# Patient Record
Sex: MALE | Race: OTHER | Employment: OTHER
[De-identification: names, ages, dates, MRNs, and addresses within clinical notes are randomized per-mention and may not be internally consistent; named-entity substitution may affect disease eponyms.]

---

## 2017-01-12 ENCOUNTER — OFFICE VISIT (OUTPATIENT)
Dept: INTERNAL MEDICINE | Facility: CLINIC | Age: 82
End: 2017-01-12

## 2017-01-12 VITALS
WEIGHT: 233 LBS | HEIGHT: 69 IN | SYSTOLIC BLOOD PRESSURE: 118 MMHG | DIASTOLIC BLOOD PRESSURE: 64 MMHG | BODY MASS INDEX: 34.51 KG/M2

## 2017-01-12 DIAGNOSIS — I42.0 DILATED CARDIOMYOPATHY (HCC): ICD-10-CM

## 2017-01-12 DIAGNOSIS — N40.1 BENIGN NON-NODULAR PROSTATIC HYPERPLASIA WITH LOWER URINARY TRACT SYMPTOMS: ICD-10-CM

## 2017-01-12 DIAGNOSIS — I48.20 CHRONIC ATRIAL FIBRILLATION (HCC): ICD-10-CM

## 2017-01-12 DIAGNOSIS — E11.9 TYPE 2 DIABETES MELLITUS WITHOUT COMPLICATION, WITHOUT LONG-TERM CURRENT USE OF INSULIN (HCC): Primary | ICD-10-CM

## 2017-01-12 PROCEDURE — 99214 OFFICE O/P EST MOD 30 MIN: CPT | Performed by: INTERNAL MEDICINE

## 2017-01-12 RX ORDER — FUROSEMIDE 40 MG/1
40 TABLET ORAL DAILY
Qty: 90 TABLET | Refills: 3 | Status: SHIPPED | OUTPATIENT
Start: 2017-01-12 | End: 2018-10-23

## 2017-01-12 ASSESSMENT — ENCOUNTER SYMPTOMS
ABDOMINAL PAIN: 0
SHORTNESS OF BREATH: 0

## 2017-01-13 DIAGNOSIS — I42.0 DILATED CARDIOMYOPATHY (HCC): ICD-10-CM

## 2017-01-13 RX ORDER — METOLAZONE 2.5 MG/1
2.5 TABLET ORAL
Qty: 30 TABLET | Refills: 3 | Status: SHIPPED | OUTPATIENT
Start: 2017-01-16 | End: 2017-01-16 | Stop reason: SDUPTHER

## 2017-01-16 DIAGNOSIS — I42.0 DILATED CARDIOMYOPATHY (HCC): ICD-10-CM

## 2017-01-16 RX ORDER — METOLAZONE 2.5 MG/1
2.5 TABLET ORAL
Qty: 30 TABLET | Refills: 3 | Status: SHIPPED | OUTPATIENT
Start: 2017-01-16 | End: 2017-03-23 | Stop reason: DRUGHIGH

## 2017-03-08 ENCOUNTER — HOSPITAL ENCOUNTER (OUTPATIENT)
Dept: GENERAL RADIOLOGY | Age: 82
Discharge: HOME | End: 2017-03-08
Payer: MEDICARE

## 2017-03-08 ENCOUNTER — HOSPITAL ENCOUNTER (OUTPATIENT)
Age: 82
Discharge: HOME | End: 2017-03-08
Payer: MEDICARE

## 2017-03-08 DIAGNOSIS — I48.20 CHRONIC ATRIAL FIBRILLATION (HCC): ICD-10-CM

## 2017-03-08 DIAGNOSIS — I42.0 DILATED CARDIOMYOPATHY (HCC): ICD-10-CM

## 2017-03-08 DIAGNOSIS — E11.9 TYPE 2 DIABETES MELLITUS WITHOUT COMPLICATION, WITHOUT LONG-TERM CURRENT USE OF INSULIN (HCC): ICD-10-CM

## 2017-03-08 LAB
ANION GAP SERPL CALCULATED.3IONS-SCNC: 16 MMOL/L (ref 9–17)
BUN BLDV-MCNC: 24 MG/DL (ref 8–23)
BUN/CREAT BLD: ABNORMAL (ref 9–20)
CALCIUM SERPL-MCNC: 10.1 MG/DL (ref 8.6–10.4)
CHLORIDE BLD-SCNC: 100 MMOL/L (ref 98–107)
CHOLESTEROL/HDL RATIO: 3.7
CHOLESTEROL: 182 MG/DL
CO2: 27 MMOL/L (ref 20–31)
CREAT SERPL-MCNC: 0.8 MG/DL (ref 0.7–1.2)
ESTIMATED AVERAGE GLUCOSE: 154 MG/DL
GFR AFRICAN AMERICAN: >60 ML/MIN
GFR NON-AFRICAN AMERICAN: >60 ML/MIN
GFR SERPL CREATININE-BSD FRML MDRD: ABNORMAL ML/MIN/{1.73_M2}
GFR SERPL CREATININE-BSD FRML MDRD: ABNORMAL ML/MIN/{1.73_M2}
GLUCOSE BLD-MCNC: 136 MG/DL (ref 70–99)
HBA1C MFR BLD: 7 % (ref 4–6)
HCT VFR BLD CALC: 37.9 % (ref 41–53)
HDLC SERPL-MCNC: 49 MG/DL
HEMOGLOBIN: 12.7 G/DL (ref 13.5–17.5)
LDL CHOLESTEROL: 101 MG/DL (ref 0–130)
MCH RBC QN AUTO: 27.5 PG (ref 26–34)
MCHC RBC AUTO-ENTMCNC: 33.4 G/DL (ref 31–37)
MCV RBC AUTO: 82.4 FL (ref 80–100)
PDW BLD-RTO: 15.7 % (ref 11.5–14.9)
PLATELET # BLD: 270 K/UL (ref 150–450)
PMV BLD AUTO: 8 FL (ref 6–12)
POTASSIUM SERPL-SCNC: 4.7 MMOL/L (ref 3.7–5.3)
RBC # BLD: 4.6 M/UL (ref 4.5–5.9)
SODIUM BLD-SCNC: 143 MMOL/L (ref 135–144)
TRIGL SERPL-MCNC: 162 MG/DL
VLDLC SERPL CALC-MCNC: ABNORMAL MG/DL (ref 1–30)
WBC # BLD: 6.7 K/UL (ref 3.5–11)

## 2017-03-08 PROCEDURE — 85027 COMPLETE CBC AUTOMATED: CPT

## 2017-03-08 PROCEDURE — 82043 UR ALBUMIN QUANTITATIVE: CPT

## 2017-03-08 PROCEDURE — 71020 XR CHEST STANDARD TWO VW: CPT

## 2017-03-08 PROCEDURE — 36415 COLL VENOUS BLD VENIPUNCTURE: CPT

## 2017-03-08 PROCEDURE — 80061 LIPID PANEL: CPT

## 2017-03-08 PROCEDURE — 83036 HEMOGLOBIN GLYCOSYLATED A1C: CPT

## 2017-03-08 PROCEDURE — 82570 ASSAY OF URINE CREATININE: CPT

## 2017-03-08 PROCEDURE — 80048 BASIC METABOLIC PNL TOTAL CA: CPT

## 2017-03-23 ENCOUNTER — OFFICE VISIT (OUTPATIENT)
Dept: INTERNAL MEDICINE CLINIC | Age: 82
End: 2017-03-23
Payer: MEDICARE

## 2017-03-23 VITALS
WEIGHT: 242 LBS | SYSTOLIC BLOOD PRESSURE: 128 MMHG | HEIGHT: 69 IN | BODY MASS INDEX: 35.84 KG/M2 | DIASTOLIC BLOOD PRESSURE: 70 MMHG

## 2017-03-23 DIAGNOSIS — E11.9 TYPE 2 DIABETES MELLITUS WITHOUT COMPLICATION, WITHOUT LONG-TERM CURRENT USE OF INSULIN (HCC): ICD-10-CM

## 2017-03-23 DIAGNOSIS — N40.1 BENIGN NON-NODULAR PROSTATIC HYPERPLASIA WITH LOWER URINARY TRACT SYMPTOMS: ICD-10-CM

## 2017-03-23 DIAGNOSIS — I48.20 CHRONIC ATRIAL FIBRILLATION (HCC): ICD-10-CM

## 2017-03-23 DIAGNOSIS — I42.0 DILATED CARDIOMYOPATHY (HCC): Primary | ICD-10-CM

## 2017-03-23 PROCEDURE — 99214 OFFICE O/P EST MOD 30 MIN: CPT | Performed by: INTERNAL MEDICINE

## 2017-03-23 RX ORDER — CARVEDILOL 3.12 MG/1
3.12 TABLET ORAL DAILY
Qty: 180 TABLET | Refills: 3 | Status: SHIPPED | OUTPATIENT
Start: 2017-03-23 | End: 2017-08-01 | Stop reason: SDUPTHER

## 2017-03-23 RX ORDER — METOLAZONE 2.5 MG/1
2.5 TABLET ORAL
Qty: 30 TABLET | Refills: 3 | Status: SHIPPED | OUTPATIENT
Start: 2017-03-23 | End: 2018-10-23 | Stop reason: SDUPTHER

## 2017-03-23 ASSESSMENT — ENCOUNTER SYMPTOMS
ORTHOPNEA: 1
BACK PAIN: 0
HEARTBURN: 0
HEMOPTYSIS: 0
NAUSEA: 0
COUGH: 0
VOMITING: 0

## 2017-03-23 ASSESSMENT — PATIENT HEALTH QUESTIONNAIRE - PHQ9
SUM OF ALL RESPONSES TO PHQ9 QUESTIONS 1 & 2: 0
2. FEELING DOWN, DEPRESSED OR HOPELESS: 0
1. LITTLE INTEREST OR PLEASURE IN DOING THINGS: 0
SUM OF ALL RESPONSES TO PHQ QUESTIONS 1-9: 0

## 2017-03-29 ENCOUNTER — HOSPITAL ENCOUNTER (OUTPATIENT)
Dept: NON INVASIVE DIAGNOSTICS | Age: 82
Discharge: HOME | End: 2017-03-29
Payer: MEDICARE

## 2017-03-29 DIAGNOSIS — I48.20 CHRONIC ATRIAL FIBRILLATION (HCC): ICD-10-CM

## 2017-03-29 DIAGNOSIS — I42.0 DILATED CARDIOMYOPATHY (HCC): ICD-10-CM

## 2017-03-29 LAB
LV EF: 55 %
LVEF MODALITY: NORMAL

## 2017-03-29 PROCEDURE — C8923 2D TTE W OR W/O FOL W/CON,CO: HCPCS

## 2017-03-29 PROCEDURE — 93005 ELECTROCARDIOGRAM TRACING: CPT

## 2017-03-30 LAB
EKG ATRIAL RATE: 300 BPM
EKG Q-T INTERVAL: 442 MS
EKG QRS DURATION: 100 MS
EKG QTC CALCULATION (BAZETT): 394 MS
EKG R AXIS: -16 DEGREES
EKG T AXIS: 87 DEGREES
EKG VENTRICULAR RATE: 48 BPM

## 2017-05-09 ENCOUNTER — OFFICE VISIT (OUTPATIENT)
Dept: INTERNAL MEDICINE CLINIC | Age: 82
End: 2017-05-09
Payer: MEDICARE

## 2017-05-09 ENCOUNTER — HOSPITAL ENCOUNTER (OUTPATIENT)
Age: 82
Setting detail: SPECIMEN
Discharge: HOME | End: 2017-05-09
Payer: MEDICARE

## 2017-05-09 VITALS
BODY MASS INDEX: 35.7 KG/M2 | WEIGHT: 241 LBS | DIASTOLIC BLOOD PRESSURE: 82 MMHG | HEIGHT: 69 IN | SYSTOLIC BLOOD PRESSURE: 128 MMHG

## 2017-05-09 DIAGNOSIS — N50.89 ANDROPAUSE: ICD-10-CM

## 2017-05-09 DIAGNOSIS — I48.20 CHRONIC ATRIAL FIBRILLATION (HCC): ICD-10-CM

## 2017-05-09 DIAGNOSIS — I10 ESSENTIAL HYPERTENSION: Primary | ICD-10-CM

## 2017-05-09 DIAGNOSIS — N40.1 BENIGN NON-NODULAR PROSTATIC HYPERPLASIA WITH LOWER URINARY TRACT SYMPTOMS: ICD-10-CM

## 2017-05-09 DIAGNOSIS — R06.02 SOB (SHORTNESS OF BREATH) ON EXERTION: ICD-10-CM

## 2017-05-09 DIAGNOSIS — E11.9 TYPE 2 DIABETES MELLITUS WITHOUT COMPLICATION, WITHOUT LONG-TERM CURRENT USE OF INSULIN (HCC): ICD-10-CM

## 2017-05-09 DIAGNOSIS — I42.0 DILATED CARDIOMYOPATHY (HCC): ICD-10-CM

## 2017-05-09 PROCEDURE — 99214 OFFICE O/P EST MOD 30 MIN: CPT | Performed by: INTERNAL MEDICINE

## 2017-05-10 ENCOUNTER — TELEPHONE (OUTPATIENT)
Dept: INTERNAL MEDICINE CLINIC | Age: 82
End: 2017-05-10

## 2017-05-10 LAB
SEX HORMONE BINDING GLOBULIN: 87 NMOL/L (ref 11–80)
TESTOSTERONE FREE-NONMALE: 7.4 PG/ML (ref 47–244)
TESTOSTERONE TOTAL: 80 NG/DL (ref 220–1000)

## 2017-05-18 ENCOUNTER — OFFICE VISIT (OUTPATIENT)
Dept: INTERNAL MEDICINE CLINIC | Age: 82
End: 2017-05-18
Payer: MEDICARE

## 2017-05-18 VITALS — BODY MASS INDEX: 35.99 KG/M2 | HEIGHT: 69 IN | WEIGHT: 243 LBS

## 2017-05-18 DIAGNOSIS — N40.1 BENIGN NON-NODULAR PROSTATIC HYPERPLASIA WITH LOWER URINARY TRACT SYMPTOMS: ICD-10-CM

## 2017-05-18 DIAGNOSIS — I48.20 CHRONIC ATRIAL FIBRILLATION (HCC): ICD-10-CM

## 2017-05-18 DIAGNOSIS — E11.9 TYPE 2 DIABETES MELLITUS WITHOUT COMPLICATION, WITHOUT LONG-TERM CURRENT USE OF INSULIN (HCC): ICD-10-CM

## 2017-05-18 DIAGNOSIS — I42.0 DILATED CARDIOMYOPATHY (HCC): ICD-10-CM

## 2017-05-18 DIAGNOSIS — N50.89 ANDROPAUSE: Primary | ICD-10-CM

## 2017-05-18 PROCEDURE — 99213 OFFICE O/P EST LOW 20 MIN: CPT | Performed by: INTERNAL MEDICINE

## 2017-05-18 RX ORDER — TESTOSTERONE 12.5 MG/1.25G
2.5 GEL TOPICAL DAILY
Qty: 1 PACKAGE | Refills: 3 | Status: SHIPPED | OUTPATIENT
Start: 2017-05-18 | End: 2017-08-16

## 2017-05-24 ENCOUNTER — TELEPHONE (OUTPATIENT)
Dept: INTERNAL MEDICINE CLINIC | Age: 82
End: 2017-05-24

## 2017-08-01 DIAGNOSIS — N40.1 BENIGN PROSTATIC HYPERPLASIA WITH LOWER URINARY TRACT SYMPTOMS, UNSPECIFIED MORPHOLOGY: ICD-10-CM

## 2017-08-01 DIAGNOSIS — I50.42 CHRONIC COMBINED SYSTOLIC AND DIASTOLIC CONGESTIVE HEART FAILURE (HCC): ICD-10-CM

## 2017-08-01 DIAGNOSIS — E11.59 TYPE 2 DIABETES MELLITUS WITH OTHER CIRCULATORY COMPLICATIONS (HCC): ICD-10-CM

## 2017-08-01 DIAGNOSIS — E11.9 TYPE 2 DIABETES MELLITUS WITHOUT COMPLICATION, UNSPECIFIED LONG TERM INSULIN USE STATUS: ICD-10-CM

## 2017-08-01 RX ORDER — GLIMEPIRIDE 2 MG/1
2 TABLET ORAL
Qty: 90 TABLET | Refills: 3 | Status: SHIPPED | OUTPATIENT
Start: 2017-08-01 | End: 2018-06-28 | Stop reason: SDUPTHER

## 2017-08-01 RX ORDER — CARVEDILOL 3.12 MG/1
3.12 TABLET ORAL DAILY
Qty: 90 TABLET | Refills: 3 | Status: SHIPPED | OUTPATIENT
Start: 2017-08-01 | End: 2017-11-08

## 2017-08-01 RX ORDER — TAMSULOSIN HYDROCHLORIDE 0.4 MG/1
0.4 CAPSULE ORAL DAILY
Qty: 90 CAPSULE | Refills: 3 | Status: SHIPPED | OUTPATIENT
Start: 2017-08-01 | End: 2018-08-27 | Stop reason: SDUPTHER

## 2017-08-01 RX ORDER — LISINOPRIL 20 MG/1
20 TABLET ORAL DAILY
Qty: 90 TABLET | Refills: 3 | Status: SHIPPED | OUTPATIENT
Start: 2017-08-01 | End: 2017-08-16 | Stop reason: SDUPTHER

## 2017-08-16 ENCOUNTER — OFFICE VISIT (OUTPATIENT)
Dept: INTERNAL MEDICINE CLINIC | Age: 82
End: 2017-08-16
Payer: MEDICARE

## 2017-08-16 VITALS
SYSTOLIC BLOOD PRESSURE: 100 MMHG | WEIGHT: 237 LBS | HEIGHT: 69 IN | DIASTOLIC BLOOD PRESSURE: 60 MMHG | BODY MASS INDEX: 35.1 KG/M2

## 2017-08-16 DIAGNOSIS — I50.42 CHRONIC COMBINED SYSTOLIC AND DIASTOLIC CONGESTIVE HEART FAILURE (HCC): ICD-10-CM

## 2017-08-16 DIAGNOSIS — Z98.890 HISTORY OF INGUINAL HERNIA REPAIR: ICD-10-CM

## 2017-08-16 DIAGNOSIS — E11.9 TYPE 2 DIABETES MELLITUS WITHOUT COMPLICATION, WITHOUT LONG-TERM CURRENT USE OF INSULIN (HCC): ICD-10-CM

## 2017-08-16 DIAGNOSIS — R20.2 PARESTHESIA OF BILATERAL LEGS: ICD-10-CM

## 2017-08-16 DIAGNOSIS — I42.0 DILATED CARDIOMYOPATHY (HCC): ICD-10-CM

## 2017-08-16 DIAGNOSIS — Z23 FLU VACCINE NEED: Primary | ICD-10-CM

## 2017-08-16 DIAGNOSIS — E11.42 TYPE 2 DIABETES MELLITUS WITH DIABETIC POLYNEUROPATHY, WITHOUT LONG-TERM CURRENT USE OF INSULIN (HCC): ICD-10-CM

## 2017-08-16 DIAGNOSIS — N40.1 BENIGN NON-NODULAR PROSTATIC HYPERPLASIA WITH LOWER URINARY TRACT SYMPTOMS: ICD-10-CM

## 2017-08-16 DIAGNOSIS — Z87.19 HISTORY OF INGUINAL HERNIA REPAIR: ICD-10-CM

## 2017-08-16 DIAGNOSIS — N50.89 ANDROPAUSE: ICD-10-CM

## 2017-08-16 DIAGNOSIS — I48.20 CHRONIC ATRIAL FIBRILLATION (HCC): ICD-10-CM

## 2017-08-16 PROCEDURE — G0008 ADMIN INFLUENZA VIRUS VAC: HCPCS | Performed by: INTERNAL MEDICINE

## 2017-08-16 PROCEDURE — 99214 OFFICE O/P EST MOD 30 MIN: CPT | Performed by: INTERNAL MEDICINE

## 2017-08-16 PROCEDURE — 90662 IIV NO PRSV INCREASED AG IM: CPT | Performed by: INTERNAL MEDICINE

## 2017-08-16 RX ORDER — LISINOPRIL 10 MG/1
10 TABLET ORAL DAILY
Qty: 90 TABLET | Refills: 3 | Status: SHIPPED | OUTPATIENT
Start: 2017-08-16 | End: 2018-06-28 | Stop reason: SDUPTHER

## 2017-08-16 RX ORDER — LISINOPRIL 20 MG/1
10 TABLET ORAL DAILY
Qty: 90 TABLET | Refills: 3 | Status: SHIPPED | OUTPATIENT
Start: 2017-08-16 | End: 2017-08-16 | Stop reason: SDUPTHER

## 2017-08-16 RX ORDER — GABAPENTIN 300 MG/1
300 CAPSULE ORAL NIGHTLY
Qty: 90 CAPSULE | Refills: 1 | Status: SHIPPED | OUTPATIENT
Start: 2017-08-16 | End: 2019-01-16

## 2017-08-16 ASSESSMENT — ENCOUNTER SYMPTOMS
COUGH: 0
HEARTBURN: 0
BACK PAIN: 0
HEMOPTYSIS: 0
VOMITING: 0
NAUSEA: 0
ORTHOPNEA: 1

## 2017-11-08 ENCOUNTER — OFFICE VISIT (OUTPATIENT)
Dept: INTERNAL MEDICINE CLINIC | Age: 82
End: 2017-11-08
Payer: MEDICARE

## 2017-11-08 VITALS
BODY MASS INDEX: 35.55 KG/M2 | WEIGHT: 240 LBS | DIASTOLIC BLOOD PRESSURE: 72 MMHG | HEIGHT: 69 IN | OXYGEN SATURATION: 94 % | HEART RATE: 42 BPM | SYSTOLIC BLOOD PRESSURE: 118 MMHG

## 2017-11-08 DIAGNOSIS — M54.50 ACUTE RIGHT-SIDED LOW BACK PAIN WITHOUT SCIATICA: ICD-10-CM
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
## 2017-11-08 DIAGNOSIS — I48.20 CHRONIC ATRIAL FIBRILLATION (HCC): ICD-10-CM

## 2017-11-08 DIAGNOSIS — E11.9 TYPE 2 DIABETES MELLITUS WITHOUT COMPLICATION, WITHOUT LONG-TERM CURRENT USE OF INSULIN (HCC): ICD-10-CM

## 2017-11-08 PROCEDURE — 1036F TOBACCO NON-USER: CPT | Performed by: INTERNAL MEDICINE

## 2017-11-08 PROCEDURE — G8417 CALC BMI ABV UP PARAM F/U: HCPCS | Performed by: INTERNAL MEDICINE

## 2017-11-08 PROCEDURE — G8484 FLU IMMUNIZE NO ADMIN: HCPCS | Performed by: INTERNAL MEDICINE

## 2017-11-08 PROCEDURE — 99214 OFFICE O/P EST MOD 30 MIN: CPT | Performed by: INTERNAL MEDICINE

## 2017-11-08 PROCEDURE — 4040F PNEUMOC VAC/ADMIN/RCVD: CPT | Performed by: INTERNAL MEDICINE

## 2017-11-08 PROCEDURE — 1123F ACP DISCUSS/DSCN MKR DOCD: CPT | Performed by: INTERNAL MEDICINE

## 2017-11-08 PROCEDURE — G8427 DOCREV CUR MEDS BY ELIG CLIN: HCPCS | Performed by: INTERNAL MEDICINE

## 2017-11-08 RX ORDER — TIZANIDINE 2 MG/1
2 TABLET ORAL NIGHTLY PRN
Qty: 30 TABLET | Refills: 0 | Status: SHIPPED | OUTPATIENT
Start: 2017-11-08 | End: 2018-10-30

## 2017-11-08 NOTE — PROGRESS NOTES
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  Accidental fall, initial encounter Yes    Acute right-sided low back pain without sciatica     Type 2 diabetes mellitus without complication, without long-term current use of insulin (HCC)     Chronic atrial fibrillation ([facility] [address]           AND -had an accidental fall from ladder at home -     Known  CHRONIC  HEART FAILURE . NYHA CLASS   -- 2 -- ,  NO CHANGE IN SOB AND LEG EDEMA SINCE LAST VISIT . NO ORTHOPNEA. COMPLIANT WITH DRUG REGIMEN . -irregular bowels-      ROS AS NOTED IN HPI ,    Other  positive -- Review of Systems   Constitutional: Negative for malaise/fatigue and weight loss. Respiratory: Negative for shortness of breath. Cardiovascular: Negative for chest pain, palpitations and leg swelling. Gastrointestinal: Positive for constipation. Negative for abdominal pain. Musculoskeletal: Positive for falls, joint pain and myalgias. Skin: Negative for rash. Neurological: Negative for focal weakness. ALL OTHER  SYSTEM REVIEW NEGATIVE.   Social History   Substance Use Topics    Smoking status: Former Smoker     Packs/day: 20.00     Types: Cigarettes    Smokeless tobacco: Never Used      Comment: quit 42 years ago     Alcohol use No      Comment: quite 42 years ago      Xarelto [rivaroxaban]     FAMILY  And SOCIAL HISTORY:   Reviewed and No change from previous visit     Allergies; medicatons; past medical, surgical, family, and social history; and problem list reviewed by me, as indicated in this encounter  . OBJECTIVE      Physical exam      Vitals:    11/08/17 1402   BP: 118/72   Pulse: (!) 42   SpO2: 94%   Weight: 240 lb (108.9 kg)   Height: 5' 8.9\" (1.75 m)      Estimated body mass index is 35.55 kg/m² as calculated from the following:    Height as of this encounter: 5' 8.9\" (1.75 m). Weight as of this encounter: 240 lb (108.9 kg). Physical Exam   Constitutional: He is cooperative. Neck: Carotid bruit is not present. No thyroid mass and no thyromegaly present. Cardiovascular: Normal rate, regular rhythm and normal heart sounds. Exam reveals no S3. No murmur heard. Pulmonary/Chest: Effort normal and breath sounds normal.   Neurological: He is alert. He has normal strength. Skin: Skin is warm and dry. No rash noted. Psychiatric: He has a normal mood and affect.              DIAGNOSTICS / INSERTS / IMAGES      [x] Reviewed  ;       Labs           Chemistry        Component Value Date/Time     03/08/2017 1221    K 4.7 03/08/2017 1221     03/08/2017 1221    CO2 27 03/08/2017 1221    BUN 24 (H) 03/08/2017 1221    CREATININE 0.80 03/08/2017 1221        Component Value Date/Time    CALCIUM 10.1 03/08/2017 1221    ALKPHOS 47 03/31/2016 1215    AST 16 03/31/2016 1215    ALT 18 03/31/2016 1215    BILITOT 0.57 03/31/2016 1215          Lab Results   Component Value Date    WBC 6.7 03/08/2017    HGB 12.7 (L) 03/08/2017    HCT 37.9 (L) 03/08/2017    MCV 82.4 03/08/2017     03/08/2017     Lab Results   Component Value Date    LABMICR 8 08/30/2016     No components found for: CHLPL  Lab Results   Component Value Date    TRIG 162 (H) 03/08/2017     Lab Results   Component Value Date    HDL 49 03/08/2017     Lab Results   Component Value Date    LDLCHOLESTEROL 101 03/08/2017               Results for orders placed or performed during the hospital encounter of 05/09/17 Testosterone Free and Total Male   Result Value Ref Range    Testosterone 80 (L) 220 - 1,000 ng/dL    Sex Hormone Binding 87 (H) 11 - 80 nmol/L    Testosterone, Free 7.4 (L) 47 - 244 pg/mL        No results found. ASSESSMENT / [address] was seen today for fall, congestive heart failure and other. Diagnoses and all orders for this visit:    Accidental fall, initial encounter  Has sxs  -     tiZANidine (ZANAFLEX) 2 MG tablet; Take 1 tablet by mouth nightly as needed (muscle spasms)    Acute right-sided low back pain without sciatica  bruising-     tiZANidine (ZANAFLEX) 2 MG tablet; Take 1 tablet by mouth nightly as needed (muscle spasms)    Type 2 diabetes mellitus without complication, without long-term current use of insulin ([facility] [address]  Fair control . Therapy reviewed . Continue to monitor .     -     Hemoglobin A1C; Future  -     Basic Metabolic Panel; Future    Chronic atrial fibrillation ([facility] [address]  1. Stable . 2. Rx reviewed . 3. Continue present regimen .                -Key points--;  -accidental fall-     SALIENT  ACTIONS TODAYS VISIT     Today's office visit SALIENT ACTIONS   Medications Discontinued During This Encounter   Medication Reason    carvedilol (COREG) 3.125 MG tablet            Orders Placed This Encounter   Procedures    Hemoglobin A1C     Standing Status:   Future     Standing Expiration Date:   11/8/2018    Basic Metabolic Panel     Standing Status:   Future     Standing Expiration Date:   11/8/2018               There are no Patient Instructions on file for this visit.      Current Outpatient Prescriptions   Medication Sig Dispense Refill    tiZANidine (ZANAFLEX) 2 MG tablet Take 1 tablet by mouth nightly as needed (muscle spasms) 30 tablet 0    gabapentin (NEURONTIN) 300 MG capsule Take 1 capsule by mouth nightly 90 capsule 1    lisinopril (PRINIVIL;ZESTRIL) 10 MG tablet Take 1 tablet by mouth daily 90 tablet 3    metFORMIN (GLUCOPHAGE) 500 MG tablet Take 1 tablet by mouth daily (with breakfast) 90 tablet 3    tamsulosin (FLOMAX) 0.4 MG capsule Take 1 capsule by mouth daily 90 capsule 3    glimepiride (AMARYL) 2 MG tablet Take 1 tablet by mouth every morning (before breakfast) 90 tablet 3    metolazone (ZAROXOLYN) 2.5 MG tablet Take 1 tablet by mouth Twice a Week Mondays only 30 tablet 3    furosemide (LASIX) 40 MG tablet Take 1 tablet by mouth daily In morning 90 tablet 3    simvastatin (ZOCOR) 20 MG tablet Take 1 tablet by mouth nightly 90 tablet 3    glucose monitoring kit (FREESTYLE) monitoring kit 1 each by Does not apply route once for 1 dose. 1 kit 0    aspirin EC 81 MG EC tablet Take 1 tablet by mouth daily. 30 tablet 11    Multiple Vitamin (MULTI VITAMIN MENS PO) Take  by mouth.  Calcium Citrate-Vitamin D (CITRACAL + D PO) Take  by mouth.  fish oil-omega-3 fatty acids 1000 MG capsule Take 2 g by mouth daily.  Cyanocobalamin (VITAMIN B 12 PO) Take  by mouth.  Misc Natural Products (LEG VEIN & CIRCULATION PO) Take  by mouth. No current facility-administered medications for this visit. FOLLOW UP  PLANS     Return in about 1 month (around 12/8/2017). MD PIA [facility] [address], [address] [place] [address].    Phone [phone]   Fax: [phone]  Answering Service: [phone]

## 2017-11-20 ASSESSMENT — ENCOUNTER SYMPTOMS
ABDOMINAL PAIN: 0
CONSTIPATION: 1
SHORTNESS OF BREATH: 0

## 2017-12-21 ENCOUNTER — OFFICE VISIT (OUTPATIENT)
Dept: UROLOGY | Age: 82
End: 2017-12-21
Payer: MEDICARE

## 2017-12-21 VITALS
SYSTOLIC BLOOD PRESSURE: 132 MMHG | DIASTOLIC BLOOD PRESSURE: 74 MMHG | HEART RATE: 64 BPM | RESPIRATION RATE: 16 BRPM | HEIGHT: 69 IN | TEMPERATURE: 98 F | WEIGHT: 240.08 LBS | BODY MASS INDEX: 35.56 KG/M2

## 2017-12-21 DIAGNOSIS — N40.1 BPH WITH OBSTRUCTION/LOWER URINARY TRACT SYMPTOMS: ICD-10-CM

## 2017-12-21 DIAGNOSIS — N13.8 BPH WITH OBSTRUCTION/LOWER URINARY TRACT SYMPTOMS: ICD-10-CM

## 2017-12-21 DIAGNOSIS — N43.0 ENCYSTED HYDROCELE: Primary | ICD-10-CM

## 2017-12-21 DIAGNOSIS — N47.1 PHIMOSIS: ICD-10-CM

## 2017-12-21 PROCEDURE — G8427 DOCREV CUR MEDS BY ELIG CLIN: HCPCS | Performed by: UROLOGY

## 2017-12-21 PROCEDURE — 99214 OFFICE O/P EST MOD 30 MIN: CPT | Performed by: UROLOGY

## 2017-12-21 PROCEDURE — 1123F ACP DISCUSS/DSCN MKR DOCD: CPT | Performed by: UROLOGY

## 2017-12-21 PROCEDURE — 1036F TOBACCO NON-USER: CPT | Performed by: UROLOGY

## 2017-12-21 PROCEDURE — G8417 CALC BMI ABV UP PARAM F/U: HCPCS | Performed by: UROLOGY

## 2017-12-21 PROCEDURE — 4040F PNEUMOC VAC/ADMIN/RCVD: CPT | Performed by: UROLOGY

## 2017-12-21 PROCEDURE — G8484 FLU IMMUNIZE NO ADMIN: HCPCS | Performed by: UROLOGY

## 2017-12-21 ASSESSMENT — ENCOUNTER SYMPTOMS
COUGH: 0
EYE PAIN: 0
NAUSEA: 0
WHEEZING: 0
VOMITING: 0
EYE REDNESS: 0
BACK PAIN: 0
SHORTNESS OF BREATH: 0
COLOR CHANGE: 0
ABDOMINAL PAIN: 0

## 2017-12-21 NOTE — PROGRESS NOTES
non-tender, non-distended with no CVA,  No flank tenderness,  Or hepatosplenomegaly   Lymphatics: No palpable lymphadenopathy. Bladder non-tender and not distended. Musculoskeletal: Normal gait and station  Testiscles: right hydrocele noted, absent left testicle. Phimosis noted. Assessment and Plan      1. Encysted hydrocele    2. Phimosis    3. BPH with obstruction/lower urinary tract symptoms       He is now unable to retract his foreskin      Plan:         Continue Flomax  Discussed hyrdocelectomy, but he is not bothered enough to proceed. We discussed a dorsal slit for the phimosis, but he is unclear if he wants to proceed. Prescriptions Ordered:  No orders of the defined types were placed in this encounter. Orders Placed:  No orders of the defined types were placed in this encounter.          Zhanna Maya MD

## 2017-12-21 NOTE — LETTER
[facility]  [facility] [place]  [address]  [address]  [address] [place] [id number]  Dept: [phone]  Dept Fax: [phone]        12/21/17    Patient: Parrish Tomas  YOB: 1935    Dear Ramesh Posada MD,    I had the pleasure of seeing one of your patients, Clyde Mathias today in the office today. Below are the relevant portions of my assessment and plan of care. IMPRESSION:     1. Encysted hydrocele    2. Phimosis    3. BPH with obstruction/lower urinary tract symptoms        PLAN:        Continue Flomax  Discussed hyrdocelectomy, but he is not bothered enough to proceed. We discussed a dorsal slit for the phimosis, but he is unclear if he wants to proceed. Prescriptions Ordered:  No orders of the defined types were placed in this encounter. Orders Placed:  No orders of the defined types were placed in this encounter. Thank you for allowing me to participate in the care of this patient. I will keep you updated on this patient's follow up and I look forward to serving you and your patients again in the future.         Oanh Moran MD

## 2018-01-30 DIAGNOSIS — E78.2 MIXED HYPERLIPIDEMIA: ICD-10-CM

## 2018-01-30 RX ORDER — SIMVASTATIN 20 MG
20 TABLET ORAL NIGHTLY
Qty: 90 TABLET | Refills: 3 | Status: SHIPPED | OUTPATIENT
Start: 2018-01-30 | End: 2019-05-29 | Stop reason: SDUPTHER

## 2018-06-28 DIAGNOSIS — I50.42 CHRONIC COMBINED SYSTOLIC AND DIASTOLIC CONGESTIVE HEART FAILURE (HCC): ICD-10-CM

## 2018-06-28 DIAGNOSIS — E11.9 TYPE 2 DIABETES MELLITUS WITHOUT COMPLICATION, UNSPECIFIED LONG TERM INSULIN USE STATUS: ICD-10-CM

## 2018-06-28 RX ORDER — LISINOPRIL 10 MG/1
TABLET ORAL
Qty: 90 TABLET | Refills: 3 | Status: SHIPPED | OUTPATIENT
Start: 2018-06-28 | End: 2019-10-16

## 2018-06-28 RX ORDER — GLIMEPIRIDE 2 MG/1
TABLET ORAL
Qty: 90 TABLET | Refills: 3 | Status: SHIPPED | OUTPATIENT
Start: 2018-06-28 | End: 2018-11-21 | Stop reason: SDUPTHER

## 2018-08-27 RX ORDER — TAMSULOSIN HYDROCHLORIDE 0.4 MG/1
0.4 CAPSULE ORAL DAILY
Qty: 90 CAPSULE | Refills: 3 | Status: SHIPPED | OUTPATIENT
Start: 2018-08-27 | End: 2019-08-19 | Stop reason: SDUPTHER

## 2018-08-27 NOTE — TELEPHONE ENCOUNTER
Medication: tamsulosin  Last visit: 118/17  Next visit: Visit date not found  Last refill: 7/2017  Pharmacy: [facility]

## 2018-10-23 ENCOUNTER — OFFICE VISIT (OUTPATIENT)
Dept: INTERNAL MEDICINE CLINIC | Age: 83
End: 2018-10-23
Payer: MEDICARE

## 2018-10-23 ENCOUNTER — OFFICE VISIT (OUTPATIENT)
Dept: UROLOGY | Age: 83
End: 2018-10-23
Payer: MEDICARE

## 2018-10-23 VITALS
SYSTOLIC BLOOD PRESSURE: 172 MMHG | HEART RATE: 66 BPM | HEIGHT: 69 IN | WEIGHT: 242 LBS | BODY MASS INDEX: 35.84 KG/M2 | OXYGEN SATURATION: 95 % | DIASTOLIC BLOOD PRESSURE: 74 MMHG

## 2018-10-23 VITALS
SYSTOLIC BLOOD PRESSURE: 112 MMHG | HEART RATE: 52 BPM | DIASTOLIC BLOOD PRESSURE: 62 MMHG | WEIGHT: 242 LBS | BODY MASS INDEX: 35.84 KG/M2 | HEIGHT: 69 IN | TEMPERATURE: 98 F

## 2018-10-23 DIAGNOSIS — I42.0 DILATED CARDIOMYOPATHY (HCC): ICD-10-CM

## 2018-10-23 DIAGNOSIS — N40.1 BENIGN PROSTATIC HYPERPLASIA WITH LOWER URINARY TRACT SYMPTOMS, SYMPTOM DETAILS UNSPECIFIED: ICD-10-CM

## 2018-10-23 DIAGNOSIS — N47.1 PHIMOSIS: Primary | ICD-10-CM

## 2018-10-23 DIAGNOSIS — N50.89 SCROTAL EDEMA: ICD-10-CM

## 2018-10-23 DIAGNOSIS — I48.20 CHRONIC ATRIAL FIBRILLATION (HCC): ICD-10-CM

## 2018-10-23 DIAGNOSIS — E11.9 TYPE 2 DIABETES MELLITUS WITHOUT COMPLICATION, WITHOUT LONG-TERM CURRENT USE OF INSULIN (HCC): ICD-10-CM

## 2018-10-23 DIAGNOSIS — R60.0 LOWER EXTREMITY EDEMA: ICD-10-CM

## 2018-10-23 DIAGNOSIS — Z23 NEED FOR VACCINATION: Primary | ICD-10-CM

## 2018-10-23 DIAGNOSIS — L81.9 SKIN DEPIGMENTATION: ICD-10-CM

## 2018-10-23 DIAGNOSIS — I50.43 ACUTE ON CHRONIC COMBINED SYSTOLIC AND DIASTOLIC HEART FAILURE (HCC): ICD-10-CM

## 2018-10-23 PROCEDURE — G8484 FLU IMMUNIZE NO ADMIN: HCPCS | Performed by: NURSE PRACTITIONER

## 2018-10-23 PROCEDURE — 1123F ACP DISCUSS/DSCN MKR DOCD: CPT | Performed by: NURSE PRACTITIONER

## 2018-10-23 PROCEDURE — 1036F TOBACCO NON-USER: CPT | Performed by: INTERNAL MEDICINE

## 2018-10-23 PROCEDURE — 4040F PNEUMOC VAC/ADMIN/RCVD: CPT | Performed by: INTERNAL MEDICINE

## 2018-10-23 PROCEDURE — 1123F ACP DISCUSS/DSCN MKR DOCD: CPT | Performed by: INTERNAL MEDICINE

## 2018-10-23 PROCEDURE — 4040F PNEUMOC VAC/ADMIN/RCVD: CPT | Performed by: NURSE PRACTITIONER

## 2018-10-23 PROCEDURE — G8417 CALC BMI ABV UP PARAM F/U: HCPCS | Performed by: NURSE PRACTITIONER

## 2018-10-23 PROCEDURE — 1036F TOBACCO NON-USER: CPT | Performed by: NURSE PRACTITIONER

## 2018-10-23 PROCEDURE — 99214 OFFICE O/P EST MOD 30 MIN: CPT | Performed by: NURSE PRACTITIONER

## 2018-10-23 PROCEDURE — 1101F PT FALLS ASSESS-DOCD LE1/YR: CPT | Performed by: INTERNAL MEDICINE

## 2018-10-23 PROCEDURE — 99214 OFFICE O/P EST MOD 30 MIN: CPT | Performed by: INTERNAL MEDICINE

## 2018-10-23 PROCEDURE — G8427 DOCREV CUR MEDS BY ELIG CLIN: HCPCS | Performed by: NURSE PRACTITIONER

## 2018-10-23 PROCEDURE — G8417 CALC BMI ABV UP PARAM F/U: HCPCS | Performed by: INTERNAL MEDICINE

## 2018-10-23 PROCEDURE — G8427 DOCREV CUR MEDS BY ELIG CLIN: HCPCS | Performed by: INTERNAL MEDICINE

## 2018-10-23 PROCEDURE — G8484 FLU IMMUNIZE NO ADMIN: HCPCS | Performed by: INTERNAL MEDICINE

## 2018-10-23 PROCEDURE — 1101F PT FALLS ASSESS-DOCD LE1/YR: CPT | Performed by: NURSE PRACTITIONER

## 2018-10-23 RX ORDER — METOLAZONE 2.5 MG/1
2.5 TABLET ORAL DAILY
Qty: 30 TABLET | Refills: 3 | Status: SHIPPED | OUTPATIENT
Start: 2018-10-23 | End: 2018-11-21 | Stop reason: SDUPTHER

## 2018-10-23 RX ORDER — TORSEMIDE 20 MG/1
20 TABLET ORAL
Qty: 30 TABLET | Refills: 0 | Status: SHIPPED | OUTPATIENT
Start: 2018-10-23 | End: 2018-10-30 | Stop reason: SDUPTHER

## 2018-10-23 ASSESSMENT — ENCOUNTER SYMPTOMS
WHEEZING: 0
COLOR CHANGE: 0
COUGH: 0
BACK PAIN: 0
EYE REDNESS: 0
EYE PAIN: 0
ABDOMINAL PAIN: 0
VOMITING: 0
SHORTNESS OF BREATH: 0
NAUSEA: 0

## 2018-10-23 ASSESSMENT — PATIENT HEALTH QUESTIONNAIRE - PHQ9
SUM OF ALL RESPONSES TO PHQ QUESTIONS 1-9: 0
SUM OF ALL RESPONSES TO PHQ QUESTIONS 1-9: 0
2. FEELING DOWN, DEPRESSED OR HOPELESS: 0
1. LITTLE INTEREST OR PLEASURE IN DOING THINGS: 0
SUM OF ALL RESPONSES TO PHQ9 QUESTIONS 1 & 2: 0

## 2018-10-23 NOTE — PATIENT INSTRUCTIONS
Asked  [facility] [place], [address] to evaluate depigmentation noted in assessment.     He agrees with plan: appt has been scheduled today by our office with Dr Karo Galloway for evaluation of lower leg pitting edema,  SOB and weight gain at 4pm.     Elevate legs when sitting, consider scrotal elevation with hand towel when sitting or lying     Continue water pills as per Dr Berenice Newman instructions.      F/u to re-evaluate in 3 weeks for possible dorsal slit. Wife and pt both verbalize understanding.     Call with questions or concerns sooner.

## 2018-10-23 NOTE — PATIENT INSTRUCTIONS
Patient Education        Limiting Sodium and Fluids With Heart Failure: Care Instructions  Your Care Instructions    Sodium causes your body to hold on to extra water. This may cause your heart failure symptoms to get worse. Limiting sodium may help you feel better and lower your risk of having to go to the hospital.  People get most of their sodium from processed foods. Fast food and restaurant meals also tend to be very high in sodium. Your doctor may suggest that you limit sodium to 2,000 milligrams (mg) a day or less. That is less than 1 teaspoon of salt a day, including all the salt you eat in cooked or packaged foods. Usually, you have to limit the amount of liquids you drink only if your heart failure is severe. Limiting sodium alone often is enough to help your body get rid of extra fluids. However, your doctor may tell you to limit your fluid intake to a set amount each day. Follow-up care is a key part of your treatment and safety. Be sure to make and go to all appointments, and call your doctor if you are having problems. It's also a good idea to know your test results and keep a list of the medicines you take. How can you care for yourself at home? Read food labels  · Read food labels on cans and food packages. The labels tell you how much sodium is in each serving. Make sure that you look at the serving size. If you eat more than the serving size, you have eaten more sodium than is listed for one serving. · Food labels also tell you the Percent Daily Value. If the Percent Daily Value says 50%, it means that you will get at least 50% of all the sodium you need for the entire day in one serving. Choose products with low Percent Daily Values for sodium. · Be aware that sodium can come in forms other than salt, including monosodium glutamate (MSG), sodium citrate, and sodium bicarbonate (baking soda). MSG is often added to Asian food. You can sometimes ask for food without MSG or salt.   Buy

## 2018-10-23 NOTE — PROGRESS NOTES
mouth daily In morning 90 tablet 3    glucose monitoring kit (FREESTYLE) monitoring kit 1 each by Does not apply route once for 1 dose. 1 kit 0    aspirin EC 81 MG EC tablet Take 1 tablet by mouth daily. 30 tablet 11    Multiple Vitamin (MULTI VITAMIN MENS PO) Take  by mouth.  Calcium Citrate-Vitamin D (CITRACAL + D PO) Take  by mouth.  fish oil-omega-3 fatty acids 1000 MG capsule Take 2 g by mouth daily.  Cyanocobalamin (VITAMIN B 12 PO) Take  by mouth.  Misc Natural Products (LEG VEIN & CIRCULATION PO) Take  by mouth. Xarelto [rivaroxaban]  History   Smoking Status    Former Smoker    Packs/day: 20.00    Types: Cigarettes   Smokeless Tobacco    Never Used     Comment: quit 42 years ago      (Ifpatient a smoker, smoking cessation counseling offered)    History   Alcohol Use No     Comment: quite 42 years ago        REVIEW OF SYSTEMS:  Review of Systems    Physical Exam:      Vitals:    10/23/18 1136   BP: 112/62   Pulse: 52   Temp: 98 °F (36.7 °C)     Body mass index is 35.84 kg/m². Patient is a 81 y.o. male in no acute distress and alert and oriented to person, place and time. Physical Exam  Constitutional: Patient in no acute distress. Neuro: Alert and oriented to person, place and time. Psych: Mood normal, affect normal  Skin:warm, pink,  No rash noted  HEENT: Head: Normocephalic andatraumatic  Conjunctivae and EOM are normal. Pupils are equal, round  Nose:Normal  Right External Ear: Normal; Left External Ear: Normal  Mouth: Mucosa Moist  Neck: Supple  Lungs: Respiratory effort is normal  Cardiovascular: strong and regular. 2-3 plus Pitting edema in the legs, low legs shiney, no hair. Abdomen: Soft, non-tender, non-distended  Bladder non-tender and not distended. Musculoskeletal: slow gait and station  Penis: retracted, unable to expose due to swelling. Phimosis, skin around phimosis white depigmentation, no open areas. Assessment and Plan      1.  Phimosis

## 2018-10-23 NOTE — PROGRESS NOTES
OFFICE VISIT  PROGRESS NOTE         Date of patient's visit: 10/23/2018  Patient's Name:  Coty Metcalf  YOB: 1935       Patient Care Team:  Jesenia Mars MD as PCP - General (Internal Medicine)  Gordon Damon MD as Consulting Physician (Hematology and Oncology)  Art Kruger MD as Consulting Physician (Radiation Oncology)  Mirella Stuart MD        SUBJECTIVE     HISTORY           History of present illness     Pertinent details  added to ,       Chief Complaint   Patient presents with    Leg Swelling     bilateral leg edema           Encounter Diagnoses   Name Primary?  Edema, unspecified type Yes    Need for vaccination            Symptom / problem          ---     · Location/          --------  Context/Setting---        Timing/Onset/Duration--:         Quality/ Severity       ----         Modifying Factors:------        ROS AS NOTED IN HPI ,    Other  positive-- Review of Systems    ALL OTHER  SYSTEM REVIEW NEGATIVE. Allergies; medicatons; past medical, surgical, family, and social history; and problem list reviewed by me, as indicated in this encounter  . OBJECTIVE      Physical exam      Vitals:    10/23/18 1609   BP: (!) 172/74   Pulse: 66   SpO2: 95%   Weight: 242 lb (109.8 kg)   Height: 5' 8.9\" (1.75 m)        Estimated body mass index is 35.84 kg/m² as calculated from the following:    Height as of this encounter: 5' 8.9\" (1.75 m). Weight as of this encounter: 242 lb (109.8 kg). Physical Exam   Vitals: BP (!) 172/74   Pulse 66   Ht 5' 8.9\" (1.75 m)   Wt 242 lb (109.8 kg)   SpO2 95%   BMI 35.84 kg/m²                 Body mass index is 35.84 kg/m².      General Appearance:   Alert , CO-OPERATIVE , Pulmonary/Chest:        Clear to auscultation bilaterally . No wheezes, rales or rhonchi . Cardiovascular:            Normal rate, regular rhythm,                                          No murmur or  Gallop . Abdomen:                       Soft, non-tender    Extremities:                    No  Edema . Clary Thurston / ALLYSSA / Prudy Alpers    [x] Reviewed       Labs      URINE ANALYSIS: No results found for: LABURIN     CBC:  Lab Results   Component Value Date    WBC 6.7 03/08/2017    HGB 12.7 03/08/2017     03/08/2017     05/22/2012        BMP:    Lab Results   Component Value Date     03/08/2017    K 4.7 03/08/2017     03/08/2017    CO2 27 03/08/2017    BUN 24 03/08/2017    CREATININE 0.80 03/08/2017    GLUCOSE 136 03/08/2017    GLUCOSE 181 10/10/2011        No components found for: LDLC  Lab Results   Component Value Date    LABA1C 7.0 03/08/2017     No results found for: POCGLU   .     LIVER PROFILE:  Lab Results   Component Value Date    ALT 18 03/31/2016    AST 16 03/31/2016    PROT 7.0 03/31/2016    BILITOT 0.57 03/31/2016    LABALBU 3.8 03/31/2016    LABALBU 4.0 10/10/2011                           MONA / Isabella Pak was seen today for leg swelling. Diagnoses and all orders for this visit:    Edema, unspecified type    Need for vaccination    Other orders  -     INFLUENZA, HIGH DOSE, 65 YRS +, IM, PF, PREFILL SYR, 0.5ML (FLUZONE HD)          Diagnosis Orders   1. Edema, unspecified type     2. Need for vaccination          SALIENT  ACTIONS TODAYS VISIT     Today's office visit SALIENT ACTIONS    ----          There are no discontinued medications. No orders of the defined types were placed in this encounter. There are no Patient Instructions on file for this visit.           Medication List          Accurate as of 10/23/18  4:37 PM.

## 2018-10-24 ENCOUNTER — HOSPITAL ENCOUNTER (OUTPATIENT)
Age: 83
Setting detail: SPECIMEN
Discharge: HOME | End: 2018-10-24
Payer: MEDICARE

## 2018-10-24 ENCOUNTER — HOSPITAL ENCOUNTER (OUTPATIENT)
Facility: CLINIC | Age: 83
Discharge: HOME | End: 2018-10-26
Payer: MEDICARE

## 2018-10-24 ENCOUNTER — HOSPITAL ENCOUNTER (OUTPATIENT)
Dept: GENERAL RADIOLOGY | Facility: CLINIC | Age: 83
Discharge: HOME | End: 2018-10-26
Payer: MEDICARE

## 2018-10-24 DIAGNOSIS — I50.43 ACUTE ON CHRONIC COMBINED SYSTOLIC AND DIASTOLIC HEART FAILURE (HCC): ICD-10-CM

## 2018-10-24 DIAGNOSIS — E11.9 TYPE 2 DIABETES MELLITUS WITHOUT COMPLICATION, WITHOUT LONG-TERM CURRENT USE OF INSULIN (HCC): ICD-10-CM

## 2018-10-24 LAB
-: NORMAL
ALBUMIN SERPL-MCNC: 3.9 G/DL (ref 3.5–5.2)
ALBUMIN/GLOBULIN RATIO: 1.3 (ref 1–2.5)
ALP BLD-CCNC: 54 U/L (ref 40–129)
ALT SERPL-CCNC: 18 U/L (ref 5–41)
AMORPHOUS: NORMAL
ANION GAP SERPL CALCULATED.3IONS-SCNC: 12 MMOL/L (ref 9–17)
AST SERPL-CCNC: 16 U/L
BACTERIA: NORMAL
BILIRUB SERPL-MCNC: 0.74 MG/DL (ref 0.3–1.2)
BILIRUBIN URINE: NEGATIVE
BNP INTERPRETATION: ABNORMAL
BUN BLDV-MCNC: 19 MG/DL (ref 8–23)
BUN/CREAT BLD: ABNORMAL (ref 9–20)
CALCIUM SERPL-MCNC: 9.3 MG/DL (ref 8.6–10.4)
CASTS UA: NORMAL /LPF (ref 0–8)
CHLORIDE BLD-SCNC: 106 MMOL/L (ref 98–107)
CHOLESTEROL/HDL RATIO: 5.5
CHOLESTEROL: 209 MG/DL
CO2: 25 MMOL/L (ref 20–31)
COLOR: YELLOW
COMMENT UA: ABNORMAL
CREAT SERPL-MCNC: 0.82 MG/DL (ref 0.7–1.2)
CREATININE URINE: 107.2 MG/DL (ref 39–259)
CRYSTALS, UA: NORMAL /HPF
EPITHELIAL CELLS UA: NORMAL /HPF (ref 0–5)
GFR AFRICAN AMERICAN: >60 ML/MIN
GFR NON-AFRICAN AMERICAN: >60 ML/MIN
GFR SERPL CREATININE-BSD FRML MDRD: ABNORMAL ML/MIN/{1.73_M2}
GFR SERPL CREATININE-BSD FRML MDRD: ABNORMAL ML/MIN/{1.73_M2}
GLUCOSE BLD-MCNC: 118 MG/DL (ref 70–99)
GLUCOSE URINE: NEGATIVE
HCT VFR BLD CALC: 38.9 % (ref 40.7–50.3)
HDLC SERPL-MCNC: 38 MG/DL
HEMOGLOBIN: 11.9 G/DL (ref 13–17)
KETONES, URINE: NEGATIVE
LDL CHOLESTEROL: 143 MG/DL (ref 0–130)
LEUKOCYTE ESTERASE, URINE: ABNORMAL
MCH RBC QN AUTO: 26.9 PG (ref 25.2–33.5)
MCHC RBC AUTO-ENTMCNC: 30.6 G/DL (ref 28.4–34.8)
MCV RBC AUTO: 88 FL (ref 82.6–102.9)
MICROALBUMIN/CREAT 24H UR: 27 MG/L
MICROALBUMIN/CREAT UR-RTO: 25 MCG/MG CREAT
MUCUS: NORMAL
NITRITE, URINE: NEGATIVE
NRBC AUTOMATED: 0 PER 100 WBC
OTHER OBSERVATIONS UA: NORMAL
PDW BLD-RTO: 14.5 % (ref 11.8–14.4)
PH UA: 5 (ref 5–8)
PLATELET # BLD: 267 K/UL (ref 138–453)
PMV BLD AUTO: 10.4 FL (ref 8.1–13.5)
POTASSIUM SERPL-SCNC: 4.4 MMOL/L (ref 3.7–5.3)
PRO-BNP: 1607 PG/ML
PROTEIN UA: NEGATIVE
RBC # BLD: 4.42 M/UL (ref 4.21–5.77)
RBC UA: NORMAL /HPF (ref 0–4)
RENAL EPITHELIAL, UA: NORMAL /HPF
SODIUM BLD-SCNC: 143 MMOL/L (ref 135–144)
SPECIFIC GRAVITY UA: 1.02 (ref 1–1.03)
TOTAL PROTEIN: 6.8 G/DL (ref 6.4–8.3)
TRICHOMONAS: NORMAL
TRIGL SERPL-MCNC: 139 MG/DL
TURBIDITY: CLEAR
URINE HGB: NEGATIVE
UROBILINOGEN, URINE: NORMAL
VLDLC SERPL CALC-MCNC: ABNORMAL MG/DL (ref 1–30)
WBC # BLD: 5.8 K/UL (ref 3.5–11.3)
WBC UA: NORMAL /HPF (ref 0–5)
YEAST: NORMAL

## 2018-10-24 PROCEDURE — 81001 URINALYSIS AUTO W/SCOPE: CPT

## 2018-10-24 PROCEDURE — 36415 COLL VENOUS BLD VENIPUNCTURE: CPT

## 2018-10-24 PROCEDURE — 82570 ASSAY OF URINE CREATININE: CPT

## 2018-10-24 PROCEDURE — 85027 COMPLETE CBC AUTOMATED: CPT

## 2018-10-24 PROCEDURE — 83036 HEMOGLOBIN GLYCOSYLATED A1C: CPT

## 2018-10-24 PROCEDURE — 82043 UR ALBUMIN QUANTITATIVE: CPT

## 2018-10-24 PROCEDURE — 80061 LIPID PANEL: CPT

## 2018-10-24 PROCEDURE — 83880 ASSAY OF NATRIURETIC PEPTIDE: CPT

## 2018-10-24 PROCEDURE — 71046 X-RAY EXAM CHEST 2 VIEWS: CPT

## 2018-10-24 PROCEDURE — 80053 COMPREHEN METABOLIC PANEL: CPT

## 2018-10-25 LAB
ESTIMATED AVERAGE GLUCOSE: 174 MG/DL
HBA1C MFR BLD: 7.7 % (ref 4–6)

## 2018-10-30 ENCOUNTER — OFFICE VISIT (OUTPATIENT)
Dept: INTERNAL MEDICINE CLINIC | Age: 83
End: 2018-10-30
Payer: MEDICARE

## 2018-10-30 VITALS
DIASTOLIC BLOOD PRESSURE: 64 MMHG | HEART RATE: 55 BPM | SYSTOLIC BLOOD PRESSURE: 118 MMHG | WEIGHT: 229 LBS | BODY MASS INDEX: 33.92 KG/M2 | HEIGHT: 69 IN | OXYGEN SATURATION: 93 %

## 2018-10-30 DIAGNOSIS — I50.43 ACUTE ON CHRONIC COMBINED SYSTOLIC AND DIASTOLIC HEART FAILURE (HCC): ICD-10-CM

## 2018-10-30 DIAGNOSIS — Z23 NEED FOR VACCINATION: Primary | ICD-10-CM

## 2018-10-30 DIAGNOSIS — E11.9 TYPE 2 DIABETES MELLITUS WITHOUT COMPLICATION, WITHOUT LONG-TERM CURRENT USE OF INSULIN (HCC): ICD-10-CM

## 2018-10-30 DIAGNOSIS — I42.0 DILATED CARDIOMYOPATHY (HCC): ICD-10-CM

## 2018-10-30 DIAGNOSIS — I48.20 CHRONIC ATRIAL FIBRILLATION (HCC): ICD-10-CM

## 2018-10-30 PROCEDURE — G0008 ADMIN INFLUENZA VIRUS VAC: HCPCS | Performed by: INTERNAL MEDICINE

## 2018-10-30 PROCEDURE — G8482 FLU IMMUNIZE ORDER/ADMIN: HCPCS | Performed by: INTERNAL MEDICINE

## 2018-10-30 PROCEDURE — 90662 IIV NO PRSV INCREASED AG IM: CPT | Performed by: INTERNAL MEDICINE

## 2018-10-30 PROCEDURE — G8427 DOCREV CUR MEDS BY ELIG CLIN: HCPCS | Performed by: INTERNAL MEDICINE

## 2018-10-30 PROCEDURE — G8417 CALC BMI ABV UP PARAM F/U: HCPCS | Performed by: INTERNAL MEDICINE

## 2018-10-30 PROCEDURE — 1101F PT FALLS ASSESS-DOCD LE1/YR: CPT | Performed by: INTERNAL MEDICINE

## 2018-10-30 PROCEDURE — 1036F TOBACCO NON-USER: CPT | Performed by: INTERNAL MEDICINE

## 2018-10-30 PROCEDURE — 4040F PNEUMOC VAC/ADMIN/RCVD: CPT | Performed by: INTERNAL MEDICINE

## 2018-10-30 PROCEDURE — 1123F ACP DISCUSS/DSCN MKR DOCD: CPT | Performed by: INTERNAL MEDICINE

## 2018-10-30 PROCEDURE — 99213 OFFICE O/P EST LOW 20 MIN: CPT | Performed by: INTERNAL MEDICINE

## 2018-10-30 RX ORDER — TORSEMIDE 10 MG/1
10 TABLET ORAL
Qty: 90 TABLET | Refills: 3 | Status: SHIPPED | OUTPATIENT
Start: 2018-10-30 | End: 2019-10-16

## 2018-11-20 ENCOUNTER — OFFICE VISIT (OUTPATIENT)
Dept: UROLOGY | Age: 83
End: 2018-11-20
Payer: MEDICARE

## 2018-11-20 VITALS
DIASTOLIC BLOOD PRESSURE: 77 MMHG | BODY MASS INDEX: 34.71 KG/M2 | WEIGHT: 229 LBS | HEIGHT: 68 IN | SYSTOLIC BLOOD PRESSURE: 150 MMHG | TEMPERATURE: 98.3 F | HEART RATE: 70 BPM

## 2018-11-20 DIAGNOSIS — N43.0 ENCYSTED HYDROCELE: ICD-10-CM

## 2018-11-20 DIAGNOSIS — N40.1 BPH WITH OBSTRUCTION/LOWER URINARY TRACT SYMPTOMS: ICD-10-CM

## 2018-11-20 DIAGNOSIS — N13.8 BPH WITH OBSTRUCTION/LOWER URINARY TRACT SYMPTOMS: ICD-10-CM

## 2018-11-20 DIAGNOSIS — N47.1 PHIMOSIS: Primary | ICD-10-CM

## 2018-11-20 PROCEDURE — 1123F ACP DISCUSS/DSCN MKR DOCD: CPT | Performed by: UROLOGY

## 2018-11-20 PROCEDURE — 4040F PNEUMOC VAC/ADMIN/RCVD: CPT | Performed by: UROLOGY

## 2018-11-20 PROCEDURE — 1036F TOBACCO NON-USER: CPT | Performed by: UROLOGY

## 2018-11-20 PROCEDURE — 99214 OFFICE O/P EST MOD 30 MIN: CPT | Performed by: UROLOGY

## 2018-11-20 PROCEDURE — G8427 DOCREV CUR MEDS BY ELIG CLIN: HCPCS | Performed by: UROLOGY

## 2018-11-20 PROCEDURE — G8482 FLU IMMUNIZE ORDER/ADMIN: HCPCS | Performed by: UROLOGY

## 2018-11-20 PROCEDURE — G8417 CALC BMI ABV UP PARAM F/U: HCPCS | Performed by: UROLOGY

## 2018-11-20 PROCEDURE — 1101F PT FALLS ASSESS-DOCD LE1/YR: CPT | Performed by: UROLOGY

## 2018-11-20 ASSESSMENT — ENCOUNTER SYMPTOMS
EYE PAIN: 0
SHORTNESS OF BREATH: 0
COUGH: 0
WHEEZING: 0
EYE REDNESS: 0
ABDOMINAL PAIN: 0
COLOR CHANGE: 0
NAUSEA: 0
VOMITING: 0
BACK PAIN: 0

## 2018-11-20 NOTE — PROGRESS NOTES
Component Value Date    CREATININE 0.82 10/24/2018       Additional Lab/Culture results: none    Imaging Reviewed during this Office Visit: none  (results were independently reviewed by physician and radiology report verified)    PAST MEDICAL, FAMILY AND SOCIAL HISTORY UPDATE:  Past Medical History:   Diagnosis Date    Diabetes mellitus ([facility] [address]     Hypertension     Testicular cancer ([facility] [address]     sarcoma of left testis     Past Surgical History:   Procedure Laterality Date    EYE SURGERY Bilateral     HERNIA REPAIR  1988    left inguinal    JOINT REPLACEMENT Bilateral     TESTICLE REMOVAL  2008     Family History   Problem Relation Age of Onset    Other Mother         lung disease     Outpatient Prescriptions Marked as Taking for the 11/20/18 encounter (Office Visit) with Kyra Schumacher MD   Medication Sig Dispense Refill    torsemide (DEMADEX) 10 MG tablet Take 1 tablet by mouth daily (with breakfast) HOLD IF DIARRHEA , DIZZY. NOTIFY OFFICE . 90 tablet 3    metolazone (ZAROXOLYN) 2.5 MG tablet Take 1 tablet by mouth daily Mondays only 30 tablet 3    tamsulosin (FLOMAX) 0.4 MG capsule Take 1 capsule by mouth daily 90 capsule 3    glimepiride (AMARYL) 2 MG tablet TAKE 1 TABLET EVERY MORNING BEFORE BREAKFAST 90 tablet 3    lisinopril (PRINIVIL;ZESTRIL) 10 MG tablet TAKE 1 TABLET EVERY DAY 90 tablet 3    simvastatin (ZOCOR) 20 MG tablet Take 1 tablet by mouth nightly 90 tablet 3    metFORMIN (GLUCOPHAGE) 500 MG tablet Take 1 tablet by mouth daily (with breakfast) 90 tablet 3    gabapentin (NEURONTIN) 300 MG capsule Take 1 capsule by mouth nightly 90 capsule 1    aspirin EC 81 MG EC tablet Take 1 tablet by mouth daily. 30 tablet 11    Multiple Vitamin (MULTI VITAMIN MENS PO) Take  by mouth.  Calcium Citrate-Vitamin D (CITRACAL + D PO) Take  by mouth.  fish oil-omega-3 fatty acids 1000 MG capsule Take 2 g by mouth daily.  Cyanocobalamin (VITAMIN B 12 PO) Take  by mouth.         Misc

## 2018-11-21 ENCOUNTER — OFFICE VISIT (OUTPATIENT)
Dept: INTERNAL MEDICINE CLINIC | Age: 83
End: 2018-11-21
Payer: MEDICARE

## 2018-11-21 VITALS
SYSTOLIC BLOOD PRESSURE: 132 MMHG | BODY MASS INDEX: 35.46 KG/M2 | DIASTOLIC BLOOD PRESSURE: 62 MMHG | HEIGHT: 68 IN | WEIGHT: 234 LBS

## 2018-11-21 DIAGNOSIS — I50.43 ACUTE ON CHRONIC COMBINED SYSTOLIC AND DIASTOLIC HEART FAILURE (HCC): ICD-10-CM

## 2018-11-21 DIAGNOSIS — I42.0 DILATED CARDIOMYOPATHY (HCC): ICD-10-CM

## 2018-11-21 DIAGNOSIS — I48.20 CHRONIC ATRIAL FIBRILLATION (HCC): Primary | ICD-10-CM

## 2018-11-21 DIAGNOSIS — E11.9 TYPE 2 DIABETES MELLITUS WITHOUT COMPLICATION, WITHOUT LONG-TERM CURRENT USE OF INSULIN (HCC): ICD-10-CM

## 2018-11-21 PROCEDURE — 1036F TOBACCO NON-USER: CPT | Performed by: INTERNAL MEDICINE

## 2018-11-21 PROCEDURE — G8417 CALC BMI ABV UP PARAM F/U: HCPCS | Performed by: INTERNAL MEDICINE

## 2018-11-21 PROCEDURE — 4040F PNEUMOC VAC/ADMIN/RCVD: CPT | Performed by: INTERNAL MEDICINE

## 2018-11-21 PROCEDURE — 1101F PT FALLS ASSESS-DOCD LE1/YR: CPT | Performed by: INTERNAL MEDICINE

## 2018-11-21 PROCEDURE — G8427 DOCREV CUR MEDS BY ELIG CLIN: HCPCS | Performed by: INTERNAL MEDICINE

## 2018-11-21 PROCEDURE — 99214 OFFICE O/P EST MOD 30 MIN: CPT | Performed by: INTERNAL MEDICINE

## 2018-11-21 PROCEDURE — G8482 FLU IMMUNIZE ORDER/ADMIN: HCPCS | Performed by: INTERNAL MEDICINE

## 2018-11-21 PROCEDURE — 1123F ACP DISCUSS/DSCN MKR DOCD: CPT | Performed by: INTERNAL MEDICINE

## 2018-11-21 RX ORDER — METOLAZONE 2.5 MG/1
2.5 TABLET ORAL DAILY
Qty: 90 TABLET | Refills: 3 | Status: SHIPPED | OUTPATIENT
Start: 2018-11-21 | End: 2018-11-23 | Stop reason: SDUPTHER

## 2018-11-21 RX ORDER — GLIMEPIRIDE 2 MG/1
2 TABLET ORAL
Qty: 90 TABLET | Refills: 3 | Status: SHIPPED | OUTPATIENT
Start: 2018-11-21 | End: 2018-11-23 | Stop reason: SDUPTHER

## 2018-11-21 NOTE — PROGRESS NOTES
Team:  Maicol Santo MD as PCP - General (Internal Medicine)  Kate Sierra MD as Consulting Physician (Hematology and Oncology)  Florence Le MD as Consulting Physician (Radiation Oncology)  Key Dubois MD        SUBJECTIVE     HISTORY           History of present illness     Pertinent details  added to ,       Chief Complaint   Patient presents with    Diabetes     management last a1c 7.7%    Benign Prostatic Hypertrophy     sees Dr Lebron Aaron Edema     follow up on le edema           Encounter Diagnoses   Name Primary?  Dilated cardiomyopathy (HCC)     Acute on chronic combined systolic and diastolic heart failure (HCC)     Chronic atrial fibrillation (HCC) Yes    Type 2 diabetes mellitus without complication, without long-term current use of insulin (HCC)            Symptom / problem          ---           Known  CHRONIC  HEART FAILURE . NYHA CLASS   -- 2 -- ,  NO CHANGE IN SOB AND LEG EDEMA SINCE LAST VISIT . NO ORTHOPNEA. COMPLIANT WITH DRUG REGIMEN . Blood sugar control better           ROS AS NOTED IN HPI ,    Other  positive-- Review of Systems    ALL OTHER  SYSTEM REVIEW NEGATIVE. Allergies; medicatons; past medical, surgical, family, and social history; and problem list reviewed by me, as indicated in this encounter  . OBJECTIVE      Physical exam      Vitals:    11/21/18 1615   BP: 132/62   Weight: 234 lb (106.1 kg)   Height: 5' 8\" (1.727 m)        Estimated body mass index is 35.58 kg/m² as calculated from the following:    Height as of this encounter: 5' 8\" (1.727 m). Weight as of this encounter: 234 lb (106.1 kg). Physical Exam   Vitals: /62   Ht 5' 8\" (1.727 m)   Wt 234 lb (106.1 kg)   BMI 35.58 kg/m²                 Body mass index is 35.58 kg/m². General Appearance:   Alert , CO-OPERATIVE ,                                                        Pulmonary/Chest:        Clear to auscultation bilaterally .

## 2018-11-23 DIAGNOSIS — I50.43 ACUTE ON CHRONIC COMBINED SYSTOLIC AND DIASTOLIC HEART FAILURE (HCC): ICD-10-CM

## 2018-11-23 DIAGNOSIS — I42.0 DILATED CARDIOMYOPATHY (HCC): ICD-10-CM

## 2018-11-23 DIAGNOSIS — E11.9 TYPE 2 DIABETES MELLITUS WITHOUT COMPLICATION, WITHOUT LONG-TERM CURRENT USE OF INSULIN (HCC): ICD-10-CM

## 2018-11-26 RX ORDER — GLIMEPIRIDE 2 MG/1
2 TABLET ORAL
Qty: 90 TABLET | Refills: 3 | Status: SHIPPED | OUTPATIENT
Start: 2018-11-26 | End: 2020-03-11

## 2018-11-26 RX ORDER — METOLAZONE 2.5 MG/1
2.5 TABLET ORAL DAILY
Qty: 90 TABLET | Refills: 3 | Status: SHIPPED | OUTPATIENT
Start: 2018-11-26 | End: 2019-08-13 | Stop reason: SDUPTHER

## 2018-11-28 ENCOUNTER — TELEPHONE (OUTPATIENT)
Dept: INTERNAL MEDICINE CLINIC | Age: 83
End: 2018-11-28

## 2018-11-28 DIAGNOSIS — E11.9 TYPE 2 DIABETES MELLITUS WITHOUT COMPLICATION, WITHOUT LONG-TERM CURRENT USE OF INSULIN (HCC): Primary | ICD-10-CM

## 2018-12-26 NOTE — TELEPHONE ENCOUNTER
Patient states that he still has not received his diabetic testing supplies. Is still in need of test strips and lancets to check his sugar. They only sent him alcohol pads and the testing solution for the glucometer. Patient very upset, states that he has been trying to get this ordered for 2 months now. Explained to him that we did fax info to [place] last month, but I do not see anything scanned into his chart for this. He needs us to call shobha at [phone] reference # [id number] to get this taken care of ASAP.

## 2018-12-27 RX ORDER — LANCETS 30 GAUGE
1 EACH MISCELLANEOUS DAILY
Qty: 100 EACH | Refills: 11 | Status: SHIPPED | OUTPATIENT
Start: 2018-12-27 | End: 2021-12-13 | Stop reason: SDUPTHER

## 2019-01-16 ENCOUNTER — OFFICE VISIT (OUTPATIENT)
Dept: INTERNAL MEDICINE CLINIC | Age: 84
End: 2019-01-16
Payer: MEDICARE

## 2019-01-16 VITALS
HEIGHT: 68 IN | SYSTOLIC BLOOD PRESSURE: 122 MMHG | BODY MASS INDEX: 35.92 KG/M2 | WEIGHT: 237 LBS | OXYGEN SATURATION: 98 % | DIASTOLIC BLOOD PRESSURE: 74 MMHG | HEART RATE: 73 BPM

## 2019-01-16 DIAGNOSIS — N43.3 HYDROCELE, UNSPECIFIED HYDROCELE TYPE: ICD-10-CM

## 2019-01-16 DIAGNOSIS — K59.00 CONSTIPATION, UNSPECIFIED CONSTIPATION TYPE: ICD-10-CM

## 2019-01-16 DIAGNOSIS — B37.42 CANDIDAL BALANITIS: Primary | ICD-10-CM

## 2019-01-16 DIAGNOSIS — Z87.19 HISTORY OF INGUINAL HERNIA REPAIR: ICD-10-CM

## 2019-01-16 DIAGNOSIS — N47.1 ACQUIRED PHIMOSIS: ICD-10-CM

## 2019-01-16 DIAGNOSIS — I50.42 CHRONIC COMBINED SYSTOLIC AND DIASTOLIC CONGESTIVE HEART FAILURE (HCC): ICD-10-CM

## 2019-01-16 DIAGNOSIS — E11.9 TYPE 2 DIABETES MELLITUS WITHOUT COMPLICATION, WITHOUT LONG-TERM CURRENT USE OF INSULIN (HCC): ICD-10-CM

## 2019-01-16 DIAGNOSIS — I48.20 CHRONIC ATRIAL FIBRILLATION (HCC): ICD-10-CM

## 2019-01-16 DIAGNOSIS — Z98.890 HISTORY OF INGUINAL HERNIA REPAIR: ICD-10-CM

## 2019-01-16 DIAGNOSIS — I42.0 DILATED CARDIOMYOPATHY (HCC): ICD-10-CM

## 2019-01-16 DIAGNOSIS — N40.1 BENIGN PROSTATIC HYPERPLASIA WITH LOWER URINARY TRACT SYMPTOMS, SYMPTOM DETAILS UNSPECIFIED: ICD-10-CM

## 2019-01-16 PROBLEM — N50.89 ANDROPAUSE: Status: RESOLVED | Noted: 2017-08-16 | Resolved: 2019-01-16

## 2019-01-16 PROBLEM — I50.43 ACUTE ON CHRONIC COMBINED SYSTOLIC AND DIASTOLIC HEART FAILURE (HCC): Status: RESOLVED | Noted: 2018-10-23 | Resolved: 2019-01-16

## 2019-01-16 PROCEDURE — G8427 DOCREV CUR MEDS BY ELIG CLIN: HCPCS | Performed by: INTERNAL MEDICINE

## 2019-01-16 PROCEDURE — G8482 FLU IMMUNIZE ORDER/ADMIN: HCPCS | Performed by: INTERNAL MEDICINE

## 2019-01-16 PROCEDURE — 99214 OFFICE O/P EST MOD 30 MIN: CPT | Performed by: INTERNAL MEDICINE

## 2019-01-16 PROCEDURE — 4040F PNEUMOC VAC/ADMIN/RCVD: CPT | Performed by: INTERNAL MEDICINE

## 2019-01-16 PROCEDURE — 1036F TOBACCO NON-USER: CPT | Performed by: INTERNAL MEDICINE

## 2019-01-16 PROCEDURE — G8417 CALC BMI ABV UP PARAM F/U: HCPCS | Performed by: INTERNAL MEDICINE

## 2019-01-16 PROCEDURE — 1101F PT FALLS ASSESS-DOCD LE1/YR: CPT | Performed by: INTERNAL MEDICINE

## 2019-01-16 PROCEDURE — 1123F ACP DISCUSS/DSCN MKR DOCD: CPT | Performed by: INTERNAL MEDICINE

## 2019-01-16 RX ORDER — AMOXICILLIN 250 MG
2 CAPSULE ORAL DAILY
Qty: 100 TABLET | Refills: 0 | Status: SHIPPED | OUTPATIENT
Start: 2019-01-16 | End: 2019-05-09 | Stop reason: SDUPTHER

## 2019-01-16 RX ORDER — CLOTRIMAZOLE AND BETAMETHASONE DIPROPIONATE 10; .64 MG/G; MG/G
CREAM TOPICAL 2 TIMES DAILY
Qty: 1 TUBE | Refills: 1 | Status: SHIPPED | OUTPATIENT
Start: 2019-01-16 | End: 2019-05-09

## 2019-01-16 ASSESSMENT — ENCOUNTER SYMPTOMS: CONSTIPATION: 1

## 2019-02-26 ENCOUNTER — OFFICE VISIT (OUTPATIENT)
Dept: UROLOGY | Age: 84
End: 2019-02-26
Payer: MEDICARE

## 2019-02-26 VITALS
HEART RATE: 88 BPM | HEIGHT: 68 IN | BODY MASS INDEX: 36.07 KG/M2 | SYSTOLIC BLOOD PRESSURE: 132 MMHG | WEIGHT: 238 LBS | TEMPERATURE: 98.2 F | DIASTOLIC BLOOD PRESSURE: 76 MMHG

## 2019-02-26 DIAGNOSIS — N40.1 BPH WITH OBSTRUCTION/LOWER URINARY TRACT SYMPTOMS: ICD-10-CM

## 2019-02-26 DIAGNOSIS — N43.3 HYDROCELE, UNSPECIFIED HYDROCELE TYPE: ICD-10-CM

## 2019-02-26 DIAGNOSIS — N47.1 PHIMOSIS: Primary | ICD-10-CM

## 2019-02-26 DIAGNOSIS — N13.8 BPH WITH OBSTRUCTION/LOWER URINARY TRACT SYMPTOMS: ICD-10-CM

## 2019-02-26 PROCEDURE — G8427 DOCREV CUR MEDS BY ELIG CLIN: HCPCS | Performed by: NURSE PRACTITIONER

## 2019-02-26 PROCEDURE — G8417 CALC BMI ABV UP PARAM F/U: HCPCS | Performed by: NURSE PRACTITIONER

## 2019-02-26 PROCEDURE — 99214 OFFICE O/P EST MOD 30 MIN: CPT | Performed by: NURSE PRACTITIONER

## 2019-02-26 PROCEDURE — 1036F TOBACCO NON-USER: CPT | Performed by: NURSE PRACTITIONER

## 2019-02-26 PROCEDURE — 1101F PT FALLS ASSESS-DOCD LE1/YR: CPT | Performed by: NURSE PRACTITIONER

## 2019-02-26 PROCEDURE — 1123F ACP DISCUSS/DSCN MKR DOCD: CPT | Performed by: NURSE PRACTITIONER

## 2019-02-26 PROCEDURE — 4040F PNEUMOC VAC/ADMIN/RCVD: CPT | Performed by: NURSE PRACTITIONER

## 2019-02-26 PROCEDURE — G8482 FLU IMMUNIZE ORDER/ADMIN: HCPCS | Performed by: NURSE PRACTITIONER

## 2019-02-26 ASSESSMENT — ENCOUNTER SYMPTOMS
COLOR CHANGE: 0
VOMITING: 0
CONSTIPATION: 1
WHEEZING: 0
SHORTNESS OF BREATH: 0
EYE PAIN: 0
NAUSEA: 0
BACK PAIN: 0
COUGH: 0
EYE REDNESS: 0
ABDOMINAL PAIN: 0

## 2019-02-27 ENCOUNTER — HOSPITAL ENCOUNTER (OUTPATIENT)
Dept: GENERAL RADIOLOGY | Facility: CLINIC | Age: 84
Discharge: HOME | End: 2019-03-01
Payer: MEDICARE

## 2019-02-27 ENCOUNTER — HOSPITAL ENCOUNTER (OUTPATIENT)
Facility: CLINIC | Age: 84
Discharge: HOME | End: 2019-03-01
Payer: MEDICARE

## 2019-02-27 ENCOUNTER — OFFICE VISIT (OUTPATIENT)
Dept: INTERNAL MEDICINE CLINIC | Age: 84
End: 2019-02-27
Payer: MEDICARE

## 2019-02-27 VITALS
SYSTOLIC BLOOD PRESSURE: 134 MMHG | BODY MASS INDEX: 36.37 KG/M2 | OXYGEN SATURATION: 97 % | WEIGHT: 240 LBS | HEART RATE: 72 BPM | DIASTOLIC BLOOD PRESSURE: 68 MMHG | HEIGHT: 68 IN

## 2019-02-27 DIAGNOSIS — R20.2 PARESTHESIA OF BILATERAL LEGS: Primary | ICD-10-CM

## 2019-02-27 DIAGNOSIS — N40.1 BENIGN PROSTATIC HYPERPLASIA WITH LOWER URINARY TRACT SYMPTOMS, SYMPTOM DETAILS UNSPECIFIED: ICD-10-CM

## 2019-02-27 DIAGNOSIS — E11.9 TYPE 2 DIABETES MELLITUS WITHOUT COMPLICATION, WITHOUT LONG-TERM CURRENT USE OF INSULIN (HCC): ICD-10-CM

## 2019-02-27 DIAGNOSIS — B37.42 CANDIDAL BALANITIS: ICD-10-CM

## 2019-02-27 DIAGNOSIS — I42.0 DILATED CARDIOMYOPATHY (HCC): ICD-10-CM

## 2019-02-27 DIAGNOSIS — M54.50 MIDLINE LOW BACK PAIN WITHOUT SCIATICA, UNSPECIFIED CHRONICITY: ICD-10-CM

## 2019-02-27 DIAGNOSIS — M54.12 CERVICAL RADICULOPATHY: ICD-10-CM

## 2019-02-27 DIAGNOSIS — I50.42 CHRONIC COMBINED SYSTOLIC AND DIASTOLIC CONGESTIVE HEART FAILURE (HCC): ICD-10-CM

## 2019-02-27 DIAGNOSIS — I48.20 CHRONIC ATRIAL FIBRILLATION (HCC): ICD-10-CM

## 2019-02-27 DIAGNOSIS — N47.1 ACQUIRED PHIMOSIS: ICD-10-CM

## 2019-02-27 PROCEDURE — 99214 OFFICE O/P EST MOD 30 MIN: CPT | Performed by: INTERNAL MEDICINE

## 2019-02-27 PROCEDURE — G8427 DOCREV CUR MEDS BY ELIG CLIN: HCPCS | Performed by: INTERNAL MEDICINE

## 2019-02-27 PROCEDURE — 72040 X-RAY EXAM NECK SPINE 2-3 VW: CPT

## 2019-02-27 PROCEDURE — G8482 FLU IMMUNIZE ORDER/ADMIN: HCPCS | Performed by: INTERNAL MEDICINE

## 2019-02-27 PROCEDURE — 1101F PT FALLS ASSESS-DOCD LE1/YR: CPT | Performed by: INTERNAL MEDICINE

## 2019-02-27 PROCEDURE — G8417 CALC BMI ABV UP PARAM F/U: HCPCS | Performed by: INTERNAL MEDICINE

## 2019-02-27 PROCEDURE — 72100 X-RAY EXAM L-S SPINE 2/3 VWS: CPT

## 2019-02-27 PROCEDURE — 4040F PNEUMOC VAC/ADMIN/RCVD: CPT | Performed by: INTERNAL MEDICINE

## 2019-02-27 PROCEDURE — 1123F ACP DISCUSS/DSCN MKR DOCD: CPT | Performed by: INTERNAL MEDICINE

## 2019-02-27 PROCEDURE — 1036F TOBACCO NON-USER: CPT | Performed by: INTERNAL MEDICINE

## 2019-02-27 RX ORDER — GABAPENTIN 100 MG/1
100 CAPSULE ORAL 2 TIMES DAILY
Qty: 180 CAPSULE | Refills: 1 | Status: SHIPPED | OUTPATIENT
Start: 2019-02-27 | End: 2019-02-27 | Stop reason: SDUPTHER

## 2019-02-27 ASSESSMENT — PATIENT HEALTH QUESTIONNAIRE - PHQ9
SUM OF ALL RESPONSES TO PHQ QUESTIONS 1-9: 0
2. FEELING DOWN, DEPRESSED OR HOPELESS: 0
SUM OF ALL RESPONSES TO PHQ QUESTIONS 1-9: 0
1. LITTLE INTEREST OR PLEASURE IN DOING THINGS: 0
SUM OF ALL RESPONSES TO PHQ9 QUESTIONS 1 & 2: 0

## 2019-02-28 RX ORDER — GABAPENTIN 100 MG/1
100 CAPSULE ORAL 2 TIMES DAILY
Qty: 180 CAPSULE | Refills: 1 | Status: SHIPPED | OUTPATIENT
Start: 2019-02-28 | End: 2020-01-14

## 2019-03-06 ENCOUNTER — TELEPHONE (OUTPATIENT)
Dept: INTERNAL MEDICINE CLINIC | Age: 84
End: 2019-03-06

## 2019-03-06 DIAGNOSIS — E11.9 TYPE 2 DIABETES MELLITUS WITHOUT COMPLICATION, WITHOUT LONG-TERM CURRENT USE OF INSULIN (HCC): Primary | ICD-10-CM

## 2019-03-06 RX ORDER — LANCETS
1 EACH MISCELLANEOUS 3 TIMES DAILY
Qty: 100 EACH | Refills: 3 | Status: SHIPPED | OUTPATIENT
Start: 2019-03-06 | End: 2020-12-28 | Stop reason: SDUPTHER

## 2019-05-09 ENCOUNTER — OFFICE VISIT (OUTPATIENT)
Dept: INTERNAL MEDICINE CLINIC | Age: 84
End: 2019-05-09
Payer: MEDICARE

## 2019-05-09 VITALS
WEIGHT: 231 LBS | SYSTOLIC BLOOD PRESSURE: 110 MMHG | HEIGHT: 68 IN | DIASTOLIC BLOOD PRESSURE: 74 MMHG | BODY MASS INDEX: 35.01 KG/M2

## 2019-05-09 DIAGNOSIS — I48.20 CHRONIC ATRIAL FIBRILLATION (HCC): ICD-10-CM

## 2019-05-09 DIAGNOSIS — I10 ESSENTIAL HYPERTENSION: ICD-10-CM

## 2019-05-09 DIAGNOSIS — N40.1 BENIGN PROSTATIC HYPERPLASIA WITH LOWER URINARY TRACT SYMPTOMS, SYMPTOM DETAILS UNSPECIFIED: ICD-10-CM

## 2019-05-09 DIAGNOSIS — I50.42 CHRONIC COMBINED SYSTOLIC AND DIASTOLIC CONGESTIVE HEART FAILURE (HCC): ICD-10-CM

## 2019-05-09 DIAGNOSIS — E11.9 TYPE 2 DIABETES MELLITUS WITHOUT COMPLICATION, WITHOUT LONG-TERM CURRENT USE OF INSULIN (HCC): Primary | ICD-10-CM

## 2019-05-09 DIAGNOSIS — K59.00 CONSTIPATION, UNSPECIFIED CONSTIPATION TYPE: ICD-10-CM

## 2019-05-09 DIAGNOSIS — I42.0 DILATED CARDIOMYOPATHY (HCC): ICD-10-CM

## 2019-05-09 PROCEDURE — 1123F ACP DISCUSS/DSCN MKR DOCD: CPT | Performed by: INTERNAL MEDICINE

## 2019-05-09 PROCEDURE — 1036F TOBACCO NON-USER: CPT | Performed by: INTERNAL MEDICINE

## 2019-05-09 PROCEDURE — 99214 OFFICE O/P EST MOD 30 MIN: CPT | Performed by: INTERNAL MEDICINE

## 2019-05-09 PROCEDURE — G8427 DOCREV CUR MEDS BY ELIG CLIN: HCPCS | Performed by: INTERNAL MEDICINE

## 2019-05-09 PROCEDURE — G8417 CALC BMI ABV UP PARAM F/U: HCPCS | Performed by: INTERNAL MEDICINE

## 2019-05-09 PROCEDURE — 4040F PNEUMOC VAC/ADMIN/RCVD: CPT | Performed by: INTERNAL MEDICINE

## 2019-05-09 RX ORDER — AMOXICILLIN 250 MG
2 CAPSULE ORAL DAILY
Qty: 100 TABLET | Refills: 1 | Status: SHIPPED | OUTPATIENT
Start: 2019-05-09 | End: 2020-05-05

## 2019-05-09 NOTE — PROGRESS NOTES
Visit Information    Have you changed or started any medications since your last visit including any over-the-counter medicines, vitamins, or herbal medicines? no   Are you having any side effects from any of your medications? -  no  Have you stopped taking any of your medications? Is so, why? -  no    Have you seen any other physician or provider since your last visit? No  Have you had any other diagnostic tests since your last visit? No  Have you been seen in the emergency room and/or had an admission to a hospital since we last saw you? No  Have you had your routine dental cleaning in the past 6 months? no    Have you activated your [email or web address] account? If not, what are your barriers?  No:      Patient Care Team:  Linda Steward MD as PCP - General (Internal Medicine)  Guera Harry MD as Consulting Physician (Hematology and Oncology)  Owen Deleon MD as Consulting Physician (Radiation Oncology)  Sarai Alexander MD    Medical History Review  Past Medical, Family, and Social History reviewed and does not contribute to the patient presenting condition    Health Maintenance   Topic Date Due    DTaP/Tdap/Td vaccine (1 - Tdap) 10/05/1954    Shingles Vaccine (1 of 2) 11/22/2019 (Originally 10/5/1986)    Potassium monitoring  10/24/2019    Creatinine monitoring  10/24/2019    Flu vaccine  Completed    Pneumococcal 65+ years Vaccine  Completed

## 2019-05-21 ENCOUNTER — TELEPHONE (OUTPATIENT)
Dept: UROLOGY | Age: 84
End: 2019-05-21

## 2019-05-21 DIAGNOSIS — N47.1 PHIMOSIS: ICD-10-CM

## 2019-05-21 DIAGNOSIS — N43.3 HYDROCELE, UNSPECIFIED HYDROCELE TYPE: Primary | ICD-10-CM

## 2019-05-21 NOTE — TELEPHONE ENCOUNTER
Will discuss who Dr Yousuf Anaya wants to refer to for phimosis repair. He is more uncomfortable with hydrocele and would like this addressed sooner.

## 2019-05-22 NOTE — TELEPHONE ENCOUNTER
Spoke with patient regarding appointment info with Dr. Valeri Glaser. Strongly expressed to patient to bring a family member with him to appointment due to [facility] being a large campus. Patient is picking up information regarding appointment today 5/22/19 in the [place] office to go over in more detail. Referral, demographics, notes and imaging faxed to Dr. Valeri Glaser at [phone].

## 2019-05-29 DIAGNOSIS — E78.2 MIXED HYPERLIPIDEMIA: ICD-10-CM

## 2019-05-29 RX ORDER — SIMVASTATIN 20 MG
TABLET ORAL
Qty: 90 TABLET | Refills: 3 | Status: SHIPPED | OUTPATIENT
Start: 2019-05-29

## 2019-06-13 ENCOUNTER — OFFICE VISIT (OUTPATIENT)
Dept: INTERNAL MEDICINE CLINIC | Age: 84
End: 2019-06-13
Payer: MEDICARE

## 2019-06-13 VITALS
SYSTOLIC BLOOD PRESSURE: 112 MMHG | DIASTOLIC BLOOD PRESSURE: 64 MMHG | HEIGHT: 68 IN | WEIGHT: 234 LBS | BODY MASS INDEX: 35.46 KG/M2

## 2019-06-13 DIAGNOSIS — I10 ESSENTIAL HYPERTENSION: ICD-10-CM

## 2019-06-13 DIAGNOSIS — N43.3 HYDROCELE, UNSPECIFIED HYDROCELE TYPE: ICD-10-CM

## 2019-06-13 DIAGNOSIS — B37.42 CANDIDAL BALANITIS: ICD-10-CM

## 2019-06-13 DIAGNOSIS — N47.1 ACQUIRED PHIMOSIS: ICD-10-CM

## 2019-06-13 DIAGNOSIS — I42.0 DILATED CARDIOMYOPATHY (HCC): ICD-10-CM

## 2019-06-13 DIAGNOSIS — E11.9 TYPE 2 DIABETES MELLITUS WITHOUT COMPLICATION, WITHOUT LONG-TERM CURRENT USE OF INSULIN (HCC): ICD-10-CM

## 2019-06-13 DIAGNOSIS — I50.42 CHRONIC COMBINED SYSTOLIC AND DIASTOLIC CONGESTIVE HEART FAILURE (HCC): Primary | ICD-10-CM

## 2019-06-13 DIAGNOSIS — I48.20 CHRONIC ATRIAL FIBRILLATION (HCC): ICD-10-CM

## 2019-06-13 PROCEDURE — 1036F TOBACCO NON-USER: CPT | Performed by: INTERNAL MEDICINE

## 2019-06-13 PROCEDURE — 4040F PNEUMOC VAC/ADMIN/RCVD: CPT | Performed by: INTERNAL MEDICINE

## 2019-06-13 PROCEDURE — G8427 DOCREV CUR MEDS BY ELIG CLIN: HCPCS | Performed by: INTERNAL MEDICINE

## 2019-06-13 PROCEDURE — G8417 CALC BMI ABV UP PARAM F/U: HCPCS | Performed by: INTERNAL MEDICINE

## 2019-06-13 PROCEDURE — 99214 OFFICE O/P EST MOD 30 MIN: CPT | Performed by: INTERNAL MEDICINE

## 2019-06-13 PROCEDURE — 1123F ACP DISCUSS/DSCN MKR DOCD: CPT | Performed by: INTERNAL MEDICINE

## 2019-06-13 NOTE — PROGRESS NOTES
Visit Information    Have you changed or started any medications since your last visit including any over-the-counter medicines, vitamins, or herbal medicines? no   Are you having any side effects from any of your medications? -  no  Have you stopped taking any of your medications? Is so, why? -  no    Have you seen any other physician or provider since your last visit? No  Have you had any other diagnostic tests since your last visit? No  Have you been seen in the emergency room and/or had an admission to a hospital since we last saw you? No  Have you had your routine dental cleaning in the past 6 months? no    Have you activated your [employer] account? If not, what are your barriers?  No: declined     Patient Care Team:  Miriam aMya MD as PCP - General (Internal Medicine)  Miriam Maya MD as PCP - [facility]  Ruthy oRn MD as Consulting Physician (Hematology and Oncology)  Jeff Calixto MD as Consulting Physician (Radiation Oncology)  Darrel Ceballos MD    Medical History Review  Past Medical, Family, and Social History reviewed and does not contribute to the patient presenting condition    Health Maintenance   Topic Date Due    DTaP/Tdap/Td vaccine (1 - Tdap) 10/05/1955    Shingles Vaccine (1 of 2) 11/22/2019 (Originally 10/5/1986)    Potassium monitoring  10/24/2019    Creatinine monitoring  10/24/2019    Flu vaccine  Completed    Pneumococcal 65+ years Vaccine  Completed     Chief Complaint   Patient presents with    Diabetes     follow up \                                                                                                                                                                                                          OFFICE VISIT  PROGRESS NOTE         Date of patient's visit: 6/16/2019  Patient's Name:  James Mcdonald  YOB: 1935       Patient Care Team:  Miriam Maya MD as PCP - General (Internal Medicine)  Miriam Maya MD as PCP [facility] Empaneled Provider  Mack Moctezuma MD as Consulting Physician (Hematology and Oncology)  Parker Ruff MD as Consulting Physician (Radiation Oncology)  Myles Cobian MD        SUBJECTIVE     HISTORY           History of present illness     Pertinent details  added to ,       Chief Complaint   Patient presents with    Diabetes     follow up \          Encounter Diagnoses   Name Primary?  Chronic combined systolic and diastolic congestive heart failure (HCC) Yes    Chronic atrial fibrillation (HCC)     Type 2 diabetes mellitus without complication, without long-term current use of insulin (HCC)     Dilated cardiomyopathy (HCC)     Hydrocele, unspecified hydrocele type     Essential hypertension     Candidal balanitis     Acquired phimosis         Symptom / problem          ---   Patient here for evaluation and management  of  Diabetes Mellitus . -- No symptoms suggestive of hyperglycemia or hypoglycemia . - Blood sugar diary maintaine [] yes    [x] no           - TAKING MEDICATIONS  AS PRESCRIBED , NO ADVERSE EFFECTS .      Significant problem because of the acquired severe phimosis and has significantly difficulty in urination  As a result he has balanitis  He has seen a urologist but they have not been able to help him so he is going to see a urologist in [facility] hopefully they will be able to help him and operate on him  It is to be noted that he does have a hydrocele of 1 of the testicles the other testicle has been removed in the past so  He may end end up having that hydrocele drained also    He is known to have chronic congestive heart failure which is under fair control he still has 1+ edema he is on torsemide and Zestril symptomatically he is doing well with present treatment    Known to have chronic atrial fibrillation chronic combined systolic and diastolic heart failure dilated cardiomyopathy            ROS AS NOTED IN HPI ,    Other Component Value Date    ALT 18 10/24/2018    AST 16 10/24/2018    PROT 6.8 10/24/2018    BILITOT 0.74 10/24/2018    LABALBU 3.9 10/24/2018    LABALBU 4.0 10/10/2011            Lab Results   Component Value Date    CREATININE 0.82 10/24/2018     Lab Results   Component Value Date    LABA1C 7.7 10/24/2018     No results found for: Sarah Yin / Samra Haro was seen today for diabetes. Diagnoses and all orders for this visit:    Chronic combined systolic and diastolic congestive heart failure (HCC)    Chronic atrial fibrillation ([facility] [address]    Type 2 diabetes mellitus without complication, without long-term current use of insulin (HCC)    Dilated cardiomyopathy (HCC)    Hydrocele, unspecified hydrocele type    Essential hypertension    Candidal balanitis    Acquired phimosis          Diagnosis Orders   1. Chronic combined systolic and diastolic congestive heart failure ([facility] [address]     2. Chronic atrial fibrillation (HCC)     3. Type 2 diabetes mellitus without complication, without long-term current use of insulin ([facility] [address]     4. Dilated cardiomyopathy (HCC)     5. Hydrocele, unspecified hydrocele type     6. Essential hypertension     7. Candidal balanitis     8. Acquired phimosis           SALIENT  ACTIONS TODAYS VISIT     Today's office visit SALIENT ACTIONS    --Has multiple medical conditions which are under fair control including CHF diabetes hypertension ischemic dilated cardiomyopathy  No new symptoms of chest pain shortness of breath  Main issue is the phimosis and related problems and he is going to see a urologist at [facility] hopefully they will be able to help--          There are no discontinued medications. No orders of the defined types were placed in this encounter. There are no Patient Instructions on file for this visit. Medication List           Accurate as of 6/13/19 11:59 PM. If you have any questions, ask your nurse or doctor.                CONTINUE taking these medications    ACCU-CHEK ERIKA [place]  1 each by Does not apply route daily     aspirin EC 81 MG EC tablet  Take 1 tablet by mouth daily. * blood glucose test strips strip  Commonly known as:  ASCENSIA AUTODISC VI;ONE TOUCH ULTRA TEST VI  Use with associated glucose meter. * blood glucose test strips strip  Commonly known as:  ACCU-CHEK ERIKA  1 each by In Vitro route 3 times daily As needed. gabapentin 100 MG capsule  Commonly known as:  NEURONTIN  Take 1 capsule by mouth 2 times daily for 180 days. Intended supply: 90 days. glimepiride 2 MG tablet  Commonly known as:  AMARYL  Take 1 tablet by mouth every morning (before breakfast)     * Lancets Misc  1 each by Does not apply route daily     * ACCU-CHEK SOFTCLIX LANCETS Misc  1 each by Does not apply route 3 times daily     lisinopril 10 MG tablet  Commonly known as:  PRINIVIL;ZESTRIL  TAKE 1 TABLET EVERY DAY     metFORMIN 500 MG tablet  Commonly known as:  GLUCOPHAGE  TAKE 1 TABLET EVERY DAY WITH BREAKFAST     metolazone 2.5 MG tablet  Commonly known as:  ZAROXOLYN  Take 1 tablet by mouth daily Mondays only     MULTI VITAMIN MENS PO     senna-docusate 8.6-50 MG per tablet  Commonly known as:  SENNA S  Take 2 tablets by mouth daily     simvastatin 20 MG tablet  Commonly known as:  ZOCOR  TAKE 1 TABLET EVERY NIGHT     tamsulosin 0.4 MG capsule  Commonly known as:  FLOMAX  Take 1 capsule by mouth daily     torsemide 10 MG tablet  Commonly known as:  DEMADEX  Take 1 tablet by mouth daily (with breakfast) HOLD IF DIARRHEA , DIZZY. NOTIFY OFFICE . VITAMIN B 12 PO         * This list has 4 medication(s) that are the same as other medications prescribed for you. Read the directions carefully, and ask your doctor or other care provider to review them with you. FOLLOW UP  PLANS     Return in about 2 months (around 8/13/2019). MD PIA Francois [address], [address] [place] [address].    Phone [phone]   Fax:

## 2019-06-16 PROBLEM — N43.3 HYDROCELE: Status: ACTIVE | Noted: 2019-06-16

## 2019-06-21 ENCOUNTER — HOSPITAL ENCOUNTER (OUTPATIENT)
Dept: ULTRASOUND IMAGING | Age: 84
Discharge: HOME | End: 2019-06-23
Payer: MEDICARE

## 2019-06-21 DIAGNOSIS — N50.9 TESTICULAR LESION: ICD-10-CM

## 2019-06-21 PROCEDURE — 76870 US EXAM SCROTUM: CPT

## 2019-08-06 ENCOUNTER — TELEPHONE (OUTPATIENT)
Dept: INTERNAL MEDICINE CLINIC | Age: 84
End: 2019-08-06

## 2019-08-06 NOTE — TELEPHONE ENCOUNTER
Medical surgical clearance request received 08/06/19    Surgeon: Dr Dennis Cockayne    Procedure: buried penis repair, limited panniciulectomy    Date of Procedure: 8/20/19    Last appt: 6/13/19    Next appt: 8/13/2019    PATs received:    [] yes   [x] no

## 2019-08-13 ENCOUNTER — OFFICE VISIT (OUTPATIENT)
Dept: INTERNAL MEDICINE CLINIC | Age: 84
End: 2019-08-13
Payer: MEDICARE

## 2019-08-13 VITALS
HEIGHT: 68 IN | DIASTOLIC BLOOD PRESSURE: 68 MMHG | SYSTOLIC BLOOD PRESSURE: 132 MMHG | BODY MASS INDEX: 35.16 KG/M2 | WEIGHT: 232 LBS

## 2019-08-13 DIAGNOSIS — I10 ESSENTIAL HYPERTENSION: ICD-10-CM

## 2019-08-13 DIAGNOSIS — I50.42 CHRONIC COMBINED SYSTOLIC AND DIASTOLIC CONGESTIVE HEART FAILURE (HCC): ICD-10-CM

## 2019-08-13 DIAGNOSIS — E11.9 TYPE 2 DIABETES MELLITUS WITHOUT COMPLICATION, WITHOUT LONG-TERM CURRENT USE OF INSULIN (HCC): Primary | ICD-10-CM

## 2019-08-13 DIAGNOSIS — B37.42 CANDIDAL BALANITIS: ICD-10-CM

## 2019-08-13 DIAGNOSIS — I50.43 ACUTE ON CHRONIC COMBINED SYSTOLIC AND DIASTOLIC HEART FAILURE (HCC): ICD-10-CM

## 2019-08-13 DIAGNOSIS — I42.0 DILATED CARDIOMYOPATHY (HCC): ICD-10-CM

## 2019-08-13 DIAGNOSIS — R30.0 DYSURIA: ICD-10-CM

## 2019-08-13 DIAGNOSIS — N43.3 HYDROCELE, UNSPECIFIED HYDROCELE TYPE: ICD-10-CM

## 2019-08-13 DIAGNOSIS — N47.1 ACQUIRED PHIMOSIS: ICD-10-CM

## 2019-08-13 PROCEDURE — G8427 DOCREV CUR MEDS BY ELIG CLIN: HCPCS | Performed by: INTERNAL MEDICINE

## 2019-08-13 PROCEDURE — G8417 CALC BMI ABV UP PARAM F/U: HCPCS | Performed by: INTERNAL MEDICINE

## 2019-08-13 PROCEDURE — 4040F PNEUMOC VAC/ADMIN/RCVD: CPT | Performed by: INTERNAL MEDICINE

## 2019-08-13 PROCEDURE — 1036F TOBACCO NON-USER: CPT | Performed by: INTERNAL MEDICINE

## 2019-08-13 PROCEDURE — 99214 OFFICE O/P EST MOD 30 MIN: CPT | Performed by: INTERNAL MEDICINE

## 2019-08-13 PROCEDURE — 1123F ACP DISCUSS/DSCN MKR DOCD: CPT | Performed by: INTERNAL MEDICINE

## 2019-08-13 RX ORDER — METOLAZONE 2.5 MG/1
2.5 TABLET ORAL
Qty: 30 TABLET | Refills: 0 | Status: SHIPPED | OUTPATIENT
Start: 2019-08-13 | End: 2019-09-16 | Stop reason: SDUPTHER

## 2019-08-13 NOTE — PROGRESS NOTES
OFFICE VISIT  PROGRESS NOTE         Date of patient's visit: 8/13/19  Patient's Name:  Bertrand Thompson  YOB: 1935       Patient Care Team:  Andrei Alejandro MD as PCP - General (Internal Medicine)  Andrei Alejandro MD as PCP - [facility] Provider  Antonio Kwon MD as Consulting Physician (Hematology and Oncology)  Attila Cortes MD as Consulting Physician (Radiation Oncology)  Felton Orellana MD        SUBJECTIVE     HISTORY           History of present illness     Pertinent details  added to ,       Chief Complaint   Patient presents with    Other     surgical clearance for penilectomy           Encounter Diagnoses   Name Primary?  Type 2 diabetes mellitus without complication, without long-term current use of insulin (HCC) Yes    Essential hypertension     Hydrocele, unspecified hydrocele type     Chronic combined systolic and diastolic congestive heart failure (HCC)     Dysuria     Acquired phimosis     Candidal balanitis         Symptom / problem          ---   Going for surgery 8/20/19    Scheduled for distal penilectomy on 8/30/19      echocardigram 2017 normal lv fx . ROS AS NOTED IN HPI ,    Other  positive-- Review of Systems    ALL OTHER  SYSTEM REVIEW NEGATIVE. Allergies; medicatons; past medical, surgical, family, and social history; and problem list reviewed by me, as indicated in this encounter  . OBJECTIVE      Physical exam           Vitals:    08/13/19 1224   BP: 132/68   Weight: 232 lb (105.2 kg)   Height: 5' 7.99\" (1.727 m)        Estimated body mass index is 35.28 kg/m² as calculated from the following:    Height as of this encounter: 5' 7.99\" (1.727 m). Weight as of this encounter: 232 lb (105.2 kg).   .BM   Physical Exam   Vitals:  /68   Ht medications? -  no  Have you stopped taking any of your medications? Is so, why? -  no    Have you seen any other physician or provider since your last visit? No  Have you had any other diagnostic tests since your last visit? No  Have you been seen in the emergency room and/or had an admission to a hospital since we last saw you? No  Have you had your routine dental cleaning in the past 6 months? no    Have you activated your [employer] account? If not, what are your barriers?  Yes     Patient Care Team:  Ann Jeans, MD as PCP - General (Internal Medicine)  Ann Jeans[facility] Consulting Physician (Hematology and Oncology)  Sudha Nayak MD as Consulting Physician (Radiation Oncology)  Devon Dalton MD    Medical History Review  Past Medical, Family, and Social History reviewed and does not contribute to the patient presenting condition    Health Maintenance   Topic Date Due    DTaP/Tdap/Td vaccine (1 - Tdap) 10/05/1955    Annual Wellness Visit (AWV)  10/05/1999    Shingles Vaccine (1 of 2) 11/22/2019 (Originally 10/5/1986)    Flu vaccine (1) 09/01/2019    Potassium monitoring  10/24/2019    Creatinine monitoring  10/24/2019    Pneumococcal 65+ years Vaccine  Completed     Chief Complaint   Patient presents with    Other     surgical clearance for penilectomy

## 2019-08-19 RX ORDER — TAMSULOSIN HYDROCHLORIDE 0.4 MG/1
0.4 CAPSULE ORAL DAILY
Qty: 90 CAPSULE | Refills: 3 | Status: SHIPPED | OUTPATIENT
Start: 2019-08-19 | End: 2020-12-01 | Stop reason: SDUPTHER

## 2019-08-21 ENCOUNTER — TELEPHONE (OUTPATIENT)
Dept: INTERNAL MEDICINE CLINIC | Age: 84
End: 2019-08-21

## 2019-08-21 DIAGNOSIS — E11.9 TYPE 2 DIABETES MELLITUS WITHOUT COMPLICATION, WITHOUT LONG-TERM CURRENT USE OF INSULIN (HCC): ICD-10-CM

## 2019-08-21 DIAGNOSIS — I10 ESSENTIAL HYPERTENSION: Primary | ICD-10-CM

## 2019-09-03 ENCOUNTER — HOSPITAL ENCOUNTER (OUTPATIENT)
Age: 84
Setting detail: SPECIMEN
Discharge: HOME | End: 2019-09-03
Payer: MEDICARE

## 2019-09-03 DIAGNOSIS — E11.9 TYPE 2 DIABETES MELLITUS WITHOUT COMPLICATION, WITHOUT LONG-TERM CURRENT USE OF INSULIN (HCC): ICD-10-CM

## 2019-09-03 DIAGNOSIS — I10 ESSENTIAL HYPERTENSION: ICD-10-CM

## 2019-09-03 LAB
ANION GAP SERPL CALCULATED.3IONS-SCNC: 16 MMOL/L (ref 9–17)
BUN BLDV-MCNC: 22 MG/DL (ref 8–23)
BUN/CREAT BLD: NORMAL (ref 9–20)
CALCIUM SERPL-MCNC: 9.5 MG/DL (ref 8.6–10.4)
CHLORIDE BLD-SCNC: 101 MMOL/L (ref 98–107)
CO2: 22 MMOL/L (ref 20–31)
CREAT SERPL-MCNC: 1.11 MG/DL (ref 0.7–1.2)
GFR AFRICAN AMERICAN: >60 ML/MIN
GFR NON-AFRICAN AMERICAN: >60 ML/MIN
GFR SERPL CREATININE-BSD FRML MDRD: NORMAL ML/MIN/{1.73_M2}
GFR SERPL CREATININE-BSD FRML MDRD: NORMAL ML/MIN/{1.73_M2}
GLUCOSE BLD-MCNC: 90 MG/DL (ref 70–99)
POTASSIUM SERPL-SCNC: 4.9 MMOL/L (ref 3.7–5.3)
SODIUM BLD-SCNC: 139 MMOL/L (ref 135–144)

## 2019-09-16 DIAGNOSIS — I42.0 DILATED CARDIOMYOPATHY (HCC): ICD-10-CM

## 2019-09-16 DIAGNOSIS — I50.43 ACUTE ON CHRONIC COMBINED SYSTOLIC AND DIASTOLIC HEART FAILURE (HCC): ICD-10-CM

## 2019-09-16 RX ORDER — METOLAZONE 2.5 MG/1
2.5 TABLET ORAL
Qty: 30 TABLET | Refills: 0 | Status: SHIPPED | OUTPATIENT
Start: 2019-09-16 | End: 2019-09-18 | Stop reason: SDUPTHER

## 2019-09-17 ENCOUNTER — TELEPHONE (OUTPATIENT)
Dept: INTERNAL MEDICINE CLINIC | Age: 84
End: 2019-09-17

## 2019-09-17 DIAGNOSIS — I50.43 ACUTE ON CHRONIC COMBINED SYSTOLIC AND DIASTOLIC HEART FAILURE (HCC): ICD-10-CM

## 2019-09-17 DIAGNOSIS — I42.0 DILATED CARDIOMYOPATHY (HCC): ICD-10-CM

## 2019-09-18 RX ORDER — METOLAZONE 2.5 MG/1
2.5 TABLET ORAL
Qty: 30 TABLET | Refills: 0 | Status: SHIPPED | OUTPATIENT
Start: 2019-09-18 | End: 2019-10-16 | Stop reason: DRUGHIGH

## 2019-10-16 ENCOUNTER — OFFICE VISIT (OUTPATIENT)
Dept: INTERNAL MEDICINE CLINIC | Age: 84
End: 2019-10-16
Payer: MEDICARE

## 2019-10-16 VITALS
DIASTOLIC BLOOD PRESSURE: 64 MMHG | SYSTOLIC BLOOD PRESSURE: 130 MMHG | HEIGHT: 68 IN | WEIGHT: 237 LBS | BODY MASS INDEX: 35.92 KG/M2

## 2019-10-16 DIAGNOSIS — I10 ESSENTIAL HYPERTENSION: ICD-10-CM

## 2019-10-16 DIAGNOSIS — E11.9 TYPE 2 DIABETES MELLITUS WITHOUT COMPLICATION, WITHOUT LONG-TERM CURRENT USE OF INSULIN (HCC): ICD-10-CM

## 2019-10-16 DIAGNOSIS — N40.1 BENIGN PROSTATIC HYPERPLASIA WITH LOWER URINARY TRACT SYMPTOMS, SYMPTOM DETAILS UNSPECIFIED: Primary | ICD-10-CM

## 2019-10-16 DIAGNOSIS — I42.0 DILATED CARDIOMYOPATHY (HCC): ICD-10-CM

## 2019-10-16 DIAGNOSIS — I50.43 ACUTE ON CHRONIC COMBINED SYSTOLIC AND DIASTOLIC HEART FAILURE (HCC): ICD-10-CM

## 2019-10-16 DIAGNOSIS — I50.42 CHRONIC COMBINED SYSTOLIC AND DIASTOLIC CONGESTIVE HEART FAILURE (HCC): ICD-10-CM

## 2019-10-16 PROCEDURE — G8484 FLU IMMUNIZE NO ADMIN: HCPCS | Performed by: INTERNAL MEDICINE

## 2019-10-16 PROCEDURE — 1123F ACP DISCUSS/DSCN MKR DOCD: CPT | Performed by: INTERNAL MEDICINE

## 2019-10-16 PROCEDURE — G8427 DOCREV CUR MEDS BY ELIG CLIN: HCPCS | Performed by: INTERNAL MEDICINE

## 2019-10-16 PROCEDURE — 4040F PNEUMOC VAC/ADMIN/RCVD: CPT | Performed by: INTERNAL MEDICINE

## 2019-10-16 PROCEDURE — G8417 CALC BMI ABV UP PARAM F/U: HCPCS | Performed by: INTERNAL MEDICINE

## 2019-10-16 PROCEDURE — 99214 OFFICE O/P EST MOD 30 MIN: CPT | Performed by: INTERNAL MEDICINE

## 2019-10-16 PROCEDURE — 1036F TOBACCO NON-USER: CPT | Performed by: INTERNAL MEDICINE

## 2019-10-16 RX ORDER — SPIRONOLACTONE 25 MG/1
25 TABLET ORAL DAILY
Qty: 10 TABLET | Refills: 0 | Status: SHIPPED | OUTPATIENT
Start: 2019-10-16 | End: 2019-10-31 | Stop reason: SDUPTHER

## 2019-10-16 RX ORDER — SPIRONOLACTONE 25 MG/1
25 TABLET ORAL DAILY
Qty: 90 TABLET | Refills: 1 | Status: SHIPPED | OUTPATIENT
Start: 2019-10-16 | End: 2019-10-16 | Stop reason: SDUPTHER

## 2019-10-16 RX ORDER — BUMETANIDE 1 MG/1
1 TABLET ORAL DAILY
Qty: 90 TABLET | Refills: 3 | Status: SHIPPED | OUTPATIENT
Start: 2019-10-16 | End: 2019-10-16 | Stop reason: SDUPTHER

## 2019-10-16 RX ORDER — METOLAZONE 2.5 MG/1
2.5 TABLET ORAL DAILY
Qty: 90 TABLET | Refills: 3 | Status: SHIPPED | OUTPATIENT
Start: 2019-10-16 | End: 2019-11-15 | Stop reason: SDUPTHER

## 2019-10-16 RX ORDER — BUMETANIDE 1 MG/1
1 TABLET ORAL DAILY
Qty: 10 TABLET | Refills: 0 | Status: SHIPPED | OUTPATIENT
Start: 2019-10-16 | End: 2019-10-31 | Stop reason: SDUPTHER

## 2019-10-30 ENCOUNTER — HOSPITAL ENCOUNTER (OUTPATIENT)
Age: 84
Setting detail: SPECIMEN
Discharge: HOME | End: 2019-10-30
Payer: MEDICARE

## 2019-10-30 DIAGNOSIS — I42.0 DILATED CARDIOMYOPATHY (HCC): ICD-10-CM

## 2019-10-30 DIAGNOSIS — I50.43 ACUTE ON CHRONIC COMBINED SYSTOLIC AND DIASTOLIC HEART FAILURE (HCC): ICD-10-CM

## 2019-10-30 LAB
ANION GAP SERPL CALCULATED.3IONS-SCNC: 11 MMOL/L (ref 9–17)
BUN BLDV-MCNC: 19 MG/DL (ref 8–23)
BUN/CREAT BLD: ABNORMAL (ref 9–20)
CALCIUM SERPL-MCNC: 9.4 MG/DL (ref 8.6–10.4)
CHLORIDE BLD-SCNC: 102 MMOL/L (ref 98–107)
CO2: 29 MMOL/L (ref 20–31)
CREAT SERPL-MCNC: 0.77 MG/DL (ref 0.7–1.2)
GFR AFRICAN AMERICAN: >60 ML/MIN
GFR NON-AFRICAN AMERICAN: >60 ML/MIN
GFR SERPL CREATININE-BSD FRML MDRD: ABNORMAL ML/MIN/{1.73_M2}
GFR SERPL CREATININE-BSD FRML MDRD: ABNORMAL ML/MIN/{1.73_M2}
GLUCOSE BLD-MCNC: 155 MG/DL (ref 70–99)
POTASSIUM SERPL-SCNC: 4 MMOL/L (ref 3.7–5.3)
SODIUM BLD-SCNC: 142 MMOL/L (ref 135–144)

## 2019-10-31 ENCOUNTER — OFFICE VISIT (OUTPATIENT)
Dept: INTERNAL MEDICINE CLINIC | Age: 84
End: 2019-10-31
Payer: MEDICARE

## 2019-10-31 VITALS
SYSTOLIC BLOOD PRESSURE: 134 MMHG | WEIGHT: 239 LBS | BODY MASS INDEX: 36.22 KG/M2 | HEIGHT: 68 IN | DIASTOLIC BLOOD PRESSURE: 80 MMHG

## 2019-10-31 DIAGNOSIS — I42.0 DILATED CARDIOMYOPATHY (HCC): ICD-10-CM

## 2019-10-31 DIAGNOSIS — I50.43 ACUTE ON CHRONIC COMBINED SYSTOLIC AND DIASTOLIC HEART FAILURE (HCC): ICD-10-CM

## 2019-10-31 PROCEDURE — G8427 DOCREV CUR MEDS BY ELIG CLIN: HCPCS | Performed by: INTERNAL MEDICINE

## 2019-10-31 PROCEDURE — 1123F ACP DISCUSS/DSCN MKR DOCD: CPT | Performed by: INTERNAL MEDICINE

## 2019-10-31 PROCEDURE — G8417 CALC BMI ABV UP PARAM F/U: HCPCS | Performed by: INTERNAL MEDICINE

## 2019-10-31 PROCEDURE — 99213 OFFICE O/P EST LOW 20 MIN: CPT | Performed by: INTERNAL MEDICINE

## 2019-10-31 PROCEDURE — G8484 FLU IMMUNIZE NO ADMIN: HCPCS | Performed by: INTERNAL MEDICINE

## 2019-10-31 PROCEDURE — 4040F PNEUMOC VAC/ADMIN/RCVD: CPT | Performed by: INTERNAL MEDICINE

## 2019-10-31 PROCEDURE — 1036F TOBACCO NON-USER: CPT | Performed by: INTERNAL MEDICINE

## 2019-10-31 RX ORDER — BUMETANIDE 1 MG/1
1 TABLET ORAL DAILY
Qty: 90 TABLET | Refills: 1 | Status: SHIPPED | OUTPATIENT
Start: 2019-10-31 | End: 2020-05-05 | Stop reason: SDUPTHER

## 2019-10-31 RX ORDER — SPIRONOLACTONE 25 MG/1
25 TABLET ORAL DAILY
Qty: 90 TABLET | Refills: 3 | Status: SHIPPED | OUTPATIENT
Start: 2019-10-31 | End: 2020-01-14

## 2019-10-31 RX ORDER — BUMETANIDE 1 MG/1
1 TABLET ORAL DAILY
Qty: 10 TABLET | Refills: 0 | Status: SHIPPED | OUTPATIENT
Start: 2019-10-31 | End: 2019-10-31 | Stop reason: SDUPTHER

## 2019-11-15 DIAGNOSIS — I42.0 DILATED CARDIOMYOPATHY (HCC): ICD-10-CM

## 2019-11-15 DIAGNOSIS — I50.43 ACUTE ON CHRONIC COMBINED SYSTOLIC AND DIASTOLIC HEART FAILURE (HCC): ICD-10-CM

## 2019-11-20 RX ORDER — METOLAZONE 2.5 MG/1
TABLET ORAL
Qty: 30 TABLET | Refills: 0 | Status: SHIPPED | OUTPATIENT
Start: 2019-11-20 | End: 2020-01-14 | Stop reason: SDUPTHER

## 2020-01-14 ENCOUNTER — OFFICE VISIT (OUTPATIENT)
Dept: INTERNAL MEDICINE CLINIC | Age: 85
End: 2020-01-14
Payer: MEDICARE

## 2020-01-14 VITALS
HEART RATE: 56 BPM | DIASTOLIC BLOOD PRESSURE: 70 MMHG | SYSTOLIC BLOOD PRESSURE: 124 MMHG | OXYGEN SATURATION: 96 % | WEIGHT: 229 LBS | BODY MASS INDEX: 34.71 KG/M2 | HEIGHT: 68 IN

## 2020-01-14 PROCEDURE — 1036F TOBACCO NON-USER: CPT | Performed by: INTERNAL MEDICINE

## 2020-01-14 PROCEDURE — G8417 CALC BMI ABV UP PARAM F/U: HCPCS | Performed by: INTERNAL MEDICINE

## 2020-01-14 PROCEDURE — G8484 FLU IMMUNIZE NO ADMIN: HCPCS | Performed by: INTERNAL MEDICINE

## 2020-01-14 PROCEDURE — 99214 OFFICE O/P EST MOD 30 MIN: CPT | Performed by: INTERNAL MEDICINE

## 2020-01-14 PROCEDURE — 4040F PNEUMOC VAC/ADMIN/RCVD: CPT | Performed by: INTERNAL MEDICINE

## 2020-01-14 PROCEDURE — 1123F ACP DISCUSS/DSCN MKR DOCD: CPT | Performed by: INTERNAL MEDICINE

## 2020-01-14 PROCEDURE — G8427 DOCREV CUR MEDS BY ELIG CLIN: HCPCS | Performed by: INTERNAL MEDICINE

## 2020-01-14 RX ORDER — METOLAZONE 2.5 MG/1
2.5 TABLET ORAL EVERY OTHER DAY
Qty: 30 TABLET | Refills: 5 | Status: SHIPPED | OUTPATIENT
Start: 2020-01-14 | End: 2020-03-09 | Stop reason: SDUPTHER

## 2020-01-14 RX ORDER — SPIRONOLACTONE 25 MG/1
50 TABLET ORAL DAILY
Qty: 90 TABLET | Refills: 3 | Status: SHIPPED | OUTPATIENT
Start: 2020-01-14 | End: 2020-09-23

## 2020-01-14 ASSESSMENT — PATIENT HEALTH QUESTIONNAIRE - PHQ9
SUM OF ALL RESPONSES TO PHQ QUESTIONS 1-9: 0
SUM OF ALL RESPONSES TO PHQ QUESTIONS 1-9: 0
1. LITTLE INTEREST OR PLEASURE IN DOING THINGS: 0
SUM OF ALL RESPONSES TO PHQ9 QUESTIONS 1 & 2: 0
2. FEELING DOWN, DEPRESSED OR HOPELESS: 0

## 2020-01-14 NOTE — PROGRESS NOTES
Patient Care Team:  Ramesh Paris MD as PCP - General (Internal Medicine)  Ramesh Paris MD as PCP - [facility]  Rashmi Marin MD as Consulting Physician (Hematology and Oncology)  Daniel Nguyen MD as Consulting Physician (Radiation Oncology)  Auburn Lennox, MD        SUBJECTIVE     HISTORY           History of present illness     Pertinent details  added to ,       Chief Complaint   Patient presents with    Cough     congestion, last couple days     Dizziness     has been havign dizziness          Encounter Diagnoses   Name Primary?     Dilated cardiomyopathy (HCC)     Acute on chronic combined systolic and diastolic heart failure (HCC)     Type 2 diabetes mellitus without complication, without long-term current use of insulin (HCC) Yes    Essential hypertension     Chronic atrial fibrillation     Chronic combined systolic and diastolic congestive heart failure (HCC)         Symptom / problem          --history obtained from patient.-   Pains symptom was orthostatic dizziness  He is not any antihypertensive medication or diuretics  However he is on tamsulosin which can cause dizziness and we can stop that    He is recuperating for an upper respiratory tract infection and has mild cough  No fever no chills no Rales rhonchi    It is to be noted that since we optimized his diuretic therapy with metolazone and spironolactone and Bumex the leg edema has significantly improved and hydrocele and scrotal edema is also improved and patient is feeling better we will continue same medications patient is monitoring his weight           MEDICATIONS:      Current Outpatient Medications   Medication Sig Dispense Refill    spironolactone (ALDACTONE) 25 MG tablet Take 2 tablets by mouth daily 90 tablet 3    metOLazone (ZAROXOLYN) 2.5 MG tablet Take 1 tablet by mouth every other day 30 tablet 5    metFORMIN (GLUCOPHAGE) 500 MG tablet Take 1 tablet by mouth daily (with breakfast) 90 tablet 1    bumetanide (BUMEX) 1 MG tablet Take 1 tablet by mouth daily 90 tablet 1    tamsulosin (FLOMAX) 0.4 MG capsule Take 1 capsule by mouth daily 90 capsule 3    simvastatin (ZOCOR) 20 MG tablet TAKE 1 TABLET EVERY NIGHT 90 tablet 3    senna-docusate (SENNA S) 8.6-50 MG per tablet Take 2 tablets by mouth daily 100 tablet 1    blood glucose test strips (ACCU-CHEK ERIKA) strip 1 each by In Vitro route 3 times daily As needed. 100 each 3    Blood Glucose Monitoring Suppl (ACCU-CHEK ERIKA) DEVIN 1 each by Does not apply route daily 1 Device 0    ACCU-CHEK SOFTCLIX LANCETS MISC 1 each by Does not apply route 3 times daily 100 each 3    Lancets MISC 1 each by Does not apply route daily 100 each 11    blood glucose test strips (ASCENSIA AUTODISC VI;ONE TOUCH ULTRA TEST VI) strip Use with associated glucose meter. 100 strip 11    glimepiride (AMARYL) 2 MG tablet Take 1 tablet by mouth every morning (before breakfast) 90 tablet 3    aspirin EC 81 MG EC tablet Take 1 tablet by mouth daily. 30 tablet 11    Multiple Vitamin (MULTI VITAMIN MENS PO) Take  by mouth.  Cyanocobalamin (VITAMIN B 12 PO) Take  by mouth. No current facility-administered medications for this visit. ALLERGIES:      Allergies   Allergen Reactions    Xarelto [Rivaroxaban] Other (See Comments)     Hematuria recurrent       SOCIAL HISTORY   Reviewed and no change from previous record. Tennille Morgan  reports that he has quit smoking. His smoking use included cigarettes. He smoked 20.00 packs per day.  He has never used smokeless tobacco.    FAMILY HISTORY:    Reviewed and No change from previous visit    REVIEW OF SYSTEMS:    Review of Systems        OBJECTIVE      Physical exam           Vitals:    01/14/20 1357   BP: 124/70   Pulse: 56   SpO2: 96%   Weight: 229 lb (103.9 kg)   Height: 5' 7.99\" (1.727 m)         Physical Exam   Vitals:  /70   Pulse 56   Ht 5' 7.99\" (1.727 m)   Wt 229 lb (103.9 kg)   SpO2 96%   BMI 34.83 kg/m²                 Body mass index is 34.83 kg/m².      Vitals:    01/14/20 1357   BP: 124/70   Pulse: 56   SpO2: 96%   Weight: 229 lb (103.9 kg)   Height: 5' 7.99\" (1.727 m)       General -alert, well appearing, and in no distress  Skin - normal coloration and turgor, no rashes,no suspicious skin lesions noted  Eyes - pupils equal and reactive, extraocular eye movementsintact  Ears - bilateral TM's and external ear canals normal  Nose - normal and patent, no erythema, discharge or polyps  Mouth - mucous membranes are moist, pharynx normal without lesions  Neck - supple, no significant adenopathy  Lymphatics - no palpable lymphadenopathy, no hepatosplenomegaly    Chest - clear to auscultation, no wheezes, rales or rhonchi, symmetric air entry    Heart - normal rate, regular rhythm, no murmurs, rubs, clicks or gallops    Extremities - peripheral pulses normal, no pedal edema       Abdomen - soft, nontender, nondistended, no masses or organomegaly    Back - full range of motion, notenderness, palpable spasm or pain on motion    Neurological - alert, oriented, normal speech, no focal sensory or motor deficit  Musculoskeletal - no joint tenderness, deformity or swelling        Hydrocele noted  2+ leg edema        DIAGNOSTICS / INSERTS / IMAGES    [x] Reviewed       Labs      URINE ANALYSIS: No results found for: LABURIN     CBC:  Lab Results   Component Value Date    WBC 5.8 10/24/2018    HGB 11.9 10/24/2018     10/24/2018     05/22/2012        BMP:    Lab Results   Component Value Date     10/30/2019    K 4.0 10/30/2019     10/30/2019    CO2 29 10/30/2019    BUN 19 10/30/2019    CREATININE 0.77 10/30/2019    GLUCOSE 155 10/30/2019    GLUCOSE 181 10/10/2011        No components found for: LDLC  Lab Results   Component Value Date    LABA1C 7.7 10/24/2018     No results found for: POCGLU   .     LIVER PROFILE:  Lab Results   Component Value Date    ALT 18 10/24/2018    AST 16 10/24/2018    PROT

## 2020-03-09 RX ORDER — METOLAZONE 2.5 MG/1
2.5 TABLET ORAL EVERY OTHER DAY
Qty: 30 TABLET | Refills: 5 | Status: SHIPPED | OUTPATIENT
Start: 2020-03-09 | End: 2021-02-03

## 2020-03-11 RX ORDER — GLIMEPIRIDE 2 MG/1
TABLET ORAL
Qty: 90 TABLET | Refills: 3 | Status: SHIPPED | OUTPATIENT
Start: 2020-03-11 | End: 2021-05-11

## 2020-05-05 ENCOUNTER — VIRTUAL VISIT (OUTPATIENT)
Dept: INTERNAL MEDICINE CLINIC | Age: 85
End: 2020-05-05
Payer: MEDICARE

## 2020-05-05 PROCEDURE — 99443 PR PHYS/QHP TELEPHONE EVALUATION 21-30 MIN: CPT | Performed by: INTERNAL MEDICINE

## 2020-05-05 RX ORDER — LACTULOSE 10 G/15ML
20 SOLUTION ORAL DAILY PRN
Qty: 1 BOTTLE | Refills: 3 | Status: SHIPPED | OUTPATIENT
Start: 2020-05-05 | End: 2020-09-15 | Stop reason: ALTCHOICE

## 2020-05-05 RX ORDER — BUMETANIDE 1 MG/1
1 TABLET ORAL DAILY
Qty: 90 TABLET | Refills: 3 | Status: SHIPPED | OUTPATIENT
Start: 2020-05-05 | End: 2021-05-13 | Stop reason: SDUPTHER

## 2020-05-05 RX ORDER — AMOXICILLIN 250 MG
2 CAPSULE ORAL DAILY
Qty: 180 TABLET | Refills: 3 | Status: SHIPPED | OUTPATIENT
Start: 2020-05-05 | End: 2021-05-13 | Stop reason: SDUPTHER

## 2020-05-05 NOTE — PROGRESS NOTES
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 fib and is taking his Xarelto regularly     MEDICATIONS:      Current Outpatient Medications   Medication Sig Dispense Refill    senna-docusate (SENNA S) 8.6-50 MG per tablet Take 2 tablets by mouth daily 180 tablet 3    lactulose (CHRONULAC) 10 GM/15ML solution Take 30 mLs by mouth daily as needed (constipation) 1 Bottle 3    bumetanide (BUMEX) 1 MG tablet Take 1 tablet by mouth daily 90 tablet 3    metFORMIN (GLUCOPHAGE) 500 MG tablet Take 1 tablet by mouth daily (with breakfast) 90 tablet 3    glimepiride (AMARYL) 2 MG tablet TAKE 1 TABLET EVERY MORNING (BEFORE BREAKFAST) 90 tablet 3    metOLazone (ZAROXOLYN) 2.5 MG tablet Take 1 tablet by mouth every other day 30 tablet 5    spironolactone (ALDACTONE) 25 MG tablet Take 2 tablets by mouth daily 90 tablet 3    tamsulosin (FLOMAX) 0.4 MG capsule Take 1 capsule by mouth daily 90 capsule 3    simvastatin (ZOCOR) 20 MG tablet TAKE 1 TABLET EVERY NIGHT 90 tablet 3    blood glucose test strips (ACCU-CHEK ERIKA) strip 1 each by In Vitro route 3 times daily As needed.  100 each 3    Blood Glucose Monitoring Suppl (ACCU-CHEK ERIKA) DEVIN 1 each by Does not apply route daily 1 Device 0    ACCU-CHEK SOFTCLIX LANCETS MISC 1 each by Does not apply route 3 times daily 100 each 3    Lancets MISC 1 each by Does not apply route daily 100 each 11    blood glucose test tablet  Commonly known as:  GLUCOPHAGE  Take 1 tablet by mouth daily (with breakfast)     metOLazone 2.5 MG tablet  Commonly known as:  ZAROXOLYN  Take 1 tablet by mouth every other day     MULTI VITAMIN MENS PO     senna-docusate 8.6-50 MG per tablet  Commonly known as:  Senna S  Take 2 tablets by mouth daily     simvastatin 20 MG tablet  Commonly known as:  ZOCOR  TAKE 1 TABLET EVERY NIGHT     spironolactone 25 MG tablet  Commonly known as:  ALDACTONE  Take 2 tablets by mouth daily     tamsulosin 0.4 MG capsule  Commonly known as:  FLOMAX  Take 1 capsule by mouth daily     VITAMIN B 12 PO         * This list has 4 medication(s) that are the same as other medications prescribed for you. Read the directions carefully, and ask your doctor or other care provider to review them with you. Where to Get Your Medications      These medications were sent to [address] [place] [address]    Phone:  [phone] ·   bumetanide 1 MG tablet  · lactulose 10 GM/15ML solution  · metFORMIN 500 MG tablet  · senna-docusate 8.6-50 MG per tablet             FOLLOW UP  PLANS     No follow-ups on file. MD PIA Martínez[address], [address] [place] [address].    Phone [phone]   Fax: [phone]  Answering Service: [phone]

## 2020-05-14 RX ORDER — LISINOPRIL 10 MG/1
TABLET ORAL
Qty: 90 TABLET | Refills: 3 | Status: SHIPPED | OUTPATIENT
Start: 2020-05-14 | End: 2021-02-19

## 2020-06-16 ENCOUNTER — NURSE TRIAGE (OUTPATIENT)
Dept: OTHER | Facility: CLINIC | Age: 85
End: 2020-06-16

## 2020-06-17 ENCOUNTER — HOSPITAL ENCOUNTER (OUTPATIENT)
Age: 85
Setting detail: SPECIMEN
Discharge: HOME | End: 2020-06-17
Payer: MEDICARE

## 2020-06-17 ENCOUNTER — OFFICE VISIT (OUTPATIENT)
Dept: INTERNAL MEDICINE CLINIC | Age: 85
End: 2020-06-17
Payer: MEDICARE

## 2020-06-17 VITALS
HEIGHT: 68 IN | DIASTOLIC BLOOD PRESSURE: 60 MMHG | TEMPERATURE: 97.8 F | WEIGHT: 230.4 LBS | HEART RATE: 60 BPM | OXYGEN SATURATION: 97 % | SYSTOLIC BLOOD PRESSURE: 102 MMHG | BODY MASS INDEX: 34.92 KG/M2

## 2020-06-17 LAB
-: ABNORMAL
AMORPHOUS: ABNORMAL
BACTERIA: ABNORMAL
BILIRUBIN URINE: NEGATIVE
CASTS UA: ABNORMAL /LPF (ref 0–8)
COLOR: YELLOW
COMMENT UA: ABNORMAL
CRYSTALS, UA: ABNORMAL /HPF
EPITHELIAL CELLS UA: ABNORMAL /HPF (ref 0–5)
GLUCOSE URINE: NEGATIVE
KETONES, URINE: NEGATIVE
LEUKOCYTE ESTERASE, URINE: ABNORMAL
MUCUS: ABNORMAL
NITRITE, URINE: POSITIVE
OTHER OBSERVATIONS UA: ABNORMAL
PH UA: 5 (ref 5–8)
PROTEIN UA: ABNORMAL
RBC UA: ABNORMAL /HPF (ref 0–4)
RENAL EPITHELIAL, UA: ABNORMAL /HPF
SPECIFIC GRAVITY UA: 1.01 (ref 1–1.03)
TRICHOMONAS: ABNORMAL
TURBIDITY: ABNORMAL
URINE HGB: ABNORMAL
UROBILINOGEN, URINE: NORMAL
WBC UA: ABNORMAL /HPF (ref 0–5)
YEAST: ABNORMAL

## 2020-06-17 PROCEDURE — 1123F ACP DISCUSS/DSCN MKR DOCD: CPT | Performed by: PHYSICIAN ASSISTANT

## 2020-06-17 PROCEDURE — 1036F TOBACCO NON-USER: CPT | Performed by: PHYSICIAN ASSISTANT

## 2020-06-17 PROCEDURE — 4040F PNEUMOC VAC/ADMIN/RCVD: CPT | Performed by: PHYSICIAN ASSISTANT

## 2020-06-17 PROCEDURE — G8427 DOCREV CUR MEDS BY ELIG CLIN: HCPCS | Performed by: PHYSICIAN ASSISTANT

## 2020-06-17 PROCEDURE — G8417 CALC BMI ABV UP PARAM F/U: HCPCS | Performed by: PHYSICIAN ASSISTANT

## 2020-06-17 PROCEDURE — 99213 OFFICE O/P EST LOW 20 MIN: CPT | Performed by: PHYSICIAN ASSISTANT

## 2020-06-17 RX ORDER — CEPHALEXIN 500 MG/1
500 CAPSULE ORAL 2 TIMES DAILY
Qty: 14 CAPSULE | Refills: 0 | Status: SHIPPED | OUTPATIENT
Start: 2020-06-17 | End: 2020-06-17 | Stop reason: SDUPTHER

## 2020-06-17 RX ORDER — CEPHALEXIN 500 MG/1
500 CAPSULE ORAL 2 TIMES DAILY
Qty: 14 CAPSULE | Refills: 0 | Status: SHIPPED | OUTPATIENT
Start: 2020-06-17 | End: 2020-09-15 | Stop reason: ALTCHOICE

## 2020-06-17 NOTE — PROGRESS NOTES
urinating, discharge, flank pain, hematuria, penile pain, scrotal swelling and testicular pain. Musculoskeletal: Negative for arthralgias, back pain, neck pain and neck stiffness. Skin: Negative for color change, pallor and rash. Neurological: Negative for dizziness, weakness, light-headedness, numbness and headaches. Hematological: Negative for adenopathy. Psychiatric/Behavioral: Negative for confusion. PHYSICAL EXAM:      Vitals:    06/17/20 1316   BP: 102/60   Pulse: 60   Temp: 97.8 °F (36.6 °C)   SpO2: 97%   Weight: 230 lb 6.4 oz (104.5 kg)   Height: 5' 7.99\" (1.727 m)     General -alert, well appearing, non toxic, in no distress  Skin - normal coloration and turgor, no rash  Mouth - mucous membranes are moist  Neck - supple, no significant adenopathy  Lymphatics - no palpable lymphadenopathy, no hepatosplenomegaly  Chest - clear to auscultation, no wheezes, rales or rhonchi, symmetric air entry  Heart - normal rate, irregularly  irregular rhythm  Abdomen - soft, nontender, nondistended, no suprapubic tenderness  Back - no flank tenderness bilaterally  Neurological - alert, oriented x 3, normal speech, no focal sensory or motor deficit  Extremities - peripheral pulses normal, 1+ pedal edema cyndie    ASSESSMENT AND PLAN:      1. Dysuria  2. Frequency of micturition  - Urinalysis Reflex to Culture; Future  - cephALEXin (KEFLEX) 500 MG capsule; Take 1 capsule by mouth 2 times daily  Dispense: 14 capsule; Refill: 0    FOLLOW UP AND INSTRUCTIONS:   Return if symptoms worsen or fail to improve. Discussed use, benefit, and side effects of prescribed medications. All patient questions answered. Patient voiced understanding. Patient given educational materials - see patient instructions    Jace BHARDWAJ [facility]  6/18/2020, 4:35 PM    Please note that this chart wasgenerated using voice recognition Dragon dictation software.   Although every effort was made to ensure the accuracy of this automated transcription, some errors in transcription may have occurred.

## 2020-06-18 ASSESSMENT — ENCOUNTER SYMPTOMS
VOMITING: 0
ABDOMINAL PAIN: 0
NAUSEA: 0
SHORTNESS OF BREATH: 0
BACK PAIN: 0
COUGH: 0
COLOR CHANGE: 0
CHEST TIGHTNESS: 0

## 2020-06-19 LAB
CULTURE: ABNORMAL
Lab: ABNORMAL
SPECIMEN DESCRIPTION: ABNORMAL

## 2020-09-15 ENCOUNTER — OFFICE VISIT (OUTPATIENT)
Dept: INTERNAL MEDICINE CLINIC | Age: 85
End: 2020-09-15
Payer: MEDICARE

## 2020-09-15 VITALS
HEIGHT: 68 IN | BODY MASS INDEX: 33.8 KG/M2 | WEIGHT: 223 LBS | TEMPERATURE: 97.1 F | HEART RATE: 75 BPM | OXYGEN SATURATION: 98 % | DIASTOLIC BLOOD PRESSURE: 62 MMHG | SYSTOLIC BLOOD PRESSURE: 138 MMHG

## 2020-09-15 LAB — HBA1C MFR BLD: 8.3 %

## 2020-09-15 PROCEDURE — 4040F PNEUMOC VAC/ADMIN/RCVD: CPT | Performed by: PHYSICIAN ASSISTANT

## 2020-09-15 PROCEDURE — 3052F HG A1C>EQUAL 8.0%<EQUAL 9.0%: CPT | Performed by: PHYSICIAN ASSISTANT

## 2020-09-15 PROCEDURE — 99214 OFFICE O/P EST MOD 30 MIN: CPT | Performed by: PHYSICIAN ASSISTANT

## 2020-09-15 PROCEDURE — 83036 HEMOGLOBIN GLYCOSYLATED A1C: CPT | Performed by: PHYSICIAN ASSISTANT

## 2020-09-15 PROCEDURE — G8417 CALC BMI ABV UP PARAM F/U: HCPCS | Performed by: PHYSICIAN ASSISTANT

## 2020-09-15 PROCEDURE — 1123F ACP DISCUSS/DSCN MKR DOCD: CPT | Performed by: PHYSICIAN ASSISTANT

## 2020-09-15 PROCEDURE — G8427 DOCREV CUR MEDS BY ELIG CLIN: HCPCS | Performed by: PHYSICIAN ASSISTANT

## 2020-09-15 PROCEDURE — 1036F TOBACCO NON-USER: CPT | Performed by: PHYSICIAN ASSISTANT

## 2020-09-15 ASSESSMENT — ENCOUNTER SYMPTOMS
EYE ITCHING: 0
COUGH: 0
VOICE CHANGE: 0
RHINORRHEA: 0
WHEEZING: 0
SINUS PAIN: 0
BACK PAIN: 0
TROUBLE SWALLOWING: 0
ABDOMINAL PAIN: 0
VOMITING: 0
EYE DISCHARGE: 0
DIARRHEA: 0
BLOOD IN STOOL: 0
EYE PAIN: 0
COLOR CHANGE: 0
NAUSEA: 0
CHEST TIGHTNESS: 0
SHORTNESS OF BREATH: 1
SORE THROAT: 0
CONSTIPATION: 0

## 2020-09-15 NOTE — PROGRESS NOTES
[address] [id number]  Dept: [phone]  Dept Fax: [phone]    OfficeProgress/Follow Up Note  Date of patient's visit: 9/15/2020  Patient's Name:  Wm Bhatia YOB: 1935            Patient Care Team:  Janes Woods MD as PCP - General (Internal Medicine)  Janes Woods MD as PCP - [facility] Provider  Corrinne Mulders, MD as Consulting Physician (Hematology and Oncology)  Yousif Mayfield MD as Consulting Physician (Radiation Oncology)  Paige Cordoba MD    REASON FOR VISIT:  Routine outpatient follow up    HISTORY OF PRESENT ILLNESS:      Chief Complaint   Patient presents with    Other     Discuss weight loss, urine has bubbles    Discuss Medications    Health Maintenance     Dtap     History was obtained from the patient. Wm Bhatia is a 81 y.o. male here today for above. Patient reports occasional bubble in urine over the past few weeks. Was seen here a few months ago with similar complaints, urinalysis revealed acute cystitis, urine culture positive for e coli infection. He currently denies dysuria, urgency, hesitancy, flank pain or hematuria. He reports urinary frequency, chronic due to diuretic therapy. Diabetes mellitus, patient not checking blood sugars regularly, A1c elevated today 8.3. Congestive heart failure, mild dyspnea on exertion. No chest pain/pressure. No headaches or vision changes.       Patient Active Problem List   Diagnosis    Benign prostatic hyperplasia with lower urinary tract symptoms    Essential hypertension    Dilated cardiomyopathy (HCC)    Chronic atrial fibrillation    Type 2 diabetes mellitus without complication, without long-term current use of insulin (HCC)    History of inguinal hernia repair    Paresthesia of bilateral legs    Chronic combined systolic and diastolic congestive heart failure (HCC)    Candidal balanitis    Acquired phimosis    Constipation    Reactions    Xarelto [Rivaroxaban] Other (See Comments)     Hematuria recurrent       SOCIAL HISTORY    Reviewed and no change from previous record. Kaylene Brito  reports that he has quit smoking. His smoking use included cigarettes. He smoked 20.00 packs per day. He has never used smokeless tobacco.    FAMILY HISTORY:    Reviewed and no change from previous visit    REVIEW OF SYSTEMS:    Review of Systems   Constitutional: Positive for unexpected weight change (7 lbs over past few months). Negative for chills, diaphoresis, fatigue and fever. HENT: Positive for hearing loss. Negative for congestion, ear discharge, ear pain, rhinorrhea, sinus pain, sore throat, trouble swallowing and voice change. Eyes: Negative for pain, discharge, itching and visual disturbance. Respiratory: Positive for shortness of breath (mild, exertional). Negative for cough, chest tightness and wheezing. Cardiovascular: Negative for chest pain, palpitations and leg swelling. Gastrointestinal: Negative for abdominal pain, blood in stool, constipation, diarrhea, nausea and vomiting. Endocrine: Negative for cold intolerance and heat intolerance. Genitourinary: Positive for frequency. Negative for difficulty urinating, dysuria, flank pain, hematuria and urgency. Musculoskeletal: Negative for arthralgias, back pain, neck pain and neck stiffness. Skin: Negative for color change, pallor and rash. Neurological: Negative for dizziness, tremors, syncope, facial asymmetry, speech difficulty, weakness, light-headedness, numbness and headaches. Hematological: Negative for adenopathy.      PHYSICAL EXAM:      Vitals:    09/15/20 0936   BP: 138/62   Pulse: 75   Temp: 97.1 °F (36.2 °C)   SpO2: 98%   Weight: 223 lb (101.2 kg)   Height: 5' 7.99\" (1.727 m)     BP Readings from Last 3 Encounters:   09/15/20 138/62   06/17/20 102/60   01/14/20 124/70      Wt Readings from Last 3 Encounters:   09/15/20 223 lb (101.2 kg)   06/17/20 230 lb 6.4 oz (104.5 kg)   01/14/20 229 lb (103.9 kg)     General - alert, well appearing, and in no distress  Skin - normal coloration and turgor, no rash  Eyes - pupils equal and reactive, extraocular eye movements intact  Mouth - mucous membranes are moist  Neck - supple, no significant adenopathy, no palpable masses   Lymphatics - no palpable lymphadenopathy, no hepatosplenomegaly  Chest - clear to auscultation, no wheezes, rales or rhonchi, symmetric air entry  Heart - normal rate, rhythm is regular  Abdomen - soft, nontender, nondistended  Neurological - alert, oriented, normal speech, no focal sensory or motor deficit  Extremities - peripheral pulses normal, trace pedal edema bilaterally    LABORATORY FINDINGS:    CBC:  Lab Results   Component Value Date    WBC 5.8 10/24/2018    HGB 11.9 10/24/2018     10/24/2018     05/22/2012     BMP:    Lab Results   Component Value Date     10/30/2019    K 4.0 10/30/2019     10/30/2019    CO2 29 10/30/2019    BUN 19 10/30/2019    CREATININE 0.77 10/30/2019    GLUCOSE 155 10/30/2019    GLUCOSE 181 10/10/2011     HEMOGLOBIN A1C:   Lab Results   Component Value Date    LABA1C 8.3 09/15/2020     FASTING LIPID PANEL:  Lab Results   Component Value Date    CHOL 209 (H) 10/24/2018    HDL 38 (L) 10/24/2018    TRIG 139 10/24/2018     ASSESSMENT AND PLAN:      1. Essential hypertension  - Comprehensive Metabolic Panel; Future  - CBC Auto Differential; Future    2. Type 2 diabetes mellitus without complication, without long-term current use of insulin (HCC)  - Uncontrolled, A1c increased 8.3, dietary changes, increase Metformin 500 mg BID  - metFORMIN (GLUCOPHAGE) 500 MG tablet; Take 1 tablet by mouth 2 times daily (with meals)  Dispense: 60 tablet; Refill: 2  - Microalbumin, Ur; Future  - TSH With Reflex Ft4; Future    3.  Chronic combined systolic and diastolic congestive heart failure (HCC)  - Stable, compensated, continue present management  - Comprehensive Metabolic

## 2020-09-23 RX ORDER — SPIRONOLACTONE 25 MG/1
TABLET ORAL
Qty: 90 TABLET | Refills: 3 | Status: SHIPPED | OUTPATIENT
Start: 2020-09-23 | End: 2021-05-13

## 2020-10-26 ENCOUNTER — HOSPITAL ENCOUNTER (OUTPATIENT)
Age: 85
Setting detail: SPECIMEN
Discharge: HOME | End: 2020-10-26
Payer: MEDICARE

## 2020-10-26 LAB
-: ABNORMAL
ABSOLUTE EOS #: 0.42 K/UL (ref 0–0.44)
ABSOLUTE IMMATURE GRANULOCYTE: 0.03 K/UL (ref 0–0.3)
ABSOLUTE LYMPH #: 2.81 K/UL (ref 1.1–3.7)
ABSOLUTE MONO #: 0.54 K/UL (ref 0.1–1.2)
ALBUMIN SERPL-MCNC: 3.6 G/DL (ref 3.5–5.2)
ALBUMIN/GLOBULIN RATIO: 1.3 (ref 1–2.5)
ALP BLD-CCNC: 50 U/L (ref 40–129)
ALT SERPL-CCNC: 17 U/L (ref 5–41)
AMORPHOUS: ABNORMAL
ANION GAP SERPL CALCULATED.3IONS-SCNC: 14 MMOL/L (ref 9–17)
AST SERPL-CCNC: 14 U/L
BACTERIA: ABNORMAL
BASOPHILS # BLD: 1 % (ref 0–2)
BASOPHILS ABSOLUTE: 0.06 K/UL (ref 0–0.2)
BILIRUB SERPL-MCNC: 0.49 MG/DL (ref 0.3–1.2)
BILIRUBIN URINE: NEGATIVE
BUN BLDV-MCNC: 26 MG/DL (ref 8–23)
BUN/CREAT BLD: ABNORMAL (ref 9–20)
CALCIUM SERPL-MCNC: 9.1 MG/DL (ref 8.6–10.4)
CASTS UA: ABNORMAL /LPF (ref 0–2)
CHLORIDE BLD-SCNC: 104 MMOL/L (ref 98–107)
CHOLESTEROL, FASTING: 196 MG/DL
CHOLESTEROL/HDL RATIO: 5
CO2: 23 MMOL/L (ref 20–31)
COLOR: YELLOW
COMMENT UA: ABNORMAL
CREAT SERPL-MCNC: 0.95 MG/DL (ref 0.7–1.2)
CREATININE URINE: 97.3 MG/DL (ref 39–259)
CRYSTALS, UA: ABNORMAL /HPF
DIFFERENTIAL TYPE: ABNORMAL
EOSINOPHILS RELATIVE PERCENT: 6 % (ref 1–4)
EPITHELIAL CELLS UA: ABNORMAL /HPF (ref 0–5)
GFR AFRICAN AMERICAN: >60 ML/MIN
GFR NON-AFRICAN AMERICAN: >60 ML/MIN
GFR SERPL CREATININE-BSD FRML MDRD: ABNORMAL ML/MIN/{1.73_M2}
GFR SERPL CREATININE-BSD FRML MDRD: ABNORMAL ML/MIN/{1.73_M2}
GLUCOSE BLD-MCNC: 124 MG/DL (ref 70–99)
GLUCOSE URINE: NEGATIVE
HCT VFR BLD CALC: 35.2 % (ref 40.7–50.3)
HDLC SERPL-MCNC: 39 MG/DL
HEMOGLOBIN: 10.9 G/DL (ref 13–17)
IMMATURE GRANULOCYTES: 0 %
KETONES, URINE: NEGATIVE
LDL CHOLESTEROL: 122 MG/DL (ref 0–130)
LEUKOCYTE ESTERASE, URINE: ABNORMAL
LYMPHOCYTES # BLD: 39 % (ref 24–43)
MCH RBC QN AUTO: 27.5 PG (ref 25.2–33.5)
MCHC RBC AUTO-ENTMCNC: 31 G/DL (ref 28.4–34.8)
MCV RBC AUTO: 88.7 FL (ref 82.6–102.9)
MICROALBUMIN/CREAT 24H UR: 76 MG/L
MICROALBUMIN/CREAT UR-RTO: 78 MCG/MG CREAT
MONOCYTES # BLD: 7 % (ref 3–12)
MUCUS: ABNORMAL
NITRITE, URINE: POSITIVE
NRBC AUTOMATED: 0 PER 100 WBC
OTHER OBSERVATIONS UA: ABNORMAL
PDW BLD-RTO: 14.8 % (ref 11.8–14.4)
PH UA: 5 (ref 5–8)
PLATELET # BLD: 300 K/UL (ref 138–453)
PLATELET ESTIMATE: ABNORMAL
PMV BLD AUTO: 9.6 FL (ref 8.1–13.5)
POTASSIUM SERPL-SCNC: 4.3 MMOL/L (ref 3.7–5.3)
PROTEIN UA: ABNORMAL
RBC # BLD: 3.97 M/UL (ref 4.21–5.77)
RBC # BLD: ABNORMAL 10*6/UL
RBC UA: ABNORMAL /HPF (ref 0–2)
RENAL EPITHELIAL, UA: ABNORMAL /HPF
SEG NEUTROPHILS: 47 % (ref 36–65)
SEGMENTED NEUTROPHILS ABSOLUTE COUNT: 3.44 K/UL (ref 1.5–8.1)
SODIUM BLD-SCNC: 141 MMOL/L (ref 135–144)
SPECIFIC GRAVITY UA: 1.01 (ref 1–1.03)
TOTAL PROTEIN: 6.4 G/DL (ref 6.4–8.3)
TRICHOMONAS: ABNORMAL
TRIGLYCERIDE, FASTING: 176 MG/DL
TSH SERPL DL<=0.05 MIU/L-ACNC: 1.27 MIU/L (ref 0.3–5)
TURBIDITY: ABNORMAL
URINE HGB: ABNORMAL
UROBILINOGEN, URINE: NORMAL
VLDLC SERPL CALC-MCNC: ABNORMAL MG/DL (ref 1–30)
WBC # BLD: 7.3 K/UL (ref 3.5–11.3)
WBC # BLD: ABNORMAL 10*3/UL
WBC UA: ABNORMAL /HPF (ref 0–5)
YEAST: ABNORMAL

## 2020-10-27 ENCOUNTER — OFFICE VISIT (OUTPATIENT)
Dept: INTERNAL MEDICINE CLINIC | Age: 85
End: 2020-10-27
Payer: MEDICARE

## 2020-10-27 VITALS
DIASTOLIC BLOOD PRESSURE: 76 MMHG | TEMPERATURE: 97.6 F | WEIGHT: 230 LBS | HEIGHT: 60 IN | BODY MASS INDEX: 45.16 KG/M2 | RESPIRATION RATE: 14 BRPM | HEART RATE: 64 BPM | SYSTOLIC BLOOD PRESSURE: 128 MMHG

## 2020-10-27 PROCEDURE — 1036F TOBACCO NON-USER: CPT | Performed by: INTERNAL MEDICINE

## 2020-10-27 PROCEDURE — G8484 FLU IMMUNIZE NO ADMIN: HCPCS | Performed by: INTERNAL MEDICINE

## 2020-10-27 PROCEDURE — G8417 CALC BMI ABV UP PARAM F/U: HCPCS | Performed by: INTERNAL MEDICINE

## 2020-10-27 PROCEDURE — 3052F HG A1C>EQUAL 8.0%<EQUAL 9.0%: CPT | Performed by: INTERNAL MEDICINE

## 2020-10-27 PROCEDURE — 4040F PNEUMOC VAC/ADMIN/RCVD: CPT | Performed by: INTERNAL MEDICINE

## 2020-10-27 PROCEDURE — G8427 DOCREV CUR MEDS BY ELIG CLIN: HCPCS | Performed by: INTERNAL MEDICINE

## 2020-10-27 PROCEDURE — 99214 OFFICE O/P EST MOD 30 MIN: CPT | Performed by: INTERNAL MEDICINE

## 2020-10-27 PROCEDURE — 1123F ACP DISCUSS/DSCN MKR DOCD: CPT | Performed by: INTERNAL MEDICINE

## 2020-10-27 RX ORDER — CEFUROXIME AXETIL 250 MG/1
250 TABLET ORAL 2 TIMES DAILY
Qty: 20 TABLET | Refills: 0 | Status: SHIPPED | OUTPATIENT
Start: 2020-10-27 | End: 2020-11-06

## 2020-10-27 ASSESSMENT — PATIENT HEALTH QUESTIONNAIRE - PHQ9
SUM OF ALL RESPONSES TO PHQ9 QUESTIONS 1 & 2: 0
SUM OF ALL RESPONSES TO PHQ QUESTIONS 1-9: 0
1. LITTLE INTEREST OR PLEASURE IN DOING THINGS: 0
SUM OF ALL RESPONSES TO PHQ QUESTIONS 1-9: 0
2. FEELING DOWN, DEPRESSED OR HOPELESS: 0
SUM OF ALL RESPONSES TO PHQ QUESTIONS 1-9: 0

## 2020-10-27 NOTE — PROGRESS NOTES
Visit Information    Have you changed or started any medications since your last visit including any over-the-counter medicines, vitamins, or herbal medicines? no -    Are you having any side effects from any of your medications? -  no  Have you stopped taking any of your medications? Is so, why? -  no    Have you seen any other physician or provider since your last visit? No  Have you had any other diagnostic tests since your last visit? No  Have you been seen in the emergency room and/or had an admission to a hospital since we last saw you? Yes - Records Obtained  Have you had your routine dental cleaning in the past 6 months? yes     Have you activated your [employer] account? If not, what are your barriers?  yes      Patient Care Team:  Oskar Trent MD as PCP - General (Internal Medicine)  Oskar Trent MD as PCP - [facility]  Margarito Acuna MD as Consulting Physician (Hematology and Oncology)  Christopher Enriquez MD as Consulting Physician (Radiation Oncology)  Tremaine Arenas MD    Medical History Review  Past Medical, Family, and Social History reviewed and does contribute to the patient presenting condition    Health Maintenance   Topic Date Due    DTaP/Tdap/Td vaccine (1 - Tdap) 10/05/1955    Shingles Vaccine (1 of 2) 10/05/1986    Annual Wellness Visit (AWV)  05/29/2019    Flu vaccine (1) 09/01/2020    Lipid screen  10/26/2021    Potassium monitoring  10/26/2021    Creatinine monitoring  10/26/2021    Pneumococcal 65+ years Vaccine  Completed    Hepatitis A vaccine  Aged Out    Hib vaccine  Aged Out    Meningococcal (ACWY) vaccine  Aged Out     Chief Complaint   Patient presents with    Follow-up                                                                                                                                                                                                       OFFICE VISIT  PROGRESS NOTE         Date of patient's visit: 10/28/20  Patient's Name: Rosette Fish  YOB: 1935       Patient Care Team:  Jana Salcido MD as PCP - General (Internal Medicine)  Jana Salcido MD as PCP - [facility] Provider  Aleksandra Khanna MD as Consulting Physician (Hematology and Oncology)  Emily Da Silva MD as Consulting Physician (Radiation Oncology)  Lucy Stephens MD        SUBJECTIVE     HISTORY           History of present illness     Pertinent details  added to ,       Chief Complaint   Patient presents with    Follow-up          Encounter Diagnoses   Name Primary?  Follow up Yes    Chronic atrial fibrillation (HCC)     Type 2 diabetes mellitus without complication, without long-term current use of insulin (HCC)     Chronic combined systolic (congestive) and diastolic (congestive) heart failure (HCC)     Essential hypertension     Chronic combined systolic and diastolic congestive heart failure (HCC)     Acute cystitis without hematuria         Symptom / problem          --history obtained from patient. -   Patient is known to have diabetes mellitus heart failure hypertension   He is CHF and marked edema has improved with the diuretic therapy   At present he complains of foamy urine  Recent urinalysis showed evidence of pyuria  So we decided to treat him as UTI     MEDICATIONS:      Current Outpatient Medications   Medication Sig Dispense Refill    cefUROXime (CEFTIN) 250 MG tablet Take 1 tablet by mouth 2 times daily for 10 days 20 tablet 0    spironolactone (ALDACTONE) 25 MG tablet TAKE 1 TABLET EVERY DAY 90 tablet 3    metFORMIN (GLUCOPHAGE) 500 MG tablet Take 1 tablet by mouth 2 times daily (with meals) 60 tablet 2    lisinopril (PRINIVIL;ZESTRIL) 10 MG tablet TAKE 1 TABLET EVERY DAY 90 tablet 3    senna-docusate (SENNA S) 8.6-50 MG per tablet Take 2 tablets by mouth daily 180 tablet 3    bumetanide (BUMEX) 1 MG tablet Take 1 tablet by mouth daily 90 tablet 3    glimepiride (AMARYL) 2 MG tablet TAKE 1 TABLET EVERY MORNING (BEFORE BREAKFAST) 90 tablet 3    metOLazone (ZAROXOLYN) 2.5 MG tablet Take 1 tablet by mouth every other day 30 tablet 5    tamsulosin (FLOMAX) 0.4 MG capsule Take 1 capsule by mouth daily 90 capsule 3    simvastatin (ZOCOR) 20 MG tablet TAKE 1 TABLET EVERY NIGHT 90 tablet 3    blood glucose test strips (ACCU-CHEK ERIKA) strip 1 each by In Vitro route 3 times daily As needed. 100 each 3    Blood Glucose Monitoring Suppl (ACCU-CHEK ERIKA) DEVIN 1 each by Does not apply route daily 1 Device 0    ACCU-CHEK SOFTCLIX LANCETS MISC 1 each by Does not apply route 3 times daily 100 each 3    Lancets MISC 1 each by Does not apply route daily 100 each 11    blood glucose test strips (ASCENSIA AUTODISC VI;ONE TOUCH ULTRA TEST VI) strip Use with associated glucose meter. 100 strip 11    aspirin EC 81 MG EC tablet Take 1 tablet by mouth daily. 30 tablet 11    Multiple Vitamin (MULTI VITAMIN MENS PO) Take  by mouth.  Cyanocobalamin (VITAMIN B 12 PO) Take  by mouth. No current facility-administered medications for this visit. ALLERGIES:      Allergies   Allergen Reactions    Xarelto [Rivaroxaban] Other (See Comments)     Hematuria recurrent       SOCIAL HISTORY   Reviewed and no change from previous record. Giorgi Pham  reports that he has quit smoking. His smoking use included cigarettes. He smoked 20.00 packs per day.  He has never used smokeless tobacco.    FAMILY HISTORY:    Reviewed and No change from previous visit    REVIEW OF SYSTEMS:    Review of Systems        OBJECTIVE      Physical exam           Vitals:    10/27/20 1438   BP: 128/76   Site: Right Upper Arm   Position: Sitting   Cuff Size: Small Adult   Pulse: 64   Resp: 14   Temp: 97.6 °F (36.4 °C)   Weight: 230 lb (104.3 kg)   Height: 5' (1.524 m)         Physical Exam   Vitals:  /76 (Site: Right Upper Arm, Position: Sitting, Cuff Size: Small Adult)   Pulse 64   Temp 97.6 °F (36.4 °C)   Resp 14   Ht 5' (1.524 m)   Wt 230 lb (104.3 kg)   BMI 44.92 kg/m²                 Body mass index is 44.92 kg/m².      Vitals:    10/27/20 1438   BP: 128/76   Site: Right Upper Arm   Position: Sitting   Cuff Size: Small Adult   Pulse: 64   Resp: 14   Temp: 97.6 °F (36.4 °C)   Weight: 230 lb (104.3 kg)   Height: 5' (1.524 m)       General -alert, well appearing, and in no distress  Skin - normal coloration and turgor, no rashes,no suspicious skin lesions noted  Eyes - pupils equal and reactive, extraocular eye movementsintact  Ears - bilateral TM's and external ear canals normal  Nose - normal and patent, no erythema, discharge or polyps  Mouth - mucous membranes are moist, pharynx normal without lesions  Neck - supple, no significant adenopathy  Lymphatics - no palpable lymphadenopathy, no hepatosplenomegaly    Chest - clear to auscultation, no wheezes, rales or rhonchi, symmetric air entry    Heart - normal rate, regular rhythm, no murmurs, rubs, clicks or gallops    Extremities - peripheral pulses normal, no pedal edema       Abdomen - soft, nontender, nondistended, no masses or organomegaly    Back - full range of motion, notenderness, palpable spasm or pain on motion    Neurological - alert, oriented, normal speech, no focal sensory or motor deficit  Musculoskeletal - no joint tenderness, deformity or swelling                DIAGNOSTICS / INSERTS / IMAGES    [x] Reviewed       Labs      URINE ANALYSIS: No results found for: LABURIN     CBC:  Lab Results   Component Value Date    WBC 7.3 10/26/2020    HGB 10.9 10/26/2020     10/26/2020     05/22/2012        BMP:    Lab Results   Component Value Date     10/26/2020    K 4.3 10/26/2020     10/26/2020    CO2 23 10/26/2020    BUN 26 10/26/2020    CREATININE 0.95 10/26/2020    GLUCOSE 124 10/26/2020    GLUCOSE 181 10/10/2011        No components found for: LDLC  Lab Results   Component Value Date    LABA1C 8.3 09/15/2020     No results found for: POCGLU   .     LIVER PROFILE:  Lab Results   Component Value Date    ALT 17 10/26/2020    AST 14 10/26/2020    PROT 6.4 10/26/2020    BILITOT 0.49 10/26/2020    LABALBU 3.6 10/26/2020    LABALBU 4.0 10/10/2011          Results for orders placed or performed during the hospital encounter of 10/26/20   Culture, Urine    Specimen: Urine, clean catch   Result Value Ref Range    Specimen Description . CLEAN CATCH URINE     Special Requests NOT REPORTED     Culture ESCHERICHIA COLI >[id number] CFU/ML (A)        Susceptibility    Escherichia coli - BACTERIAL SUSCEPTIBILITY PANEL OSCAR     amikacin Value in next row        NOT REPORTED     ampicillin Value in next row Sensitive       4SUSCEPTIBLE     ampicillin-sulbactam Value in next row        NOT REPORTED     aztreonam Value in next row Sensitive       <=1SUSCEPTIBLE     ceFAZolin Value in next row Sensitive       <=4SUSCEPTIBLE     ceFAZolin Value in next row Sensitive       <=4SUSCEPTIBLE     cefepime Value in next row        NOT REPORTED     cefTRIAXone Value in next row Sensitive       <=1SUSCEPTIBLE     ciprofloxacin Value in next row Sensitive       <=0.25SUSCEPTIBLE     ertapenem Value in next row        NOT REPORTED     Confirmatory Extended Spectrum Beta-Lactamase Value in next row Negative       NOT REPORTED     gentamicin Value in next row Sensitive       <=1SUSCEPTIBLE     meropenem Value in next row        NOT REPORTED     nitrofurantoin Value in next row Sensitive       <=16SUSCEPTIBLE     tigecycline Value in next row        NOT REPORTED     tobramycin Value in next row Sensitive       <=1SUSCEPTIBLE     trimethoprim-sulfamethoxazole Value in next row Sensitive       <=20SUSCEPTIBLE     piperacillin-tazobactam Value in next row Sensitive       <=4SUSCEPTIBLE   CBC Auto Differential   Result Value Ref Range    WBC 7.3 3.5 - 11.3 k/uL    RBC 3.97 (L) 4.21 - 5.77 m/uL    Hemoglobin 10.9 (L) 13.0 - 17.0 g/dL    Hematocrit 35.2 (L) 40.7 - 50.3 %    MCV 88.7 82.6 - 102.9 fL    MCH 27.5 25.2 - 33.5 pg    MCHC 31.0 28.4 - 34.8 g/dL    RDW 14.8 (H) 11.8 - 14.4 %    Platelets 196 147 - 178 k/uL    MPV 9.6 8.1 - 13.5 fL    NRBC Automated 0.0 0.0 per 100 WBC    Differential Type NOT REPORTED     Seg Neutrophils 47 36 - 65 %    Lymphocytes 39 24 - 43 %    Monocytes 7 3 - 12 %    Eosinophils % 6 (H) 1 - 4 %    Basophils 1 0 - 2 %    Immature Granulocytes 0 0 %    Segs Absolute 3.44 1.50 - 8.10 k/uL    Absolute Lymph # 2.81 1.10 - 3.70 k/uL    Absolute Mono # 0.54 0.10 - 1.20 k/uL    Absolute Eos # 0.42 0.00 - 0.44 k/uL    Basophils Absolute 0.06 0.00 - 0.20 k/uL    Absolute Immature Granulocyte 0.03 0.00 - 0.30 k/uL    WBC Morphology NOT REPORTED     RBC Morphology ANISOCYTOSIS PRESENT     Platelet Estimate NOT REPORTED    Comprehensive Metabolic Panel   Result Value Ref Range    Glucose 124 (H) 70 - 99 mg/dL    BUN 26 (H) 8 - 23 mg/dL    CREATININE 0.95 0.70 - 1.20 mg/dL    Bun/Cre Ratio NOT REPORTED 9 - 20    Calcium 9.1 8.6 - 10.4 mg/dL    Sodium 141 135 - 144 mmol/L    Potassium 4.3 3.7 - 5.3 mmol/L    Chloride 104 98 - 107 mmol/L    CO2 23 20 - 31 mmol/L    Anion Gap 14 9 - 17 mmol/L    Alkaline Phosphatase 50 40 - 129 U/L    ALT 17 5 - 41 U/L    AST 14 <40 U/L    Total Bilirubin 0.49 0.3 - 1.2 mg/dL    Total Protein 6.4 6.4 - 8.3 g/dL    Alb 3.6 3.5 - 5.2 g/dL    Albumin/Globulin Ratio 1.3 1.0 - 2.5    GFR Non-African American >60 >60 mL/min    GFR African American >60 >60 mL/min    GFR Comment          GFR Staging NOT REPORTED    TSH With Reflex Ft4   Result Value Ref Range    TSH 1.27 0.30 - 5.00 mIU/L   Lipid, Fasting   Result Value Ref Range    Cholesterol, Fasting 196 <200 mg/dL    HDL 39 (L) >40 mg/dL    LDL Cholesterol 122 0 - 130 mg/dL    Chol/HDL Ratio 5.0 (H) <5    Triglyceride, Fasting 176 (H) <150 mg/dL    VLDL NOT REPORTED (H) 1 - 30 mg/dL   Urinalysis Reflex to Culture    Specimen: Urine, clean catch   Result Value Ref Range    Color, UA YELLOW YELLOW    Turbidity UA TURBID (A) CLEAR TODAYS VISIT       Today's office visit SALIENT ACTIONS    ----            Discussed use, benefit, and side effects of prescribed medications. [x] yes    All patient questions answered. Patient voiced understanding. Patient given educational materials - see patient instructions  [] yes         There are no discontinued medications. No orders of the defined types were placed in this encounter. There are no Patient Instructions on file for this visit. Medication List          Accurate as of October 27, 2020 11:59 PM. If you have any questions, ask your nurse or doctor. START taking these medications    cefUROXime 250 MG tablet  Commonly known as:  Ceftin  Take 1 tablet by mouth 2 times daily for 10 days  Started by:  Karma Castillo MD        CONTINUE taking these medications    Accu-Chek Estella Gardenia  1 each by Does not apply route daily     aspirin EC 81 MG EC tablet  Take 1 tablet by mouth daily. * blood glucose test strips strip  Commonly known as:  ASCENSIA AUTODISC VI;ONE TOUCH ULTRA TEST VI  Use with associated glucose meter. * blood glucose test strips strip  Commonly known as:  Accu-Chek Estella  1 each by In Vitro route 3 times daily As needed.      bumetanide 1 MG tablet  Commonly known as:  BUMEX  Take 1 tablet by mouth daily     glimepiride 2 MG tablet  Commonly known as:  AMARYL  TAKE 1 TABLET EVERY MORNING (BEFORE BREAKFAST)     * Lancets Misc  1 each by Does not apply route daily     * Accu-Chek Softclix Lancets Misc  1 each by Does not apply route 3 times daily     lisinopril 10 MG tablet  Commonly known as:  PRINIVIL;ZESTRIL  TAKE 1 TABLET EVERY DAY     metFORMIN 500 MG tablet  Commonly known as:  GLUCOPHAGE  Take 1 tablet by mouth 2 times daily (with meals)     metOLazone 2.5 MG tablet  Commonly known as:  ZAROXOLYN  Take 1 tablet by mouth every other day     MULTI VITAMIN MENS PO     senna-docusate 8.6-50 MG per tablet  Commonly known as:  Senna S  Take 2 tablets by mouth daily     simvastatin 20 MG tablet  Commonly known as:  ZOCOR  TAKE 1 TABLET EVERY NIGHT     spironolactone 25 MG tablet  Commonly known as:  ALDACTONE  TAKE 1 TABLET EVERY DAY     tamsulosin 0.4 MG capsule  Commonly known as:  FLOMAX  Take 1 capsule by mouth daily     VITAMIN B 12 PO         * This list has 4 medication(s) that are the same as other medications prescribed for you. Read the directions carefully, and ask your doctor or other care provider to review them with you. Where to Get Your Medications      These medications were sent to [facility], [address] [place] [address] Street [facility], [address]    Phone:  [phone] ·   cefUROXime 250 MG tablet             FOLLOW UP  PLANS     Return in about 1 month (around 11/27/2020). MD PIA Villagran [address], [address] [place] [address] Street.    Phone [phone]   Fax: [phone]  Answering Service: [phone]

## 2020-10-28 LAB
CULTURE: ABNORMAL
Lab: ABNORMAL
SPECIMEN DESCRIPTION: ABNORMAL

## 2020-11-19 ENCOUNTER — OFFICE VISIT (OUTPATIENT)
Dept: INTERNAL MEDICINE CLINIC | Age: 85
End: 2020-11-19
Payer: MEDICARE

## 2020-11-19 VITALS
DIASTOLIC BLOOD PRESSURE: 66 MMHG | HEART RATE: 86 BPM | HEIGHT: 60 IN | RESPIRATION RATE: 14 BRPM | BODY MASS INDEX: 44.61 KG/M2 | SYSTOLIC BLOOD PRESSURE: 132 MMHG | OXYGEN SATURATION: 98 % | TEMPERATURE: 97.3 F | WEIGHT: 227.2 LBS

## 2020-11-19 PROCEDURE — 99214 OFFICE O/P EST MOD 30 MIN: CPT | Performed by: INTERNAL MEDICINE

## 2020-11-19 PROCEDURE — 1036F TOBACCO NON-USER: CPT | Performed by: INTERNAL MEDICINE

## 2020-11-19 PROCEDURE — 4040F PNEUMOC VAC/ADMIN/RCVD: CPT | Performed by: INTERNAL MEDICINE

## 2020-11-19 PROCEDURE — 1123F ACP DISCUSS/DSCN MKR DOCD: CPT | Performed by: INTERNAL MEDICINE

## 2020-11-19 PROCEDURE — G8484 FLU IMMUNIZE NO ADMIN: HCPCS | Performed by: INTERNAL MEDICINE

## 2020-11-19 PROCEDURE — G8427 DOCREV CUR MEDS BY ELIG CLIN: HCPCS | Performed by: INTERNAL MEDICINE

## 2020-11-19 PROCEDURE — G8417 CALC BMI ABV UP PARAM F/U: HCPCS | Performed by: INTERNAL MEDICINE

## 2020-11-19 RX ORDER — AMOXICILLIN 500 MG/1
500 CAPSULE ORAL 3 TIMES DAILY
Qty: 21 CAPSULE | Refills: 0 | Status: SHIPPED | OUTPATIENT
Start: 2020-11-19 | End: 2020-11-26

## 2020-11-19 ASSESSMENT — PATIENT HEALTH QUESTIONNAIRE - PHQ9
SUM OF ALL RESPONSES TO PHQ QUESTIONS 1-9: 0
1. LITTLE INTEREST OR PLEASURE IN DOING THINGS: 0
2. FEELING DOWN, DEPRESSED OR HOPELESS: 0
SUM OF ALL RESPONSES TO PHQ QUESTIONS 1-9: 0
SUM OF ALL RESPONSES TO PHQ9 QUESTIONS 1 & 2: 0
SUM OF ALL RESPONSES TO PHQ QUESTIONS 1-9: 0

## 2020-11-19 NOTE — PROGRESS NOTES
OFFICE VISIT  PROGRESS NOTE         Date of patient's visit: 11/20/20  Patient's Name:  Cheri Obrien  YOB: 1935       Patient Care Team:  Ambreen Garcia MD as PCP - General (Internal Medicine)  Ambreen Garcia MD as PCP - [facility]  Sita Kraft MD as Consulting Physician (Hematology and Oncology)  Olegario Condon MD as Consulting Physician (Radiation Oncology)  Eva Llanos MD        SUBJECTIVE     HISTORY           History of present illness     Pertinent details  added to ,       Chief Complaint   Patient presents with    Follow-up     1 Month for bubbles in urine- still having issue           Encounter Diagnoses   Name Primary?  Benign prostatic hyperplasia with urinary hesitancy Yes    Essential hypertension     Chronic atrial fibrillation (HCC)     Chronic combined systolic and diastolic congestive heart failure (HCC)     Hydrocele, unspecified hydrocele type     History of inguinal hernia repair         Symptom / problem          --history obtained from patient. -   Known to have significant heart failure with significant leg edema in the past   He has tolerated spironolactone and lisinopril and torsemide and his CHF control has been better  He used to have severe phimosis and balanitis which has been corrected with surgical approach per Dr. Lily Kohler patient does have a hydrocele and does have some abdominal pain and concerned about hernia from time to time but I explained to him that surgical options are limited and high risk        MEDICATIONS:      Current Outpatient Medications   Medication Sig Dispense Refill    amoxicillin (AMOXIL) 500 MG capsule Take 1 capsule by mouth 3 times daily for 7 days 21 capsule 0    spironolactone (ALDACTONE) 25 MG tablet TAKE 1 TABLET EVERY DAY 90 tablet 3    metFORMIN (GLUCOPHAGE) 500 MG tablet Take 1 tablet by mouth 2 times daily (with meals) 60 tablet 2    lisinopril (PRINIVIL;ZESTRIL) 10 MG tablet TAKE 1 TABLET EVERY DAY 90 tablet 3    senna-docusate (SENNA S) 8.6-50 MG per tablet Take 2 tablets by mouth daily 180 tablet 3    bumetanide (BUMEX) 1 MG tablet Take 1 tablet by mouth daily 90 tablet 3    glimepiride (AMARYL) 2 MG tablet TAKE 1 TABLET EVERY MORNING (BEFORE BREAKFAST) 90 tablet 3    metOLazone (ZAROXOLYN) 2.5 MG tablet Take 1 tablet by mouth every other day 30 tablet 5    tamsulosin (FLOMAX) 0.4 MG capsule Take 1 capsule by mouth daily 90 capsule 3    simvastatin (ZOCOR) 20 MG tablet TAKE 1 TABLET EVERY NIGHT 90 tablet 3    blood glucose test strips (ACCU-CHEK ERIKA) strip 1 each by In Vitro route 3 times daily As needed. 100 each 3    Blood Glucose Monitoring Suppl (ACCU-CHEK ERIKA) DEVIN 1 each by Does not apply route daily 1 Device 0    ACCU-CHEK SOFTCLIX LANCETS MISC 1 each by Does not apply route 3 times daily 100 each 3    Lancets MISC 1 each by Does not apply route daily 100 each 11    blood glucose test strips (ASCENSIA AUTODISC VI;ONE TOUCH ULTRA TEST VI) strip Use with associated glucose meter. 100 strip 11    aspirin EC 81 MG EC tablet Take 1 tablet by mouth daily. 30 tablet 11    Multiple Vitamin (MULTI VITAMIN MENS PO) Take  by mouth.  Cyanocobalamin (VITAMIN B 12 PO) Take  by mouth. No current facility-administered medications for this visit. ALLERGIES:      Allergies   Allergen Reactions    Xarelto [Rivaroxaban] Other (See Comments)     Hematuria recurrent       SOCIAL HISTORY   Reviewed and no change from previous record. Marylee Elam  reports that he has quit smoking. His smoking use included cigarettes. He smoked 20.00 packs per day.  He has never used smokeless tobacco.    FAMILY HISTORY:    Reviewed and No change from previous visit    REVIEW OF SYSTEMS:    Review of Systems OBJECTIVE      Physical exam           Vitals:    11/19/20 1504   BP: 132/66   Pulse: 86   Resp: 14   Temp: 97.3 °F (36.3 °C)   TempSrc: Temporal   SpO2: 98%   Weight: 227 lb 3.2 oz (103.1 kg)   Height: 5' (1.524 m)         Physical Exam   Vitals:  /66   Pulse 86   Temp 97.3 °F (36.3 °C) (Temporal)   Resp 14   Ht 5' (1.524 m)   Wt 227 lb 3.2 oz (103.1 kg)   SpO2 98%   BMI 44.37 kg/m²                 Body mass index is 44.37 kg/m².      Vitals:    11/19/20 1504   BP: 132/66   Pulse: 86   Resp: 14   Temp: 97.3 °F (36.3 °C)   TempSrc: Temporal   SpO2: 98%   Weight: 227 lb 3.2 oz (103.1 kg)   Height: 5' (1.524 m)       General -alert, well appearing, and in no distress  Skin - normal coloration and turgor, no rashes,no suspicious skin lesions noted  Eyes - pupils equal and reactive, extraocular eye movementsintact  Ears - bilateral TM's and external ear canals normal  Nose - normal and patent, no erythema, discharge or polyps  Mouth - mucous membranes are moist, pharynx normal without lesions  Neck - supple, no significant adenopathy  Lymphatics - no palpable lymphadenopathy, no hepatosplenomegaly    Chest - clear to auscultation, no wheezes, rales or rhonchi, symmetric air entry    Heart - normal rate, regular rhythm, no murmurs, rubs, clicks or gallops    Extremities - peripheral pulses normal, no pedal edema       Abdomen - soft, nontender, nondistended, no masses or organomegaly    Back - full range of motion, notenderness, palpable spasm or pain on motion    Neurological - alert, oriented, normal speech, no focal sensory or motor deficit  Musculoskeletal - no joint tenderness, deformity or swelling        Right leg edema  Hydrocele        DIAGNOSTICS / INSERTS / IMAGES    [x] Reviewed     Lab results reviewed with patient  Labs      URINE ANALYSIS: No results found for: LABURIN     CBC:  Lab Results   Component Value Date    WBC 7.3 10/26/2020    HGB 10.9 10/26/2020     10/26/2020    PLT 273 05/22/2012        BMP:    Lab Results   Component Value Date     10/26/2020    K 4.3 10/26/2020     10/26/2020    CO2 23 10/26/2020    BUN 26 10/26/2020    CREATININE 0.95 10/26/2020    GLUCOSE 124 10/26/2020    GLUCOSE 181 10/10/2011        No components found for: LDLC  Lab Results   Component Value Date    LABA1C 8.3 09/15/2020     No results found for: POCGLU   .     LIVER PROFILE:  Lab Results   Component Value Date    ALT 17 10/26/2020    AST 14 10/26/2020    PROT 6.4 10/26/2020    BILITOT 0.49 10/26/2020    LABALBU 3.6 10/26/2020    LABALBU 4.0 10/10/2011          Results for orders placed or performed during the hospital encounter of 10/26/20   Culture, Urine    Specimen: Urine, clean catch   Result Value Ref Range    Specimen Description . CLEAN CATCH URINE     Special Requests NOT REPORTED     Culture ESCHERICHIA COLI >239510 CFU/ML (A)        Susceptibility    Escherichia coli - BACTERIAL SUSCEPTIBILITY PANEL OSACR     amikacin Value in next row        NOT REPORTED     ampicillin Value in next row Sensitive       4SUSCEPTIBLE     ampicillin-sulbactam Value in next row        NOT REPORTED     aztreonam Value in next row Sensitive       <=1SUSCEPTIBLE     ceFAZolin Value in next row Sensitive       <=4SUSCEPTIBLE     ceFAZolin Value in next row Sensitive       <=4SUSCEPTIBLE     cefepime Value in next row        NOT REPORTED     cefTRIAXone Value in next row Sensitive       <=1SUSCEPTIBLE     ciprofloxacin Value in next row Sensitive       <=0.25SUSCEPTIBLE     ertapenem Value in next row        NOT REPORTED     Confirmatory Extended Spectrum Beta-Lactamase Value in next row Negative       NOT REPORTED     gentamicin Value in next row Sensitive       <=1SUSCEPTIBLE     meropenem Value in next row        NOT REPORTED     nitrofurantoin Value in next row Sensitive       <=16SUSCEPTIBLE     tigecycline Value in next row        NOT REPORTED     tobramycin Value in next row Sensitive <=1SUSCEPTIBLE     trimethoprim-sulfamethoxazole Value in next row Sensitive       <=20SUSCEPTIBLE     piperacillin-tazobactam Value in next row Sensitive       <=4SUSCEPTIBLE   CBC Auto Differential   Result Value Ref Range    WBC 7.3 3.5 - 11.3 k/uL    RBC 3.97 (L) 4.21 - 5.77 m/uL    Hemoglobin 10.9 (L) 13.0 - 17.0 g/dL    Hematocrit 35.2 (L) 40.7 - 50.3 %    MCV 88.7 82.6 - 102.9 fL    MCH 27.5 25.2 - 33.5 pg    MCHC 31.0 28.4 - 34.8 g/dL    RDW 14.8 (H) 11.8 - 14.4 %    Platelets 141 091 - 952 k/uL    MPV 9.6 8.1 - 13.5 fL    NRBC Automated 0.0 0.0 per 100 WBC    Differential Type NOT REPORTED     Seg Neutrophils 47 36 - 65 %    Lymphocytes 39 24 - 43 %    Monocytes 7 3 - 12 %    Eosinophils % 6 (H) 1 - 4 %    Basophils 1 0 - 2 %    Immature Granulocytes 0 0 %    Segs Absolute 3.44 1.50 - 8.10 k/uL    Absolute Lymph # 2.81 1.10 - 3.70 k/uL    Absolute Mono # 0.54 0.10 - 1.20 k/uL    Absolute Eos # 0.42 0.00 - 0.44 k/uL    Basophils Absolute 0.06 0.00 - 0.20 k/uL    Absolute Immature Granulocyte 0.03 0.00 - 0.30 k/uL    WBC Morphology NOT REPORTED     RBC Morphology ANISOCYTOSIS PRESENT     Platelet Estimate NOT REPORTED    Comprehensive Metabolic Panel   Result Value Ref Range    Glucose 124 (H) 70 - 99 mg/dL    BUN 26 (H) 8 - 23 mg/dL    CREATININE 0.95 0.70 - 1.20 mg/dL    Bun/Cre Ratio NOT REPORTED 9 - 20    Calcium 9.1 8.6 - 10.4 mg/dL    Sodium 141 135 - 144 mmol/L    Potassium 4.3 3.7 - 5.3 mmol/L    Chloride 104 98 - 107 mmol/L    CO2 23 20 - 31 mmol/L    Anion Gap 14 9 - 17 mmol/L    Alkaline Phosphatase 50 40 - 129 U/L    ALT 17 5 - 41 U/L    AST 14 <40 U/L    Total Bilirubin 0.49 0.3 - 1.2 mg/dL    Total Protein 6.4 6.4 - 8.3 g/dL    Alb 3.6 3.5 - 5.2 g/dL    Albumin/Globulin Ratio 1.3 1.0 - 2.5    GFR Non-African American >60 >60 mL/min    GFR African American >60 >60 mL/min    GFR Comment          GFR Staging NOT REPORTED    TSH With Reflex Ft4   Result Value Ref Range    TSH 1.27 0.30 - 5.00 mIU/L Lipid, Fasting   Result Value Ref Range    Cholesterol, Fasting 196 <200 mg/dL    HDL 39 (L) >40 mg/dL    LDL Cholesterol 122 0 - 130 mg/dL    Chol/HDL Ratio 5.0 (H) <5    Triglyceride, Fasting 176 (H) <150 mg/dL    VLDL NOT REPORTED (H) 1 - 30 mg/dL   Urinalysis Reflex to Culture    Specimen: Urine, clean catch   Result Value Ref Range    Color, UA YELLOW YELLOW    Turbidity UA TURBID (A) CLEAR    Glucose, Ur NEGATIVE NEGATIVE    Bilirubin Urine NEGATIVE NEGATIVE    Ketones, Urine NEGATIVE NEGATIVE    Specific Gravity, UA 1.013 1.005 - 1.030    Urine Hgb MODERATE (A) NEGATIVE    pH, UA 5.0 5.0 - 8.0    Protein, UA 1+ (A) NEGATIVE    Urobilinogen, Urine Normal Normal    Nitrite, Urine POSITIVE (A) NEGATIVE    Leukocyte Esterase, Urine LARGE (A) NEGATIVE    Urinalysis Comments NOT REPORTED    Microalbumin, Ur   Result Value Ref Range    Microalb, Ur 76 (H) <21 mg/L    Creatinine, Ur 97.3 39.0 - 259.0 mg/dL    Microalb/Crt. Ratio 78 (H) <17 mcg/mg creat   Microscopic Urinalysis   Result Value Ref Range    -          WBC, UA TOO NUMEROUS TO COUNT 0 - 5 /HPF    RBC, UA 10 TO 20 0 - 2 /HPF    Casts UA NOT REPORTED 0 - 2 /LPF    Crystals, UA NOT REPORTED None /HPF    Epithelial Cells UA None 0 - 5 /HPF    Renal Epithelial, UA NOT REPORTED 0 /HPF    Bacteria, UA MANY (A) None    Mucus, UA NOT REPORTED None    Trichomonas, UA NOT REPORTED None    Amorphous, UA NOT REPORTED None    Other Observations UA NOT REPORTED NOT REQ. Yeast, UA NOT REPORTED None                     VITALS INCLUDING BMI REVIEWED WITH PATIENT  Labs reviewed as noted above   Discussed with patient        ASSESSMENT / Dea Garrett was seen today for follow-up.     Diagnoses and all orders for this visit:    Benign prostatic hyperplasia with urinary hesitancy  Stable  Essential hypertension  Good control  Chronic atrial fibrillation (HCC)  Rate controlled  Chronic combined systolic and diastolic congestive heart failure (HCC)  Much improved  Hydrocele, unspecified hydrocele type  Unchanged  History of inguinal hernia repair    Other orders  Had several episodes of Enterococcus urinary tract infection  Advised if he has symptoms of UTI to take Amoxil and call us  -     amoxicillin (AMOXIL) 500 MG capsule; Take 1 capsule by mouth 3 times daily for 7 days               SALIENT  ACTIONS TODAYS VISIT       Today's office visit SALIENT ACTIONS    ----            Discussed use, benefit, and side effects of prescribed medications. [x] yes    All patient questions answered. Patient voiced understanding. Patient given educational materials - see patient instructions  [] yes         There are no discontinued medications. No orders of the defined types were placed in this encounter. There are no Patient Instructions on file for this visit. Medication List          Accurate as of November 19, 2020 11:59 PM. If you have any questions, ask your nurse or doctor. START taking these medications    amoxicillin 500 MG capsule  Commonly known as:  AMOXIL  Take 1 capsule by mouth 3 times daily for 7 days  Started by:  Muna San MD        CONTINUE taking these medications    Accu-Chek Estella Gardenia  1 each by Does not apply route daily     aspirin EC 81 MG EC tablet  Take 1 tablet by mouth daily. * blood glucose test strips strip  Commonly known as:  ASCENSIA AUTODISC VI;ONE TOUCH ULTRA TEST VI  Use with associated glucose meter. * blood glucose test strips strip  Commonly known as:  Accu-Chek Estella  1 each by In Vitro route 3 times daily As needed.      bumetanide 1 MG tablet  Commonly known as:  BUMEX  Take 1 tablet by mouth daily     glimepiride 2 MG tablet  Commonly known as:  AMARYL  TAKE 1 TABLET EVERY MORNING (BEFORE BREAKFAST)     * Lancets Misc  1 each by Does not apply route daily     * Accu-Chek Softclix Lancets Misc  1 each by Does not apply route 3 times daily     lisinopril 10 MG tablet  Commonly known as: PRINIVIL;ZESTRIL  TAKE 1 TABLET EVERY DAY     metFORMIN 500 MG tablet  Commonly known as:  GLUCOPHAGE  Take 1 tablet by mouth 2 times daily (with meals)     metOLazone 2.5 MG tablet  Commonly known as:  ZAROXOLYN  Take 1 tablet by mouth every other day     MULTI VITAMIN MENS PO     senna-docusate 8.6-50 MG per tablet  Commonly known as:  Senna S  Take 2 tablets by mouth daily     simvastatin 20 MG tablet  Commonly known as:  ZOCOR  TAKE 1 TABLET EVERY NIGHT     spironolactone 25 MG tablet  Commonly known as:  ALDACTONE  TAKE 1 TABLET EVERY DAY     tamsulosin 0.4 MG capsule  Commonly known as:  FLOMAX  Take 1 capsule by mouth daily     VITAMIN B 12 PO         * This list has 4 medication(s) that are the same as other medications prescribed for you. Read the directions carefully, and ask your doctor or other care provider to review them with you. Where to Get Your Medications      You can get these medications from any pharmacy    Bring a paper prescription for each of these medications  · amoxicillin 500 MG capsule             FOLLOW UP  PLANS     Return in about 6 months (around 5/19/2021). MD PIA Remy [address], [address] [place] [address]. Phone [phone]   Fax: [phone]  Answering Service: [phone]  Visit Information    Have you changed or started any medications since your last visit including any over-the-counter medicines, vitamins, or herbal medicines? no   Are you having any side effects from any of your medications? -  no  Have you stopped taking any of your medications? Is so, why? -      Have you seen any other physician or provider since your last visit? No  Have you had any other diagnostic tests since your last visit? No  Have you been seen in the emergency room and/or had an admission to a hospital since we last saw you?  No  Have you had your routine dental cleaning in the past 6 months? yes -     Have you activated your MyChart

## 2020-11-20 PROBLEM — K59.00 CONSTIPATION: Status: RESOLVED | Noted: 2019-05-09 | Resolved: 2020-11-20

## 2020-11-20 PROBLEM — N47.1 ACQUIRED PHIMOSIS: Status: RESOLVED | Noted: 2019-01-16 | Resolved: 2020-11-20

## 2020-11-20 PROBLEM — R20.2 PARESTHESIA OF BILATERAL LEGS: Status: RESOLVED | Noted: 2017-08-16 | Resolved: 2020-11-20

## 2020-11-20 PROBLEM — R30.0 DYSURIA: Status: RESOLVED | Noted: 2019-08-13 | Resolved: 2020-11-20

## 2020-11-20 PROBLEM — B37.42 CANDIDAL BALANITIS: Status: RESOLVED | Noted: 2019-01-16 | Resolved: 2020-11-20

## 2020-12-01 RX ORDER — TAMSULOSIN HYDROCHLORIDE 0.4 MG/1
CAPSULE ORAL
Qty: 90 CAPSULE | Refills: 3 | Status: SHIPPED | OUTPATIENT
Start: 2020-12-01 | End: 2021-10-22

## 2020-12-28 RX ORDER — LANCETS
1 EACH MISCELLANEOUS 3 TIMES DAILY
Qty: 100 EACH | Refills: 3 | Status: SHIPPED | OUTPATIENT
Start: 2020-12-28 | End: 2021-05-13 | Stop reason: SDUPTHER

## 2020-12-28 NOTE — TELEPHONE ENCOUNTER
Medication: Accu-Chek Estella Plus Meter, Test Strips, Lancets  Last visit: 11/19/20  Next visit: 5/13/2021  Last refill: 3/6/19  Pharmacy: [facility]

## 2021-02-03 DIAGNOSIS — I50.43 ACUTE ON CHRONIC COMBINED SYSTOLIC AND DIASTOLIC HEART FAILURE (HCC): ICD-10-CM

## 2021-02-03 DIAGNOSIS — I42.0 DILATED CARDIOMYOPATHY (HCC): ICD-10-CM

## 2021-02-05 RX ORDER — METOLAZONE 2.5 MG/1
TABLET ORAL
Qty: 45 TABLET | Refills: 3 | Status: SHIPPED | OUTPATIENT
Start: 2021-02-05

## 2021-02-17 DIAGNOSIS — I50.42 CHRONIC COMBINED SYSTOLIC AND DIASTOLIC CONGESTIVE HEART FAILURE (HCC): ICD-10-CM

## 2021-02-19 RX ORDER — LISINOPRIL 10 MG/1
TABLET ORAL
Qty: 90 TABLET | Refills: 3 | Status: SHIPPED | OUTPATIENT
Start: 2021-02-19 | End: 2022-03-02

## 2021-05-03 DIAGNOSIS — E11.9 TYPE 2 DIABETES MELLITUS WITHOUT COMPLICATION, WITHOUT LONG-TERM CURRENT USE OF INSULIN (HCC): Primary | ICD-10-CM

## 2021-05-03 RX ORDER — BLOOD SUGAR DIAGNOSTIC
STRIP MISCELLANEOUS
Qty: 200 STRIP | Refills: 11 | Status: SHIPPED | OUTPATIENT
Start: 2021-05-03 | End: 2022-07-26

## 2021-05-10 DIAGNOSIS — E11.9 TYPE 2 DIABETES MELLITUS WITHOUT COMPLICATION, WITHOUT LONG-TERM CURRENT USE OF INSULIN (HCC): ICD-10-CM

## 2021-05-11 RX ORDER — GLIMEPIRIDE 2 MG/1
TABLET ORAL
Qty: 90 TABLET | Refills: 3 | Status: SHIPPED | OUTPATIENT
Start: 2021-05-11 | End: 2021-05-13

## 2021-05-13 ENCOUNTER — OFFICE VISIT (OUTPATIENT)
Dept: INTERNAL MEDICINE CLINIC | Age: 86
End: 2021-05-13
Payer: MEDICARE

## 2021-05-13 ENCOUNTER — HOSPITAL ENCOUNTER (OUTPATIENT)
Dept: GENERAL RADIOLOGY | Facility: CLINIC | Age: 86
Discharge: HOME | End: 2021-05-15
Payer: MEDICARE

## 2021-05-13 ENCOUNTER — HOSPITAL ENCOUNTER (OUTPATIENT)
Facility: CLINIC | Age: 86
Discharge: HOME | End: 2021-05-15
Payer: MEDICARE

## 2021-05-13 VITALS
DIASTOLIC BLOOD PRESSURE: 82 MMHG | HEART RATE: 74 BPM | OXYGEN SATURATION: 98 % | SYSTOLIC BLOOD PRESSURE: 124 MMHG | BODY MASS INDEX: 43.16 KG/M2 | TEMPERATURE: 96 F | WEIGHT: 221 LBS

## 2021-05-13 DIAGNOSIS — I48.20 CHRONIC ATRIAL FIBRILLATION (HCC): ICD-10-CM

## 2021-05-13 DIAGNOSIS — G89.29 CHRONIC BILATERAL LOW BACK PAIN WITHOUT SCIATICA: ICD-10-CM

## 2021-05-13 DIAGNOSIS — I50.43 ACUTE ON CHRONIC COMBINED SYSTOLIC AND DIASTOLIC HEART FAILURE (HCC): ICD-10-CM

## 2021-05-13 DIAGNOSIS — Z98.890 HISTORY OF INGUINAL HERNIA REPAIR: ICD-10-CM

## 2021-05-13 DIAGNOSIS — I50.42 CHRONIC COMBINED SYSTOLIC AND DIASTOLIC CONGESTIVE HEART FAILURE (HCC): Primary | ICD-10-CM

## 2021-05-13 DIAGNOSIS — K59.09 CONSTIPATION, CHRONIC: ICD-10-CM

## 2021-05-13 DIAGNOSIS — I10 ESSENTIAL HYPERTENSION: ICD-10-CM

## 2021-05-13 DIAGNOSIS — E11.9 TYPE 2 DIABETES MELLITUS WITHOUT COMPLICATION, WITHOUT LONG-TERM CURRENT USE OF INSULIN (HCC): ICD-10-CM

## 2021-05-13 DIAGNOSIS — M54.50 CHRONIC BILATERAL LOW BACK PAIN WITHOUT SCIATICA: ICD-10-CM

## 2021-05-13 DIAGNOSIS — K45.8 OTHER SPECIFIED ABDOMINAL HERNIA WITHOUT OBSTRUCTION OR GANGRENE: ICD-10-CM

## 2021-05-13 DIAGNOSIS — I42.0 DILATED CARDIOMYOPATHY (HCC): ICD-10-CM

## 2021-05-13 DIAGNOSIS — F32.89 OTHER DEPRESSION: ICD-10-CM

## 2021-05-13 DIAGNOSIS — Z87.19 HISTORY OF INGUINAL HERNIA REPAIR: ICD-10-CM

## 2021-05-13 DIAGNOSIS — K59.01 SLOW TRANSIT CONSTIPATION: ICD-10-CM

## 2021-05-13 LAB — HBA1C MFR BLD: 9.6 %

## 2021-05-13 PROCEDURE — 72100 X-RAY EXAM L-S SPINE 2/3 VWS: CPT

## 2021-05-13 PROCEDURE — G8427 DOCREV CUR MEDS BY ELIG CLIN: HCPCS | Performed by: INTERNAL MEDICINE

## 2021-05-13 PROCEDURE — 4040F PNEUMOC VAC/ADMIN/RCVD: CPT | Performed by: INTERNAL MEDICINE

## 2021-05-13 PROCEDURE — 99214 OFFICE O/P EST MOD 30 MIN: CPT | Performed by: INTERNAL MEDICINE

## 2021-05-13 PROCEDURE — 83036 HEMOGLOBIN GLYCOSYLATED A1C: CPT | Performed by: INTERNAL MEDICINE

## 2021-05-13 PROCEDURE — 1036F TOBACCO NON-USER: CPT | Performed by: INTERNAL MEDICINE

## 2021-05-13 PROCEDURE — 1123F ACP DISCUSS/DSCN MKR DOCD: CPT | Performed by: INTERNAL MEDICINE

## 2021-05-13 PROCEDURE — G8417 CALC BMI ABV UP PARAM F/U: HCPCS | Performed by: INTERNAL MEDICINE

## 2021-05-13 RX ORDER — BUMETANIDE 1 MG/1
1 TABLET ORAL DAILY
Qty: 90 TABLET | Refills: 3 | Status: SHIPPED | OUTPATIENT
Start: 2021-05-13 | End: 2022-09-28 | Stop reason: SDUPTHER

## 2021-05-13 RX ORDER — ACETAMINOPHEN 500 MG
1000 TABLET ORAL 2 TIMES DAILY
Qty: 540 TABLET | Refills: 1 | Status: SHIPPED | OUTPATIENT
Start: 2021-05-13

## 2021-05-13 RX ORDER — FLUOXETINE HYDROCHLORIDE 20 MG/1
20 CAPSULE ORAL DAILY
Qty: 30 CAPSULE | Refills: 3 | Status: SHIPPED | OUTPATIENT
Start: 2021-05-13

## 2021-05-13 RX ORDER — GLIMEPIRIDE 2 MG/1
4 TABLET ORAL
Qty: 90 TABLET | Refills: 3 | Status: SHIPPED | OUTPATIENT
Start: 2021-05-13 | End: 2022-03-10

## 2021-05-13 RX ORDER — AMOXICILLIN 250 MG
2 CAPSULE ORAL DAILY
Qty: 180 TABLET | Refills: 3 | Status: SHIPPED | OUTPATIENT
Start: 2021-05-13 | End: 2021-09-20

## 2021-05-13 RX ORDER — POLYETHYLENE GLYCOL 3350 17 G/17G
17 POWDER, FOR SOLUTION ORAL DAILY
Qty: 510 G | Refills: 0 | Status: SHIPPED | OUTPATIENT
Start: 2021-05-13 | End: 2021-06-12

## 2021-05-13 RX ORDER — LANCETS
1 EACH MISCELLANEOUS 3 TIMES DAILY
Qty: 100 EACH | Refills: 3 | Status: SHIPPED | OUTPATIENT
Start: 2021-05-13

## 2021-05-13 ASSESSMENT — PATIENT HEALTH QUESTIONNAIRE - PHQ9
6. FEELING BAD ABOUT YOURSELF - OR THAT YOU ARE A FAILURE OR HAVE LET YOURSELF OR YOUR FAMILY DOWN: 1
4. FEELING TIRED OR HAVING LITTLE ENERGY: 3
9. THOUGHTS THAT YOU WOULD BE BETTER OFF DEAD, OR OF HURTING YOURSELF: 0
SUM OF ALL RESPONSES TO PHQ QUESTIONS 1-9: 14
3. TROUBLE FALLING OR STAYING ASLEEP: 3
SUM OF ALL RESPONSES TO PHQ QUESTIONS 1-9: 14
8. MOVING OR SPEAKING SO SLOWLY THAT OTHER PEOPLE COULD HAVE NOTICED. OR THE OPPOSITE, BEING SO FIGETY OR RESTLESS THAT YOU HAVE BEEN MOVING AROUND A LOT MORE THAN USUAL: 0
1. LITTLE INTEREST OR PLEASURE IN DOING THINGS: 3
SUM OF ALL RESPONSES TO PHQ9 QUESTIONS 1 & 2: 6
7. TROUBLE CONCENTRATING ON THINGS, SUCH AS READING THE NEWSPAPER OR WATCHING TELEVISION: 0

## 2021-05-13 ASSESSMENT — COLUMBIA-SUICIDE SEVERITY RATING SCALE - C-SSRS: 1. WITHIN THE PAST MONTH, HAVE YOU WISHED YOU WERE DEAD OR WISHED YOU COULD GO TO SLEEP AND NOT WAKE UP?: NO

## 2021-05-13 NOTE — PROGRESS NOTES
OFFICE VISIT  PROGRESS NOTE         Date of patient's visit: 5/13/21  Patient's Name:  Yomaira Pinto  YOB: 1935       Patient Care Team:  Genie Vizcaino MD as PCP - General (Internal Medicine)  Genie Vizcaino MD as PCP - [facility] Provider  Lizet Looney MD as Consulting Physician (Hematology and Oncology)  Maryana Riley MD as Consulting Physician (Radiation Oncology)  Cristin Bowen MD        SUBJECTIVE     HISTORY           History of present illness     Pertinent details  added to ,       Chief Complaint   Patient presents with    Medication Check    Lower Back Pain          Encounter Diagnoses   Name Primary?  Chronic combined systolic and diastolic congestive heart failure (HCC) Yes    Type 2 diabetes mellitus without complication, without long-term current use of insulin (HCC)     Dilated cardiomyopathy (HCC)     Chronic atrial fibrillation (HCC)         Symptom / problem          --history obtained from patient. -   *   Known  CHRONIC  HEART FAILURE . NYHA CLASS   -- 2 -- ,  NO CHANGE IN SOB AND LEG EDEMA SINCE LAST VISIT . NO ORTHOPNEA. COMPLIANT WITH DRUG REGIMEN . Patient here for evaluation and management  of  Diabetes Mellitus . -- No symptoms suggestive of hyperglycemia or hypoglycemia . - Blood sugar diary maintaine [] yes    [x] no           - TAKING MEDICATIONS  AS PRESCRIBED , NO ADVERSE EFFECTS .              MEDICATIONS:      Current Outpatient Medications   Medication Sig Dispense Refill    glimepiride (AMARYL) 2 MG tablet TAKE 1 TABLET EVERY MORNING (BEFORE BREAKFAST) 90 tablet 3    ACCU-CHEK ERIKA PLUS strip TEST BLOOD SUGAR THREE TIMES DAILY AS NEEDED. 200 strip 11    lisinopril (PRINIVIL;ZESTRIL) 10 MG tablet TAKE 1 TABLET EVERY DAY 43.16 kg/m².      Vitals:    05/13/21 1103   BP: 124/82   Site: Left Upper Arm   Pulse: 74   Temp: 96 °F (35.6 °C)   SpO2: 98%   Weight: 221 lb (100.2 kg)       General -alert, well appearing, and in no distress  Skin - normal coloration and turgor, no rashes,no suspicious skin lesions noted  Eyes - pupils equal and reactive, extraocular eye movementsintact  Ears - bilateral TM's and external ear canals normal  Nose - normal and patent, no erythema, discharge or polyps  Mouth - mucous membranes are moist, pharynx normal without lesions  Neck - supple, no significant adenopathy  Lymphatics - no palpable lymphadenopathy, no hepatosplenomegaly    Chest - clear to auscultation, no wheezes, rales or rhonchi, symmetric air entry    Heart - normal rate, regular rhythm, no murmurs, rubs, clicks or gallops    Extremities - peripheral pulses normal, no pedal edema       Abdomen - soft, nontender, nondistended, no masses or organomegaly    Back - full range of motion, notenderness, palpable spasm or pain on motion    Neurological - alert, oriented, normal speech, no focal sensory or motor deficit  Musculoskeletal - no joint tenderness, deformity or swelling                DIAGNOSTICS / INSERTS / IMAGES    [x] Reviewed       Labs      URINE ANALYSIS: No results found for: LABURIN     CBC:  Lab Results   Component Value Date    WBC 7.3 10/26/2020    HGB 10.9 10/26/2020     10/26/2020     05/22/2012        BMP:    Lab Results   Component Value Date     10/26/2020    K 4.3 10/26/2020     10/26/2020    CO2 23 10/26/2020    BUN 26 10/26/2020    CREATININE 0.95 10/26/2020    GLUCOSE 124 10/26/2020    GLUCOSE 181 10/10/2011        No components found for: LDLC  Lab Results   Component Value Date    LABA1C 9.6 05/13/2021     No results found for: POCGLU   .     LIVER PROFILE:  Lab Results   Component Value Date    ALT 17 10/26/2020    AST 14 10/26/2020    PROT 6.4 10/26/2020    BILITOT 0.49 10/26/2020 LABALBU 3.6 10/26/2020    LABALBU 4.0 10/10/2011          Results for orders placed or performed in visit on 05/13/21   POCT glycosylated hemoglobin (Hb A1C)   Result Value Ref Range    Hemoglobin A1C 9.6 %                     VITALS INCLUDING BMI REVIEWED WITH PATIENT  Labs reviewed as noted above   Discussed with patient        ASSESSMENT / Nelly Case was seen today for medication check and lower back pain. Diagnoses and all orders for this visit:    Chronic combined systolic and diastolic congestive heart failure ([address]  Compensated  Type 2 diabetes mellitus without complication, without long-term current use of insulin (HCC)  Fair control  -     POCT glycosylated hemoglobin (Hb A1C)    Dilated cardiomyopathy (HCC)  Compensated  Chronic atrial fibrillation (HCC)  Rate controlled             SALIENT  ACTIONS TODAYS VISIT       Today's office visit SALIENT ACTIONS    ----            Discussed use, benefit, and side effects of prescribed medications. [x] yes    All patient questions answered. Patient voiced understanding. Patient given educational materials - see patient instructions  [] yes         There are no discontinued medications. Orders Placed This Encounter   Procedures    POCT glycosylated hemoglobin (Hb A1C)        There are no Patient Instructions on file for this visit. Medication List          Accurate as of May 13, 2021 11:33 AM. If you have any questions, ask your nurse or doctor. CONTINUE taking these medications    Accu-Chek Estella Gardenia  1 each by Does not apply route daily     Accu-Chek Estella Plus strip  Generic drug: blood glucose test strips  TEST BLOOD SUGAR THREE TIMES DAILY AS NEEDED. aspirin EC 81 MG EC tablet  Take 1 tablet by mouth daily.      bumetanide 1 MG tablet  Commonly known as: BUMEX  Take 1 tablet by mouth daily     glimepiride 2 MG tablet  Commonly known as: AMARYL  TAKE 1 TABLET EVERY MORNING (BEFORE BREAKFAST)     * Lancets Misc  1 each barriers?  No:      Patient Care Team:  Carolyne Samuel MD as PCP - General (Internal Medicine)  Carolyne Samuel MD as PCP - [address] [place] [address] Provider  Lisa Lopes MD as Consulting Physician (Hematology and Oncology)  Shirin Noonan MD as Consulting Physician (Radiation Oncology)  Pranav Saenz MD    Medical History Review  Past Medical, Family, and Social History reviewed and does not contribute to the patient presenting condition    Health Maintenance   Topic Date Due    COVID-19 Vaccine (1) Never done   [employer] Visit (AWV)  Never done    Flu vaccine (Season Ended) 10/27/2021 (Originally 9/1/2021)    DTaP/Tdap/Td vaccine (1 - Tdap) 11/19/2021 (Originally 10/5/1954)    Shingles Vaccine (1 of 2) 11/19/2021 (Originally 10/5/1986)    Lipid screen  10/26/2021    Potassium monitoring  10/26/2021    Creatinine monitoring  10/26/2021    Pneumococcal 65+ years Vaccine  Completed    Hepatitis A vaccine  Aged Out    Hib vaccine  Aged Out    Meningococcal (ACWY) vaccine  Aged Out

## 2021-06-22 ENCOUNTER — OFFICE VISIT (OUTPATIENT)
Dept: INTERNAL MEDICINE CLINIC | Age: 86
End: 2021-06-22
Payer: MEDICARE

## 2021-06-22 VITALS
HEART RATE: 64 BPM | OXYGEN SATURATION: 97 % | DIASTOLIC BLOOD PRESSURE: 60 MMHG | TEMPERATURE: 97 F | SYSTOLIC BLOOD PRESSURE: 124 MMHG | WEIGHT: 226 LBS | BODY MASS INDEX: 44.14 KG/M2

## 2021-06-22 DIAGNOSIS — G89.29 CHRONIC BILATERAL LOW BACK PAIN WITHOUT SCIATICA: ICD-10-CM

## 2021-06-22 DIAGNOSIS — I42.0 DILATED CARDIOMYOPATHY (HCC): ICD-10-CM

## 2021-06-22 DIAGNOSIS — M54.50 CHRONIC BILATERAL LOW BACK PAIN WITHOUT SCIATICA: ICD-10-CM

## 2021-06-22 DIAGNOSIS — N43.3 HYDROCELE, UNSPECIFIED HYDROCELE TYPE: ICD-10-CM

## 2021-06-22 DIAGNOSIS — I10 ESSENTIAL HYPERTENSION: ICD-10-CM

## 2021-06-22 DIAGNOSIS — K59.01 SLOW TRANSIT CONSTIPATION: ICD-10-CM

## 2021-06-22 DIAGNOSIS — N40.1 BENIGN PROSTATIC HYPERPLASIA WITH URINARY HESITANCY: ICD-10-CM

## 2021-06-22 DIAGNOSIS — R39.11 BENIGN PROSTATIC HYPERPLASIA WITH URINARY HESITANCY: ICD-10-CM

## 2021-06-22 DIAGNOSIS — I48.20 CHRONIC ATRIAL FIBRILLATION (HCC): ICD-10-CM

## 2021-06-22 DIAGNOSIS — M54.17 LUMBOSACRAL RADICULOPATHY: ICD-10-CM

## 2021-06-22 DIAGNOSIS — E11.9 TYPE 2 DIABETES MELLITUS WITHOUT COMPLICATION, WITHOUT LONG-TERM CURRENT USE OF INSULIN (HCC): Primary | ICD-10-CM

## 2021-06-22 DIAGNOSIS — I50.42 CHRONIC COMBINED SYSTOLIC AND DIASTOLIC CONGESTIVE HEART FAILURE (HCC): ICD-10-CM

## 2021-06-22 PROCEDURE — G8427 DOCREV CUR MEDS BY ELIG CLIN: HCPCS | Performed by: INTERNAL MEDICINE

## 2021-06-22 PROCEDURE — 4040F PNEUMOC VAC/ADMIN/RCVD: CPT | Performed by: INTERNAL MEDICINE

## 2021-06-22 PROCEDURE — G8417 CALC BMI ABV UP PARAM F/U: HCPCS | Performed by: INTERNAL MEDICINE

## 2021-06-22 PROCEDURE — 1123F ACP DISCUSS/DSCN MKR DOCD: CPT | Performed by: INTERNAL MEDICINE

## 2021-06-22 PROCEDURE — 99214 OFFICE O/P EST MOD 30 MIN: CPT | Performed by: INTERNAL MEDICINE

## 2021-06-22 PROCEDURE — 1036F TOBACCO NON-USER: CPT | Performed by: INTERNAL MEDICINE

## 2021-06-22 RX ORDER — GABAPENTIN 100 MG/1
100 CAPSULE ORAL 2 TIMES DAILY
Qty: 180 CAPSULE | Refills: 0 | Status: SHIPPED | OUTPATIENT
Start: 2021-06-22 | End: 2021-09-20

## 2021-06-22 SDOH — ECONOMIC STABILITY: TRANSPORTATION INSECURITY
IN THE PAST 12 MONTHS, HAS THE LACK OF TRANSPORTATION KEPT YOU FROM MEDICAL APPOINTMENTS OR FROM GETTING MEDICATIONS?: NO

## 2021-06-22 SDOH — ECONOMIC STABILITY: FOOD INSECURITY: WITHIN THE PAST 12 MONTHS, THE FOOD YOU BOUGHT JUST DIDN'T LAST AND YOU DIDN'T HAVE MONEY TO GET MORE.: NEVER TRUE

## 2021-06-22 SDOH — ECONOMIC STABILITY: TRANSPORTATION INSECURITY
IN THE PAST 12 MONTHS, HAS LACK OF TRANSPORTATION KEPT YOU FROM MEETINGS, WORK, OR FROM GETTING THINGS NEEDED FOR DAILY LIVING?: NO

## 2021-06-22 SDOH — ECONOMIC STABILITY: FOOD INSECURITY: WITHIN THE PAST 12 MONTHS, YOU WORRIED THAT YOUR FOOD WOULD RUN OUT BEFORE YOU GOT MONEY TO BUY MORE.: NEVER TRUE

## 2021-06-22 ASSESSMENT — PATIENT HEALTH QUESTIONNAIRE - PHQ9
SUM OF ALL RESPONSES TO PHQ QUESTIONS 1-9: 0
SUM OF ALL RESPONSES TO PHQ9 QUESTIONS 1 & 2: 0
SUM OF ALL RESPONSES TO PHQ QUESTIONS 1-9: 0
1. LITTLE INTEREST OR PLEASURE IN DOING THINGS: 0
2. FEELING DOWN, DEPRESSED OR HOPELESS: 0
SUM OF ALL RESPONSES TO PHQ QUESTIONS 1-9: 0

## 2021-06-22 ASSESSMENT — LIFESTYLE VARIABLES: HOW OFTEN DO YOU HAVE A DRINK CONTAINING ALCOHOL: NEVER

## 2021-06-22 ASSESSMENT — SOCIAL DETERMINANTS OF HEALTH (SDOH): HOW HARD IS IT FOR YOU TO PAY FOR THE VERY BASICS LIKE FOOD, HOUSING, MEDICAL CARE, AND HEATING?: NOT VERY HARD

## 2021-06-22 NOTE — PROGRESS NOTES
Vitals:    06/22/21 1431   BP: 124/60   Site: Left Upper Arm   Pulse: 64   Temp: 97 °F (36.1 °C)   SpO2: 97%   Weight: 226 lb (102.5 kg)         Physical Exam   Vitals:  /60 (Site: Left Upper Arm)   Pulse 64   Temp 97 °F (36.1 °C)   Wt 226 lb (102.5 kg)   SpO2 97%   BMI 44.14 kg/m²                 Body mass index is 44.14 kg/m².      Vitals:    06/22/21 1431   BP: 124/60   Site: Left Upper Arm   Pulse: 64   Temp: 97 °F (36.1 °C)   SpO2: 97%   Weight: 226 lb (102.5 kg)       General -alert, well appearing, and in no distress  Skin - normal coloration and turgor, no rashes,no suspicious skin lesions noted  Eyes - pupils equal and reactive, extraocular eye movementsintact  Ears - bilateral TM's and external ear canals normal  Nose - normal and patent, no erythema, discharge or polyps  Mouth - mucous membranes are moist, pharynx normal without lesions  Neck - supple, no significant adenopathy  Lymphatics - no palpable lymphadenopathy, no hepatosplenomegaly    Chest - clear to auscultation, no wheezes, rales or rhonchi, symmetric air entry    Heart -     Extremities -        Abdomen - soft, nontender, nondistended, no masses or organomegaly    Back - full range of motion, notenderness, palpable spasm or pain on motion    Neurological - alert, oriented, normal speech, no focal sensory or motor deficit  Musculoskeletal - no joint tenderness, deformity or swelling        Has cardiomegaly and S3  Has leg edema bilateral 1+  Has scrotal hydrocele  Obese        DIAGNOSTICS / INSERTS / IMAGES    [x] Reviewed       Labs      URINE ANALYSIS: No results found for: LABURIN     CBC:  Lab Results   Component Value Date    WBC 7.3 10/26/2020    HGB 10.9 10/26/2020     10/26/2020     05/22/2012        BMP:    Lab Results   Component Value Date     10/26/2020    K 4.3 10/26/2020     10/26/2020    CO2 23 10/26/2020    BUN 26 10/26/2020    CREATININE 0.95 10/26/2020    GLUCOSE 124 10/26/2020 GLUCOSE 181 10/10/2011        No components found for: LDLC  Lab Results   Component Value Date    LABA1C 9.6 05/13/2021     No results found for: POCGLU   .     LIVER PROFILE:  Lab Results   Component Value Date    ALT 17 10/26/2020    AST 14 10/26/2020    PROT 6.4 10/26/2020    BILITOT 0.49 10/26/2020    LABALBU 3.6 10/26/2020    LABALBU 4.0 10/10/2011          Results for orders placed or performed in visit on 05/13/21   POCT glycosylated hemoglobin (Hb A1C)   Result Value Ref Range    Hemoglobin A1C 9.6 %                     VITALS INCLUDING BMI REVIEWED WITH PATIENT  Labs reviewed as noted above   Discussed with patient        ASSESSMENT / Tram Trevino was seen today for results and gi problem. Diagnoses and all orders for this visit:    Type 2 diabetes mellitus without complication, without long-term current use of insulin (HCC)  Fair control  Chronic combined systolic and diastolic congestive heart failure (HCC)  Fair control  Dilated cardiomyopathy (HCC)  On treatment  Benign prostatic hyperplasia with urinary hesitancy  Fair control  Essential hypertension  Good control  Chronic atrial fibrillation (HCC)  Rate controlled  Slow transit constipation  Advised to take senna as  Hydrocele, unspecified hydrocele type  Unchanged  Chronic bilateral low back pain without sciatica  Mild symptoms             SALIENT  ACTIONS TODAYS VISIT       Today's office visit SALIENT ACTIONS    ----            Discussed use, benefit, and side effects of prescribed medications. [x] yes    All patient questions answered. Patient voiced understanding. Patient given educational materials - see patient instructions  [] yes         There are no discontinued medications. No orders of the defined types were placed in this encounter. There are no Patient Instructions on file for this visit. Medication List          Accurate as of June 22, 2021  3:05 PM. If you have any questions, ask your nurse or doctor. CONTINUE taking these medications    Accu-Chek Estella Gardenia  1 each by Does not apply route daily     Accu-Chek Estella Plus strip  Generic drug: blood glucose test strips  TEST BLOOD SUGAR THREE TIMES DAILY AS NEEDED. acetaminophen 500 MG tablet  Commonly known as: TYLENOL  Take 2 tablets by mouth 2 times daily     aspirin EC 81 MG EC tablet  Take 1 tablet by mouth daily. bumetanide 1 MG tablet  Commonly known as: BUMEX  Take 1 tablet by mouth daily     FLUoxetine 20 MG capsule  Commonly known as: PROZAC  Take 1 capsule by mouth daily     glimepiride 2 MG tablet  Commonly known as: AMARYL  Take 2 tablets by mouth every morning (before breakfast)     * Lancets Misc  1 each by Does not apply route daily     * Accu-Chek Softclix Lancets Misc  1 each by Does not apply route 3 times daily     lisinopril 10 MG tablet  Commonly known as: PRINIVIL;ZESTRIL  TAKE 1 TABLET EVERY DAY     metFORMIN 850 MG tablet  Commonly known as: GLUCOPHAGE  Take 1 tablet by mouth 2 times daily (with meals)     metOLazone 2.5 MG tablet  Commonly known as: ZAROXOLYN  TAKE 1 TABLET EVERY OTHER DAY     MULTI VITAMIN MENS PO     senna-docusate 8.6-50 MG per tablet  Commonly known as: Senna S  Take 2 tablets by mouth daily In the evening     simvastatin 20 MG tablet  Commonly known as: ZOCOR  TAKE 1 TABLET EVERY NIGHT     tamsulosin 0.4 MG capsule  Commonly known as: FLOMAX  TAKE 1 CAPSULE EVERY DAY     VITAMIN B 12 PO         * This list has 2 medication(s) that are the same as other medications prescribed for you. Read the directions carefully, and ask your doctor or other care provider to review them with you. FOLLOW UP  PLANS     No follow-ups on file. MD PIA Frost [address], [address] [place] [address].    Phone [phone]   Fax: [phone]  Answering Service: [phone]  Visit Information    Have you changed or started any medications since your last visit including any over-the-counter medicines, vitamins, or herbal medicines? no   Are you having any side effects from any of your medications? -  no  Have you stopped taking any of your medications? Is so, why? -  no    Have you seen any other physician or provider since your last visit? No  Have you had any other diagnostic tests since your last visit? Yes - Records Obtained  Have you been seen in the emergency room and/or had an admission to a hospital since we last saw you? No  Have you had your routine dental cleaning in the past 6 months? no    Have you activated your [employer] account? If not, what are your barriers?  No:      Patient Care Team:  June aYo MD as PCP - General (Internal Medicine)  June Yao MD as PCP - [address] [place] [address]  [facility] Provider  Cindy Chowdary MD as Consulting Physician (Hematology and Oncology)  Lissett Barry MD as Consulting Physician (Radiation Oncology)  Jacquiline Goldberg, MD    Medical History Review  Past Medical, Family, and Social History reviewed and does not contribute to the patient presenting condition    Health Maintenance   Topic Date Due    Annual Wellness Visit (AWV)  Never done    Flu vaccine (Season Ended) 10/27/2021 (Originally 9/1/2021)    DTaP/Tdap/Td vaccine (1 - Tdap) 11/19/2021 (Originally 10/5/1954)    Shingles Vaccine (1 of 2) 11/19/2021 (Originally 10/5/1986)    Lipid screen  10/26/2021    Potassium monitoring  10/26/2021    Creatinine monitoring  10/26/2021    Pneumococcal 65+ years Vaccine  Completed    COVID-19 Vaccine  Completed    Hepatitis A vaccine  Aged Out    Hib vaccine  Aged Out    Meningococcal (ACWY) vaccine  Aged Out

## 2021-09-15 ENCOUNTER — HOSPITAL ENCOUNTER (OUTPATIENT)
Age: 86
Setting detail: SPECIMEN
Discharge: HOME | End: 2021-09-15
Payer: MEDICARE

## 2021-09-15 DIAGNOSIS — E11.9 TYPE 2 DIABETES MELLITUS WITHOUT COMPLICATION, WITHOUT LONG-TERM CURRENT USE OF INSULIN (HCC): ICD-10-CM

## 2021-09-15 DIAGNOSIS — I10 ESSENTIAL HYPERTENSION: ICD-10-CM

## 2021-09-15 DIAGNOSIS — I50.42 CHRONIC COMBINED SYSTOLIC AND DIASTOLIC CONGESTIVE HEART FAILURE (HCC): ICD-10-CM

## 2021-09-15 LAB
ALBUMIN SERPL-MCNC: 3.9 G/DL (ref 3.5–5.2)
ALBUMIN/GLOBULIN RATIO: 1.4 (ref 1–2.5)
ALP BLD-CCNC: 53 U/L (ref 40–129)
ALT SERPL-CCNC: 24 U/L (ref 5–41)
ANION GAP SERPL CALCULATED.3IONS-SCNC: 13 MMOL/L (ref 9–17)
AST SERPL-CCNC: 18 U/L
BILIRUB SERPL-MCNC: 0.81 MG/DL (ref 0.3–1.2)
BUN BLDV-MCNC: 20 MG/DL (ref 8–23)
BUN/CREAT BLD: ABNORMAL (ref 9–20)
CALCIUM SERPL-MCNC: 9.3 MG/DL (ref 8.6–10.4)
CHLORIDE BLD-SCNC: 105 MMOL/L (ref 98–107)
CHOLESTEROL/HDL RATIO: 4.6
CHOLESTEROL: 183 MG/DL
CO2: 22 MMOL/L (ref 20–31)
CREAT SERPL-MCNC: 0.92 MG/DL (ref 0.7–1.2)
GFR AFRICAN AMERICAN: >60 ML/MIN
GFR NON-AFRICAN AMERICAN: >60 ML/MIN
GFR SERPL CREATININE-BSD FRML MDRD: ABNORMAL ML/MIN/{1.73_M2}
GFR SERPL CREATININE-BSD FRML MDRD: ABNORMAL ML/MIN/{1.73_M2}
GLUCOSE BLD-MCNC: 151 MG/DL (ref 70–99)
HCT VFR BLD CALC: 36.5 % (ref 40.7–50.3)
HDLC SERPL-MCNC: 40 MG/DL
HEMOGLOBIN: 11.1 G/DL (ref 13–17)
LDL CHOLESTEROL: 115 MG/DL (ref 0–130)
MCH RBC QN AUTO: 27.4 PG (ref 25.2–33.5)
MCHC RBC AUTO-ENTMCNC: 30.4 G/DL (ref 28.4–34.8)
MCV RBC AUTO: 90.1 FL (ref 82.6–102.9)
NRBC AUTOMATED: 0 PER 100 WBC
PDW BLD-RTO: 14.5 % (ref 11.8–14.4)
PLATELET # BLD: 310 K/UL (ref 138–453)
PMV BLD AUTO: 10.2 FL (ref 8.1–13.5)
POTASSIUM SERPL-SCNC: 4.3 MMOL/L (ref 3.7–5.3)
RBC # BLD: 4.05 M/UL (ref 4.21–5.77)
SODIUM BLD-SCNC: 140 MMOL/L (ref 135–144)
TOTAL PROTEIN: 6.6 G/DL (ref 6.4–8.3)
TRIGL SERPL-MCNC: 140 MG/DL
VLDLC SERPL CALC-MCNC: ABNORMAL MG/DL (ref 1–30)
WBC # BLD: 6.1 K/UL (ref 3.5–11.3)

## 2021-09-16 LAB
ESTIMATED AVERAGE GLUCOSE: 194 MG/DL
HBA1C MFR BLD: 8.4 % (ref 4–6)

## 2021-09-20 DIAGNOSIS — K59.09 CONSTIPATION, CHRONIC: ICD-10-CM

## 2021-09-20 RX ORDER — DOCUSATE SODIUM 50 MG AND SENNOSIDES 8.6 MG 8.6; 5 MG/1; MG/1
TABLET, FILM COATED ORAL
Qty: 180 TABLET | Refills: 3 | Status: SHIPPED | OUTPATIENT
Start: 2021-09-20

## 2021-10-22 RX ORDER — TAMSULOSIN HYDROCHLORIDE 0.4 MG/1
CAPSULE ORAL
Qty: 90 CAPSULE | Refills: 3 | Status: SHIPPED | OUTPATIENT
Start: 2021-10-22 | End: 2022-09-28 | Stop reason: SDUPTHER

## 2021-10-22 RX ORDER — SPIRONOLACTONE 25 MG/1
TABLET ORAL
Qty: 90 TABLET | Refills: 0 | Status: SHIPPED | OUTPATIENT
Start: 2021-10-22 | End: 2022-03-02 | Stop reason: SDUPTHER

## 2021-10-26 ENCOUNTER — TELEPHONE (OUTPATIENT)
Dept: INTERNAL MEDICINE CLINIC | Age: 86
End: 2021-10-26

## 2021-10-26 NOTE — TELEPHONE ENCOUNTER
Spoke w/ pts spouse, on hippa by phone regarding pts no show appt fernando w/ Dr. Jovon Montes on 10/26/21. Ltr mailed.

## 2021-10-26 NOTE — LETTER
Enma Sharp  [address] [place] [address]          October 26, 2021    Dear Enma Sharp:     Leoncio Stevens recently missed a scheduled appointment with Dr. Myrla Mcburney on 10/26/21. This is the 1st scheduledappointment that you have missed within the last twelve months. If you miss 2 more appointment, you may be dismissed from the practice. It is your responsibility to arrive to your appointment. Please call our office as soon as possible so that we may reschedule your appointment, because your health and follow-up medical care are important to us. For future appointments that you are unable to keep, please call the office at [phone] at least 24 hours in advance to cancel and reschedule. If a traditional appointment is difficult for you to make, please keep in mind, we offer E-Visits for several diagnoses as well as virtual visits. We also have primary care walk-in clinics available. For more information on these options, please contact our office.    Sincerely,        [address] [facility]

## 2021-11-05 ENCOUNTER — OFFICE VISIT (OUTPATIENT)
Dept: INTERNAL MEDICINE CLINIC | Age: 86
End: 2021-11-05
Payer: MEDICARE

## 2021-11-05 VITALS
HEART RATE: 65 BPM | SYSTOLIC BLOOD PRESSURE: 100 MMHG | DIASTOLIC BLOOD PRESSURE: 60 MMHG | WEIGHT: 228.4 LBS | OXYGEN SATURATION: 96 % | BODY MASS INDEX: 44.84 KG/M2 | HEIGHT: 60 IN

## 2021-11-05 DIAGNOSIS — H91.93 BILATERAL HEARING LOSS, UNSPECIFIED HEARING LOSS TYPE: Primary | ICD-10-CM

## 2021-11-05 DIAGNOSIS — K40.90 UNILATERAL INGUINAL HERNIA WITHOUT OBSTRUCTION OR GANGRENE, RECURRENCE NOT SPECIFIED: ICD-10-CM

## 2021-11-05 DIAGNOSIS — Z00.00 ROUTINE GENERAL MEDICAL EXAMINATION AT A HEALTH CARE FACILITY: ICD-10-CM

## 2021-11-05 PROCEDURE — 1123F ACP DISCUSS/DSCN MKR DOCD: CPT | Performed by: NURSE PRACTITIONER

## 2021-11-05 PROCEDURE — 4040F PNEUMOC VAC/ADMIN/RCVD: CPT | Performed by: NURSE PRACTITIONER

## 2021-11-05 PROCEDURE — G0438 PPPS, INITIAL VISIT: HCPCS | Performed by: NURSE PRACTITIONER

## 2021-11-05 PROCEDURE — G8484 FLU IMMUNIZE NO ADMIN: HCPCS | Performed by: NURSE PRACTITIONER

## 2021-11-05 ASSESSMENT — PATIENT HEALTH QUESTIONNAIRE - PHQ9
SUM OF ALL RESPONSES TO PHQ QUESTIONS 1-9: 0
1. LITTLE INTEREST OR PLEASURE IN DOING THINGS: 0
2. FEELING DOWN, DEPRESSED OR HOPELESS: 0
SUM OF ALL RESPONSES TO PHQ QUESTIONS 1-9: 0
SUM OF ALL RESPONSES TO PHQ QUESTIONS 1-9: 0
SUM OF ALL RESPONSES TO PHQ9 QUESTIONS 1 & 2: 0

## 2021-11-05 ASSESSMENT — LIFESTYLE VARIABLES: HOW OFTEN DO YOU HAVE A DRINK CONTAINING ALCOHOL: 0

## 2021-11-05 NOTE — PROGRESS NOTES
Visit Information    Have you changed or started any medications since your last visit including any over-the-counter medicines, vitamins, or herbal medicines? no   Are you having any side effects from any of your medications? -  no  Have you stopped taking any of your medications? Is so, why? -  no    Have you seen any other physician or provider since your last visit? No  Have you had any other diagnostic tests since your last visit? Yes - Records Requested  Have you been seen in the emergency room and/or had an admission to a hospital since we last saw you? No  Have you had your routine dental cleaning in the past 6 months? no    Have you activated your [employer] account? If not, what are your barriers? No: inactive     Patient Care Team:  Fabiana Stroud MD as PCP - General (Internal Medicine)  Fabiana Stroud MD as PCP - [facility]  Alli Caba MD as Consulting Physician (Hematology and Oncology)  Meliton Dill MD as Consulting Physician (Radiation Oncology)  Radha Haywood MD    Medical History Review  Past Medical, Family, and Social History reviewed and does not contribute to the patient presenting condition    Health Maintenance   Topic Date Due    DTaP/Tdap/Td vaccine (1 - Tdap) Never done    Shingles Vaccine (1 of 2) Never done    Annual Wellness Visit (AWV)  Never done    Flu vaccine (1) 2021    COVID-19 Vaccine (3 - Booster for Verle Lachelle series) 2021    Lipid screen  09/15/2022    Potassium monitoring  09/15/2022    Creatinine monitoring  09/15/2022    Pneumococcal 65+ years Vaccine  Completed    Hepatitis A vaccine  Aged Out    Hib vaccine  Aged Out    Meningococcal (ACWY) vaccine  Aged Out     Medicare Annual Wellness Visit  Name: Joshua Hernandez Date: 2021   MRN: [id number] Sex: Male   Age: 81 y.o.  Ethnicity:  / [place] F[place]   : 1935 Race: Other      Nelda Mom is here for Medicare AWV    Screenings for behavioral, psychosocial and functional/safety risks, and cognitive dysfunction are all negative except as indicated below. These results, as well as other patient data from the [address] form, are documented in Flowsheets linked to this Encounter. Allergies   Allergen Reactions    Xarelto [Rivaroxaban] Other (See Comments)     Hematuria recurrent         Prior to Visit Medications    Medication Sig Taking? Authorizing Provider   Elastic Bandages & Supports MISC Apply Truss strap for L groin tenderness/hernia Yes Maritza Slater, APRN - CNP   latanoprost (XALATAN) 0.005 % ophthalmic solution  Yes Historical Provider, MD   spironolactone (ALDACTONE) 25 MG tablet TAKE 1 TABLET EVERY DAY Yes Sohail Olea MD   tamsulosin (FLOMAX) 0.4 MG capsule TAKE 1 CAPSULE EVERY DAY Yes Sohail Olea MD   STIMULANT LAXATIVE 8.6-50 MG per tablet TAKE 2 TABLETS EVERY DAY Yes Sohail Olea MD   metFORMIN (GLUCOPHAGE) 500 MG tablet TAKE 1 TABLET BY MOUTH DAILY (WITH BREAKFAST) Yes Sohail Olea MD   glimepiride (AMARYL) 2 MG tablet Take 2 tablets by mouth every morning (before breakfast) Yes Sohail Olea MD   bumetanide (BUMEX) 1 MG tablet Take 1 tablet by mouth daily Yes Sohail Olea MD   lisinopril (PRINIVIL;ZESTRIL) 10 MG tablet TAKE 1 TABLET EVERY DAY Yes Sohail Olea MD   metOLazone (ZAROXOLYN) 2.5 MG tablet TAKE 1 TABLET EVERY OTHER DAY Yes Sohail Olea MD   simvastatin (ZOCOR) 20 MG tablet TAKE 1 TABLET EVERY NIGHT Yes Sohail Olea MD   aspirin EC 81 MG EC tablet Take 1 tablet by mouth daily. Yes Maria G Colon DO   gabapentin (NEURONTIN) 100 MG capsule Take 1 capsule by mouth 2 times daily for 90 days.   Sohail Olea MD   Accu-Chek Softclix Lancets MISC 1 each by Does not apply route 3 times daily  Patient not taking: Reported on 11/5/2021  Sohail Olea MD   Blood Glucose Monitoring Suppl (ACCU-CHEK ERIKA) DEVIN 1 each by Does not apply route daily  Patient not taking: Reported on 11/5/2021  Sb CHRISTINE Sage Byrd MD   acetaminophen (TYLENOL) 500 MG tablet Take 2 tablets by mouth 2 times daily  Patient not taking: Reported on 11/5/2021  Nida Warner MD   FLUoxetine (PROZAC) 20 MG capsule Take 1 capsule by mouth daily  Patient not taking: Reported on 11/5/2021  Nida Warner MD   ACCU-CHEK ERIKA PLUS strip TEST BLOOD SUGAR THREE TIMES DAILY AS NEEDED. Patient not taking: Reported on 11/5/2021  Nida Warner MD   Lancets MISC 1 each by Does not apply route daily  Patient not taking: Reported on 11/5/2021  Nida Warner MD   Multiple Vitamin (MULTI VITAMIN MENS PO) Take  by mouth. Patient not taking: Reported on 11/5/2021  Historical Provider, MD   Cyanocobalamin (VITAMIN B 12 PO) Take  by mouth. 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 Empaneled Provider  Brian Elliott MD as Consulting Physician (Hematology and Oncology)  Deena Ch MD as Consulting Physician (Radiation Oncology)  Jun García MD    Wt Readings from Last 3 Encounters:   11/05/21 228 lb 6.4 oz (103.6 kg)   06/22/21 226 lb (102.5 kg)   05/13/21 221 lb (100.2 kg)     Vitals:    11/05/21 1103   BP: 100/60   Site: Left Upper Arm   Position: Sitting   Pulse: 65   SpO2: 96%   Weight: 228 lb 6.4 oz (103.6 kg)   Height: 5' (1.524 m)     Body mass index is 44.61 kg/m².     Based upon direct observation of the patient, evaluation of cognition reveals recent and remote memory intact. General Appearance: alert and oriented to person, place and time, well developed and well- nourished, in no acute distress  Skin: warm and dry, no rash or erythema  Head: normocephalic and atraumatic  Eyes: pupils equal, round, and reactive to light, extraocular eye movements intact, conjunctivae normal  ENT: tympanic membrane, external ear and ear canal normal bilaterally, nose without deformity, nasal mucosa and turbinates normal without polyps  Neck: supple and non-tender without mass, no thyromegaly or thyroid nodules, no cervical lymphadenopathy  Pulmonary/Chest: clear to auscultation bilaterally- no wheezes, rales or rhonchi, normal air movement, no respiratory distress  Cardiovascular: normal rate, regular rhythm, normal S1 and S2, no murmurs, rubs, clicks, or gallops, distal pulses intact, no carotid bruits  Abdomen: soft, non-tender, non-distended, normal bowel sounds, no masses or organomegaly  Extremities: no cyanosis, clubbing or edema  Musculoskeletal: normal range of motion, no joint swelling, deformity or tenderness  Neurologic: reflexes normal and symmetric, no cranial nerve deficit, gait, coordination and speech normal    Patient's complete Health Risk Assessment and screening values have been reviewed and are found in Flowsheets. The following problems were reviewed today and where indicated follow up appointments were made and/or referrals ordered. Positive Risk Factor Screenings with Interventions:            General Health and ACP:  General  In general, how would you say your health is?: Fair  In the past 7 days, have you experienced any of the following?  New or Increased Pain, New or Increased Fatigue, Loneliness, Social Isolation, Stress or Anger?: (!) Anger  Do you get the social and emotional support that you need?: (!) No  Do you have a Living Will?: (!) No  Advance Directives activities? Eating, dressing, grooming, bathing, toileting, or walking/balance?: (!) Walking/Balance  In the past 7 days, did you need help from others to take care of any of the following? Laundry, housekeeping, banking/finances, shopping, telephone use, food preparation, transportation, or taking medications?: None  ADL Interventions:  · Patient declines any further evaluation/treatment for this issue    Personalized Preventive Plan   Current Health Maintenance Status  Immunization History   Administered Date(s) Administered    COVID-19, [facility], Primary or Immunocompromised, PF, 100mcg/0.5mL 05/22/2021, 06/19/2021    Influenza Vaccine, unspecified formulation 01/24/2014, 09/08/2016    Influenza, High Dose (Fluzone 65 yrs and older) 11/02/2015, 09/08/2016, 08/16/2017, 10/30/2018    Pneumococcal Conjugate 13-valent ([id number]) 07/23/2015    Pneumococcal Polysaccharide ([id number]) 01/24/2014, 08/08/2016        Health Maintenance   Topic Date Due    DTaP/Tdap/Td vaccine (1 - Tdap) Never done    Shingles Vaccine (1 of 2) Never done    Annual Wellness Visit (AWV)  Never done    Flu vaccine (1) 09/01/2021    COVID-19 Vaccine (3 - Booster for Moderna series) 12/19/2021    Lipid screen  09/15/2022    Potassium monitoring  09/15/2022    Creatinine monitoring  09/15/2022    Pneumococcal 65+ years Vaccine  Completed    Hepatitis A vaccine  Aged Out    Hib vaccine  Aged Out    Meningococcal (ACWY) vaccine  Aged Out     Recommendations for [employer] Due: see orders and patient instructions/AVS.  . Recommended screening schedule for the next 5-10 years is provided to the patient in written form: see Patient Marlen Dong was seen today for medicare awv.     Diagnoses and all orders for this visit:    Bilateral hearing loss, unspecified hearing loss type  -     Samantha Leon, AUD, Audiology, [facility]    Unilateral inguinal hernia without obstruction or gangrene, recurrence not specified  -     Elastic Bandages & Supports MISC; Apply Truss strap for L groin tenderness/hernia    Routine general medical examination at a health care facility    Other orders  -     Discontinue: Elastic Bandages & Supports MISC;  Apply Truss strap for L groin tenderness/hernia

## 2021-11-05 NOTE — TELEPHONE ENCOUNTER
Patients Rx for elastic bandages and support had been incorrectly sent to [address],   Please sign pended order to print and patient will take to medical supply pharmacy.

## 2021-11-05 NOTE — PATIENT INSTRUCTIONS
Personalized Preventive Plan for Monica Espinal - 11/5/2021  Medicare offers a range of preventive health benefits. Some of the tests and screenings are paid in full while other may be subject to a deductible, co-insurance, and/or copay. Some of these benefits include a comprehensive review of your medical history including lifestyle, illnesses that may run in your family, and various assessments and screenings as appropriate. After reviewing your medical record and screening and assessments performed today your provider may have ordered immunizations, labs, imaging, and/or referrals for you. A list of these orders (if applicable) as well as your Preventive Care list are included within your After Visit Summary for your review. Other Preventive Recommendations:    · A preventive eye exam performed by an eye specialist is recommended every 1-2 years to screen for glaucoma; cataracts, macular degeneration, and other eye disorders. · A preventive dental visit is recommended every 6 months. · Try to get at least 150 minutes of exercise per week or 10,000 steps per day on a pedometer . · Order or download the FREE \"Exercise & Physical Activity: Your Everyday Guide\" from [employer]. Call 4-687.693.2256 or search [employer] online. · You need 7269-7984 mg of calcium and 9335-3738 IU of vitamin D per day. It is possible to meet your calcium requirement with diet alone, but a vitamin D supplement is usually necessary to meet this goal.  · When exposed to the sun, use a sunscreen that protects against both UVA and UVB radiation with an SPF of 30 or greater. Reapply every 2 to 3 hours or after sweating, drying off with a towel, or swimming. · Always wear a seat belt when traveling in a car. Always wear a helmet when riding a bicycle or motorcycle.

## 2021-12-13 DIAGNOSIS — E11.9 TYPE 2 DIABETES MELLITUS WITHOUT COMPLICATION, WITHOUT LONG-TERM CURRENT USE OF INSULIN (HCC): ICD-10-CM

## 2021-12-13 RX ORDER — GLUCOSAMINE HCL/CHONDROITIN SU 500-400 MG
CAPSULE ORAL 2 TIMES DAILY
Qty: 100 STRIP | Refills: 11 | Status: SHIPPED | OUTPATIENT
Start: 2021-12-13

## 2021-12-13 RX ORDER — BLOOD-GLUCOSE CONTROL, NORMAL
100 EACH MISCELLANEOUS
Qty: 100 EACH | Refills: 5 | Status: SHIPPED | OUTPATIENT
Start: 2021-12-13

## 2021-12-13 RX ORDER — LANCETS 30 GAUGE
1 EACH MISCELLANEOUS DAILY
Qty: 100 EACH | Refills: 11 | Status: SHIPPED | OUTPATIENT
Start: 2021-12-13

## 2021-12-13 NOTE — TELEPHONE ENCOUNTER
Medication: BD Single Use Swab, [employer] True Metrix Test Strip, True Metrix Blood Glucose Mtr, True Metrix Level 2 Ctrl Soln, Trueplus 33 G Lancets   Last visit: 11/5/21  Next visit: 2/8/2022  Last refill: various  Pharmacy: [facility]      Please fill in hard stops

## 2022-03-02 ENCOUNTER — OFFICE VISIT (OUTPATIENT)
Dept: INTERNAL MEDICINE CLINIC | Age: 87
End: 2022-03-02
Payer: MEDICARE

## 2022-03-02 VITALS
OXYGEN SATURATION: 99 % | SYSTOLIC BLOOD PRESSURE: 130 MMHG | BODY MASS INDEX: 42.86 KG/M2 | TEMPERATURE: 97.9 F | HEART RATE: 87 BPM | DIASTOLIC BLOOD PRESSURE: 72 MMHG | WEIGHT: 227 LBS | HEIGHT: 61 IN

## 2022-03-02 DIAGNOSIS — I48.20 CHRONIC ATRIAL FIBRILLATION (HCC): ICD-10-CM

## 2022-03-02 DIAGNOSIS — M54.17 LUMBOSACRAL RADICULOPATHY: ICD-10-CM

## 2022-03-02 DIAGNOSIS — I50.42 CHRONIC COMBINED SYSTOLIC AND DIASTOLIC CONGESTIVE HEART FAILURE (HCC): ICD-10-CM

## 2022-03-02 DIAGNOSIS — I10 ESSENTIAL HYPERTENSION: ICD-10-CM

## 2022-03-02 DIAGNOSIS — K59.01 SLOW TRANSIT CONSTIPATION: ICD-10-CM

## 2022-03-02 DIAGNOSIS — I42.0 DILATED CARDIOMYOPATHY (HCC): ICD-10-CM

## 2022-03-02 DIAGNOSIS — E11.9 TYPE 2 DIABETES MELLITUS WITHOUT COMPLICATION, WITHOUT LONG-TERM CURRENT USE OF INSULIN (HCC): Primary | ICD-10-CM

## 2022-03-02 LAB — HBA1C MFR BLD: 8.9 %

## 2022-03-02 PROCEDURE — 1036F TOBACCO NON-USER: CPT | Performed by: INTERNAL MEDICINE

## 2022-03-02 PROCEDURE — 3052F HG A1C>EQUAL 8.0%<EQUAL 9.0%: CPT | Performed by: INTERNAL MEDICINE

## 2022-03-02 PROCEDURE — G8427 DOCREV CUR MEDS BY ELIG CLIN: HCPCS | Performed by: INTERNAL MEDICINE

## 2022-03-02 PROCEDURE — 1123F ACP DISCUSS/DSCN MKR DOCD: CPT | Performed by: INTERNAL MEDICINE

## 2022-03-02 PROCEDURE — 4040F PNEUMOC VAC/ADMIN/RCVD: CPT | Performed by: INTERNAL MEDICINE

## 2022-03-02 PROCEDURE — G8484 FLU IMMUNIZE NO ADMIN: HCPCS | Performed by: INTERNAL MEDICINE

## 2022-03-02 PROCEDURE — 83036 HEMOGLOBIN GLYCOSYLATED A1C: CPT | Performed by: INTERNAL MEDICINE

## 2022-03-02 PROCEDURE — G8417 CALC BMI ABV UP PARAM F/U: HCPCS | Performed by: INTERNAL MEDICINE

## 2022-03-02 PROCEDURE — 99214 OFFICE O/P EST MOD 30 MIN: CPT | Performed by: INTERNAL MEDICINE

## 2022-03-02 RX ORDER — DORZOLAMIDE HYDROCHLORIDE AND TIMOLOL MALEATE 20; 5 MG/ML; MG/ML
SOLUTION/ DROPS OPHTHALMIC
COMMUNITY
Start: 2022-02-11

## 2022-03-02 RX ORDER — SPIRONOLACTONE 25 MG/1
25 TABLET ORAL DAILY
Qty: 90 TABLET | Refills: 3 | Status: SHIPPED | OUTPATIENT
Start: 2022-03-02 | End: 2022-03-03 | Stop reason: SDUPTHER

## 2022-03-02 RX ORDER — LISINOPRIL 20 MG/1
20 TABLET ORAL DAILY
Qty: 90 TABLET | Refills: 3 | Status: SHIPPED | OUTPATIENT
Start: 2022-03-02 | End: 2022-03-03 | Stop reason: SDUPTHER

## 2022-03-02 RX ORDER — SPIRONOLACTONE 25 MG/1
25 TABLET ORAL DAILY
Qty: 90 TABLET | Refills: 3 | OUTPATIENT
Start: 2022-03-02

## 2022-03-02 RX ORDER — LISINOPRIL 20 MG/1
20 TABLET ORAL DAILY
Qty: 90 TABLET | Refills: 3 | OUTPATIENT
Start: 2022-03-02

## 2022-03-02 NOTE — PROGRESS NOTES
OFFICE VISIT  PROGRESS NOTE         Date of patient's visit: 3/2/22  Patient's Name:  Wilmer Frazeir  YOB: 1935       Patient Care Team:  Everette Portillo MD as PCP - General (Internal Medicine)  Everette Portillo MD as PCP - [address] [place] [address] [facility]  Michelle Donohue MD as Consulting Physician (Hematology and Oncology)  Rogelio Wheeler MD as Consulting Physician (Radiation Oncology)  Ace Morales MD        SUBJECTIVE     HISTORY           History of present illness     Pertinent details  added to ,       Chief Complaint   Patient presents with    Follow-up     pt is having trouble making bowelmovements    Diabetes          Encounter Diagnoses   Name Primary?  Type 2 diabetes mellitus without complication, without long-term current use of insulin (HCC) Yes    Dilated cardiomyopathy (HCC)     Chronic combined systolic and diastolic congestive heart failure (HCC)     Essential hypertension     Chronic atrial fibrillation (HCC)     Slow transit constipation     Lumbosacral radiculopathy         Symptom / problem          --history obtained from patient. -   Patient is known to have advanced dilated cardiomyopathy chronic congestive heart failure  He is a [place] [employer] class is 2  He still has 2+ leg edema    His main complaint today was regarding intermittent back pain on ambulation  He is known to have lumbosacral arthritis and radiculopathy  Symptoms are fairly controlled    He has chronic constipation and he is on senna S2 tablets at at bedtime and sometimes he has 2 or 3 days before he had a bowel movement    His hemoglobin A1c is 8.9 he is on Metformin glimepiride             MEDICATIONS:      Current Outpatient Medications   Medication Sig Dispense Refill    dorzolamide-timolol (COSOPT) 22.3-6.8 MG/ML ophthalmic solution       spironolactone (ALDACTONE) 25 MG tablet Take 1 tablet by mouth daily 90 tablet 3    metFORMIN (GLUCOPHAGE) 500 MG tablet Take 1 tablet by mouth 2 times daily (with meals) 180 tablet 3    lisinopril (PRINIVIL;ZESTRIL) 20 MG tablet Take 1 tablet by mouth daily 90 tablet 3    Lancets MISC 1 each by Does not apply route daily Trueplus 33G Lancets 100 each 11    Blood Glucose Calibration (FREESTYLE CONTROL SOLUTION) LIQD 100 each by In Vitro route 3 times daily (before meals) True Metrix Level 2 Ctrl Solution 100 each 5    blood glucose monitor strips by Other route 2 times daily [employer] True Metrix Test Strip 100 strip 11    blood glucose monitor strips by Other route 2 times daily True Metrix Blood Glucose Meter 100 strip 11    Elastic Bandages & Supports MISC Apply Truss strap for L groin tenderness/hernia 1 each 0    latanoprost (XALATAN) 0.005 % ophthalmic solution       tamsulosin (FLOMAX) 0.4 MG capsule TAKE 1 CAPSULE EVERY DAY 90 capsule 3    STIMULANT LAXATIVE 8.6-50 MG per tablet TAKE 2 TABLETS EVERY  tablet 3    glimepiride (AMARYL) 2 MG tablet Take 2 tablets by mouth every morning (before breakfast) 90 tablet 3    bumetanide (BUMEX) 1 MG tablet Take 1 tablet by mouth daily 90 tablet 3    Accu-Chek Softclix Lancets MISC 1 each by Does not apply route 3 times daily 100 each 3    Blood Glucose Monitoring Suppl (ACCU-CHEK ERIKA) DEVIN 1 each by Does not apply route daily 1 Device 0    acetaminophen (TYLENOL) 500 MG tablet Take 2 tablets by mouth 2 times daily 540 tablet 1    FLUoxetine (PROZAC) 20 MG capsule Take 1 capsule by mouth daily 30 capsule 3    ACCU-CHEK ERIKA PLUS strip TEST BLOOD SUGAR THREE TIMES DAILY AS NEEDED. 200 strip 11    metOLazone (ZAROXOLYN) 2.5 MG tablet TAKE 1 TABLET EVERY OTHER DAY 45 tablet 3    simvastatin (ZOCOR) 20 MG tablet TAKE 1 TABLET EVERY NIGHT 90 tablet 3    aspirin EC 81 MG EC tablet Take 1 tablet by mouth daily. 30 tablet 11    Multiple Vitamin (MULTI VITAMIN MENS PO) Take by mouth        Cyanocobalamin (VITAMIN B 12 PO) Take by mouth        gabapentin (NEURONTIN) 100 MG capsule Take 1 capsule by mouth 2 times daily for 90 days. 180 capsule 0     No current facility-administered medications for this visit. ALLERGIES:      Allergies   Allergen Reactions    Xarelto [Rivaroxaban] Other (See Comments)     Hematuria recurrent       SOCIAL HISTORY   Reviewed and no change from previous record. [place]  reports that he quit smoking about 33 years ago. His smoking use included cigarettes. He has a 400.00 pack-year smoking history. He has never used smokeless tobacco.    FAMILY HISTORY:    Reviewed and No change from previous visit    REVIEW OF SYSTEMS:    Review of Systems        OBJECTIVE      Physical exam           Vitals:    03/02/22 1040   BP: 130/72   Site: Left Upper Arm   Position: Sitting   Cuff Size: Large Adult   Pulse: 87   Temp: 97.9 °F (36.6 °C)   TempSrc: Temporal   SpO2: 99%   Weight: 227 lb (103 kg)   Height: 5' 1\" (1.549 m)         Physical Exam   Vitals:  /72 (Site: Left Upper Arm, Position: Sitting, Cuff Size: Large Adult)   Pulse 87   Temp 97.9 °F (36.6 °C) (Temporal)   Ht 5' 1\" (1.549 m)   Wt 227 lb (103 kg)   SpO2 99%   BMI 42.89 kg/m²                 Body mass index is 42.89 kg/m².      Vitals:    03/02/22 1040   BP: 130/72   Site: Left Upper Arm   Position: Sitting   Cuff Size: Large Adult   Pulse: 87   Temp: 97.9 °F (36.6 °C)   TempSrc: Temporal   SpO2: 99%   Weight: 227 lb (103 kg)   Height: 5' 1\" (1.549 m)       General -alert, well appearing, and in no distress  Skin - normal coloration and turgor, no rashes,no suspicious skin lesions noted  Eyes - pupils equal and reactive, extraocular eye movementsintact  Ears - bilateral TM's and external ear canals normal  Nose - normal and patent, no erythema, discharge or polyps  Mouth - mucous membranes are moist, pharynx normal without lesions  Neck - supple, no significant adenopathy  Lymphatics - no palpable lymphadenopathy, no hepatosplenomegaly    Chest - clear to auscultation, no wheezes, rales or rhonchi, symmetric air entry    Heart - normal rate, regular rhythm, no murmurs, rubs, clicks or gallops    Extremities - peripheral pulses normal, 3+ edema       Abdomen - soft, nontender, nondistended, no masses or organomegaly    Back - full range of motion, notenderness, palpable spasm or pain on motion    Neurological - alert, oriented, normal speech, no focal sensory or motor deficit  Musculoskel                DIAGNOSTICS / INSERTS / IMAGES    [x] Reviewed       Labs      URINE ANALYSIS: No results found for: LABURIN     CBC:  Lab Results   Component Value Date    WBC 6.1 09/15/2021    HGB 11.1 09/15/2021     09/15/2021     05/22/2012        BMP:    Lab Results   Component Value Date     09/15/2021    K 4.3 09/15/2021     09/15/2021    CO2 22 09/15/2021    BUN 20 09/15/2021    CREATININE 0.92 09/15/2021    GLUCOSE 151 09/15/2021    GLUCOSE 181 10/10/2011        No results found for: LDLC  Lab Results   Component Value Date    LABA1C 8.9 03/02/2022     No results found for: POCGLU   .     LIVER PROFILE:  Lab Results   Component Value Date    ALT 24 09/15/2021    AST 18 09/15/2021    PROT 6.6 09/15/2021    BILITOT 0.81 09/15/2021    LABALBU 3.9 09/15/2021    LABALBU 4.0 10/10/2011          Results for orders placed or performed in visit on 03/02/22   POCT glycosylated hemoglobin (Hb A1C)   Result Value Ref Range    Hemoglobin A1C 8.9 %                     VITALS INCLUDING BMI REVIEWED WITH PATIENT  Labs reviewed as noted above   Discussed with patient        ASSESSMENT / Ron Chambers was seen today for follow-up and diabetes.     Diagnoses and all orders for this visit:    Type 2 diabetes mellitus without complication, without long-term current use of insulin (HCC)  We are going to increase his Metformin to 500 mg twice daily  -     POCT glycosylated hemoglobin (Hb A1C)  -     Basic Metabolic Panel; Future  -     Hemoglobin A1C; Future    Dilated cardiomyopathy (HCC)  Patient does have advanced dilated cardiomyopathy  CHF control is fair  Edema is still there but on adequate therapy    Chronic combined systolic and diastolic congestive heart failure (HCC)  We will will increase his lisinopril to 20 mg  -     lisinopril (PRINIVIL;ZESTRIL) 20 MG tablet; Take 1 tablet by mouth daily    Essential hypertension  Good control  Chronic atrial fibrillation (HCC)  Rate controlled  Slow transit constipation  Consult  Lumbosacral radiculopathy  Symptoms persist but tolerable  Other orders  -     spironolactone (ALDACTONE) 25 MG tablet; Take 1 tablet by mouth daily  -     metFORMIN (GLUCOPHAGE) 500 MG tablet; Take 1 tablet by mouth 2 times daily (with meals)               SALIENT  ACTIONS TODAYS VISIT       Today's office visit SALIENT ACTIONS    -Increase Metformin to twice daily 500  Increase lisinopril to 20  ---            Discussed use, benefit, and side effects of prescribed medications. [x] yes    All patient questions answered. Patient voiced understanding. Patient given educational materials - see patient instructions  [] yes         Medications Discontinued During This Encounter   Medication Reason    lisinopril (PRINIVIL;ZESTRIL) 10 MG tablet     metFORMIN (GLUCOPHAGE) 500 MG tablet     spironolactone (ALDACTONE) 25 MG tablet REORDER        Orders Placed This Encounter   Procedures    Basic Metabolic Panel     Standing Status:   Future     Standing Expiration Date:   3/2/2023    Hemoglobin A1C     Standing Status:   Future     Standing Expiration Date:   3/2/2023    POCT glycosylated hemoglobin (Hb A1C)        There are no Patient Instructions on file for this visit. Medication List          Accurate as of March 2, 2022 11:24 AM. If you have any questions, ask your nurse or doctor.             CHANGE how you take these medications    lisinopril 20 MG tablet  Commonly known as: PRINIVIL;ZESTRIL  Take 1 tablet by mouth daily  What changed:   · medication strength  · See the new instructions. Changed by: Abena Duke MD     metFORMIN 500 MG tablet  Commonly known as: GLUCOPHAGE  Take 1 tablet by mouth 2 times daily (with meals)  What changed: See the new instructions. Changed by: Abena Duke MD     spironolactone 25 MG tablet  Commonly known as: ALDACTONE  Take 1 tablet by mouth daily  What changed: See the new instructions. Changed by: Abena Duke MD        CONTINUE taking these medications    Accu-Chek Estella Gardenia  1 each by Does not apply route daily     * Accu-Chek Estella Plus strip  Generic drug: blood glucose test strips  TEST BLOOD SUGAR THREE TIMES DAILY AS NEEDED. * blood glucose test strips  by Other route 2 times daily [employer] True Metrix Test Strip     * blood glucose test strips  by Other route 2 times daily True Metrix Blood Glucose Meter     * Accu-Chek Softclix Lancets Misc  1 each by Does not apply route 3 times daily     * Lancets Misc  1 each by Does not apply route daily Trueplus 33G Lancets     acetaminophen 500 MG tablet  Commonly known as: TYLENOL  Take 2 tablets by mouth 2 times daily     aspirin EC 81 MG EC tablet  Take 1 tablet by mouth daily. bumetanide 1 MG tablet  Commonly known as: BUMEX  Take 1 tablet by mouth daily     dorzolamide-timolol 22.3-6.8 MG/ML ophthalmic solution  Commonly known as: COSOPT     Elastic Bandages & Supports Misc  Apply Truss strap for L groin tenderness/hernia     FLUoxetine 20 MG capsule  Commonly known as: PROZAC  Take 1 capsule by mouth daily     FreeStyle Control Solution Liqd  100 each by In Vitro route 3 times daily (before meals) True Metrix Level 2 Ctrl Solution     gabapentin 100 MG capsule  Commonly known as: NEURONTIN  Take 1 capsule by mouth 2 times daily for 90 days.      glimepiride 2 MG tablet  Commonly known as: AMARYL  Take 2 tablets by mouth every morning (before breakfast)     latanoprost 0.005 % ophthalmic solution  Commonly known as: XALATAN     metOLazone 2.5 MG tablet  Commonly known as: ZAROXOLYN  TAKE 1 TABLET EVERY OTHER DAY     MULTI VITAMIN MENS PO     simvastatin 20 MG tablet  Commonly known as: ZOCOR  TAKE 1 TABLET EVERY NIGHT     Stimulant Laxative 8.6-50 MG per tablet  Generic drug: senna-docusate  TAKE 2 TABLETS EVERY DAY     tamsulosin 0.4 MG capsule  Commonly known as: FLOMAX  TAKE 1 CAPSULE EVERY DAY     VITAMIN B 12 PO         * This list has 5 medication(s) that are the same as other medications prescribed for you. Read the directions carefully, and ask your doctor or other care provider to review them with you. Where to Get Your Medications      These medications were sent to [address], [address]    Phone: [phone] ·   lisinopril 20 MG tablet  · metFORMIN 500 MG tablet  · spironolactone 25 MG tablet             FOLLOW UP  PLANS     Return in about 3 months (around 6/2/2022). MD PIA Hensley [address], [address] [place] [address].    Phone [phone]   Fax: [phone]  Answering Service: [phone]

## 2022-03-02 NOTE — PROGRESS NOTES
Visit Information    Have you changed or started any medications since your last visit including any over-the-counter medicines, vitamins, or herbal medicines? no   Have you stopped taking any of your medications? Is so, why? -  no  Are you having any side effects from any of your medications? - no    Have you seen any other physician or provider since your last visit?  no   Have you had any other diagnostic tests since your last visit?  no   Have you been seen in the emergency room and/or had an admission in a hospital since we last saw you?  no   Have you had your routine dental cleaning in the past 6 months?  no     Do you have an active MyChart account? If no, what is the barrier?   No:     Patient Care Team:  Uriah Gates MD as PCP - General (Internal Medicine)  Uriah Gates MD as PCP - [facility]  Aristeo Lauren MD as Consulting Physician (Hematology and Oncology)  Grant Mota MD as Consulting Physician (Radiation Oncology)  Che Alcala MD    Medical History Review  Past Medical, Family, and Social History reviewed and does contribute to the patient presenting condition    Health Maintenance   Topic Date Due    DTaP/Tdap/Td vaccine (1 - Tdap) Never done    Shingles Vaccine (1 of 2) Never done    Flu vaccine (1) 09/01/2021    COVID-19 Vaccine (3 - Booster for [place] [employer] series) 11/19/2021    Lipid screen  09/15/2022    Potassium monitoring  09/15/2022    Creatinine monitoring  09/15/2022    Depression Monitoring  11/05/2022    Annual Wellness Visit (AWV)  11/06/2022    Pneumococcal 65+ years Vaccine  Completed    Hepatitis A vaccine  Aged Out    Hib vaccine  Aged Out    Meningococcal (ACWY) vaccine  Aged Out

## 2022-03-03 RX ORDER — LISINOPRIL 20 MG/1
20 TABLET ORAL DAILY
Qty: 90 TABLET | Refills: 3 | Status: SHIPPED | OUTPATIENT
Start: 2022-03-03

## 2022-03-03 RX ORDER — SPIRONOLACTONE 25 MG/1
25 TABLET ORAL DAILY
Qty: 90 TABLET | Refills: 3 | Status: SHIPPED | OUTPATIENT
Start: 2022-03-03

## 2022-03-09 DIAGNOSIS — E11.9 TYPE 2 DIABETES MELLITUS WITHOUT COMPLICATION, WITHOUT LONG-TERM CURRENT USE OF INSULIN (HCC): ICD-10-CM

## 2022-03-10 ENCOUNTER — HOSPITAL ENCOUNTER (OUTPATIENT)
Age: 87
Setting detail: SPECIMEN
Discharge: HOME | End: 2022-03-10

## 2022-03-10 DIAGNOSIS — E11.9 TYPE 2 DIABETES MELLITUS WITHOUT COMPLICATION, WITHOUT LONG-TERM CURRENT USE OF INSULIN (HCC): ICD-10-CM

## 2022-03-10 LAB
ANION GAP SERPL CALCULATED.3IONS-SCNC: 13 MMOL/L (ref 9–17)
BUN BLDV-MCNC: 23 MG/DL (ref 8–23)
CALCIUM SERPL-MCNC: 9.7 MG/DL (ref 8.6–10.4)
CHLORIDE BLD-SCNC: 101 MMOL/L (ref 98–107)
CO2: 24 MMOL/L (ref 20–31)
CREAT SERPL-MCNC: 0.85 MG/DL (ref 0.7–1.2)
GFR AFRICAN AMERICAN: >60 ML/MIN
GFR NON-AFRICAN AMERICAN: >60 ML/MIN
GFR SERPL CREATININE-BSD FRML MDRD: ABNORMAL ML/MIN/{1.73_M2}
GLUCOSE BLD-MCNC: 250 MG/DL (ref 70–99)
POTASSIUM SERPL-SCNC: 4.5 MMOL/L (ref 3.7–5.3)
SODIUM BLD-SCNC: 138 MMOL/L (ref 135–144)

## 2022-03-10 RX ORDER — GLIMEPIRIDE 2 MG/1
TABLET ORAL
Qty: 90 TABLET | Refills: 3 | Status: SHIPPED | OUTPATIENT
Start: 2022-03-10

## 2022-03-11 LAB
ESTIMATED AVERAGE GLUCOSE: 212 MG/DL
HBA1C MFR BLD: 9 % (ref 4–6)

## 2022-06-14 ENCOUNTER — OFFICE VISIT (OUTPATIENT)
Dept: INTERNAL MEDICINE CLINIC | Age: 87
End: 2022-06-14
Payer: MEDICARE

## 2022-06-14 VITALS
HEIGHT: 61 IN | HEART RATE: 74 BPM | SYSTOLIC BLOOD PRESSURE: 128 MMHG | BODY MASS INDEX: 42.1 KG/M2 | WEIGHT: 223 LBS | OXYGEN SATURATION: 100 % | DIASTOLIC BLOOD PRESSURE: 80 MMHG

## 2022-06-14 DIAGNOSIS — I10 ESSENTIAL HYPERTENSION: ICD-10-CM

## 2022-06-14 DIAGNOSIS — L20.89 FLEXURAL ATOPIC DERMATITIS: ICD-10-CM

## 2022-06-14 DIAGNOSIS — E11.9 TYPE 2 DIABETES MELLITUS WITHOUT COMPLICATION, WITHOUT LONG-TERM CURRENT USE OF INSULIN (HCC): Primary | ICD-10-CM

## 2022-06-14 DIAGNOSIS — I42.0 DILATED CARDIOMYOPATHY (HCC): ICD-10-CM

## 2022-06-14 DIAGNOSIS — I50.42 CHRONIC COMBINED SYSTOLIC AND DIASTOLIC CONGESTIVE HEART FAILURE (HCC): ICD-10-CM

## 2022-06-14 LAB — HBA1C MFR BLD: 7.6 %

## 2022-06-14 PROCEDURE — 3051F HG A1C>EQUAL 7.0%<8.0%: CPT | Performed by: INTERNAL MEDICINE

## 2022-06-14 PROCEDURE — 1036F TOBACCO NON-USER: CPT | Performed by: INTERNAL MEDICINE

## 2022-06-14 PROCEDURE — G8427 DOCREV CUR MEDS BY ELIG CLIN: HCPCS | Performed by: INTERNAL MEDICINE

## 2022-06-14 PROCEDURE — 83036 HEMOGLOBIN GLYCOSYLATED A1C: CPT | Performed by: INTERNAL MEDICINE

## 2022-06-14 PROCEDURE — 99214 OFFICE O/P EST MOD 30 MIN: CPT | Performed by: INTERNAL MEDICINE

## 2022-06-14 PROCEDURE — G8417 CALC BMI ABV UP PARAM F/U: HCPCS | Performed by: INTERNAL MEDICINE

## 2022-06-14 PROCEDURE — 1123F ACP DISCUSS/DSCN MKR DOCD: CPT | Performed by: INTERNAL MEDICINE

## 2022-06-14 RX ORDER — CLOTRIMAZOLE AND BETAMETHASONE DIPROPIONATE 10; .64 MG/G; MG/G
CREAM TOPICAL
Qty: 45 G | Refills: 1 | Status: SHIPPED | OUTPATIENT
Start: 2022-06-14

## 2022-06-14 ASSESSMENT — PATIENT HEALTH QUESTIONNAIRE - PHQ9
4. FEELING TIRED OR HAVING LITTLE ENERGY: 0
3. TROUBLE FALLING OR STAYING ASLEEP: 0
10. IF YOU CHECKED OFF ANY PROBLEMS, HOW DIFFICULT HAVE THESE PROBLEMS MADE IT FOR YOU TO DO YOUR WORK, TAKE CARE OF THINGS AT HOME, OR GET ALONG WITH OTHER PEOPLE: 0
SUM OF ALL RESPONSES TO PHQ QUESTIONS 1-9: 0
8. MOVING OR SPEAKING SO SLOWLY THAT OTHER PEOPLE COULD HAVE NOTICED. OR THE OPPOSITE, BEING SO FIGETY OR RESTLESS THAT YOU HAVE BEEN MOVING AROUND A LOT MORE THAN USUAL: 0
2. FEELING DOWN, DEPRESSED OR HOPELESS: 0
SUM OF ALL RESPONSES TO PHQ QUESTIONS 1-9: 0
SUM OF ALL RESPONSES TO PHQ QUESTIONS 1-9: 0
5. POOR APPETITE OR OVEREATING: 0
9. THOUGHTS THAT YOU WOULD BE BETTER OFF DEAD, OR OF HURTING YOURSELF: 0
6. FEELING BAD ABOUT YOURSELF - OR THAT YOU ARE A FAILURE OR HAVE LET YOURSELF OR YOUR FAMILY DOWN: 0
SUM OF ALL RESPONSES TO PHQ9 QUESTIONS 1 & 2: 0
1. LITTLE INTEREST OR PLEASURE IN DOING THINGS: 0
SUM OF ALL RESPONSES TO PHQ QUESTIONS 1-9: 0
7. TROUBLE CONCENTRATING ON THINGS, SUCH AS READING THE NEWSPAPER OR WATCHING TELEVISION: 0

## 2022-06-14 NOTE — PROGRESS NOTES
OFFICE VISIT  PROGRESS NOTE         Date of patient's visit: 6/14/22  Patient's Name:  Cheri Obrien  YOB: 1935       Patient Care Team:  Ambreen Garcia MD as PCP - General (Internal Medicine)  Ambreen Garcia MD as PCP - [facility] Provider  Sita Kraft MD as Consulting Physician (Hematology and Oncology)  Olegario Condon MD as Consulting Physician (Radiation Oncology)  Eva Llanos MD        SUBJECTIVE     HISTORY           History of present illness     Pertinent details  added to ,       Chief Complaint   Patient presents with    Diabetes          Encounter Diagnoses   Name Primary?  Type 2 diabetes mellitus without complication, without long-term current use of insulin (HCC) Yes    Flexural atopic dermatitis perineal     Dilated cardiomyopathy (HCC)     Chronic combined systolic and diastolic congestive heart failure (HCC)     Essential hypertension         Symptom / problem          --history obtained from patient. -     Patient here for evaluation and management  of  Diabetes Mellitus . -- No symptoms suggestive of hyperglycemia or hypoglycemia . - Blood sugar diary maintaine [] yes    [x] no      Known to have  Hypertension ,  Compliant with meds . No new symptoms suggestive of high or low bp . Known  CHRONIC  HEART FAILURE . NYHA CLASS   -- 2 -- ,  NO CHANGE IN SOB AND LEG EDEMA SINCE LAST VISIT . NO ORTHOPNEA. COMPLIANT WITH DRUG REGIMEN . MEDICATIONS:      Current Outpatient Medications   Medication Sig Dispense Refill    clotrimazole-betamethasone (LOTRISONE) 1-0.05 % cream Apply topically 2 times daily.  45 g 1    glimepiride (AMARYL) 2 MG tablet TAKE 1 TABLET EVERY MORNING BEFORE BREAKFAST 90 tablet 3    lisinopril (PRINIVIL;ZESTRIL) 20 MG tablet Take 1 tablet by mouth daily 90 tablet 3    metFORMIN (GLUCOPHAGE) 500 MG tablet Take 1 tablet by mouth 2 times daily (with meals) 180 tablet 3    spironolactone (ALDACTONE) 25 MG tablet Take 1 tablet by mouth daily 90 tablet 3    dorzolamide-timolol (COSOPT) 22.3-6.8 MG/ML ophthalmic solution       Lancets MISC 1 each by Does not apply route daily Trueplus 33G Lancets 100 each 11    Blood Glucose Calibration (FREESTYLE CONTROL SOLUTION) LIQD 100 each by In Vitro route 3 times daily (before meals) True Metrix Level 2 Ctrl Solution 100 each 5    blood glucose monitor strips by Other route 2 times daily [employer] True Metrix Test Strip 100 strip 11    blood glucose monitor strips by Other route 2 times daily True Metrix Blood Glucose Meter 100 strip 11    Elastic Bandages & Supports MISC Apply Truss strap for L groin tenderness/hernia 1 each 0    latanoprost (XALATAN) 0.005 % ophthalmic solution       tamsulosin (FLOMAX) 0.4 MG capsule TAKE 1 CAPSULE EVERY DAY 90 capsule 3    STIMULANT LAXATIVE 8.6-50 MG per tablet TAKE 2 TABLETS EVERY  tablet 3    bumetanide (BUMEX) 1 MG tablet Take 1 tablet by mouth daily 90 tablet 3    Accu-Chek Softclix Lancets MISC 1 each by Does not apply route 3 times daily 100 each 3    Blood Glucose Monitoring Suppl (ACCU-CHEK ERIKA) DEVIN 1 each by Does not apply route daily 1 Device 0    acetaminophen (TYLENOL) 500 MG tablet Take 2 tablets by mouth 2 times daily 540 tablet 1    FLUoxetine (PROZAC) 20 MG capsule Take 1 capsule by mouth daily 30 capsule 3    ACCU-CHEK ERIKA PLUS strip TEST BLOOD SUGAR THREE TIMES DAILY AS NEEDED. 200 strip 11    metOLazone (ZAROXOLYN) 2.5 MG tablet TAKE 1 TABLET EVERY OTHER DAY 45 tablet 3    simvastatin (ZOCOR) 20 MG tablet TAKE 1 TABLET EVERY NIGHT 90 tablet 3    aspirin EC 81 MG EC tablet Take 1 tablet by mouth daily.  30 tablet 11    Multiple Vitamin (MULTI VITAMIN MENS PO) Take by mouth        Cyanocobalamin (VITAMIN B 12 PO) Take by mouth        gabapentin (NEURONTIN) 100 MG capsule Take 1 capsule by mouth 2 times daily for 90 days. 180 capsule 0     No current facility-administered medications for this visit. ALLERGIES:      Allergies   Allergen Reactions    Xarelto [Rivaroxaban] Other (See Comments)     Hematuria recurrent       SOCIAL HISTORY   Reviewed and no change from previous record. Diann Gipson  reports that he quit smoking about 33 years ago. His smoking use included cigarettes. He has a 400.00 pack-year smoking history. He has never used smokeless tobacco.    FAMILY HISTORY:    Reviewed and No change from previous visit    REVIEW OF SYSTEMS:    Review of Systems        OBJECTIVE      Physical exam           Vitals:    06/14/22 1059   BP: 128/80   Pulse: 74   SpO2: 100%   Weight: 223 lb (101.2 kg)   Height: 5' 1\" (1.549 m)         Physical Exam   Vitals:  /80   Pulse 74   Ht 5' 1\" (1.549 m)   Wt 223 lb (101.2 kg)   SpO2 100%   BMI 42.14 kg/m²                 Body mass index is 42.14 kg/m².      Vitals:    06/14/22 1059   BP: 128/80   Pulse: 74   SpO2: 100%   Weight: 223 lb (101.2 kg)   Height: 5' 1\" (1.549 m)       General -alert, well appearing, and in no distress  Skin - normal coloration and turgor, no rashes,no suspicious skin lesions noted  Eyes - pupils equal and reactive, extraocular eye movementsintact  Ears - bilateral TM's and external ear canals normal  Nose - normal and patent, no erythema, discharge or polyps  Mouth - mucous membranes are moist, pharynx normal without lesions  Neck - supple, no significant adenopathy  Lymphatics - no palpable lymphadenopathy, no hepatosplenomegaly    Chest - clear to auscultation, no wheezes, rales or rhonchi, symmetric air entry    Heart - normal rate, regular rhythm, no murmurs, rubs, clicks or gallops    Extremities - peripheral pulses normal,        Abdomen - soft, nontender, nondistended, no masses or organomegaly    Back - full range of motion, notenderness, palpable spasm or pain on motion    Neurological - alert, oriented, normal speech, no focal sensory or motor deficit  Musculoskeletal - no joint tenderness, deformity or swelling        Leg edema bilatera 1 plus  Has Flexeril dermatitis in the perianal region and very scrotal  Candidal contributing        DIAGNOSTICS / INSERTS / IMAGES    [x] Reviewed       Labs      URINE ANALYSIS: No results found for: LABURIN     CBC:  Lab Results   Component Value Date    WBC 6.1 09/15/2021    HGB 11.1 09/15/2021     09/15/2021     05/22/2012        BMP:    Lab Results   Component Value Date     03/10/2022    K 4.5 03/10/2022     03/10/2022    CO2 24 03/10/2022    BUN 23 03/10/2022    CREATININE 0.85 03/10/2022    GLUCOSE 250 03/10/2022    GLUCOSE 181 10/10/2011        No results found for: LDLC  Lab Results   Component Value Date    LABA1C 7.6 06/14/2022     No results found for: POCGLU   .     LIVER PROFILE:  Lab Results   Component Value Date    ALT 24 09/15/2021    AST 18 09/15/2021    PROT 6.6 09/15/2021    BILITOT 0.81 09/15/2021    LABALBU 3.9 09/15/2021    LABALBU 4.0 10/10/2011          Results for orders placed or performed in visit on 06/14/22   POCT glycosylated hemoglobin (Hb A1C)   Result Value Ref Range    Hemoglobin A1C 7.6 %                     VITALS INCLUDING BMI REVIEWED WITH PATIENT  Labs reviewed as noted above   Discussed with patient        ASSESSMENT / Myriam Kayla was seen today for diabetes.     Diagnoses and all orders for this visit:    Type 2 diabetes mellitus without complication, without long-term current use of insulin (HCC)  Good control  Hemoglobin A1c 7.6  -     POCT glycosylated hemoglobin (Hb A1C)    Flexural atopic dermatitis perineal  Ordered practice cream  Ordered Lotrisone cream  Dilated cardiomyopathy (HCC)  Significant improvement  CHF compensated    Chronic combined systolic and diastolic congestive heart failure [facility])  Compensated  Essential hypertension  Blood pressure control satisfactory  Other orders  -     clotrimazole-betamethasone (LOTRISONE) 1-0.05 % cream; Apply topically 2 times daily. SALIENT  ACTIONS TODAYS VISIT       Today's office visit SALIENT ACTIONS    --Lotrisone ordered for perianal Candida dermatitis--            Discussed use, benefit, and side effects of prescribed medications. [x] yes    All patient questions answered. Patient voiced understanding. Patient given educational materials - see patient instructions  [] yes         There are no discontinued medications. Orders Placed This Encounter   Procedures    POCT glycosylated hemoglobin (Hb A1C)        There are no Patient Instructions on file for this visit. Medication List          Accurate as of June 14, 2022 11:59 PM. If you have any questions, ask your nurse or doctor. START taking these medications    clotrimazole-betamethasone 1-0.05 % cream  Commonly known as: LOTRISONE  Apply topically 2 times daily. Started by: Odilon Lopez MD        CONTINUE taking these medications    Accu-Chek Estella Gardenia  1 each by Does not apply route daily     * Accu-Chek Estella Plus strip  Generic drug: blood glucose test strips  TEST BLOOD SUGAR THREE TIMES DAILY AS NEEDED. * blood glucose test strips  by Other route 2 times daily [employer] True Metrix Test Strip     * blood glucose test strips  by Other route 2 times daily True Metrix Blood Glucose Meter     * Accu-Chek Softclix Lancets Misc  1 each by Does not apply route 3 times daily     * Lancets Misc  1 each by Does not apply route daily Trueplus 33G Lancets     acetaminophen 500 MG tablet  Commonly known as: TYLENOL  Take 2 tablets by mouth 2 times daily     aspirin EC 81 MG EC tablet  Take 1 tablet by mouth daily.      bumetanide 1 MG tablet  Commonly known as: BUMEX  Take 1 tablet by mouth daily     dorzolamide-timolol 22.3-6.8 MG/ML ophthalmic solution  Commonly known as: COSOPT     Elastic Bandages & Supports Misc  Apply Truss strap for L groin tenderness/hernia     FLUoxetine 20 MG capsule  Commonly known as: PROZAC  Take 1 capsule by mouth daily     FreeStyle Control Solution Liqd  100 each by In Vitro route 3 times daily (before meals) True Metrix Level 2 Ctrl Solution     gabapentin 100 MG capsule  Commonly known as: NEURONTIN  Take 1 capsule by mouth 2 times daily for 90 days. glimepiride 2 MG tablet  Commonly known as: AMARYL  TAKE 1 TABLET EVERY MORNING BEFORE BREAKFAST     latanoprost 0.005 % ophthalmic solution  Commonly known as: XALATAN     lisinopril 20 MG tablet  Commonly known as: PRINIVIL;ZESTRIL  Take 1 tablet by mouth daily     metFORMIN 500 MG tablet  Commonly known as: GLUCOPHAGE  Take 1 tablet by mouth 2 times daily (with meals)     metOLazone 2.5 MG tablet  Commonly known as: ZAROXOLYN  TAKE 1 TABLET EVERY OTHER DAY     MULTI VITAMIN MENS PO     simvastatin 20 MG tablet  Commonly known as: ZOCOR  TAKE 1 TABLET EVERY NIGHT     spironolactone 25 MG tablet  Commonly known as: ALDACTONE  Take 1 tablet by mouth daily     Stimulant Laxative 8.6-50 MG per tablet  Generic drug: senna-docusate  TAKE 2 TABLETS EVERY DAY     tamsulosin 0.4 MG capsule  Commonly known as: FLOMAX  TAKE 1 CAPSULE EVERY DAY     VITAMIN B 12 PO         * This list has 5 medication(s) that are the same as other medications prescribed for you. Read the directions carefully, and ask your doctor or other care provider to review them with you. Where to Get Your Medications      These medications were sent to [facility] [id number] Emely Jim [address] [place] [address]    Phone: [phone] ·   clotrimazole-betamethasone 1-0.05 % cream             FOLLOW UP  PLANS     Return in about 3 months (around 9/14/2022).     MD PIA [place] [address] [address].   Phone [phone]   Fax: [phone]  Answering Service: [phone]  Visit Information    Have you changed or started any medications since your last visit including any over-the-counter medicines, vitamins, or herbal medicines? no   Are you having any side effects from any of your medications? -  no  Have you stopped taking any of your medications? Is so, why? -  no    Have you seen any other physician or provider since your last visit? No  Have you had any other diagnostic tests since your last visit? No  Have you been seen in the emergency room and/or had an admission to a hospital since we last saw you? No  Have you had your routine dental cleaning in the past 6 months? no    Have you activated your [employer] account? If not, what are your barriers?  No:      Patient Care Team:  Papo Wilson MD as PCP - General (Internal Medicine)  Papo Wilson MD as PCP - [facility] Provider  Edilson Cole MD as Consulting Physician (Hematology and Oncology)  Pedro Xiong MD as Consulting Physician (Radiation Oncology)  Sophia Corona MD    Medical History Review  Past Medical, Family, and Social History reviewed and does contribute to the patient presenting condition    Health Maintenance   Topic Date Due    DTaP/Tdap/Td vaccine (1 - Tdap) Never done    Shingles vaccine (1 of 2) Never done    Flu vaccine (Season Ended) 09/01/2022    Lipids  09/15/2022    Annual Wellness Visit (AWV)  11/06/2022    Depression Monitoring  06/14/2023    Pneumococcal 65+ years Vaccine  Completed    COVID-19 Vaccine  Completed    Hepatitis A vaccine  Aged Out    Hib vaccine  Aged Out    Meningococcal (ACWY) vaccine  Aged Out

## 2022-07-22 DIAGNOSIS — E11.9 TYPE 2 DIABETES MELLITUS WITHOUT COMPLICATION, WITHOUT LONG-TERM CURRENT USE OF INSULIN (HCC): ICD-10-CM

## 2022-07-26 RX ORDER — BLOOD SUGAR DIAGNOSTIC
STRIP MISCELLANEOUS
Qty: 200 STRIP | Refills: 11 | Status: SHIPPED | OUTPATIENT
Start: 2022-07-26

## 2022-09-27 ENCOUNTER — OFFICE VISIT (OUTPATIENT)
Dept: INTERNAL MEDICINE CLINIC | Age: 87
End: 2022-09-27
Payer: MEDICARE

## 2022-09-27 VITALS
OXYGEN SATURATION: 97 % | HEIGHT: 61 IN | HEART RATE: 54 BPM | BODY MASS INDEX: 42.44 KG/M2 | DIASTOLIC BLOOD PRESSURE: 88 MMHG | WEIGHT: 224.8 LBS | SYSTOLIC BLOOD PRESSURE: 134 MMHG

## 2022-09-27 DIAGNOSIS — J40 BRONCHITIS: Primary | ICD-10-CM

## 2022-09-27 DIAGNOSIS — I48.20 CHRONIC ATRIAL FIBRILLATION (HCC): ICD-10-CM

## 2022-09-27 DIAGNOSIS — E11.9 TYPE 2 DIABETES MELLITUS WITHOUT COMPLICATION, WITHOUT LONG-TERM CURRENT USE OF INSULIN (HCC): ICD-10-CM

## 2022-09-27 DIAGNOSIS — I50.42 CHRONIC COMBINED SYSTOLIC AND DIASTOLIC CONGESTIVE HEART FAILURE (HCC): ICD-10-CM

## 2022-09-27 DIAGNOSIS — I50.33 ACUTE ON CHRONIC DIASTOLIC CONGESTIVE HEART FAILURE (HCC): ICD-10-CM

## 2022-09-27 PROCEDURE — 3051F HG A1C>EQUAL 7.0%<8.0%: CPT | Performed by: INTERNAL MEDICINE

## 2022-09-27 PROCEDURE — 1123F ACP DISCUSS/DSCN MKR DOCD: CPT | Performed by: INTERNAL MEDICINE

## 2022-09-27 PROCEDURE — 99214 OFFICE O/P EST MOD 30 MIN: CPT | Performed by: INTERNAL MEDICINE

## 2022-09-27 RX ORDER — CEFDINIR 300 MG/1
300 CAPSULE ORAL 2 TIMES DAILY
Qty: 14 CAPSULE | Refills: 0 | Status: SHIPPED | OUTPATIENT
Start: 2022-09-27 | End: 2022-10-04

## 2022-09-27 RX ORDER — AZITHROMYCIN 250 MG/1
250 TABLET, FILM COATED ORAL SEE ADMIN INSTRUCTIONS
Qty: 6 TABLET | Refills: 0 | Status: SHIPPED | OUTPATIENT
Start: 2022-09-27 | End: 2022-10-02

## 2022-09-27 SDOH — ECONOMIC STABILITY: FOOD INSECURITY: WITHIN THE PAST 12 MONTHS, YOU WORRIED THAT YOUR FOOD WOULD RUN OUT BEFORE YOU GOT MONEY TO BUY MORE.: NEVER TRUE

## 2022-09-27 SDOH — ECONOMIC STABILITY: FOOD INSECURITY: WITHIN THE PAST 12 MONTHS, THE FOOD YOU BOUGHT JUST DIDN'T LAST AND YOU DIDN'T HAVE MONEY TO GET MORE.: NEVER TRUE

## 2022-09-27 ASSESSMENT — SOCIAL DETERMINANTS OF HEALTH (SDOH): HOW HARD IS IT FOR YOU TO PAY FOR THE VERY BASICS LIKE FOOD, HOUSING, MEDICAL CARE, AND HEATING?: NOT HARD AT ALL

## 2022-09-27 NOTE — PROGRESS NOTES
[address] [address]  Dept: [phone]  Dept Fax: [phone]    Office Progress/Follow Up Note  Date of patient's visit: 9/27/2022  Patient's Name:  Alicia Gtz YOB: 1935            Patient Care Team:  Janeth Flores MD as PCP - General (Internal Medicine)  Janeth Flores MD as PCP - [facility]  Vennie Severance, MD as Consulting Physician (Hematology and Oncology)  Neva Mauricio MD as Consulting Physician (Radiation Oncology)  Danielito Rea MD    REASON FOR VISIT: same day     HISTORY OF PRESENT ILLNESS:      Chief Complaint   Patient presents with    Fall     Patients knee gave out on him this morning and he feel, he was in and out of it when his wife found him. Patient denies hitting his head when he fell. Right leg gave out on him. Cough     Patient was coughing last night and has a sore throat, EMS thought that he had diminished lung sounds         Patient complaining of cough, since yesterday, also having some sore throat, cough productive of whitish sputum, no fever or chills, complaining of congestion, hoarse voice, fatigue  Also complaining of  shortness of breath  Oxygen saturation was in high 90s today  No orthopnea  No chest pain    Patient fell this morning, stated that his knee gave out, denied hitting his head, denies losing consciousness  Right knee nontender  States that he does feel dizzy sometimes in the morning when he wakes up late , no check his blood pressure at home.     Not compliant with his diuretic regimen  Patient states that that makes him urinate a lot so he does not take it  Daughter was present during encounter as well, stated that patient avoids taking his meds  Patient stated that he has more than half of his diuretics left in the bottle,     Complaining of swelling in the legs, states that swelling gets better when he takes his medications, has not taken it in last couple of days    Patient was seen by squad today after he fell, strip showed A. fib with heart rate in 100  Heart rate controlled today    Not checking his blood sugars       Patient Active Problem List   Diagnosis    Benign prostatic hyperplasia with lower urinary tract symptoms    Essential hypertension    Dilated cardiomyopathy (HCC)    Chronic atrial fibrillation (HCC)    Type 2 diabetes mellitus without complication, without long-term current use of insulin (HCC)    History of inguinal hernia repair    Chronic combined systolic and diastolic congestive heart failure (HCC)    Slow transit constipation    Hydrocele    Chronic bilateral low back pain without sciatica    Other specified abdominal hernia without obstruction or gangrene    Lumbosacral radiculopathy    Flexural atopic dermatitis perineal       Health Maintenance Due   Topic Date Due    DTaP/Tdap/Td vaccine (1 - Tdap) Never done    Shingles vaccine (1 of 2) Never done    COVID-19 Vaccine (4 - Booster for Moderna series) 04/28/2022    Flu vaccine (1) 08/01/2022    Lipids  09/15/2022       Allergies   Allergen Reactions    Xarelto [Rivaroxaban] Other (See Comments)     Hematuria recurrent         MEDICATIONS:     Current Outpatient Medications   Medication Sig Dispense Refill    ACCU-CHEK ERIKA PLUS strip TEST BLOOD SUGAR THREE TIMES DAILY AS NEEDED 200 strip 11    clotrimazole-betamethasone (LOTRISONE) 1-0.05 % cream Apply topically 2 times daily.  45 g 1    glimepiride (AMARYL) 2 MG tablet TAKE 1 TABLET EVERY MORNING BEFORE BREAKFAST 90 tablet 3    lisinopril (PRINIVIL;ZESTRIL) 20 MG tablet Take 1 tablet by mouth daily 90 tablet 3    metFORMIN (GLUCOPHAGE) 500 MG tablet Take 1 tablet by mouth 2 times daily (with meals) 180 tablet 3    spironolactone (ALDACTONE) 25 MG tablet Take 1 tablet by mouth daily 90 tablet 3    dorzolamide-timolol (COSOPT) 22.3-6.8 MG/ML ophthalmic solution       Lancets MISC 1 each by Does not apply route daily Trueplus 33G Lancets 100 each 11    Blood Glucose Calibration (FREESTYLE CONTROL SOLUTION) LIQD 100 each by In Vitro route 3 times daily (before meals) True Metrix Level 2 Ctrl Solution 100 each 5    blood glucose monitor strips by Other route 2 times daily [employer] True Metrix Test Strip 100 strip 11    blood glucose monitor strips by Other route 2 times daily True Metrix Blood Glucose Meter 100 strip 11    Elastic Bandages & Supports MISC Apply Truss strap for L groin tenderness/hernia 1 each 0    latanoprost (XALATAN) 0.005 % ophthalmic solution       tamsulosin (FLOMAX) 0.4 MG capsule TAKE 1 CAPSULE EVERY DAY 90 capsule 3    STIMULANT LAXATIVE 8.6-50 MG per tablet TAKE 2 TABLETS EVERY  tablet 3    bumetanide (BUMEX) 1 MG tablet Take 1 tablet by mouth daily 90 tablet 3    Accu-Chek Softclix Lancets MISC 1 each by Does not apply route 3 times daily 100 each 3    Blood Glucose Monitoring Suppl (ACCU-CHEK ERIKA) DEVIN 1 each by Does not apply route daily 1 Device 0    acetaminophen (TYLENOL) 500 MG tablet Take 2 tablets by mouth 2 times daily 540 tablet 1    FLUoxetine (PROZAC) 20 MG capsule Take 1 capsule by mouth daily 30 capsule 3    metOLazone (ZAROXOLYN) 2.5 MG tablet TAKE 1 TABLET EVERY OTHER DAY 45 tablet 3    simvastatin (ZOCOR) 20 MG tablet TAKE 1 TABLET EVERY NIGHT 90 tablet 3    aspirin EC 81 MG EC tablet Take 1 tablet by mouth daily. 30 tablet 11    Multiple Vitamin (MULTI VITAMIN MENS PO) Take by mouth        Cyanocobalamin (VITAMIN B 12 PO) Take by mouth        gabapentin (NEURONTIN) 100 MG capsule Take 1 capsule by mouth 2 times daily for 90 days. 180 capsule 0     No current facility-administered medications for this visit. SOCIAL HISTORY    Reviewed and no change from previous record. Kaycee Bone  reports that he quit smoking about 33 years ago. His smoking use included cigarettes. He has a 400.00 pack-year smoking history.  He has never used smokeless tobacco.    FAMILY HISTORY:    Reviewed and No change from previous visit  family history includes Other in his mother. OF SYSTEMS:    General : Negative for fatigue, weight loss, appetite change  HEENT : Positive for congestion, cough  Respiratory : Positive for shortness of breath and cough cardiovascular: Positive for shortness of shortness of breath, positive for leg edema  GI: Negative for abdominal pain, nausea, vomiting, diarrhea, constipation  Genitourinary : negative for dysuria, urinary frequency, urinary urgency, hematuria  Neurological : Positive for intermittent dizziness  Psych : Negative for depression, anxiety  Skin: Negative   musculoskeletal : Negative for joint pain, muscle pain      PHYSICAL EXAM:    There were no vitals filed for this visit. BP Readings from Last 3 Encounters:   06/14/22 128/80   03/02/22 130/72   11/05/21 100/60        Physical Exam  Constitutional:       Comments: Morbidly obese, no acute distress   HENT:      Head: Normocephalic. Nose: No congestion or rhinorrhea. Mouth/Throat:      Mouth: Mucous membranes are moist.   Eyes:      Extraocular Movements: Extraocular movements intact. Pupils: Pupils are equal, round, and reactive to light. Cardiovascular:      Rate and Rhythm: Rhythm irregular. Pulses: Normal pulses. Comments: Rate controlled, heart rate in high 50s to 70s  Pulmonary:      Effort: No respiratory distress. Breath sounds: No stridor. No wheezing or rales. Comments: Scattered rhonchi present on the left side  Abdominal:      Palpations: Abdomen is soft. Musculoskeletal:         General: Swelling present. Cervical back: No rigidity. Right lower leg: Edema present. Left lower leg: Edema present. Comments: Bilateral +2 pitting edema present   Skin:     General: Skin is warm. Neurological:      General: No focal deficit present.         Lab Results   Component Value Date    LABA1C 7.6 06/14/2022     Lab Results   Component Value Date     03/10/2022      LABORATORY FINDINGS:    CBC:  Lab Results   Component Value Date/Time    WBC 6.1 09/15/2021 12:40 PM    HGB 11.1 09/15/2021 12:40 PM     09/15/2021 12:40 PM     05/22/2012 11:57 AM       BMP:    Lab Results   Component Value Date/Time     03/10/2022 01:50 PM    K 4.5 03/10/2022 01:50 PM     03/10/2022 01:50 PM    CO2 24 03/10/2022 01:50 PM    BUN 23 03/10/2022 01:50 PM    CREATININE 0.85 03/10/2022 01:50 PM    GLUCOSE 250 03/10/2022 01:50 PM    GLUCOSE 181 10/10/2011 03:50 PM       HEMOGLOBIN A1C:   Lab Results   Component Value Date/Time    LABA1C 7.6 06/14/2022 11:28 AM       FASTING LIPID PANEL:  Lab Results   Component Value Date    CHOL 183 09/15/2021    HDL 40 (L) 09/15/2021    TRIG 140 09/15/2021       ASSESSMENT AND PLAN:      1. Bronchitis    - cefdinir (OMNICEF) 300 MG capsule; Take 1 capsule by mouth 2 times daily for 7 days  Dispense: 14 capsule; Refill: 0  - azithromycin (ZITHROMAX) 250 MG tablet; Take 1 tablet by mouth See Admin Instructions for 5 days 500mg on day 1 followed by 250mg on days 2 - 5  Dispense: 6 tablet; Refill: 0    2. Acute on chronic diastolic congestive heart failure ([address]  Patient is noncompliant with his diuretic regimen,  Does have bilateral pitting pedal edema      3. Chronic atrial fibrillation (HCC)  Rate is controlled,    4. Chronic combined systolic and diastolic congestive heart failure ([address]      5.  Type 2 diabetes mellitus without complication, without long-term current use of insulin (HCC)  Not checking his blood sugars daily  Last HbA1c was checked was 7.6 in June 2022    We will treat him with antibiotics for now, patient is at very high risk of rapid decompensation  Patient was advised about medication compliance  Patient needs to take his diuretic regimen as prescribed  If no improvement in his symptoms, or if symptoms get worse , Patient and family advised to go to ER   , for further evaluation    FOLLOW UP AND INSTRUCTIONS:   Return in about 1 week (around 10/4/2022). Radha Salas received counseling on the following healthy behaviors: medication adherence    Discussed use, benefit, and side effects of prescribed medications. Barriers to medication compliance addressed. All patient questions answered. Pt voiced understanding. Patient given educational materials - see patient instructions    MD PIA [facility]  9/27/2022, 4:41 PM    Please note that this chart was generated using voice recognition Dragon dictation software. Although every effort was made to ensure the accuracy of this automatedtranscription, some errors in transcription may have occurred.

## 2022-09-28 ENCOUNTER — OFFICE VISIT (OUTPATIENT)
Dept: INTERNAL MEDICINE CLINIC | Age: 87
End: 2022-09-28
Payer: MEDICARE

## 2022-09-28 VITALS — BODY MASS INDEX: 42.32 KG/M2 | WEIGHT: 224 LBS

## 2022-09-28 DIAGNOSIS — I50.43 ACUTE ON CHRONIC COMBINED SYSTOLIC AND DIASTOLIC HEART FAILURE (HCC): ICD-10-CM

## 2022-09-28 DIAGNOSIS — E11.9 TYPE 2 DIABETES MELLITUS WITHOUT COMPLICATION, WITHOUT LONG-TERM CURRENT USE OF INSULIN (HCC): Primary | ICD-10-CM

## 2022-09-28 DIAGNOSIS — I42.0 DILATED CARDIOMYOPATHY (HCC): ICD-10-CM

## 2022-09-28 DIAGNOSIS — I50.42 CHRONIC COMBINED SYSTOLIC AND DIASTOLIC CONGESTIVE HEART FAILURE (HCC): ICD-10-CM

## 2022-09-28 LAB — HBA1C MFR BLD: 8.3 %

## 2022-09-28 PROCEDURE — 83036 HEMOGLOBIN GLYCOSYLATED A1C: CPT | Performed by: INTERNAL MEDICINE

## 2022-09-28 PROCEDURE — 1123F ACP DISCUSS/DSCN MKR DOCD: CPT | Performed by: INTERNAL MEDICINE

## 2022-09-28 PROCEDURE — G8427 DOCREV CUR MEDS BY ELIG CLIN: HCPCS | Performed by: INTERNAL MEDICINE

## 2022-09-28 PROCEDURE — 1036F TOBACCO NON-USER: CPT | Performed by: INTERNAL MEDICINE

## 2022-09-28 PROCEDURE — 99213 OFFICE O/P EST LOW 20 MIN: CPT | Performed by: INTERNAL MEDICINE

## 2022-09-28 PROCEDURE — G8417 CALC BMI ABV UP PARAM F/U: HCPCS | Performed by: INTERNAL MEDICINE

## 2022-09-28 PROCEDURE — 3052F HG A1C>EQUAL 8.0%<EQUAL 9.0%: CPT | Performed by: INTERNAL MEDICINE

## 2022-09-28 RX ORDER — BUMETANIDE 1 MG/1
1 TABLET ORAL DAILY
Qty: 90 TABLET | Refills: 3 | Status: SHIPPED | OUTPATIENT
Start: 2022-09-28

## 2022-09-28 RX ORDER — TAMSULOSIN HYDROCHLORIDE 0.4 MG/1
CAPSULE ORAL
Qty: 90 CAPSULE | Refills: 3 | Status: SHIPPED | OUTPATIENT
Start: 2022-09-28

## 2022-09-28 NOTE — PROGRESS NOTES
Visit Information    Have you changed or started any medications since your last visit including any over-the-counter medicines, vitamins, or herbal medicines? no   Are you having any side effects from any of your medications? -  no  Have you stopped taking any of your medications? Is so, why? -  no    Have you seen any other physician or provider since your last visit? No  Have you had any other diagnostic tests since your last visit? No  Have you been seen in the emergency room and/or had an admission to a hospital since we last saw you? No  Have you had your routine dental cleaning in the past 6 months? no    Have you activated your [employer] account? If not, what are your barriers?  No:      Patient Care Team:  Joe Hodges MD as PCP - General (Internal Medicine)  Joe Hodges MD as PCP - [facility]  Matthew Lipscomb MD as Consulting Physician (Hematology and Oncology)  Lorenza Nelson MD as Consulting Physician (Radiation Oncology)  Rohan Montes MD    Medical History Review  Past Medical, Family, and Social History reviewed and does not contribute to the patient presenting condition    Health Maintenance   Topic Date Due    DTaP/Tdap/Td vaccine (1 - Tdap) Never done    Shingles vaccine (1 of 2) Never done    COVID-19 Vaccine (4 - Booster for Harle Distel series) 04/28/2022    Flu vaccine (1) 08/01/2022    Lipids  09/15/2022    Annual Wellness Visit (AWV)  11/06/2022    Depression Monitoring  06/14/2023    Pneumococcal 65+ years Vaccine  Completed    Hepatitis A vaccine  Aged Out    Hib vaccine  Aged Out    Meningococcal (ACWY) vaccine  Aged Out

## 2022-09-30 NOTE — PROGRESS NOTES
OFFICE VISIT  PROGRESS NOTE         Date of patient's visit: 9/30/22  Patient's Name:  Eber Nicolas  YOB: 1935       Patient Care Team:  Raul Burris MD as PCP - General (Internal Medicine)  Raul Burris MD as PCP - [facility]  Yulisa Huffman MD as Consulting Physician (Hematology and Oncology)  Tresa Blanco MD as Consulting Physician (Radiation Oncology)  Param Gutierrez MD        SUBJECTIVE     HISTORY           History of present illness     Pertinent details  added to ,       Chief Complaint   Patient presents with    Diabetes          Encounter Diagnoses   Name Primary? Type 2 diabetes mellitus without complication, without long-term current use of insulin (HCC) Yes    Chronic combined systolic and diastolic congestive heart failure (HCC)     Dilated cardiomyopathy (HCC)     Acute on chronic combined systolic and diastolic heart failure (HCC)         Symptom / problem          --history obtained from patient. -      Known  CHRONIC  HEART FAILURE . NYHA CLASS   -- 2 -- ,    NO ORTHOPNEA.      Non compliant with diuretics   On bumex and metolazone            MEDICATIONS:      Current Outpatient Medications   Medication Sig Dispense Refill    bumetanide (BUMEX) 1 MG tablet Take 1 tablet by mouth daily 90 tablet 3    tamsulosin (FLOMAX) 0.4 MG capsule TAKE 1 CAPSULE EVERY DAY 90 capsule 3    cefdinir (OMNICEF) 300 MG capsule Take 1 capsule by mouth 2 times daily for 7 days 14 capsule 0    azithromycin (ZITHROMAX) 250 MG tablet Take 1 tablet by mouth See Admin Instructions for 5 days 500mg on day 1 followed by 250mg on days 2 - 5 6 tablet 0    ACCU-CHEK ERIKA PLUS strip TEST BLOOD SUGAR THREE TIMES DAILY AS NEEDED 200 strip 11    clotrimazole-betamethasone (LOTRISONE) 1-0.05 % cream Apply topically 2 times daily. 45 g 1    glimepiride (AMARYL) 2 MG tablet TAKE 1 TABLET EVERY MORNING BEFORE BREAKFAST 90 tablet 3    lisinopril (PRINIVIL;ZESTRIL) 20 MG tablet Take 1 tablet by mouth daily 90 tablet 3    metFORMIN (GLUCOPHAGE) 500 MG tablet Take 1 tablet by mouth 2 times daily (with meals) 180 tablet 3    spironolactone (ALDACTONE) 25 MG tablet Take 1 tablet by mouth daily 90 tablet 3    dorzolamide-timolol (COSOPT) 22.3-6.8 MG/ML ophthalmic solution       Lancets MISC 1 each by Does not apply route daily Trueplus 33G Lancets 100 each 11    Blood Glucose Calibration (FREESTYLE CONTROL SOLUTION) LIQD 100 each by In Vitro route 3 times daily (before meals) True Metrix Level 2 Ctrl Solution 100 each 5    blood glucose monitor strips by Other route 2 times daily [employer] True Metrix Test Strip 100 strip 11    blood glucose monitor strips by Other route 2 times daily True Metrix Blood Glucose Meter 100 strip 11    Elastic Bandages & Supports MISC Apply Truss strap for L groin tenderness/hernia 1 each 0    latanoprost (XALATAN) 0.005 % ophthalmic solution       Accu-Chek Softclix Lancets MISC 1 each by Does not apply route 3 times daily 100 each 3    Blood Glucose Monitoring Suppl (ACCU-CHEK ERIKA) DEVIN 1 each by Does not apply route daily 1 Device 0    acetaminophen (TYLENOL) 500 MG tablet Take 2 tablets by mouth 2 times daily 540 tablet 1    FLUoxetine (PROZAC) 20 MG capsule Take 1 capsule by mouth daily 30 capsule 3    metOLazone (ZAROXOLYN) 2.5 MG tablet TAKE 1 TABLET EVERY OTHER DAY 45 tablet 3    simvastatin (ZOCOR) 20 MG tablet TAKE 1 TABLET EVERY NIGHT 90 tablet 3    aspirin EC 81 MG EC tablet Take 1 tablet by mouth daily.  30 tablet 11    Multiple Vitamin (MULTI VITAMIN MENS PO) Take by mouth        Cyanocobalamin (VITAMIN B 12 PO) Take by mouth        STIMULANT LAXATIVE 8.6-50 MG per tablet TAKE 2 TABLETS EVERY DAY (Patient not taking: Reported on 9/28/2022) 180 tablet 3    gabapentin (NEURONTIN) 100 MG capsule Take 1 capsule by mouth 2 times daily for 90 days. 180 capsule 0     No current facility-administered medications for this visit. ALLERGIES:      Allergies   Allergen Reactions    Xarelto [Rivaroxaban] Other (See Comments)     Hematuria recurrent       SOCIAL HISTORY   Reviewed and no change from previous record. Sam Pete  reports that he quit smoking about 33 years ago. His smoking use included cigarettes. He has a 400.00 pack-year smoking history. He has never used smokeless tobacco.    FAMILY HISTORY:    Reviewed and No change from previous visit    REVIEW OF SYSTEMS:    Review of Systems        OBJECTIVE      Physical exam           Vitals:    09/28/22 1141   Weight: 224 lb (101.6 kg)         Physical Exam   Vitals: Wt 224 lb (101.6 kg)   BMI 42.32 kg/m²                 Body mass index is 42.32 kg/m².      Vitals:    09/28/22 1141   Weight: 224 lb (101.6 kg)       General -alert, well appearing, and in no distress  Skin - normal coloration and turgor, no rashes,no suspicious skin lesions noted  Eyes - pupils equal and reactive, extraocular eye movementsintact  Ears - bilateral TM's and external ear canals normal  Nose - normal and patent, no erythema, discharge or polyps  Mouth - mucous membranes are moist, pharynx normal without lesions  Neck - supple, no significant adenopathy  Lymphatics - no palpable lymphadenopathy, no hepatosplenomegaly    Chest - clear to auscultation, no wheezes, rales or rhonchi, symmetric air entry    Heart - normal rate, regular rhythm, no murmurs, rubs, clicks or gallops    Extremities - peripheral pulses normal, no pedal edema       Abdomen - soft, nontender, nondistended, no masses or organomegaly    Back - full range of motion, notenderness, palpable spasm or pain on motion    Neurological - alert, oriented, normal speech, no focal sensory or motor deficit  Musculoskeletal - no joint tenderness, deformity or swelling        Leg edema 2 plus Casey Cali / ALLYSSA / Rody Brown    [x] Reviewed       Labs      URINE ANALYSIS: No results found for: LABURIN     CBC:  Lab Results   Component Value Date/Time    WBC 6.1 09/15/2021 12:40 PM    HGB 11.1 09/15/2021 12:40 PM     09/15/2021 12:40 PM     05/22/2012 11:57 AM        BMP:    Lab Results   Component Value Date/Time     03/10/2022 01:50 PM    K 4.5 03/10/2022 01:50 PM     03/10/2022 01:50 PM    CO2 24 03/10/2022 01:50 PM    BUN 23 03/10/2022 01:50 PM    CREATININE 0.85 03/10/2022 01:50 PM    GLUCOSE 250 03/10/2022 01:50 PM    GLUCOSE 181 10/10/2011 03:50 PM        No results found for: LDLC  Lab Results   Component Value Date/Time    LABA1C 8.3 09/28/2022 11:57 AM     No results found for: POCGLU   .     LIVER PROFILE:  Lab Results   Component Value Date/Time    ALT 24 09/15/2021 12:40 PM    AST 18 09/15/2021 12:40 PM    PROT 6.6 09/15/2021 12:40 PM    BILITOT 0.81 09/15/2021 12:40 PM    LABALBU 3.9 09/15/2021 12:40 PM    LABALBU 4.0 10/10/2011 03:50 PM          Results for orders placed or performed in visit on 09/28/22   POCT glycosylated hemoglobin (Hb A1C)   Result Value Ref Range    Hemoglobin A1C 8.3 %                     VITALS INCLUDING BMI REVIEWED WITH PATIENT  Labs reviewed as noted above   Discussed with patient        ASSESSMENT / Yoliekanwal Spencer was seen today for diabetes. Diagnoses and all orders for this visit:    Type 2 diabetes mellitus without complication, without long-term current use of insulin ([address]  Fair control  -     POCT glycosylated hemoglobin (Hb A1C)    Chronic combined systolic and diastolic congestive heart failure ([address]  Counselled on taking diuretics as prescribed  not to skip  -     bumetanide (BUMEX) 1 MG tablet; Take 1 tablet by mouth daily    Dilated cardiomyopathy (HCC)  stable  -     bumetanide (BUMEX) 1 MG tablet; Take 1 tablet by mouth daily  -     Comprehensive Metabolic Panel;  Future    Acute on chronic combined systolic and diastolic heart failure (HCC)  -     bumetanide (BUMEX) 1 MG tablet; Take 1 tablet by mouth daily    Other orders  -     tamsulosin (FLOMAX) 0.4 MG capsule; TAKE 1 CAPSULE EVERY DAY               SALIENT  ACTIONS TODAYS VISIT       Today's office visit SALIENT ACTIONS    ---meds reviewed   counselled-            Discussed use, benefit, and side effects of prescribed medications. [x] yes    All patient questions answered. Patient voiced understanding. Patient given educational materials - see patient instructions  [] yes         Medications Discontinued During This Encounter   Medication Reason    bumetanide (BUMEX) 1 MG tablet REORDER    tamsulosin (FLOMAX) 0.4 MG capsule REORDER        Orders Placed This Encounter   Procedures    Comprehensive Metabolic Panel     Standing Status:   Future     Standing Expiration Date:   9/28/2023    POCT glycosylated hemoglobin (Hb A1C)        There are no Patient Instructions on file for this visit. Medication List            Accurate as of September 28, 2022 11:59 PM. If you have any questions, ask your nurse or doctor. CONTINUE taking these medications      Accu-Chek Estella Gardenia  1 each by Does not apply route daily     * Accu-Chek Softclix Lancets Misc  1 each by Does not apply route 3 times daily     * Lancets Misc  1 each by Does not apply route daily Trueplus 33G Lancets     acetaminophen 500 MG tablet  Commonly known as: TYLENOL  Take 2 tablets by mouth 2 times daily     aspirin EC 81 MG EC tablet  Take 1 tablet by mouth daily.      azithromycin 250 MG tablet  Commonly known as: ZITHROMAX  Take 1 tablet by mouth See Admin Instructions for 5 days 500mg on day 1 followed by 250mg on days 2 - 5     * blood glucose test strips  by Other route 2 times daily [employer] True Metrix Test Strip     * blood glucose test strips  by Other route 2 times daily True Metrix Blood Glucose Meter     * Accu-Chek Estella Plus strip  Generic drug: blood glucose test strips  TEST BLOOD SUGAR THREE TIMES DAILY AS NEEDED     bumetanide 1 MG tablet  Commonly known as: BUMEX  Take 1 tablet by mouth daily     cefdinir 300 MG capsule  Commonly known as: OMNICEF  Take 1 capsule by mouth 2 times daily for 7 days     clotrimazole-betamethasone 1-0.05 % cream  Commonly known as: LOTRISONE  Apply topically 2 times daily. dorzolamide-timolol 22.3-6.8 MG/ML ophthalmic solution  Commonly known as: COSOPT     Elastic Bandages & Supports Misc  Apply Truss strap for L groin tenderness/hernia     FLUoxetine 20 MG capsule  Commonly known as: PROZAC  Take 1 capsule by mouth daily     FreeStyle Control Solution Liqd  100 each by In Vitro route 3 times daily (before meals) True Metrix Level 2 Ctrl Solution     gabapentin 100 MG capsule  Commonly known as: NEURONTIN  Take 1 capsule by mouth 2 times daily for 90 days. glimepiride 2 MG tablet  Commonly known as: AMARYL  TAKE 1 TABLET EVERY MORNING BEFORE BREAKFAST     latanoprost 0.005 % ophthalmic solution  Commonly known as: XALATAN     lisinopril 20 MG tablet  Commonly known as: PRINIVIL;ZESTRIL  Take 1 tablet by mouth daily     metFORMIN 500 MG tablet  Commonly known as: GLUCOPHAGE  Take 1 tablet by mouth 2 times daily (with meals)     metOLazone 2.5 MG tablet  Commonly known as: ZAROXOLYN  TAKE 1 TABLET EVERY OTHER DAY     MULTI VITAMIN MENS PO     simvastatin 20 MG tablet  Commonly known as: ZOCOR  TAKE 1 TABLET EVERY NIGHT     spironolactone 25 MG tablet  Commonly known as: ALDACTONE  Take 1 tablet by mouth daily     Stimulant Laxative 8.6-50 MG per tablet  Generic drug: senna-docusate  TAKE 2 TABLETS EVERY DAY     tamsulosin 0.4 MG capsule  Commonly known as: FLOMAX  TAKE 1 CAPSULE EVERY DAY     VITAMIN B 12 PO           * This list has 5 medication(s) that are the same as other medications prescribed for you. Read the directions carefully, and ask your doctor or other care provider to review them with you.                    Where to Get Your Medications        These medications were sent to [address] [place] [address] [facility] [place], [address] [address]      Phone: [phone]   bumetanide 1 MG tablet  tamsulosin 0.4 MG capsule             FOLLOW UP  PLANS     No follow-ups on file. MD P[facility]  [address] [place] [address], [address] [place] [address].    Phone [phone]   Fax: [phone]  Answering Service: [phone]

## 2022-10-31 ENCOUNTER — HOSPITAL ENCOUNTER (OUTPATIENT)
Age: 87
Setting detail: SPECIMEN
Discharge: HOME | End: 2022-10-31

## 2022-10-31 DIAGNOSIS — I42.0 DILATED CARDIOMYOPATHY (HCC): ICD-10-CM

## 2022-11-01 LAB
ALBUMIN SERPL-MCNC: 3.9 G/DL (ref 3.5–5.2)
ALBUMIN/GLOBULIN RATIO: 1.2 (ref 1–2.5)
ALP BLD-CCNC: 49 U/L (ref 40–129)
ALT SERPL-CCNC: 15 U/L (ref 5–41)
ANION GAP SERPL CALCULATED.3IONS-SCNC: 14 MMOL/L (ref 9–17)
AST SERPL-CCNC: 14 U/L
BILIRUB SERPL-MCNC: 0.4 MG/DL (ref 0.3–1.2)
BUN BLDV-MCNC: 44 MG/DL (ref 8–23)
CALCIUM SERPL-MCNC: 9.7 MG/DL (ref 8.6–10.4)
CHLORIDE BLD-SCNC: 96 MMOL/L (ref 98–107)
CO2: 28 MMOL/L (ref 20–31)
CREAT SERPL-MCNC: 1.36 MG/DL (ref 0.7–1.2)
GFR SERPL CREATININE-BSD FRML MDRD: 51 ML/MIN/1.73M2
GLUCOSE BLD-MCNC: 204 MG/DL (ref 70–99)
POTASSIUM SERPL-SCNC: 4.4 MMOL/L (ref 3.7–5.3)
SODIUM BLD-SCNC: 138 MMOL/L (ref 135–144)
TOTAL PROTEIN: 7.1 G/DL (ref 6.4–8.3)

## 2022-11-08 ENCOUNTER — OFFICE VISIT (OUTPATIENT)
Dept: INTERNAL MEDICINE CLINIC | Age: 87
End: 2022-11-08
Payer: MEDICARE

## 2022-11-08 ENCOUNTER — HOSPITAL ENCOUNTER (OUTPATIENT)
Age: 87
Setting detail: SPECIMEN
Discharge: HOME | End: 2022-11-08

## 2022-11-08 VITALS
HEART RATE: 70 BPM | DIASTOLIC BLOOD PRESSURE: 66 MMHG | BODY MASS INDEX: 41.35 KG/M2 | SYSTOLIC BLOOD PRESSURE: 122 MMHG | OXYGEN SATURATION: 96 % | WEIGHT: 219 LBS | HEIGHT: 61 IN

## 2022-11-08 DIAGNOSIS — I48.20 CHRONIC ATRIAL FIBRILLATION (HCC): ICD-10-CM

## 2022-11-08 DIAGNOSIS — I50.43 ACUTE ON CHRONIC COMBINED SYSTOLIC AND DIASTOLIC HEART FAILURE (HCC): ICD-10-CM

## 2022-11-08 DIAGNOSIS — E11.9 TYPE 2 DIABETES MELLITUS WITHOUT COMPLICATION, WITHOUT LONG-TERM CURRENT USE OF INSULIN (HCC): ICD-10-CM

## 2022-11-08 DIAGNOSIS — I10 ESSENTIAL HYPERTENSION: Primary | ICD-10-CM

## 2022-11-08 DIAGNOSIS — I42.0 DILATED CARDIOMYOPATHY (HCC): ICD-10-CM

## 2022-11-08 DIAGNOSIS — I10 ESSENTIAL HYPERTENSION: ICD-10-CM

## 2022-11-08 DIAGNOSIS — I50.42 CHRONIC COMBINED SYSTOLIC AND DIASTOLIC CONGESTIVE HEART FAILURE (HCC): ICD-10-CM

## 2022-11-08 PROCEDURE — G8427 DOCREV CUR MEDS BY ELIG CLIN: HCPCS | Performed by: INTERNAL MEDICINE

## 2022-11-08 PROCEDURE — 3052F HG A1C>EQUAL 8.0%<EQUAL 9.0%: CPT | Performed by: INTERNAL MEDICINE

## 2022-11-08 PROCEDURE — 1036F TOBACCO NON-USER: CPT | Performed by: INTERNAL MEDICINE

## 2022-11-08 PROCEDURE — 1123F ACP DISCUSS/DSCN MKR DOCD: CPT | Performed by: INTERNAL MEDICINE

## 2022-11-08 PROCEDURE — G8417 CALC BMI ABV UP PARAM F/U: HCPCS | Performed by: INTERNAL MEDICINE

## 2022-11-08 PROCEDURE — 99213 OFFICE O/P EST LOW 20 MIN: CPT | Performed by: INTERNAL MEDICINE

## 2022-11-08 PROCEDURE — G8484 FLU IMMUNIZE NO ADMIN: HCPCS | Performed by: INTERNAL MEDICINE

## 2022-11-08 RX ORDER — METOLAZONE 2.5 MG/1
2.5 TABLET ORAL
Qty: 45 TABLET | Refills: 3 | Status: SHIPPED | OUTPATIENT
Start: 2022-11-10

## 2022-11-08 RX ORDER — GLIPIZIDE 5 MG/1
5 TABLET ORAL
Qty: 60 TABLET | Refills: 11 | Status: SHIPPED | OUTPATIENT
Start: 2022-11-08

## 2022-11-08 NOTE — PROGRESS NOTES
Visit Information    Have you changed or started any medications since your last visit including any over-the-counter medicines, vitamins, or herbal medicines? yes  Are you having any side effects from any of your medications? -  no  Have you stopped taking any of your medications? Is so, why? -  no    Have you seen any other physician or provider since your last visit? No  Have you had any other diagnostic tests since your last visit? No  Have you been seen in the emergency room and/or had an admission to a hospital since we last saw you? No  Have you had your routine dental cleaning in the past 6 months? no    Have you activated your "[employer] account? If not, what are your barriers?  No:      Patient Care Team:  Chauncey Givens MD as PCP - General (Internal Medicine)  Chauncey Givens MD as PCP - [facility]  Brittany Kohli MD as Consulting Physician (Hematology and Oncology)  Jonathan Hatchet, MD as Consulting Physician (Radiation Oncology)  Bette Nicholson MD    Medical History Review  Past Medical, Family, and Social History reviewed and does contribute to the patient presenting condition    Health Maintenance   Topic Date Due    DTaP/Tdap/Td vaccine (1 - Tdap) Never done    Shingles vaccine (1 of 2) Never done    COVID-19 Vaccine (4 - Booster for Lavone Amanda series) 02/22/2022    Flu vaccine (1) 08/01/2022    Lipids  09/15/2022    Annual Wellness Visit (AWV)  11/06/2022    Depression Monitoring  06/14/2023    Pneumococcal 65+ years Vaccine  Completed    Hepatitis A vaccine  Aged Out    Hib vaccine  Aged Out    Meningococcal (ACWY) vaccine  Aged Out

## 2022-11-09 LAB
ANION GAP SERPL CALCULATED.3IONS-SCNC: 11 MMOL/L (ref 9–17)
BUN BLDV-MCNC: 61 MG/DL (ref 8–23)
CALCIUM SERPL-MCNC: 9.5 MG/DL (ref 8.6–10.4)
CHLORIDE BLD-SCNC: 98 MMOL/L (ref 98–107)
CO2: 24 MMOL/L (ref 20–31)
CREAT SERPL-MCNC: 1.49 MG/DL (ref 0.7–1.2)
ESTIMATED AVERAGE GLUCOSE: 197 MG/DL
GFR SERPL CREATININE-BSD FRML MDRD: 45 ML/MIN/1.73M2
GLUCOSE BLD-MCNC: 138 MG/DL (ref 70–99)
HBA1C MFR BLD: 8.5 % (ref 4–6)
POTASSIUM SERPL-SCNC: 5 MMOL/L (ref 3.7–5.3)
SODIUM BLD-SCNC: 133 MMOL/L (ref 135–144)

## 2022-11-10 ENCOUNTER — TELEPHONE (OUTPATIENT)
Dept: INTERNAL MEDICINE CLINIC | Age: 87
End: 2022-11-10

## 2022-11-10 NOTE — TELEPHONE ENCOUNTER
----- Message from Nubia Ahmadi sent at 11/10/2022 11:03 AM EST -----  Subject: Message to Provider    QUESTIONS  Information for Provider?  Pt would like someone to return call about   adding daughter on to his HIPPA   ---------------------------------------------------------------------------  --------------  [address] [facility]  [phone]; OK to leave message on voicemail  ---------------------------------------------------------------------------  --------------  SCRIPT ANSWERS  undefined

## 2022-11-10 NOTE — PROGRESS NOTES
OFFICE VISIT  PROGRESS NOTE         Date of patient's visit: 11/10/22  Patient's Name:  Philly Dos Santos  YOB: 1935       Patient Care Team:  Jd Glover MD as PCP - General (Internal Medicine)  Jd Glover MD as PCP - [address] [place] [address] [facility]  Maicol Mckeon MD as Consulting Physician (Hematology and Oncology)  Gabby Kat MD as Consulting Physician (Radiation Oncology)  Terence Velazquez MD        SUBJECTIVE     HISTORY           History of present illness     Pertinent details  added to ,       Chief Complaint   Patient presents with    Diabetes     A1C- 9/28/22= 8.3, blood sugar was 268 yesterday    Heart Problem     States he has an enlarged heart, unsure if he needs a cardiologist           Encounter Diagnoses   Name Primary? Essential hypertension Yes    Dilated cardiomyopathy (HCC)     Chronic atrial fibrillation (HCC)     Chronic combined systolic and diastolic congestive heart failure (HCC)     Type 2 diabetes mellitus without complication, without long-term current use of insulin (HCC)     Acute on chronic combined systolic and diastolic heart failure (HCC)         Symptom / problem          --history obtained from patient. -     Patient here for evaluation and management  of  Diabetes Mellitus . -- No symptoms suggestive of hyperglycemia or hypoglycemia . - Blood sugar diary maintaine [] yes    [x] no      Known to have  Hypertension ,  Compliant with meds . No new symptoms suggestive of high or low bp . Known CHF    NYHA class 2  Compliant with therapy . No change in leg edema .               MEDICATIONS:      Current Outpatient Medications   Medication Sig Dispense Refill    metOLazone (ZAROXOLYN) 2.5 MG tablet Take 1 tablet by mouth Twice a Week Every monday and thursday 45 tablet 3    glipiZIDE (GLUCOTROL) 5 MG tablet Take 1 tablet by mouth 2 times daily (before meals) With breakfast and supper 60 tablet 11    bumetanide (BUMEX) 1 MG tablet Take 1 tablet by mouth daily 90 tablet 3    tamsulosin (FLOMAX) 0.4 MG capsule TAKE 1 CAPSULE EVERY DAY 90 capsule 3    ACCU-CHEK ERIKA PLUS strip TEST BLOOD SUGAR THREE TIMES DAILY AS NEEDED 200 strip 11    clotrimazole-betamethasone (LOTRISONE) 1-0.05 % cream Apply topically 2 times daily. 45 g 1    glimepiride (AMARYL) 2 MG tablet TAKE 1 TABLET EVERY MORNING BEFORE BREAKFAST 90 tablet 3    lisinopril (PRINIVIL;ZESTRIL) 20 MG tablet Take 1 tablet by mouth daily 90 tablet 3    spironolactone (ALDACTONE) 25 MG tablet Take 1 tablet by mouth daily 90 tablet 3    dorzolamide-timolol (COSOPT) 22.3-6.8 MG/ML ophthalmic solution       Lancets MISC 1 each by Does not apply route daily Trueplus 33G Lancets 100 each 11    Blood Glucose Calibration (FREESTYLE CONTROL SOLUTION) LIQD 100 each by In Vitro route 3 times daily (before meals) True Metrix Level 2 Ctrl Solution 100 each 5    blood glucose monitor strips by Other route 2 times daily [employer] True Metrix Test Strip 100 strip 11    blood glucose monitor strips by Other route 2 times daily True Metrix Blood Glucose Meter 100 strip 11    Elastic Bandages & Supports MISC Apply Truss strap for L groin tenderness/hernia 1 each 0    latanoprost (XALATAN) 0.005 % ophthalmic solution       Accu-Chek Softclix Lancets MISC 1 each by Does not apply route 3 times daily 100 each 3    Blood Glucose Monitoring Suppl (ACCU-CHEK ERIKA) DEVIN 1 each by Does not apply route daily 1 Device 0    acetaminophen (TYLENOL) 500 MG tablet Take 2 tablets by mouth 2 times daily 540 tablet 1    FLUoxetine (PROZAC) 20 MG capsule Take 1 capsule by mouth daily 30 capsule 3    simvastatin (ZOCOR) 20 MG tablet TAKE 1 TABLET EVERY NIGHT 90 tablet 3    aspirin EC 81 MG EC tablet Take 1 tablet by mouth daily.  30 tablet 11    Multiple Vitamin (MULTI VITAMIN MENS PO) Take by mouth        Cyanocobalamin (VITAMIN B 12 PO) Take by mouth        STIMULANT LAXATIVE 8.6-50 MG per tablet TAKE 2 TABLETS EVERY DAY (Patient not taking: No sig reported) 180 tablet 3    gabapentin (NEURONTIN) 100 MG capsule Take 1 capsule by mouth 2 times daily for 90 days. 180 capsule 0     No current facility-administered medications for this visit. ALLERGIES:      Allergies   Allergen Reactions    Xarelto [Rivaroxaban] Other (See Comments)     Hematuria recurrent       SOCIAL HISTORY   Reviewed and no change from previous record. Abdelrahman Garzon  reports that he quit smoking about 33 years ago. His smoking use included cigarettes. He has a 400.00 pack-year smoking history. He has never used smokeless tobacco.    FAMILY HISTORY:    Reviewed and No change from previous visit    REVIEW OF SYSTEMS:    Review of Systems        OBJECTIVE      Physical exam           Vitals:    11/08/22 1541   BP: 122/66   Pulse: 70   SpO2: 96%   Weight: 219 lb (99.3 kg)   Height: 5' 1\" (1.549 m)         Physical Exam   Vitals:  /66   Pulse 70   Ht 5' 1\" (1.549 m)   Wt 219 lb (99.3 kg)   SpO2 96%   BMI 41.38 kg/m²                 Body mass index is 41.38 kg/m².      Vitals:    11/08/22 1541   BP: 122/66   Pulse: 70   SpO2: 96%   Weight: 219 lb (99.3 kg)   Height: 5' 1\" (1.549 m)       General -alert, well appearing, and in no distress  Skin - normal coloration and turgor, no rashes,no suspicious skin lesions noted  Eyes - pupils equal and reactive, extraocular eye movementsintact  Ears - bilateral TM's and external ear canals normal  Nose - normal and patent, no erythema, discharge or polyps  Mouth - mucous membranes are moist, pharynx normal without lesions  Neck - supple, no significant adenopathy  Lymphatics - no palpable lymphadenopathy, no hepatosplenomegaly    Chest - clear to auscultation, no wheezes, rales or rhonchi, symmetric air entry    Heart - normal rate, regular rhythm, no murmurs, rubs, clicks or gallops    Extremities - peripheral pulses normal, no pedal edema       Abdomen - soft, nontender, nondistended, no masses or organomegaly    Back - full range of motion, notenderness, palpable spasm or pain on motion    Neurological - alert, oriented, normal speech, no focal sensory or motor deficit  Musculoskeletal - no joint tenderness, deformity or swelling                DIAGNOSTICS / INSERTS / IMAGES    [x] Reviewed       Labs      URINE ANALYSIS: No results found for: LABURIN     CBC:  Lab Results   Component Value Date/Time    WBC 6.1 09/15/2021 12:40 PM    HGB 11.1 09/15/2021 12:40 PM     09/15/2021 12:40 PM     05/22/2012 11:57 AM        BMP:    Lab Results   Component Value Date/Time     11/08/2022 04:35 PM    K 5.0 11/08/2022 04:35 PM    CL 98 11/08/2022 04:35 PM    CO2 24 11/08/2022 04:35 PM    BUN 61 11/08/2022 04:35 PM    CREATININE 1.49 11/08/2022 04:35 PM    GLUCOSE 138 11/08/2022 04:35 PM    GLUCOSE 181 10/10/2011 03:50 PM        No results found for: LDLC  Lab Results   Component Value Date/Time    LABA1C 8.5 11/08/2022 04:35 PM     No results found for: POCGLU   .     LIVER PROFILE:  Lab Results   Component Value Date/Time    ALT 15 10/31/2022 03:26 PM    AST 14 10/31/2022 03:26 PM    PROT 7.1 10/31/2022 03:26 PM    BILITOT 0.4 10/31/2022 03:26 PM    LABALBU 3.9 10/31/2022 03:26 PM    LABALBU 4.0 10/10/2011 03:50 PM          Results for orders placed or performed during the hospital encounter of 10/31/22   Comprehensive Metabolic Panel   Result Value Ref Range    Glucose 204 (H) 70 - 99 mg/dL    BUN 44 (H) 8 - 23 mg/dL    Creatinine 1.36 (H) 0.70 - 1.20 mg/dL    Est, Glom Filt Rate 51 (L) >60 mL/min/1.73m2    Calcium 9.7 8.6 - 10.4 mg/dL    Sodium 138 135 - 144 mmol/L    Potassium 4.4 3.7 - 5.3 mmol/L    Chloride 96 (L) 98 - 107 mmol/L    CO2 28 20 - 31 mmol/L    Anion Gap 14 9 - 17 mmol/L    Alkaline Phosphatase 49 40 - 129 U/L ALT 15 5 - 41 U/L    AST 14 <40 U/L    Total Bilirubin 0.4 0.3 - 1.2 mg/dL    Total Protein 7.1 6.4 - 8.3 g/dL    Albumin 3.9 3.5 - 5.2 g/dL    Albumin/Globulin Ratio 1.2 1.0 - 2.5                     VITALS INCLUDING BMI REVIEWED WITH PATIENT  Labs reviewed as noted above   Discussed with patient        ASSESSMENT / Ramona Ortez was seen today for diabetes and heart problem. Diagnoses and all orders for this visit:    Essential hypertension  Good control  -     Basic Metabolic Panel; Future    Dilated cardiomyopathy (HCC)  Compensated chf  Reduce metolazone to twice a week in view of renal insuffficiency  -     metOLazone (ZAROXOLYN) 2.5 MG tablet; Take 1 tablet by mouth Twice a Week Every monday and thursday    Chronic atrial fibrillation (HCC)  Rater controlled  Chronic combined systolic and diastolic congestive heart failure (HCC)  Improved   Type 2 diabetes mellitus without complication, without long-term current use of insulin (HCC)  Fair control  -     glipiZIDE (GLUCOTROL) 5 MG tablet; Take 1 tablet by mouth 2 times daily (before meals) With breakfast and supper  -     Hemoglobin A1C; Future    Acute on chronic combined systolic and diastolic heart failure (HCC)  Compensated . Reduce metolazone to twice a week  -     metOLazone (ZAROXOLYN) 2.5 MG tablet; Take 1 tablet by mouth Twice a Week Every monday and thursday               SALIENT  ACTIONS TODAYS VISIT       Today's office visit SALIENT ACTIONS    ----            Discussed use, benefit, and side effects of prescribed medications. [x] yes    All patient questions answered. Patient voiced understanding.      Patient given educational materials - see patient instructions  [] yes         Medications Discontinued During This Encounter   Medication Reason    metFORMIN (GLUCOPHAGE) 500 MG tablet     metOLazone (ZAROXOLYN) 2.5 MG tablet         Orders Placed This Encounter   Procedures    Basic Metabolic Panel     Standing Status:   Future     Number of Occurrences:   1     Standing Expiration Date:   11/8/2023    Hemoglobin A1C     Standing Status:   Future     Number of Occurrences:   1     Standing Expiration Date:   11/8/2023        There are no Patient Instructions on file for this visit. Medication List            Accurate as of November 8, 2022 11:59 PM. If you have any questions, ask your nurse or doctor. START taking these medications      glipiZIDE 5 MG tablet  Commonly known as: GLUCOTROL  Take 1 tablet by mouth 2 times daily (before meals) With breakfast and supper  Started by: Blane Flores MD            CHANGE how you take these medications      metOLazone 2.5 MG tablet  Commonly known as: ZAROXOLYN  Take 1 tablet by mouth Twice a Week Every monday and thursday  What changed: See the new instructions. Changed by: Blane Flores MD            CONTINUE taking these medications      Accu-Chek Estella Gardenia  1 each by Does not apply route daily     * Accu-Chek Softclix Lancets Misc  1 each by Does not apply route 3 times daily     * Lancets Misc  1 each by Does not apply route daily Trueplus 33G Lancets     acetaminophen 500 MG tablet  Commonly known as: TYLENOL  Take 2 tablets by mouth 2 times daily     aspirin EC 81 MG EC tablet  Take 1 tablet by mouth daily. * blood glucose test strips  by Other route 2 times daily [employer] True Metrix Test Strip     * blood glucose test strips  by Other route 2 times daily True Metrix Blood Glucose Meter     * Accu-Chek Estella Plus strip  Generic drug: blood glucose test strips  TEST BLOOD SUGAR THREE TIMES DAILY AS NEEDED     bumetanide 1 MG tablet  Commonly known as: BUMEX  Take 1 tablet by mouth daily     clotrimazole-betamethasone 1-0.05 % cream  Commonly known as: LOTRISONE  Apply topically 2 times daily.      dorzolamide-timolol 22.3-6.8 MG/ML ophthalmic solution  Commonly known as: COSOPT     Elastic Bandages & Supports Misc  Apply Truss strap for L groin tenderness/hernia FLUoxetine 20 MG capsule  Commonly known as: PROZAC  Take 1 capsule by mouth daily     FreeStyle Control Solution Liqd  100 each by In Vitro route 3 times daily (before meals) True Metrix Level 2 Ctrl Solution     gabapentin 100 MG capsule  Commonly known as: NEURONTIN  Take 1 capsule by mouth 2 times daily for 90 days. glimepiride 2 MG tablet  Commonly known as: AMARYL  TAKE 1 TABLET EVERY MORNING BEFORE BREAKFAST     latanoprost 0.005 % ophthalmic solution  Commonly known as: XALATAN     lisinopril 20 MG tablet  Commonly known as: PRINIVIL;ZESTRIL  Take 1 tablet by mouth daily     MULTI VITAMIN MENS PO     simvastatin 20 MG tablet  Commonly known as: ZOCOR  TAKE 1 TABLET EVERY NIGHT     spironolactone 25 MG tablet  Commonly known as: ALDACTONE  Take 1 tablet by mouth daily     Stimulant Laxative 8.6-50 MG per tablet  Generic drug: senna-docusate  TAKE 2 TABLETS EVERY DAY     tamsulosin 0.4 MG capsule  Commonly known as: FLOMAX  TAKE 1 CAPSULE EVERY DAY     VITAMIN B 12 PO           * This list has 5 medication(s) that are the same as other medications prescribed for you. Read the directions carefully, and ask your doctor or other care provider to review them with you. STOP taking these medications      metFORMIN 500 MG tablet  Commonly known as: GLUCOPHAGE  Stopped by: Marin Liz MD               Where to Get Your Medications        These medications were sent to [facility] [id number] Emely SCHMITT [address] [place] [address]      Phone: [phone]   glipiZIDE 5 MG tablet  metOLazone 2.5 MG tablet             FOLLOW UP  PLANS     Return in about 2 months (around 1/8/2023). MD PIA Ret[facility] [address], [address] [place] [address].    Phone [phone]   Fax: [phone]  Answering Service: [phone]

## 2022-11-10 NOTE — TELEPHONE ENCOUNTER
Left detailed message that patient would need to come into the office to sign new copy. Advised patient to return call wit concerns.

## 2022-11-30 ENCOUNTER — TELEPHONE (OUTPATIENT)
Dept: INTERNAL MEDICINE CLINIC | Age: 87
End: 2022-11-30

## 2022-11-30 NOTE — LETTER
[facility] [address],[facility] [address] [place] [address]  Phone: [phone]  Fax: [phone]    Nikole Allen MD         December 1, 2022     Patient: Ludy Banks   YOB: 1935   Date of Visit: 11/30/2022       To Whom It May Concern: It is my medical opinion that Master Saavedra requires a disability parking placard for the following reasons:  He cannot walk 200 feet without stopping to rest.  Duration of need: 5 years    If you have any questions or concerns, please don't hesitate to call.     Sincerely,        Nikole Allen MD

## 2022-11-30 NOTE — TELEPHONE ENCOUNTER
----- Message from Geronimo Mi sent at 2022 12:05 PM EST -----  Subject: Message to Provider    QUESTIONS  Information for Provider? Pt is calling inquiring about Handicap document. Pt will like to know when can , PT handicaps sticker has . ---------------------------------------------------------------------------  --------------  Carmita VANCE  [phone]; OK to leave message on voicemail  ---------------------------------------------------------------------------  --------------  SCRIPT ANSWERS  Relationship to Patient?  Self

## 2022-12-09 ENCOUNTER — TELEPHONE (OUTPATIENT)
Dept: INTERNAL MEDICINE CLINIC | Age: 87
End: 2022-12-09

## 2022-12-09 DIAGNOSIS — I42.0 DILATED CARDIOMYOPATHY (HCC): ICD-10-CM

## 2022-12-09 DIAGNOSIS — E11.9 TYPE 2 DIABETES MELLITUS WITHOUT COMPLICATION, WITHOUT LONG-TERM CURRENT USE OF INSULIN (HCC): ICD-10-CM

## 2022-12-09 DIAGNOSIS — I50.43 ACUTE ON CHRONIC COMBINED SYSTOLIC AND DIASTOLIC HEART FAILURE (HCC): ICD-10-CM

## 2022-12-09 NOTE — TELEPHONE ENCOUNTER
Rich stating that the pt have refills of medication that he wants refilled at [facility]

## 2022-12-09 NOTE — TELEPHONE ENCOUNTER
----- Message from Adrianna Tripp sent at 12/8/2022 11:40 AM EST -----  Subject: Refill Request    QUESTIONS  Name of Medication? glipiZIDE (GLUCOTROL) 5 MG tablet  Patient-reported dosage and instructions? 5 MG tablet - 90 day supply  How many days do you have left? 10  Preferred Pharmacy? [address] [facility] phone number (if available)? [phone]  ---------------------------------------------------------------------------  --------------,  Name of Medication? metOLazone (ZAROXOLYN) 2.5 MG tablet  Patient-reported dosage and instructions? 2.5 MG tablet  How many days do you have left? 0  Preferred Pharmacy? [address] [facility] phone number (if available)? [phone]  ---------------------------------------------------------------------------  --------------,  Name of Medication? bumetanide (BUMEX) 1 MG tablet  Patient-reported dosage and instructions? 1 MG tablet  How many days do you have left? 14  Preferred Pharmacy? [address] [facility] phone number (if available)? [phone]  ---------------------------------------------------------------------------  --------------  CALL BACK INFO  What is the best way for the office to contact you? OK to leave message on   voicemail  Preferred Call Back Phone Number? [phone]  ---------------------------------------------------------------------------  --------------  SCRIPT ANSWERS  Relationship to Patient?  Self

## 2022-12-13 RX ORDER — METOLAZONE 2.5 MG/1
2.5 TABLET ORAL
Qty: 135 TABLET | Refills: 3 | Status: ON HOLD | OUTPATIENT
Start: 2022-12-15 | End: 2022-12-16 | Stop reason: HOSPADM

## 2022-12-13 RX ORDER — GLIPIZIDE 5 MG/1
5 TABLET ORAL
Qty: 10 TABLET | Refills: 3 | Status: ON HOLD | OUTPATIENT
Start: 2022-12-13 | End: 2022-12-16 | Stop reason: HOSPADM

## 2022-12-14 ENCOUNTER — APPOINTMENT (OUTPATIENT)
Dept: GENERAL RADIOLOGY | Age: 87
End: 2022-12-14
Payer: MEDICARE

## 2022-12-14 ENCOUNTER — HOSPITAL ENCOUNTER (INPATIENT)
Age: 87
LOS: 2 days | Discharge: HOME | End: 2022-12-16
Attending: EMERGENCY MEDICINE | Admitting: INTERNAL MEDICINE
Payer: MEDICARE

## 2022-12-14 DIAGNOSIS — N17.9 AKI (ACUTE KIDNEY INJURY) (HCC): Primary | ICD-10-CM

## 2022-12-14 DIAGNOSIS — U07.1 COVID: ICD-10-CM

## 2022-12-14 DIAGNOSIS — I50.42 CHRONIC COMBINED SYSTOLIC AND DIASTOLIC CONGESTIVE HEART FAILURE (HCC): ICD-10-CM

## 2022-12-14 DIAGNOSIS — I48.91 ATRIAL FIBRILLATION, UNSPECIFIED TYPE (HCC): ICD-10-CM

## 2022-12-14 DIAGNOSIS — E86.0 DEHYDRATION: ICD-10-CM

## 2022-12-14 DIAGNOSIS — R77.8 ELEVATED TROPONIN: ICD-10-CM

## 2022-12-14 LAB
ABSOLUTE EOS #: 0.3 K/UL (ref 0–0.4)
ABSOLUTE LYMPH #: 1.3 K/UL (ref 1–4.8)
ABSOLUTE MONO #: 0.3 K/UL (ref 0.1–1.3)
ALBUMIN SERPL-MCNC: 3.9 G/DL (ref 3.5–5.2)
ALP BLD-CCNC: 52 U/L (ref 40–129)
ALT SERPL-CCNC: 19 U/L (ref 5–41)
ANION GAP SERPL CALCULATED.3IONS-SCNC: 12 MMOL/L (ref 9–17)
AST SERPL-CCNC: 16 U/L
BASOPHILS # BLD: 1 % (ref 0–2)
BASOPHILS ABSOLUTE: 0.1 K/UL (ref 0–0.2)
BILIRUB SERPL-MCNC: 0.7 MG/DL (ref 0.3–1.2)
BILIRUBIN URINE: NEGATIVE
BUN BLDV-MCNC: 50 MG/DL (ref 8–23)
CALCIUM SERPL-MCNC: 9.7 MG/DL (ref 8.6–10.4)
CHLORIDE BLD-SCNC: 98 MMOL/L (ref 98–107)
CO2: 23 MMOL/L (ref 20–31)
COLOR: YELLOW
COMMENT UA: NORMAL
CREAT SERPL-MCNC: 1.9 MG/DL (ref 0.7–1.2)
EOSINOPHILS RELATIVE PERCENT: 6 % (ref 0–4)
GFR SERPL CREATININE-BSD FRML MDRD: 34 ML/MIN/1.73M2
GLUCOSE BLD-MCNC: 226 MG/DL (ref 70–99)
GLUCOSE URINE: NEGATIVE
HCT VFR BLD CALC: 33.8 % (ref 41–53)
HEMOGLOBIN: 11.4 G/DL (ref 13.5–17.5)
INFLUENZA A: NOT DETECTED
INFLUENZA B: NOT DETECTED
INR BLD: 1
KETONES, URINE: NEGATIVE
LEUKOCYTE ESTERASE, URINE: NEGATIVE
LYMPHOCYTES # BLD: 23 % (ref 24–44)
MAGNESIUM: 2.1 MG/DL (ref 1.6–2.6)
MCH RBC QN AUTO: 28.9 PG (ref 26–34)
MCHC RBC AUTO-ENTMCNC: 33.8 G/DL (ref 31–37)
MCV RBC AUTO: 85.5 FL (ref 80–100)
MONOCYTES # BLD: 6 % (ref 1–7)
NITRITE, URINE: NEGATIVE
PDW BLD-RTO: 16.2 % (ref 11.5–14.9)
PH UA: 5 (ref 5–8)
PLATELET # BLD: 261 K/UL (ref 150–450)
PMV BLD AUTO: 7.6 FL (ref 6–12)
POTASSIUM SERPL-SCNC: 5.3 MMOL/L (ref 3.7–5.3)
PROTEIN UA: NEGATIVE
PROTHROMBIN TIME: 12.9 SEC (ref 11.8–14.6)
RBC # BLD: 3.95 M/UL (ref 4.5–5.9)
SARS-COV-2 RNA, RT PCR: DETECTED
SEG NEUTROPHILS: 64 % (ref 36–66)
SEGMENTED NEUTROPHILS ABSOLUTE COUNT: 3.7 K/UL (ref 1.3–9.1)
SODIUM BLD-SCNC: 133 MMOL/L (ref 135–144)
SOURCE: ABNORMAL
SPECIFIC GRAVITY UA: 1.01 (ref 1–1.03)
SPECIMEN DESCRIPTION: ABNORMAL
TOTAL PROTEIN: 7.3 G/DL (ref 6.4–8.3)
TROPONIN, HIGH SENSITIVITY: 66 NG/L (ref 0–22)
TROPONIN, HIGH SENSITIVITY: 85 NG/L (ref 0–22)
TURBIDITY: CLEAR
URINE HGB: NEGATIVE
UROBILINOGEN, URINE: NORMAL
WBC # BLD: 5.7 K/UL (ref 3.5–11)

## 2022-12-14 PROCEDURE — 2060000000 HC ICU INTERMEDIATE R&B

## 2022-12-14 PROCEDURE — 99285 EMERGENCY DEPT VISIT HI MDM: CPT

## 2022-12-14 PROCEDURE — 85025 COMPLETE CBC W/AUTO DIFF WBC: CPT

## 2022-12-14 PROCEDURE — 36415 COLL VENOUS BLD VENIPUNCTURE: CPT

## 2022-12-14 PROCEDURE — 96360 HYDRATION IV INFUSION INIT: CPT

## 2022-12-14 PROCEDURE — 84484 ASSAY OF TROPONIN QUANT: CPT

## 2022-12-14 PROCEDURE — 80053 COMPREHEN METABOLIC PANEL: CPT

## 2022-12-14 PROCEDURE — 71045 X-RAY EXAM CHEST 1 VIEW: CPT

## 2022-12-14 PROCEDURE — 2580000003 HC RX 258: Performed by: EMERGENCY MEDICINE

## 2022-12-14 PROCEDURE — 6360000002 HC RX W HCPCS: Performed by: INTERNAL MEDICINE

## 2022-12-14 PROCEDURE — 2580000003 HC RX 258: Performed by: INTERNAL MEDICINE

## 2022-12-14 PROCEDURE — 81003 URINALYSIS AUTO W/O SCOPE: CPT

## 2022-12-14 PROCEDURE — 87636 SARSCOV2 & INF A&B AMP PRB: CPT

## 2022-12-14 PROCEDURE — 83735 ASSAY OF MAGNESIUM: CPT

## 2022-12-14 PROCEDURE — 93005 ELECTROCARDIOGRAM TRACING: CPT | Performed by: EMERGENCY MEDICINE

## 2022-12-14 PROCEDURE — 87086 URINE CULTURE/COLONY COUNT: CPT

## 2022-12-14 PROCEDURE — 85610 PROTHROMBIN TIME: CPT

## 2022-12-14 RX ORDER — 0.9 % SODIUM CHLORIDE 0.9 %
500 INTRAVENOUS SOLUTION INTRAVENOUS ONCE
Status: COMPLETED | OUTPATIENT
Start: 2022-12-14 | End: 2022-12-14

## 2022-12-14 RX ORDER — FLUOXETINE HYDROCHLORIDE 20 MG/1
20 CAPSULE ORAL DAILY
Status: DISCONTINUED | OUTPATIENT
Start: 2022-12-14 | End: 2022-12-16 | Stop reason: HOSPADM

## 2022-12-14 RX ORDER — ONDANSETRON 2 MG/ML
4 INJECTION INTRAMUSCULAR; INTRAVENOUS EVERY 6 HOURS PRN
Status: DISCONTINUED | OUTPATIENT
Start: 2022-12-14 | End: 2022-12-16 | Stop reason: HOSPADM

## 2022-12-14 RX ORDER — SODIUM CHLORIDE 9 MG/ML
INJECTION, SOLUTION INTRAVENOUS CONTINUOUS
Status: DISCONTINUED | OUTPATIENT
Start: 2022-12-14 | End: 2022-12-15

## 2022-12-14 RX ORDER — SODIUM CHLORIDE 9 MG/ML
INJECTION, SOLUTION INTRAVENOUS PRN
Status: DISCONTINUED | OUTPATIENT
Start: 2022-12-14 | End: 2022-12-16 | Stop reason: HOSPADM

## 2022-12-14 RX ORDER — POTASSIUM CHLORIDE 20 MEQ/1
40 TABLET, EXTENDED RELEASE ORAL PRN
Status: DISCONTINUED | OUTPATIENT
Start: 2022-12-14 | End: 2022-12-16 | Stop reason: HOSPADM

## 2022-12-14 RX ORDER — SODIUM CHLORIDE 0.9 % (FLUSH) 0.9 %
5-40 SYRINGE (ML) INJECTION EVERY 12 HOURS SCHEDULED
Status: DISCONTINUED | OUTPATIENT
Start: 2022-12-14 | End: 2022-12-16 | Stop reason: HOSPADM

## 2022-12-14 RX ORDER — SODIUM CHLORIDE 0.9 % (FLUSH) 0.9 %
10 SYRINGE (ML) INJECTION PRN
Status: DISCONTINUED | OUTPATIENT
Start: 2022-12-14 | End: 2022-12-16 | Stop reason: HOSPADM

## 2022-12-14 RX ORDER — ACETAMINOPHEN 325 MG/1
650 TABLET ORAL EVERY 6 HOURS PRN
Status: DISCONTINUED | OUTPATIENT
Start: 2022-12-14 | End: 2022-12-16 | Stop reason: HOSPADM

## 2022-12-14 RX ORDER — ACETAMINOPHEN 650 MG/1
650 SUPPOSITORY RECTAL EVERY 6 HOURS PRN
Status: DISCONTINUED | OUTPATIENT
Start: 2022-12-14 | End: 2022-12-16 | Stop reason: HOSPADM

## 2022-12-14 RX ORDER — POTASSIUM CHLORIDE 7.45 MG/ML
10 INJECTION INTRAVENOUS PRN
Status: DISCONTINUED | OUTPATIENT
Start: 2022-12-14 | End: 2022-12-16 | Stop reason: HOSPADM

## 2022-12-14 RX ORDER — ENOXAPARIN SODIUM 100 MG/ML
40 INJECTION SUBCUTANEOUS DAILY
Status: DISCONTINUED | OUTPATIENT
Start: 2022-12-14 | End: 2022-12-16 | Stop reason: HOSPADM

## 2022-12-14 RX ORDER — ONDANSETRON 4 MG/1
4 TABLET, ORALLY DISINTEGRATING ORAL EVERY 8 HOURS PRN
Status: DISCONTINUED | OUTPATIENT
Start: 2022-12-14 | End: 2022-12-16 | Stop reason: HOSPADM

## 2022-12-14 RX ORDER — MAGNESIUM SULFATE 1 G/100ML
1000 INJECTION INTRAVENOUS PRN
Status: DISCONTINUED | OUTPATIENT
Start: 2022-12-14 | End: 2022-12-16 | Stop reason: HOSPADM

## 2022-12-14 RX ORDER — POLYETHYLENE GLYCOL 3350 17 G/17G
17 POWDER, FOR SOLUTION ORAL DAILY PRN
Status: DISCONTINUED | OUTPATIENT
Start: 2022-12-14 | End: 2022-12-16 | Stop reason: HOSPADM

## 2022-12-14 RX ADMIN — SODIUM CHLORIDE: 9 INJECTION, SOLUTION INTRAVENOUS at 16:00

## 2022-12-14 RX ADMIN — SODIUM CHLORIDE: 9 INJECTION, SOLUTION INTRAVENOUS at 18:15

## 2022-12-14 RX ADMIN — SODIUM CHLORIDE 500 ML: 9 INJECTION, SOLUTION INTRAVENOUS at 15:00

## 2022-12-14 RX ADMIN — SODIUM CHLORIDE: 9 INJECTION, SOLUTION INTRAVENOUS at 15:26

## 2022-12-14 RX ADMIN — ENOXAPARIN SODIUM 40 MG: 100 INJECTION SUBCUTANEOUS at 23:48

## 2022-12-14 ASSESSMENT — PAIN - FUNCTIONAL ASSESSMENT: PAIN_FUNCTIONAL_ASSESSMENT: NONE - DENIES PAIN

## 2022-12-14 ASSESSMENT — ENCOUNTER SYMPTOMS
DIARRHEA: 1
COUGH: 1
NAUSEA: 0
VOMITING: 0
SHORTNESS OF BREATH: 1

## 2022-12-14 NOTE — ED PROVIDER NOTES
EMERGENCY DEPARTMENT ENCOUNTER    Pt Name: Edin Vanegas  MRN: [id number]  [place] 1935  Date of evaluation: 12/14/22  CHIEF COMPLAINT       Chief Complaint   Patient presents with    Hypotension     Pt states low blood pressure X1 week     HISTORY OF PRESENT ILLNESS   HPI  Fatigue, dizziness, 1 week of symptoms, upper respiratory infection, cough and cold symptoms. States he had diarrhea over the weekend none now. No nausea no vomiting. Stated history of A. fib, compliant with his medications. No pain anywhere. No traumas or falls. Constant symptoms, nothing making better or worse, moderate symptom severity. Persistent. He is got a known history of atrial fibrillation, does not take anticoagulants at this time, denies any blood in his stools or black stools or vomiting. Denies any chest pain. Denies any headache or weakness in his arms or legs or facial droop or slurred speech. REVIEW OF SYSTEMS     Review of Systems   Constitutional:  Positive for fatigue. Respiratory:  Positive for cough and shortness of breath. Gastrointestinal:  Positive for diarrhea. Negative for nausea and vomiting. All other systems reviewed and are negative.   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     Past Problem List  Patient Active Problem List   Diagnosis Code    Benign prostatic hyperplasia with lower urinary tract symptoms N40.1    Essential hypertension I10    Dilated cardiomyopathy (HCC) I42.0    Chronic atrial fibrillation (HCC) I48.20    Type 2 diabetes mellitus without complication, without long-term current use of insulin (HCC) E11.9    History of inguinal hernia repair Z98.890, Z87.19    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    Hydrocele N43.3    Chronic bilateral low back pain without sciatica M54.50, G89.29 Other specified abdominal hernia without obstruction or gangrene K45.8    Lumbosacral radiculopathy M54.17    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 BLOOD GLUCOSE CALIBRATION (FREESTYLE CONTROL SOLUTION) LIQD    100 each by In Vitro route 3 times daily (before meals) True Metrix Level 2 Ctrl Solution    BLOOD GLUCOSE MONITOR STRIPS    by Other route 2 times daily [employer] True Metrix Test Strip    BLOOD GLUCOSE MONITOR STRIPS    by Other route 2 times daily True Metrix Blood Glucose Meter    BLOOD GLUCOSE MONITORING SUPPL (ACCU-CHEK ERIKA) DEVIN    1 each by Does not apply route daily    BUMETANIDE (BUMEX) 1 MG TABLET    Take 1 tablet by mouth daily    CLOTRIMAZOLE-BETAMETHASONE (LOTRISONE) 1-0.05 % CREAM    Apply topically 2 times daily. CYANOCOBALAMIN (VITAMIN B 12 PO)    Take by mouth      DORZOLAMIDE-TIMOLOL (COSOPT) 22.3-6.8 MG/ML OPHTHALMIC SOLUTION        ELASTIC BANDAGES & SUPPORTS MISC    Apply Truss strap for L groin tenderness/hernia    FLUOXETINE (PROZAC) 20 MG CAPSULE    Take 1 capsule by mouth daily    GABAPENTIN (NEURONTIN) 100 MG CAPSULE    Take 1 capsule by mouth 2 times daily for 90 days.     GLIMEPIRIDE (AMARYL) 2 MG TABLET    TAKE 1 TABLET EVERY MORNING BEFORE BREAKFAST    GLIPIZIDE (GLUCOTROL) 5 MG TABLET    Take 1 tablet by mouth 2 times daily (before meals) With breakfast and supper    LANCETS MISC    1 each by Does not apply route daily Trueplus 33G Lancets    LATANOPROST (XALATAN) 0.005 % OPHTHALMIC SOLUTION        LISINOPRIL (PRINIVIL;ZESTRIL) 20 MG TABLET    Take 1 tablet by mouth daily    METOLAZONE (ZAROXOLYN) 2.5 MG TABLET    Take 1 tablet by mouth Twice a Week Every monday and thursday    MULTIPLE VITAMIN (MULTI VITAMIN MENS PO)    Take by mouth      SIMVASTATIN (ZOCOR) 20 MG TABLET    TAKE 1 TABLET EVERY NIGHT    SPIRONOLACTONE (ALDACTONE) 25 MG TABLET    Take 1 tablet by mouth daily    STIMULANT LAXATIVE 8.6-50 MG PER TABLET    TAKE 2 TABLETS EVERY DAY    TAMSULOSIN (FLOMAX) 0.4 MG CAPSULE    TAKE 1 CAPSULE EVERY DAY     ALLERGIES     is allergic to xarelto [rivaroxaban]. FAMILY HISTORY     He indicated that his mother is alive. He indicated that his father is . SOCIAL HISTORY       Social History     Tobacco Use    Smoking status: Former     Packs/day: 20.00     Years: 20.00     Pack years: 400.00     Types: Cigarettes     Quit date:      Years since quittin.9    Smokeless tobacco: Never    Tobacco comments:     quit 42 years ago    Substance Use Topics    Alcohol use: No     Comment: quite 42 years ago     Drug use: No     PHYSICAL EXAM     INITIAL VITALS: BP (!) 94/46   Pulse 62   Temp 98.2 °F (36.8 °C) (Oral)   Resp 16   Ht 5' 8\" (1.727 m)   Wt 219 lb (99.3 kg)   SpO2 98%   BMI 33.30 kg/m²    Physical Exam  Constitutional:       General: He is not in acute distress. Appearance: Normal appearance. He is well-developed. He is not diaphoretic. HENT:      Head: Normocephalic and atraumatic. Right Ear: External ear normal.      Left Ear: External ear normal.      Nose: Nose normal. No congestion. Mouth/Throat:      Mouth: Mucous membranes are moist.      Pharynx: Oropharynx is clear. Eyes:      General:         Right eye: No discharge. Left eye: No discharge. Conjunctiva/sclera: Conjunctivae normal.      Pupils: Pupils are equal, round, and reactive to light.    Neck: Trachea: No tracheal deviation. Cardiovascular:      Rate and Rhythm: Normal rate and regular rhythm. Pulses: Normal pulses. Heart sounds: Normal heart sounds. Pulmonary:      Effort: Pulmonary effort is normal. No respiratory distress. Breath sounds: Normal breath sounds. No stridor. No wheezing or rales. Abdominal:      Palpations: Abdomen is soft. Tenderness: There is no abdominal tenderness. There is no guarding or rebound. Musculoskeletal:         General: No tenderness or deformity. Normal range of motion. Cervical back: Normal range of motion and neck supple. Skin:     General: Skin is warm and dry. Capillary Refill: Capillary refill takes less than 2 seconds. Findings: No erythema or rash. Neurological:      General: No focal deficit present. Mental Status: He is alert and oriented to person, place, and time. Coordination: Coordination normal.   Psychiatric:         Mood and Affect: Mood normal.         Behavior: Behavior normal.         Thought Content:  Thought content normal.         Judgment: Judgment normal.       MEDICAL DECISION MAKING:       ED Course as of 12/14/22 1602   Wed Dec 14, 2022   1556 Covid positive, ROXI on labs, suspect dehydration, starting iv fluids, trop elev likely from [place] and covid, will trend, do not suspect nstemi [WM]      ED Course User Index  [WM] Claribel Painting MD        Dr Mikaela Lazar the plan for admit  He is not requiring oxygen at this time  Patient updated plan  Admitting for fluids, trop tend, hold diuretics    Procedures    DIAGNOSTIC RESULTS   EKG:All EKG's are interpreted by the Emergency Department Physician who either signs or Co-signs this chart in the absence of a cardiologist.  Afib, rate 64 bpm, no acute st elev or dep, normal intervals      RADIOLOGY:All plain film, CT, MRI, and formal ultrasound images (except ED bedside ultrasound) are read by the radiologist, see reports below, unless otherwisenoted in MDM or here. XR CHEST PORTABLE   Preliminary Result   No acute intrathoracic process. LABS: All lab results were reviewed by myself, and all abnormals are listed below. Labs Reviewed   COVID-19 & INFLUENZA COMBO - Abnormal; Notable for the following components:       Result Value    SARS-CoV-2 RNA, RT PCR DETECTED (*)     All other components within normal limits   CBC WITH AUTO DIFFERENTIAL - Abnormal; Notable for the following components:    RBC 3.95 (*)     Hemoglobin 11.4 (*)     Hematocrit 33.8 (*)     RDW 16.2 (*)     Lymphocytes 23 (*)     Eosinophils % 6 (*)     All other components within normal limits   COMPREHENSIVE METABOLIC PANEL - Abnormal; Notable for the following components:    Glucose 226 (*)     BUN 50 (*)     Creatinine 1.90 (*)     Est, Glom Filt Rate 34 (*)     Sodium 133 (*)     All other components within normal limits   TROPONIN - Abnormal; Notable for the following components:    Troponin, High Sensitivity 85 (*)     All other components within normal limits   CULTURE, URINE   MAGNESIUM   PROTIME-INR   TROPONIN   URINALYSIS WITH REFLEX TO CULTURE       EMERGENCY DEPARTMENTCOURSE:         Vitals:    Vitals:    12/14/22 1429 12/14/22 1444 12/14/22 1530 12/14/22 1545   BP: (!) 115/59  (!) 100/42 (!) 94/46   Pulse: 62 65 62 62   Resp: 20  24 16   Temp: 98.2 °F (36.8 °C)      TempSrc: Oral      SpO2: 98%      Weight: 219 lb (99.3 kg)      Height: 5' 8\" (1.727 m)          The patient was given the following medications while in the emergency department:  Orders Placed This Encounter   Medications    0.9 % sodium chloride infusion    0.9 % sodium chloride bolus    0.9 % sodium chloride infusion     CONSULTS:  None    FINAL IMPRESSION      1. ROXI (acute kidney injury) ([address]    2. Dehydration    3. COVID    4. Elevated troponin    5.  Atrial fibrillation, unspecified type [facility])          DISPOSITION/PLAN   DISPOSITION Decision To Admit 12/14/2022 03:55:28 PM      PATIENT REFERRED TO:  No follow-up provider specified. DISCHARGE MEDICATIONS:  New Prescriptions    No medications on file     The care is provided during an unprecedented national emergency due to the novel coronavirus, COVID 19.   MD Alessia Hernandez MD  12/14/22 5611

## 2022-12-14 NOTE — PROGRESS NOTES
Medication History completed:    New medications: none    Medications discontinued: none    Medications flagged for review:  Senna-docusate - not taking  Glimepiride - not taking. Now on glipizide. Gabapentin - not taking. Last fill 6/22/21 for 90 days per [place]. Fluoxetine - not taking    Changes to dosing: none    Stated allergies: As listed    Other pertinent information: Medications confirmed with [facility] and [facility]. OAR[place] history reviewed.      Thank you,  Nomi Wayne, PharmD, BCPS  [phone]

## 2022-12-14 NOTE — ED NOTES
Report given to SUKHWINDER tao from ICU/Covid. Report method by phone   The following was reviewed with receiving RN:   Current vital signs:  BP (!) 114/53   Pulse 62   Temp 98.2 °F (36.8 °C) (Oral)   Resp 17   Ht 5' 8\" (1.727 m)   Wt 219 lb (99.3 kg)   SpO2 100%   BMI 33.30 kg/m²                MEWS Score: 1     Any medication or safety alerts were reviewed. Any pending diagnostics and notifications were also reviewed, as well as any safety concerns or issues, abnormal labs, abnormal imaging, and abnormal assessment findings. Questions were answered.             Bushra Zaman RN  12/14/22 7938

## 2022-12-14 NOTE — ED NOTES
Per Dr. Wolfgang Moreno, verbals orders to give 500 ml bolus NS, followed by 1L at 100 ml/hour.      Tammy Herr RN  12/14/22 0677

## 2022-12-15 ENCOUNTER — APPOINTMENT (OUTPATIENT)
Dept: NON INVASIVE DIAGNOSTICS | Age: 87
End: 2022-12-15
Payer: MEDICARE

## 2022-12-15 DIAGNOSIS — E11.9 TYPE 2 DIABETES MELLITUS WITHOUT COMPLICATION, WITHOUT LONG-TERM CURRENT USE OF INSULIN (HCC): ICD-10-CM

## 2022-12-15 PROBLEM — N17.9 AKI (ACUTE KIDNEY INJURY) (HCC): Status: ACTIVE | Noted: 2022-12-15

## 2022-12-15 LAB
ANION GAP SERPL CALCULATED.3IONS-SCNC: 8 MMOL/L (ref 9–17)
BUN BLDV-MCNC: 47 MG/DL (ref 8–23)
CALCIUM SERPL-MCNC: 8.9 MG/DL (ref 8.6–10.4)
CHLORIDE BLD-SCNC: 105 MMOL/L (ref 98–107)
CO2: 24 MMOL/L (ref 20–31)
CREAT SERPL-MCNC: 1.51 MG/DL (ref 0.7–1.2)
CULTURE: NORMAL
EKG ATRIAL RATE: 59 BPM
EKG Q-T INTERVAL: 428 MS
EKG QRS DURATION: 72 MS
EKG QTC CALCULATION (BAZETT): 428 MS
EKG R AXIS: -17 DEGREES
EKG T AXIS: -37 DEGREES
EKG VENTRICULAR RATE: 60 BPM
GFR SERPL CREATININE-BSD FRML MDRD: 45 ML/MIN/1.73M2
GLUCOSE BLD-MCNC: 144 MG/DL (ref 70–99)
INR BLD: 1
POTASSIUM SERPL-SCNC: 4.2 MMOL/L (ref 3.7–5.3)
PROTHROMBIN TIME: 13.5 SEC (ref 11.8–14.6)
SODIUM BLD-SCNC: 137 MMOL/L (ref 135–144)
SPECIMEN DESCRIPTION: NORMAL

## 2022-12-15 PROCEDURE — 80048 BASIC METABOLIC PNL TOTAL CA: CPT

## 2022-12-15 PROCEDURE — 36415 COLL VENOUS BLD VENIPUNCTURE: CPT

## 2022-12-15 PROCEDURE — 99221 1ST HOSP IP/OBS SF/LOW 40: CPT | Performed by: INTERNAL MEDICINE

## 2022-12-15 PROCEDURE — 6370000000 HC RX 637 (ALT 250 FOR IP): Performed by: INTERNAL MEDICINE

## 2022-12-15 PROCEDURE — 2580000003 HC RX 258: Performed by: INTERNAL MEDICINE

## 2022-12-15 PROCEDURE — 99223 1ST HOSP IP/OBS HIGH 75: CPT | Performed by: INTERNAL MEDICINE

## 2022-12-15 PROCEDURE — 85610 PROTHROMBIN TIME: CPT

## 2022-12-15 PROCEDURE — 2060000000 HC ICU INTERMEDIATE R&B

## 2022-12-15 PROCEDURE — 6360000002 HC RX W HCPCS: Performed by: INTERNAL MEDICINE

## 2022-12-15 PROCEDURE — 93010 ELECTROCARDIOGRAM REPORT: CPT | Performed by: INTERNAL MEDICINE

## 2022-12-15 PROCEDURE — 93306 TTE W/DOPPLER COMPLETE: CPT

## 2022-12-15 RX ADMIN — SODIUM CHLORIDE, PRESERVATIVE FREE 10 ML: 5 INJECTION INTRAVENOUS at 20:23

## 2022-12-15 RX ADMIN — SODIUM CHLORIDE, PRESERVATIVE FREE 10 ML: 5 INJECTION INTRAVENOUS at 08:10

## 2022-12-15 RX ADMIN — ENOXAPARIN SODIUM 40 MG: 100 INJECTION SUBCUTANEOUS at 08:09

## 2022-12-15 RX ADMIN — FLUOXETINE HYDROCHLORIDE 20 MG: 20 CAPSULE ORAL at 08:09

## 2022-12-15 NOTE — H&P
times daily (before meals) With breakfast and supper 12/13/22   Keyona Lopez MD   metOLazone (ZAROXOLYN) 2.5 MG tablet Take 1 tablet by mouth Twice a Week Every monday and thursday 12/15/22   Keyona Lopez MD   bumetanide (BUMEX) 1 MG tablet Take 1 tablet by mouth daily 9/28/22   Keyona Lopez MD   tamsulosin (FLOMAX) 0.4 MG capsule TAKE 1 CAPSULE EVERY DAY 9/28/22   Keyona Lopez MD   ACCU-CHEK ERIKA PLUS strip TEST BLOOD SUGAR THREE TIMES DAILY AS NEEDED 7/26/22   Keyona Lopez MD   clotrimazole-betamethasone (LOTRISONE) 1-0.05 % cream Apply topically 2 times daily.  6/14/22   Keyona Lopez MD   glimepiride (AMARYL) 2 MG tablet TAKE 1 TABLET EVERY MORNING BEFORE BREAKFAST  Patient not taking: Reported on 12/14/2022 3/10/22   Keyona Lopez MD   lisinopril (PRINIVIL;ZESTRIL) 20 MG tablet Take 1 tablet by mouth daily 3/3/22   Keyona Lopez MD   spironolactone (ALDACTONE) 25 MG tablet Take 1 tablet by mouth daily 3/3/22   Keyona Lopez MD   dorzolamide-timolol (COSOPT) 22.3-6.8 MG/ML ophthalmic solution Place 1 drop into both eyes 2 times daily 2/11/22   Kia Bell MD   Lancets MISC 1 each by Does not apply route daily Trueplus 33G Lancets 12/13/21   Keyona Lopez MD   Blood Glucose Calibration (FREESTYLE CONTROL SOLUTION) LIQD 100 each by In Vitro route 3 times daily (before meals) True Metrix Level 2 Ctrl Solution 12/13/21   Keyona Lopez MD   blood glucose monitor strips by Other route 2 times daily Fito Dakin True Metrix Test Strip 12/13/21   Keyona Lopez MD   blood glucose monitor strips by Other route 2 times daily True Metrix Blood Glucose Meter 12/13/21   Keyona Lopez MD   Elastic Bandages & Supports MISC Apply Truss strap for L groin tenderness/hernia 11/5/21   KALEN Coats - CNP   latanoprost (XALATAN) 0.005 % ophthalmic solution Place 1 drop into both eyes nightly 10/5/21   Historical Provider, MD   STIMULANT LAXATIVE 8.6-50 MG per tablet TAKE 2 TABLETS EVERY DAY  Patient not taking: No sig reported 9/20/21   Blane Flores MD   gabapentin (NEURONTIN) 100 MG capsule Take 1 capsule by mouth 2 times daily for 90 days. Patient not taking: Reported on 12/14/2022 6/22/21 9/20/21  Blane Flores MD   Accu-Chek Softclix Lancets MISC 1 each by Does not apply route 3 times daily 5/13/21   Blane Flores MD   Blood Glucose Monitoring Suppl (ACCU-CHEK ERIKA) DEVIN 1 each by Does not apply route daily 5/13/21   Blane Flores MD   acetaminophen (TYLENOL) 500 MG tablet Take 2 tablets by mouth 2 times daily 5/13/21   Blane Flores MD   FLUoxetine (PROZAC) 20 MG capsule Take 1 capsule by mouth daily  Patient not taking: Reported on 12/14/2022 5/13/21   Blane Flores MD   simvastatin (ZOCOR) 20 MG tablet TAKE 1 TABLET EVERY NIGHT 5/29/19   Blane Flores MD   aspirin EC 81 MG EC tablet Take 1 tablet by mouth daily. 6/12/12 3/17/23  Eugene Brain, DO   Multiple Vitamin (MULTI VITAMIN MENS PO) Take by mouth      Historical Provider, MD   Cyanocobalamin (VITAMIN B 12) 100 MCG LOZG Take 1 tablet by mouth daily    Historical Provider, MD        Allergies:     Xarelto [rivaroxaban]    Social History:     Tobacco:    reports that he quit smoking about 33 years ago. His smoking use included cigarettes. He has a 400.00 pack-year smoking history. He has never used smokeless tobacco.  Alcohol:      reports no history of alcohol use. Drug Use:  reports no history of drug use. Family History:     Family History   Problem Relation Age of Onset    Other Mother         lung disease       Review of Systems:     Positive and Negative as described in HPI.     Review of system:  Denies any nausea vomiting fever chills,  Denies any headaches or blurred vision,  Denies any abdominal pain diarrhea constipation,  Denies any tingling tingling numbness weakness of arms or legs,   Skin no rash,          Physical Exam:   BP (!) 124/50   Pulse 56   Temp 97.3 °F (36.3 °C)   Resp 20   Ht 5' 8\" (1.727 m)   Wt 220 lb (99.8 kg)   SpO2 100%   BMI 33.45 kg/m²   Temp (24hrs), Av.5 °F (36.4 °C), Min:97.3 °F (36.3 °C), Max:97.7 °F (36.5 °C)    No results for input(s): POCGLU in the last 72 hours. Intake/Output Summary (Last 24 hours) at 12/15/2022 1835  Last data filed at 12/15/2022 1547  Gross per 24 hour   Intake 1529.58 ml   Output 1000 ml   Net 529.58 ml       On examination,  Alert awake oriented x3,  S1-S2 present,  CTA bilateral,  Abdomen soft nontender nondistended bowel sounds present   Extremity no edema no calf tenderness,,  Skin no rash  CNS no focal neurological deficits    Investigations:      Laboratory Testing:  Recent Results (from the past 24 hour(s))   Basic Metabolic Panel w/ Reflex to MG    Collection Time: 12/15/22  3:45 AM   Result Value Ref Range    Glucose 144 (H) 70 - 99 mg/dL    BUN 47 (H) 8 - 23 mg/dL    Creatinine 1.51 (H) 0.70 - 1.20 mg/dL    Est, Glom Filt Rate 45 (L) >60 mL/min/1.73m2    Calcium 8.9 8.6 - 10.4 mg/dL    Sodium 137 135 - 144 mmol/L    Potassium 4.2 3.7 - 5.3 mmol/L    Chloride 105 98 - 107 mmol/L    CO2 24 20 - 31 mmol/L    Anion Gap 8 (L) 9 - 17 mmol/L   Protime-INR    Collection Time: 12/15/22  3:45 AM   Result Value Ref Range    Protime 13.5 11.8 - 14.6 sec    INR 1.0        Imaging/Diagnostics:  XR CHEST PORTABLE    Result Date: 2022  No acute intrathoracic process.        Assessment :      Hospital Problems             Last Modified POA    * (Principal) COVID-19 virus infection 2022 Yes       Plan:     Patient status inpatient in the Progressive Unit/Step down    1.  COVID-19 infection, no signs and symptoms of pneumonia, patient out of window for the treatment, watch for symptoms and check the inflammatory markers in the morning, infectious disease on board,  Elevated troponin, possible type II MI, patient extensive cardiac history on cardiology on board,  Diabetes mellitus, check blood sugars before meals and at bedtime, patient eating well,  Acute kidney injury creatinine 1.9, started on IV fluids,  History of testicular cancer, follows with oncology,  DVT prophylaxis with the Lovenox,  Full CODE STATUS,      2. Disposition 2 days      Consultations:   IP CONSULT TO INFECTIOUS DISEASES  IP CONSULT TO CARDIOLOGY     Patient is admitted as inpatient status because of co-morbidities listed above, severity of signs and symptoms as outlined, requirement for current medical therapies and most importantly because of direct risk to patient if care not provided in a hospital setting. Expected length of stay > 48 hours. Sulema Santiago MD  12/15/2022  6:35 PM    Copy sent to Dr. Jd Glover MD    Please note that this chart was generated using voice recognition Dragon dictation software. Although every effort was made to ensure the accuracy of this automated transcription, some errors in transcription may have occurred.

## 2022-12-15 NOTE — PROGRESS NOTES
12/15/22 1729   Encounter Summary   Encounter Overview/Reason  Initial Encounter   Service Provided For: Patient   Referral/Consult From: Rounding   Complexity of Encounter Low   Spiritual/Emotional needs   Type Spiritual Support   Assessment/Intervention/Outcome   Assessment Unable to assess  (covid isolation)   Intervention Prayer (assurance of)/[place]

## 2022-12-15 NOTE — TELEPHONE ENCOUNTER
Ana Cristina Menezes is calling to request a refill on the following medication(s):    Last Visit Date (If Applicable):  45/4/1974    Next Visit Date:    Visit date not found    Medication Request:  Requested Prescriptions     Pending Prescriptions Disp Refills    TRUE METRIX BLOOD GLUCOSE TEST strip [Pharmacy Med Name: TRUE METRIX SELF MONITORING BLOOD GLUCOSE STRIPS   Strip] 200 strip      Sig: TEST BLOOD [id number] [facility] [address] [place] [address] [Pharmacy Med Name: Svitlana Bearded LANCETS 33G] 200 each      Sig: TEST BLOOD SUGAR AS DIRECTED

## 2022-12-15 NOTE — PLAN OF CARE
Problem: Discharge Planning  Goal: Discharge to home or other facility with appropriate resources  Outcome: Progressing     Problem: Safety - Adult  Goal: Free from fall injury  Outcome: Progressing   Pt assessed as a fall risk this shift. Remains free from falls and accidental injury at this time. Fall precautions in place, including falling star sign and fall risk band on pt. Floor free from obstacles, and bed is locked and in lowest position. Adequate lighting provided. Pt encouraged to call before getting OOB for any need. Bed alarm activated. Will continue to monitor needs during hourly rounding, and reinforce education on use of call light.    Problem: ABCDS Injury Assessment  Goal: Absence of physical injury  Outcome: Progressing

## 2022-12-15 NOTE — CONSULTS
[facility] -   Infectious diseases evaluation  admission date 12/14/2022    reason for consultation:   COVID-19    Impression :   Current:  Positive COVID-19 test, no evidence of pneumonia. Elevated troponin  Diabetes mellitus  Hypertension  History of testicular cancer    Recommendations   No indication for Decadron  He is out of the window for antiviral treatment. Monitor oxygenation. Inflammatory markers in a.m. Cardiology consulted  Supportive care  Discussed with daughter at the bedside    Infection Control Recommendations   Droplet plus isolation    Antimicrobial Stewardship Recommendations   Simplification of therapy  Targeted therapy      History of Present Illness:   Initial history:  Alfredito Alvarez is a 81y.o.-year-old male was brought to the hospital for generalized fatigue and dizziness associated with nonproductive cough and diarrhea for more than 1 week. Symptoms moderate, no alleviating or aggravating factors. He is comfortable on room air, eating lunch, no hypoxia. Afebrile, blood pressure stable on the low side with systolic blood pressure 974  The patient is hard of hearing, unable to provide detailed history that was obtained from chart review and daughter at the bedside. The patient blood pressure was low reportedly.   12/14/2022 COVID-19 PCR was positive  WBC 5.7, creatinine 1.9, INR 1, troponin 85  Urinalysis was clear, negative nitrate negative leukocyte Estrace  Chest x-ray reviewed showed no acute infiltrate  The patient is vaccinated for COVID-19    Interval changes  12/15/2022   Patient Vitals for the past 8 hrs:   BP Temp Temp src Pulse SpO2 Weight   12/15/22 0951 (!) 103/48 97.5 °F (36.4 °C) Oral 64 100 % --   12/15/22 0830 -- -- -- 57 -- --   12/15/22 0600 -- -- -- -- -- 220 lb (99.8 kg)           I have personally reviewed the past medical history, past surgical history, medications, social history, and family history, and I haveupdated the database accordingly. Allergies:   Xarelto [rivaroxaban]     Review of Systems:     Review of Systems  Decreased hearing, other than above 12 system review was negative. Physical Examination :       Physical Exam     Appearance: Normal appearance. He is well-developed. He is not diaphoretic. HENT:      Head: Normocephalic and atraumatic. Right Ear: External ear normal.      Left Ear: External ear normal.      Nose: Nose normal. No congestion. Mouth/Throat:      Mouth: Mucous membranes are moist.      Pharynx: Oropharynx is clear. Eyes:      General:         Right eye: No discharge. Left eye: No discharge. Conjunctiva/sclera: Conjunctivae normal.      Pupils: Pupils are equal, round, and reactive to light. Neck:      Trachea: No tracheal deviation. Cardiovascular:      Rate and Rhythm: Normal rate and regular rhythm. Pulses: Normal pulses. Heart sounds: Normal heart sounds. Pulmonary:      Effort: Pulmonary effort is normal. No respiratory distress. Breath sounds: Normal breath sounds. No stridor. No wheezing or rales. Abdominal:      Palpations: Abdomen is soft. Tenderness: There is no abdominal tenderness. There is no guarding or rebound. Musculoskeletal:         General: No tenderness or deformity. Normal range of motion. Cervical back: Normal range of motion and neck supple. Skin:     General: Skin is warm and dry. Capillary Refill: Capillary refill takes less than 2 seconds. Findings: No erythema or rash. Neurological:      General: No focal deficit present. Mental Status: He is alert and oriented to person, place, and time. Coordination: Coordination normal.   Psychiatric:         Mood and Affect: Mood normal.         Behavior: Behavior normal.         Thought Content:  Thought content normal.         Judgment: Judgment normal.      Past Medical History:     Past Medical History:   Diagnosis Date Diabetes mellitus ([facility] [address]     Hypertension     [facility]     sarcoma of left testis       Past Surgical  History:     Past Surgical History:   Procedure Laterality Date    EYE SURGERY Bilateral     HERNIA REPAIR  1988    left inguinal    JOINT REPLACEMENT Bilateral     TESTICLE REMOVAL         Medications:      FLUoxetine  20 mg Oral Daily    sodium chloride flush  5-40 mL IntraVENous 2 times per day    enoxaparin  40 mg SubCUTAneous Daily       Social History:     Social History     Socioeconomic History    Marital status:      Spouse name: Not on file    Number of children: Not on file    Years of education: Not on file    Highest education level: Not on file   Occupational History    Not on file   Tobacco Use    Smoking status: Former     Packs/day: 20.00     Years: 20.00     Pack years: 400.00     Types: Cigarettes     Quit date: 47     Years since quittin.9    Smokeless tobacco: Never    Tobacco comments:     quit 42 years ago    Substance and Sexual Activity    Alcohol use: No     Comment: quite 42 years ago     Drug use: No    Sexual activity: Not on file   Other Topics Concern    Not on file   Social History Narrative    Not on file     Social Determinants of Health     Financial Resource Strain: Low Risk     Difficulty of Paying Living Expenses: Not hard at all   Food Insecurity: No Food Insecurity    Worried About Running Out of Food in the Last Year: Never true    Ran Out of Food in the Last Year: Never true   Transportation Needs: Not on file   Physical Activity: Not on file   Stress: Not on file   Social Connections: Not on file   Intimate Partner Violence: Not on file   Housing Stability: Not on file       Family History:     Family History   Problem Relation Age of Onset    Other Mother         lung disease      Medical Decision Making:   I have independently reviewed/ordered the following labs:    CBC with Differential:   Recent Labs     22  1504   WBC 5.7   HGB

## 2022-12-15 NOTE — CARE COORDINATION
CASE MANAGEMENT NOTE:    Admission Date:  12/14/2022 Lavern Paget is a 81 y.o.  male    Admitted for : Dehydration [E86.0]  Elevated troponin [R77.8]  ROXI (acute kidney injury) ([facility] [address] [N17.9]  Atrial fibrillation, unspecified type ([facility] [address] [I48.91]  COVID [U07.1]  COVID-19 virus infection [U07.1]    Met with:  Patient and Family    PCP:  [address]:  Liban Mcgill       Is patient alert and oriented at time of discussion:  Yes    Current Residence/ Living Arrangements:  independently at home  with spouse in 2 story home> liveable 1st floor. Current Services PTA:  No    Does patient go to outpatient dialysis: No  If yes, location and chair time: NA  Who is their nephrologist? NA    Is patient agreeable to VNS: No    Freedom of choice provided:  No    List of [address] provided: No    VNS chosen:  No    DME:  straight cane, walker, shower chair, and other GB    Home Oxygen: No    Nebulizer: No    CPAP/BIPAP: No    Supplier: N/A    Potential Assistance Needed: No    SNF needed: No    Freedom of choice and list provided: No    Pharmacy:  [facility] [address]       Is patient currently receiving oral anticoagulation therapy? No- but patient has history of afib     Is the Patient an [facility] with Readmission Risk Score greater than 14%? No  If yes, pt needs a follow up appointment made within 7 days. Family Members/Caregivers that pt would like involved in their care:    Yes    If yes, list name here:  spouse Naomi and dtr Lay Lindsey     Transportation Provider:  Family             Discharge Plan:  12/15/22 [employer] COVID+ (afebrile , room air) from home with spouse DME: cane, walker, GB, SC toilet handles VNS: none and patient denies need for VNS. Cardio and ID consult 2decho, PT/OT eval. HX: afib, not on anticoag .  Following for needs//JF                           Electronically signed by: Zaria Gutierrez RN on 12/15/2022 at 8:31 AM

## 2022-12-15 NOTE — PLAN OF CARE
Problem: Discharge Planning  Goal: Discharge to home or other facility with appropriate resources  12/15/2022 0524 by Nicholas Swift RN  Outcome: Progressing  12/14/2022 1929 by Micheline Perdomo RN  Outcome: Progressing     Problem: Safety - Adult  Goal: Free from fall injury  12/15/2022 0524 by Nicholas Swift RN  Outcome: Progressing  12/14/2022 1929 by Micheline Perdomo RN  Outcome: Progressing     Problem: Skin/Tissue Integrity  Goal: Absence of new skin breakdown  Description: 1. Monitor for areas of redness and/or skin breakdown  2. Assess vascular access sites hourly  3. Every 4-6 hours minimum:  Change oxygen saturation probe site  4. Every 4-6 hours:  If on nasal continuous positive airway pressure, respiratory therapy assess nares and determine need for appliance change or resting period.   12/15/2022 0524 by Nicholas Swift RN  Outcome: Progressing  12/14/2022 1929 by Micheline Perdomo RN  Outcome: Progressing     Problem: ABCDS Injury Assessment  Goal: Absence of physical injury  12/14/2022 1929 by Micheline Perdomo RN  Outcome: Progressing

## 2022-12-16 ENCOUNTER — TELEPHONE (OUTPATIENT)
Dept: INTERNAL MEDICINE CLINIC | Age: 87
End: 2022-12-16

## 2022-12-16 VITALS
SYSTOLIC BLOOD PRESSURE: 121 MMHG | TEMPERATURE: 98.3 F | OXYGEN SATURATION: 94 % | WEIGHT: 220 LBS | HEIGHT: 68 IN | DIASTOLIC BLOOD PRESSURE: 86 MMHG | HEART RATE: 66 BPM | RESPIRATION RATE: 16 BRPM | BODY MASS INDEX: 33.34 KG/M2

## 2022-12-16 LAB
ANION GAP SERPL CALCULATED.3IONS-SCNC: 9 MMOL/L (ref 9–17)
BUN BLDV-MCNC: 25 MG/DL (ref 8–23)
C-REACTIVE PROTEIN: 4.6 MG/L (ref 0–5)
CALCIUM SERPL-MCNC: 9.2 MG/DL (ref 8.6–10.4)
CHLORIDE BLD-SCNC: 102 MMOL/L (ref 98–107)
CO2: 23 MMOL/L (ref 20–31)
CREAT SERPL-MCNC: 1.03 MG/DL (ref 0.7–1.2)
GFR SERPL CREATININE-BSD FRML MDRD: >60 ML/MIN/1.73M2
GLUCOSE BLD-MCNC: 128 MG/DL (ref 70–99)
HCT VFR BLD CALC: 31.2 % (ref 41–53)
HEMOGLOBIN: 10.3 G/DL (ref 13.5–17.5)
MCH RBC QN AUTO: 28.8 PG (ref 26–34)
MCHC RBC AUTO-ENTMCNC: 33.1 G/DL (ref 31–37)
MCV RBC AUTO: 87.2 FL (ref 80–100)
PDW BLD-RTO: 15.8 % (ref 11.5–14.9)
PLATELET # BLD: 250 K/UL (ref 150–450)
PMV BLD AUTO: 7.2 FL (ref 6–12)
POTASSIUM SERPL-SCNC: 4.5 MMOL/L (ref 3.7–5.3)
RBC # BLD: 3.58 M/UL (ref 4.5–5.9)
SODIUM BLD-SCNC: 134 MMOL/L (ref 135–144)
WBC # BLD: 6.8 K/UL (ref 3.5–11)

## 2022-12-16 PROCEDURE — 85027 COMPLETE CBC AUTOMATED: CPT

## 2022-12-16 PROCEDURE — 6370000000 HC RX 637 (ALT 250 FOR IP): Performed by: INTERNAL MEDICINE

## 2022-12-16 PROCEDURE — 6360000002 HC RX W HCPCS: Performed by: INTERNAL MEDICINE

## 2022-12-16 PROCEDURE — 2580000003 HC RX 258: Performed by: INTERNAL MEDICINE

## 2022-12-16 PROCEDURE — 80048 BASIC METABOLIC PNL TOTAL CA: CPT

## 2022-12-16 PROCEDURE — 36415 COLL VENOUS BLD VENIPUNCTURE: CPT

## 2022-12-16 PROCEDURE — 86140 C-REACTIVE PROTEIN: CPT

## 2022-12-16 RX ORDER — LISINOPRIL 20 MG/1
10 TABLET ORAL DAILY
Qty: 90 TABLET | Refills: 1 | Status: SHIPPED | OUTPATIENT
Start: 2022-12-16

## 2022-12-16 RX ORDER — CALCIUM CITRATE/VITAMIN D3 200MG-6.25
TABLET ORAL
Qty: 200 STRIP | Refills: 3 | Status: SHIPPED | OUTPATIENT
Start: 2022-12-16

## 2022-12-16 RX ORDER — GLUCOSAM/CHON-MSM1/C/MANG/BOSW 500-416.6
TABLET ORAL
Qty: 200 EACH | Refills: 3 | Status: SHIPPED | OUTPATIENT
Start: 2022-12-16

## 2022-12-16 RX ADMIN — ENOXAPARIN SODIUM 40 MG: 100 INJECTION SUBCUTANEOUS at 07:51

## 2022-12-16 RX ADMIN — FLUOXETINE HYDROCHLORIDE 20 MG: 20 CAPSULE ORAL at 07:51

## 2022-12-16 RX ADMIN — SODIUM CHLORIDE, PRESERVATIVE FREE 10 ML: 5 INJECTION INTRAVENOUS at 07:51

## 2022-12-16 NOTE — PLAN OF CARE
Problem: Discharge Planning  Goal: Discharge to home or other facility with appropriate resources  Outcome: Progressing     Problem: Safety - Adult  Goal: Free from fall injury  Outcome: Progressing     Problem: Respiratory - Adult  Goal: Achieves optimal ventilation and oxygenation  Outcome: Progressing  Flowsheets (Taken 12/16/2022 0255)  Achieves optimal ventilation and oxygenation:   Assess for changes in respiratory status   Assess for changes in mentation and behavior   Position to facilitate oxygenation and minimize respiratory effort   Oxygen supplementation based on oxygen saturation or arterial blood gases   Respiratory therapy support as indicated   Assess and instruct to report shortness of breath or any respiratory difficulty     Problem: Skin/Tissue Integrity  Goal: Absence of new skin breakdown  Description: Monitor for areas of redness and/or skin breakdown  Outcome: Progressing     Problem: ABCDS Injury Assessment  Goal: Absence of physical injury  Outcome: Progressing  Flowsheets  Taken 12/16/2022 0255  Absence of Physical Injury: Implement safety measures based on patient assessment  Taken 12/15/2022 2100  Absence of Physical Injury: Implement safety measures based on patient assessment     Problem: Chronic Conditions and Co-morbidities  Goal: Patient's chronic conditions and co-morbidity symptoms are monitored and maintained or improved  Outcome: Progressing

## 2022-12-16 NOTE — FLOWSHEET NOTE
12/16/22 0755   Vitals   Temp 98.3 °F (36.8 °C)   Temp Source Oral   Heart Rate 66   Heart Rate Source Monitor   Resp 16   /86   MAP (Calculated) 98   Oxygen Therapy   SpO2 94 %   Discharge   Daughter at bedside, pt alert and oriented, discharge instructions provided with CDC recommendations on quarantine and masking guidelines, medication changes, follow up with PCP. All questions answered, pt provided wheelchair.

## 2022-12-16 NOTE — DISCHARGE INSTR - ACTIVITY
Please visit CDC webpage to clarify questions   Regardless of vaccination status, you should isolate from others when you have COVID-19. You should also isolate if you are sick and suspect that you have COVID-19 but do not yet have test results. If your results are positive, follow the full isolation recommendations below. If your results are negative, you can end your isolation. IF YOU TEST  Negative  You can end your isolation    IF YOU TEST  Positive  Follow the full isolation recommendations below    When you have COVID-19, isolation is counted in days, as follows: If you had no symptoms  Day 0 is the day you were tested (not the day you received your positive test result)  Day 1 is the first full day following the day you were tested  If you develop symptoms within 10 days of when you were tested, the clock restarts at day 0 on the day of symptom onset  If you had symptoms  Day 0 of isolation is the day of symptom onset, regardless of when you tested positive  Day 1 is the first full day after the day your symptoms started  Isolation  If you test positive for COVID-19, stay home for at least 5 days and isolate from others in your home. You are likely most infectious during these first 5 days. Wear a high-quality mask if you must be around others at home and in public. Do not go places where you are unable to wear a mask. For travel guidance, see CDCs Travel webpage. Do not travel. Stay home and separate from others as much as possible. Use a separate bathroom, if possible. Take steps to improve ventilation at home, if possible. Dont share personal household items, like cups, towels, and utensils. Monitor your symptoms. If you have an emergency warning sign (like trouble breathing), seek emergency medical care immediately. Learn more about what to do if you have COVID-19. Ending Isolation  End isolation based on how serious your COVID-19 symptoms were.  Loss of taste and smell may persist for weeks or months after recovery and need not delay the end of isolation. If you had no symptoms  You may end isolation after day 5. If you had symptoms and:  Your symptoms are improving  You may end isolation after day 5 if:    You are fever-free for 24 hours (without the use of fever-reducing medication). Your symptoms are not improving  Continue to isolate until:    You are fever-free for 24 hours (without the use of fever-reducing medication). Your symptoms are improving. 1  If you had symptoms and had: Moderate illness (you experienced shortness of breath or had difficulty breathing)  You need to isolate through day 10. Severe illness (you were hospitalized) or have a weakened immune system  You need to isolate through day 10. Consult your doctor before ending isolation. Ending isolation without a viral test may not be an option for you. If you are unsure if your symptoms are moderate or severe or if you have a weakened immune system, talk to a healthcare provider for further guidance. Regardless of when you end isolation  Until at least day 11:  Avoid being around people who are more likely to get very sick from COVID-19. Remember to wear a high-quality mask when indoors around others at home and in public. Do not go places where you are unable to wear a mask until you are able to discontinue masking (see below). For travel guidance, see CDCs Travel webpage.

## 2022-12-16 NOTE — TELEPHONE ENCOUNTER
Patient was just discharged from [facility]  today and he and his family are very confused about what medications he is to take. Numerous things were stopped and they are concerned because he is a diabetic and that was stopped as well. They would like to know what he is to have before dinner.     Please Advise

## 2022-12-16 NOTE — PLAN OF CARE
Problem: Discharge Planning  Goal: Discharge to home or other facility with appropriate resources  12/16/2022 0916 by William Duarte RN  Outcome: Progressing  Flowsheets (Taken 12/16/2022 9363)  Discharge to home or other facility with appropriate resources:   Identify barriers to discharge with patient and caregiver   Arrange for needed discharge resources and transportation as appropriate   Identify discharge learning needs (meds, wound care, etc)   Arrange for interpreters to assist at discharge as needed   Refer to discharge planning if patient needs post-hospital services based on physician order or complex needs related to functional status, cognitive ability or social support system  12/16/2022 0255 by Lavelle Carrero RN  Outcome: Progressing     Problem: Safety - Adult  Goal: Free from fall injury  12/16/2022 0916 by William Duarte RN  Outcome: Progressing  12/16/2022 0255 by Lavelle Carrero RN  Outcome: Progressing     Problem: Respiratory - Adult  Goal: Achieves optimal ventilation and oxygenation  12/16/2022 0916 by William Duarte RN  Outcome: Progressing  12/16/2022 0255 by Lavelle Carrero RN  Outcome: Progressing  Flowsheets (Taken 12/16/2022 0255)  Achieves optimal ventilation and oxygenation:   Assess for changes in respiratory status   Assess for changes in mentation and behavior   Position to facilitate oxygenation and minimize respiratory effort   Oxygen supplementation based on oxygen saturation or arterial blood gases   Respiratory therapy support as indicated   Assess and instruct to report shortness of breath or any respiratory difficulty     Problem: Skin/Tissue Integrity  Goal: Absence of new skin breakdown  Description: 1. Monitor for areas of redness and/or skin breakdown  2. Assess vascular access sites hourly  3. Every 4-6 hours minimum:  Change oxygen saturation probe site  4.   Every 4-6 hours:  If on nasal continuous positive airway pressure, respiratory therapy assess nares and determine need for appliance change or resting period.   12/16/2022 0916 by Octavio Wayne RN  Outcome: Progressing  12/16/2022 0255 by Nishi Dill RN  Outcome: Progressing     Problem: ABCDS Injury Assessment  Goal: Absence of physical injury  12/16/2022 0916 by Octavio Wayne RN  Outcome: Progressing  12/16/2022 0255 by Nishi Dill RN  Outcome: Progressing  Flowsheets  Taken 12/16/2022 0255  Absence of Physical Injury: Implement safety measures based on patient assessment  Taken 12/15/2022 2100  Absence of Physical Injury: Implement safety measures based on patient assessment     Problem: Chronic Conditions and Co-morbidities  Goal: Patient's chronic conditions and co-morbidity symptoms are monitored and maintained or improved  12/16/2022 0916 by Octavio Wayne RN  Outcome: Progressing  Flowsheets (Taken 12/16/2022 0737)  Care Plan - Patient's Chronic Conditions and Co-Morbidity Symptoms are Monitored and Maintained or Improved:   Monitor and assess patient's chronic conditions and comorbid symptoms for stability, deterioration, or improvement   Collaborate with multidisciplinary team to address chronic and comorbid conditions and prevent exacerbation or deterioration   Update acute care plan with appropriate goals if chronic or comorbid symptoms are exacerbated and prevent overall improvement and discharge  12/16/2022 0255 by Nishi Dill RN  Outcome: Progressing     Problem: Discharge Planning  Goal: Discharge to home or other facility with appropriate resources  12/16/2022 0916 by Octavio Wayne RN  Outcome: Progressing  Flowsheets (Taken 12/16/2022 0737)  Discharge to home or other facility with appropriate resources:   Identify barriers to discharge with patient and caregiver   Arrange for needed discharge resources and transportation as appropriate   Identify discharge learning needs (meds, wound care, etc)   Arrange for interpreters to assist at discharge as needed   Refer to discharge planning if patient needs post-hospital services based on physician order or complex needs related to functional status, cognitive ability or social support system  12/16/2022 0255 by Nuzhat Rodriguez RN  Outcome: Progressing     Problem: Safety - Adult  Goal: Free from fall injury  12/16/2022 0916 by Maggi Arellano RN  Outcome: Progressing  12/16/2022 0255 by Nuzhat Rodriguez RN  Outcome: Progressing     Problem: Respiratory - Adult  Goal: Achieves optimal ventilation and oxygenation  12/16/2022 0916 by Maggi Arellano RN  Outcome: Progressing  12/16/2022 0255 by Nuzhat Rodriguez RN  Outcome: Progressing  Flowsheets (Taken 12/16/2022 0255)  Achieves optimal ventilation and oxygenation:   Assess for changes in respiratory status   Assess for changes in mentation and behavior   Position to facilitate oxygenation and minimize respiratory effort   Oxygen supplementation based on oxygen saturation or arterial blood gases   Respiratory therapy support as indicated   Assess and instruct to report shortness of breath or any respiratory difficulty     Problem: Skin/Tissue Integrity  Goal: Absence of new skin breakdown  Description: 1. Monitor for areas of redness and/or skin breakdown  2. Assess vascular access sites hourly  3. Every 4-6 hours minimum:  Change oxygen saturation probe site  4. Every 4-6 hours:  If on nasal continuous positive airway pressure, respiratory therapy assess nares and determine need for appliance change or resting period.   12/16/2022 0916 by Maggi Arellano RN  Outcome: Progressing  12/16/2022 0255 by Nuzhat Rodriguez RN  Outcome: Progressing     Problem: ABCDS Injury Assessment  Goal: Absence of physical injury  12/16/2022 0916 by Maggi Arellano RN  Outcome: Progressing  12/16/2022 0255 by Nuzhat Rodriguez RN  Outcome: Progressing  Flowsheets  Taken 12/16/2022 0255  Absence of Physical Injury: Implement safety measures based on patient assessment  Taken 12/15/2022 2100  Absence of Physical Injury: Implement safety measures based on patient assessment     Problem: Chronic Conditions and Co-morbidities  Goal: Patient's chronic conditions and co-morbidity symptoms are monitored and maintained or improved  12/16/2022 0916 by Ambreen Fabian RN  Outcome: Progressing  Flowsheets (Taken 12/16/2022 1761)  Care Plan - Patient's Chronic Conditions and Co-Morbidity Symptoms are Monitored and Maintained or Improved:   Monitor and assess patient's chronic conditions and comorbid symptoms for stability, deterioration, or improvement   Collaborate with multidisciplinary team to address chronic and comorbid conditions and prevent exacerbation or deterioration   Update acute care plan with appropriate goals if chronic or comorbid symptoms are exacerbated and prevent overall improvement and discharge  12/16/2022 0255 by Saumya Steele RN  Outcome: Progressing

## 2022-12-16 NOTE — PLAN OF CARE
Problem: Discharge Planning  Goal: Discharge to home or other facility with appropriate resources  12/16/2022 1112 by Dayo Lobato RN  Outcome: Completed  12/16/2022 0916 by Dayo Lobato RN  Outcome: Progressing  Flowsheets (Taken 12/16/2022 5259)  Discharge to home or other facility with appropriate resources:   Identify barriers to discharge with patient and caregiver   Arrange for needed discharge resources and transportation as appropriate   Identify discharge learning needs (meds, wound care, etc)   Arrange for interpreters to assist at discharge as needed   Refer to discharge planning if patient needs post-hospital services based on physician order or complex needs related to functional status, cognitive ability or social support system  12/16/2022 0255 by Nayan Wetzel RN  Outcome: Progressing     Problem: Safety - Adult  Goal: Free from fall injury  12/16/2022 1112 by Dayo Lobato RN  Outcome: Completed  12/16/2022 0916 by Dayo Lobato RN  Outcome: Progressing  12/16/2022 0255 by Nayan Wetzel RN  Outcome: Progressing     Problem: Respiratory - Adult  Goal: Achieves optimal ventilation and oxygenation  12/16/2022 1112 by Dayo Lobato RN  Outcome: Completed  12/16/2022 0916 by Dayo Lobato RN  Outcome: Progressing  12/16/2022 0255 by Nayan Wetzel RN  Outcome: Progressing  Flowsheets (Taken 12/16/2022 0255)  Achieves optimal ventilation and oxygenation:   Assess for changes in respiratory status   Assess for changes in mentation and behavior   Position to facilitate oxygenation and minimize respiratory effort   Oxygen supplementation based on oxygen saturation or arterial blood gases   Respiratory therapy support as indicated   Assess and instruct to report shortness of breath or any respiratory difficulty     Problem: Skin/Tissue Integrity  Goal: Absence of new skin breakdown  Description: 1. Monitor for areas of redness and/or skin breakdown  2.   Assess vascular access sites hourly  3. Every 4-6 hours minimum:  Change oxygen saturation probe site  4. Every 4-6 hours:  If on nasal continuous positive airway pressure, respiratory therapy assess nares and determine need for appliance change or resting period.   12/16/2022 1112 by Maggi Arellano RN  Outcome: Completed  12/16/2022 0916 by Maggi Arellano RN  Outcome: Progressing  12/16/2022 0255 by Nuzhat Rodriguez RN  Outcome: Progressing     Problem: ABCDS Injury Assessment  Goal: Absence of physical injury  12/16/2022 1112 by Maggi Arellano RN  Outcome: Completed  12/16/2022 0916 by Maggi Arellano RN  Outcome: Progressing  12/16/2022 0255 by Nuzhat Rodriguez RN  Outcome: Progressing  Flowsheets  Taken 12/16/2022 0255  Absence of Physical Injury: Implement safety measures based on patient assessment  Taken 12/15/2022 2100  Absence of Physical Injury: Implement safety measures based on patient assessment     Problem: Chronic Conditions and Co-morbidities  Goal: Patient's chronic conditions and co-morbidity symptoms are monitored and maintained or improved  12/16/2022 1112 by Maggi Arellano RN  Outcome: Completed  12/16/2022 0916 by Maggi Arellano RN  Outcome: Progressing  Flowsheets (Taken 12/16/2022 0737)  Care Plan - Patient's Chronic Conditions and Co-Morbidity Symptoms are Monitored and Maintained or Improved:   Monitor and assess patient's chronic conditions and comorbid symptoms for stability, deterioration, or improvement   Collaborate with multidisciplinary team to address chronic and comorbid conditions and prevent exacerbation or deterioration   Update acute care plan with appropriate goals if chronic or comorbid symptoms are exacerbated and prevent overall improvement and discharge  12/16/2022 0255 by Nuzhat Rodriguez RN  Outcome: Progressing

## 2022-12-16 NOTE — PROGRESS NOTES
Physician Progress Note      PATIENT:               Jaime Ha  CSN #:                  [id number]  :                       1935  ADMIT DATE:       2022 2:21 PM  [address] [place] DATE:  RESPONDING  PROVIDER #:        Matias Estevez MD          QUERY TEXT:    Patient admitted w/ COVID 19 infection. Noted documentation of \"possible type   II MI\" in 12/15 H&P. In order to support the diagnosis of type II MI, please   refer to 4th universal definition of MI below & include additional clinical   indicators in your documentation. ? Or please document if the diagnosis of type   II MI has been r/o after study. The medical record reflects the following:  ?? Risk Factors: PMH of DM & HTN. Admitted for COVID 19, ROXI, & one week   history of hypotension. ?? Clinical Indicators: ED Provider Note : Denies any CP. Covid positive,   ROXI on labs, suspect dehydration. EKG: Afib, rate 64 bpm, no acute st elev or   dep, normal intervals. H&P 12/15: presents w/ Hypotension (Pt states low BP   X1 week) & is admitted to the hospital for the management of COVID-19 virus   infection. Cr was 1.9, Elevated troponin, possible type II MI, extensive   cardiac history on cardiology on board. 2D Echo 12/15: Low Normal LV size &   function w/ an estimated EF 50-55%. No obvious segmental wall motion   abnormalities seen. Trops 85 > 66.  ?? Treatment: Treatment of underlying conditions. Pending Cardio consult. Fourth Universal Definition of MI:  Clearly separates MI from myocardial   injury. Patients w/ elevated blood troponin levels but w/out clinical evidence   of ischemia are said to have had a myocardial injury. ? To have a MI requires   both an elevated troponin blood test along w/ at least one of the following:  - Symptoms of acute myocardial ischemia  - Clinical evidence of ischemia, as evidenced in an EKG showing new ischemic   changes  - Development of pathological Q waves  - Imaging evidence of new loss of viable myocardium or new regional wall   motion abnormality in a pattern consistent w/ an ischemic etiology  Options provided:  -- MI type II present as evidenced by, Please document indicators of MI.  -- MI type II r/o after study & demand ischemia confirmed  -- Other - I will add my own diagnosis  -- Disagree - Not applicable / Not valid  -- Disagree - Clinically unable to determine / Unknown  -- Refer to Clinical Documentation Reviewer    PROVIDER RESPONSE TEXT:    This patient has a type II MI as evidenced by elevated trop    Query created by: Fiona Lombardi on 12/16/2022 7:00 AM      Electronically signed by:  Marcie Hamilton MD 12/16/2022 11:03 AM

## 2023-01-03 ENCOUNTER — OFFICE VISIT (OUTPATIENT)
Dept: INTERNAL MEDICINE CLINIC | Age: 88
End: 2023-01-03
Payer: MEDICARE

## 2023-01-03 VITALS
WEIGHT: 222 LBS | DIASTOLIC BLOOD PRESSURE: 60 MMHG | BODY MASS INDEX: 33.75 KG/M2 | HEART RATE: 92 BPM | OXYGEN SATURATION: 96 % | SYSTOLIC BLOOD PRESSURE: 118 MMHG

## 2023-01-03 DIAGNOSIS — Z09 HOSPITAL DISCHARGE FOLLOW-UP: Primary | ICD-10-CM

## 2023-01-03 DIAGNOSIS — I50.42 CHRONIC COMBINED SYSTOLIC AND DIASTOLIC CONGESTIVE HEART FAILURE (HCC): ICD-10-CM

## 2023-01-03 PROCEDURE — 1111F DSCHRG MED/CURRENT MED MERGE: CPT | Performed by: INTERNAL MEDICINE

## 2023-01-03 PROCEDURE — 99213 OFFICE O/P EST LOW 20 MIN: CPT | Performed by: INTERNAL MEDICINE

## 2023-01-03 PROCEDURE — G8427 DOCREV CUR MEDS BY ELIG CLIN: HCPCS | Performed by: INTERNAL MEDICINE

## 2023-01-03 PROCEDURE — 1036F TOBACCO NON-USER: CPT | Performed by: INTERNAL MEDICINE

## 2023-01-03 PROCEDURE — 1123F ACP DISCUSS/DSCN MKR DOCD: CPT | Performed by: INTERNAL MEDICINE

## 2023-01-03 PROCEDURE — G8484 FLU IMMUNIZE NO ADMIN: HCPCS | Performed by: INTERNAL MEDICINE

## 2023-01-03 PROCEDURE — G8417 CALC BMI ABV UP PARAM F/U: HCPCS | Performed by: INTERNAL MEDICINE

## 2023-01-03 RX ORDER — GLIPIZIDE 5 MG/1
5 TABLET ORAL
COMMUNITY
End: 2023-01-03 | Stop reason: SDUPTHER

## 2023-01-03 RX ORDER — LISINOPRIL 10 MG/1
10 TABLET ORAL DAILY
Qty: 90 TABLET | Refills: 3 | Status: SHIPPED | OUTPATIENT
Start: 2023-01-03

## 2023-01-03 RX ORDER — GLIMEPIRIDE 2 MG/1
2 TABLET ORAL
COMMUNITY

## 2023-01-03 RX ORDER — GLIPIZIDE 5 MG/1
5 TABLET ORAL
Qty: 180 TABLET | Refills: 3 | Status: SHIPPED | OUTPATIENT
Start: 2023-01-03

## 2023-01-03 ASSESSMENT — ENCOUNTER SYMPTOMS
FLU SYMPTOMS: 1
COUGH: 1

## 2023-01-03 NOTE — PROGRESS NOTES
Visit Information    Have you changed or started any medications since your last visit including any over-the-counter medicines, vitamins, or herbal medicines? no   Are you having any side effects from any of your medications? -  no  Have you stopped taking any of your medications? Is so, why? -  no    Have you seen any other physician or provider since your last visit? No  Have you had any other diagnostic tests since your last visit? Yes - Records Obtained  Have you been seen in the emergency room and/or had an admission to a hospital since we last saw you? Yes - Records Obtained  Have you had your routine dental cleaning in the past 6 months? no    Have you activated your [employer] account? If not, what are your barriers?  No:      Patient Care Team:  Cyndi Pereira MD as PCP - General (Internal Medicine)  Cyndi Pereira MD as PCP - [facility]  Bismark Cotter MD as Consulting Physician (Hematology and Oncology)  Federica Randolph MD as Consulting Physician (Radiation Oncology)  Vaishali Jordan MD    Medical History Review  Past Medical, Family, and Social History reviewed and does contribute to the patient presenting condition    Health Maintenance   Topic Date Due    DTaP/Tdap/Td vaccine (1 - Tdap) Never done    Shingles vaccine (1 of 2) Never done    COVID-19 Vaccine (4 - Booster for Milo Stevenson series) 02/22/2022    Flu vaccine (1) 08/01/2022    Lipids  09/15/2022    Annual Wellness Visit (AWV)  11/06/2022    Depression Monitoring  06/14/2023    Pneumococcal 65+ years Vaccine  Completed    Hepatitis A vaccine  Aged Out    Hib vaccine  Aged Out    Meningococcal (ACWY) vaccine  Aged Out

## 2023-01-03 NOTE — PROGRESS NOTES
[address] [address]  Dept: [phone]  Dept Fax: [phone]    Shira Akbar is a 81 y.o. male who presents today for his medicalconditions/complaints as noted below. Shira Akbar is c/o of Dehydration (While in er) and Discuss Medications (Lisinopril taking an half tab was taking a whole tab before going to er and glimepiride and glipiZIDE was discontinued )      HPI:     Hypertension  This is a chronic problem. The current episode started more than 1 year ago. The problem is unchanged. The problem is controlled. Risk factors for coronary artery disease include dyslipidemia and diabetes mellitus. Past treatments include ACE inhibitors and diuretics. The current treatment provides significant improvement. There are no compliance problems. Influenza  This is a new (dx with COVID mid december) problem. The current episode started 1 to 4 weeks ago. The problem has been gradually improving. Associated symptoms include congestion and coughing. Nothing aggravates the symptoms. He has tried drinking (fluids, antivirals) for the symptoms. The treatment provided moderate relief. At time of admission hsi creatinine went to 1.9. since has come down. His lisinopriul has been decreased to 10 mg.      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        Current Outpatient Medications   Medication Sig Dispense Refill    glimepiride (AMARYL) 2 MG tablet Take 2 mg by mouth every morning (before breakfast)      lisinopril (PRINIVIL;ZESTRIL) 10 MG tablet Take 1 tablet by mouth daily 90 tablet 3    glipiZIDE (GLUCOTROL) 5 MG tablet Take 1 tablet by mouth 2 times daily (before meals) 180 tablet 3    bumetanide (BUMEX) 1 MG tablet Take 1 tablet by mouth daily 90 tablet 3    tamsulosin (FLOMAX) 0.4 MG capsule TAKE 1 CAPSULE EVERY DAY 90 capsule 3    spironolactone (ALDACTONE) 25 MG tablet Take 1 tablet by mouth daily 90 tablet 3    TRUE METRIX BLOOD GLUCOSE TEST strip TEST BLOOD SUGAR TWICE DAILY 200 strip 3    TRUEplus Lancets 33G MISC TEST BLOOD SUGAR AS DIRECTED 200 each 3    ACCU-CHEK ERIKA PLUS strip TEST BLOOD SUGAR THREE TIMES DAILY AS NEEDED 200 strip 11    clotrimazole-betamethasone (LOTRISONE) 1-0.05 % cream Apply topically 2 times daily. 45 g 1    dorzolamide-timolol (COSOPT) 22.3-6.8 MG/ML ophthalmic solution Place 1 drop into both eyes 2 times daily      Blood Glucose Calibration (FREESTYLE CONTROL SOLUTION) LIQD 100 each by In Vitro route 3 times daily (before meals) True Metrix Level 2 Ctrl Solution 100 each 5    blood glucose monitor strips by Other route 2 times daily True Metrix Blood Glucose Meter 100 strip 11    Elastic Bandages & Supports MISC Apply Truss strap for L groin tenderness/hernia 1 each 0    latanoprost (XALATAN) 0.005 % ophthalmic solution Place 1 drop into both eyes nightly      Accu-Chek Softclix Lancets MISC 1 each by Does not apply route 3 times daily 100 each 3    Blood Glucose Monitoring Suppl (ACCU-CHEK ERIKA) DEVIN 1 each by Does not apply route daily 1 Device 0    acetaminophen (TYLENOL) 500 MG tablet Take 2 tablets by mouth 2 times daily 540 tablet 1    simvastatin (ZOCOR) 20 MG tablet TAKE 1 TABLET EVERY NIGHT 90 tablet 3    aspirin EC 81 MG EC tablet Take 1 tablet by mouth daily. 30 tablet 11    Multiple Vitamin (MULTI VITAMIN MENS PO) Take by mouth        Cyanocobalamin (VITAMIN B 12) 100 MCG LOZG Take 1 tablet by mouth daily       No current facility-administered medications for this visit.      Allergies   Allergen Reactions    Xarelto [Rivaroxaban] Other (See Comments)     Hematuria recurrent       Health Maintenance   Topic Date Due    DTaP/Tdap/Td vaccine (1 - Tdap) Never done    Shingles vaccine (1 of 2) Never done    COVID-19 Vaccine (4 - Booster for Moderna series) 02/22/2022    Flu vaccine (1) 08/01/2022    Lipids  09/15/2022    Annual Wellness Visit (AWV)  11/06/2022    Depression Monitoring  06/14/2023    Pneumococcal 65+ years Vaccine  Completed    Hepatitis A vaccine  Aged Out    Hib vaccine  Aged Out    Meningococcal (ACWY) vaccine  Aged Out       Subjective:      Review of Systems   HENT:  Positive for congestion. Respiratory:  Positive for cough. All other systems reviewed and are negative. Objective:     Physical Exam  Vitals reviewed. Constitutional:       Appearance: He is well-developed. HENT:      Head: Normocephalic and atraumatic. Eyes:      Conjunctiva/sclera: Conjunctivae normal.      Pupils: Pupils are equal, round, and reactive to light. Neck:      Thyroid: No thyromegaly. Vascular: No JVD. Cardiovascular:      Rate and Rhythm: Normal rate and regular rhythm. Heart sounds: Normal heart sounds. No murmur heard. Pulmonary:      Effort: Pulmonary effort is normal.      Breath sounds: Normal breath sounds. Abdominal:      General: Bowel sounds are normal.      Palpations: Abdomen is soft. Musculoskeletal:         General: Normal range of motion. Cervical back: Normal range of motion and neck supple. Skin:     General: Skin is warm and dry. Neurological:      Mental Status: He is alert and oriented to person, place, and time. Deep Tendon Reflexes: Reflexes are normal and symmetric. /60 (Site: Right Upper Arm, Position: Sitting)   Pulse 92   Wt 222 lb (100.7 kg)   SpO2 96%   BMI 33.75 kg/m²       Assessment:       Diagnosis Orders   1. Hospital discharge follow-up  SC DISCHARGE MEDS RECONCILED W/ CURRENT OUTPATIENT MED LIST      2. Chronic combined systolic and diastolic congestive heart failure (HCC)  lisinopril (PRINIVIL;ZESTRIL) 10 MG tablet          Plan:      No follow-ups on file.     Orders Placed This Encounter   Medications    lisinopril (PRINIVIL;ZESTRIL) 10 MG tablet     Sig: Take 1 tablet by mouth daily     Dispense:  90 tablet     Refill:  3    glipiZIDE (GLUCOTROL) 5 MG tablet     Sig: Take 1 tablet by mouth 2 times daily (before meals)     Dispense:  180 tablet     Refill:  3     Orders Placed This Encounter   Procedures    MD DISCHARGE MEDS RECONCILED W/ CURRENT OUTPATIENT MED LIST            Patient given educational materials - see patient instructions. Discussed use, benefit, and side effects of prescribed medications. All patientquestions answered. Pt voiced understanding.     Electronically signed by Vito Peters MD on 1/3/2023at 2:38 PM

## 2023-01-04 ENCOUNTER — TELEPHONE (OUTPATIENT)
Dept: INTERNAL MEDICINE CLINIC | Age: 88
End: 2023-01-04

## 2023-01-04 NOTE — TELEPHONE ENCOUNTER
Patients daughter called informing that she noticed that Metolazone had been discontinued upon hospital discharge , would like to know if he should be restarting and continuing ?

## 2023-01-05 NOTE — TELEPHONE ENCOUNTER
Patient seen 1/3 by Dr Jaden Parks. Please advise. Or does patient need an additional appt to discuss?

## 2023-02-07 DIAGNOSIS — I50.43 ACUTE ON CHRONIC COMBINED SYSTOLIC AND DIASTOLIC HEART FAILURE (HCC): ICD-10-CM

## 2023-02-07 DIAGNOSIS — I42.0 DILATED CARDIOMYOPATHY (HCC): ICD-10-CM

## 2023-02-07 RX ORDER — METOLAZONE 2.5 MG/1
2.5 TABLET ORAL
Qty: 135 TABLET | Refills: 3 | Status: SHIPPED | OUTPATIENT
Start: 2023-02-09

## 2023-02-07 NOTE — TELEPHONE ENCOUNTER
----- Message from Margaux Whittaker sent at 2/7/2023 11:20 AM EST -----  Subject: Refill Request    QUESTIONS  Name of Medication? Other - metolazone  Patient-reported dosage and instructions? every other day  How many days do you have left? 0  Preferred Pharmacy? [address] phone number (if available)? [phone]  Additional Information for Provider? Patient wants this sent to mail   order, [place] charges him $17.00.  ---------------------------------------------------------------------------  --------------  [address]  What is the best way for the office to contact you? OK to leave message on   voicemail  Preferred Call Back Phone Number? [phone]  ---------------------------------------------------------------------------  --------------  SCRIPT ANSWERS  Relationship to Patient?  Self

## 2023-03-07 ENCOUNTER — TELEPHONE (OUTPATIENT)
Dept: INTERNAL MEDICINE CLINIC | Age: 88
End: 2023-03-07

## 2023-03-07 NOTE — TELEPHONE ENCOUNTER
LVM requesting patient return call to discuss. Pharmacy may be able to fill additional medication. However, the insurance likely will not cover a replacement script.

## 2023-03-07 NOTE — TELEPHONE ENCOUNTER
----- Message from [facility] sent at 3/6/2023  1:39 PM EST -----  Subject: Medication Problem    Medication: metOLazone (ZAROXOLYN) 2.5 MG tablet  Dosage: twice a week on Monday and Thursday  Ordering Provider: Kena Gordillo    Question/Problem: Patient lost pill bottle in home, needs refill?        Pharmacy: [address] [place] [address] [phone] [place] [phone]    ---------------------------------------------------------------------------  --------------  Jumana DRAKE  [phone]; OK to leave message on voicemail  ---------------------------------------------------------------------------  --------------    SCRIPT ANSWERS  Relationship to Patient: Self

## 2023-03-07 NOTE — LETTER
March 9, 2023       [address]       Dear Semaj Evans: We have attempted to reach you by phone with no success, please call the office at your earliest convenience.      Sincerely,    Your Care Team

## 2023-04-18 ENCOUNTER — OFFICE VISIT (OUTPATIENT)
Dept: INTERNAL MEDICINE CLINIC | Age: 88
End: 2023-04-18
Payer: MEDICARE

## 2023-04-18 VITALS
DIASTOLIC BLOOD PRESSURE: 84 MMHG | OXYGEN SATURATION: 96 % | HEART RATE: 75 BPM | SYSTOLIC BLOOD PRESSURE: 134 MMHG | WEIGHT: 227.6 LBS | BODY MASS INDEX: 34.61 KG/M2

## 2023-04-18 DIAGNOSIS — M54.17 LUMBOSACRAL RADICULOPATHY: ICD-10-CM

## 2023-04-18 DIAGNOSIS — G89.29 CHRONIC BILATERAL LOW BACK PAIN WITHOUT SCIATICA: ICD-10-CM

## 2023-04-18 DIAGNOSIS — N40.1 BENIGN PROSTATIC HYPERPLASIA WITH URINARY HESITANCY: ICD-10-CM

## 2023-04-18 DIAGNOSIS — R26.81 UNSTABLE GAIT: ICD-10-CM

## 2023-04-18 DIAGNOSIS — M54.50 CHRONIC BILATERAL LOW BACK PAIN WITHOUT SCIATICA: ICD-10-CM

## 2023-04-18 DIAGNOSIS — K45.8 OTHER SPECIFIED ABDOMINAL HERNIA WITHOUT OBSTRUCTION OR GANGRENE: ICD-10-CM

## 2023-04-18 DIAGNOSIS — R39.11 BENIGN PROSTATIC HYPERPLASIA WITH URINARY HESITANCY: ICD-10-CM

## 2023-04-18 DIAGNOSIS — I10 ESSENTIAL HYPERTENSION: ICD-10-CM

## 2023-04-18 DIAGNOSIS — K59.01 SLOW TRANSIT CONSTIPATION: ICD-10-CM

## 2023-04-18 DIAGNOSIS — I50.42 CHRONIC COMBINED SYSTOLIC AND DIASTOLIC CONGESTIVE HEART FAILURE (HCC): Primary | ICD-10-CM

## 2023-04-18 PROBLEM — U07.1 COVID-19 VIRUS INFECTION: Status: RESOLVED | Noted: 2022-12-14 | Resolved: 2023-04-18

## 2023-04-18 PROBLEM — N17.9 AKI (ACUTE KIDNEY INJURY) (HCC): Status: RESOLVED | Noted: 2022-12-15 | Resolved: 2023-04-18

## 2023-04-18 PROCEDURE — G8427 DOCREV CUR MEDS BY ELIG CLIN: HCPCS | Performed by: INTERNAL MEDICINE

## 2023-04-18 PROCEDURE — G8417 CALC BMI ABV UP PARAM F/U: HCPCS | Performed by: INTERNAL MEDICINE

## 2023-04-18 PROCEDURE — 99214 OFFICE O/P EST MOD 30 MIN: CPT | Performed by: INTERNAL MEDICINE

## 2023-04-18 PROCEDURE — 1123F ACP DISCUSS/DSCN MKR DOCD: CPT | Performed by: INTERNAL MEDICINE

## 2023-04-18 PROCEDURE — 1036F TOBACCO NON-USER: CPT | Performed by: INTERNAL MEDICINE

## 2023-04-18 RX ORDER — AMOXICILLIN 250 MG
1 CAPSULE ORAL DAILY
Qty: 100 TABLET | Refills: 3 | Status: SHIPPED | OUTPATIENT
Start: 2023-04-18

## 2023-04-18 RX ORDER — AMOXICILLIN 250 MG
1 CAPSULE ORAL DAILY
Qty: 100 TABLET | Refills: 0 | COMMUNITY
Start: 2023-04-18 | End: 2023-04-18

## 2023-04-18 RX ORDER — SPIRONOLACTONE 25 MG/1
25 TABLET ORAL DAILY
Qty: 90 TABLET | Refills: 3 | Status: SHIPPED | OUTPATIENT
Start: 2023-04-18

## 2023-04-18 SDOH — ECONOMIC STABILITY: INCOME INSECURITY: HOW HARD IS IT FOR YOU TO PAY FOR THE VERY BASICS LIKE FOOD, HOUSING, MEDICAL CARE, AND HEATING?: NOT HARD AT ALL

## 2023-04-18 SDOH — ECONOMIC STABILITY: FOOD INSECURITY: WITHIN THE PAST 12 MONTHS, THE FOOD YOU BOUGHT JUST DIDN'T LAST AND YOU DIDN'T HAVE MONEY TO GET MORE.: NEVER TRUE

## 2023-04-18 SDOH — ECONOMIC STABILITY: HOUSING INSECURITY
IN THE LAST 12 MONTHS, WAS THERE A TIME WHEN YOU DID NOT HAVE A STEADY PLACE TO SLEEP OR SLEPT IN A SHELTER (INCLUDING NOW)?: NO

## 2023-04-18 SDOH — ECONOMIC STABILITY: FOOD INSECURITY: WITHIN THE PAST 12 MONTHS, YOU WORRIED THAT YOUR FOOD WOULD RUN OUT BEFORE YOU GOT MONEY TO BUY MORE.: NEVER TRUE

## 2023-04-18 ASSESSMENT — PATIENT HEALTH QUESTIONNAIRE - PHQ9
3. TROUBLE FALLING OR STAYING ASLEEP: 0
SUM OF ALL RESPONSES TO PHQ9 QUESTIONS 1 & 2: 0
6. FEELING BAD ABOUT YOURSELF - OR THAT YOU ARE A FAILURE OR HAVE LET YOURSELF OR YOUR FAMILY DOWN: 0
5. POOR APPETITE OR OVEREATING: 0
SUM OF ALL RESPONSES TO PHQ QUESTIONS 1-9: 0
SUM OF ALL RESPONSES TO PHQ QUESTIONS 1-9: 0
4. FEELING TIRED OR HAVING LITTLE ENERGY: 0
10. IF YOU CHECKED OFF ANY PROBLEMS, HOW DIFFICULT HAVE THESE PROBLEMS MADE IT FOR YOU TO DO YOUR WORK, TAKE CARE OF THINGS AT HOME, OR GET ALONG WITH OTHER PEOPLE: 0
8. MOVING OR SPEAKING SO SLOWLY THAT OTHER PEOPLE COULD HAVE NOTICED. OR THE OPPOSITE, BEING SO FIGETY OR RESTLESS THAT YOU HAVE BEEN MOVING AROUND A LOT MORE THAN USUAL: 0
SUM OF ALL RESPONSES TO PHQ QUESTIONS 1-9: 0
7. TROUBLE CONCENTRATING ON THINGS, SUCH AS READING THE NEWSPAPER OR WATCHING TELEVISION: 0
2. FEELING DOWN, DEPRESSED OR HOPELESS: 0
1. LITTLE INTEREST OR PLEASURE IN DOING THINGS: 0
9. THOUGHTS THAT YOU WOULD BE BETTER OFF DEAD, OR OF HURTING YOURSELF: 0
SUM OF ALL RESPONSES TO PHQ QUESTIONS 1-9: 0

## 2023-04-18 NOTE — PROGRESS NOTES
Visit Information    Have you changed or started any medications since your last visit including any over-the-counter medicines, vitamins, or herbal medicines? no   Are you having any side effects from any of your medications? -  no  Have you stopped taking any of your medications? Is so, why? -  no    Have you seen any other physician or provider since your last visit? No  Have you had any other diagnostic tests since your last visit? No  Have you been seen in the emergency room and/or had an admission to a hospital since we last saw you? No  Have you had your routine dental cleaning in the past 6 months? no    Have you activated your [employer] account? If not, what are your barriers?  No:      Patient Care Team:  Elvia Wright MD as PCP - General (Internal Medicine)  Elvia Wright MD as PCP - Empaneled Provider  Kaylen Jarrett MD as Consulting Physician (Hematology and Oncology)  Kylee Ma MD as Consulting Physician (Radiation Oncology)  Cain Dodge MD    Medical History Review  Past Medical, Family, and Social History reviewed and does not contribute to the patient presenting condition    Health Maintenance   Topic Date Due    DTaP/Tdap/Td vaccine (1 - Tdap) Never done    Shingles vaccine (1 of 2) Never done    COVID-19 Vaccine (4 - Booster for Lorella Breeding series) 02/22/2022    Lipids  09/15/2022    Annual Wellness Visit (AWV)  11/06/2022    Depression Monitoring  06/14/2023    Flu vaccine (Season Ended) 08/01/2023    Pneumococcal 65+ years Vaccine  Completed    Hepatitis A vaccine  Aged Out    Hib vaccine  Aged Out    Meningococcal (ACWY) vaccine  Aged Out
Patricia Frazier NASOPHARYNGEAL SWAB     SARS-CoV-2 RNA, RT PCR DETECTED (A) Not Detected    INFLUENZA A Not Detected Not Detected    INFLUENZA B Not Detected Not Detected   CBC with Auto Differential   Result Value Ref Range    WBC 5.7 3.5 - 11.0 k/uL    RBC 3.95 (L) 4.5 - 5.9 m/uL    Hemoglobin 11.4 (L) 13.5 - 17.5 g/dL    Hematocrit 33.8 (L) 41 - 53 %    MCV 85.5 80 - 100 fL    MCH 28.9 26 - 34 pg    MCHC 33.8 31 - 37 g/dL    RDW 16.2 (H) 11.5 - 14.9 %    Platelets 512 865 - 903 k/uL    MPV 7.6 6.0 - 12.0 fL    Seg Neutrophils 64 36 - 66 %    Lymphocytes 23 (L) 24 - 44 %    Monocytes 6 1 - 7 %    Eosinophils % 6 (H) 0 - 4 %    Basophils 1 0 - 2 %    Segs Absolute 3.70 1.3 - 9.1 k/uL    Absolute Lymph # 1.30 1.0 - 4.8 k/uL    Absolute Mono # 0.30 0.1 - 1.3 k/uL    Absolute Eos # 0.30 0.0 - 0.4 k/uL    Basophils Absolute 0.10 0.0 - 0.2 k/uL   Comprehensive Metabolic Panel   Result Value Ref Range    Glucose 226 (H) 70 - 99 mg/dL    BUN 50 (H) 8 - 23 mg/dL    Creatinine 1.90 (H) 0.70 - 1.20 mg/dL    Est, Glom Filt Rate 34 (L) >60 mL/min/1.73m2    Calcium 9.7 8.6 - 10.4 mg/dL    Sodium 133 (L) 135 - 144 mmol/L    Potassium 5.3 3.7 - 5.3 mmol/L    Chloride 98 98 - 107 mmol/L    CO2 23 20 - 31 mmol/L    Anion Gap 12 9 - 17 mmol/L    Alkaline Phosphatase 52 40 - 129 U/L    ALT 19 5 - 41 U/L    AST 16 <40 U/L    Total Bilirubin 0.7 0.3 - 1.2 mg/dL    Total Protein 7.3 6.4 - 8.3 g/dL    Albumin 3.9 3.5 - 5.2 g/dL   Magnesium   Result Value Ref Range    Magnesium 2.1 1.6 - 2.6 mg/dL   Protime-INR   Result Value Ref Range    Protime 12.9 11.8 - 14.6 sec    INR 1.0    Troponin   Result Value Ref Range    Troponin, High Sensitivity 85 (HH) 0 - 22 ng/L   Troponin   Result Value Ref Range    Troponin, High Sensitivity 66 (HH) 0 - 22 ng/L   Urinalysis with Reflex to Culture    Specimen: Urine, clean catch   Result Value Ref Range    Color, UA Yellow Yellow    Turbidity UA Clear Clear    Glucose, Ur NEGATIVE NEGATIVE    Bilirubin Urine NEGATIVE

## 2023-05-01 ENCOUNTER — HOSPITAL ENCOUNTER (OUTPATIENT)
Dept: PHYSICAL THERAPY | Age: 88
Setting detail: THERAPIES SERIES
Discharge: HOME | End: 2023-05-01
Payer: MEDICARE

## 2023-05-01 PROCEDURE — 97161 PT EVAL LOW COMPLEX 20 MIN: CPT

## 2023-05-01 NOTE — CONSULTS
[address]  [place]: [phone]   F: [phone]     Physical Therapy Evaluation    Date:  2023  Patient: Deya Eisenberg  : 1935  MRN: [id number]  Physician: Gloria Martinez MD     Insurance: [facility] [employer] Medicare (28$ copay, APPROVED -23 10 VISITS)  Medical Diagnosis: R26.81 (ICD-10-CM) - Unstable gait    Rehab Codes: R26.81 unstable gait, M62.81 weakness   Onset Date: 2023 (referral)                                 Next 's appt: 23    Subjective:   CC: Unsteady gait  HPI: He reports falling 3x over the last 6 months. He reports that the last fall was last week when he was folding a comforter. No pain present. He is walking with a SPC. He reports taking meds for diabetes which has been under control. He reports his activity tolerance is decreased due to heart condition. He would like to be more confident with walking so he can get more out during the summer. He reports that he will occasionally go to the second floor of his house with UE assist.       Per referral note:   Unstable gait . 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     Medications: [x] Refer to full medical record [] None [] Other:  Allergies:      [x] Refer to full medical record  [] None [] Other:    Function:    Patient lives with: wife   In what type

## 2023-05-02 ENCOUNTER — OFFICE VISIT (OUTPATIENT)
Dept: UROLOGY | Age: 88
End: 2023-05-02
Payer: MEDICARE

## 2023-05-02 VITALS — BODY MASS INDEX: 34.4 KG/M2 | HEIGHT: 68 IN | TEMPERATURE: 97.8 F | WEIGHT: 227 LBS

## 2023-05-02 DIAGNOSIS — N40.1 BPH WITH OBSTRUCTION/LOWER URINARY TRACT SYMPTOMS: ICD-10-CM

## 2023-05-02 DIAGNOSIS — N13.8 BPH WITH OBSTRUCTION/LOWER URINARY TRACT SYMPTOMS: ICD-10-CM

## 2023-05-02 DIAGNOSIS — R35.1 NOCTURIA: Primary | ICD-10-CM

## 2023-05-02 PROCEDURE — 1123F ACP DISCUSS/DSCN MKR DOCD: CPT | Performed by: UROLOGY

## 2023-05-02 PROCEDURE — G8427 DOCREV CUR MEDS BY ELIG CLIN: HCPCS | Performed by: UROLOGY

## 2023-05-02 PROCEDURE — 1036F TOBACCO NON-USER: CPT | Performed by: UROLOGY

## 2023-05-02 PROCEDURE — 99204 OFFICE O/P NEW MOD 45 MIN: CPT | Performed by: UROLOGY

## 2023-05-02 PROCEDURE — G8417 CALC BMI ABV UP PARAM F/U: HCPCS | Performed by: UROLOGY

## 2023-05-02 RX ORDER — FUROSEMIDE 20 MG/1
TABLET ORAL
COMMUNITY

## 2023-05-02 RX ORDER — CARVEDILOL 12.5 MG/1
TABLET ORAL
COMMUNITY

## 2023-05-02 RX ORDER — FINASTERIDE 5 MG/1
5 TABLET, FILM COATED ORAL DAILY
Qty: 90 TABLET | Refills: 3 | Status: SHIPPED | OUTPATIENT
Start: 2023-05-02

## 2023-05-02 RX ORDER — SULFAMETHOXAZOLE AND TRIMETHOPRIM 800; 160 MG/1; MG/1
TABLET ORAL
COMMUNITY

## 2023-05-02 ASSESSMENT — ENCOUNTER SYMPTOMS
DIARRHEA: 0
EYE REDNESS: 0
CONSTIPATION: 0
ABDOMINAL PAIN: 0
BACK PAIN: 0
SHORTNESS OF BREATH: 0
NAUSEA: 0
VOMITING: 0
COUGH: 0
WHEEZING: 0
EYE PAIN: 0

## 2023-05-02 NOTE — PROGRESS NOTES
Review of Systems   Constitutional:  Negative for chills, fatigue and fever. Eyes:  Negative for pain, redness and visual disturbance. Respiratory:  Negative for cough, shortness of breath and wheezing. Cardiovascular:  Negative for chest pain and leg swelling. Gastrointestinal:  Negative for abdominal pain, constipation, diarrhea, nausea and vomiting. Genitourinary:  Positive for frequency and urgency. Negative for difficulty urinating, dysuria, flank pain, hematuria, scrotal swelling and testicular pain. Musculoskeletal:  Negative for back pain, joint swelling and myalgias. Skin:  Negative for rash and wound. Neurological:  Negative for dizziness, tremors and numbness. Hematological:  Does not bruise/bleed easily.
disease     Outpatient Medications Marked as Taking for the 5/2/23 encounter (Office Visit) with Parag Rivero MD   Medication Sig Dispense Refill    finasteride (PROSCAR) 5 MG tablet Take 1 tablet by mouth daily 90 tablet 3    spironolactone (ALDACTONE) 25 MG tablet Take 1 tablet by mouth daily 90 tablet 3    senna-docusate (SENNA S) 8.6-50 MG per tablet Take 1 tablet by mouth daily 100 tablet 3    metOLazone (ZAROXOLYN) 2.5 MG tablet Take 1 tablet by mouth Twice a Week Every monday and thursday 10 tablet 0    lisinopril (PRINIVIL;ZESTRIL) 10 MG tablet Take 1 tablet by mouth daily 90 tablet 3    glipiZIDE (GLUCOTROL) 5 MG tablet Take 1 tablet by mouth 2 times daily (before meals) 180 tablet 3    TRUE METRIX BLOOD GLUCOSE TEST strip TEST BLOOD SUGAR TWICE DAILY 200 strip 3    TRUEplus Lancets 33G MISC TEST BLOOD SUGAR AS DIRECTED 200 each 3    bumetanide (BUMEX) 1 MG tablet Take 1 tablet by mouth daily 90 tablet 3    tamsulosin (FLOMAX) 0.4 MG capsule TAKE 1 CAPSULE EVERY DAY 90 capsule 3    ACCU-CHEK ERIKA PLUS strip TEST BLOOD SUGAR THREE TIMES DAILY AS NEEDED 200 strip 11    clotrimazole-betamethasone (LOTRISONE) 1-0.05 % cream Apply topically 2 times daily.  45 g 1    dorzolamide-timolol (COSOPT) 22.3-6.8 MG/ML ophthalmic solution Place 1 drop into both eyes 2 times daily      Blood Glucose Calibration (FREESTYLE CONTROL SOLUTION) LIQD 100 each by In Vitro route 3 times daily (before meals) True Metrix Level 2 Ctrl Solution 100 each 5    blood glucose monitor strips by Other route 2 times daily True Metrix Blood Glucose Meter 100 strip 11    Elastic Bandages & Supports MISC Apply Truss strap for L groin tenderness/hernia 1 each 0    latanoprost (XALATAN) 0.005 % ophthalmic solution Place 1 drop into both eyes nightly      Accu-Chek Softclix Lancets MISC 1 each by Does not apply route 3 times daily 100 each 3    Blood Glucose Monitoring Suppl (ACCU-CHEK ERIKA) DEVIN 1 each by Does not apply route daily 1 Device

## 2023-05-08 ENCOUNTER — OFFICE VISIT (OUTPATIENT)
Dept: INTERNAL MEDICINE CLINIC | Age: 88
End: 2023-05-08
Payer: MEDICARE

## 2023-05-08 VITALS
BODY MASS INDEX: 34.67 KG/M2 | DIASTOLIC BLOOD PRESSURE: 72 MMHG | HEART RATE: 84 BPM | OXYGEN SATURATION: 98 % | WEIGHT: 228 LBS | SYSTOLIC BLOOD PRESSURE: 128 MMHG

## 2023-05-08 DIAGNOSIS — M79.602 LEFT ARM PAIN: ICD-10-CM
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
## 2023-05-08 DIAGNOSIS — M79.89 SWELLING OF LEFT HAND: Primary | ICD-10-CM

## 2023-05-08 PROCEDURE — G8427 DOCREV CUR MEDS BY ELIG CLIN: HCPCS | Performed by: INTERNAL MEDICINE

## 2023-05-08 PROCEDURE — 99213 OFFICE O/P EST LOW 20 MIN: CPT | Performed by: INTERNAL MEDICINE

## 2023-05-08 PROCEDURE — 1123F ACP DISCUSS/DSCN MKR DOCD: CPT | Performed by: INTERNAL MEDICINE

## 2023-05-08 PROCEDURE — G8417 CALC BMI ABV UP PARAM F/U: HCPCS | Performed by: INTERNAL MEDICINE

## 2023-05-08 PROCEDURE — 1036F TOBACCO NON-USER: CPT | Performed by: INTERNAL MEDICINE

## 2023-05-08 NOTE — PROGRESS NOTES
strip 11    clotrimazole-betamethasone (LOTRISONE) 1-0.05 % cream Apply topically 2 times daily. 45 g 1    dorzolamide-timolol (COSOPT) 22.3-6.8 MG/ML ophthalmic solution Place 1 drop into both eyes 2 times daily      Blood Glucose Calibration (FREESTYLE CONTROL SOLUTION) LIQD 100 each by In Vitro route 3 times daily (before meals) True Metrix Level 2 Ctrl Solution 100 each 5    blood glucose monitor strips by Other route 2 times daily True Metrix Blood Glucose Meter 100 strip 11    Elastic Bandages & Supports MISC Apply Truss strap for L groin tenderness/hernia 1 each 0    latanoprost (XALATAN) 0.005 % ophthalmic solution Place 1 drop into both eyes nightly      Accu-Chek Softclix Lancets MISC 1 each by Does not apply route 3 times daily 100 each 3    Blood Glucose Monitoring Suppl (ACCU-CHEK ERIKA) DEVIN 1 each by Does not apply route daily 1 Device 0    acetaminophen (TYLENOL) 500 MG tablet Take 2 tablets by mouth 2 times daily 540 tablet 1    aspirin EC 81 MG EC tablet Take 1 tablet by mouth daily. 30 tablet 11    Multiple Vitamin (MULTI VITAMIN MENS PO) Take by mouth        Cyanocobalamin (VITAMIN B 12) 100 MCG LOZG Take 1 tablet by mouth daily       No current facility-administered medications for this visit. Allergies   Allergen Reactions    Xarelto [Rivaroxaban] Other (See Comments)     Hematuria recurrent       Health Maintenance   Topic Date Due    DTaP/Tdap/Td vaccine (1 - Tdap) Never done    Shingles vaccine (1 of 2) Never done    COVID-19 Vaccine (4 - Booster for Moderna series) 02/22/2022    Annual Wellness Visit (AWV)  11/06/2022    Flu vaccine (Season Ended) 08/01/2023    Depression Monitoring  04/18/2024    Pneumococcal 65+ years Vaccine  Completed    Hepatitis A vaccine  Aged Out    Hib vaccine  Aged Out    Meningococcal (ACWY) vaccine  Aged Out    Lipids  Discontinued       Subjective:      Review of Systems   All other systems reviewed and are negative.     Objective:     Physical Exam  Vitals

## 2023-05-30 ENCOUNTER — OFFICE VISIT (OUTPATIENT)
Dept: INTERNAL MEDICINE CLINIC | Age: 88
End: 2023-05-30
Payer: MEDICARE

## 2023-05-30 VITALS
BODY MASS INDEX: 34.21 KG/M2 | SYSTOLIC BLOOD PRESSURE: 124 MMHG | WEIGHT: 225 LBS | DIASTOLIC BLOOD PRESSURE: 80 MMHG | OXYGEN SATURATION: 98 % | HEART RATE: 60 BPM

## 2023-05-30 DIAGNOSIS — I10 ESSENTIAL HYPERTENSION: ICD-10-CM

## 2023-05-30 DIAGNOSIS — I42.0 DILATED CARDIOMYOPATHY (HCC): ICD-10-CM

## 2023-05-30 DIAGNOSIS — E11.42 TYPE 2 DIABETES MELLITUS WITH DIABETIC POLYNEUROPATHY, WITHOUT LONG-TERM CURRENT USE OF INSULIN (HCC): ICD-10-CM

## 2023-05-30 DIAGNOSIS — I50.43 ACUTE ON CHRONIC COMBINED SYSTOLIC AND DIASTOLIC HEART FAILURE (HCC): ICD-10-CM

## 2023-05-30 DIAGNOSIS — I48.20 CHRONIC ATRIAL FIBRILLATION (HCC): Primary | ICD-10-CM

## 2023-05-30 DIAGNOSIS — I50.42 CHRONIC COMBINED SYSTOLIC AND DIASTOLIC CONGESTIVE HEART FAILURE (HCC): ICD-10-CM

## 2023-05-30 PROCEDURE — 99213 OFFICE O/P EST LOW 20 MIN: CPT | Performed by: INTERNAL MEDICINE

## 2023-05-30 PROCEDURE — 1036F TOBACCO NON-USER: CPT | Performed by: INTERNAL MEDICINE

## 2023-05-30 PROCEDURE — G8417 CALC BMI ABV UP PARAM F/U: HCPCS | Performed by: INTERNAL MEDICINE

## 2023-05-30 PROCEDURE — 1123F ACP DISCUSS/DSCN MKR DOCD: CPT | Performed by: INTERNAL MEDICINE

## 2023-05-30 PROCEDURE — G8427 DOCREV CUR MEDS BY ELIG CLIN: HCPCS | Performed by: INTERNAL MEDICINE

## 2023-05-30 RX ORDER — BUMETANIDE 1 MG/1
1 TABLET ORAL DAILY
Qty: 90 TABLET | Refills: 3 | Status: SHIPPED | OUTPATIENT
Start: 2023-05-30

## 2023-05-30 RX ORDER — METOLAZONE 2.5 MG/1
2.5 TABLET ORAL
Qty: 30 TABLET | Refills: 1 | Status: SHIPPED | OUTPATIENT
Start: 2023-06-01 | End: 2023-08-30

## 2023-05-31 NOTE — PROGRESS NOTES
Visit Information    Have you changed or started any medications since your last visit including any over-the-counter medicines, vitamins, or herbal medicines? no   Are you having any side effects from any of your medications? -  no  Have you stopped taking any of your medications? Is so, why? -  no    Have you seen any other physician or provider since your last visit? No  Have you had any other diagnostic tests since your last visit? No  Have you been seen in the emergency room and/or had an admission to a hospital since we last saw you? No  Have you had your routine dental cleaning in the past 6 months? no    Have you activated your [employer] account? If not, what are your barriers?  No:      Patient Care Team:  Gloria Hernandez MD as PCP - General (Internal Medicine)  Gloria Hernandez MD as PCP - Empaneled Provider  Gia Cadet MD as Consulting Physician (Hematology and Oncology)  Hoa Lagunas MD as Consulting Physician (Radiation Oncology)  Tremayne Sanchez MD    Medical History Review  Past Medical, Family, and Social History reviewed and does not contribute to the patient presenting condition    Health Maintenance   Topic Date Due    DTaP/Tdap/Td vaccine (1 - Tdap) Never done    Shingles vaccine (1 of 2) Never done    COVID-19 Vaccine (4 - Booster for [place] Maha series) 02/22/2022    Annual Wellness Visit (AWV)  11/06/2022    Flu vaccine (Season Ended) 08/01/2023    Depression Screen  04/18/2024    Pneumococcal 65+ years Vaccine  Completed    Hepatitis A vaccine  Aged Out    Hib vaccine  Aged Out    Meningococcal (ACWY) vaccine  Aged Out    Lipids  Discontinued    Depression Monitoring  Discontinued
2021 [address] Rodrigez.      Phone: [phone]   bumetanide 1 MG tablet  metOLazone 2.5 MG tablet             FOLLOW UP  PLANS     Return in about 3 months (around 8/30/2023). MD PIA Adame [address], [address] [place] [address].    Phone [phone]   Fax: [phone]  Answering Service: [phone]

## 2023-07-28 ENCOUNTER — CLINICAL DOCUMENTATION (OUTPATIENT)
Dept: PHYSICAL THERAPY | Age: 88
End: 2023-07-28

## 2023-07-28 NOTE — THERAPY DISCHARGE
[x] [facility] ([facility]) @ [facility]  [address] [facility], [address] [place] [address] [address]  Phone [phone]  Fax [phone]    Physical Therapy Discharge Note    Date: 2023      Patient: Nathaly Aguero  : 1935  MRN: [id number]    Physician: Lupe Estrada MD                               Insurance: [facility] [email or web address] [facility] Medicare (87$ copay, APPROVED -23 10 VISITS)  Medical Diagnosis: R26.81 (ICD-10-CM) - Unstable gait                  Rehab Codes: R26.81 unstable gait, M62.81 weakness   Onset Date: 2023 (referral)                                 Next 's appt: 23  Total visits attended:1  Cancels/No shows:0/0  Date of initial visit: 23                   [] Patient recovered from conditions. Treatment goals were met. [] Patient received maximum benefit. No further therapy indicated at this time. [] Patient demonstrated improvement from condition with  ** Of  ** Short term goals met. []Patient demonstrated improvement from condition with **   Of **  Long term goals met. [] Patient to continue exercise/home instructions independently. [] Therapy interrupted due to:    [] Patient has 2 or more no shows/cancels, is discontinued per our policy. [] Patient has completed prescribed number of treatment sessions. [x] Other: After insurance approval, patient was called multiple times and left messages. Patient never returned calls. Patient is to be discharged at this time. If patient is to require more physical therapy services, they are to obtain new script from physician.           Treatment Included:     [] Therapeutic Exercise   08337  [] Iontophoresis: 4 mg/mL Dexamethasone Sodium Phosphate  mAmin  39979   [] Therapeutic Activity  64490 [] Vasopneumatic cold with compression  35756    [] Gait Training   [phone] [] Ultrasound   21608   [] Neuromuscular Re-education  64943 [] Electrical Stimulation Unattended  34870   [] Manual Therapy  63119 []

## 2023-08-29 ENCOUNTER — OFFICE VISIT (OUTPATIENT)
Dept: INTERNAL MEDICINE CLINIC | Age: 88
End: 2023-08-29
Payer: MEDICARE

## 2023-08-29 VITALS
WEIGHT: 241 LBS | BODY MASS INDEX: 36.53 KG/M2 | HEART RATE: 56 BPM | SYSTOLIC BLOOD PRESSURE: 124 MMHG | OXYGEN SATURATION: 98 % | HEIGHT: 68 IN | DIASTOLIC BLOOD PRESSURE: 70 MMHG

## 2023-08-29 DIAGNOSIS — E66.01 SEVERE OBESITY (BMI 35.0-39.9) WITH COMORBIDITY (HCC): ICD-10-CM

## 2023-08-29 DIAGNOSIS — K59.01 SLOW TRANSIT CONSTIPATION: ICD-10-CM

## 2023-08-29 DIAGNOSIS — I50.42 CHRONIC COMBINED SYSTOLIC AND DIASTOLIC CONGESTIVE HEART FAILURE (HCC): ICD-10-CM

## 2023-08-29 DIAGNOSIS — E11.42 TYPE 2 DIABETES MELLITUS WITH DIABETIC POLYNEUROPATHY, WITHOUT LONG-TERM CURRENT USE OF INSULIN (HCC): Primary | ICD-10-CM

## 2023-08-29 DIAGNOSIS — R23.3 EASY BRUISABILITY: ICD-10-CM

## 2023-08-29 DIAGNOSIS — I48.20 CHRONIC ATRIAL FIBRILLATION (HCC): ICD-10-CM

## 2023-08-29 LAB — HBA1C MFR BLD: 11.9 %

## 2023-08-29 PROCEDURE — 83036 HEMOGLOBIN GLYCOSYLATED A1C: CPT | Performed by: INTERNAL MEDICINE

## 2023-08-29 PROCEDURE — 1123F ACP DISCUSS/DSCN MKR DOCD: CPT | Performed by: INTERNAL MEDICINE

## 2023-08-29 PROCEDURE — G8427 DOCREV CUR MEDS BY ELIG CLIN: HCPCS | Performed by: INTERNAL MEDICINE

## 2023-08-29 PROCEDURE — G8417 CALC BMI ABV UP PARAM F/U: HCPCS | Performed by: INTERNAL MEDICINE

## 2023-08-29 PROCEDURE — 1036F TOBACCO NON-USER: CPT | Performed by: INTERNAL MEDICINE

## 2023-08-29 PROCEDURE — 3046F HEMOGLOBIN A1C LEVEL >9.0%: CPT | Performed by: INTERNAL MEDICINE

## 2023-08-29 PROCEDURE — 99214 OFFICE O/P EST MOD 30 MIN: CPT | Performed by: INTERNAL MEDICINE

## 2023-08-29 RX ORDER — AMOXICILLIN 250 MG
2 CAPSULE ORAL NIGHTLY
Qty: 180 TABLET | Refills: 3 | Status: SHIPPED | OUTPATIENT
Start: 2023-08-29

## 2023-08-29 RX ORDER — GLIPIZIDE 10 MG/1
10 TABLET ORAL
Qty: 180 TABLET | Refills: 3 | Status: SHIPPED | OUTPATIENT
Start: 2023-08-29 | End: 2024-08-23

## 2023-08-29 NOTE — PROGRESS NOTES
Visit Information    Have you changed or started any medications since your last visit including any over-the-counter medicines, vitamins, or herbal medicines? no   Are you having any side effects from any of your medications? -  no  Have you stopped taking any of your medications? Is so, why? -  no    Have you seen any other physician or provider since your last visit? No  Have you had any other diagnostic tests since your last visit? No  Have you been seen in the emergency room and/or had an admission to a hospital since we last saw you? No  Have you had your routine dental cleaning in the past 6 months? no    Have you activated your [employer] account? If not, what are your barriers?  No:   Patient Care Team:  Rina Toth MD as PCP - General (Internal Medicine)  Rina Toth MD as PCP - Empaneled Provider  Filemon Guallpa MD as Consulting Physician (Hematology and Oncology)  Ronaldo Pickard MD as Consulting Physician (Radiation Oncology)  Vernon Velazquez MD    Medical History Review  Past Medical, Family, and Social History reviewed and does contribute to the patient presenting condition    Health Maintenance   Topic Date Due    DTaP/Tdap/Td vaccine (1 - Tdap) Never done    Shingles vaccine (1 of 2) Never done    COVID-19 Vaccine (4 - Booster for [place] Halt series) 2022    Annual Wellness Visit (AWV)  2022    Flu vaccine (1) 2023    Depression Screen  2024    Pneumococcal 65+ years Vaccine  Completed    Hepatitis A vaccine  Aged Out    Hib vaccine  Aged Out    Meningococcal (ACWY) vaccine  Aged Out    Lipids  Discontinued    Depression Monitoring  Discontinued
on file for this visit. Medication List            Accurate as of August 29, 2023 11:59 PM. If you have any questions, ask your nurse or doctor. START taking these medications      empagliflozin 10 MG tablet  Commonly known as: Jardiance  Take 1 tablet by mouth daily            CHANGE how you take these medications      glipiZIDE 10 MG tablet  Commonly known as: GLUCOTROL  Take 1 tablet by mouth 2 times daily (before meals)  What changed:   medication strength  how much to take     senna-docusate 8.6-50 MG per tablet  Commonly known as: Senna S  Take 2 tablets by mouth at bedtime  What changed:   how much to take  when to take this            CONTINUE taking these medications      Accu-Chek Estella Gardenia  1 each by Does not apply route daily     * Accu-Chek Softclix Lancets Misc  1 each by Does not apply route 3 times daily     * TRUEplus Lancets 33G Misc  TEST BLOOD SUGAR AS DIRECTED     * blood glucose test strips  by Other route 2 times daily True Metrix Blood Glucose Meter     * Accu-Chek Estella Plus strip  Generic drug: blood glucose test strips  TEST BLOOD SUGAR THREE TIMES DAILY AS NEEDED     * True Metrix Blood Glucose Test strip  Generic drug: blood glucose test strips  TEST BLOOD SUGAR TWICE DAILY     bumetanide 1 MG tablet  Commonly known as: BUMEX  Take 1 tablet by mouth daily     clotrimazole-betamethasone 1-0.05 % cream  Commonly known as: LOTRISONE  Apply topically 2 times daily.      dorzolamide-timolol 22.3-6.8 MG/ML ophthalmic solution  Commonly known as: COSOPT     Elastic Bandages & Supports Misc  Apply Truss strap for L groin tenderness/hernia     finasteride 5 MG tablet  Commonly known as: PROSCAR  Take 1 tablet by mouth daily     FreeStyle Control Solution Liqd  100 each by In Vitro route 3 times daily (before meals) True Metrix Level 2 Ctrl Solution     latanoprost 0.005 % ophthalmic solution  Commonly known as: XALATAN     lisinopril 10 MG tablet  Commonly known as:

## 2023-09-05 ENCOUNTER — TELEPHONE (OUTPATIENT)
Dept: INTERNAL MEDICINE CLINIC | Age: 88
End: 2023-09-05

## 2023-09-05 NOTE — TELEPHONE ENCOUNTER
Spoke with patient and received verbal approval to speak with patients wife. She is concerned about patient medications. On the AVS it stated to stop the following medications. Patient family would like to know why these are stopped. Please advise.      STOP taking these medications       acetaminophen 500 MG tablet  Commonly known as: TYLENOL      aspirin EC 81 MG EC tablet      carvedilol 12.5 MG tablet  Commonly known as: COREG      metOLazone 2.5 MG tablet  Commonly known as: Rhoda Loop

## 2023-09-05 NOTE — TELEPHONE ENCOUNTER
Celester Reasons would like a call back regarding Carl's visit on 08/29/2023 she has questions. Please advice.

## 2023-09-06 NOTE — TELEPHONE ENCOUNTER
Attempting to reach patient, wife answered and is not with patient. Informed her I will need his verbal consent to speak with her. She agreed and will call back when she is with patient.

## 2023-11-03 ENCOUNTER — TELEPHONE (OUTPATIENT)
Dept: UROLOGY | Age: 88
End: 2023-11-03

## 2023-11-03 NOTE — TELEPHONE ENCOUNTER
Writer called patient and left a voicemail. Writer called patient and let them know that we had to reschedule patients appointment for 11.28 @ 110 pm. Writer advised on voicemail if this does not work for them to give our office a call back to get rescheduled.

## 2023-12-05 ENCOUNTER — TELEPHONE (OUTPATIENT)
Dept: INTERNAL MEDICINE CLINIC | Age: 88
End: 2023-12-05

## 2023-12-14 ENCOUNTER — OFFICE VISIT (OUTPATIENT)
Dept: INTERNAL MEDICINE CLINIC | Age: 88
End: 2023-12-14
Payer: MEDICARE

## 2023-12-14 VITALS — SYSTOLIC BLOOD PRESSURE: 122 MMHG | HEART RATE: 64 BPM | DIASTOLIC BLOOD PRESSURE: 72 MMHG | OXYGEN SATURATION: 98 %

## 2023-12-14 DIAGNOSIS — E11.42 TYPE 2 DIABETES MELLITUS WITH DIABETIC POLYNEUROPATHY, WITHOUT LONG-TERM CURRENT USE OF INSULIN (HCC): Primary | ICD-10-CM

## 2023-12-14 LAB — HBA1C MFR BLD: 9.6 %

## 2023-12-14 PROCEDURE — G8417 CALC BMI ABV UP PARAM F/U: HCPCS | Performed by: INTERNAL MEDICINE

## 2023-12-14 PROCEDURE — 99214 OFFICE O/P EST MOD 30 MIN: CPT | Performed by: INTERNAL MEDICINE

## 2023-12-14 PROCEDURE — G8484 FLU IMMUNIZE NO ADMIN: HCPCS | Performed by: INTERNAL MEDICINE

## 2023-12-14 PROCEDURE — 1123F ACP DISCUSS/DSCN MKR DOCD: CPT | Performed by: INTERNAL MEDICINE

## 2023-12-14 PROCEDURE — 1036F TOBACCO NON-USER: CPT | Performed by: INTERNAL MEDICINE

## 2023-12-14 PROCEDURE — 83036 HEMOGLOBIN GLYCOSYLATED A1C: CPT | Performed by: INTERNAL MEDICINE

## 2023-12-14 PROCEDURE — 3046F HEMOGLOBIN A1C LEVEL >9.0%: CPT | Performed by: INTERNAL MEDICINE

## 2023-12-14 PROCEDURE — G8427 DOCREV CUR MEDS BY ELIG CLIN: HCPCS | Performed by: INTERNAL MEDICINE

## 2023-12-14 RX ORDER — ASPIRIN 81 MG/1
81 TABLET ORAL DAILY
Qty: 90 TABLET | Refills: 1 | Status: SHIPPED | OUTPATIENT
Start: 2023-12-14

## 2023-12-14 RX ORDER — METOLAZONE 2.5 MG/1
2.5 TABLET ORAL
COMMUNITY

## 2023-12-15 ENCOUNTER — HOSPITAL ENCOUNTER (OUTPATIENT)
Age: 88
Setting detail: SPECIMEN
Discharge: HOME | End: 2023-12-15

## 2023-12-15 DIAGNOSIS — E11.42 TYPE 2 DIABETES MELLITUS WITH DIABETIC POLYNEUROPATHY, WITHOUT LONG-TERM CURRENT USE OF INSULIN (HCC): ICD-10-CM

## 2023-12-15 LAB
CREAT UR-MCNC: 80.5 MG/DL (ref 39–259)
MICROALBUMIN UR-MCNC: <12 MG/L
MICROALBUMIN/CREAT UR-RTO: NORMAL MCG/MG CREAT

## 2023-12-15 NOTE — PROGRESS NOTES
Visit Information    Have you changed or started any medications since your last visit including any over-the-counter medicines, vitamins, or herbal medicines? no   Are you having any side effects from any of your medications? -  no  Have you stopped taking any of your medications? Is so, why? -  no    Have you seen any other physician or provider since your last visit? No  Have you had any other diagnostic tests since your last visit? No  Have you been seen in the emergency room and/or had an admission to a hospital since we last saw you? No  Have you had your routine dental cleaning in the past 6 months? no    Have you activated your [employer] account? If not, what are your barriers?  No:      Patient Care Team:  Lucas Apple MD as PCP - General (Internal Medicine)  Lucas Apple MD as PCP - Empaneled Provider  Skylar Nolan MD as Consulting Physician (Hematology and Oncology)  Isaiah Li MD as Consulting Physician (Radiation Oncology)  Abdirashid Matos MD    Medical History Review  Past Medical, Family, and Social History reviewed and does contribute to the patient presenting condition    Health Maintenance   Topic Date Due    DTaP/Tdap/Td vaccine (1 - Tdap) Never done    Shingles vaccine (1 of 2) Never done    Hepatitis B vaccine (1 of 3 - Risk 3-dose series) Never done    Respiratory Syncytial Virus (RSV) age 61 yrs+ (1 - 1-dose 60+ series) Never done    Annual Wellness Visit (AWV)  11/06/2022    Flu vaccine (1) 08/01/2023    COVID-19 Vaccine (4 - 2023-24 season) 09/01/2023    Depression Screen  04/18/2024    Pneumococcal 65+ years Vaccine  Completed    Hepatitis A vaccine  Aged Out    Hib vaccine  Aged Out    Meningococcal (ACWY) vaccine  Aged Out    Lipids  Discontinued    Depression Monitoring  Discontinued
Psych : Negative for depression, anxiety  Skin: Negative rash and skin lesions  Musculoskeletal : Negative for joint pain, muscle pain      PHYSICAL EXAM:      Vitals:    12/14/23 1518   BP: 122/72   Site: Right Upper Arm   Position: Sitting   Pulse: 64   SpO2: 98%     BP Readings from Last 3 Encounters:   12/14/23 122/72   08/29/23 124/70   05/30/23 124/80        Constitutional: Normal appearance for chronological age, does not appear chronically ill. HEENT: The pupils are equal and reactive. Neck: Trachea is midline. The neck is supple. Negative nodes. Respiratory: normal respiratory effort. Lungs are clear to auscultation bilaterally. no wheezing, no crackles. Cardiovascular: No jugular venous distention or carotid bruits. Normal S1 and S2 sounds are noted without murmurs, rubs, gallops. No leg edema  Abdomen: Soft to palpation in all four quadrants. There is no organomegaly and no rebound tenderness. Bowel sounds are normal.   Musculoskeletal: Muscular strength good bilaterally  Neurological: Normal [place] Coma Scale.   Alert oriented x3, grossly nonfocal    Lab Results   Component Value Date    LABA1C 9.6 12/14/2023     Lab Results   Component Value Date     11/08/2022      LABORATORY FINDINGS:    CBC:  Lab Results   Component Value Date/Time    WBC 6.8 12/16/2022 08:38 AM    HGB 10.3 12/16/2022 08:38 AM     12/16/2022 08:38 AM     05/22/2012 11:57 AM       BMP:    Lab Results   Component Value Date/Time     12/16/2022 08:38 AM    K 4.5 12/16/2022 08:38 AM     12/16/2022 08:38 AM    CO2 23 12/16/2022 08:38 AM    BUN 25 12/16/2022 08:38 AM    CREATININE 1.03 12/16/2022 08:38 AM    GLUCOSE 128 12/16/2022 08:38 AM    GLUCOSE 181 10/10/2011 03:50 PM       HEMOGLOBIN A1C:   Hemoglobin A1C (%)   Date Value   12/14/2023 9.6   08/29/2023 11.9   11/08/2022 8.5 (H)        FASTING LIPID PANEL:No results found for: \"LIPIDPAN\"    TSH:No results found for: \"TSHREFFT4\"    No results

## 2024-01-09 ENCOUNTER — OFFICE VISIT (OUTPATIENT)
Dept: INTERNAL MEDICINE CLINIC | Age: 89
End: 2024-01-09

## 2024-01-09 VITALS
WEIGHT: 205 LBS | DIASTOLIC BLOOD PRESSURE: 60 MMHG | HEIGHT: 67 IN | BODY MASS INDEX: 32.18 KG/M2 | SYSTOLIC BLOOD PRESSURE: 128 MMHG

## 2024-01-09 DIAGNOSIS — I48.20 CHRONIC ATRIAL FIBRILLATION (HCC): ICD-10-CM

## 2024-01-09 DIAGNOSIS — K59.00 CONSTIPATION, UNSPECIFIED CONSTIPATION TYPE: ICD-10-CM

## 2024-01-09 DIAGNOSIS — E11.42 TYPE 2 DIABETES MELLITUS WITH DIABETIC POLYNEUROPATHY, WITHOUT LONG-TERM CURRENT USE OF INSULIN (HCC): Primary | ICD-10-CM

## 2024-01-09 PROBLEM — K59.09 OTHER CONSTIPATION: Status: ACTIVE | Noted: 2019-05-09

## 2024-01-09 RX ORDER — ASPIRIN 81 MG/1
81 TABLET ORAL EVERY OTHER DAY
Qty: 90 TABLET | Refills: 1 | Status: SHIPPED | OUTPATIENT
Start: 2024-01-09

## 2024-01-09 RX ORDER — LACTULOSE 10 G/15ML
10 SOLUTION ORAL NIGHTLY PRN
Qty: 1 EACH | Refills: 1 | Status: SHIPPED | OUTPATIENT
Start: 2024-01-09

## 2024-01-09 ASSESSMENT — PATIENT HEALTH QUESTIONNAIRE - PHQ9
SUM OF ALL RESPONSES TO PHQ QUESTIONS 1-9: 0
SUM OF ALL RESPONSES TO PHQ9 QUESTIONS 1 & 2: 0
SUM OF ALL RESPONSES TO PHQ QUESTIONS 1-9: 0
1. LITTLE INTEREST OR PLEASURE IN DOING THINGS: 0
SUM OF ALL RESPONSES TO PHQ QUESTIONS 1-9: 0
2. FEELING DOWN, DEPRESSED OR HOPELESS: 0
SUM OF ALL RESPONSES TO PHQ QUESTIONS 1-9: 0

## 2024-01-09 NOTE — PROGRESS NOTES
OFFICE VISIT  PROGRESS NOTE         Date of patient's visit: 1/9/24  Patient's Name:  Carl Frias  YOB: 1935       Patient Care Team:  Aleksandr Finney MD as PCP - General (Internal Medicine)  [facility]ksandr Finney MD [facility]  Minerva Box MD as Consulting Physician (Hematology and Oncology)  Kyaw Bowling MD as Consulting Physician (Radiation Oncology)  Ron Mayers MD        SUBJECTIVE     HISTORY           History of present illness     Pertinent details  added to ,       Chief Complaint   Patient presents with    Diabetes          Encounter Diagnoses   Name Primary?    Type 2 diabetes mellitus with diabetic polyneuropathy, without long-term current use of insulin (HCC) Yes    Constipation, unspecified constipation type     Chronic atrial fibrillation (HCC)         Symptom / problem          --history obtained from patient.-     Patient here for evaluation and management  of  Diabetes Mellitus .           -- No symptoms suggestive of hyperglycemia or hypoglycemia .           - Blood sugar diary maintaine [] yes    [x] No    Hemoglobin A1C   Date Value Ref Range Status   12/14/2023 9.6 % Final            Known to have  Hypertension ,  Compliant with meds .  No new symptoms suggestive of high or low bp .        Hemoglobin A1C   Date Value Ref Range Status   12/14/2023 9.6 % Final         BP Readings from Last 3 Encounters:   01/09/24 128/60   12/14/23 122/72   08/29/23 124/70       @last(bmp)            Known  CHRONIC  HEART FAILURE .   NYHA CLASS   -- 2-- ,  NO CHANGE IN SOB AND LEG EDEMA SINCE LAST VISIT .  NO ORTHOPNEA.  COMPLIANT WITH DRUG REGIMEN .               MEDICATIONS:      Current Outpatient Medications   Medication Sig Dispense Refill    lactulose (CHRONULAC) 10

## 2024-01-09 NOTE — PROGRESS NOTES
Visit Information    Have you changed or started any medications since your last visit including any over-the-counter medicines, vitamins, or herbal medicines? no   Are you having any side effects from any of your medications? -  no  Have you stopped taking any of your medications? Is so, why? -  no    Have you seen any other physician or provider since your last visit? No  Have you had any other diagnostic tests since your last visit? No  Have you been seen in the emergency room and/or had an admission to a hospital since we last saw you? No  Have you had your routine dental cleaning in the past 6 months? no    Have you activated your [employer] account? If not, what are your barriers? No:      Patient Care Team:  Aleksandr Finney MD as PCP - General (Internal Medicine)  Aleksandr Finney MD as PCP - Empaneled Provider  Minerva Box MD as Consulting Physician (Hematology and Oncology)  Kyaw Bowling MD as Consulting Physician (Radiation Oncology)  Ron Mayers MD    Medical History Review  Past Medical, Family, and Social History reviewed and does not contribute to the patient presenting condition    Health Maintenance   Topic Date Due    DTaP/Tdap/Td vaccine (1 - Tdap) Never done    Shingles vaccine (1 of 2) Never done    Respiratory Syncytial Virus (RSV) Pregnant or age 60 yrs+ (1 - 1-dose 60+ series) Never done    Flu vaccine (1) 08/01/2023    COVID-19 Vaccine (4 - 2023-24 season) 09/01/2023    Annual Wellness Visit (Medicare Advantage)  01/01/2024    Depression Screen  04/18/2024    Pneumococcal 65+ years Vaccine  Completed    Hepatitis A vaccine  Aged Out    Hepatitis B vaccine  Aged Out    Hib vaccine  Aged Out    Polio vaccine  Aged Out    Meningococcal (ACWY) vaccine  Aged Out    Lipids  Discontinued    Depression Monitoring  Discontinued

## 2024-02-18 ENCOUNTER — APPOINTMENT (OUTPATIENT)
Dept: GENERAL RADIOLOGY | Age: 89
DRG: 291 | End: 2024-02-18
Payer: MEDICARE

## 2024-02-18 ENCOUNTER — HOSPITAL ENCOUNTER (INPATIENT)
Age: 89
LOS: 4 days | Discharge: NURSING, REHAB OR LONG-TERM CARE | DRG: 291 | End: 2024-02-22
Attending: EMERGENCY MEDICINE | Admitting: INTERNAL MEDICINE
Payer: MEDICARE

## 2024-02-18 DIAGNOSIS — I50.43 ACUTE ON CHRONIC COMBINED SYSTOLIC (CONGESTIVE) AND DIASTOLIC (CONGESTIVE) HEART FAILURE (HCC): Primary | ICD-10-CM

## 2024-02-18 DIAGNOSIS — I50.9 ACUTE CONGESTIVE HEART FAILURE, UNSPECIFIED HEART FAILURE TYPE (HCC): ICD-10-CM

## 2024-02-18 DIAGNOSIS — I48.20 CHRONIC ATRIAL FIBRILLATION (HCC): ICD-10-CM

## 2024-02-18 LAB
ALBUMIN SERPL-MCNC: 3.5 G/DL (ref 3.5–5.2)
ALP SERPL-CCNC: 47 U/L (ref 40–129)
ALT SERPL-CCNC: 14 U/L (ref 5–41)
ANION GAP SERPL CALCULATED.3IONS-SCNC: 12 MMOL/L (ref 9–17)
AST SERPL-CCNC: 18 U/L
BASOPHILS # BLD: 0 K/UL (ref 0–0.2)
BASOPHILS NFR BLD: 1 % (ref 0–2)
BILIRUB SERPL-MCNC: 1.1 MG/DL (ref 0.3–1.2)
BNP SERPL-MCNC: 849 PG/ML
BUN SERPL-MCNC: 31 MG/DL (ref 8–23)
CALCIUM SERPL-MCNC: 9.2 MG/DL (ref 8.6–10.4)
CHLORIDE SERPL-SCNC: 94 MMOL/L (ref 98–107)
CO2 SERPL-SCNC: 23 MMOL/L (ref 20–31)
CREAT SERPL-MCNC: 1.1 MG/DL (ref 0.7–1.2)
EOSINOPHIL # BLD: 0.1 K/UL (ref 0–0.4)
EOSINOPHILS RELATIVE PERCENT: 1 % (ref 0–4)
ERYTHROCYTE [DISTWIDTH] IN BLOOD BY AUTOMATED COUNT: 15 % (ref 11.5–14.9)
FLUAV RNA RESP QL NAA+PROBE: NOT DETECTED
FLUBV RNA RESP QL NAA+PROBE: NOT DETECTED
GFR SERPL CREATININE-BSD FRML MDRD: >60 ML/MIN/1.73M2
GLUCOSE BLD-MCNC: 156 MG/DL (ref 75–110)
GLUCOSE BLD-MCNC: 196 MG/DL (ref 75–110)
GLUCOSE SERPL-MCNC: 198 MG/DL (ref 70–99)
HCT VFR BLD AUTO: 34.4 % (ref 41–53)
HGB BLD-MCNC: 11.2 G/DL (ref 13.5–17.5)
INR PPP: 1.1
LIPASE SERPL-CCNC: 14 U/L (ref 13–60)
LYMPHOCYTES NFR BLD: 1.4 K/UL (ref 1–4.8)
LYMPHOCYTES RELATIVE PERCENT: 24 % (ref 24–44)
MAGNESIUM SERPL-MCNC: 1.7 MG/DL (ref 1.6–2.6)
MCH RBC QN AUTO: 27.9 PG (ref 26–34)
MCHC RBC AUTO-ENTMCNC: 32.7 G/DL (ref 31–37)
MCV RBC AUTO: 85.5 FL (ref 80–100)
MONOCYTES NFR BLD: 0.9 K/UL (ref 0.1–1.3)
MONOCYTES NFR BLD: 14 % (ref 1–7)
NEUTROPHILS NFR BLD: 60 % (ref 36–66)
NEUTS SEG NFR BLD: 3.7 K/UL (ref 1.3–9.1)
PLATELET # BLD AUTO: 243 K/UL (ref 150–450)
PMV BLD AUTO: 7.3 FL (ref 6–12)
POTASSIUM SERPL-SCNC: 4 MMOL/L (ref 3.7–5.3)
PROT SERPL-MCNC: 6.4 G/DL (ref 6.4–8.3)
PROTHROMBIN TIME: 14.6 SEC (ref 11.8–14.6)
RBC # BLD AUTO: 4.03 M/UL (ref 4.5–5.9)
SARS-COV-2 RNA RESP QL NAA+PROBE: NOT DETECTED
SODIUM SERPL-SCNC: 129 MMOL/L (ref 135–144)
SOURCE: NORMAL
SPECIMEN DESCRIPTION: NORMAL
TROPONIN I SERPL HS-MCNC: 69 NG/L (ref 0–22)
TROPONIN I SERPL HS-MCNC: 72 NG/L (ref 0–22)
WBC OTHER # BLD: 6.1 K/UL (ref 3.5–11)

## 2024-02-18 PROCEDURE — 2060000000 HC ICU INTERMEDIATE R&B

## 2024-02-18 PROCEDURE — 83690 ASSAY OF LIPASE: CPT

## 2024-02-18 PROCEDURE — 6360000002 HC RX W HCPCS: Performed by: EMERGENCY MEDICINE

## 2024-02-18 PROCEDURE — 93005 ELECTROCARDIOGRAM TRACING: CPT | Performed by: INTERNAL MEDICINE

## 2024-02-18 PROCEDURE — 6360000002 HC RX W HCPCS

## 2024-02-18 PROCEDURE — 85610 PROTHROMBIN TIME: CPT

## 2024-02-18 PROCEDURE — 99285 EMERGENCY DEPT VISIT HI MDM: CPT

## 2024-02-18 PROCEDURE — 99223 1ST HOSP IP/OBS HIGH 75: CPT | Performed by: INTERNAL MEDICINE

## 2024-02-18 PROCEDURE — 84484 ASSAY OF TROPONIN QUANT: CPT

## 2024-02-18 PROCEDURE — 82947 ASSAY GLUCOSE BLOOD QUANT: CPT

## 2024-02-18 PROCEDURE — 6370000000 HC RX 637 (ALT 250 FOR IP)

## 2024-02-18 PROCEDURE — 80053 COMPREHEN METABOLIC PANEL: CPT

## 2024-02-18 PROCEDURE — 83880 ASSAY OF NATRIURETIC PEPTIDE: CPT

## 2024-02-18 PROCEDURE — 85025 COMPLETE CBC W/AUTO DIFF WBC: CPT

## 2024-02-18 PROCEDURE — 83735 ASSAY OF MAGNESIUM: CPT

## 2024-02-18 PROCEDURE — 36415 COLL VENOUS BLD VENIPUNCTURE: CPT

## 2024-02-18 PROCEDURE — 96374 THER/PROPH/DIAG INJ IV PUSH: CPT

## 2024-02-18 PROCEDURE — 71045 X-RAY EXAM CHEST 1 VIEW: CPT

## 2024-02-18 PROCEDURE — 87636 SARSCOV2 & INF A&B AMP PRB: CPT

## 2024-02-18 PROCEDURE — 2580000003 HC RX 258

## 2024-02-18 RX ORDER — ASPIRIN 81 MG/1
81 TABLET ORAL EVERY OTHER DAY
Status: DISCONTINUED | OUTPATIENT
Start: 2024-02-19 | End: 2024-02-22 | Stop reason: HOSPADM

## 2024-02-18 RX ORDER — POTASSIUM CHLORIDE 20 MEQ/1
40 TABLET, EXTENDED RELEASE ORAL PRN
Status: DISCONTINUED | OUTPATIENT
Start: 2024-02-18 | End: 2024-02-22 | Stop reason: HOSPADM

## 2024-02-18 RX ORDER — POLYETHYLENE GLYCOL 3350 17 G/17G
17 POWDER, FOR SOLUTION ORAL DAILY PRN
Status: DISCONTINUED | OUTPATIENT
Start: 2024-02-18 | End: 2024-02-22 | Stop reason: HOSPADM

## 2024-02-18 RX ORDER — INSULIN LISPRO 100 [IU]/ML
0-4 INJECTION, SOLUTION INTRAVENOUS; SUBCUTANEOUS NIGHTLY
Status: DISCONTINUED | OUTPATIENT
Start: 2024-02-18 | End: 2024-02-22 | Stop reason: HOSPADM

## 2024-02-18 RX ORDER — ACETAMINOPHEN 325 MG/1
650 TABLET ORAL EVERY 6 HOURS PRN
Status: DISCONTINUED | OUTPATIENT
Start: 2024-02-18 | End: 2024-02-22 | Stop reason: HOSPADM

## 2024-02-18 RX ORDER — ACETAMINOPHEN 650 MG/1
650 SUPPOSITORY RECTAL EVERY 6 HOURS PRN
Status: DISCONTINUED | OUTPATIENT
Start: 2024-02-18 | End: 2024-02-22 | Stop reason: HOSPADM

## 2024-02-18 RX ORDER — BUMETANIDE 0.25 MG/ML
1 INJECTION INTRAMUSCULAR; INTRAVENOUS 2 TIMES DAILY
Status: DISCONTINUED | OUTPATIENT
Start: 2024-02-19 | End: 2024-02-20

## 2024-02-18 RX ORDER — POTASSIUM CHLORIDE 7.45 MG/ML
10 INJECTION INTRAVENOUS PRN
Status: DISCONTINUED | OUTPATIENT
Start: 2024-02-18 | End: 2024-02-22 | Stop reason: HOSPADM

## 2024-02-18 RX ORDER — DEXTROSE MONOHYDRATE 100 MG/ML
INJECTION, SOLUTION INTRAVENOUS CONTINUOUS PRN
Status: DISCONTINUED | OUTPATIENT
Start: 2024-02-18 | End: 2024-02-22 | Stop reason: HOSPADM

## 2024-02-18 RX ORDER — INSULIN LISPRO 100 [IU]/ML
0-4 INJECTION, SOLUTION INTRAVENOUS; SUBCUTANEOUS
Status: DISCONTINUED | OUTPATIENT
Start: 2024-02-18 | End: 2024-02-22 | Stop reason: HOSPADM

## 2024-02-18 RX ORDER — TAMSULOSIN HYDROCHLORIDE 0.4 MG/1
0.4 CAPSULE ORAL DAILY
Status: DISCONTINUED | OUTPATIENT
Start: 2024-02-19 | End: 2024-02-22 | Stop reason: HOSPADM

## 2024-02-18 RX ORDER — FINASTERIDE 5 MG/1
5 TABLET, FILM COATED ORAL DAILY
Status: DISCONTINUED | OUTPATIENT
Start: 2024-02-19 | End: 2024-02-22 | Stop reason: HOSPADM

## 2024-02-18 RX ORDER — LISINOPRIL 10 MG/1
10 TABLET ORAL DAILY
Status: DISCONTINUED | OUTPATIENT
Start: 2024-02-19 | End: 2024-02-18

## 2024-02-18 RX ORDER — ONDANSETRON 4 MG/1
4 TABLET, ORALLY DISINTEGRATING ORAL EVERY 8 HOURS PRN
Status: DISCONTINUED | OUTPATIENT
Start: 2024-02-18 | End: 2024-02-22 | Stop reason: HOSPADM

## 2024-02-18 RX ORDER — SPIRONOLACTONE 25 MG/1
25 TABLET ORAL DAILY
Status: DISCONTINUED | OUTPATIENT
Start: 2024-02-19 | End: 2024-02-18

## 2024-02-18 RX ORDER — METOLAZONE 2.5 MG/1
2.5 TABLET ORAL
Status: DISCONTINUED | OUTPATIENT
Start: 2024-02-19 | End: 2024-02-22 | Stop reason: HOSPADM

## 2024-02-18 RX ORDER — SODIUM CHLORIDE 0.9 % (FLUSH) 0.9 %
5-40 SYRINGE (ML) INJECTION EVERY 12 HOURS SCHEDULED
Status: DISCONTINUED | OUTPATIENT
Start: 2024-02-18 | End: 2024-02-22 | Stop reason: HOSPADM

## 2024-02-18 RX ORDER — SODIUM CHLORIDE 0.9 % (FLUSH) 0.9 %
5-40 SYRINGE (ML) INJECTION PRN
Status: DISCONTINUED | OUTPATIENT
Start: 2024-02-18 | End: 2024-02-22 | Stop reason: HOSPADM

## 2024-02-18 RX ORDER — SODIUM CHLORIDE 9 MG/ML
INJECTION, SOLUTION INTRAVENOUS PRN
Status: DISCONTINUED | OUTPATIENT
Start: 2024-02-18 | End: 2024-02-22 | Stop reason: HOSPADM

## 2024-02-18 RX ORDER — MAGNESIUM SULFATE HEPTAHYDRATE 40 MG/ML
2000 INJECTION, SOLUTION INTRAVENOUS PRN
Status: DISCONTINUED | OUTPATIENT
Start: 2024-02-18 | End: 2024-02-22 | Stop reason: HOSPADM

## 2024-02-18 RX ORDER — ENOXAPARIN SODIUM 100 MG/ML
40 INJECTION SUBCUTANEOUS DAILY
Status: DISCONTINUED | OUTPATIENT
Start: 2024-02-18 | End: 2024-02-21

## 2024-02-18 RX ORDER — BUMETANIDE 0.25 MG/ML
0.5 INJECTION INTRAMUSCULAR; INTRAVENOUS ONCE
Status: COMPLETED | OUTPATIENT
Start: 2024-02-18 | End: 2024-02-18

## 2024-02-18 RX ORDER — ONDANSETRON 2 MG/ML
4 INJECTION INTRAMUSCULAR; INTRAVENOUS EVERY 6 HOURS PRN
Status: DISCONTINUED | OUTPATIENT
Start: 2024-02-18 | End: 2024-02-22 | Stop reason: HOSPADM

## 2024-02-18 RX ORDER — SENNA AND DOCUSATE SODIUM 50; 8.6 MG/1; MG/1
2 TABLET, FILM COATED ORAL NIGHTLY
Status: DISCONTINUED | OUTPATIENT
Start: 2024-02-18 | End: 2024-02-22 | Stop reason: HOSPADM

## 2024-02-18 RX ADMIN — ENOXAPARIN SODIUM 40 MG: 100 INJECTION SUBCUTANEOUS at 17:28

## 2024-02-18 RX ADMIN — BUMETANIDE 0.5 MG: 0.25 INJECTION INTRAMUSCULAR; INTRAVENOUS at 13:15

## 2024-02-18 RX ADMIN — SODIUM CHLORIDE, PRESERVATIVE FREE 10 ML: 5 INJECTION INTRAVENOUS at 20:15

## 2024-02-18 RX ADMIN — ACETAMINOPHEN 650 MG: 325 TABLET ORAL at 22:28

## 2024-02-18 ASSESSMENT — LIFESTYLE VARIABLES
HOW MANY STANDARD DRINKS CONTAINING ALCOHOL DO YOU HAVE ON A TYPICAL DAY: PATIENT DOES NOT DRINK
HOW OFTEN DO YOU HAVE A DRINK CONTAINING ALCOHOL: NEVER

## 2024-02-18 ASSESSMENT — PAIN DESCRIPTION - LOCATION: LOCATION: HEAD

## 2024-02-18 ASSESSMENT — PAIN SCALES - GENERAL: PAINLEVEL_OUTOF10: 5

## 2024-02-18 ASSESSMENT — PAIN - FUNCTIONAL ASSESSMENT: PAIN_FUNCTIONAL_ASSESSMENT: NONE - DENIES PAIN

## 2024-02-18 ASSESSMENT — PAIN DESCRIPTION - DESCRIPTORS: DESCRIPTORS: ACHING

## 2024-02-18 NOTE — H&P
[facility]  IN-PATIENT SERVICE  [facility]  IN-PATIENT SERVICE   [facility]     HISTORY AND PHYSICAL EXAMINATION            Date:   2/18/2024  Patient name:  Carl Frias  Date of admission:  2/18/2024 11:40 AM  MRN:   [id number]  Account:  [id number]  YOB: 1935  PCP:    Aleksandr Finney MD  Room:   05/05  Code Status:    Full Code    Chief Complaint:     Chief Complaint   Patient presents with    Fatigue     X2 days, decreased activity tolerance, SOB on exertion  Ambulates with walker  No falls, no sick contacts       History Obtained From:     patient, electronic medical record    History of Present Illness:     The patient is a 88 y.o.   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 Patient reports increasing progressive dyspnea with exertion over the past few days as well as increased lower extremity swelling. Symptoms are associated with fatigue. Denies cough, recent illness, chest pain, palpations. Patient endorses medication compliance and has not missed any doses.  In the ED, BNP was elevated at 862, no baseline per chart review last bnp was 3 years ago around 1000. Trops were elevated slightly above baseline however trending down. EKG unremarkable for acute abnormalities compared to prior EKG. Last ECHO was in 2022 with preserved EF.    On exam, 2+ BL LE pitting edema. Radial pulses left side 2+. Lungs clear. Vitals stable on admission.   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

## 2024-02-18 NOTE — ED PROVIDER NOTES
water 50 mL IVPB    enoxaparin (LOVENOX) injection 40 mg     Order Specific Question:   Indication of Use     Answer:   Prophylaxis-DVT/PE    OR Linked Order Group     ondansetron (ZOFRAN-ODT) disintegrating tablet 4 mg     ondansetron (ZOFRAN) injection 4 mg    polyethylene glycol (GLYCOLAX) packet 17 g    OR Linked Order Group     acetaminophen (TYLENOL) tablet 650 mg     acetaminophen (TYLENOL) suppository 650 mg    bumetanide (BUMEX) injection 1 mg    glucose chewable tablet 16 g    OR Linked Order Group     dextrose bolus 10% 125 mL     dextrose bolus 10% 250 mL    glucagon injection 1 mg    dextrose 10 % infusion    insulin lispro (HUMALOG) injection vial 0-4 Units    insulin lispro (HUMALOG) injection vial 0-4 Units    aspirin EC tablet 81 mg    finasteride (PROSCAR) tablet 5 mg    DISCONTD: lisinopril (PRINIVIL;ZESTRIL) tablet 10 mg    sennosides-docusate sodium (SENOKOT-S) 8.6-50 MG tablet 2 tablet    DISCONTD: spironolactone (ALDACTONE) tablet 25 mg    tamsulosin (FLOMAX) capsule 0.4 mg    metOLazone (ZAROXOLYN) tablet 2.5 mg     DISCHARGE PRESCRIPTIONS:  Current Discharge Medication List        PHYSICIAN CONSULTS ORDERED THIS ENCOUNTER:  IP CONSULT TO INTERNAL MEDICINE  IP CONSULT TO SOCIAL WORK  IP CONSULT TO CARDIOLOGY  FINAL IMPRESSION      1. Acute on chronic combined systolic (congestive) and diastolic (congestive) heart failure (HCC)    2. Acute congestive heart failure, unspecified heart failure type (HCC)          DISPOSITION/PLAN   DISPOSITION Admitted 02/18/2024 01:50:48 PM      OUTPATIENT FOLLOW UP THE PATIENT:  No follow-up provider specified.    DO Amanda Mcbride Nathan R, DO  02/18/24 2243

## 2024-02-19 ENCOUNTER — APPOINTMENT (OUTPATIENT)
Dept: CT IMAGING | Age: 89
DRG: 291 | End: 2024-02-19
Payer: MEDICARE

## 2024-02-19 ENCOUNTER — APPOINTMENT (OUTPATIENT)
Age: 89
DRG: 291 | End: 2024-02-19
Payer: MEDICARE

## 2024-02-19 ENCOUNTER — HOSPITAL ENCOUNTER (INPATIENT)
Dept: VASCULAR LAB | Age: 89
Discharge: HOME | DRG: 291 | End: 2024-02-21
Payer: MEDICARE

## 2024-02-19 LAB
ANION GAP SERPL CALCULATED.3IONS-SCNC: 12 MMOL/L (ref 9–17)
BASOPHILS # BLD: 0 K/UL (ref 0–0.2)
BASOPHILS NFR BLD: 0 % (ref 0–2)
BUN SERPL-MCNC: 30 MG/DL (ref 8–23)
CALCIUM SERPL-MCNC: 8.9 MG/DL (ref 8.6–10.4)
CHLORIDE SERPL-SCNC: 94 MMOL/L (ref 98–107)
CO2 SERPL-SCNC: 25 MMOL/L (ref 20–31)
CREAT SERPL-MCNC: 1.1 MG/DL (ref 0.7–1.2)
ECHO AO ROOT DIAM: 3.6 CM
ECHO AO ROOT INDEX: 1.73 CM/M2
ECHO AV AREA PEAK VELOCITY: 1.4 CM2
ECHO AV AREA VTI: 1.5 CM2
ECHO AV AREA/BSA PEAK VELOCITY: 0.7 CM2/M2
ECHO AV AREA/BSA VTI: 0.7 CM2/M2
ECHO AV MEAN GRADIENT: 8 MMHG
ECHO AV MEAN VELOCITY: 1.3 M/S
ECHO AV PEAK GRADIENT: 15 MMHG
ECHO AV PEAK VELOCITY: 2 M/S
ECHO AV VELOCITY RATIO: 0.45
ECHO AV VTI: 35.1 CM
ECHO BSA: 2.14 M2
ECHO BSA: 2.14 M2
ECHO EST RA PRESSURE: 3 MMHG
ECHO LA AREA 2C: 35.1 CM2
ECHO LA AREA 4C: 27.8 CM2
ECHO LA DIAMETER INDEX: 3.03 CM/M2
ECHO LA DIAMETER: 6.3 CM
ECHO LA MAJOR AXIS: 7.3 CM
ECHO LA MINOR AXIS: 7.9 CM
ECHO LA TO AORTIC ROOT RATIO: 1.75
ECHO LA VOL BP: 108 ML (ref 18–58)
ECHO LA VOL MOD A2C: 128 ML (ref 18–58)
ECHO LA VOL MOD A4C: 85 ML (ref 18–58)
ECHO LA VOL/BSA BIPLANE: 52 ML/M2 (ref 16–34)
ECHO LA VOLUME INDEX MOD A2C: 62 ML/M2 (ref 16–34)
ECHO LA VOLUME INDEX MOD A4C: 41 ML/M2 (ref 16–34)
ECHO LV FRACTIONAL SHORTENING: 20 % (ref 28–44)
ECHO LV INTERNAL DIMENSION DIASTOLE INDEX: 2.45 CM/M2
ECHO LV INTERNAL DIMENSION DIASTOLIC: 5.1 CM (ref 4.2–5.9)
ECHO LV INTERNAL DIMENSION SYSTOLIC INDEX: 1.97 CM/M2
ECHO LV INTERNAL DIMENSION SYSTOLIC: 4.1 CM
ECHO LV IVSD: 1.4 CM (ref 0.6–1)
ECHO LV MASS 2D: 270.1 G (ref 88–224)
ECHO LV MASS INDEX 2D: 129.8 G/M2 (ref 49–115)
ECHO LV POSTERIOR WALL DIASTOLIC: 1.2 CM (ref 0.6–1)
ECHO LV RELATIVE WALL THICKNESS RATIO: 0.47
ECHO LVOT AREA: 3.1 CM2
ECHO LVOT AV VTI INDEX: 0.46
ECHO LVOT DIAM: 2 CM
ECHO LVOT MEAN GRADIENT: 2 MMHG
ECHO LVOT PEAK GRADIENT: 3 MMHG
ECHO LVOT PEAK VELOCITY: 0.9 M/S
ECHO LVOT STROKE VOLUME INDEX: 24.6 ML/M2
ECHO LVOT SV: 51.2 ML
ECHO LVOT VTI: 16.3 CM
ECHO PV MAX VELOCITY: 0.9 M/S
ECHO PV PEAK GRADIENT: 3 MMHG
ECHO RA AREA 4C: 22.3 CM2
ECHO RA END SYSTOLIC VOLUME APICAL 4 CHAMBER INDEX BSA: 34 ML/M2
ECHO RA VOLUME: 71 ML
ECHO RIGHT VENTRICULAR SYSTOLIC PRESSURE (RVSP): 28 MMHG
ECHO RV BASAL DIMENSION: 3.8 CM
ECHO RV TAPSE: 1.8 CM (ref 1.7–?)
ECHO TV REGURGITANT MAX VELOCITY: 2.51 M/S
ECHO TV REGURGITANT PEAK GRADIENT: 25 MMHG
EKG ATRIAL RATE: 131 BPM
EKG ATRIAL RATE: 68 BPM
EKG Q-T INTERVAL: 392 MS
EKG Q-T INTERVAL: 408 MS
EKG QRS DURATION: 72 MS
EKG QRS DURATION: 92 MS
EKG QTC CALCULATION (BAZETT): 443 MS
EKG QTC CALCULATION (BAZETT): 452 MS
EKG R AXIS: -20 DEGREES
EKG R AXIS: -25 DEGREES
EKG T AXIS: -5 DEGREES
EKG T AXIS: 10 DEGREES
EKG VENTRICULAR RATE: 74 BPM
EKG VENTRICULAR RATE: 77 BPM
EOSINOPHIL # BLD: 0.25 K/UL (ref 0–0.4)
EOSINOPHILS RELATIVE PERCENT: 4 % (ref 0–4)
ERYTHROCYTE [DISTWIDTH] IN BLOOD BY AUTOMATED COUNT: 15.1 % (ref 11.5–14.9)
GFR SERPL CREATININE-BSD FRML MDRD: >60 ML/MIN/1.73M2
GLUCOSE BLD-MCNC: 118 MG/DL (ref 75–110)
GLUCOSE BLD-MCNC: 174 MG/DL (ref 75–110)
GLUCOSE BLD-MCNC: 238 MG/DL (ref 75–110)
GLUCOSE BLD-MCNC: 278 MG/DL (ref 75–110)
GLUCOSE SERPL-MCNC: 118 MG/DL (ref 70–99)
HCT VFR BLD AUTO: 32.6 % (ref 41–53)
HGB BLD-MCNC: 11 G/DL (ref 13.5–17.5)
LYMPHOCYTES NFR BLD: 2.14 K/UL (ref 1–4.8)
LYMPHOCYTES RELATIVE PERCENT: 34 % (ref 24–44)
MCH RBC QN AUTO: 29 PG (ref 26–34)
MCHC RBC AUTO-ENTMCNC: 33.7 G/DL (ref 31–37)
MCV RBC AUTO: 86.1 FL (ref 80–100)
MONOCYTES NFR BLD: 0.63 K/UL (ref 0.1–1.3)
MONOCYTES NFR BLD: 10 % (ref 1–7)
MORPHOLOGY: ABNORMAL
NEUTROPHILS NFR BLD: 52 % (ref 36–66)
NEUTS SEG NFR BLD: 3.28 K/UL (ref 1.3–9.1)
PLATELET # BLD AUTO: 226 K/UL (ref 150–450)
PMV BLD AUTO: 7.5 FL (ref 6–12)
POTASSIUM SERPL-SCNC: 3.9 MMOL/L (ref 3.7–5.3)
RBC # BLD AUTO: 3.79 M/UL (ref 4.5–5.9)
SODIUM SERPL-SCNC: 131 MMOL/L (ref 135–144)
WBC OTHER # BLD: 6.3 K/UL (ref 3.5–11)

## 2024-02-19 PROCEDURE — 82947 ASSAY GLUCOSE BLOOD QUANT: CPT

## 2024-02-19 PROCEDURE — 93005 ELECTROCARDIOGRAM TRACING: CPT | Performed by: EMERGENCY MEDICINE

## 2024-02-19 PROCEDURE — 93306 TTE W/DOPPLER COMPLETE: CPT

## 2024-02-19 PROCEDURE — 99232 SBSQ HOSP IP/OBS MODERATE 35: CPT | Performed by: INTERNAL MEDICINE

## 2024-02-19 PROCEDURE — 97535 SELF CARE MNGMENT TRAINING: CPT

## 2024-02-19 PROCEDURE — 93010 ELECTROCARDIOGRAM REPORT: CPT | Performed by: INTERNAL MEDICINE

## 2024-02-19 PROCEDURE — 97166 OT EVAL MOD COMPLEX 45 MIN: CPT

## 2024-02-19 PROCEDURE — 6370000000 HC RX 637 (ALT 250 FOR IP): Performed by: INTERNAL MEDICINE

## 2024-02-19 PROCEDURE — 6370000000 HC RX 637 (ALT 250 FOR IP)

## 2024-02-19 PROCEDURE — 93970 EXTREMITY STUDY: CPT

## 2024-02-19 PROCEDURE — 99213 OFFICE O/P EST LOW 20 MIN: CPT

## 2024-02-19 PROCEDURE — 2500000003 HC RX 250 WO HCPCS

## 2024-02-19 PROCEDURE — 2060000000 HC ICU INTERMEDIATE R&B

## 2024-02-19 PROCEDURE — 70450 CT HEAD/BRAIN W/O DYE: CPT

## 2024-02-19 PROCEDURE — 80048 BASIC METABOLIC PNL TOTAL CA: CPT

## 2024-02-19 PROCEDURE — 6360000002 HC RX W HCPCS

## 2024-02-19 PROCEDURE — 93970 EXTREMITY STUDY: CPT | Performed by: SURGERY

## 2024-02-19 PROCEDURE — 85025 COMPLETE CBC W/AUTO DIFF WBC: CPT

## 2024-02-19 PROCEDURE — 36415 COLL VENOUS BLD VENIPUNCTURE: CPT

## 2024-02-19 PROCEDURE — 2580000003 HC RX 258

## 2024-02-19 RX ORDER — SPIRONOLACTONE 25 MG/1
25 TABLET ORAL DAILY
Status: DISCONTINUED | OUTPATIENT
Start: 2024-02-19 | End: 2024-02-22 | Stop reason: HOSPADM

## 2024-02-19 RX ADMIN — FINASTERIDE 5 MG: 5 TABLET, FILM COATED ORAL at 08:22

## 2024-02-19 RX ADMIN — SPIRONOLACTONE 25 MG: 25 TABLET, FILM COATED ORAL at 17:26

## 2024-02-19 RX ADMIN — BUMETANIDE 1 MG: 0.25 INJECTION, SOLUTION INTRAMUSCULAR; INTRAVENOUS at 10:51

## 2024-02-19 RX ADMIN — SODIUM CHLORIDE, PRESERVATIVE FREE 10 ML: 5 INJECTION INTRAVENOUS at 21:36

## 2024-02-19 RX ADMIN — INSULIN LISPRO 2 UNITS: 100 INJECTION, SOLUTION INTRAVENOUS; SUBCUTANEOUS at 17:26

## 2024-02-19 RX ADMIN — TAMSULOSIN HYDROCHLORIDE 0.4 MG: 0.4 CAPSULE ORAL at 08:21

## 2024-02-19 RX ADMIN — ANTI-FUNGAL POWDER MICONAZOLE NITRATE TALC FREE: 1.42 POWDER TOPICAL at 15:55

## 2024-02-19 RX ADMIN — ASPIRIN 81 MG: 81 TABLET, COATED ORAL at 08:21

## 2024-02-19 RX ADMIN — ENOXAPARIN SODIUM 40 MG: 100 INJECTION SUBCUTANEOUS at 15:53

## 2024-02-19 RX ADMIN — SODIUM CHLORIDE, PRESERVATIVE FREE 10 ML: 5 INJECTION INTRAVENOUS at 10:54

## 2024-02-19 RX ADMIN — ANTI-FUNGAL POWDER MICONAZOLE NITRATE TALC FREE: 1.42 POWDER TOPICAL at 21:36

## 2024-02-19 RX ADMIN — DOCUSATE SODIUM 50 MG AND SENNOSIDES 8.6 MG 2 TABLET: 8.6; 5 TABLET, FILM COATED ORAL at 21:33

## 2024-02-19 RX ADMIN — BUMETANIDE 1 MG: 0.25 INJECTION, SOLUTION INTRAMUSCULAR; INTRAVENOUS at 21:33

## 2024-02-19 ASSESSMENT — PAIN DESCRIPTION - LOCATION: LOCATION: HEAD

## 2024-02-19 ASSESSMENT — PAIN SCALES - GENERAL: PAINLEVEL_OUTOF10: 3

## 2024-02-19 NOTE — PLAN OF CARE
Problem: Discharge Planning  Goal: Discharge to home or other facility with appropriate resources  Outcome: Progressing  Flowsheets  Taken 2/19/2024 0411 by Becca Toledo RN  Discharge to home or other facility with appropriate resources:   Identify discharge learning needs (meds, wound care, etc)   Arrange for needed discharge resources and transportation as appropriate   Arrange for interpreters to assist at discharge as needed  Taken 2/18/2024 1440 by Savannah Schultz RN  Discharge to home or other facility with appropriate resources: Identify barriers to discharge with patient and caregiver     Problem: Skin/Tissue Integrity  Goal: Absence of new skin breakdown  Description: 1.  Monitor for areas of redness and/or skin breakdown  2.  Assess vascular access sites hourly  3.  Every 4-6 hours minimum:  Change oxygen saturation probe site  4.  Every 4-6 hours:  If on nasal continuous positive airway pressure, respiratory therapy assess nares and determine need for appliance change or resting period.  Outcome: Progressing  Note: Skin assessment complete. Waffle mattress in place. Coccyx reddened. Sensicare applied PRN. Turned and repositioned every two hours.  Area kept free from moisture. Proper nourishment and fluids encouraged, as appropriate. Will continue to monitor for additional needs and changes in skin breakdown.       Problem: Pain  Goal: Verbalizes/displays adequate comfort level or baseline comfort level  Outcome: Progressing  Note: Pt medicated with pain medication prn.  Assessed all pain characteristics including level, type, location, frequency, and onset.  Non-pharmacologic interventions offered to pt as well.  Pt states pain is tolerable at this time.        Problem: Safety - Adult  Goal: Free from fall injury  Outcome: Progressing  Note: No falls noted this shift. Patient ambulates with x1 staff assistance without difficulty.  Bed kept in low position. Safe environment maintained. Bedside

## 2024-02-19 NOTE — PLAN OF CARE
Problem: Discharge Planning  Goal: Discharge to home or other facility with appropriate resources  Outcome: Progressing     Problem: Skin/Tissue Integrity  Goal: Absence of new skin breakdown  Description: 1.  Monitor for areas of redness and/or skin breakdown  2.  Assess vascular access sites hourly  3.  Every 4-6 hours minimum:  Change oxygen saturation probe site  4.  Every 4-6 hours:  If on nasal continuous positive airway pressure, respiratory therapy assess nares and determine need for appliance change or resting period.  Outcome: Progressing     Problem: Safety - Adult  Goal: Free from fall injury  Outcome: Progressing     Problem: ABCDS Injury Assessment  Goal: Absence of physical injury  Outcome: Progressing     Problem: Pain  Goal: Verbalizes/displays adequate comfort level or baseline comfort level  Outcome: Progressing     Problem: Chronic Conditions and Co-morbidities  Goal: Patient's chronic conditions and co-morbidity symptoms are monitored and maintained or improved  Outcome: Progressing

## 2024-02-19 NOTE — ACP (ADVANCE CARE PLANNING)
Advance Care Planning     Advance Care Planning Clinical Specialist  Conversation Note      Date of ACP Conversation: 2/19/2024    Conversation Conducted with: Patient with Decision Making Capacity    ACP Clinical Specialist: Shilpa Leggett RN        Healthcare Decision Maker:     Current Designated Healthcare Decision Maker:     Primary Decision Maker: Naomi Frias - Spouse - [phone]  Click here to complete Healthcare Decision Makers including section of the Healthcare Decision Maker Relationship (ie \"Primary\")  Today we documented Decision Maker(s) consistent with Legal Next of Kin hierarchy.    Care Preferences    Hospitalization:  \"If your health worsens and it becomes clear that your chance of recovery is unlikely, what would your preference be regarding hospitalization?\"    Choice:  [] The patient wants hospitalization.  [] The patient prefers comfort-focused treatment without hospitalization.    Ventilation:  \"If you were in your present state of health and suddenly became very ill and were unable to breathe on your own, what would your preference be about the use of a ventilator (breathing machine) if it were available to you?\"      If the patient would desire the use of ventilator (breathing machine), answer \"yes\".  If not, \"no\": yes    \"If your health worsens and it becomes clear that your chance of recovery is unlikely, what would your preference be about the use of a ventilator (breathing machine) if it were available to you?\"     Would the patient desire the use of ventilator (breathing machine)?: Yes      Resuscitation  \"CPR works best to restart the heart when there is a sudden event, like a heart attack, in someone who is otherwise healthy. Unfortunately, CPR does not typically restart the heart for people who have serious health conditions or who are very sick.\"    \"In the event your heart stopped as a result of an underlying serious health condition, would you want attempts to be made to

## 2024-02-20 LAB
ANION GAP SERPL CALCULATED.3IONS-SCNC: 8 MMOL/L (ref 9–17)
BASOPHILS # BLD: 0 K/UL (ref 0–0.2)
BASOPHILS NFR BLD: 0 % (ref 0–2)
BUN SERPL-MCNC: 25 MG/DL (ref 8–23)
CALCIUM SERPL-MCNC: 8.7 MG/DL (ref 8.6–10.4)
CHLORIDE SERPL-SCNC: 92 MMOL/L (ref 98–107)
CO2 SERPL-SCNC: 26 MMOL/L (ref 20–31)
CREAT SERPL-MCNC: 1.1 MG/DL (ref 0.7–1.2)
EOSINOPHIL # BLD: 0.24 K/UL (ref 0–0.4)
EOSINOPHILS RELATIVE PERCENT: 4 % (ref 0–4)
ERYTHROCYTE [DISTWIDTH] IN BLOOD BY AUTOMATED COUNT: 14.7 % (ref 11.5–14.9)
GFR SERPL CREATININE-BSD FRML MDRD: >60 ML/MIN/1.73M2
GLUCOSE BLD-MCNC: 158 MG/DL (ref 75–110)
GLUCOSE BLD-MCNC: 233 MG/DL (ref 75–110)
GLUCOSE BLD-MCNC: 268 MG/DL (ref 75–110)
GLUCOSE BLD-MCNC: 269 MG/DL (ref 75–110)
GLUCOSE SERPL-MCNC: 151 MG/DL (ref 70–99)
HCT VFR BLD AUTO: 32.3 % (ref 41–53)
HGB BLD-MCNC: 10.8 G/DL (ref 13.5–17.5)
LYMPHOCYTES NFR BLD: 2.22 K/UL (ref 1–4.8)
LYMPHOCYTES RELATIVE PERCENT: 37 % (ref 24–44)
MAGNESIUM SERPL-MCNC: 1.8 MG/DL (ref 1.6–2.6)
MCH RBC QN AUTO: 28.5 PG (ref 26–34)
MCHC RBC AUTO-ENTMCNC: 33.4 G/DL (ref 31–37)
MCV RBC AUTO: 85.3 FL (ref 80–100)
MONOCYTES NFR BLD: 1.26 K/UL (ref 0.1–1.3)
MONOCYTES NFR BLD: 21 % (ref 1–7)
MORPHOLOGY: ABNORMAL
MORPHOLOGY: ABNORMAL
NEUTROPHILS NFR BLD: 38 % (ref 36–66)
NEUTS SEG NFR BLD: 2.28 K/UL (ref 1.3–9.1)
PLATELET # BLD AUTO: 237 K/UL (ref 150–450)
PMV BLD AUTO: 7.5 FL (ref 6–12)
POTASSIUM SERPL-SCNC: 4.2 MMOL/L (ref 3.7–5.3)
RBC # BLD AUTO: 3.78 M/UL (ref 4.5–5.9)
SODIUM SERPL-SCNC: 126 MMOL/L (ref 135–144)
WBC OTHER # BLD: 6 K/UL (ref 3.5–11)

## 2024-02-20 PROCEDURE — 83735 ASSAY OF MAGNESIUM: CPT

## 2024-02-20 PROCEDURE — 99232 SBSQ HOSP IP/OBS MODERATE 35: CPT | Performed by: INTERNAL MEDICINE

## 2024-02-20 PROCEDURE — 2060000000 HC ICU INTERMEDIATE R&B

## 2024-02-20 PROCEDURE — 97162 PT EVAL MOD COMPLEX 30 MIN: CPT

## 2024-02-20 PROCEDURE — 80048 BASIC METABOLIC PNL TOTAL CA: CPT

## 2024-02-20 PROCEDURE — 6360000002 HC RX W HCPCS

## 2024-02-20 PROCEDURE — 82947 ASSAY GLUCOSE BLOOD QUANT: CPT

## 2024-02-20 PROCEDURE — 2580000003 HC RX 258

## 2024-02-20 PROCEDURE — 36415 COLL VENOUS BLD VENIPUNCTURE: CPT

## 2024-02-20 PROCEDURE — 6370000000 HC RX 637 (ALT 250 FOR IP)

## 2024-02-20 PROCEDURE — 97530 THERAPEUTIC ACTIVITIES: CPT

## 2024-02-20 PROCEDURE — 6370000000 HC RX 637 (ALT 250 FOR IP): Performed by: INTERNAL MEDICINE

## 2024-02-20 PROCEDURE — 85025 COMPLETE CBC W/AUTO DIFF WBC: CPT

## 2024-02-20 PROCEDURE — 97535 SELF CARE MNGMENT TRAINING: CPT

## 2024-02-20 RX ORDER — BUMETANIDE 0.25 MG/ML
1 INJECTION INTRAMUSCULAR; INTRAVENOUS 2 TIMES DAILY
Status: DISCONTINUED | OUTPATIENT
Start: 2024-02-21 | End: 2024-02-22

## 2024-02-20 RX ORDER — LIDOCAINE 4 G/G
1 PATCH TOPICAL DAILY
Status: DISCONTINUED | OUTPATIENT
Start: 2024-02-20 | End: 2024-02-22 | Stop reason: HOSPADM

## 2024-02-20 RX ADMIN — ACETAMINOPHEN 650 MG: 325 TABLET ORAL at 21:53

## 2024-02-20 RX ADMIN — FINASTERIDE 5 MG: 5 TABLET, FILM COATED ORAL at 09:20

## 2024-02-20 RX ADMIN — SODIUM CHLORIDE, PRESERVATIVE FREE 10 ML: 5 INJECTION INTRAVENOUS at 21:15

## 2024-02-20 RX ADMIN — DOCUSATE SODIUM 50 MG AND SENNOSIDES 8.6 MG 2 TABLET: 8.6; 5 TABLET, FILM COATED ORAL at 21:15

## 2024-02-20 RX ADMIN — SODIUM CHLORIDE, PRESERVATIVE FREE 10 ML: 5 INJECTION INTRAVENOUS at 09:20

## 2024-02-20 RX ADMIN — ENOXAPARIN SODIUM 40 MG: 100 INJECTION SUBCUTANEOUS at 09:19

## 2024-02-20 RX ADMIN — INSULIN LISPRO 1 UNITS: 100 INJECTION, SOLUTION INTRAVENOUS; SUBCUTANEOUS at 16:54

## 2024-02-20 RX ADMIN — ANTI-FUNGAL POWDER MICONAZOLE NITRATE TALC FREE: 1.42 POWDER TOPICAL at 21:16

## 2024-02-20 RX ADMIN — BUMETANIDE 1 MG: 0.25 INJECTION, SOLUTION INTRAMUSCULAR; INTRAVENOUS at 09:20

## 2024-02-20 RX ADMIN — ACETAMINOPHEN 650 MG: 325 TABLET ORAL at 01:06

## 2024-02-20 RX ADMIN — TAMSULOSIN HYDROCHLORIDE 0.4 MG: 0.4 CAPSULE ORAL at 09:20

## 2024-02-20 RX ADMIN — INSULIN LISPRO 2 UNITS: 100 INJECTION, SOLUTION INTRAVENOUS; SUBCUTANEOUS at 12:12

## 2024-02-20 RX ADMIN — ACETAMINOPHEN 650 MG: 325 TABLET ORAL at 12:38

## 2024-02-20 RX ADMIN — ANTI-FUNGAL POWDER MICONAZOLE NITRATE TALC FREE: 1.42 POWDER TOPICAL at 09:21

## 2024-02-20 RX ADMIN — SPIRONOLACTONE 25 MG: 25 TABLET, FILM COATED ORAL at 09:20

## 2024-02-20 ASSESSMENT — PAIN SCALES - GENERAL
PAINLEVEL_OUTOF10: 4
PAINLEVEL_OUTOF10: 3
PAINLEVEL_OUTOF10: 0

## 2024-02-20 ASSESSMENT — PAIN DESCRIPTION - LOCATION
LOCATION: GENERALIZED
LOCATION: NECK

## 2024-02-20 NOTE — CONSULTS
combined systolic and diastolic congestive heart failure (HCC)     Other constipation     Hydrocele     Chronic bilateral low back pain without sciatica     Other specified abdominal hernia without obstruction or gangrene     Lumbosacral radiculopathy     Flexural atopic dermatitis perineal     Unstable gait     Easy bruisability     Acute on chronic combined systolic (congestive) and diastolic (congestive) heart failure (HCC)      Plan of Treatment:   Medications reviewed  1: CHF systolic and diastolic EF 50%, continue current dose of IV Bumex 2 mg a day, add Aldactone 25 mg a day  2; A-fib add low-dose beta-blocker 12.5 mg metoprolol twice daily patient also had nonsustained V. Tach  3: BPH continue current dose of tamsulosin 0.4 mg and finasteride 5 mg a day  4: Diabetes mellitus on insulin  5: Continue current dose of subcu Lovenox Will discuss with the family low-dose Eliquis 2.5 mg twice daily very high risk for CVA patient has chronic A-fib severely dilated LA  Check BMP and magnesium daily    Electronically signed by Alcides Felipe MD on 2/19/2024 at 3:53 PM  
assess for wall motion abnormality Ejection fraction about 50% The right ventricle function appears normal The left atrium is severely dilated Aortic valve was not well-visualized, no obvious aortic stenosis or regurgitation Mitral annular calcification noted, trace regurgitation no stenosis Tricuspid valve was not well-visualized trace regurgitation no stenosis The pulmonary valve was not well-visualized Normal aortic root dimensions The IVC not well-visualized No significant pericardial effusion seen     CT HEAD WO CONTRAST    Result Date: 2/19/2024  EXAMINATION: CT OF THE HEAD WITHOUT CONTRAST  2/19/2024 9:19 am TECHNIQUE: CT of the head was performed without the administration of intravenous contrast. Automated exposure control, iterative reconstruction, and/or weight based adjustment of the mA/kV was utilized to reduce the radiation dose to as low as reasonably achievable. COMPARISON: None. HISTORY: ORDERING SYSTEM PROVIDED HISTORY: fall TECHNOLOGIST PROVIDED HISTORY: fall Reason for Exam: fall FINDINGS: BRAIN/VENTRICLES: There is no acute intracranial hemorrhage, mass effect, or midline shift.  There is satisfactory overall gray-white matter differentiation.  There is chronic microvascular disease.  The ventricular structures are symmetric and unremarkable.  The infratentorial structures are unremarkable. ORBITS: The visualized portion of the orbits demonstrate no acute abnormality. SINUSES: The visualized paranasal sinuses and mastoid air cells demonstrate no acute abnormality. SOFT TISSUES/SKULL:  No acute abnormality of the visualized skull or soft tissues.     Chronic microvascular disease without acute intracranial abnormality.       Impression:    Patient Active Problem List   Diagnosis    Benign prostatic hyperplasia with lower urinary tract symptoms    Essential hypertension    Chronic atrial fibrillation (HCC)    Type 2 diabetes mellitus with diabetic polyneuropathy, without long-term current use of 
with saline 02/19/24 1204   Dressing/Treatment Open to air 02/19/24 1204   Wound Length (cm) 0.5 cm 02/19/24 1204   Wound Width (cm) 0.5 cm 02/19/24 1204   Wound Surface Area (cm^2) 0.25 cm^2 02/19/24 1204   Wound Assessment Other (Comment) 02/19/24 1204   Drainage Amount None (dry) 02/19/24 1204   Odor None 02/19/24 1204   Mariajose-wound Assessment Erosion;Fragile 02/19/24 1204   Margins Attached edges 02/19/24 1204   Number of days: 0       Wound 02/19/24 Leg Left;Lower (Active)   Wound Image     02/19/24 1204   Wound Etiology Other 02/19/24 1204   Dressing/Treatment Open to air 02/19/24 1204   Wound Assessment Purple/maroon;Fluid filled blister 02/19/24 1204   Drainage Amount None (dry) 02/19/24 1204   Odor None 02/19/24 1204   Mariajose-wound Assessment Intact 02/19/24 1204   Margins Attached edges 02/19/24 1204   Number of days: 0         WOUND ASSESSMENT:   WOC nurse consult for buttocks wound.  Patient was admitted on 2/18 with fatigue, shortness of breath, and leg swelling.  He had redness noticed to his buttocks upon admission.  Most of the redness is blanchable, but there are some areas that are very slow to bridget or nonblanchable.  Patient does have some urinary incontinence at times.  Recommend zinc paste and frequent turns.  Daughter asked writer to assess a rash on patient's left leg. Patient has clusters of blisters over dark purple discoloration from his lateral knee to his ankle. There are also a few clusters on the dorsal and plantar sides of his left foot.  Daughter reports that this just started.  Patient states that the areas are painful.  Recommend ruling out shingles, as the clusters of blisters are similar in appearance to shingles and are only occurring to his left lower leg/foot.  Daughter also concerned about a spot on patient's penis.  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  There is a black, raised area present underneath a skin fold, as well as some plaques and

## 2024-02-20 NOTE — PLAN OF CARE
Problem: Safety - Adult  Goal: Free from fall injury  2/20/2024 0528 by Nini Whittaker RN  Outcome: Progressing  Note: No falls noted this shift. Patient ambulates with x1 staff assistance without difficulty. Family at bedside. Bed kept in low position. Safe environment maintained. Bedside table & call light in reach. Uses call light appropriately when needing assistance.       Problem: Pain  Goal: Verbalizes/displays adequate comfort level or baseline comfort level  2/20/2024 0528 by Nini Whittaker RN  Outcome: Progressing  Note: Pt medicated with pain medication prn.  Assessed all pain characteristics including level, type, location, frequency, and onset.  Non-pharmacologic interventions offered to pt as well.  Pt states pain is tolerable at this time.       Problem: Chronic Conditions and Co-morbidities  Goal: Patient's chronic conditions and co-morbidity symptoms are monitored and maintained or improved  2/20/2024 0528 by Nini Whittaker RN  Outcome: Progressing     Problem: Skin/Tissue Integrity  Goal: Absence of new skin breakdown  Description: 1.  Monitor for areas of redness and/or skin breakdown  2.  Assess vascular access sites hourly  3.  Every 4-6 hours minimum:  Change oxygen saturation probe site  4.  Every 4-6 hours:  If on nasal continuous positive airway pressure, respiratory therapy assess nares and determine need for appliance change or resting period.  2/20/2024 0528 by Nini Whittaker RN  Outcome: Progressing     Problem: Discharge Planning  Goal: Discharge to home or other facility with appropriate resources  2/20/2024 0528 by Nini Whittaker RN  Outcome: Progressing

## 2024-02-21 LAB
ANION GAP SERPL CALCULATED.3IONS-SCNC: 13 MMOL/L (ref 9–17)
BASOPHILS # BLD: 0.1 K/UL (ref 0–0.2)
BASOPHILS NFR BLD: 1 % (ref 0–2)
BUN SERPL-MCNC: 32 MG/DL (ref 8–23)
CALCIUM SERPL-MCNC: 9.2 MG/DL (ref 8.6–10.4)
CHLORIDE SERPL-SCNC: 94 MMOL/L (ref 98–107)
CO2 SERPL-SCNC: 23 MMOL/L (ref 20–31)
CREAT SERPL-MCNC: 1.2 MG/DL (ref 0.7–1.2)
EOSINOPHIL # BLD: 0.3 K/UL (ref 0–0.4)
EOSINOPHILS RELATIVE PERCENT: 5 % (ref 0–4)
ERYTHROCYTE [DISTWIDTH] IN BLOOD BY AUTOMATED COUNT: 15.1 % (ref 11.5–14.9)
EST. AVERAGE GLUCOSE BLD GHB EST-MCNC: 217 MG/DL
GFR SERPL CREATININE-BSD FRML MDRD: 59 ML/MIN/1.73M2
GLUCOSE BLD-MCNC: 162 MG/DL (ref 75–110)
GLUCOSE BLD-MCNC: 252 MG/DL (ref 75–110)
GLUCOSE BLD-MCNC: 271 MG/DL (ref 75–110)
GLUCOSE BLD-MCNC: 306 MG/DL (ref 75–110)
GLUCOSE BLD-MCNC: 310 MG/DL (ref 75–110)
GLUCOSE SERPL-MCNC: 172 MG/DL (ref 70–99)
HBA1C MFR BLD: 9.2 % (ref 4–6)
HCT VFR BLD AUTO: 34.2 % (ref 41–53)
HGB BLD-MCNC: 11.3 G/DL (ref 13.5–17.5)
LYMPHOCYTES NFR BLD: 2.1 K/UL (ref 1–4.8)
LYMPHOCYTES RELATIVE PERCENT: 36 % (ref 24–44)
MCH RBC QN AUTO: 28.8 PG (ref 26–34)
MCHC RBC AUTO-ENTMCNC: 33 G/DL (ref 31–37)
MCV RBC AUTO: 87.4 FL (ref 80–100)
MONOCYTES NFR BLD: 0.8 K/UL (ref 0.1–1.3)
MONOCYTES NFR BLD: 14 % (ref 1–7)
NEUTROPHILS NFR BLD: 44 % (ref 36–66)
NEUTS SEG NFR BLD: 2.7 K/UL (ref 1.3–9.1)
PLATELET # BLD AUTO: 253 K/UL (ref 150–450)
PMV BLD AUTO: 8.7 FL (ref 6–12)
POTASSIUM SERPL-SCNC: 4.3 MMOL/L (ref 3.7–5.3)
RBC # BLD AUTO: 3.91 M/UL (ref 4.5–5.9)
SODIUM SERPL-SCNC: 130 MMOL/L (ref 135–144)
WBC OTHER # BLD: 6 K/UL (ref 3.5–11)

## 2024-02-21 PROCEDURE — 2580000003 HC RX 258

## 2024-02-21 PROCEDURE — 6360000002 HC RX W HCPCS

## 2024-02-21 PROCEDURE — 2060000000 HC ICU INTERMEDIATE R&B

## 2024-02-21 PROCEDURE — 85025 COMPLETE CBC W/AUTO DIFF WBC: CPT

## 2024-02-21 PROCEDURE — 80048 BASIC METABOLIC PNL TOTAL CA: CPT

## 2024-02-21 PROCEDURE — 99232 SBSQ HOSP IP/OBS MODERATE 35: CPT | Performed by: INTERNAL MEDICINE

## 2024-02-21 PROCEDURE — 6370000000 HC RX 637 (ALT 250 FOR IP): Performed by: INTERNAL MEDICINE

## 2024-02-21 PROCEDURE — 6370000000 HC RX 637 (ALT 250 FOR IP)

## 2024-02-21 PROCEDURE — 36415 COLL VENOUS BLD VENIPUNCTURE: CPT

## 2024-02-21 PROCEDURE — 99212 OFFICE O/P EST SF 10 MIN: CPT

## 2024-02-21 PROCEDURE — 6360000002 HC RX W HCPCS: Performed by: NURSE PRACTITIONER

## 2024-02-21 PROCEDURE — 83036 HEMOGLOBIN GLYCOSYLATED A1C: CPT

## 2024-02-21 PROCEDURE — 82947 ASSAY GLUCOSE BLOOD QUANT: CPT

## 2024-02-21 RX ORDER — METOPROLOL SUCCINATE 25 MG/1
25 TABLET, EXTENDED RELEASE ORAL DAILY
Status: DISCONTINUED | OUTPATIENT
Start: 2024-02-21 | End: 2024-02-22 | Stop reason: HOSPADM

## 2024-02-21 RX ORDER — INSULIN GLARGINE 100 [IU]/ML
10 INJECTION, SOLUTION SUBCUTANEOUS NIGHTLY
Status: DISCONTINUED | OUTPATIENT
Start: 2024-02-21 | End: 2024-02-22

## 2024-02-21 RX ORDER — METOPROLOL SUCCINATE 25 MG/1
25 TABLET, EXTENDED RELEASE ORAL DAILY
Qty: 90 TABLET | Refills: 1 | Status: SHIPPED | OUTPATIENT
Start: 2024-02-21

## 2024-02-21 RX ORDER — METOPROLOL TARTRATE 50 MG/1
25 TABLET, FILM COATED ORAL DAILY
Qty: 60 TABLET | Refills: 3 | Status: SHIPPED | OUTPATIENT
Start: 2024-02-21 | End: 2024-02-21 | Stop reason: HOSPADM

## 2024-02-21 RX ADMIN — INSULIN GLARGINE 10 UNITS: 100 INJECTION, SOLUTION SUBCUTANEOUS at 21:06

## 2024-02-21 RX ADMIN — ANTI-FUNGAL POWDER MICONAZOLE NITRATE TALC FREE: 1.42 POWDER TOPICAL at 21:06

## 2024-02-21 RX ADMIN — BUMETANIDE 1 MG: 0.25 INJECTION, SOLUTION INTRAMUSCULAR; INTRAVENOUS at 09:35

## 2024-02-21 RX ADMIN — DOCUSATE SODIUM 50 MG AND SENNOSIDES 8.6 MG 2 TABLET: 8.6; 5 TABLET, FILM COATED ORAL at 21:05

## 2024-02-21 RX ADMIN — ACETAMINOPHEN 650 MG: 325 TABLET ORAL at 05:43

## 2024-02-21 RX ADMIN — SPIRONOLACTONE 25 MG: 25 TABLET, FILM COATED ORAL at 09:34

## 2024-02-21 RX ADMIN — METOPROLOL SUCCINATE 25 MG: 25 TABLET, EXTENDED RELEASE ORAL at 18:05

## 2024-02-21 RX ADMIN — FINASTERIDE 5 MG: 5 TABLET, FILM COATED ORAL at 09:34

## 2024-02-21 RX ADMIN — BUMETANIDE 1 MG: 0.25 INJECTION, SOLUTION INTRAMUSCULAR; INTRAVENOUS at 16:31

## 2024-02-21 RX ADMIN — TAMSULOSIN HYDROCHLORIDE 0.4 MG: 0.4 CAPSULE ORAL at 09:34

## 2024-02-21 RX ADMIN — INSULIN LISPRO 3 UNITS: 100 INJECTION, SOLUTION INTRAVENOUS; SUBCUTANEOUS at 16:32

## 2024-02-21 RX ADMIN — SODIUM CHLORIDE, PRESERVATIVE FREE 10 ML: 5 INJECTION INTRAVENOUS at 09:35

## 2024-02-21 RX ADMIN — SODIUM CHLORIDE, PRESERVATIVE FREE 10 ML: 5 INJECTION INTRAVENOUS at 21:06

## 2024-02-21 RX ADMIN — ENOXAPARIN SODIUM 40 MG: 100 INJECTION SUBCUTANEOUS at 09:36

## 2024-02-21 RX ADMIN — ANTI-FUNGAL POWDER MICONAZOLE NITRATE TALC FREE: 1.42 POWDER TOPICAL at 09:35

## 2024-02-21 RX ADMIN — ASPIRIN 81 MG: 81 TABLET, COATED ORAL at 09:34

## 2024-02-21 RX ADMIN — ACETAMINOPHEN 650 MG: 325 TABLET ORAL at 21:11

## 2024-02-21 ASSESSMENT — PAIN DESCRIPTION - DESCRIPTORS
DESCRIPTORS: ACHING
DESCRIPTORS: ACHING

## 2024-02-21 ASSESSMENT — PAIN SCALES - GENERAL
PAINLEVEL_OUTOF10: 3
PAINLEVEL_OUTOF10: 3

## 2024-02-21 ASSESSMENT — PAIN DESCRIPTION - ORIENTATION: ORIENTATION: LEFT

## 2024-02-21 ASSESSMENT — PAIN DESCRIPTION - LOCATION
LOCATION: FOOT
LOCATION: FOOT

## 2024-02-21 NOTE — PLAN OF CARE
I reached out to Dr. Felipe from cardiology.  He went to the patient and saw the patient and said okay to start Eliquis 2.5 Mg twice daily and said to repeat blood work CBC twice daily at the skilled nursing facility.  Patient to follow-up with PCP and cardiology outpatient.    Electronically signed by Brett Hamilton MD on 2/21/2024 at 4:50 PM

## 2024-02-21 NOTE — PLAN OF CARE
Problem: Discharge Planning  Goal: Discharge to home or other facility with appropriate resources  2/21/2024 0101 by Ania Thomson RN  Outcome: Progressing  Flowsheets (Taken 2/21/2024 0101)  Discharge to home or other facility with appropriate resources: Identify barriers to discharge with patient and caregiver     Problem: Skin/Tissue Integrity  Goal: Absence of new skin breakdown  Description: 1.  Monitor for areas of redness and/or skin breakdown  2.  Assess vascular access sites hourly  3.  Every 4-6 hours minimum:  Change oxygen saturation probe site  4.  Every 4-6 hours:  If on nasal continuous positive airway pressure, respiratory therapy assess nares and determine need for appliance change or resting period.  2/21/2024 0101 by Ania Thomson RN  Outcome: Progressing     Problem: Safety - Adult  Goal: Free from fall injury  2/21/2024 0101 by Ania Thomson RN  Outcome: Progressing  Flowsheets (Taken 2/21/2024 0101)  Free From Fall Injury: Instruct family/caregiver on patient safety     Problem: ABCDS Injury Assessment  Goal: Absence of physical injury  2/21/2024 0101 by Ania Thomson RN  Outcome: Progressing  Flowsheets (Taken 2/19/2024 2216 by Savannah Schultz RN)  Absence of Physical Injury: Implement safety measures based on patient assessment     Problem: Pain  Goal: Verbalizes/displays adequate comfort level or baseline comfort level  2/21/2024 0101 by Ania Thomson RN  Outcome: Progressing  Flowsheets (Taken 2/21/2024 0101)  Verbalizes/displays adequate comfort level or baseline comfort level: Encourage patient to monitor pain and request assistance     Problem: Chronic Conditions and Co-morbidities  Goal: Patient's chronic conditions and co-morbidity symptoms are monitored and maintained or improved  2/21/2024 0101 by Ania Thomson RN  Outcome: Progressing  Flowsheets (Taken 2/21/2024 0101)  Care Plan - Patient's Chronic Conditions and Co-Morbidity Symptoms are Monitored and Maintained

## 2024-02-21 NOTE — DISCHARGE INSTRUCTIONS
Start taking Eliquis as per the recommendation from cardiology team.  Currently started on low-dose Eliquis 2.5 Mg 2 times daily.  Check CBC 2 times a week  Patient tolerates Eliquis without bleeding and other side effects, increase the Eliquis to 5 Mg twice daily  Follow-up with PCP and discuss regarding restarting of metolazone if sodium normalizes.

## 2024-02-21 NOTE — DISCHARGE SUMMARY
[facility]   IN-PATIENT SERVICE   [facility]    Discharge Summary     Patient ID: Carl Frias  :  1935   MRN: [id number]     ACCOUNT:  [id number]   Patient's PCP: Aleksandr Finney MD  Admit Date: 2024   Discharge Date: 2024   Length of Stay: 4  Code Status:  Prior  Admitting Physician: Miranda Youssef MD  Discharge Physician: Brett Hamilton MD     Active Discharge Diagnoses:       Primary Problem  Acute on chronic combined systolic (congestive) and diastolic (congestive) heart failure (HCC)      Hospital Problems  Active Hospital Problems    Diagnosis Date Noted    Acute on chronic combined systolic (congestive) and diastolic (congestive) heart failure (HCC) [I50.43] 2024       Admission Condition:  poor     Discharged Condition: stable    Hospital Stay:       Hospital Course:      88-year-old male with past history of combined systolic and diastolic heart failure presented with shortness of breath and lower extremity edema.On exam, 2+ BL LE pitting edema.Trops were elevated slightly above baseline however trending down. EKG unremarkable for acute abnormalities compared to prior EKG. patient was admitted with a diagnosis of acute exacerbation of congestive heart failure.  Repeat echo showed ejection fraction of 50% with severely dilated left atrium .  Patient was treated with IV Bumex and Aldactone 25 Mg daily.  His home home metolazone was held due to hyponatremia.    Patient has a history of chronic A-fib with RVR with high CHADVASC score of 5.  Cardiology was consulted and they recommended low-dose metoprolol and Eliquis and follow-up with them after 2 weeks.  Cardiology recommended to discharge the patient with 2 mg of Bumex daily and hold metolazone for now.      Significant therapeutic interventions: None    Significant Diagnostic Studies:   Labs / Micro:  CBC:   Lab Results   Component Value Date/Time    WBC 6.8 2024 05:43 AM    RBC 3.78

## 2024-02-21 NOTE — DISCHARGE INSTR - COC
Continuity of Care Form    Patient Name: Carl Frias   :  1935  MRN:  [id number]    Admit date:  2024  Discharge date:  24    Code Status Order: Full Code   Advance Directives:     Admitting Physician:  Miranda Youssef MD  PCP: Aleksandr Finney MD    Discharging Nurse: Shilpa Whitten Hospital Unit/Room#: 2090/2090-01  Discharging Unit Phone Number: [phone]    Emergency Contact:   Extended Emergency Contact Information  Primary Emergency Contact: Naomi Frias  Address: [address]  Home Phone: [phone]  Mobile Phone: [phone]  Relation: Spouse  Secondary Emergency Contact: Lucy Lazcano   [facility]  Home Phone: [phone]  Relation: Child    Past Surgical History:  Past Surgical History:   Procedure Laterality Date    EYE SURGERY Bilateral     HERNIA REPAIR      left inguinal    JOINT REPLACEMENT Bilateral     TESTICLE REMOVAL         Immunization History:   Immunization History   Administered Date(s) Administered    COVID-19, MODERNA BLUE border, Primary or Immunocompromised, (age 12y+), IM, 100 mcg/0.5mL 2021, 2021, 2021    Influenza Vaccine, unspecified formulation 2014, 2016    Influenza, High Dose (Fluzone 65 yrs and older) 2015, 2016, 2017, 10/30/2018    Pneumococcal, PCV-13, PREVNAR 13, (age 6w+), IM, 0.5mL 2015    Pneumococcal, PPSV23, PNEUMOVAX 23, (age 2y+), SC/IM, 0.5mL 2014, 2016       Active Problems:  Patient Active Problem List   Diagnosis Code    Benign prostatic hyperplasia with lower urinary tract symptoms N40.1    Essential hypertension I10    Chronic atrial fibrillation (HCC) I48.20    Type 2 diabetes mellitus with diabetic polyneuropathy, without long-term current use of insulin (HCC) E11.42    History of inguinal hernia repair Z98.890, Z87.19    Chronic combined systolic and diastolic congestive heart failure (HCC)

## 2024-02-22 VITALS
OXYGEN SATURATION: 95 % | BODY MASS INDEX: 33.59 KG/M2 | SYSTOLIC BLOOD PRESSURE: 148 MMHG | DIASTOLIC BLOOD PRESSURE: 80 MMHG | HEIGHT: 67 IN | RESPIRATION RATE: 20 BRPM | WEIGHT: 214 LBS | HEART RATE: 58 BPM | TEMPERATURE: 97.7 F

## 2024-02-22 LAB
BASOPHILS # BLD: 0 K/UL (ref 0–0.2)
BASOPHILS NFR BLD: 1 % (ref 0–2)
EOSINOPHIL # BLD: 0.4 K/UL (ref 0–0.4)
EOSINOPHILS RELATIVE PERCENT: 6 % (ref 0–4)
ERYTHROCYTE [DISTWIDTH] IN BLOOD BY AUTOMATED COUNT: 14.9 % (ref 11.5–14.9)
GLUCOSE BLD-MCNC: 240 MG/DL (ref 75–110)
HCT VFR BLD AUTO: 32.4 % (ref 41–53)
HGB BLD-MCNC: 10.9 G/DL (ref 13.5–17.5)
LYMPHOCYTES NFR BLD: 2.2 K/UL (ref 1–4.8)
LYMPHOCYTES RELATIVE PERCENT: 33 % (ref 24–44)
MCH RBC QN AUTO: 28.7 PG (ref 26–34)
MCHC RBC AUTO-ENTMCNC: 33.5 G/DL (ref 31–37)
MCV RBC AUTO: 85.8 FL (ref 80–100)
MONOCYTES NFR BLD: 0.7 K/UL (ref 0.1–1.3)
MONOCYTES NFR BLD: 10 % (ref 1–7)
NEUTROPHILS NFR BLD: 50 % (ref 36–66)
NEUTS SEG NFR BLD: 3.5 K/UL (ref 1.3–9.1)
PLATELET # BLD AUTO: 264 K/UL (ref 150–450)
PMV BLD AUTO: 7.8 FL (ref 6–12)
RBC # BLD AUTO: 3.78 M/UL (ref 4.5–5.9)
WBC OTHER # BLD: 6.8 K/UL (ref 3.5–11)

## 2024-02-22 PROCEDURE — 6360000002 HC RX W HCPCS: Performed by: NURSE PRACTITIONER

## 2024-02-22 PROCEDURE — 2580000003 HC RX 258

## 2024-02-22 PROCEDURE — 6370000000 HC RX 637 (ALT 250 FOR IP)

## 2024-02-22 PROCEDURE — 82947 ASSAY GLUCOSE BLOOD QUANT: CPT

## 2024-02-22 PROCEDURE — 99239 HOSP IP/OBS DSCHRG MGMT >30: CPT | Performed by: INTERNAL MEDICINE

## 2024-02-22 PROCEDURE — 36415 COLL VENOUS BLD VENIPUNCTURE: CPT

## 2024-02-22 PROCEDURE — 85025 COMPLETE CBC W/AUTO DIFF WBC: CPT

## 2024-02-22 PROCEDURE — 6370000000 HC RX 637 (ALT 250 FOR IP): Performed by: INTERNAL MEDICINE

## 2024-02-22 RX ORDER — BUMETANIDE 2 MG/1
2 TABLET ORAL DAILY
Qty: 30 TABLET | Refills: 3 | Status: SHIPPED | OUTPATIENT
Start: 2024-02-23

## 2024-02-22 RX ORDER — INSULIN GLARGINE 100 [IU]/ML
15 INJECTION, SOLUTION SUBCUTANEOUS NIGHTLY
Status: DISCONTINUED | OUTPATIENT
Start: 2024-02-22 | End: 2024-02-22 | Stop reason: HOSPADM

## 2024-02-22 RX ORDER — INSULIN GLARGINE 100 [IU]/ML
10 INJECTION, SOLUTION SUBCUTANEOUS NIGHTLY
Qty: 10 ML | Refills: 3 | Status: SHIPPED | OUTPATIENT
Start: 2024-02-22

## 2024-02-22 RX ORDER — BUMETANIDE 1 MG/1
2 TABLET ORAL DAILY
Status: DISCONTINUED | OUTPATIENT
Start: 2024-02-23 | End: 2024-02-22 | Stop reason: HOSPADM

## 2024-02-22 RX ADMIN — TAMSULOSIN HYDROCHLORIDE 0.4 MG: 0.4 CAPSULE ORAL at 08:42

## 2024-02-22 RX ADMIN — SPIRONOLACTONE 25 MG: 25 TABLET, FILM COATED ORAL at 08:42

## 2024-02-22 RX ADMIN — METOPROLOL SUCCINATE 25 MG: 25 TABLET, EXTENDED RELEASE ORAL at 08:42

## 2024-02-22 RX ADMIN — ANTI-FUNGAL POWDER MICONAZOLE NITRATE TALC FREE: 1.42 POWDER TOPICAL at 08:43

## 2024-02-22 RX ADMIN — FINASTERIDE 5 MG: 5 TABLET, FILM COATED ORAL at 08:42

## 2024-02-22 RX ADMIN — ACETAMINOPHEN 650 MG: 325 TABLET ORAL at 08:48

## 2024-02-22 RX ADMIN — SODIUM CHLORIDE, PRESERVATIVE FREE 10 ML: 5 INJECTION INTRAVENOUS at 08:49

## 2024-02-22 RX ADMIN — BUMETANIDE 1 MG: 0.25 INJECTION, SOLUTION INTRAMUSCULAR; INTRAVENOUS at 08:43

## 2024-02-22 RX ADMIN — APIXABAN 2.5 MG: 2.5 TABLET, FILM COATED ORAL at 06:20

## 2024-02-22 ASSESSMENT — PAIN SCALES - GENERAL: PAINLEVEL_OUTOF10: 3

## 2024-02-22 ASSESSMENT — PAIN DESCRIPTION - LOCATION: LOCATION: LEG

## 2024-02-22 NOTE — PLAN OF CARE
Transfer of care note    88-year-old male with past medical history of combined systolic and diastolic heart failure presented with increased shortness of breath and lower extremity edema and admitted with diagnosis of acute exacerbation of congestive heart failure.  Patient was treated with IV Bumex and Aldactone and home metolazone was held  due to hyponatremia.  Cardiology was consulted and they recommended starting the patient on metoprolol and  Eliquis as patient has history of chronic atrial fibrillation and was not on any medications.  Patient's shortness of breath and edema improved.  Discharge orders already done.  Awaiting for transport which is supposed to happen today morning at 10 AM.    Electronically signed by Brett Hamilton MD on 2/22/2024 at 7:12 AM

## 2024-02-22 NOTE — PLAN OF CARE
Problem: Discharge Planning  Goal: Discharge to home or other facility with appropriate resources  2/22/2024 1117 by Erin Almonte, RN  Outcome: Completed     Problem: Skin/Tissue Integrity  Goal: Absence of new skin breakdown  Description: 1.  Monitor for areas of redness and/or skin breakdown  2.  Assess vascular access sites hourly  3.  Every 4-6 hours minimum:  Change oxygen saturation probe site  4.  Every 4-6 hours:  If on nasal continuous positive airway pressure, respiratory therapy assess nares and determine need for appliance change or resting period.  2/22/2024 1117 by Erin Almonte, RN  Outcome: Completed     Problem: Safety - Adult  Goal: Free from fall injury  2/22/2024 1117 by Erin Almonte RN  Outcome: Completed     Problem: ABCDS Injury Assessment  Goal: Absence of physical injury  2/22/2024 1117 by Erin Almonte RN  Outcome: Completed     Problem: Pain  Goal: Verbalizes/displays adequate comfort level or baseline comfort level  2/22/2024 1117 by Erin Almonte, RN  Outcome: Completed     Problem: Chronic Conditions and Co-morbidities  Goal: Patient's chronic conditions and co-morbidity symptoms are monitored and maintained or improved  2/22/2024 1117 by Eirn Almonte, RN  Outcome: Completed

## 2024-02-22 NOTE — PROGRESS NOTES
02/19/24 1537   Encounter Summary   Encounter Overview/Reason  Attempted Encounter   Service Provided For: Patient not available  (Staff with PT)       
  [facility]  IN-PATIENT SERVICE  [facility]    PROGRESS NOTE             2/22/2024    10:05 AM    Name:   Carl Frias  MRN:     [id number]     Acct:      [id number]   Room:   2090/2090-01  IP Day:  4  Admit Date:  2/18/2024 11:40 AM    PCP:  Aleksandr Finney MD  Code Status:  Full Code    Subjective:     C/C:   Chief Complaint   Patient presents with    Fatigue     X2 days, decreased activity tolerance, SOB on exertion  Ambulates with walker  No falls, no sick contacts     Interval History Status: not changed.  Patient, clinically doing much better today  He told me that he is feeling better every single day  Swelling in legs is improving  Eval by urologist      Brief History:     The patient is a 88 y.o.   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 Patient reports increasing progressive dyspnea with exertion over the past few days as well as increased lower extremity swelling. Symptoms are associated with fatigue. Denies cough, recent illness, chest pain, palpations. Patient endorses medication compliance and has not missed any doses.  In the ED, BNP was elevated at 862, no baseline per chart review last bnp was 3 years ago around 1000. Trops were elevated slightly above baseline however trending down. EKG unremarkable for acute abnormalities compared to prior EKG. Last ECHO was in 2022 with preserved EF.    On exam, 2+ BL LE pitting edema. Radial pulses left side 2+. Lungs clear. Vitals stable on admission.   PMH HFpEF, Essential HTN, BPH, DMT2, Chronic Atrial Fibrillation on asa, lumbosacral radiculopathy, testicular cancer s/p left orchiectomy, followed by radiation therapy in beginning of 2008        Review of Systems:     Review of Systems  Positive and Negative as described in HPI.     Review of Systems  Review of Systems   Constitutional:  Positive for fatigue. 
  [facility]  IN-PATIENT SERVICE  [facility]    PROGRESS NOTE             2/20/2024    6:58 AM    Name:   Carl Frias  MRN:     [id number]     Acct:      [id number]   Room:   2090/2090-01   Day:  2  Admit Date:  2/18/2024 11:40 AM    PCP:  Aleksandr Finney MD  Code Status:  Full Code    Subjective:     C/C:   Chief Complaint   Patient presents with    Fatigue     X2 days, decreased activity tolerance, SOB on exertion  Ambulates with walker  No falls, no sick contacts     Interval History Status: not changed.    Patient seen and examined at bedside this morning. Reports improvement in shortness of breath. 2+ BL LE edema unchanged from yesterday. Lung auscultation limited due to body habitus, no crackles heard. HR 71.           Brief History:     The patient is a 88 y.o.   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 Patient reports increasing progressive dyspnea with exertion over the past few days as well as increased lower extremity swelling. Symptoms are associated with fatigue. Denies cough, recent illness, chest pain, palpations. Patient endorses medication compliance and has not missed any doses.  In the ED, BNP was elevated at 862, no baseline per chart review last bnp was 3 years ago around 1000. Trops were elevated slightly above baseline however trending down. EKG unremarkable for acute abnormalities compared to prior EKG. Last ECHO was in 2022 with preserved EF.    On exam, 2+ BL LE pitting edema. Radial pulses left side 2+. Lungs clear. Vitals stable on admission.   PMH HFpEF, Essential HTN, BPH, DMT2, Chronic Atrial Fibrillation on asa, lumbosacral radiculopathy, testicular cancer s/p left orchiectomy, followed by radiation therapy in beginning of 2008        Review of Systems:     Review of Systems  Positive and Negative as described in HPI.     Review of 
Date:   2/20/2024  Patient name: Carl Frias  Date of admission:  2/18/2024 11:40 AM  MRN:   [id number]  YOB: 1935  PCP: Aleksandr Finney MD    Reason for Admission: Acute on chronic combined systolic (congestive) and diastolic (congestive) heart failure (HCC) [I50.43]  Acute congestive heart failure, unspecified heart failure type (HCC) [I50.9]    Cardiology consult: CHF systolic and diastolic acute on chronic, chronic A-fib, episode of nonsustained V. tach 12 beat heart rate 160 on 2/18/2024        Referring physician Dr. Miranda Youssef        Impression     Congestive heart failure systolic and diastolic ejection fraction 50%  Episode of nonsustained V. tach heart rate 160, 12 beats 2/18/2024  Chronic A-fib  Severely dilated LA  Hypertension  Type 2 diabetes mellitus  Benign prostatic hypertrophy  Impaired hearing  Severe osteoarthritis involving multiple joints poor mobility  Back pain  History of easy bruising  No history of coronary intervention, no TIA or CVA     Past surgical history  Hernia repair left inguinal, bilateral knee joint replacement bilateral, testicle removal, eye surgery    Drug allergy  Xarelto     Investigation workup     ECG 2/18/2024  A-fib probable old inferior MI heart rate 77     ECG 2/19/2024  A-fib, heart rate 74, old inferior MI     2D echo 2/19/2024     Technically difficult study  Normal LV size, moderately increased septal wall thickness, difficult to assess septal wall motion  Ejection fraction 50%  RV appears normal in size and function  Severely dilated LA  No significant valvular abnormality  IVC not well-visualized     CT head without contrast 2/19/2024  Chronic microvascular disease without acute intracranial abnormality     Chest x-ray 2/18/2024  Bibasilar atelectasis or infiltrate with probable small effusion greater on the right     History of present illness     88-year-old  male with a past medical history of hypertension, chronic A-fib, severe 
Date:   2/22/2024  Patient name: Carl Frias  Date of admission:  2/18/2024 11:40 AM  MRN:   [id number]  YOB: 1935  PCP: Aleksandr Finney MD    Reason for Admission: Acute on chronic combined systolic (congestive) and diastolic (congestive) heart failure (HCC) [I50.43]  Acute congestive heart failure, unspecified heart failure type (HCC) [I50.9]    Cardiology follow-up: CHF systolic and diastolic acute on chronic, chronic A-fib, episode of nonsustained V. tach 12 beat heart rate 160 on 2/18/2024         Referring physician Dr. Miranda Youssef        Impression     Congestive heart failure systolic and diastolic ejection fraction 50%  Episode of nonsustained V. tach heart rate 160, 12 beats 2/18/2024  Chronic A-fib  Severely dilated LA  Hypertension  Type 2 diabetes mellitus  Benign prostatic hypertrophy  Impaired hearing  Severe osteoarthritis involving multiple joints poor mobility  Back pain  History of easy bruising  No history of coronary intervention, no TIA or CVA     Past surgical history  Hernia repair left inguinal, bilateral knee joint replacement bilateral, testicle removal, eye surgery     Drug allergy  Xarelto, severity nonspecified     Investigation workup     ECG 2/18/2024  A-fib probable old inferior MI heart rate 77     ECG 2/19/2024  A-fib, heart rate 74, old inferior MI    2D echo 2/19/2024     Technically difficult study  Normal LV size, moderately increased septal wall thickness, difficult to assess septal wall motion  Ejection fraction 50%  RV appears normal in size and function  Severely dilated LA  No significant valvular abnormality  IVC not well-visualized     CT head without contrast 2/19/2024  Chronic microvascular disease without acute intracranial abnormality     Chest x-ray 2/18/2024  Bibasilar atelectasis or infiltrate with probable small effusion greater on the right     History of present illness     88-year-old  male with a past medical history of hypertension, 
[facility]   Occupational Therapy Evaluation  Date: 24  Patient Name: Carl Frias       Room:   MRN: [id number]  Account: [id number]   : 1935  (88 y.o.) Gender: male     Discharge Recommendations:  Further Occupational Therapy is recommended upon facility discharge.    OT Equipment Recommendations  Other: TBD    Referring Practitioner: Dr. Youssef  Diagnosis: Acute on chronic combined CHF   Treatment Diagnosis: Impaired self-care status    Past Medical History:  has a past medical history of Diabetes mellitus (HCC), Hypertension, and Testicular cancer (HCC).    Past Surgical History:   has a past surgical history that includes Testicle removal (); hernia repair (); eye surgery (Bilateral); and joint replacement (Bilateral).    Restrictions  Restrictions/Precautions  Restrictions/Precautions: Fall Risk, General Precautions  Required Braces or Orthoses?: No  Implants present? : Metal implants (B TKA)      Subjective  Subjective: \"My knees hurt a little when they squeeze them\"  Comments: Pt denied pain with mobility but did report feeling SOB, SpO2 remained above 90% throughout.  Subjective  Pain: Pt denies pain    Social/Functional History  Social/Functional History  Lives With: Spouse  Type of Home: House  Home Layout: Two level, Performs ADL's on one level, Able to Live on Main level with bedroom/bathroom (Sleeps in a flat bed in the LR; Full bath on main floor)  Home Access: Stairs to enter with rails (Generally able to walk up/down IND with use of railings)  Entrance Stairs - Number of Steps: 4  Entrance Stairs - Rails: Both  Bathroom Shower/Tub: Walk-in shower  Bathroom Toilet: Standard  Bathroom Equipment: Grab bars in shower, Shower chair, Grab bars around toilet, Commode  Bathroom Accessibility: Accessible (RW difficult to maneuver)  Home Equipment: Cane, Walker, rolling, Wheelchair-manual, Lift chair (Family looking into getting a hospital bed)  Has the 
Duc Ambrose MD   Patient:  MILY PELAYO KATIE   YOB: 1935  MRN:  [id number]  Location: [facility] Care    2090-01 2/19/24 2:14 PM   [phone] From: Tegan Davey [facility] RE: MILY PELAYO 1935 penile lesions PROG CARE  Unread   
[facility]  Progress Note      NAME:  Carl Frias  MEDICAL RECORD NUMBER:  [id number]  AGE: 88 y.o.   GENDER: male  : 1935  TODAY'S DATE:  2024    Spoke with residents regarding blistering rash to patient's left lower leg and raised black penile lesion. Resident states that the rash is not likely to be shingles as it is too widespread and covers multiple dermatomes and that it is likely vascular in nature.  Resident also aware of penile lesion and awaiting urology consult.  Will continue to follow and monitor rash for any progression or changes.     Radha Sabillon, BSN, RN, CWOCN, [facility]  Wound, Ostomy, and Continence Nursing  [phone]              
[facility]  Progress Note      NAME:  Carl Frias  MEDICAL RECORD NUMBER:  [id number]  AGE: 88 y.o.   GENDER: male  : 1935  TODAY'S DATE:  2024    [facility] nurse follow-up visit to check on progression of blistering rash to left lower leg and foot.  Color remains unchanged and there is still a dark purple base underneath each of the blistering areas.  Blisters are more defined today than on last assessment.  Patient states he is only having pain to the plantar foot.  Skin to left lower leg otherwise normal in color, no hemosiderin staining or venous discoloration noted.  DP and PT pulses palpable.  Penile lesion unchanged, per urology looks like calciphylaxis.  Patient has no diagnosis of renal disease, not on dialysis, is diabetic with A1c of 9.6. Area still concerning to writer, especially given patient's history, so discussed with daughter that patient may want to follow-up in dermatology just to be sure that the lesion is not something to worry about.  Will continue to follow.     Radha Sabillon, TARIQN, RN, CWOCN, [facility]  Wound, Ostomy, and Continence Nursing  [phone]              
Patient and family refusing bed alarm and chair alarm.   Family will also request 4 bed rails up.   Education provided.   
Physical Therapy  Facility/Department: [facility] PROGRESSIVE CARE  Physical Therapy Initial Assessment    Name: Carl Frias  : 1935  MRN: [id number]  Date of Service: 2024    Discharge Recommendations:  Patient would benefit from continued therapy after discharge, Therapy recommended at discharge   PT Equipment Recommendations  Equipment Needed: No      Patient Diagnosis(es): The primary encounter diagnosis was Acute on chronic combined systolic (congestive) and diastolic (congestive) heart failure (HCC). A diagnosis of Acute congestive heart failure, unspecified heart failure type (HCC) was also pertinent to this visit.  Past Medical History:  has a past medical history of Diabetes mellitus (HCC), Hypertension, and Testicular cancer (HCC).  Past Surgical History:  has a past surgical history that includes Testicle removal (); hernia repair (); eye surgery (Bilateral); and joint replacement (Bilateral).    Assessment   Body Structures, Functions, Activity Limitations Requiring Skilled Therapeutic Intervention: Decreased functional mobility ;Decreased endurance;Decreased balance;Decreased strength  Assessment: Impaired mobility due to decreasd tolerance to activity and safety concerns of decreased balance and strength  Decision Making: Medium Complexity  History: CHF  Exam: decreased balance, strength, endurance, mobility  Clinical Presentation: evolving  Requires PT Follow-Up: Yes  Activity Tolerance  Activity Tolerance: Patient tolerated evaluation without incident;Patient limited by endurance     Plan   Physical Therapy Plan  General Plan: 5-7 times per week  Current Treatment Recommendations: Strengthening, Balance training, Functional mobility training, Transfer training, Endurance training, Gait training, Therapeutic activities, Safety education & training  Safety Devices  Type of Devices: Left in bed, Call light within reach, Gait belt, Bed alarm in place (family present) 
RN called and let resident team know that the patient is in AFIB and had an EKG last night that showed this. Daughter did not seem to think patient had AFIB. Resident states that patient has known history of this. RN also let Resident know that the Patient L leg is warm to the touch and red. Resident to come eval the patient.   
Report called to Nola and Walter Weston. All questions answered.  
Wound care needs to be consulted tomorrow for eval and treat of sacral wound.  
increased overall strength and activity tolerance.  Short Term Goal 3: Pt will complete UB ADL's with SBA and LB ADL's with Mod A and Good safety using AE if appropriate.  Short Term Goal 4: Pt will complete toilet transfer with Min A and Good safety using least restrictive device.  Short Term Goal 5: Pt will actively participate in 15-20 minutes of therapeutic exercise/activity to promote increased independence and safety with self-care and mobility.  Occupational Therapy Plan  Times Per Week: 5-6  Times Per Day: Once a day  Current Treatment Recommendations: Balance training, Functional mobility training, Endurance training, Strengthening, Safety education & training, Patient/Caregiver education & training, Equipment evaluation, education, & procurement, Self-Care / ADL    Assessment  Activity Tolerance  Activity Tolerance: Patient limited by fatigue, Patient Tolerated treatment well  Activity Tolerance Comments: Pt SOB with exertion although SpO2 maintained WFL  Assessment  Performance deficits / Impairments: Decreased functional mobility , Decreased ADL status, Decreased safe awareness, Decreased endurance, Decreased balance, Decreased cognition, Decreased sensation  Treatment Diagnosis: Impaired self-care status  Prognosis: Good  Decision Making: Medium Complexity  Discharge Recommendations: Patient would benefit from continued therapy after discharge  OT Equipment Recommendations  Other: TBD  Safety Devices  Type of Devices: All fall risk precautions in place, Bed alarm in place, Call light within reach, Gait belt, Patient at risk for falls, Left in bed, Nurse notified    AM-PAC Daily Activities Inpatient  AM-PAC Daily Activity - Inpatient   How much help is needed for putting on and taking off regular lower body clothing?: A Lot  How much help is needed for bathing (which includes washing, rinsing, drying)?: A Lot  How much help is needed for toileting (which includes using toilet, bedpan, or urinal)?: A 
Benign prostatic hyperplasia with lower urinary tract symptoms     Essential hypertension     Chronic atrial fibrillation (HCC)     Type 2 diabetes mellitus with diabetic polyneuropathy, without long-term current use of insulin (HCC)     History of inguinal hernia repair     Chronic combined systolic and diastolic congestive heart failure (HCC)     Other constipation     Hydrocele     Chronic bilateral low back pain without sciatica     Other specified abdominal hernia without obstruction or gangrene     Lumbosacral radiculopathy     Flexural atopic dermatitis perineal     Unstable gait     Easy bruisability     Acute on chronic combined systolic (congestive) and diastolic (congestive) heart failure (HCC)      Plan of Treatment:   Medications reviewed     1: CHF systolic and diastolic EF 50%, continue current dose of IV Bumex 2 mg a day,  Aldactone 25 mg a day  Change IV Bumex to p.o. Bumex 2 mg tomorrow morning  2; A-fib continue Lopressor succinate 25 mg a  daily patient also had nonsustained V. Tach  3: BPH continue current dose of tamsulosin 0.4 mg and finasteride 5 mg a day  4: Diabetes mellitus on insulin  5: Continue current dose of subcu Lovenox start low-dose Eliquis 2.5 mg twice daily very high risk for CVA patient has chronic A-fib severely dilated LA  Discussed with the patient's daughter about oral anticoagulation  Check BMP and magnesium daily  Probable discharge to rehab unit 2/22/2024    Electronically signed by Alcides Felipe MD on 2/21/2024 at 4:48 PM  
small effusions greater on the right.         Physical Examination:        Physical Exam  Physical Exam  Physical Exam  Vitals and nursing note reviewed.   Constitutional:       General: He is not in acute distress.     Appearance: Normal appearance. He is not ill-appearing.   HENT:      Head: Normocephalic and atraumatic.      Right Ear: External ear normal.      Left Ear: External ear normal.      Nose: Nose normal.      Mouth/Throat:      Mouth: Mucous membranes are moist.   Eyes:      Extraocular Movements: Extraocular movements intact.      Pupils: Pupils are equal, round, and reactive to light.   Cardiovascular:      Rate and Rhythm: Normal rate. Rhythm irregularly irregular.      Pulses: Normal pulses.           Radial pulses are 2+ on the left side.      Heart sounds: Normal heart sounds.   Pulmonary:      Effort: Pulmonary effort is normal.      Breath sounds: Normal breath sounds.   Abdominal:      General: Abdomen is flat.      Palpations: Abdomen is soft.      Tenderness: There is no abdominal tenderness.   Musculoskeletal:         General: No tenderness. Normal range of motion.      Cervical back: Neck supple. No spinous process tenderness or muscular tenderness.      Right lower le+ Pitting Edema present.      Left lower le+ Pitting Edema present.   Skin:     General: Skin is warm and dry.      Capillary Refill: Capillary refill takes less than 2 seconds.   Neurological:      General: No focal deficit present.      Mental Status: He is alert and oriented to person, place, and time.      Cranial Nerves: Cranial nerves 2-12 are intact.      Sensory: Sensation is intact.      Motor: Motor function is intact.   Psychiatric:         Behavior: Behavior normal.         Thought Content: Thought content does not include homicidal or suicidal ideation.        Assessment:        Primary Problem  Acute on chronic combined systolic (congestive) and diastolic (congestive) heart failure (HCC)    Active 
baseline  -Cr 1.1  -Na 129 >131  -on aspirin for atrial fibrillation   -CT head was ordered yesterday due to fall   -home meds include bumex, aldactone, lisinopril, metolazone however aldactone and lisinopril were not dispensed since 4/2023  -holding metolazone   -TSH pending   -repeat Echo pending   -daily weights  -strict I&Os  -telemetry  -cardiology consulted  -on home bumex 1mg once daily--> start IV bumex 1mg twice daily            Atrial Fibrillation with RVR  -on asa, per chart review  -High fall risk  -[id number] Score for Atrial Fibrillation Stroke Risk   Risk   Factors  Component Value   C CHF Yes 1   H HTN Yes 1   A2 Age >= 75 Yes,  (88 y.o.) 2   D DM Yes 1   S2 Prior Stroke/TIA No 0   V Vascular Disease No 0   A Age 65-74 No,  (88 y.o.) 0   Sc Sex male 0    [id number]  Score  5   Score last updated 2/19/24 7:57 AM EST           DM T2  - on admission   -last HbA1c 12/2024: 9.6%   -repeat pending  -hypoglycemia treatment protocol  -LDSS  -insulin naive         BPH  -on proscar 5mg   -on flomax 0.4mg        DVT prophylaxis: Lovenox 40 mg SC        Plan will be discussed with the attending      Natalie Pappas MD  PGY 1 TY Resident  2/19/2024 7:37 AM        Attending Physician Statement  I have discussed the care of Carl Frias and I have examined the patient myselft and taken ros and hpi , including pertinent history and exam findings,  with the resident. I have reviewed the key elements of all parts of the encounter with the resident.  I agree with the assessment, plan and orders as documented by the resident.   Patient is 88-year-old male very very hard of hearing, history of CHF hypertension diabetes presented with falls no syncopal episode, generalized weakness fatigue and volume overload.  Patient acute on chronic diastolic heart failure  Has evidence of volume overload  As per family patient not compliant with Bumex  Patient hyponatremic likely secondary to underlying hypervolemia

## 2024-02-22 NOTE — PLAN OF CARE
Problem: Discharge Planning  Goal: Discharge to home or other facility with appropriate resources  2/22/2024 0051 by Ania Thomson RN  Outcome: Progressing  Flowsheets (Taken 2/22/2024 0051)  Discharge to home or other facility with appropriate resources: Identify barriers to discharge with patient and caregiver     Problem: Skin/Tissue Integrity  Goal: Absence of new skin breakdown  Description: 1.  Monitor for areas of redness and/or skin breakdown  2.  Assess vascular access sites hourly  3.  Every 4-6 hours minimum:  Change oxygen saturation probe site  4.  Every 4-6 hours:  If on nasal continuous positive airway pressure, respiratory therapy assess nares and determine need for appliance change or resting period.  2/22/2024 0051 by Ania Thomson RN  Outcome: Progressing     Problem: Safety - Adult  Goal: Free from fall injury  2/22/2024 0051 by Ania Thomson RN  Outcome: Progressing  Flowsheets (Taken 2/22/2024 0051)  Free From Fall Injury: Instruct family/caregiver on patient safety     Problem: ABCDS Injury Assessment  Goal: Absence of physical injury  2/22/2024 0051 by Ania Thomson RN  Outcome: Progressing     Problem: Pain  Goal: Verbalizes/displays adequate comfort level or baseline comfort level  Outcome: Progressing  Flowsheets (Taken 2/22/2024 0051)  Verbalizes/displays adequate comfort level or baseline comfort level: Encourage patient to monitor pain and request assistance     Problem: Chronic Conditions and Co-morbidities  Goal: Patient's chronic conditions and co-morbidity symptoms are monitored and maintained or improved  2/22/2024 0051 by Ania Thomson RN  Outcome: Progressing  Flowsheets (Taken 2/22/2024 0051)  Care Plan - Patient's Chronic Conditions and Co-Morbidity Symptoms are Monitored and Maintained or Improved:   Monitor and assess patient's chronic conditions and comorbid symptoms for stability, deterioration, or improvement   Collaborate with multidisciplinary team to

## 2024-02-22 NOTE — CARE COORDINATION
DISCHARGE PLANNING NOTE:  Call from Pastor from [facility] they can not take this patient today at 6:30pm and asked if we can schedule for tomorrow.     Call to Dee at [facility] and rescheduled transport for 10:00 am Thursday with [facility].     Pastor notified of transport time for tomorrow.     Electronically signed by Shilpa Leggett RN on 2/21/2024 at 4:51 PM   
HENS complete.  Document ID: [id number]   Electronically signed by JOHNY Delong on 2/22/2024 at 8:16 AM    
ONGOING DISCHARGE PLAN:    Patient is alert and oriented x4.    Spoke with patient & Patient's Daughter, Hannah, at the bedside. regarding discharge plan and patient confirms that plan is still to DC to [facility] at 10:00 am.     Transport has already been set up for today w/ [facility].     Report called to: [phone].    HENS/Janneth, has already been completed.       Will continue to follow for additional discharge needs.    If patient is discharged prior to next notation, then this note serves as note for discharge by case management.    Electronically signed by Duyen Munguia RN on 2/22/2024 at 9:13 AM   
Writer is following for potential discharge placement.    Writer placed call to Hannah at [facility] regarding referral. Facility can accept this pt.    Writer met with pt and daughter in room regarding this. Agreeable to insurance authorization started for [facility].  Electronically signed by JOHNY Delong on 2/20/2024 at 2:30 PM    
Writer is following for potential discharge to [facility].  Writer checked authorization status in [employer] portal.  Authorization approved 2/21-2/23.  Writer placed perfectserve message to bedside RN Shilpa confirming approval.   Electronically signed by JOHNY Delong on 2/21/2024 at 8:52 AM    Writer spoke to Shilpa regarding discharge. Dr. Londono is currently rounding. Writer placed message to Hannah at [facility] for update.  Electronically signed by JOHNY Delong on 2/21/2024 at 2:12 PM    Shilpa states residents will be placing discharge order for this pt.  Writer placed call to [facility] regarding admission. Pt can admit no later than 1800.  Writer placed call to [facility] for scheduling  Electronically signed by JOHNY Delong on 2/21/2024 at 4:31 PM    [facility] could pick this pt up in 30 minutes. Discharge is not currently in place. Writer placed call back to [facility] to reach out to [facility] for transportation before 1800.  Electronically signed by JOHNY Delong on 2/21/2024 at 4:35 PM    Writer received call from [facility]. [facility] earliest transportation time is 1830. Writer placed call to Pastor at [facility] to verify this time. Admissions is currently speaking to the D.O.N. regarding this admission. Will place call back to writer in 10-15 minutes.  Electronically signed by JOHNY Delong on 2/21/2024 at 4:42 PM    
Writer submitted authorization in [employer].  [employer] Auth ID: [id number]   Electronically signed by JOHNY Delong on 2/20/2024 at 2:31 PM    
discuss the discharge plan with any other family members/significant others, and if so, who? No  Plans to Return to Present Housing: Yes  Other Identified Issues/Barriers to RETURNING to current housing: none  Potential Assistance needed at discharge: Home Care, Skilled Nursing Facility            Potential DME:    Patient expects to discharge to: Skilled nursing facility  Plan for transportation at discharge: Wheelchair Van    Financial    Payor: HUMANA MEDICARE / Plan: HUMANA GOLD PLUS HMO / Product Type: *No Product type* /     Does insurance require precert for SNF: Yes    Potential assistance Purchasing Medications: No  Meds-to-Beds request:        [facility] - [facility] [address] - P [phone] - F [phone] [address] [address]  Phone: [phone] Fax: [phone]    [id number] - [place], [place] - [address] - P [phone] - F [phone]  [address] [facility] [place] [address]  Phone: [phone] Fax: [phone]    [facility] [id number] - [place], [place] - [address] -  [phone] - F [phone]  [address] [facility] [place] [address]  Phone: [phone] Fax: [phone]      Notes:    Factors facilitating achievement of predicted outcomes: Family support, Motivated, Cooperative, Pleasant, Sense of humor, Good insight into deficits, and Has needed Durable Medical Equipment at home    Barriers to discharge: Decreased endurance, Lower extremity weakness, Medical complications, and Stairs at home    Additional Case Management Notes: 2/19/24 HUMANA MEDICARE from home with spouse DME: cane,walker, SC, GB, toilet handles, glucometer, walk in shower VNS: none but gave list to dtr. Pt would like [facility] , referral sent . IV bumex, CT head, echo and cardio consult. LIZ NEEDS SIGNED/COMPLETED. Following for needs//JF              The Plan for Transition of Care is related to the following treatment goals of Acute on chronic 
Medications      These medications were sent to [id number] - [place], [place] - [address] [place] [address] -  [phone] - F [phone]  [address] [place], [place] [address]  Phone: [phone]       apixaban 2.5 MG Tabs tablet        metoprolol succinate 25 MG extended release tablet

## 2024-02-23 ENCOUNTER — TELEPHONE (OUTPATIENT)
Dept: INTERNAL MEDICINE CLINIC | Age: 89
End: 2024-02-23

## 2024-02-23 NOTE — TELEPHONE ENCOUNTER
Wife was calling to let us know patient was discharged from [facility] and is now at [facility].

## 2024-02-26 ENCOUNTER — HOSPITAL ENCOUNTER (OUTPATIENT)
Age: 89
Setting detail: SPECIMEN
Discharge: HOME | End: 2024-02-26

## 2024-02-26 LAB
ERYTHROCYTE [DISTWIDTH] IN BLOOD BY AUTOMATED COUNT: 14.7 % (ref 11.8–14.4)
HCT VFR BLD AUTO: 31.9 % (ref 40.7–50.3)
HGB BLD-MCNC: 10 G/DL (ref 13–17)
MCH RBC QN AUTO: 28 PG (ref 25.2–33.5)
MCHC RBC AUTO-ENTMCNC: 31.3 G/DL (ref 28.4–34.8)
MCV RBC AUTO: 89.4 FL (ref 82.6–102.9)
NRBC BLD-RTO: 0 PER 100 WBC
PLATELET # BLD AUTO: 362 K/UL (ref 138–453)
PMV BLD AUTO: 9.6 FL (ref 8.1–13.5)
RBC # BLD AUTO: 3.57 M/UL (ref 4.21–5.77)
WBC OTHER # BLD: 6.4 K/UL (ref 3.5–11.3)

## 2024-02-26 PROCEDURE — 36415 COLL VENOUS BLD VENIPUNCTURE: CPT

## 2024-02-26 PROCEDURE — 85027 COMPLETE CBC AUTOMATED: CPT

## 2024-02-26 PROCEDURE — P9603 ONE-WAY ALLOW PRORATED MILES: HCPCS

## 2024-02-27 ENCOUNTER — TELEPHONE (OUTPATIENT)
Dept: INTERNAL MEDICINE CLINIC | Age: 89
End: 2024-02-27

## 2024-02-27 NOTE — TELEPHONE ENCOUNTER
Spoke with patients daughter Hannah who confirmed upcoming appt. Informed that documentation will be needed for insurance coverage of sleep study. Hannah verbalized understanding and agreed with plan to discuss at OV.

## 2024-02-27 NOTE — TELEPHONE ENCOUNTER
----- Message from April Nagle sent at 2/27/2024  2:26 PM EST -----  Subject: Referral Request    Reason for referral request? patients daughter chad called in requesting a   referral be made for patient to be checked for sleep apnea. per chad,   patient is currently at a rehab facility for physical therapy, and it was   brought up to her by the nurse that was taking care of patient that when   patient is sleeping he has been having shallow breathing and gasping for   air during his sleep. patient also waking up with headache, and nurse   advised chad to call his family physician to get patient checked for sleep   apnea  Provider patient wants to be referred to(if known):     Provider Phone Number(if known):    Additional Information for Provider? please call chad back to advise,   thank you   ---------------------------------------------------------------------------  --------------  CALL BACK INFO    [phone]; OK to leave message on voicemail  ---------------------------------------------------------------------------  --------------

## 2024-02-28 ENCOUNTER — HOSPITAL ENCOUNTER (OUTPATIENT)
Age: 89
Setting detail: SPECIMEN
Discharge: HOME | End: 2024-02-28

## 2024-02-28 LAB
ANION GAP SERPL CALCULATED.3IONS-SCNC: 12 MMOL/L (ref 9–17)
BNP SERPL-MCNC: 1330 PG/ML
BUN SERPL-MCNC: 52 MG/DL (ref 8–23)
CALCIUM SERPL-MCNC: 9.1 MG/DL (ref 8.6–10.4)
CHLORIDE SERPL-SCNC: 103 MMOL/L (ref 98–107)
CO2 SERPL-SCNC: 24 MMOL/L (ref 20–31)
CREAT SERPL-MCNC: 1.4 MG/DL (ref 0.7–1.2)
ERYTHROCYTE [DISTWIDTH] IN BLOOD BY AUTOMATED COUNT: 14.6 % (ref 11.8–14.4)
GFR SERPL CREATININE-BSD FRML MDRD: 49 ML/MIN/1.73M2
GLUCOSE SERPL-MCNC: 95 MG/DL (ref 70–99)
HCT VFR BLD AUTO: 32.7 % (ref 40.7–50.3)
HGB BLD-MCNC: 10.1 G/DL (ref 13–17)
MCH RBC QN AUTO: 27.4 PG (ref 25.2–33.5)
MCHC RBC AUTO-ENTMCNC: 30.9 G/DL (ref 28.4–34.8)
MCV RBC AUTO: 88.9 FL (ref 82.6–102.9)
NRBC BLD-RTO: 0 PER 100 WBC
PLATELET # BLD AUTO: 411 K/UL (ref 138–453)
PMV BLD AUTO: 9.7 FL (ref 8.1–13.5)
POTASSIUM SERPL-SCNC: 4.4 MMOL/L (ref 3.7–5.3)
RBC # BLD AUTO: 3.68 M/UL (ref 4.21–5.77)
SODIUM SERPL-SCNC: 139 MMOL/L (ref 135–144)
WBC OTHER # BLD: 6 K/UL (ref 3.5–11.3)

## 2024-02-28 PROCEDURE — 80048 BASIC METABOLIC PNL TOTAL CA: CPT

## 2024-02-28 PROCEDURE — 85027 COMPLETE CBC AUTOMATED: CPT

## 2024-02-28 PROCEDURE — P9603 ONE-WAY ALLOW PRORATED MILES: HCPCS

## 2024-02-28 PROCEDURE — 36415 COLL VENOUS BLD VENIPUNCTURE: CPT

## 2024-02-28 PROCEDURE — 83880 ASSAY OF NATRIURETIC PEPTIDE: CPT

## 2024-02-29 ENCOUNTER — HOSPITAL ENCOUNTER (OUTPATIENT)
Age: 89
Setting detail: SPECIMEN
Discharge: HOME | End: 2024-02-29

## 2024-02-29 LAB
ERYTHROCYTE [DISTWIDTH] IN BLOOD BY AUTOMATED COUNT: 14.5 % (ref 11.8–14.4)
HCT VFR BLD AUTO: 35.9 % (ref 40.7–50.3)
HGB BLD-MCNC: 11.3 G/DL (ref 13–17)
MCH RBC QN AUTO: 28.5 PG (ref 25.2–33.5)
MCHC RBC AUTO-ENTMCNC: 31.5 G/DL (ref 28.4–34.8)
MCV RBC AUTO: 90.7 FL (ref 82.6–102.9)
NRBC BLD-RTO: 0 PER 100 WBC
PLATELET # BLD AUTO: 408 K/UL (ref 138–453)
PMV BLD AUTO: 9.5 FL (ref 8.1–13.5)
RBC # BLD AUTO: 3.96 M/UL (ref 4.21–5.77)
WBC OTHER # BLD: 7.9 K/UL (ref 3.5–11.3)

## 2024-02-29 PROCEDURE — P9603 ONE-WAY ALLOW PRORATED MILES: HCPCS

## 2024-02-29 PROCEDURE — 36415 COLL VENOUS BLD VENIPUNCTURE: CPT

## 2024-02-29 PROCEDURE — 85027 COMPLETE CBC AUTOMATED: CPT

## 2024-03-04 ENCOUNTER — HOSPITAL ENCOUNTER (OUTPATIENT)
Age: 89
Setting detail: SPECIMEN
Discharge: HOME | End: 2024-03-04

## 2024-03-04 LAB
ERYTHROCYTE [DISTWIDTH] IN BLOOD BY AUTOMATED COUNT: 14.3 % (ref 11.8–14.4)
HCT VFR BLD AUTO: 32.9 % (ref 40.7–50.3)
HGB BLD-MCNC: 10.1 G/DL (ref 13–17)
MCH RBC QN AUTO: 27.9 PG (ref 25.2–33.5)
MCHC RBC AUTO-ENTMCNC: 30.7 G/DL (ref 28.4–34.8)
MCV RBC AUTO: 90.9 FL (ref 82.6–102.9)
NRBC BLD-RTO: 0 PER 100 WBC
PLATELET # BLD AUTO: 410 K/UL (ref 138–453)
PMV BLD AUTO: 9.9 FL (ref 8.1–13.5)
RBC # BLD AUTO: 3.62 M/UL (ref 4.21–5.77)
WBC OTHER # BLD: 7.4 K/UL (ref 3.5–11.3)

## 2024-03-04 PROCEDURE — 85027 COMPLETE CBC AUTOMATED: CPT

## 2024-03-04 PROCEDURE — 36415 COLL VENOUS BLD VENIPUNCTURE: CPT

## 2024-03-04 PROCEDURE — P9603 ONE-WAY ALLOW PRORATED MILES: HCPCS

## 2024-03-05 ENCOUNTER — HOSPITAL ENCOUNTER (OUTPATIENT)
Age: 89
Setting detail: SPECIMEN
Discharge: HOME | End: 2024-03-05

## 2024-03-05 LAB
ANION GAP SERPL CALCULATED.3IONS-SCNC: 12 MMOL/L (ref 9–16)
BNP SERPL-MCNC: 1397 PG/ML (ref 0–300)
BUN SERPL-MCNC: 38 MG/DL (ref 8–23)
CALCIUM SERPL-MCNC: 9.3 MG/DL (ref 8.6–10.4)
CHLORIDE SERPL-SCNC: 105 MMOL/L (ref 98–107)
CO2 SERPL-SCNC: 25 MMOL/L (ref 20–31)
CREAT SERPL-MCNC: 1.3 MG/DL (ref 0.7–1.2)
ERYTHROCYTE [DISTWIDTH] IN BLOOD BY AUTOMATED COUNT: 14.3 % (ref 11.8–14.4)
GFR SERPL CREATININE-BSD FRML MDRD: 56 ML/MIN/1.73M2
GLUCOSE SERPL-MCNC: 80 MG/DL (ref 74–99)
HCT VFR BLD AUTO: 32.1 % (ref 40.7–50.3)
HGB BLD-MCNC: 10.1 G/DL (ref 13–17)
MCH RBC QN AUTO: 28 PG (ref 25.2–33.5)
MCHC RBC AUTO-ENTMCNC: 31.5 G/DL (ref 28.4–34.8)
MCV RBC AUTO: 88.9 FL (ref 82.6–102.9)
NRBC BLD-RTO: 0 PER 100 WBC
PLATELET # BLD AUTO: 381 K/UL (ref 138–453)
PMV BLD AUTO: 9.8 FL (ref 8.1–13.5)
POTASSIUM SERPL-SCNC: 3.9 MMOL/L (ref 3.7–5.3)
RBC # BLD AUTO: 3.61 M/UL (ref 4.21–5.77)
SODIUM SERPL-SCNC: 142 MMOL/L (ref 136–145)
WBC OTHER # BLD: 6.8 K/UL (ref 3.5–11.3)

## 2024-03-05 PROCEDURE — 83880 ASSAY OF NATRIURETIC PEPTIDE: CPT

## 2024-03-05 PROCEDURE — 85027 COMPLETE CBC AUTOMATED: CPT

## 2024-03-05 PROCEDURE — 80048 BASIC METABOLIC PNL TOTAL CA: CPT

## 2024-03-05 PROCEDURE — 36415 COLL VENOUS BLD VENIPUNCTURE: CPT

## 2024-03-05 PROCEDURE — P9603 ONE-WAY ALLOW PRORATED MILES: HCPCS

## 2024-03-07 ENCOUNTER — HOSPITAL ENCOUNTER (OUTPATIENT)
Age: 89
Setting detail: SPECIMEN
Discharge: HOME | End: 2024-03-07

## 2024-03-07 LAB
ERYTHROCYTE [DISTWIDTH] IN BLOOD BY AUTOMATED COUNT: 14.4 % (ref 11.8–14.4)
HCT VFR BLD AUTO: 29.5 % (ref 40.7–50.3)
HGB BLD-MCNC: 9.3 G/DL (ref 13–17)
MCH RBC QN AUTO: 28.1 PG (ref 25.2–33.5)
MCHC RBC AUTO-ENTMCNC: 31.5 G/DL (ref 28.4–34.8)
MCV RBC AUTO: 89.1 FL (ref 82.6–102.9)
NRBC BLD-RTO: 0 PER 100 WBC
PLATELET # BLD AUTO: 336 K/UL (ref 138–453)
PMV BLD AUTO: 9.8 FL (ref 8.1–13.5)
RBC # BLD AUTO: 3.31 M/UL (ref 4.21–5.77)
WBC OTHER # BLD: 7.1 K/UL (ref 3.5–11.3)

## 2024-03-07 PROCEDURE — P9603 ONE-WAY ALLOW PRORATED MILES: HCPCS

## 2024-03-07 PROCEDURE — 36415 COLL VENOUS BLD VENIPUNCTURE: CPT

## 2024-03-07 PROCEDURE — 85027 COMPLETE CBC AUTOMATED: CPT

## 2024-03-08 ENCOUNTER — TELEPHONE (OUTPATIENT)
Dept: INTERNAL MEDICINE CLINIC | Age: 89
End: 2024-03-08

## 2024-03-08 NOTE — TELEPHONE ENCOUNTER
Maria G from [facility] called patient will be discharging from Skilled nursing and they will be providing home health care.

## 2024-03-11 ENCOUNTER — HOSPITAL ENCOUNTER (EMERGENCY)
Age: 89
Discharge: HOME | End: 2024-03-11
Attending: EMERGENCY MEDICINE
Payer: MEDICARE

## 2024-03-11 ENCOUNTER — APPOINTMENT (OUTPATIENT)
Dept: CT IMAGING | Age: 89
End: 2024-03-11
Payer: MEDICARE

## 2024-03-11 ENCOUNTER — HOSPITAL ENCOUNTER (OUTPATIENT)
Age: 89
Setting detail: SPECIMEN
Discharge: HOME | End: 2024-03-11

## 2024-03-11 VITALS
BODY MASS INDEX: 32.96 KG/M2 | OXYGEN SATURATION: 98 % | HEART RATE: 90 BPM | WEIGHT: 210 LBS | SYSTOLIC BLOOD PRESSURE: 128 MMHG | TEMPERATURE: 98.2 F | RESPIRATION RATE: 19 BRPM | HEIGHT: 67 IN | DIASTOLIC BLOOD PRESSURE: 77 MMHG

## 2024-03-11 DIAGNOSIS — R31.0 GROSS HEMATURIA: Primary | ICD-10-CM

## 2024-03-11 LAB
BACTERIA URNS QL MICRO: ABNORMAL
BILIRUB UR QL STRIP: ABNORMAL
CASTS #/AREA URNS LPF: ABNORMAL /LPF
CLARITY UR: ABNORMAL
COLOR UR: ABNORMAL
EPI CELLS #/AREA URNS HPF: ABNORMAL /HPF
GLUCOSE UR STRIP-MCNC: NEGATIVE MG/DL
HGB UR QL STRIP.AUTO: ABNORMAL
KETONES UR STRIP-MCNC: NEGATIVE MG/DL
LEUKOCYTE ESTERASE UR QL STRIP: ABNORMAL
NITRITE UR QL STRIP: NEGATIVE
PH UR STRIP: 5 [PH] (ref 5–8)
PROT UR STRIP-MCNC: ABNORMAL MG/DL
RBC #/AREA URNS HPF: ABNORMAL /HPF
SP GR UR STRIP: 1.01 (ref 1–1.03)
UROBILINOGEN UR STRIP-ACNC: NORMAL EU/DL (ref 0–1)
WBC #/AREA URNS HPF: ABNORMAL /HPF

## 2024-03-11 PROCEDURE — 81001 URINALYSIS AUTO W/SCOPE: CPT

## 2024-03-11 PROCEDURE — 74176 CT ABD & PELVIS W/O CONTRAST: CPT

## 2024-03-11 PROCEDURE — 99284 EMERGENCY DEPT VISIT MOD MDM: CPT

## 2024-03-11 ASSESSMENT — ENCOUNTER SYMPTOMS
COUGH: 0
SORE THROAT: 0
EYE PAIN: 0
VOMITING: 0
SINUS PRESSURE: 0
BACK PAIN: 0
ABDOMINAL PAIN: 0
FACIAL SWELLING: 0
BLOOD IN STOOL: 0
NAUSEA: 0
WHEEZING: 0
EYE DISCHARGE: 0
CONSTIPATION: 0
SHORTNESS OF BREATH: 0
TROUBLE SWALLOWING: 0
RHINORRHEA: 0
EYE REDNESS: 0
DIARRHEA: 0
CHEST TIGHTNESS: 0
COLOR CHANGE: 0

## 2024-03-11 ASSESSMENT — PAIN - FUNCTIONAL ASSESSMENT: PAIN_FUNCTIONAL_ASSESSMENT: 0-10

## 2024-03-11 ASSESSMENT — LIFESTYLE VARIABLES
HOW OFTEN DO YOU HAVE A DRINK CONTAINING ALCOHOL: NEVER
HOW MANY STANDARD DRINKS CONTAINING ALCOHOL DO YOU HAVE ON A TYPICAL DAY: PATIENT DOES NOT DRINK

## 2024-03-11 ASSESSMENT — PAIN SCALES - GENERAL: PAINLEVEL_OUTOF10: 0

## 2024-03-11 NOTE — ED PROVIDER NOTES
portions of this note were completed with a voice recognition program.  Efforts were made to edit thedictations but occasionally words are mis-transcribed.)    Justus Norton MD  Attending Emergency Physician                        Justus Norton MD  03/11/24 2732

## 2024-03-11 NOTE — ED NOTES
Mode of arrival (squad #, walk in, police, etc) : walk-in      Chief complaint(s): hematuria/weakness        Arrival Note (brief scenario, treatment PTA, etc).: patient came into ED today when he and his daughter noticed some blood in his brief from urinating. Patient denies any dysuria or pain at all. Patient states he was recently admitted to the hospital and was then discharged to [facility]. He has been home one week and was recently started on eliquis.        C= \"Have you ever felt that you should Cut down on your drinking?\"  No  A= \"Have people Annoyed you by criticizing your drinking?\"  No  G= \"Have you ever felt bad or Guilty about your drinking?\"  No  E= \"Have you ever had a drink as an Eye-opener first thing in the morning to steady your nerves or to help a hangover?\"  No      Deferred []      Reason for deferring: N/A    *If yes to two or more: probable alcohol abuse.*

## 2024-03-11 NOTE — ED NOTES
Report given to SUKHWINDER Santiago from ED.   Report method in person   The following was reviewed with receiving RN:   Current vital signs:  /77   Pulse 90   Temp 98.2 °F (36.8 °C) (Oral)   Resp 19   Ht 1.702 m (5' 7\")   Wt 95.3 kg (210 lb)   SpO2 98%   BMI 32.89 kg/m²                      Any medication or safety alerts were reviewed. Any pending diagnostics and notifications were also reviewed, as well as any safety concerns or issues, abnormal labs, abnormal imaging, and abnormal assessment findings. Questions were answered.

## 2024-03-12 ENCOUNTER — OFFICE VISIT (OUTPATIENT)
Dept: INTERNAL MEDICINE CLINIC | Age: 89
End: 2024-03-12
Payer: MEDICARE

## 2024-03-12 ENCOUNTER — HOSPITAL ENCOUNTER (OUTPATIENT)
Age: 89
Setting detail: SPECIMEN
Discharge: HOME | End: 2024-03-12

## 2024-03-12 VITALS
BODY MASS INDEX: 33.36 KG/M2 | SYSTOLIC BLOOD PRESSURE: 130 MMHG | HEART RATE: 78 BPM | DIASTOLIC BLOOD PRESSURE: 84 MMHG | OXYGEN SATURATION: 97 % | WEIGHT: 213 LBS

## 2024-03-12 DIAGNOSIS — E11.42 DIABETIC PERIPHERAL NEUROPATHY (HCC): ICD-10-CM

## 2024-03-12 DIAGNOSIS — I50.42 CHRONIC COMBINED SYSTOLIC AND DIASTOLIC CONGESTIVE HEART FAILURE (HCC): ICD-10-CM

## 2024-03-12 DIAGNOSIS — N13.8 BPH WITH OBSTRUCTION/LOWER URINARY TRACT SYMPTOMS: ICD-10-CM

## 2024-03-12 DIAGNOSIS — I10 ESSENTIAL HYPERTENSION: ICD-10-CM

## 2024-03-12 DIAGNOSIS — D68.69 SECONDARY HYPERCOAGULABLE STATE (HCC): ICD-10-CM

## 2024-03-12 DIAGNOSIS — I48.20 CHRONIC ATRIAL FIBRILLATION (HCC): ICD-10-CM

## 2024-03-12 DIAGNOSIS — E11.42 TYPE 2 DIABETES MELLITUS WITH DIABETIC POLYNEUROPATHY, WITHOUT LONG-TERM CURRENT USE OF INSULIN (HCC): ICD-10-CM

## 2024-03-12 DIAGNOSIS — M21.372 ACQUIRED LEFT FOOT DROP: Primary | ICD-10-CM

## 2024-03-12 DIAGNOSIS — N40.1 BPH WITH OBSTRUCTION/LOWER URINARY TRACT SYMPTOMS: ICD-10-CM

## 2024-03-12 LAB
ERYTHROCYTE [DISTWIDTH] IN BLOOD BY AUTOMATED COUNT: 14.8 % (ref 11.8–14.4)
EST. AVERAGE GLUCOSE BLD GHB EST-MCNC: 194 MG/DL
HBA1C MFR BLD: 8.4 % (ref 4–6)
HCT VFR BLD AUTO: 33.3 % (ref 40.7–50.3)
HGB BLD-MCNC: 10.2 G/DL (ref 13–17)
MCH RBC QN AUTO: 28.3 PG (ref 25.2–33.5)
MCHC RBC AUTO-ENTMCNC: 30.6 G/DL (ref 28.4–34.8)
MCV RBC AUTO: 92.2 FL (ref 82.6–102.9)
NRBC BLD-RTO: 0 PER 100 WBC
PLATELET # BLD AUTO: 309 K/UL (ref 138–453)
PMV BLD AUTO: 10.1 FL (ref 8.1–13.5)
RBC # BLD AUTO: 3.61 M/UL (ref 4.21–5.77)
WBC OTHER # BLD: 8.1 K/UL (ref 3.5–11.3)

## 2024-03-12 PROCEDURE — G8484 FLU IMMUNIZE NO ADMIN: HCPCS | Performed by: INTERNAL MEDICINE

## 2024-03-12 PROCEDURE — 1111F DSCHRG MED/CURRENT MED MERGE: CPT | Performed by: INTERNAL MEDICINE

## 2024-03-12 PROCEDURE — 99214 OFFICE O/P EST MOD 30 MIN: CPT | Performed by: INTERNAL MEDICINE

## 2024-03-12 PROCEDURE — 3052F HG A1C>EQUAL 8.0%<EQUAL 9.0%: CPT | Performed by: INTERNAL MEDICINE

## 2024-03-12 PROCEDURE — G8427 DOCREV CUR MEDS BY ELIG CLIN: HCPCS | Performed by: INTERNAL MEDICINE

## 2024-03-12 PROCEDURE — 1123F ACP DISCUSS/DSCN MKR DOCD: CPT | Performed by: INTERNAL MEDICINE

## 2024-03-12 PROCEDURE — G8417 CALC BMI ABV UP PARAM F/U: HCPCS | Performed by: INTERNAL MEDICINE

## 2024-03-12 PROCEDURE — 1036F TOBACCO NON-USER: CPT | Performed by: INTERNAL MEDICINE

## 2024-03-12 RX ORDER — BLOOD-GLUCOSE SENSOR
EACH MISCELLANEOUS
Qty: 2 EACH | Refills: 3 | Status: SHIPPED | OUTPATIENT
Start: 2024-03-12

## 2024-03-12 RX ORDER — FINASTERIDE 5 MG/1
5 TABLET, FILM COATED ORAL DAILY
Qty: 90 TABLET | Refills: 3 | Status: SHIPPED | OUTPATIENT
Start: 2024-03-12

## 2024-03-12 NOTE — PROGRESS NOTES
Visit Information    Have you changed or started any medications since your last visit including any over-the-counter medicines, vitamins, or herbal medicines? no   Are you having any side effects from any of your medications? -  no  Have you stopped taking any of your medications? Is so, why? -  no    Have you seen any other physician or provider since your last visit? No  Have you had any other diagnostic tests since your last visit? No  Have you been seen in the emergency room and/or had an admission to a hospital since we last saw you? No  Have you had your routine dental cleaning in the past 6 months? no    Have you activated your [employer] account? If not, what are your barriers? No:      Patient Care Team:  Aleksandr Finney MD as PCP - General (Internal Medicine)  Aleksandr Finney MD as PCP - Empaneled Provider  Minerva Box MD as Consulting Physician (Hematology and Oncology)  Kyaw Bowling MD as Consulting Physician (Radiation Oncology)  Ron Mayers MD    Medical History Review  Past Medical, Family, and Social History reviewed and does not contribute to the patient presenting condition    Health Maintenance   Topic Date Due    DTaP/Tdap/Td vaccine (1 - Tdap) Never done    Shingles vaccine (1 of 2) Never done    Respiratory Syncytial Virus (RSV) Pregnant or age 60 yrs+ (1 - 1-dose 60+ series) Never done    Flu vaccine (1) 08/01/2023    COVID-19 Vaccine (4 - 2023-24 season) 09/01/2023    Annual Wellness Visit (Medicare Advantage)  01/01/2024    Depression Screen  01/09/2025    Pneumococcal 65+ years Vaccine  Completed    Hepatitis A vaccine  Aged Out    Hepatitis B vaccine  Aged Out    Hib vaccine  Aged Out    Polio vaccine  Aged Out    Meningococcal (ACWY) vaccine  Aged Out    Lipids  Discontinued    Depression Monitoring  Discontinued      
  Elastic Bandages & Supports Misc  Apply Truss strap for L groin tenderness/hernia     finasteride 5 MG tablet  Commonly known as: PROSCAR  Take 1 tablet by mouth daily     glipiZIDE 10 MG tablet  Commonly known as: GLUCOTROL  Take 1 tablet by mouth 2 times daily (before meals)     lisinopril 10 MG tablet  Commonly known as: PRINIVIL;ZESTRIL  Take 1 tablet by mouth daily     metFORMIN 1000 MG tablet  Commonly known as: GLUCOPHAGE  Take 1 tablet by mouth 2 times daily (with meals)     metoprolol succinate 25 MG extended release tablet  Commonly known as: TOPROL XL  Take 1 tablet by mouth daily     MULTI VITAMIN MENS PO     senna-docusate 8.6-50 MG per tablet  Commonly known as: Senna S  Take 2 tablets by mouth at bedtime     spironolactone 25 MG tablet  Commonly known as: ALDACTONE  Take 1 tablet by mouth daily     tamsulosin 0.4 MG capsule  Commonly known as: FLOMAX  TAKE 1 CAPSULE EVERY DAY           * This list has 3 medication(s) that are the same as other medications prescribed for you. Read the directions carefully, and ask your doctor or other care provider to review them with you.                STOP taking these medications      insulin glargine 100 UNIT/ML injection vial  Commonly known as: LANTUS  Stopped by: Aleksandr Finney MD               Where to Get Your Medications        These medications were sent to [facility] - [facility] [address] - P [phone] - F [phone]  [address], [facility] [address]      Phone: [phone]   finasteride 5 MG tablet  FreeStyle Catrachito 3 Sensor Misc             FOLLOW UP  PLANS     Return in about 1 month (around 4/12/2024) for follow up for chronic disease management.    [place] [address].   Phone [phone]   Fax: [phone]  Answering Service: [phone]

## 2024-03-13 ENCOUNTER — TELEPHONE (OUTPATIENT)
Dept: INTERNAL MEDICINE CLINIC | Age: 89
End: 2024-03-13

## 2024-03-13 DIAGNOSIS — J44.9 CHRONIC OBSTRUCTIVE PULMONARY DISEASE, UNSPECIFIED COPD TYPE (HCC): Primary | ICD-10-CM

## 2024-03-13 LAB
ALBUMIN SERPL-MCNC: 3.8 G/DL (ref 3.5–5.2)
ALBUMIN/GLOB SERPL: 1.4 {RATIO} (ref 1–2.5)
ALP SERPL-CCNC: 43 U/L (ref 40–129)
ALT SERPL-CCNC: 17 U/L (ref 5–41)
ANION GAP SERPL CALCULATED.3IONS-SCNC: 11 MMOL/L (ref 9–17)
AST SERPL-CCNC: 16 U/L
BILIRUB SERPL-MCNC: 0.3 MG/DL (ref 0.3–1.2)
BUN SERPL-MCNC: 37 MG/DL (ref 8–23)
CALCIUM SERPL-MCNC: 9.3 MG/DL (ref 8.6–10.4)
CHLORIDE SERPL-SCNC: 104 MMOL/L (ref 98–107)
CHOLEST SERPL-MCNC: 186 MG/DL
CHOLESTEROL/HDL RATIO: 4
CO2 SERPL-SCNC: 26 MMOL/L (ref 20–31)
CREAT SERPL-MCNC: 1.1 MG/DL (ref 0.7–1.2)
GFR SERPL CREATININE-BSD FRML MDRD: >60 ML/MIN/1.73M2
GLUCOSE SERPL-MCNC: 86 MG/DL (ref 70–99)
HDLC SERPL-MCNC: 47 MG/DL
LDLC SERPL CALC-MCNC: 110 MG/DL (ref 0–130)
POTASSIUM SERPL-SCNC: 4.6 MMOL/L (ref 3.7–5.3)
PROT SERPL-MCNC: 6.6 G/DL (ref 6.4–8.3)
SODIUM SERPL-SCNC: 141 MMOL/L (ref 135–144)
TRIGL SERPL-MCNC: 146 MG/DL

## 2024-03-13 NOTE — TELEPHONE ENCOUNTER
Patient was just seen in the office and the family forgot to ask about the patient taking spironolactone. The patient is on Lisinopril 10 mg and they wanted to know if he should continue taking the spironolactone as well.    Please advise

## 2024-03-13 NOTE — TELEPHONE ENCOUNTER
Patient was seen yesterday and it was mentioned  that he had sleep apnea.    The family reached out to Dr Boudreaux and they do not take his insurance.    They would like a referral to [facility]. They already spoke with them and they are waiting on a referral to be put in to [facility].    Please advise

## 2024-03-14 ENCOUNTER — TELEPHONE (OUTPATIENT)
Dept: INTERNAL MEDICINE CLINIC | Age: 89
End: 2024-03-14

## 2024-03-14 DIAGNOSIS — M21.372 ACQUIRED LEFT FOOT DROP: Primary | ICD-10-CM

## 2024-03-22 ENCOUNTER — OFFICE VISIT (OUTPATIENT)
Dept: UROLOGY | Age: 89
End: 2024-03-22
Payer: MEDICARE

## 2024-03-22 VITALS
WEIGHT: 213 LBS | BODY MASS INDEX: 33.43 KG/M2 | OXYGEN SATURATION: 98 % | DIASTOLIC BLOOD PRESSURE: 78 MMHG | HEIGHT: 67 IN | SYSTOLIC BLOOD PRESSURE: 132 MMHG | HEART RATE: 78 BPM | TEMPERATURE: 97.8 F

## 2024-03-22 DIAGNOSIS — N40.1 BPH WITH OBSTRUCTION/LOWER URINARY TRACT SYMPTOMS: ICD-10-CM

## 2024-03-22 DIAGNOSIS — R35.1 NOCTURIA: Primary | ICD-10-CM

## 2024-03-22 DIAGNOSIS — B35.6 JOCK ITCH: ICD-10-CM

## 2024-03-22 DIAGNOSIS — N48.83 ACQUIRED BURIED PENIS: ICD-10-CM

## 2024-03-22 DIAGNOSIS — N13.8 BPH WITH OBSTRUCTION/LOWER URINARY TRACT SYMPTOMS: ICD-10-CM

## 2024-03-22 PROCEDURE — G8484 FLU IMMUNIZE NO ADMIN: HCPCS | Performed by: UROLOGY

## 2024-03-22 PROCEDURE — 1036F TOBACCO NON-USER: CPT | Performed by: UROLOGY

## 2024-03-22 PROCEDURE — G8427 DOCREV CUR MEDS BY ELIG CLIN: HCPCS | Performed by: UROLOGY

## 2024-03-22 PROCEDURE — G8417 CALC BMI ABV UP PARAM F/U: HCPCS | Performed by: UROLOGY

## 2024-03-22 PROCEDURE — 1111F DSCHRG MED/CURRENT MED MERGE: CPT | Performed by: UROLOGY

## 2024-03-22 PROCEDURE — 1123F ACP DISCUSS/DSCN MKR DOCD: CPT | Performed by: UROLOGY

## 2024-03-22 PROCEDURE — 99214 OFFICE O/P EST MOD 30 MIN: CPT | Performed by: UROLOGY

## 2024-03-22 RX ORDER — CARVEDILOL 12.5 MG/1
TABLET ORAL
COMMUNITY

## 2024-03-22 RX ORDER — NYSTATIN 100000 [USP'U]/G
POWDER TOPICAL
Qty: 1 EACH | Refills: 3 | Status: SHIPPED | OUTPATIENT
Start: 2024-03-22

## 2024-03-22 RX ORDER — FUROSEMIDE 20 MG/1
1 TABLET ORAL
COMMUNITY

## 2024-03-22 ASSESSMENT — ENCOUNTER SYMPTOMS
COLOR CHANGE: 0
VOMITING: 0
SHORTNESS OF BREATH: 0
EYES NEGATIVE: 1
EYE REDNESS: 0
COUGH: 0
GASTROINTESTINAL NEGATIVE: 1
ALLERGIC/IMMUNOLOGIC NEGATIVE: 1
WHEEZING: 0
NAUSEA: 0
EYE PAIN: 0
BACK PAIN: 0
RESPIRATORY NEGATIVE: 1
ABDOMINAL PAIN: 0

## 2024-03-22 NOTE — PROGRESS NOTES
[facility] PHYSICIANS [facility], [facility] UROLOGY CENTER  [address]  [facility] [address]  Dept: [phone]    [facility] Urology Office Note - Established    Patient:  Carl Frias  YOB: 1935  Date: 3/22/2024    The patient is a 88 y.o. male who presents todayfor evaluation of the following problems:   Chief Complaint   Patient presents with    BPH w/ urinary hesitancy       HPI  Here fro BPH.   He is on Flomax.   His stream stops and starts at times.   He has a known buried penis.       Summary of old records: N/A    Additional History: N/A    Procedures Today: N/A    Urinalysis today:  No results found for this visit on 03/22/24.  Last several PSA's:  Lab Results   Component Value Date    PSA 0.34 03/31/2016    PSA 0.37 06/19/2013    PSA 0.68 05/22/2012     Last total testosterone:  Lab Results   Component Value Date    TESTOSTERONE 80 (L) 05/09/2017       AUA Symptom Score (3/22/2024):                               Last BUN and creatinine:  Lab Results   Component Value Date    BUN 37 (H) 03/12/2024     Lab Results   Component Value Date    CREATININE 1.1 03/12/2024       Additional Lab/Culture results: none    Imaging Reviewed during this Office Visit: none  (results were independently reviewed by physician and radiology report verified)    PAST MEDICAL, FAMILY AND SOCIAL HISTORY UPDATE:  Past Medical History:   Diagnosis Date    Diabetes mellitus (HCC)     Hypertension     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

## 2024-03-22 NOTE — PROGRESS NOTES
Review of Systems   Constitutional: Negative.  Negative for appetite change, chills and fever.   HENT: Negative.     Eyes: Negative.  Negative for pain, redness and visual disturbance.   Respiratory: Negative.  Negative for cough, shortness of breath and wheezing.    Cardiovascular: Negative.  Negative for chest pain and leg swelling.   Gastrointestinal: Negative.  Negative for abdominal pain, nausea and vomiting.   Endocrine: Negative.    Genitourinary:  Positive for difficulty urinating. Negative for dysuria, flank pain, frequency, hematuria, testicular pain and urgency.   Musculoskeletal: Negative.  Negative for back pain, joint swelling and myalgias.   Skin: Negative.  Negative for color change, rash and wound.   Allergic/Immunologic: Negative.    Neurological: Negative.  Negative for dizziness, tremors, weakness, numbness and headaches.   Hematological: Negative.  Negative for adenopathy. Does not bruise/bleed easily.   Psychiatric/Behavioral: Negative.

## 2024-03-26 NOTE — TELEPHONE ENCOUNTER
Hannah returned office call and agreed to have patient continue Spironolactone. Denied further questions at this time.

## 2024-03-26 NOTE — TELEPHONE ENCOUNTER
2nd attempt to discuss medications. Patients wife answered and stated patients daughter, Hannah is handling patients medications. She advised office called Hannah to discuss.     LVM requesting Hannah return office call regarding medications.

## 2024-04-04 DIAGNOSIS — I50.42 CHRONIC COMBINED SYSTOLIC AND DIASTOLIC CONGESTIVE HEART FAILURE (HCC): ICD-10-CM

## 2024-04-04 RX ORDER — SPIRONOLACTONE 25 MG/1
25 TABLET ORAL DAILY
Qty: 90 TABLET | Refills: 3 | Status: SHIPPED | OUTPATIENT
Start: 2024-04-04

## 2024-04-05 DIAGNOSIS — E11.42 TYPE 2 DIABETES MELLITUS WITH DIABETIC POLYNEUROPATHY, WITHOUT LONG-TERM CURRENT USE OF INSULIN (HCC): Primary | ICD-10-CM

## 2024-04-05 RX ORDER — BLOOD PRESSURE TEST KIT
KIT MISCELLANEOUS
Qty: 300 EACH | Refills: 3 | Status: SHIPPED | OUTPATIENT
Start: 2024-04-05

## 2024-04-05 RX ORDER — BLOOD-GLUCOSE METER
KIT MISCELLANEOUS
Qty: 1 KIT | Refills: 0 | Status: SHIPPED | OUTPATIENT
Start: 2024-04-05

## 2024-04-05 NOTE — TELEPHONE ENCOUNTER
[facility] faxed prescription request for BD single use swab, True metrix air glucose meter, and true metrix level 1 ctrl soln    Last office visit 3/12/2024

## 2024-04-12 ENCOUNTER — TELEPHONE (OUTPATIENT)
Dept: INTERNAL MEDICINE CLINIC | Age: 89
End: 2024-04-12

## 2024-04-12 RX ORDER — CALCIUM CITRATE/VITAMIN D3 200MG-6.25
TABLET ORAL
Qty: 200 STRIP | Refills: 3 | Status: CANCELLED | OUTPATIENT
Start: 2024-04-12

## 2024-04-12 NOTE — TELEPHONE ENCOUNTER
[facility] faxed request for True Metrix Air Glucose Meter , BD Single use swab and True Metrix level 1 CTRL Soln    Last office visit 3/12/2024      Please create orders and send to [facility]

## 2024-04-23 ENCOUNTER — APPOINTMENT (OUTPATIENT)
Dept: GENERAL RADIOLOGY | Age: 89
DRG: 291 | End: 2024-04-23
Payer: MEDICARE

## 2024-04-23 ENCOUNTER — HOSPITAL ENCOUNTER (INPATIENT)
Age: 89
LOS: 7 days | Discharge: NURSING, REHAB OR LONG-TERM CARE | DRG: 291 | End: 2024-04-30
Attending: EMERGENCY MEDICINE | Admitting: INTERNAL MEDICINE
Payer: MEDICARE

## 2024-04-23 ENCOUNTER — APPOINTMENT (OUTPATIENT)
Dept: ULTRASOUND IMAGING | Age: 89
DRG: 291 | End: 2024-04-23
Payer: MEDICARE

## 2024-04-23 DIAGNOSIS — I50.9 CONGESTIVE HEART FAILURE, UNSPECIFIED HF CHRONICITY, UNSPECIFIED HEART FAILURE TYPE (HCC): Primary | ICD-10-CM

## 2024-04-23 DIAGNOSIS — E87.1 HYPONATREMIA: ICD-10-CM

## 2024-04-23 DIAGNOSIS — E83.42 HYPOMAGNESEMIA: ICD-10-CM

## 2024-04-23 DIAGNOSIS — I50.21 SYSTOLIC CHF, ACUTE (HCC): ICD-10-CM

## 2024-04-23 DIAGNOSIS — N17.9 AKI (ACUTE KIDNEY INJURY) (HCC): ICD-10-CM

## 2024-04-23 DIAGNOSIS — N48.9 PENILE LESION: ICD-10-CM

## 2024-04-23 LAB
ABSOLUTE BANDS: 1.05 K/UL (ref 0–1)
ANION GAP SERPL CALCULATED.3IONS-SCNC: 15 MMOL/L (ref 9–17)
BANDS: 10 % (ref 0–10)
BASOPHILS # BLD: 0 K/UL (ref 0–0.2)
BASOPHILS NFR BLD: 0 % (ref 0–2)
BNP SERPL-MCNC: 5484 PG/ML
BUN SERPL-MCNC: 48 MG/DL (ref 8–23)
CALCIUM SERPL-MCNC: 8.2 MG/DL (ref 8.6–10.4)
CHLORIDE SERPL-SCNC: 94 MMOL/L (ref 98–107)
CO2 SERPL-SCNC: 18 MMOL/L (ref 20–31)
CREAT SERPL-MCNC: 1.8 MG/DL (ref 0.7–1.2)
EOSINOPHIL # BLD: 0 K/UL (ref 0–0.4)
EOSINOPHILS RELATIVE PERCENT: 0 % (ref 0–4)
ERYTHROCYTE [DISTWIDTH] IN BLOOD BY AUTOMATED COUNT: 14.9 % (ref 11.5–14.9)
GFR SERPL CREATININE-BSD FRML MDRD: 36 ML/MIN/1.73M2
GLUCOSE BLD-MCNC: 132 MG/DL (ref 75–110)
GLUCOSE BLD-MCNC: 191 MG/DL (ref 75–110)
GLUCOSE SERPL-MCNC: 162 MG/DL (ref 70–99)
HCT VFR BLD AUTO: 26.5 % (ref 41–53)
HGB BLD-MCNC: 9.1 G/DL (ref 13.5–17.5)
LYMPHOCYTES NFR BLD: 0.74 K/UL (ref 1–4.8)
LYMPHOCYTES RELATIVE PERCENT: 7 % (ref 24–44)
MAGNESIUM SERPL-MCNC: 1.3 MG/DL (ref 1.6–2.6)
MCH RBC QN AUTO: 28.7 PG (ref 26–34)
MCHC RBC AUTO-ENTMCNC: 34.2 G/DL (ref 31–37)
MCV RBC AUTO: 83.9 FL (ref 80–100)
MONOCYTES NFR BLD: 1.05 K/UL (ref 0.1–1.3)
MONOCYTES NFR BLD: 10 % (ref 1–7)
MORPHOLOGY: ABNORMAL
MYOGLOBIN SERPL-MCNC: 209 NG/ML (ref 28–72)
MYOGLOBIN SERPL-MCNC: 221 NG/ML (ref 28–72)
NEUTROPHILS NFR BLD: 73 % (ref 36–66)
NEUTS SEG NFR BLD: 7.66 K/UL (ref 1.3–9.1)
PLATELET # BLD AUTO: 301 K/UL (ref 150–450)
PMV BLD AUTO: 7.3 FL (ref 6–12)
POTASSIUM SERPL-SCNC: 3.7 MMOL/L (ref 3.7–5.3)
RBC # BLD AUTO: 3.16 M/UL (ref 4.5–5.9)
SODIUM SERPL-SCNC: 127 MMOL/L (ref 135–144)
SODIUM SERPL-SCNC: 130 MMOL/L (ref 135–144)
T4 FREE SERPL-MCNC: 1.4 NG/DL (ref 0.9–1.7)
TROPONIN I SERPL HS-MCNC: 69 NG/L (ref 0–22)
TROPONIN I SERPL HS-MCNC: 70 NG/L (ref 0–22)
TSH SERPL DL<=0.05 MIU/L-ACNC: 1.83 UIU/ML (ref 0.3–5)
WBC OTHER # BLD: 10.5 K/UL (ref 3.5–11)

## 2024-04-23 PROCEDURE — 83874 ASSAY OF MYOGLOBIN: CPT

## 2024-04-23 PROCEDURE — 99223 1ST HOSP IP/OBS HIGH 75: CPT | Performed by: INTERNAL MEDICINE

## 2024-04-23 PROCEDURE — 80048 BASIC METABOLIC PNL TOTAL CA: CPT

## 2024-04-23 PROCEDURE — 71045 X-RAY EXAM CHEST 1 VIEW: CPT

## 2024-04-23 PROCEDURE — 76775 US EXAM ABDO BACK WALL LIM: CPT

## 2024-04-23 PROCEDURE — 2500000003 HC RX 250 WO HCPCS

## 2024-04-23 PROCEDURE — 2580000003 HC RX 258

## 2024-04-23 PROCEDURE — 96375 TX/PRO/DX INJ NEW DRUG ADDON: CPT

## 2024-04-23 PROCEDURE — 6370000000 HC RX 637 (ALT 250 FOR IP)

## 2024-04-23 PROCEDURE — 84443 ASSAY THYROID STIM HORMONE: CPT

## 2024-04-23 PROCEDURE — 36415 COLL VENOUS BLD VENIPUNCTURE: CPT

## 2024-04-23 PROCEDURE — 6360000002 HC RX W HCPCS: Performed by: EMERGENCY MEDICINE

## 2024-04-23 PROCEDURE — 84439 ASSAY OF FREE THYROXINE: CPT

## 2024-04-23 PROCEDURE — 82947 ASSAY GLUCOSE BLOOD QUANT: CPT

## 2024-04-23 PROCEDURE — 84484 ASSAY OF TROPONIN QUANT: CPT

## 2024-04-23 PROCEDURE — 6360000002 HC RX W HCPCS

## 2024-04-23 PROCEDURE — 84295 ASSAY OF SERUM SODIUM: CPT

## 2024-04-23 PROCEDURE — 83550 IRON BINDING TEST: CPT

## 2024-04-23 PROCEDURE — 85025 COMPLETE CBC W/AUTO DIFF WBC: CPT

## 2024-04-23 PROCEDURE — 99285 EMERGENCY DEPT VISIT HI MDM: CPT

## 2024-04-23 PROCEDURE — 83735 ASSAY OF MAGNESIUM: CPT

## 2024-04-23 PROCEDURE — 51798 US URINE CAPACITY MEASURE: CPT

## 2024-04-23 PROCEDURE — 93005 ELECTROCARDIOGRAM TRACING: CPT | Performed by: EMERGENCY MEDICINE

## 2024-04-23 PROCEDURE — 83880 ASSAY OF NATRIURETIC PEPTIDE: CPT

## 2024-04-23 PROCEDURE — 83540 ASSAY OF IRON: CPT

## 2024-04-23 PROCEDURE — 96365 THER/PROPH/DIAG IV INF INIT: CPT

## 2024-04-23 PROCEDURE — 2060000000 HC ICU INTERMEDIATE R&B

## 2024-04-23 PROCEDURE — 83930 ASSAY OF BLOOD OSMOLALITY: CPT

## 2024-04-23 PROCEDURE — 2500000003 HC RX 250 WO HCPCS: Performed by: EMERGENCY MEDICINE

## 2024-04-23 RX ORDER — BUMETANIDE 0.25 MG/ML
2 INJECTION INTRAMUSCULAR; INTRAVENOUS 2 TIMES DAILY
Status: DISCONTINUED | OUTPATIENT
Start: 2024-04-23 | End: 2024-04-24

## 2024-04-23 RX ORDER — FUROSEMIDE 20 MG/1
20 TABLET ORAL DAILY
Status: DISCONTINUED | OUTPATIENT
Start: 2024-04-23 | End: 2024-04-25

## 2024-04-23 RX ORDER — ONDANSETRON 4 MG/1
4 TABLET, ORALLY DISINTEGRATING ORAL EVERY 8 HOURS PRN
Status: DISCONTINUED | OUTPATIENT
Start: 2024-04-23 | End: 2024-04-23

## 2024-04-23 RX ORDER — SODIUM CHLORIDE 9 MG/ML
INJECTION, SOLUTION INTRAVENOUS PRN
Status: DISCONTINUED | OUTPATIENT
Start: 2024-04-23 | End: 2024-04-30 | Stop reason: HOSPADM

## 2024-04-23 RX ORDER — DEXTROSE MONOHYDRATE 100 MG/ML
INJECTION, SOLUTION INTRAVENOUS CONTINUOUS PRN
Status: DISCONTINUED | OUTPATIENT
Start: 2024-04-23 | End: 2024-04-30 | Stop reason: HOSPADM

## 2024-04-23 RX ORDER — SODIUM CHLORIDE 0.9 % (FLUSH) 0.9 %
5-40 SYRINGE (ML) INJECTION PRN
Status: DISCONTINUED | OUTPATIENT
Start: 2024-04-23 | End: 2024-04-30 | Stop reason: HOSPADM

## 2024-04-23 RX ORDER — FUROSEMIDE 10 MG/ML
40 INJECTION INTRAMUSCULAR; INTRAVENOUS ONCE
Status: COMPLETED | OUTPATIENT
Start: 2024-04-23 | End: 2024-04-23

## 2024-04-23 RX ORDER — SPIRONOLACTONE 25 MG/1
25 TABLET ORAL DAILY
Status: DISCONTINUED | OUTPATIENT
Start: 2024-04-23 | End: 2024-04-30 | Stop reason: HOSPADM

## 2024-04-23 RX ORDER — ONDANSETRON 2 MG/ML
4 INJECTION INTRAMUSCULAR; INTRAVENOUS EVERY 6 HOURS PRN
Status: DISCONTINUED | OUTPATIENT
Start: 2024-04-23 | End: 2024-04-23

## 2024-04-23 RX ORDER — CARVEDILOL 12.5 MG/1
12.5 TABLET ORAL DAILY
Status: DISCONTINUED | OUTPATIENT
Start: 2024-04-23 | End: 2024-04-23

## 2024-04-23 RX ORDER — POTASSIUM CHLORIDE 7.45 MG/ML
10 INJECTION INTRAVENOUS PRN
Status: DISCONTINUED | OUTPATIENT
Start: 2024-04-23 | End: 2024-04-30 | Stop reason: HOSPADM

## 2024-04-23 RX ORDER — BUMETANIDE 1 MG/1
2 TABLET ORAL DAILY
Status: DISCONTINUED | OUTPATIENT
Start: 2024-04-23 | End: 2024-04-24

## 2024-04-23 RX ORDER — ASPIRIN 81 MG/1
81 TABLET ORAL EVERY OTHER DAY
Status: DISCONTINUED | OUTPATIENT
Start: 2024-04-23 | End: 2024-04-30 | Stop reason: HOSPADM

## 2024-04-23 RX ORDER — INSULIN LISPRO 100 [IU]/ML
0-4 INJECTION, SOLUTION INTRAVENOUS; SUBCUTANEOUS
Status: DISCONTINUED | OUTPATIENT
Start: 2024-04-23 | End: 2024-04-30 | Stop reason: HOSPADM

## 2024-04-23 RX ORDER — MAGNESIUM SULFATE HEPTAHYDRATE 40 MG/ML
2000 INJECTION, SOLUTION INTRAVENOUS ONCE
Status: COMPLETED | OUTPATIENT
Start: 2024-04-23 | End: 2024-04-23

## 2024-04-23 RX ORDER — POTASSIUM CHLORIDE 20 MEQ/1
40 TABLET, EXTENDED RELEASE ORAL PRN
Status: DISCONTINUED | OUTPATIENT
Start: 2024-04-23 | End: 2024-04-26

## 2024-04-23 RX ORDER — TAMSULOSIN HYDROCHLORIDE 0.4 MG/1
0.4 CAPSULE ORAL DAILY
Status: DISCONTINUED | OUTPATIENT
Start: 2024-04-23 | End: 2024-04-30 | Stop reason: HOSPADM

## 2024-04-23 RX ORDER — SODIUM CHLORIDE 0.9 % (FLUSH) 0.9 %
5-40 SYRINGE (ML) INJECTION EVERY 12 HOURS SCHEDULED
Status: DISCONTINUED | OUTPATIENT
Start: 2024-04-23 | End: 2024-04-30 | Stop reason: HOSPADM

## 2024-04-23 RX ORDER — ACETAMINOPHEN 650 MG/1
650 SUPPOSITORY RECTAL EVERY 6 HOURS PRN
Status: DISCONTINUED | OUTPATIENT
Start: 2024-04-23 | End: 2024-04-30 | Stop reason: HOSPADM

## 2024-04-23 RX ORDER — FINASTERIDE 5 MG/1
5 TABLET, FILM COATED ORAL DAILY
Status: DISCONTINUED | OUTPATIENT
Start: 2024-04-23 | End: 2024-04-30 | Stop reason: HOSPADM

## 2024-04-23 RX ORDER — METOPROLOL SUCCINATE 25 MG/1
25 TABLET, EXTENDED RELEASE ORAL DAILY
Status: DISCONTINUED | OUTPATIENT
Start: 2024-04-23 | End: 2024-04-30 | Stop reason: HOSPADM

## 2024-04-23 RX ORDER — FUROSEMIDE 10 MG/ML
40 INJECTION INTRAMUSCULAR; INTRAVENOUS 2 TIMES DAILY
Status: CANCELLED | OUTPATIENT
Start: 2024-04-23

## 2024-04-23 RX ORDER — MAGNESIUM SULFATE HEPTAHYDRATE 40 MG/ML
2000 INJECTION, SOLUTION INTRAVENOUS PRN
Status: DISCONTINUED | OUTPATIENT
Start: 2024-04-23 | End: 2024-04-30 | Stop reason: HOSPADM

## 2024-04-23 RX ORDER — LISINOPRIL 10 MG/1
10 TABLET ORAL DAILY
Status: DISCONTINUED | OUTPATIENT
Start: 2024-04-23 | End: 2024-04-30

## 2024-04-23 RX ORDER — ACETAMINOPHEN 325 MG/1
650 TABLET ORAL EVERY 6 HOURS PRN
Status: DISCONTINUED | OUTPATIENT
Start: 2024-04-23 | End: 2024-04-30 | Stop reason: HOSPADM

## 2024-04-23 RX ORDER — INSULIN LISPRO 100 [IU]/ML
0-4 INJECTION, SOLUTION INTRAVENOUS; SUBCUTANEOUS NIGHTLY
Status: DISCONTINUED | OUTPATIENT
Start: 2024-04-23 | End: 2024-04-30 | Stop reason: HOSPADM

## 2024-04-23 RX ORDER — POLYETHYLENE GLYCOL 3350 17 G/17G
17 POWDER, FOR SOLUTION ORAL DAILY PRN
Status: DISCONTINUED | OUTPATIENT
Start: 2024-04-23 | End: 2024-04-30 | Stop reason: HOSPADM

## 2024-04-23 RX ADMIN — FUROSEMIDE 40 MG: 10 INJECTION, SOLUTION INTRAMUSCULAR; INTRAVENOUS at 15:11

## 2024-04-23 RX ADMIN — SODIUM CHLORIDE, PRESERVATIVE FREE 10 ML: 5 INJECTION INTRAVENOUS at 20:56

## 2024-04-23 RX ADMIN — BUMETANIDE 2 MG: 0.25 INJECTION INTRAMUSCULAR; INTRAVENOUS at 18:53

## 2024-04-23 RX ADMIN — APIXABAN 2.5 MG: 2.5 TABLET, FILM COATED ORAL at 20:56

## 2024-04-23 RX ADMIN — ANTI-FUNGAL POWDER MICONAZOLE NITRATE TALC FREE: 1.42 POWDER TOPICAL at 20:56

## 2024-04-23 RX ADMIN — MAGNESIUM SULFATE HEPTAHYDRATE 2000 MG: 40 INJECTION, SOLUTION INTRAVENOUS at 15:15

## 2024-04-23 ASSESSMENT — PAIN SCALES - GENERAL
PAINLEVEL_OUTOF10: 0
PAINLEVEL_OUTOF10: 0

## 2024-04-23 NOTE — H&P
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      ROXI secondary to urinary retention due to benign prostatic hyperplasia versus acute cardiorenal syndrome  -On external catheter  -Follow renal ultrasound and bladder scan  -BMP daily  -straight cath considered if bladder scan more than 400 cc  -Will continue BPH medication including finasteride 5 mg daily and Flomax 0.4 mg daily    Hyponatremia  -Likely dilutional hyponatremia, will have fluid restriction 1500 mL  -Follow serum sodium every 6, serum osmolality, urine sodium, urine osmolality     A-fib  -Rate controlled, continue home medication including Eliquis 2.5 mg 2 times daily   -No aspirin every other day  [id number] Score for Atrial Fibrillation Stroke Risk   Risk   Factors  Component Value   C CHF Yes 1   H HTN Yes 1   A2 Age >= 75 Yes,  (88 y.o.) 2   D DM Yes 1   S2 Prior Stroke/TIA No 0   V Vascular Disease No 0   A Age 65-74 No,  (88 y.o.) 0   Sc Sex male 0    [id number]  Score  5   Score last updated 4/23/24 4:32 PM EDT      Type 2 diabetes mellitus  -Low-dose sliding  -POCT  -Hypoglycemia treatment ordered        DVT prophylaxis: Eliquis 2.5 mg, twice daily  GI prophylaxis: Not indicated  Diet: Fluid restriction 1500 mL, diabetic diet  Disposition:  tbd    OT/PT/SW    Code Status: DNR CCA with no intubation          Consultations:   IP CONSULT TO PRIMARY CARE PROVIDER  IP CONSULT TO SOCIAL WORK    Patient is admitted as inpatient status because of co-morbiditieslisted above, severity of signs and symptoms as outlined, requirement for current medical therapies and most importantly because of direct risk to patient if care not provided in a hospital setting.    Gracy Keenan MD  4/23/2024  3:47 PM    Copy sent to Aleksandr Hogan MD  Attending

## 2024-04-23 NOTE — ED NOTES
Report given to , RN from [facility] .   Report method by phone   The following was reviewed with receiving RN:   Current vital signs:  /76   Pulse 90   Temp 98 °F (36.7 °C) (Oral)   Resp 16   Wt 96.6 kg (213 lb)   SpO2 96%   BMI 33.36 kg/m²                MEWS Score: 2     Any medication or safety alerts were reviewed. Any pending diagnostics and notifications were also reviewed, as well as any safety concerns or issues, abnormal labs, abnormal imaging, and abnormal assessment findings. Questions were answered.

## 2024-04-23 NOTE — ED PROVIDER NOTES
eMERGENCY dEPARTMENT eNCOUnter      Pt Name: Carl Frias  MRN: [id number]  Birthdate 1935  Date of evaluation: 4/23/24      CHIEF COMPLAINT       Chief Complaint   Patient presents with    Shortness of Breath         HISTORY OF PRESENT ILLNESS    Carl Frias is a 88 y.o. male who presents complaining of shortness of breath.  Patient was brought in by ambulance because he evidently has been too weak to walk and having shortness of breath.  Sounds like he has had problems with swelling and weakness in his left leg specifically for a month or more but it seems to be getting worse to the point where he cannot even really get around at home now.  Patient states that he does have shortness of breath but no cough no chest pain no palpitations.  Patient has a history of diabetes and hypertension.  Patient also reportedly has history of congestive heart failure per EMS.  Patient denies any numbness or tingling anywhere.      REVIEW OF SYSTEMS       Review of Systems   Constitutional:  Positive for fatigue. Negative for activity change, appetite change, chills, diaphoresis and fever.   HENT:  Negative for congestion, ear pain, facial swelling, nosebleeds, rhinorrhea, sinus pressure, sore throat and trouble swallowing.    Eyes:  Negative for pain, discharge and redness.   Respiratory:  Positive for shortness of breath. Negative for cough, chest tightness and wheezing.    Cardiovascular:  Positive for leg swelling. Negative for chest pain and palpitations.   Gastrointestinal:  Negative for abdominal pain, blood in stool, constipation, diarrhea, nausea and vomiting.   Genitourinary:  Negative for difficulty urinating, dysuria, flank pain, frequency, genital sores and hematuria.   Musculoskeletal:  Negative for arthralgias, back pain, gait problem, joint swelling, myalgias and neck pain.   Skin:  Negative for color change, pallor, rash and wound.   Neurological:  Positive for weakness. Negative for dizziness, tremors, seizures,

## 2024-04-24 ENCOUNTER — APPOINTMENT (OUTPATIENT)
Dept: GENERAL RADIOLOGY | Age: 89
DRG: 291 | End: 2024-04-24
Payer: MEDICARE

## 2024-04-24 PROBLEM — I50.9 CONGESTIVE HEART FAILURE (HCC): Status: ACTIVE | Noted: 2024-04-24

## 2024-04-24 PROBLEM — N17.9 AKI (ACUTE KIDNEY INJURY) (HCC): Status: ACTIVE | Noted: 2024-04-24

## 2024-04-24 PROBLEM — D50.9 IRON DEFICIENCY ANEMIA: Status: ACTIVE | Noted: 2024-04-24

## 2024-04-24 LAB
ANION GAP SERPL CALCULATED.3IONS-SCNC: 12 MMOL/L (ref 9–17)
B PARAP IS1001 DNA NPH QL NAA+NON-PROBE: NOT DETECTED
B PERT DNA SPEC QL NAA+PROBE: NOT DETECTED
BACTERIA URNS QL MICRO: ABNORMAL
BASOPHILS # BLD: 0 K/UL (ref 0–0.2)
BASOPHILS NFR BLD: 0 % (ref 0–2)
BILIRUB UR QL STRIP: NEGATIVE
BUN SERPL-MCNC: 53 MG/DL (ref 8–23)
C PNEUM DNA NPH QL NAA+NON-PROBE: NOT DETECTED
CALCIUM SERPL-MCNC: 8.3 MG/DL (ref 8.6–10.4)
CASTS #/AREA URNS LPF: ABNORMAL /LPF
CHLORIDE SERPL-SCNC: 97 MMOL/L (ref 98–107)
CHOLEST SERPL-MCNC: 148 MG/DL
CHOLESTEROL/HDL RATIO: 5.3
CLARITY UR: ABNORMAL
CO2 SERPL-SCNC: 23 MMOL/L (ref 20–31)
COLOR UR: YELLOW
CREAT SERPL-MCNC: 1.8 MG/DL (ref 0.7–1.2)
CRYSTALS URNS MICRO: ABNORMAL /HPF
CRYSTALS URNS MICRO: ABNORMAL /HPF
DATE, STOOL #1: NORMAL
EOSINOPHIL # BLD: 0.1 K/UL (ref 0–0.4)
EOSINOPHILS RELATIVE PERCENT: 1 % (ref 0–4)
EPI CELLS #/AREA URNS HPF: ABNORMAL /HPF
ERYTHROCYTE [DISTWIDTH] IN BLOOD BY AUTOMATED COUNT: 14.9 % (ref 11.5–14.9)
FLUAV RNA NPH QL NAA+NON-PROBE: NOT DETECTED
FLUBV RNA NPH QL NAA+NON-PROBE: NOT DETECTED
FOLATE SERPL-MCNC: >20 NG/ML (ref 4.8–24.2)
GFR SERPL CREATININE-BSD FRML MDRD: 36 ML/MIN/1.73M2
GLUCOSE BLD-MCNC: 167 MG/DL (ref 75–110)
GLUCOSE BLD-MCNC: 173 MG/DL (ref 75–110)
GLUCOSE BLD-MCNC: 229 MG/DL (ref 75–110)
GLUCOSE BLD-MCNC: 87 MG/DL (ref 75–110)
GLUCOSE SERPL-MCNC: 120 MG/DL (ref 70–99)
GLUCOSE UR STRIP-MCNC: NEGATIVE MG/DL
HADV DNA NPH QL NAA+NON-PROBE: NOT DETECTED
HCOV 229E RNA NPH QL NAA+NON-PROBE: NOT DETECTED
HCOV HKU1 RNA NPH QL NAA+NON-PROBE: NOT DETECTED
HCOV NL63 RNA NPH QL NAA+NON-PROBE: NOT DETECTED
HCOV OC43 RNA NPH QL NAA+NON-PROBE: NOT DETECTED
HCT VFR BLD AUTO: 25.4 % (ref 41–53)
HDLC SERPL-MCNC: 28 MG/DL
HEMOCCULT SP1 STL QL: NEGATIVE
HGB BLD-MCNC: 8.6 G/DL (ref 13.5–17.5)
HGB UR QL STRIP.AUTO: ABNORMAL
HMPV RNA NPH QL NAA+NON-PROBE: NOT DETECTED
HPIV1 RNA NPH QL NAA+NON-PROBE: NOT DETECTED
HPIV2 RNA NPH QL NAA+NON-PROBE: NOT DETECTED
HPIV3 RNA NPH QL NAA+NON-PROBE: NOT DETECTED
HPIV4 RNA NPH QL NAA+NON-PROBE: NOT DETECTED
IRON SATN MFR SERPL: 3 % (ref 20–55)
IRON SERPL-MCNC: 6 UG/DL (ref 61–157)
KETONES UR STRIP-MCNC: NEGATIVE MG/DL
LDLC SERPL CALC-MCNC: 92 MG/DL (ref 0–130)
LEUKOCYTE ESTERASE UR QL STRIP: ABNORMAL
LYMPHOCYTES NFR BLD: 1 K/UL (ref 1–4.8)
LYMPHOCYTES RELATIVE PERCENT: 12 % (ref 24–44)
M PNEUMO DNA NPH QL NAA+NON-PROBE: NOT DETECTED
MAGNESIUM SERPL-MCNC: 1.8 MG/DL (ref 1.6–2.6)
MCH RBC QN AUTO: 28.9 PG (ref 26–34)
MCHC RBC AUTO-ENTMCNC: 33.8 G/DL (ref 31–37)
MCV RBC AUTO: 85.6 FL (ref 80–100)
MONOCYTES NFR BLD: 1.1 K/UL (ref 0.1–1.3)
MONOCYTES NFR BLD: 13 % (ref 1–7)
NEUTROPHILS NFR BLD: 74 % (ref 36–66)
NEUTS SEG NFR BLD: 6.5 K/UL (ref 1.3–9.1)
NITRITE UR QL STRIP: NEGATIVE
OSMOLALITY SERPL: 288 MOSM/KG (ref 275–295)
OSMOLALITY UR: 294 MOSM/KG (ref 80–1300)
PH UR STRIP: 5.5 [PH] (ref 5–8)
PLATELET # BLD AUTO: 292 K/UL (ref 150–450)
PMV BLD AUTO: 7.2 FL (ref 6–12)
POTASSIUM SERPL-SCNC: 3.7 MMOL/L (ref 3.7–5.3)
PROCALCITONIN SERPL-MCNC: 10.13 NG/ML
PROT UR STRIP-MCNC: ABNORMAL MG/DL
RBC # BLD AUTO: 2.97 M/UL (ref 4.5–5.9)
RBC #/AREA URNS HPF: ABNORMAL /HPF
RSV RNA NPH QL NAA+NON-PROBE: NOT DETECTED
RV+EV RNA NPH QL NAA+NON-PROBE: NOT DETECTED
SARS-COV-2 RNA NPH QL NAA+NON-PROBE: NOT DETECTED
SODIUM SERPL-SCNC: 132 MMOL/L (ref 135–144)
SODIUM SERPL-SCNC: 133 MMOL/L (ref 135–144)
SODIUM SERPL-SCNC: 135 MMOL/L (ref 135–144)
SODIUM SERPL-SCNC: 135 MMOL/L (ref 135–144)
SODIUM UR-SCNC: 61 MMOL/L
SP GR UR STRIP: 1.01 (ref 1–1.03)
SPECIMEN DESCRIPTION: NORMAL
TIBC SERPL-MCNC: 175 UG/DL (ref 250–450)
TIME, STOOL #1: NORMAL
TRIGL SERPL-MCNC: 140 MG/DL
UNSATURATED IRON BINDING CAPACITY: 169 UG/DL (ref 112–347)
UROBILINOGEN UR STRIP-ACNC: NORMAL EU/DL (ref 0–1)
VIT B12 SERPL-MCNC: 286 PG/ML (ref 232–1245)
WBC #/AREA URNS HPF: ABNORMAL /HPF
WBC OTHER # BLD: 8.8 K/UL (ref 3.5–11)

## 2024-04-24 PROCEDURE — 97535 SELF CARE MNGMENT TRAINING: CPT

## 2024-04-24 PROCEDURE — 82270 OCCULT BLOOD FECES: CPT

## 2024-04-24 PROCEDURE — 83735 ASSAY OF MAGNESIUM: CPT

## 2024-04-24 PROCEDURE — 80048 BASIC METABOLIC PNL TOTAL CA: CPT

## 2024-04-24 PROCEDURE — 97166 OT EVAL MOD COMPLEX 45 MIN: CPT

## 2024-04-24 PROCEDURE — 36415 COLL VENOUS BLD VENIPUNCTURE: CPT

## 2024-04-24 PROCEDURE — 73562 X-RAY EXAM OF KNEE 3: CPT

## 2024-04-24 PROCEDURE — 51701 INSERT BLADDER CATHETER: CPT

## 2024-04-24 PROCEDURE — 97162 PT EVAL MOD COMPLEX 30 MIN: CPT

## 2024-04-24 PROCEDURE — 51702 INSERT TEMP BLADDER CATH: CPT

## 2024-04-24 PROCEDURE — 99222 1ST HOSP IP/OBS MODERATE 55: CPT | Performed by: INTERNAL MEDICINE

## 2024-04-24 PROCEDURE — 82607 VITAMIN B-12: CPT

## 2024-04-24 PROCEDURE — 0202U NFCT DS 22 TRGT SARS-COV-2: CPT

## 2024-04-24 PROCEDURE — 82947 ASSAY GLUCOSE BLOOD QUANT: CPT

## 2024-04-24 PROCEDURE — 81001 URINALYSIS AUTO W/SCOPE: CPT

## 2024-04-24 PROCEDURE — 83516 IMMUNOASSAY NONANTIBODY: CPT

## 2024-04-24 PROCEDURE — 87077 CULTURE AEROBIC IDENTIFY: CPT

## 2024-04-24 PROCEDURE — 85025 COMPLETE CBC W/AUTO DIFF WBC: CPT

## 2024-04-24 PROCEDURE — 2580000003 HC RX 258

## 2024-04-24 PROCEDURE — 87086 URINE CULTURE/COLONY COUNT: CPT

## 2024-04-24 PROCEDURE — 2580000003 HC RX 258: Performed by: NURSE PRACTITIONER

## 2024-04-24 PROCEDURE — 82784 ASSAY IGA/IGD/IGG/IGM EACH: CPT

## 2024-04-24 PROCEDURE — 84145 PROCALCITONIN (PCT): CPT

## 2024-04-24 PROCEDURE — 6370000000 HC RX 637 (ALT 250 FOR IP)

## 2024-04-24 PROCEDURE — 99233 SBSQ HOSP IP/OBS HIGH 50: CPT | Performed by: INTERNAL MEDICINE

## 2024-04-24 PROCEDURE — 84295 ASSAY OF SERUM SODIUM: CPT

## 2024-04-24 PROCEDURE — 2060000000 HC ICU INTERMEDIATE R&B

## 2024-04-24 PROCEDURE — 51798 US URINE CAPACITY MEASURE: CPT

## 2024-04-24 PROCEDURE — 87186 SC STD MICRODIL/AGAR DIL: CPT

## 2024-04-24 PROCEDURE — 80061 LIPID PANEL: CPT

## 2024-04-24 PROCEDURE — 6360000002 HC RX W HCPCS: Performed by: NURSE PRACTITIONER

## 2024-04-24 PROCEDURE — 84300 ASSAY OF URINE SODIUM: CPT

## 2024-04-24 PROCEDURE — 82746 ASSAY OF FOLIC ACID SERUM: CPT

## 2024-04-24 PROCEDURE — 99212 OFFICE O/P EST SF 10 MIN: CPT

## 2024-04-24 PROCEDURE — 6360000002 HC RX W HCPCS

## 2024-04-24 PROCEDURE — 83935 ASSAY OF URINE OSMOLALITY: CPT

## 2024-04-24 PROCEDURE — 97530 THERAPEUTIC ACTIVITIES: CPT

## 2024-04-24 RX ADMIN — SODIUM CHLORIDE, PRESERVATIVE FREE 10 ML: 5 INJECTION INTRAVENOUS at 09:44

## 2024-04-24 RX ADMIN — TAMSULOSIN HYDROCHLORIDE 0.4 MG: 0.4 CAPSULE ORAL at 09:42

## 2024-04-24 RX ADMIN — ANTI-FUNGAL POWDER MICONAZOLE NITRATE TALC FREE: 1.42 POWDER TOPICAL at 09:43

## 2024-04-24 RX ADMIN — BUMETANIDE 2 MG: 0.25 INJECTION INTRAMUSCULAR; INTRAVENOUS at 18:05

## 2024-04-24 RX ADMIN — METOPROLOL SUCCINATE 25 MG: 25 TABLET, EXTENDED RELEASE ORAL at 09:42

## 2024-04-24 RX ADMIN — ACETAMINOPHEN 650 MG: 325 TABLET ORAL at 21:37

## 2024-04-24 RX ADMIN — BUMETANIDE 2 MG: 0.25 INJECTION INTRAMUSCULAR; INTRAVENOUS at 09:42

## 2024-04-24 RX ADMIN — SODIUM CHLORIDE, PRESERVATIVE FREE 10 ML: 5 INJECTION INTRAVENOUS at 21:04

## 2024-04-24 RX ADMIN — CEFTRIAXONE SODIUM 1000 MG: 1 INJECTION, POWDER, FOR SOLUTION INTRAMUSCULAR; INTRAVENOUS at 02:04

## 2024-04-24 RX ADMIN — POLYETHYLENE GLYCOL 3350 17 G: 17 POWDER, FOR SOLUTION ORAL at 10:23

## 2024-04-24 RX ADMIN — APIXABAN 2.5 MG: 2.5 TABLET, FILM COATED ORAL at 21:03

## 2024-04-24 RX ADMIN — FINASTERIDE 5 MG: 5 TABLET, FILM COATED ORAL at 09:43

## 2024-04-24 RX ADMIN — APIXABAN 2.5 MG: 2.5 TABLET, FILM COATED ORAL at 09:42

## 2024-04-24 RX ADMIN — ANTI-FUNGAL POWDER MICONAZOLE NITRATE TALC FREE: 1.42 POWDER TOPICAL at 21:04

## 2024-04-24 ASSESSMENT — PAIN SCALES - GENERAL
PAINLEVEL_OUTOF10: 0
PAINLEVEL_OUTOF10: 3
PAINLEVEL_OUTOF10: 0

## 2024-04-24 ASSESSMENT — PAIN DESCRIPTION - LOCATION: LOCATION: FOOT

## 2024-04-24 ASSESSMENT — PAIN DESCRIPTION - DESCRIPTORS: DESCRIPTORS: NUMBNESS;SHOOTING;TINGLING

## 2024-04-24 ASSESSMENT — PAIN DESCRIPTION - ORIENTATION: ORIENTATION: POSTERIOR

## 2024-04-24 NOTE — CONSULTS
[facility]  Consult Note      NAME:  aCrl Frias  MEDICAL RECORD NUMBER:  [id number]  AGE: 88 y.o.   GENDER: male  : 1935  TODAY'S DATE:  2024    [facility] nurse consult for penile wound.  Patient known to writer from previous admission, last seen on 2024.  At that time, patient had a black, raised area to his penis with plaques and raised red areas to the glans.  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  Calciphylaxis is typically (although not always) seen in patients with advanced renal disease and presents with extremely painful ischemic cutaneous lesions.  These lesions start as red or purple areas and progress to ulcers and necrotic areas and are usually located on the abdomen and thighs or the fingers and toes.  Patient does have a history of testicular cancer with radiation to the area.  It was previously recommended to patient's daughter that they follow-up with dermatology for evaluation and possible biopsy of the lesion.  Upon assessment, the lesion is relatively unchanged in size.  It is somewhat macerated around the edges today due to patient's incontinence of urine.  Plaques and red, raised areas continue to the glans.  Patient denies any pain to the area. Again, it is my recommendation that patient follow-up with dermatology given his previous history of testicular cancer and radiation.  This was discussed with patient's daughter who was in the room at time of assessment.  No topical treatment needed.  Keep area clean and dry.  Will sign off.  Please reconsult if any other wound care needs arise.     Radha Sabillon, TARIQN, RN, CWOCN, [facility]  Wound, Ostomy, and Continence Nursing  [phone]

## 2024-04-24 NOTE — FLOWSHEET NOTE
04/24/24 0050 04/24/24 0139   Urine Assessment   Urinary Status  --  Straight cath   Urinary Incontinence  --  Present   Urine Color  --  Tamera   Urine Appearance  --  Cloudy   Urine Odor  --  Malodorous   Bladder Scan Volume (mL) 473 mL  --    $ Bladder scan $ Yes  --    Intermittent/Straight Cath (mL)  --  1000 mL   $ Cath urethra straight  --  $ Yes     0050: Bladder scan ordered. Bladder scan done and scan volume sent to Katey ALVARADO. See new order for straight cath    0139: Writer messaged Katey ALVARADO of straight cath results. See new order for urology consult.

## 2024-04-24 NOTE — CONSULTS
Today's Date: 4/24/2024  Patient Name: Carl Frias  Date of admission: 4/23/2024  1:56 PM  Patient's age: 88 y.o., 1935  Admission Dx: Hypomagnesemia [E83.42]  Hyponatremia [E87.1]  ROXI (acute kidney injury) (HCC) [N17.9]  Systolic CHF, acute (HCC) [I50.21]  Congestive heart failure, unspecified HF chronicity, unspecified heart failure type (HCC) [I50.9]    Reason for Consult: anemia    Requesting Physician: Arsenio Ruiz MD    CHIEF COMPLAINT:    Chief Complaint   Patient presents with    Shortness of Breath       History Obtained From:  patient and chart    HISTORY OF PRESENT ILLNESS:      Carl Frias is a 88 y.o. male who is admitted to the hospital on 4/23/2024  for SOB.    Pt has PMH of diabetes, hypertension, history of testicular cancer, atrial fibrillation was admitted with shortness of breath and dyspnea on exertion.  He does complains of some chest pain as well.  On admission noted to have hemoglobin of 9.1 which appears slightly decreased from his baseline also noted to have ROXI with creatinine 1.8    Hematology consulted for evaluation of anemia.  His lab work showed iron 6, iron saturation 3%.  Denies any dark black stools, or symptoms of rectal bleeding.  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  Past Medical History:   has a past medical history of Diabetes mellitus (HCC), Hypertension, and Testicular cancer (HCC).    Past Surgical History:   has a past surgical history that includes Testicle removal (2008); hernia repair (1988); eye surgery (Bilateral); and joint replacement (Bilateral).     Medications:    Prior to Admission medications    Medication Sig Start Date End Date Taking? Authorizing Provider   glucose monitoring kit Use to check glucose as directed. 4/5/24   Aleksandr Finney MD   Alcohol Swabs PADS Use one swab to cleanse skin prior to checking glucose twice daily. 4/5/24   Aleksandr Finney MD   spironolactone (ALDACTONE) 25 MG

## 2024-04-24 NOTE — CARE COORDINATION
Case Management Assessment  Initial Evaluation    Date/Time of Evaluation: 4/24/2024 11:46 AM  Assessment Completed by: Duyen Munguia RN    If patient is discharged prior to next notation, then this note serves as note for discharge by case management.    Patient Name: Carl Frias                   YOB: 1935  Diagnosis: Hypomagnesemia [E83.42]  Hyponatremia [E87.1]  ROXI (acute kidney injury) (HCC) [N17.9]  Systolic CHF, acute (HCC) [I50.21]  Congestive heart failure, unspecified HF chronicity, unspecified heart failure type (HCC) [I50.9]                   Date / Time: 4/23/2024  1:56 PM    Patient Admission Status: Inpatient   Readmission Risk (Low < 19, Mod (19-27), High > 27): Readmission Risk Score: 11.8    Current PCP: Aleksandr Finney MD  PCP verified by CM? Yes    Chart Reviewed: Yes      History Provided by: Child/Family (Pt's Daughter, Hannah)  Patient Orientation: Other (see comment) (Asleep)    Patient Cognition: Other (see comment) (Asleep)    Hospitalization in the last 30 days (Readmission):  No    If yes, Readmission Assessment in CM Navigator will be completed.    Advance Directives:      Code Status: DNR-CCA   Patient's Primary Decision Maker is:      Primary Decision Maker: Naomi Frias - Spouse - [phone]    Discharge Planning:    Patient lives with: Spouse/Significant Other Type of Home: House  Primary Care Giver:    Patient Support Systems include: Spouse/Significant Other, Family Members, Children   Current Financial resources: Medicare  Current community resources: ECF/Home Care (Current w/ [facility])  Current services prior to admission: Home Care, Durable Medical Equipment (Current w/ [facility])            Current DME: Cane, Glucometer, Shower Chair, Walker, Wheelchair            Type of Home Care services:  OT, PT, Skilled Therapy    ADLS  Prior functional level: Assistance with the following:, Bathing, Dressing, Toileting, Housework, Shopping, Mobility  Current

## 2024-04-24 NOTE — CONSULTS
Date:   4/24/2024  Patient name: Carl Frias  Date of admission:  4/23/2024  1:56 PM  MRN:   [id number]  YOB: 1935  PCP: Aleksandr Finney MD    Reason for Admission: Hypomagnesemia [E83.42]  Hyponatremia [E87.1]  ROXI (acute kidney injury) (HCC) [N17.9]  Systolic CHF, acute (HCC) [I50.21]  Congestive heart failure, unspecified HF chronicity, unspecified heart failure type (HCC) [I50.9]    Cardiology consult: Congestive heart failure systolic and diastolic acute on chronic, A-fib, history of ventricular arrhythmia       Referring physician: Dr Arsenio Ruiz  Impression    Admission  4/23/2024 increasing shortness of breath, peripheral edema and UTI, urinary retention  Abnormal high-sensitivity troponin most likely type  congestive heart failure systolic and diastolic ejection fraction 50%  Episode of nonsustained V. tach heart rate 160, 12 beats 2/18/2024  Chronic A-fib  Severely dilated LA  Hypertension  Type 2 diabetes mellitus  Benign prostatic hypertrophy  Impaired hearing, severe  Severe osteoarthritis involving multiple joints poor mobility  Back pain  History of easy bruising  Iron deficiency anemia serum iron 6, saturation 3, TIBC 175 lab work 4/23/2024  No history of coronary intervention, no TIA or CVA    Past surgical history  Hernia repair left inguinal, bilateral knee joint replacement bilateral, testicle removal, eye surgery     Drug allergy  Xarelto, severity nonspecified    History of present illness     88-year-old  male with a past medical history of hypertension, chronic A-fib, diabetes mellitus , BPH ,severe osteoarthritis involving multiple knees, impaired hearing, bilateral knee replacement got hospitalized on 4- 23- 2024 with increasing shortness of breath and chest discomfort.  It was difficult to obtain good history from the patient because of impaired hearing.  He was started on IV diuretic.  He required Crenshaw catheter placement for urinary retention.  He was also found

## 2024-04-24 NOTE — DISCHARGE INSTR - COC
Intake 649.19 ml   Output 1600 ml   Net -950.81 ml     I/O last 3 completed shifts:  In: 289.2 [P.O.:240; IV Piggyback:49.2]  Out: 1000 [Urine:1000]    Safety Concerns:     At Risk for Falls    Impairments/Disabilities:      Hearing    Nutrition Therapy:  Current Nutrition Therapy:   - Oral Diet:  General and Carb Control 4 carbs/meal (1800kcals/day)    Routes of Feeding: Oral  Liquids: No Restrictions  Daily Fluid Restriction: yes - amount 1500 mL  Last Modified Barium Swallow with Video (Video Swallowing Test): not done    Treatments at the Time of Hospital Discharge:   Respiratory Treatments: n/a    Oxygen Therapy:  is not on home oxygen therapy.  Ventilator:    - No ventilator support    Rehab Therapies: Physical Therapy and Occupational Therapy  Weight Bearing Status/Restrictions: No weight bearing restrictions  Other Medical Equipment (for information only, NOT a DME order):    Other Treatments: Skilled Nursing assessment and monitoring. Medication education and monitoring per protocol.      Patient's personal belongings (please select all that are sent with patient):  Glasses, Hearing Aides left    RN SIGNATURE:  Electronically signed by Ayaka Bailey RN on 4/30/24 at 3:44 PM EDT    CASE MANAGEMENT/SOCIAL WORK SECTION    Inpatient Status Date: 4/23/24    Readmission Risk Assessment Score:  Readmission Risk              Risk of Unplanned Readmission:  25           Discharging to Facility/ Agency   [facility]  [address]  [facility] [place] [address]  Phone [phone]  Fax  [phone]     Dialysis Facility (if applicable)   Name:  Address:  Dialysis Schedule:  Phone:  Fax:    / signature: Electronically signed by Duyen Munguia RN on 4/24/24 at 11:50 AM EDT    PHYSICIAN SECTION    Prognosis: Good    Condition at Discharge: Stable    Rehab Potential (if transferring to Rehab): Good    Recommended Labs or Other Treatments After Discharge:     Physician

## 2024-04-24 NOTE — CONSULTS
NEPHROLOGY CONSULT     Patient :  Carl Frias; 88 y.o. MRN# [id number]  Location:  2109/2109-01  Attending:  Arsenio Ruiz MD  Admit Date:  4/23/2024   Hospital Day: 1      Reason for Consult: Acute kidney injury on CKD      Chief Complaint: Shortness of breath  History Obtained From:  patient    History of Present Illness:    This is a 88 y.o. male with past medical history of essential hypertension, chronic A-fib, type 2 diabetes,non-insulin-dependent diabetes mellitus, BPH on Flomax and finasteride, severe osteoarthritis, history of CHF with preserved EF moderate LVH diastolic, CKD stage IIIa with baseline creatinine of 1.0 to 1.2 mg/dL.  Patient presented to the hospital with complaints of shortness of breath and chest pain patient is complaining of shortness of breath for about a month, patient has exertional dyspnea patient also noticed sweating few days ago while being short of breath patient had 1 episode of mild chest pain which got resolved.    Chest x-ray showed mild enlargement of cardiac silhouette, no acute pulmonary abnormality no pleural effusion no pulmonary consolidation or pneumothorax no pulmonary edema  proBNP 5400  Troponin 69  Serum creatinine on admission 1.8 mg/dL    Patient also noted to have urinary retention with high PVR on bladder scan, with straight cath 1000 mL of urine drained confirming urinary retention, urology consulted recommended to place a Crenshaw catheter    Pt denies any history of  prolonged NSAID use.  Patient complains of decrease in urine production  There has been no recent exposure to IV contrast.   There is no history  of paraprotein disease.   Pt denies any history of recurrent UTI or kidney stones.  Medication review shows use of lisinopril, Lasix, Aldactone    Past Medical History:        Diagnosis Date    Diabetes mellitus (HCC)     Hypertension     Testicular cancer (HCC)     sarcoma of left testis       Past Surgical History:        Procedure Laterality Date

## 2024-04-24 NOTE — CONSULTS
Department of Urology  Urology Consult Note    Patient:  Carl Frias  MRN: [id number]  YOB: 1935    Reason for Consult:  urinary retention  Requesting Physician:  Katey Del Toro, APRN - CNP    CHIEF COMPLAINT:    Chief Complaint   Patient presents with    Shortness of Breath       History Obtained From:   patient, family member, electronic medical record    HISTORY OF PRESENT ILLNESS:    The patient is a 88 y.o. male with significant past medical history of bph, testicular ca, and buried penis (repaired 2019) who presents to the ER with SOB.  He is admitted tor the management of CHF. He had a renal ultrasound yesterday which showed bilateral hydronephrosis. He was found to be in high volume urinary retention >1000cc out per straight cath overnight. He continues combo therapy with finasteride and flomax daily.  His urinalysis is also suspicious of a UTI and was started on rocephin empirically.  He does admit that the past couple of days he had a strong urge to urinate but difficulty starting his stream and emptying.  He denies any hematuria, dysuria, flank pain, fevers or chills. No complaints of constipation. He was seen in the office just a couple of months ago without issue.  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 He reports the area is unchanged, there is no pain or discharge.    Past Medical History:        Diagnosis Date    Diabetes mellitus (HCC)     Hypertension     Testicular cancer (HCC)     sarcoma of left testis     Past Surgical History:        Procedure Laterality Date    EYE SURGERY Bilateral     HERNIA REPAIR  1988    left inguinal    JOINT REPLACEMENT Bilateral     TESTICLE REMOVAL  2008     Current Medications:   Current Facility-Administered Medications: cefTRIAXone (ROCEPHIN) 1,000 mg in sodium chloride 0.9 % 50 mL IVPB (mini-bag), 1,000 mg, IntraVENous, Q24H  apixaban (ELIQUIS) tablet 2.5 mg,

## 2024-04-24 NOTE — ACP (ADVANCE CARE PLANNING)
Advance Care Planning     Advance Care Planning Activator (Inpatient)  Conversation Note      Date of ACP Conversation: 4/24/2024     Conversation Conducted with: Spoke to Pt's Daughter, Hannah    ACP Activator: Duyen Munguia RN        Health Care Decision Maker:     Current Designated Health Care Decision Maker:     Primary Decision Maker: Naomi Frias - Spouse - [phone]        Care Preferences    Ventilation:  \"If you were in your present state of health and suddenly became very ill and were unable to breathe on your own, what would your preference be about the use of a ventilator (breathing machine) if it were available to you?\"      Would the patient desire the use of ventilator (breathing machine)?: no    \"If your health worsens and it becomes clear that your chance of recovery is unlikely, what would your preference be about the use of a ventilator (breathing machine) if it were available to you?\"     Would the patient desire the use of ventilator (breathing machine)?: No      Resuscitation  \"CPR works best to restart the heart when there is a sudden event, like a heart attack, in someone who is otherwise healthy. Unfortunately, CPR does not typically restart the heart for people who have serious health conditions or who are very sick.\"    \"In the event your heart stopped as a result of an underlying serious health condition, would you want attempts to be made to restart your heart (answer \"yes\" for attempt to resuscitate) or would you prefer a natural death (answer \"no\" for do not attempt to resuscitate)?\" no       [] Yes   [] No   Educated Patient / Decision Maker regarding differences between Advance Directives and portable DNR orders.    Length of ACP Conversation in minutes:      Conversation Outcomes:  ACP discussion completed    Follow-up plan:    [] Schedule follow-up conversation to continue planning  [] Referred individual to Provider for additional questions/concerns   [] Advised

## 2024-04-25 LAB
ANION GAP SERPL CALCULATED.3IONS-SCNC: 12 MMOL/L (ref 9–17)
BASOPHILS # BLD: 0.1 K/UL (ref 0–0.2)
BASOPHILS NFR BLD: 1 % (ref 0–2)
BUN SERPL-MCNC: 51 MG/DL (ref 8–23)
CALCIUM SERPL-MCNC: 8.6 MG/DL (ref 8.6–10.4)
CHLORIDE SERPL-SCNC: 98 MMOL/L (ref 98–107)
CO2 SERPL-SCNC: 26 MMOL/L (ref 20–31)
CREAT SERPL-MCNC: 1.7 MG/DL (ref 0.7–1.2)
EKG ATRIAL RATE: 120 BPM
EKG Q-T INTERVAL: 396 MS
EKG QRS DURATION: 94 MS
EKG QTC CALCULATION (BAZETT): 487 MS
EKG R AXIS: -22 DEGREES
EKG T AXIS: -27 DEGREES
EKG VENTRICULAR RATE: 91 BPM
EOSINOPHIL # BLD: 0.2 K/UL (ref 0–0.4)
EOSINOPHILS RELATIVE PERCENT: 2 % (ref 0–4)
ERYTHROCYTE [DISTWIDTH] IN BLOOD BY AUTOMATED COUNT: 15 % (ref 11.5–14.9)
FERRITIN SERPL-MCNC: 242 NG/ML (ref 30–400)
GFR SERPL CREATININE-BSD FRML MDRD: 39 ML/MIN/1.73M2
GLIADIN IGA SER IA-ACNC: 1.6 U/ML
GLIADIN IGG SER IA-ACNC: <0.4 U/ML
GLUCOSE BLD-MCNC: 121 MG/DL (ref 75–110)
GLUCOSE BLD-MCNC: 241 MG/DL (ref 75–110)
GLUCOSE BLD-MCNC: 241 MG/DL (ref 75–110)
GLUCOSE BLD-MCNC: 327 MG/DL (ref 75–110)
GLUCOSE SERPL-MCNC: 110 MG/DL (ref 70–99)
HAPTOGLOB SERPL-MCNC: 399 MG/DL (ref 30–200)
HCT VFR BLD AUTO: 21.9 % (ref 41–53)
HCT VFR BLD AUTO: 27.7 % (ref 41–53)
HGB BLD-MCNC: 7.3 G/DL (ref 13.5–17.5)
HGB BLD-MCNC: 8.9 G/DL (ref 13.5–17.5)
IGA SERPL-MCNC: 295 MG/DL (ref 70–400)
LDH SERPL-CCNC: 146 U/L (ref 135–225)
LYMPHOCYTES NFR BLD: 1.4 K/UL (ref 1–4.8)
LYMPHOCYTES RELATIVE PERCENT: 14 % (ref 24–44)
MAGNESIUM SERPL-MCNC: 1.6 MG/DL (ref 1.6–2.6)
MCH RBC QN AUTO: 28.1 PG (ref 26–34)
MCHC RBC AUTO-ENTMCNC: 33.2 G/DL (ref 31–37)
MCV RBC AUTO: 84.7 FL (ref 80–100)
MONOCYTES NFR BLD: 1.4 K/UL (ref 0.1–1.3)
MONOCYTES NFR BLD: 13 % (ref 1–7)
NEUTROPHILS NFR BLD: 70 % (ref 36–66)
NEUTS SEG NFR BLD: 7.1 K/UL (ref 1.3–9.1)
PLATELET # BLD AUTO: 349 K/UL (ref 150–450)
PMV BLD AUTO: 7.4 FL (ref 6–12)
POTASSIUM SERPL-SCNC: 3.3 MMOL/L (ref 3.7–5.3)
PSA SERPL-MCNC: 0.1 NG/ML (ref 0–4)
RBC # BLD AUTO: 2.59 M/UL (ref 4.5–5.9)
SODIUM SERPL-SCNC: 136 MMOL/L (ref 135–144)
TTG IGA SER IA-ACNC: 0.8 U/ML
WBC OTHER # BLD: 10.2 K/UL (ref 3.5–11)

## 2024-04-25 PROCEDURE — 82728 ASSAY OF FERRITIN: CPT

## 2024-04-25 PROCEDURE — 6360000002 HC RX W HCPCS: Performed by: NURSE PRACTITIONER

## 2024-04-25 PROCEDURE — 2060000000 HC ICU INTERMEDIATE R&B

## 2024-04-25 PROCEDURE — 99232 SBSQ HOSP IP/OBS MODERATE 35: CPT | Performed by: INTERNAL MEDICINE

## 2024-04-25 PROCEDURE — 6370000000 HC RX 637 (ALT 250 FOR IP)

## 2024-04-25 PROCEDURE — 2580000003 HC RX 258: Performed by: INTERNAL MEDICINE

## 2024-04-25 PROCEDURE — 83010 ASSAY OF HAPTOGLOBIN QUANT: CPT

## 2024-04-25 PROCEDURE — 36415 COLL VENOUS BLD VENIPUNCTURE: CPT

## 2024-04-25 PROCEDURE — 2580000003 HC RX 258: Performed by: NURSE PRACTITIONER

## 2024-04-25 PROCEDURE — 82947 ASSAY GLUCOSE BLOOD QUANT: CPT

## 2024-04-25 PROCEDURE — 97535 SELF CARE MNGMENT TRAINING: CPT

## 2024-04-25 PROCEDURE — 85014 HEMATOCRIT: CPT

## 2024-04-25 PROCEDURE — 83615 LACTATE (LD) (LDH) ENZYME: CPT

## 2024-04-25 PROCEDURE — 85025 COMPLETE CBC W/AUTO DIFF WBC: CPT

## 2024-04-25 PROCEDURE — G0103 PSA SCREENING: HCPCS

## 2024-04-25 PROCEDURE — 93010 ELECTROCARDIOGRAM REPORT: CPT | Performed by: INTERNAL MEDICINE

## 2024-04-25 PROCEDURE — 83735 ASSAY OF MAGNESIUM: CPT

## 2024-04-25 PROCEDURE — 2580000003 HC RX 258

## 2024-04-25 PROCEDURE — 97110 THERAPEUTIC EXERCISES: CPT

## 2024-04-25 PROCEDURE — 85018 HEMOGLOBIN: CPT

## 2024-04-25 PROCEDURE — 80048 BASIC METABOLIC PNL TOTAL CA: CPT

## 2024-04-25 PROCEDURE — 6360000002 HC RX W HCPCS: Performed by: INTERNAL MEDICINE

## 2024-04-25 RX ORDER — BUMETANIDE 1 MG/1
2 TABLET ORAL DAILY
Status: DISCONTINUED | OUTPATIENT
Start: 2024-04-25 | End: 2024-04-25

## 2024-04-25 RX ORDER — BUMETANIDE 1 MG/1
1 TABLET ORAL DAILY
Status: DISCONTINUED | OUTPATIENT
Start: 2024-04-26 | End: 2024-04-30 | Stop reason: HOSPADM

## 2024-04-25 RX ORDER — CYANOCOBALAMIN 1000 UG/ML
1000 INJECTION, SOLUTION INTRAMUSCULAR; SUBCUTANEOUS ONCE
Status: COMPLETED | OUTPATIENT
Start: 2024-04-25 | End: 2024-04-25

## 2024-04-25 RX ADMIN — METOPROLOL SUCCINATE 25 MG: 25 TABLET, EXTENDED RELEASE ORAL at 08:08

## 2024-04-25 RX ADMIN — FUROSEMIDE 20 MG: 20 TABLET ORAL at 08:08

## 2024-04-25 RX ADMIN — POTASSIUM CHLORIDE 40 MEQ: 1500 TABLET, EXTENDED RELEASE ORAL at 06:32

## 2024-04-25 RX ADMIN — TAMSULOSIN HYDROCHLORIDE 0.4 MG: 0.4 CAPSULE ORAL at 08:08

## 2024-04-25 RX ADMIN — INSULIN LISPRO 4 UNITS: 100 INJECTION, SOLUTION INTRAVENOUS; SUBCUTANEOUS at 20:16

## 2024-04-25 RX ADMIN — FINASTERIDE 5 MG: 5 TABLET, FILM COATED ORAL at 08:08

## 2024-04-25 RX ADMIN — CYANOCOBALAMIN 1000 MCG: 1000 INJECTION, SOLUTION INTRAMUSCULAR; SUBCUTANEOUS at 16:21

## 2024-04-25 RX ADMIN — SODIUM CHLORIDE 300 MG: 9 INJECTION, SOLUTION INTRAVENOUS at 10:40

## 2024-04-25 RX ADMIN — ANTI-FUNGAL POWDER MICONAZOLE NITRATE TALC FREE: 1.42 POWDER TOPICAL at 08:07

## 2024-04-25 RX ADMIN — SODIUM CHLORIDE, PRESERVATIVE FREE 10 ML: 5 INJECTION INTRAVENOUS at 20:11

## 2024-04-25 RX ADMIN — APIXABAN 2.5 MG: 2.5 TABLET, FILM COATED ORAL at 20:11

## 2024-04-25 RX ADMIN — APIXABAN 2.5 MG: 2.5 TABLET, FILM COATED ORAL at 08:08

## 2024-04-25 RX ADMIN — INSULIN LISPRO 1 UNITS: 100 INJECTION, SOLUTION INTRAVENOUS; SUBCUTANEOUS at 11:15

## 2024-04-25 RX ADMIN — INSULIN LISPRO 1 UNITS: 100 INJECTION, SOLUTION INTRAVENOUS; SUBCUTANEOUS at 17:35

## 2024-04-25 RX ADMIN — ASPIRIN 81 MG: 81 TABLET, COATED ORAL at 08:30

## 2024-04-25 RX ADMIN — SODIUM CHLORIDE, PRESERVATIVE FREE 10 ML: 5 INJECTION INTRAVENOUS at 08:08

## 2024-04-25 RX ADMIN — CEFTRIAXONE SODIUM 1000 MG: 1 INJECTION, POWDER, FOR SOLUTION INTRAMUSCULAR; INTRAVENOUS at 03:58

## 2024-04-25 ASSESSMENT — PAIN SCALES - GENERAL
PAINLEVEL_OUTOF10: 0

## 2024-04-25 NOTE — CARE COORDINATION
ONGOING DISCHARGE PLAN:    Patient is alert and oriented x4.    Spoke with patient regarding discharge plan and patient confirms that plan is still to discharge to home with no needs     Fluid restriction 1500 ml    IV venofer     Will continue to follow for additional discharge needs.    If patient is discharged prior to next notation, then this note serves as note for discharge by case management.    Electronically signed by Selene Pedro RN on 4/25/2024 at 3:53 PM

## 2024-04-26 LAB
ABSOLUTE BANDS: 0.34 K/UL (ref 0–1)
ANION GAP SERPL CALCULATED.3IONS-SCNC: 10 MMOL/L (ref 9–17)
BANDS: 4 % (ref 0–10)
BASOPHILS # BLD: 0 K/UL (ref 0–0.2)
BASOPHILS NFR BLD: 0 % (ref 0–2)
BNP SERPL-MCNC: 2359 PG/ML
BUN SERPL-MCNC: 41 MG/DL (ref 8–23)
CALCIUM SERPL-MCNC: 8.5 MG/DL (ref 8.6–10.4)
CHLORIDE SERPL-SCNC: 99 MMOL/L (ref 98–107)
CO2 SERPL-SCNC: 27 MMOL/L (ref 20–31)
CREAT SERPL-MCNC: 1.2 MG/DL (ref 0.7–1.2)
EOSINOPHIL # BLD: 0.43 K/UL (ref 0–0.4)
EOSINOPHILS RELATIVE PERCENT: 5 % (ref 0–4)
ERYTHROCYTE [DISTWIDTH] IN BLOOD BY AUTOMATED COUNT: 14.7 % (ref 11.5–14.9)
GFR SERPL CREATININE-BSD FRML MDRD: 59 ML/MIN/1.73M2
GLUCOSE BLD-MCNC: 118 MG/DL (ref 75–110)
GLUCOSE BLD-MCNC: 246 MG/DL (ref 75–110)
GLUCOSE BLD-MCNC: 278 MG/DL (ref 75–110)
GLUCOSE BLD-MCNC: 374 MG/DL (ref 75–110)
GLUCOSE SERPL-MCNC: 120 MG/DL (ref 70–99)
HCT VFR BLD AUTO: 25.4 % (ref 41–53)
HGB BLD-MCNC: 8.4 G/DL (ref 13.5–17.5)
LYMPHOCYTES NFR BLD: 1.55 K/UL (ref 1–4.8)
LYMPHOCYTES RELATIVE PERCENT: 18 % (ref 24–44)
MAGNESIUM SERPL-MCNC: 1.6 MG/DL (ref 1.6–2.6)
MCH RBC QN AUTO: 28.7 PG (ref 26–34)
MCHC RBC AUTO-ENTMCNC: 33.3 G/DL (ref 31–37)
MCV RBC AUTO: 86.3 FL (ref 80–100)
METAMYELOCYTES ABSOLUTE COUNT: 0.09 K/UL
METAMYELOCYTES: 1 %
MICROORGANISM SPEC CULT: ABNORMAL
MICROORGANISM SPEC CULT: ABNORMAL
MONOCYTES NFR BLD: 0.77 K/UL (ref 0.1–1.3)
MONOCYTES NFR BLD: 9 % (ref 1–7)
MORPHOLOGY: ABNORMAL
MORPHOLOGY: ABNORMAL
NEUTROPHILS NFR BLD: 63 % (ref 36–66)
NEUTS SEG NFR BLD: 5.42 K/UL (ref 1.3–9.1)
PLATELET # BLD AUTO: 326 K/UL (ref 150–450)
PMV BLD AUTO: 7.6 FL (ref 6–12)
POTASSIUM SERPL-SCNC: 3.4 MMOL/L (ref 3.7–5.3)
RBC # BLD AUTO: 2.94 M/UL (ref 4.5–5.9)
SODIUM SERPL-SCNC: 136 MMOL/L (ref 135–144)
SPECIMEN DESCRIPTION: ABNORMAL
WBC OTHER # BLD: 8.6 K/UL (ref 3.5–11)

## 2024-04-26 PROCEDURE — 83880 ASSAY OF NATRIURETIC PEPTIDE: CPT

## 2024-04-26 PROCEDURE — 6360000002 HC RX W HCPCS: Performed by: NURSE PRACTITIONER

## 2024-04-26 PROCEDURE — 97530 THERAPEUTIC ACTIVITIES: CPT

## 2024-04-26 PROCEDURE — 6370000000 HC RX 637 (ALT 250 FOR IP): Performed by: INTERNAL MEDICINE

## 2024-04-26 PROCEDURE — 6370000000 HC RX 637 (ALT 250 FOR IP)

## 2024-04-26 PROCEDURE — 85025 COMPLETE CBC W/AUTO DIFF WBC: CPT

## 2024-04-26 PROCEDURE — 2580000003 HC RX 258: Performed by: NURSE PRACTITIONER

## 2024-04-26 PROCEDURE — 83735 ASSAY OF MAGNESIUM: CPT

## 2024-04-26 PROCEDURE — 99232 SBSQ HOSP IP/OBS MODERATE 35: CPT | Performed by: INTERNAL MEDICINE

## 2024-04-26 PROCEDURE — 36415 COLL VENOUS BLD VENIPUNCTURE: CPT

## 2024-04-26 PROCEDURE — 97116 GAIT TRAINING THERAPY: CPT

## 2024-04-26 PROCEDURE — 2580000003 HC RX 258

## 2024-04-26 PROCEDURE — 2060000000 HC ICU INTERMEDIATE R&B

## 2024-04-26 PROCEDURE — 6360000002 HC RX W HCPCS: Performed by: INTERNAL MEDICINE

## 2024-04-26 PROCEDURE — 80048 BASIC METABOLIC PNL TOTAL CA: CPT

## 2024-04-26 PROCEDURE — 82947 ASSAY GLUCOSE BLOOD QUANT: CPT

## 2024-04-26 PROCEDURE — 2580000003 HC RX 258: Performed by: INTERNAL MEDICINE

## 2024-04-26 RX ADMIN — ANTI-FUNGAL POWDER MICONAZOLE NITRATE TALC FREE: 1.42 POWDER TOPICAL at 09:20

## 2024-04-26 RX ADMIN — TAMSULOSIN HYDROCHLORIDE 0.4 MG: 0.4 CAPSULE ORAL at 09:19

## 2024-04-26 RX ADMIN — ACETAMINOPHEN 650 MG: 325 TABLET ORAL at 04:07

## 2024-04-26 RX ADMIN — SODIUM CHLORIDE, PRESERVATIVE FREE 10 ML: 5 INJECTION INTRAVENOUS at 09:20

## 2024-04-26 RX ADMIN — APIXABAN 2.5 MG: 2.5 TABLET, FILM COATED ORAL at 21:05

## 2024-04-26 RX ADMIN — FINASTERIDE 5 MG: 5 TABLET, FILM COATED ORAL at 09:20

## 2024-04-26 RX ADMIN — INSULIN LISPRO 1 UNITS: 100 INJECTION, SOLUTION INTRAVENOUS; SUBCUTANEOUS at 13:07

## 2024-04-26 RX ADMIN — SODIUM CHLORIDE 300 MG: 9 INJECTION, SOLUTION INTRAVENOUS at 09:30

## 2024-04-26 RX ADMIN — POTASSIUM BICARBONATE 40 MEQ: 782 TABLET, EFFERVESCENT ORAL at 09:21

## 2024-04-26 RX ADMIN — SODIUM CHLORIDE, PRESERVATIVE FREE 10 ML: 5 INJECTION INTRAVENOUS at 21:08

## 2024-04-26 RX ADMIN — INSULIN LISPRO 2 UNITS: 100 INJECTION, SOLUTION INTRAVENOUS; SUBCUTANEOUS at 18:36

## 2024-04-26 RX ADMIN — APIXABAN 2.5 MG: 2.5 TABLET, FILM COATED ORAL at 09:20

## 2024-04-26 RX ADMIN — ANTI-FUNGAL POWDER MICONAZOLE NITRATE TALC FREE: 1.42 POWDER TOPICAL at 21:06

## 2024-04-26 RX ADMIN — METOPROLOL SUCCINATE 25 MG: 25 TABLET, EXTENDED RELEASE ORAL at 09:20

## 2024-04-26 RX ADMIN — BUMETANIDE 1 MG: 1 TABLET ORAL at 09:20

## 2024-04-26 RX ADMIN — INSULIN LISPRO 4 UNITS: 100 INJECTION, SOLUTION INTRAVENOUS; SUBCUTANEOUS at 21:06

## 2024-04-26 RX ADMIN — CEFTRIAXONE SODIUM 1000 MG: 1 INJECTION, POWDER, FOR SOLUTION INTRAMUSCULAR; INTRAVENOUS at 01:34

## 2024-04-26 ASSESSMENT — PAIN SCALES - GENERAL
PAINLEVEL_OUTOF10: 0
PAINLEVEL_OUTOF10: 2
PAINLEVEL_OUTOF10: 0
PAINLEVEL_OUTOF10: 0

## 2024-04-26 ASSESSMENT — PAIN - FUNCTIONAL ASSESSMENT: PAIN_FUNCTIONAL_ASSESSMENT: ACTIVITIES ARE NOT PREVENTED

## 2024-04-26 ASSESSMENT — PAIN DESCRIPTION - LOCATION: LOCATION: HEAD

## 2024-04-26 ASSESSMENT — PAIN DESCRIPTION - DESCRIPTORS: DESCRIPTORS: ACHING

## 2024-04-26 NOTE — CARE COORDINATION
ONGOING DISCHARGE PLAN:    Patient is alert and oriented x4.    Spoke with patient regarding discharge plan and patient confirms that plan is still to discharge to home with [facility]     Crenshaw     Fluid restriction 1500 ml.    Lasix     Consult Hemoc    IV venofer     Patient and family absolutely deny need for SNF.  Pt plans on discharging to home family will be there 24/7    Will continue to follow for additional discharge needs.    If patient is discharged prior to next notation, then this note serves as note for discharge by case management.    Electronically signed by Selene Pedro RN on 4/26/2024 at 12:05 PM

## 2024-04-27 PROBLEM — E53.8 B12 DEFICIENCY: Status: ACTIVE | Noted: 2024-04-27

## 2024-04-27 LAB
ANION GAP SERPL CALCULATED.3IONS-SCNC: 14 MMOL/L (ref 9–17)
BUN SERPL-MCNC: 36 MG/DL (ref 8–23)
CALCIUM SERPL-MCNC: 9 MG/DL (ref 8.6–10.4)
CHLORIDE SERPL-SCNC: 100 MMOL/L (ref 98–107)
CO2 SERPL-SCNC: 27 MMOL/L (ref 20–31)
CREAT SERPL-MCNC: 1.2 MG/DL (ref 0.7–1.2)
GFR SERPL CREATININE-BSD FRML MDRD: 59 ML/MIN/1.73M2
GLUCOSE BLD-MCNC: 185 MG/DL (ref 75–110)
GLUCOSE BLD-MCNC: 279 MG/DL (ref 75–110)
GLUCOSE BLD-MCNC: 299 MG/DL (ref 75–110)
GLUCOSE BLD-MCNC: 303 MG/DL (ref 75–110)
GLUCOSE SERPL-MCNC: 176 MG/DL (ref 70–99)
MAGNESIUM SERPL-MCNC: 1.7 MG/DL (ref 1.6–2.6)
POTASSIUM SERPL-SCNC: 4.2 MMOL/L (ref 3.7–5.3)
SODIUM SERPL-SCNC: 141 MMOL/L (ref 135–144)
TROPONIN I SERPL HS-MCNC: 53 NG/L (ref 0–22)

## 2024-04-27 PROCEDURE — 99232 SBSQ HOSP IP/OBS MODERATE 35: CPT | Performed by: INTERNAL MEDICINE

## 2024-04-27 PROCEDURE — 84484 ASSAY OF TROPONIN QUANT: CPT

## 2024-04-27 PROCEDURE — 36415 COLL VENOUS BLD VENIPUNCTURE: CPT

## 2024-04-27 PROCEDURE — 82947 ASSAY GLUCOSE BLOOD QUANT: CPT

## 2024-04-27 PROCEDURE — 51798 US URINE CAPACITY MEASURE: CPT

## 2024-04-27 PROCEDURE — 93005 ELECTROCARDIOGRAM TRACING: CPT | Performed by: INTERNAL MEDICINE

## 2024-04-27 PROCEDURE — 6360000002 HC RX W HCPCS: Performed by: INTERNAL MEDICINE

## 2024-04-27 PROCEDURE — 6370000000 HC RX 637 (ALT 250 FOR IP): Performed by: INTERNAL MEDICINE

## 2024-04-27 PROCEDURE — 2580000003 HC RX 258: Performed by: NURSE PRACTITIONER

## 2024-04-27 PROCEDURE — 97116 GAIT TRAINING THERAPY: CPT

## 2024-04-27 PROCEDURE — 6370000000 HC RX 637 (ALT 250 FOR IP)

## 2024-04-27 PROCEDURE — 80048 BASIC METABOLIC PNL TOTAL CA: CPT

## 2024-04-27 PROCEDURE — 83735 ASSAY OF MAGNESIUM: CPT

## 2024-04-27 PROCEDURE — 2060000000 HC ICU INTERMEDIATE R&B

## 2024-04-27 PROCEDURE — 2580000003 HC RX 258

## 2024-04-27 PROCEDURE — 6360000002 HC RX W HCPCS: Performed by: NURSE PRACTITIONER

## 2024-04-27 PROCEDURE — 2580000003 HC RX 258: Performed by: INTERNAL MEDICINE

## 2024-04-27 PROCEDURE — 97110 THERAPEUTIC EXERCISES: CPT

## 2024-04-27 RX ORDER — PANTOPRAZOLE SODIUM 40 MG/1
40 TABLET, DELAYED RELEASE ORAL
Status: DISCONTINUED | OUTPATIENT
Start: 2024-04-27 | End: 2024-04-30 | Stop reason: HOSPADM

## 2024-04-27 RX ADMIN — INSULIN LISPRO 3 UNITS: 100 INJECTION, SOLUTION INTRAVENOUS; SUBCUTANEOUS at 12:53

## 2024-04-27 RX ADMIN — LISINOPRIL 10 MG: 10 TABLET ORAL at 10:12

## 2024-04-27 RX ADMIN — SPIRONOLACTONE 25 MG: 25 TABLET ORAL at 10:12

## 2024-04-27 RX ADMIN — TAMSULOSIN HYDROCHLORIDE 0.4 MG: 0.4 CAPSULE ORAL at 10:12

## 2024-04-27 RX ADMIN — ANTI-FUNGAL POWDER MICONAZOLE NITRATE TALC FREE: 1.42 POWDER TOPICAL at 10:15

## 2024-04-27 RX ADMIN — INSULIN LISPRO 2 UNITS: 100 INJECTION, SOLUTION INTRAVENOUS; SUBCUTANEOUS at 17:32

## 2024-04-27 RX ADMIN — ASPIRIN 81 MG: 81 TABLET, COATED ORAL at 10:15

## 2024-04-27 RX ADMIN — BUMETANIDE 1 MG: 1 TABLET ORAL at 10:12

## 2024-04-27 RX ADMIN — ACETAMINOPHEN 650 MG: 325 TABLET ORAL at 03:11

## 2024-04-27 RX ADMIN — SODIUM CHLORIDE, PRESERVATIVE FREE 10 ML: 5 INJECTION INTRAVENOUS at 10:49

## 2024-04-27 RX ADMIN — FINASTERIDE 5 MG: 5 TABLET, FILM COATED ORAL at 10:12

## 2024-04-27 RX ADMIN — CEFTRIAXONE SODIUM 1000 MG: 1 INJECTION, POWDER, FOR SOLUTION INTRAMUSCULAR; INTRAVENOUS at 02:01

## 2024-04-27 RX ADMIN — PANTOPRAZOLE SODIUM 40 MG: 40 TABLET, DELAYED RELEASE ORAL at 10:49

## 2024-04-27 RX ADMIN — SODIUM CHLORIDE, PRESERVATIVE FREE 10 ML: 5 INJECTION INTRAVENOUS at 21:40

## 2024-04-27 RX ADMIN — APIXABAN 2.5 MG: 2.5 TABLET, FILM COATED ORAL at 10:12

## 2024-04-27 RX ADMIN — SODIUM CHLORIDE 300 MG: 9 INJECTION, SOLUTION INTRAVENOUS at 10:54

## 2024-04-27 RX ADMIN — METOPROLOL SUCCINATE 25 MG: 25 TABLET, EXTENDED RELEASE ORAL at 10:12

## 2024-04-27 RX ADMIN — APIXABAN 2.5 MG: 2.5 TABLET, FILM COATED ORAL at 21:39

## 2024-04-27 RX ADMIN — ANTI-FUNGAL POWDER MICONAZOLE NITRATE TALC FREE: 1.42 POWDER TOPICAL at 21:39

## 2024-04-27 ASSESSMENT — PAIN SCALES - GENERAL
PAINLEVEL_OUTOF10: 3
PAINLEVEL_OUTOF10: 0

## 2024-04-27 ASSESSMENT — PAIN DESCRIPTION - LOCATION: LOCATION: BACK

## 2024-04-27 ASSESSMENT — PAIN DESCRIPTION - DESCRIPTORS: DESCRIPTORS: DISCOMFORT;ACHING

## 2024-04-27 ASSESSMENT — PAIN DESCRIPTION - ORIENTATION: ORIENTATION: MID

## 2024-04-27 NOTE — CARE COORDINATION
ONGOING DISCHARGE PLAN:    Patient is sound asleep.    Daughter on phone at the bedside.     Writer reviewed chart notes.    Plan appears to be to DC to Home w/ [facility].     Remains on Oral Bumex & IV Rocephin.    Will continue to follow for additional discharge needs.    If patient is discharged prior to next notation, then this note serves as note for discharge by case management.    Electronically signed by Duyen Munguia RN on 4/27/2024 at 2:41 PM

## 2024-04-28 LAB
ABSOLUTE BANDS: 1.13 K/UL (ref 0–1)
ANION GAP SERPL CALCULATED.3IONS-SCNC: 10 MMOL/L (ref 9–17)
BANDS: 13 % (ref 0–10)
BASOPHILS # BLD: 0.09 K/UL (ref 0–0.2)
BASOPHILS NFR BLD: 1 % (ref 0–2)
BUN SERPL-MCNC: 32 MG/DL (ref 8–23)
CALCIUM SERPL-MCNC: 9.1 MG/DL (ref 8.6–10.4)
CHLORIDE SERPL-SCNC: 102 MMOL/L (ref 98–107)
CO2 SERPL-SCNC: 30 MMOL/L (ref 20–31)
CREAT SERPL-MCNC: 1.3 MG/DL (ref 0.7–1.2)
EOSINOPHIL # BLD: 0.52 K/UL (ref 0–0.4)
EOSINOPHILS RELATIVE PERCENT: 6 % (ref 0–4)
ERYTHROCYTE [DISTWIDTH] IN BLOOD BY AUTOMATED COUNT: 15.1 % (ref 11.5–14.9)
GFR SERPL CREATININE-BSD FRML MDRD: 53 ML/MIN/1.73M2
GLUCOSE BLD-MCNC: 166 MG/DL (ref 75–110)
GLUCOSE BLD-MCNC: 228 MG/DL (ref 75–110)
GLUCOSE BLD-MCNC: 276 MG/DL (ref 75–110)
GLUCOSE BLD-MCNC: 280 MG/DL (ref 75–110)
GLUCOSE SERPL-MCNC: 185 MG/DL (ref 70–99)
HCT VFR BLD AUTO: 27.2 % (ref 41–53)
HGB BLD-MCNC: 8.7 G/DL (ref 13.5–17.5)
LYMPHOCYTES NFR BLD: 1.39 K/UL (ref 1–4.8)
LYMPHOCYTES RELATIVE PERCENT: 16 % (ref 24–44)
MAGNESIUM SERPL-MCNC: 1.9 MG/DL (ref 1.6–2.6)
MCH RBC QN AUTO: 28.1 PG (ref 26–34)
MCHC RBC AUTO-ENTMCNC: 32.1 G/DL (ref 31–37)
MCV RBC AUTO: 87.5 FL (ref 80–100)
METAMYELOCYTES ABSOLUTE COUNT: 0.09 K/UL
METAMYELOCYTES: 1 %
MONOCYTES NFR BLD: 1.04 K/UL (ref 0.1–1.3)
MONOCYTES NFR BLD: 12 % (ref 1–7)
MORPHOLOGY: ABNORMAL
MORPHOLOGY: ABNORMAL
NEUTROPHILS NFR BLD: 51 % (ref 36–66)
NEUTS SEG NFR BLD: 4.44 K/UL (ref 1.3–9.1)
PLATELET # BLD AUTO: 422 K/UL (ref 150–450)
PMV BLD AUTO: 7.7 FL (ref 6–12)
POTASSIUM SERPL-SCNC: 3.9 MMOL/L (ref 3.7–5.3)
RBC # BLD AUTO: 3.11 M/UL (ref 4.5–5.9)
SODIUM SERPL-SCNC: 142 MMOL/L (ref 135–144)
WBC OTHER # BLD: 8.7 K/UL (ref 3.5–11)

## 2024-04-28 PROCEDURE — 51798 US URINE CAPACITY MEASURE: CPT

## 2024-04-28 PROCEDURE — 6370000000 HC RX 637 (ALT 250 FOR IP): Performed by: INTERNAL MEDICINE

## 2024-04-28 PROCEDURE — 2580000003 HC RX 258

## 2024-04-28 PROCEDURE — 80048 BASIC METABOLIC PNL TOTAL CA: CPT

## 2024-04-28 PROCEDURE — 99232 SBSQ HOSP IP/OBS MODERATE 35: CPT | Performed by: INTERNAL MEDICINE

## 2024-04-28 PROCEDURE — 36415 COLL VENOUS BLD VENIPUNCTURE: CPT

## 2024-04-28 PROCEDURE — 82947 ASSAY GLUCOSE BLOOD QUANT: CPT

## 2024-04-28 PROCEDURE — 2060000000 HC ICU INTERMEDIATE R&B

## 2024-04-28 PROCEDURE — 6370000000 HC RX 637 (ALT 250 FOR IP)

## 2024-04-28 PROCEDURE — 85025 COMPLETE CBC W/AUTO DIFF WBC: CPT

## 2024-04-28 PROCEDURE — 6360000002 HC RX W HCPCS: Performed by: NURSE PRACTITIONER

## 2024-04-28 PROCEDURE — 2580000003 HC RX 258: Performed by: NURSE PRACTITIONER

## 2024-04-28 PROCEDURE — 83735 ASSAY OF MAGNESIUM: CPT

## 2024-04-28 RX ADMIN — APIXABAN 2.5 MG: 2.5 TABLET, FILM COATED ORAL at 08:31

## 2024-04-28 RX ADMIN — ANTI-FUNGAL POWDER MICONAZOLE NITRATE TALC FREE: 1.42 POWDER TOPICAL at 08:33

## 2024-04-28 RX ADMIN — FINASTERIDE 5 MG: 5 TABLET, FILM COATED ORAL at 08:31

## 2024-04-28 RX ADMIN — INSULIN LISPRO 1 UNITS: 100 INJECTION, SOLUTION INTRAVENOUS; SUBCUTANEOUS at 11:59

## 2024-04-28 RX ADMIN — APIXABAN 2.5 MG: 2.5 TABLET, FILM COATED ORAL at 21:55

## 2024-04-28 RX ADMIN — METOPROLOL SUCCINATE 25 MG: 25 TABLET, EXTENDED RELEASE ORAL at 08:31

## 2024-04-28 RX ADMIN — ANTI-FUNGAL POWDER MICONAZOLE NITRATE TALC FREE: 1.42 POWDER TOPICAL at 22:02

## 2024-04-28 RX ADMIN — TAMSULOSIN HYDROCHLORIDE 0.4 MG: 0.4 CAPSULE ORAL at 08:31

## 2024-04-28 RX ADMIN — INSULIN LISPRO 2 UNITS: 100 INJECTION, SOLUTION INTRAVENOUS; SUBCUTANEOUS at 17:53

## 2024-04-28 RX ADMIN — SPIRONOLACTONE 25 MG: 25 TABLET ORAL at 08:31

## 2024-04-28 RX ADMIN — CEFTRIAXONE SODIUM 1000 MG: 1 INJECTION, POWDER, FOR SOLUTION INTRAMUSCULAR; INTRAVENOUS at 01:47

## 2024-04-28 RX ADMIN — SODIUM CHLORIDE, PRESERVATIVE FREE 10 ML: 5 INJECTION INTRAVENOUS at 08:32

## 2024-04-28 RX ADMIN — SODIUM CHLORIDE, PRESERVATIVE FREE 10 ML: 5 INJECTION INTRAVENOUS at 22:03

## 2024-04-28 RX ADMIN — PANTOPRAZOLE SODIUM 40 MG: 40 TABLET, DELAYED RELEASE ORAL at 05:29

## 2024-04-28 RX ADMIN — LISINOPRIL 10 MG: 10 TABLET ORAL at 08:31

## 2024-04-28 RX ADMIN — BUMETANIDE 1 MG: 1 TABLET ORAL at 08:31

## 2024-04-28 NOTE — CARE COORDINATION
ONGOING DISCHARGE PLAN:    Patient is alert and oriented x4.    Spoke with patient's Daughter Hannah, regarding discharge plan and she confirms that plan is now to follow for SNF.                       Post Acute Facility/Agency List     Provided child with the following list, the list includes the overall star ratings obtained from CMS per the Medicare Web site (www.Medicare.gov):     [] Long Term Acute Care Facilities  [] Acute Inpatient Rehabilitation Facilities  [x] Skilled Nursing Facilities  [] Home Care    Provided verbal instructions on how to utilize the QR Code to obtain additional detailed star ratings from www.Medicare.gov     offered to print and provide the detailed list:    [x]Accepted   []Declined    Referrals sent to:    1) [facility]    2) [facility]    3) [facility]    Will follow for acceptance.    PT/OT on board.    Cardio- PO Bumex.     Will continue to follow for additional discharge needs.    If patient is discharged prior to next notation, then this note serves as note for discharge by case management.    Electronically signed by Duyen Munguia RN on 4/28/2024 at 4:14 PM

## 2024-04-29 LAB
ABSOLUTE BANDS: 0.71 K/UL (ref 0–1)
ANION GAP SERPL CALCULATED.3IONS-SCNC: 14 MMOL/L (ref 9–17)
BANDS: 6 % (ref 0–10)
BASOPHILS # BLD: 0 K/UL (ref 0–0.2)
BASOPHILS NFR BLD: 0 % (ref 0–2)
BUN SERPL-MCNC: 36 MG/DL (ref 8–23)
CALCIUM SERPL-MCNC: 9.4 MG/DL (ref 8.6–10.4)
CHLORIDE SERPL-SCNC: 101 MMOL/L (ref 98–107)
CO2 SERPL-SCNC: 27 MMOL/L (ref 20–31)
CREAT SERPL-MCNC: 1.4 MG/DL (ref 0.7–1.2)
EOSINOPHIL # BLD: 0.24 K/UL (ref 0–0.4)
EOSINOPHILS RELATIVE PERCENT: 2 % (ref 0–4)
ERYTHROCYTE [DISTWIDTH] IN BLOOD BY AUTOMATED COUNT: 15.4 % (ref 11.5–14.9)
GFR SERPL CREATININE-BSD FRML MDRD: 49 ML/MIN/1.73M2
GLUCOSE BLD-MCNC: 174 MG/DL (ref 75–110)
GLUCOSE BLD-MCNC: 266 MG/DL (ref 75–110)
GLUCOSE BLD-MCNC: 274 MG/DL (ref 75–110)
GLUCOSE BLD-MCNC: 284 MG/DL (ref 75–110)
GLUCOSE SERPL-MCNC: 264 MG/DL (ref 70–99)
HCT VFR BLD AUTO: 31.7 % (ref 41–53)
HGB BLD-MCNC: 9.9 G/DL (ref 13.5–17.5)
LYMPHOCYTES NFR BLD: 1.3 K/UL (ref 1–4.8)
LYMPHOCYTES RELATIVE PERCENT: 11 % (ref 24–44)
MCH RBC QN AUTO: 27.8 PG (ref 26–34)
MCHC RBC AUTO-ENTMCNC: 31.2 G/DL (ref 31–37)
MCV RBC AUTO: 89 FL (ref 80–100)
METAMYELOCYTES ABSOLUTE COUNT: 0.12 K/UL
METAMYELOCYTES: 1 %
MONOCYTES NFR BLD: 0.59 K/UL (ref 0.1–1.3)
MONOCYTES NFR BLD: 5 % (ref 1–7)
MORPHOLOGY: ABNORMAL
MORPHOLOGY: ABNORMAL
MYELOCYTES ABSOLUTE COUNT: 0.12 K/UL
MYELOCYTES: 1 %
NEUTROPHILS NFR BLD: 74 % (ref 36–66)
NEUTS SEG NFR BLD: 8.72 K/UL (ref 1.3–9.1)
PLATELET # BLD AUTO: 501 K/UL (ref 150–450)
PMV BLD AUTO: 7.1 FL (ref 6–12)
POTASSIUM SERPL-SCNC: 4.1 MMOL/L (ref 3.7–5.3)
RBC # BLD AUTO: 3.56 M/UL (ref 4.5–5.9)
SODIUM SERPL-SCNC: 142 MMOL/L (ref 135–144)
WBC OTHER # BLD: 11.8 K/UL (ref 3.5–11)

## 2024-04-29 PROCEDURE — 99231 SBSQ HOSP IP/OBS SF/LOW 25: CPT | Performed by: INTERNAL MEDICINE

## 2024-04-29 PROCEDURE — 6360000002 HC RX W HCPCS: Performed by: NURSE PRACTITIONER

## 2024-04-29 PROCEDURE — 2060000000 HC ICU INTERMEDIATE R&B

## 2024-04-29 PROCEDURE — 51798 US URINE CAPACITY MEASURE: CPT

## 2024-04-29 PROCEDURE — 97530 THERAPEUTIC ACTIVITIES: CPT

## 2024-04-29 PROCEDURE — 6370000000 HC RX 637 (ALT 250 FOR IP): Performed by: INTERNAL MEDICINE

## 2024-04-29 PROCEDURE — 99232 SBSQ HOSP IP/OBS MODERATE 35: CPT | Performed by: INTERNAL MEDICINE

## 2024-04-29 PROCEDURE — 85025 COMPLETE CBC W/AUTO DIFF WBC: CPT

## 2024-04-29 PROCEDURE — 36415 COLL VENOUS BLD VENIPUNCTURE: CPT

## 2024-04-29 PROCEDURE — 97116 GAIT TRAINING THERAPY: CPT

## 2024-04-29 PROCEDURE — 80048 BASIC METABOLIC PNL TOTAL CA: CPT

## 2024-04-29 PROCEDURE — 6370000000 HC RX 637 (ALT 250 FOR IP)

## 2024-04-29 PROCEDURE — 82947 ASSAY GLUCOSE BLOOD QUANT: CPT

## 2024-04-29 PROCEDURE — 2580000003 HC RX 258

## 2024-04-29 PROCEDURE — 2580000003 HC RX 258: Performed by: NURSE PRACTITIONER

## 2024-04-29 RX ORDER — FERROUS SULFATE 325(65) MG
325 TABLET ORAL 2 TIMES DAILY
Qty: 180 TABLET | Refills: 1 | OUTPATIENT
Start: 2024-04-29

## 2024-04-29 RX ORDER — UBIDECARENONE 75 MG
100 CAPSULE ORAL DAILY
Qty: 30 TABLET | Refills: 3 | OUTPATIENT
Start: 2024-04-29 | End: 2025-04-29

## 2024-04-29 RX ADMIN — METOPROLOL SUCCINATE 25 MG: 25 TABLET, EXTENDED RELEASE ORAL at 09:19

## 2024-04-29 RX ADMIN — INSULIN LISPRO 2 UNITS: 100 INJECTION, SOLUTION INTRAVENOUS; SUBCUTANEOUS at 12:06

## 2024-04-29 RX ADMIN — TAMSULOSIN HYDROCHLORIDE 0.4 MG: 0.4 CAPSULE ORAL at 09:19

## 2024-04-29 RX ADMIN — APIXABAN 2.5 MG: 2.5 TABLET, FILM COATED ORAL at 21:27

## 2024-04-29 RX ADMIN — INSULIN LISPRO 2 UNITS: 100 INJECTION, SOLUTION INTRAVENOUS; SUBCUTANEOUS at 16:49

## 2024-04-29 RX ADMIN — ASPIRIN 81 MG: 81 TABLET, COATED ORAL at 10:28

## 2024-04-29 RX ADMIN — PANTOPRAZOLE SODIUM 40 MG: 40 TABLET, DELAYED RELEASE ORAL at 10:28

## 2024-04-29 RX ADMIN — LISINOPRIL 10 MG: 10 TABLET ORAL at 09:19

## 2024-04-29 RX ADMIN — SODIUM CHLORIDE, PRESERVATIVE FREE 10 ML: 5 INJECTION INTRAVENOUS at 09:21

## 2024-04-29 RX ADMIN — SPIRONOLACTONE 25 MG: 25 TABLET ORAL at 09:19

## 2024-04-29 RX ADMIN — ANTI-FUNGAL POWDER MICONAZOLE NITRATE TALC FREE: 1.42 POWDER TOPICAL at 21:28

## 2024-04-29 RX ADMIN — APIXABAN 2.5 MG: 2.5 TABLET, FILM COATED ORAL at 09:19

## 2024-04-29 RX ADMIN — FINASTERIDE 5 MG: 5 TABLET, FILM COATED ORAL at 09:19

## 2024-04-29 RX ADMIN — ANTI-FUNGAL POWDER MICONAZOLE NITRATE TALC FREE: 1.42 POWDER TOPICAL at 09:18

## 2024-04-29 RX ADMIN — BUMETANIDE 1 MG: 1 TABLET ORAL at 09:19

## 2024-04-29 RX ADMIN — SODIUM CHLORIDE, PRESERVATIVE FREE 10 ML: 5 INJECTION INTRAVENOUS at 21:28

## 2024-04-29 RX ADMIN — CEFTRIAXONE SODIUM 1000 MG: 1 INJECTION, POWDER, FOR SOLUTION INTRAMUSCULAR; INTRAVENOUS at 01:52

## 2024-04-29 ASSESSMENT — ENCOUNTER SYMPTOMS
EYES NEGATIVE: 1
GASTROINTESTINAL NEGATIVE: 1
RESPIRATORY NEGATIVE: 1
ALLERGIC/IMMUNOLOGIC NEGATIVE: 1

## 2024-04-29 ASSESSMENT — PAIN SCALES - GENERAL: PAINLEVEL_OUTOF10: 0

## 2024-04-29 NOTE — CARE COORDINATION
Call to [facility] to follow up on referral. Facility is unable to accept per [facility]. [facility] and [facility] still reviewing.

## 2024-04-29 NOTE — CARE COORDINATION
Patient accepted to [facility] per Coreen in admissions. Facility will start insurance auth today.

## 2024-04-30 VITALS
OXYGEN SATURATION: 98 % | HEART RATE: 86 BPM | SYSTOLIC BLOOD PRESSURE: 124 MMHG | HEIGHT: 66 IN | RESPIRATION RATE: 24 BRPM | DIASTOLIC BLOOD PRESSURE: 58 MMHG | TEMPERATURE: 97.7 F | WEIGHT: 221.78 LBS | BODY MASS INDEX: 35.64 KG/M2

## 2024-04-30 LAB
EKG Q-T INTERVAL: 386 MS
EKG QRS DURATION: 76 MS
EKG QTC CALCULATION (BAZETT): 448 MS
EKG R AXIS: -20 DEGREES
EKG T AXIS: -22 DEGREES
EKG VENTRICULAR RATE: 81 BPM
GLUCOSE BLD-MCNC: 179 MG/DL (ref 75–110)
GLUCOSE BLD-MCNC: 209 MG/DL (ref 75–110)
GLUCOSE BLD-MCNC: 229 MG/DL (ref 75–110)

## 2024-04-30 PROCEDURE — 6370000000 HC RX 637 (ALT 250 FOR IP)

## 2024-04-30 PROCEDURE — 2580000003 HC RX 258

## 2024-04-30 PROCEDURE — 2580000003 HC RX 258: Performed by: NURSE PRACTITIONER

## 2024-04-30 PROCEDURE — 82947 ASSAY GLUCOSE BLOOD QUANT: CPT

## 2024-04-30 PROCEDURE — 99239 HOSP IP/OBS DSCHRG MGMT >30: CPT | Performed by: INTERNAL MEDICINE

## 2024-04-30 PROCEDURE — 6370000000 HC RX 637 (ALT 250 FOR IP): Performed by: INTERNAL MEDICINE

## 2024-04-30 PROCEDURE — 93010 ELECTROCARDIOGRAM REPORT: CPT | Performed by: INTERNAL MEDICINE

## 2024-04-30 PROCEDURE — 97530 THERAPEUTIC ACTIVITIES: CPT

## 2024-04-30 PROCEDURE — 6360000002 HC RX W HCPCS: Performed by: NURSE PRACTITIONER

## 2024-04-30 RX ORDER — INSULIN GLARGINE 100 [IU]/ML
5 INJECTION, SOLUTION SUBCUTANEOUS NIGHTLY
Status: DISCONTINUED | OUTPATIENT
Start: 2024-04-30 | End: 2024-04-30 | Stop reason: HOSPADM

## 2024-04-30 RX ORDER — BUMETANIDE 1 MG/1
1 TABLET ORAL DAILY
Qty: 30 TABLET | Refills: 3 | Status: SHIPPED | OUTPATIENT
Start: 2024-05-01

## 2024-04-30 RX ORDER — PANTOPRAZOLE SODIUM 40 MG/1
40 TABLET, DELAYED RELEASE ORAL
Qty: 30 TABLET | Refills: 3 | Status: SHIPPED | OUTPATIENT
Start: 2024-05-01

## 2024-04-30 RX ORDER — AMLODIPINE BESYLATE 5 MG/1
5 TABLET ORAL DAILY
Qty: 30 TABLET | Refills: 3 | Status: SHIPPED | OUTPATIENT
Start: 2024-05-01

## 2024-04-30 RX ORDER — AMLODIPINE BESYLATE 5 MG/1
5 TABLET ORAL DAILY
Status: DISCONTINUED | OUTPATIENT
Start: 2024-04-30 | End: 2024-04-30 | Stop reason: HOSPADM

## 2024-04-30 RX ADMIN — FINASTERIDE 5 MG: 5 TABLET, FILM COATED ORAL at 09:30

## 2024-04-30 RX ADMIN — PANTOPRAZOLE SODIUM 40 MG: 40 TABLET, DELAYED RELEASE ORAL at 05:23

## 2024-04-30 RX ADMIN — APIXABAN 2.5 MG: 2.5 TABLET, FILM COATED ORAL at 09:30

## 2024-04-30 RX ADMIN — INSULIN LISPRO 1 UNITS: 100 INJECTION, SOLUTION INTRAVENOUS; SUBCUTANEOUS at 18:07

## 2024-04-30 RX ADMIN — BUMETANIDE 1 MG: 1 TABLET ORAL at 09:30

## 2024-04-30 RX ADMIN — SODIUM CHLORIDE, PRESERVATIVE FREE 10 ML: 5 INJECTION INTRAVENOUS at 09:30

## 2024-04-30 RX ADMIN — METOPROLOL SUCCINATE 25 MG: 25 TABLET, EXTENDED RELEASE ORAL at 09:30

## 2024-04-30 RX ADMIN — ANTI-FUNGAL POWDER MICONAZOLE NITRATE TALC FREE: 1.42 POWDER TOPICAL at 09:31

## 2024-04-30 RX ADMIN — INSULIN LISPRO 1 UNITS: 100 INJECTION, SOLUTION INTRAVENOUS; SUBCUTANEOUS at 12:33

## 2024-04-30 RX ADMIN — AMLODIPINE BESYLATE 5 MG: 5 TABLET ORAL at 12:33

## 2024-04-30 RX ADMIN — TAMSULOSIN HYDROCHLORIDE 0.4 MG: 0.4 CAPSULE ORAL at 09:30

## 2024-04-30 RX ADMIN — CEFTRIAXONE SODIUM 1000 MG: 1 INJECTION, POWDER, FOR SOLUTION INTRAMUSCULAR; INTRAVENOUS at 01:47

## 2024-04-30 RX ADMIN — LISINOPRIL 10 MG: 10 TABLET ORAL at 09:30

## 2024-04-30 ASSESSMENT — PAIN SCALES - GENERAL
PAINLEVEL_OUTOF10: 0
PAINLEVEL_OUTOF10: 0

## 2024-04-30 NOTE — CARE COORDINATION
Continuity of Care Form    Patient Name: Carl Frias   :  1935  MRN:  [id number]    Admit date:  2024  Discharge date:  2024    Code Status Order: DNR-CCA   Advance Directives:     Admitting Physician:  Arsenio Ruiz MD  PCP: Aleksandr Finney MD    Discharging Nurse: SUKHWINDER buitrago   Discharging Hospital Unit/Room#: 2109/2109-01  Discharging Unit Phone Number: [phone]      Emergency Contact:   Extended Emergency Contact Information  Primary Emergency Contact: Naomi Frias  Address: [address]  Home Phone: [phone]  Mobile Phone: [phone]  Relation: Spouse  Secondary Emergency Contact: Lucy Lazcano   [facility]  Home Phone: [phone]  Relation: Child    Past Surgical History:  Past Surgical History:   Procedure Laterality Date    EYE SURGERY Bilateral     HERNIA REPAIR      left inguinal    JOINT REPLACEMENT Bilateral     TESTICLE REMOVAL         Immunization History:   Immunization History   Administered Date(s) Administered    COVID-19, MODERNA BLUE border, Primary or Immunocompromised, (age 12y+), IM, 100 mcg/0.5mL 2021, 2021, 2021    Influenza Vaccine, unspecified formulation 2014, 2016    Influenza, High Dose (Fluzone 65 yrs and older) 2015, 2016, 2017, 10/30/2018    Pneumococcal, PCV-13, PREVNAR 13, (age 6w+), IM, 0.5mL 2015    Pneumococcal, PPSV23, PNEUMOVAX 23, (age 2y+), SC/IM, 0.5mL 2014, 2016       Active Problems:  Patient Active Problem List   Diagnosis Code    Benign prostatic hyperplasia with lower urinary tract symptoms N40.1    Essential hypertension I10    Chronic atrial fibrillation (HCC) I48.20    Diabetic peripheral neuropathy (HCC) E11.42    History of inguinal hernia repair Z98.890, Z87.19    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    Other constipation K59.09    Hydrocele N43.3

## 2024-04-30 NOTE — CARE COORDINATION
Writer is following for potential discharge to [facility].  Authorization was started 4/29.   Writer placed call to Coreen with [facility] to check authorization status.  No answer, voicemail left for return call.  Electronically signed by JOHNY Delong on 4/30/2024 at 10:45 AM    Writer spoke to Coreen with [facility]. Authorization has been approved for this pt.    Writer met with pt and family in room. Family would like referral sent to [facility]. Writer placed call to Hannah regarding referral, no bed available for pt at this time.  Electronically signed by JOHNY Delong on 4/30/2024 at 2:32 PM    Writer met with pt and family to confirm [facility] does not have beds available at this time. Agreeable to [facility].  Electronically signed by JOHNY Delong on 4/30/2024 at 3:20 PM    Writer placed call to [facility] with [facility] to confirm family is agreeable.   Writer placed call to [facility] to schedule transportation for this pt. Forms faxed for scheduling.  Electronically signed by JOHNY Delong on 4/30/2024 at 3:47 PM

## 2024-04-30 NOTE — DISCHARGE SUMMARY
IN-PATIENT SERVICE   [facility] Internal Medicine    Discharge Summary     Patient ID: Carl Frias  :  1935   MRN: [id number]     ACCOUNT:  [id number]   Patient's PCP: Aleksandr Finney MD  Admit Date: 2024   Discharge Date: 24   Length of Stay: 7  Code Status:  DNR-CCA  Admitting Physician: Arsenio Ruiz MD  Discharge Physician: Jayleen Londono MD     Active Discharge Diagnoses:     Primary Problem  Systolic CHF, acute (HCC)      Hospital Problems  Active Hospital Problems    Diagnosis Date Noted    B12 deficiency [E53.8] 2024    Congestive heart failure (HCC) [I50.9] 2024    Iron deficiency anemia [D50.9] 2024    ROXI (acute kidney injury) (HCC) [N17.9] 2024    Systolic CHF, acute (HCC) [I50.21] 2024       Admission Condition:  fair     Discharged Condition: fair    Hospital Stay:     Hospital Course:  Carl Frias is a 88 y.o. male who was admitted for the management of Systolic CHF, acute (HCC) , presented to ER with Shortness of Breath    The patient is a 88 y.o. with chronic medical problem of systolic and diastolic congestive heart failure with most recent echo on 2024 showed ejection fraction of 50% with severely dilated left atrium, hypertension, A-fib on Eliquis, type II diabetes mellitus, testicular cancer, benign prostatic hyperplasia with lower urinary tract symptoms presented to the ED complaining of shortness of breath and chest pain.  In the ED EKG was done and did not show any specific ST or T wave changes, A-fib with controlled rate and occasional PVC with a prolonged QT interval.  Hemoglobin 9.1, baseline around 11,  Troponin was elevated around baseline, BNP was significantly elevated at 5500, sodium was 127,  Was found on ROXI, creatinine 1.8.  Patient has history of urine retention due to benign prostatic hyperplasia        ROXI secondary to urinary retention from BPH, urology consulted recommended chronic Crenshaw creatinine

## 2024-04-30 NOTE — CARE COORDINATION
IMM letter provided to patient.  Patient offered four hours to make informed decision regarding appeal process; patient agreeable to discharge.   Electronically signed by JOHNY Delong on 4/30/2024 at 4:57 PM

## 2024-04-30 NOTE — PROGRESS NOTES
Physical Therapy Cancel Note      DATE: 2024    NAME: Carl Frias  MRN: [id number]   : 1935      Patient not seen this date for Physical Therapy due to:    Patient Declined: Attempt @ 0820, Pt rudely declines tx d/t eating breakfast and states \"Maybe later\" Will attempt again, time permitting.       Electronically signed by Sabrina Muir PTA on 2024 at 9:55 AM    
    Today's Date: 4/25/2024  Patient Name: Carl Frias  Date of admission: 4/23/2024  1:56 PM  Patient's age: 88 y.o., 1935  Admission Dx: Hypomagnesemia [E83.42]  Hyponatremia [E87.1]  ROXI (acute kidney injury) (HCC) [N17.9]  Systolic CHF, acute (HCC) [I50.21]  Congestive heart failure, unspecified HF chronicity, unspecified heart failure type (HCC) [I50.9]    Reason for Consult: anemia    Requesting Physician: Arsenio Ruiz MD    CHIEF COMPLAINT:    Chief Complaint   Patient presents with    Shortness of Breath       History Obtained From:  patient and chart  INTERVAL HISTORY:    Patient seen and examined   Started on Venofer  Vota B12 is low normal  NO NV  +SOB  HISTORY OF PRESENT ILLNESS:    Carl Frias is a 88 y.o. male who is admitted to the hospital on 4/23/2024  for SOB.    Pt has PMH of diabetes, hypertension, history of testicular cancer, atrial fibrillation was admitted with shortness of breath and dyspnea on exertion.  He does complains of some chest pain as well.  On admission noted to have hemoglobin of 9.1 which appears slightly decreased from his baseline also noted to have ROXI with creatinine 1.8    Hematology consulted for evaluation of anemia.  His lab work showed iron 6, iron saturation 3%.  Denies any dark black stools, or symptoms of rectal bleeding.  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  Past Medical History:   has a past medical history of Diabetes mellitus (HCC), Hypertension, and Testicular cancer (HCC).    Past Surgical History:   has a past surgical history that includes Testicle removal (2008); hernia repair (1988); eye surgery (Bilateral); and joint replacement (Bilateral).     Medications:    Prior to Admission medications    Medication Sig Start Date End Date Taking? Authorizing Provider   glucose monitoring kit Use to check glucose as directed. 4/5/24   Aleksandr Finney MD   Alcohol Swabs PADS Use one swab to cleanse skin 
    Today's Date: 4/28/2024  Patient Name: Carl Frias  Date of admission: 4/23/2024  1:56 PM  Patient's age: 88 y.o., 1935  Admission Dx: Hypomagnesemia [E83.42]  Hyponatremia [E87.1]  ROXI (acute kidney injury) (HCC) [N17.9]  Systolic CHF, acute (HCC) [I50.21]  Congestive heart failure, unspecified HF chronicity, unspecified heart failure type (HCC) [I50.9]    Reason for Consult: anemia    Requesting Physician: Arsenio Ruiz MD    CHIEF COMPLAINT:    Chief Complaint   Patient presents with    Shortness of Breath       History Obtained From:  patient and chart  INTERVAL HISTORY:    Patient seen and examined  Hb stable at 8.4  NO NV  +SOB  No overnight events    HISTORY OF PRESENT ILLNESS:    Carl Frias is a 88 y.o. male who is admitted to the hospital on 4/23/2024  for SOB.    Pt has PMH of diabetes, hypertension, history of testicular cancer, atrial fibrillation was admitted with shortness of breath and dyspnea on exertion.  He does complains of some chest pain as well.  On admission noted to have hemoglobin of 9.1 which appears slightly decreased from his baseline also noted to have ROXI with creatinine 1.8    Hematology consulted for evaluation of anemia.  His lab work showed iron 6, iron saturation 3%.  Denies any dark black stools, or symptoms of rectal bleeding.  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  Past Medical History:   has a past medical history of Diabetes mellitus (HCC), Hypertension, and Testicular cancer (HCC).    Past Surgical History:   has a past surgical history that includes Testicle removal (2008); hernia repair (1988); eye surgery (Bilateral); and joint replacement (Bilateral).     Medications:    Prior to Admission medications    Medication Sig Start Date End Date Taking? Authorizing Provider   glucose monitoring kit Use to check glucose as directed. 4/5/24   Aleksandr Finney MD   Alcohol Swabs PADS Use one swab to cleanse skin prior to 
    Today's Date: 4/29/2024  Patient Name: Carl Frias  Date of admission: 4/23/2024  1:56 PM  Patient's age: 88 y.o., 1935  Admission Dx: Hypomagnesemia [E83.42]  Hyponatremia [E87.1]  ROXI (acute kidney injury) (HCC) [N17.9]  Systolic CHF, acute (HCC) [I50.21]  Congestive heart failure, unspecified HF chronicity, unspecified heart failure type (HCC) [I50.9]    Reason for Consult: anemia    Requesting Physician: Arsenio Ruiz MD    CHIEF COMPLAINT:    Chief Complaint   Patient presents with    Shortness of Breath       History Obtained From:  patient and chart  INTERVAL HISTORY:    Patient seen and examined  Hb stable   NO NV  +SOB  Suspicious black area on the penile area and was evaluated by urology    HISTORY OF PRESENT ILLNESS:    Carl Frias is a 88 y.o. male who is admitted to the hospital on 4/23/2024  for SOB.    Pt has PMH of diabetes, hypertension, history of testicular cancer, atrial fibrillation was admitted with shortness of breath and dyspnea on exertion.  He does complains of some chest pain as well.  On admission noted to have hemoglobin of 9.1 which appears slightly decreased from his baseline also noted to have ROXI with creatinine 1.8    Hematology consulted for evaluation of anemia.  His lab work showed iron 6, iron saturation 3%.  Denies any dark black stools, or symptoms of rectal bleeding.  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  Past Medical History:   has a past medical history of Diabetes mellitus (HCC), Hypertension, and Testicular cancer (HCC).    Past Surgical History:   has a past surgical history that includes Testicle removal (2008); hernia repair (1988); eye surgery (Bilateral); and joint replacement (Bilateral).     Medications:    Prior to Admission medications    Medication Sig Start Date End Date Taking? Authorizing Provider   glucose monitoring kit Use to check glucose as directed. 4/5/24   Aleksandr Finney MD   Alcohol 
   04/24/24 1756   Encounter Summary   Encounter Overview/Reason Spiritual/Emotional Needs   Service Provided For Patient   Referral/Consult From Rounding   Complexity of Encounter Low   Spiritual/Emotional needs   Type Spiritual Support   Assessment/Intervention/Outcome   Assessment Unable to assess  (covid isolation)   Intervention Prayer (assurance of)/[place]       
   04/25/24 1544   Encounter Summary   Encounter Overview/Reason Spiritual/Emotional Needs   Service Provided For Patient and family together   Referral/Consult From Rounding   Last Encounter  04/25/24   Complexity of Encounter Moderate   Spiritual/Emotional needs   Type Spiritual Support   Assessment/Intervention/Outcome   Assessment Calm;Hopeful;Peaceful   Intervention Active listening;Prayer (assurance of)/[place];Sustaining Presence/[facility] of presence   Outcome Coping;Peace;Expressed feelings, needs, and concerns;Receptive       
  [facility]  [facility]  [facility]    PROGRESS NOTE             4/27/2024    8:32 AM    Name:   Carl Frias  MRN:     [id number]     Acct:      [id number]   Room:   2109/2109-01   Day:  4  Admit Date:  4/23/2024  1:56 PM    PCP:  Aleksandr Finney MD  Code Status:  DNR-CCA    Subjective:     C/C:   Chief Complaint   Patient presents with    Shortness of Breath     Interval History Status: improved.    Patient seen and examined at bedside this morning, complaining of severe epigastric pain started yesterday, does not associated with nausea or vomiting, does not treat with any shortness of breath or chest pain.  Will have troponin, and EKG stat.    Crenshaw cath was removed yesterday, will follow bladder scan result    Urine culture positive for E. coli and Citrobacter koseri, sensitive to ceftriaxone likely will be discharged on Keflex    Patient family refused discharge to SNF, they state patient did have 24/7 support , advance the importance of SNF, refused.    Anticipate discharge today morning    Brief History:     The patient is a 88 y.o.   /  male who presents withShortness of Breath  Patient with chronic medical problem of systolic and diastolic congestive heart failure with most recent echo on February 2024 showed ejection fraction of 50% with severely dilated left atrium, hypertension, A-fib on Eliquis, type II diabetes mellitus, testicular cancer, benign prostatic hyperplasia with lower urinary tract symptoms presented to the ED complaining of shortness of breath and chest pain.  Patient was having shortness of breath for a a month or so.  And had a problem with the swelling and weakness but it seems to be getting to the point where he cannot walk. Patient also endorsed that he had chest pain yesterday described as pressure-like sensation, does not radiate, does not associated with exertion, No alleviating factors appreciated.  In the ED 
  NEPHROLOGY PROGRESS NOTE    Patient :  Carl Frias; 88 y.o. MRN# [id number]  Location:  2109/2109-01  Attending:  Arsenio Ruiz MD  Admit Date:  4/23/2024   Hospital Day: 4    Reason for Consult: Acute kidney injury on CKD    Consulting physician: Arsenio Ruiz MD.    Chief Complaint: Shortness of breath    Interval history:   Patient was seen and examined today and he complains of epigastric pain.  He does not have shortness of breath.  He is nonoliguric with indwelling Crenshaw catheter.    History of Present Illness:  This is a 88 y.o. male with past medical history of essential hypertension, chronic A-fib, type 2 diabetes,non-insulin-dependent diabetes mellitus, BPH on Flomax and finasteride, severe osteoarthritis, history of CHF with preserved EF moderate LVH diastolic, CKD stage IIIa with baseline creatinine of 1.0 to 1.2 mg/dL.  Patient presented to the hospital with complaints of shortness of breath and chest pain patient is complaining of shortness of breath for about a month, patient has exertional dyspnea patient also noticed sweating few days ago while being short of breath patient had 1 episode of mild chest pain which got resolved.  Chest x-ray showed mild enlargement of cardiac silhouette, no acute pulmonary abnormality no pleural effusion no pulmonary consolidation or pneumothorax no pulmonary edema  proBNP 5400  Troponin 69.  Serum creatinine on admission 1.8 mg/dL  Patient also noted to have urinary retention with high PVR on bladder scan, with straight cath 1000 mL of urine drained confirming urinary retention, urology consulted recommended to place a Crenshaw catheter    Current Medications:    bumetanide (BUMEX) tablet 1 mg, Daily  cefTRIAXone (ROCEPHIN) 1,000 mg in sodium chloride 0.9 % 50 mL IVPB (mini-bag), Q24H  iron sucrose (VENOFER) 300 mg in sodium chloride 0.9 % 250 mL IVPB, Daily  apixaban (ELIQUIS) tablet 2.5 mg, BID  aspirin EC tablet 81 mg, Every Other Day  lisinopril 
  NEPHROLOGY PROGRESS NOTE    Patient :  Carl Frias; 88 y.o. MRN# [id number]  Location:  2109/2109-01  Attending:  Arsenio Ruiz MD  Admit Date:  4/23/2024   Hospital Day: 3    Reason for Consult: Acute kidney injury on CKD    Consulting physician: Arsenio Ruiz MD.    Chief Complaint: Shortness of breath    Interval history: Patient was seen and examined today and is nonoliguric.  He has indwelling Crenshaw catheter for urinary retention likely secondary to benign prostatic hyperplasia.  He does not have dyspnea at rest but still has generalized weakness.  He is nonoliguric.    History of Present Illness:  This is a 88 y.o. male with past medical history of essential hypertension, chronic A-fib, type 2 diabetes,non-insulin-dependent diabetes mellitus, BPH on Flomax and finasteride, severe osteoarthritis, history of CHF with preserved EF moderate LVH diastolic, CKD stage IIIa with baseline creatinine of 1.0 to 1.2 mg/dL.  Patient presented to the hospital with complaints of shortness of breath and chest pain patient is complaining of shortness of breath for about a month, patient has exertional dyspnea patient also noticed sweating few days ago while being short of breath patient had 1 episode of mild chest pain which got resolved.  Chest x-ray showed mild enlargement of cardiac silhouette, no acute pulmonary abnormality no pleural effusion no pulmonary consolidation or pneumothorax no pulmonary edema  proBNP 5400  Troponin 69.  Serum creatinine on admission 1.8 mg/dL  Patient also noted to have urinary retention with high PVR on bladder scan, with straight cath 1000 mL of urine drained confirming urinary retention, urology consulted recommended to place a Crenshaw catheter    Current Medications:    bumetanide (BUMEX) tablet 1 mg, Daily  cefTRIAXone (ROCEPHIN) 1,000 mg in sodium chloride 0.9 % 50 mL IVPB (mini-bag), Q24H  iron sucrose (VENOFER) 300 mg in sodium chloride 0.9 % 250 mL IVPB, Daily  apixaban (ELIQUIS) 
  NEPHROLOGY PROGRESS NOTE    Patient :  Carl Frias; 88 y.o. MRN# [id number]  Location:  2109/2109-01  Attending:  Arsenio Ruiz MD  Admit Date:  4/23/2024   Hospital Day: 2      Reason for Consult: Acute kidney injury on CKD      Chief Complaint: Shortness of breath    Subjective    Patient seen and examined, feeling better, breathing has improved, edema improved.  Still have exertional dyspnea.  No chest pain today  Good uop since barajas catheter placed. 5.5 L uop yesterday.    K 3.3  sCR 1.7 mg/dl from 1.8 mg/dl  Baseline scr 1.0-1.2 mg/dl    History of Present Illness:    This is a 88 y.o. male with past medical history of essential hypertension, chronic A-fib, type 2 diabetes,non-insulin-dependent diabetes mellitus, BPH on Flomax and finasteride, severe osteoarthritis, history of CHF with preserved EF moderate LVH diastolic, CKD stage IIIa with baseline creatinine of 1.0 to 1.2 mg/dL.  Patient presented to the hospital with complaints of shortness of breath and chest pain patient is complaining of shortness of breath for about a month, patient has exertional dyspnea patient also noticed sweating few days ago while being short of breath patient had 1 episode of mild chest pain which got resolved.    Chest x-ray showed mild enlargement of cardiac silhouette, no acute pulmonary abnormality no pleural effusion no pulmonary consolidation or pneumothorax no pulmonary edema  proBNP 5400  Troponin 69  Serum creatinine on admission 1.8 mg/dL    Patient also noted to have urinary retention with high PVR on bladder scan, with straight cath 1000 mL of urine drained confirming urinary retention, urology consulted recommended to place a Barajas catheter    Pt denies any history of  prolonged NSAID use.  Patient complains of decrease in urine production  There has been no recent exposure to IV contrast.   There is no history  of paraprotein disease.   Pt denies any history of recurrent UTI or kidney stones.  Medication review 
  [facility]  IN-PATIENT SERVICE  [facility]    PROGRESS NOTE             4/25/2024    8:27 AM    Name:   Carl Frias  MRN:     [id number]     Acct:      [id number]   Room:   2109/2109-01  IP Day:  2  Admit Date:  4/23/2024  1:56 PM    PCP:  Aleksandr Finney MD  Code Status:  DNR-CCA    Subjective:     C/C:   Chief Complaint   Patient presents with    Shortness of Breath     Interval History Status: improved.    Patient seen and examined bedside this morning.  Yesterday urine bag was noted to be having red/pink,,, patient was working with physical therapy and on IV fluid was dislodged, likely traumatic.  Today urine bag is clear.  Patient denies any shortness of breath.  On chronic Crenshaw catheter, penis lesion was seen by wound ostomy, patient need to follow-up with dermatologist outpatient for possible biopsy.  Hemoglobin 7.3, fecal occult test negative, iron studies show low iron, TIBC, heme-onc was consulted and was started on IV Venofer.  Will follow Ferritin number  Creatinine improving     Brief History:       The patient is a 88 y.o.   /  male who presents withShortness of Breath  Patient with chronic medical problem of systolic and diastolic congestive heart failure with most recent echo on February 2024 showed ejection fraction of 50% with severely dilated left atrium, hypertension, A-fib on Eliquis, type II diabetes mellitus, testicular cancer, benign prostatic hyperplasia with lower urinary tract symptoms presented to the ED complaining of shortness of breath and chest pain.  Patient was having shortness of breath for a a month or so.  And had a problem with the swelling and weakness but it seems to be getting to the point where he cannot walk. Patient also endorsed that he had chest pain yesterday described as pressure-like sensation, does not radiate, does not associated with exertion, No alleviating factors appreciated.  In the ED EKG was 
Attempted to call report. No answer at this time. Will call back     1842:report given to nurse at [facility]. All questions answered   
BED ALARM REFUSED  AND PATIENT STATED SHE IS AWARE OF SAFETY PROTOCOLS AND WOULD CALL FOR NEEDS  
Date:   4/25/2024  Patient name: Carl Frias  Date of admission:  4/23/2024  1:56 PM  MRN:   [id number]  YOB: 1935  PCP: Aleksandr Finney MD    Reason for Admission: Hypomagnesemia [E83.42]  Hyponatremia [E87.1]  ROXI (acute kidney injury) (HCC) [N17.9]  Systolic CHF, acute (HCC) [I50.21]  Congestive heart failure, unspecified HF chronicity, unspecified heart failure type (HCC) [I50.9]    Cardiology consult: Congestive heart failure systolic and diastolic acute on chronic, A-fib, history of ventricular arrhythmia        Referring physician: Dr Arsenio Ruiz  Impression     Admission  4/23/2024 increasing shortness of breath, peripheral edema and UTI, urinary retention  Abnormal high-sensitivity troponin most likely type II  congestive heart failure systolic and diastolic ejection fraction 50%  Episode of nonsustained V. tach heart rate 160, 12 beats 2/18/2024  Chronic A-fib  Severely dilated LA  Hypertension  Type 2 diabetes mellitus  Benign prostatic hypertrophy  Impaired hearing, severe  Severe osteoarthritis involving multiple joints poor mobility  Back pain  Obesity BMI 37  History of easy bruising  Iron deficiency anemia serum iron 6, saturation 3, TIBC 175 lab work 4/23/2024  No history of coronary intervention, no TIA or CVA     Past surgical history  Hernia repair left inguinal, bilateral knee joint replacement bilateral, testicle removal, eye surgery     Drug allergy  Xarelto, severity nonspecified    History of present illness     88-year-old  male with a past medical history of hypertension, chronic A-fib, diabetes mellitus , BPH ,severe osteoarthritis involving multiple knees, impaired hearing, bilateral knee replacement got hospitalized on 4- 23- 2024 with increasing shortness of breath and chest discomfort.  It was difficult to obtain good history from the patient because of impaired hearing.  He was started on IV diuretic.  He required Crenshaw catheter placement for urinary 
Date:   4/27/2024  Patient name: Carl Frias  Date of admission:  4/23/2024  1:56 PM  MRN:   [id number]  YOB: 1935  PCP: Aleksandr Finney MD    Reason for Admission: Hypomagnesemia [E83.42]  Hyponatremia [E87.1]  ROXI (acute kidney injury) (HCC) [N17.9]  Systolic CHF, acute (HCC) [I50.21]  Congestive heart failure, unspecified HF chronicity, unspecified heart failure type (HCC) [I50.9]    Cardiology follow up: Congestive heart failure systolic and diastolic acute on chronic, A-fib, history of ventricular arrhythmia        Referring physician: Dr Arsenio Ruiz    Impression     Admission  4/23/2024 increasing shortness of breath, peripheral edema and UTI, urinary retention, urine culture grew E. coli, ROXI on CKD  Abnormal high-sensitivity troponin most likely type II  congestive heart failure systolic and diastolic ejection fraction 50%  Episode of nonsustained V. tach heart rate 160, 12 beats 2/18/2024  Chronic A-fib  Severely dilated LA  Hypertension  Type 2 diabetes mellitus  Benign prostatic hypertrophy  Impaired hearing, severe  Severe osteoarthritis involving multiple joints poor mobility  Back pain  Obesity BMI 37  Poor mobility  History of easy bruising  Iron deficiency anemia serum iron 6, saturation 3%, TIBC 175 lab work 4/23/2024  No history of coronary intervention, no TIA or CVA     Past surgical history  Hernia repair left inguinal, bilateral knee joint replacement bilateral, testicle removal, eye surgery    Drug allergy  Xarelto, severity nonspecified     History of present illness     88-year-old  male with a past medical history of hypertension, chronic A-fib, diabetes mellitus , BPH ,severe osteoarthritis involving multiple knees, impaired hearing, bilateral knee replacement got hospitalized on 4- 23- 2024 with increasing shortness of breath and chest discomfort.  It was difficult to obtain good history from the patient because of impaired hearing.  He was started on IV 
Date:   4/28/2024  Patient name: Carl Frias  Date of admission:  4/23/2024  1:56 PM  MRN:   [id number]  YOB: 1935  PCP: Aleksandr Finney MD    Reason for Admission: Hypomagnesemia [E83.42]  Hyponatremia [E87.1]  ROXI (acute kidney injury) (HCC) [N17.9]  Systolic CHF, acute (HCC) [I50.21]  Congestive heart failure, unspecified HF chronicity, unspecified heart failure type (HCC) [I50.9]    Cardiology follow up: Congestive heart failure systolic and diastolic acute on chronic, A-fib, history of ventricular arrhythmia        Referring physician: Dr Arsenio Ruiz     Impression     Admission  4/23/2024 increasing shortness of breath, peripheral edema and UTI, urinary retention, urine culture grew E. coli, ROXI on CKD  Significant improvement in the renal function after placing Crenshaw catheter and treating for UTI    Abnormal high-sensitivity troponin most likely type II  congestive heart failure systolic and diastolic ejection fraction 50%  Episode of nonsustained V. tach heart rate 160, 12 beats 2/18/2024  Chronic A-fib  Severely dilated LA  Hypertension  Type 2 diabetes mellitus  Benign prostatic hypertrophy  Impaired hearing, severe  Severe osteoarthritis involving multiple joints poor mobility  Back pain  Obesity BMI 37  Poor mobility  History of easy bruising  Iron deficiency anemia serum iron 6, saturation 3%, TIBC 175 lab work 4/23/2024  No history of coronary intervention, no TIA or CVA     Past surgical history  Hernia repair left inguinal, bilateral knee joint replacement bilateral, testicle removal, eye surgery     Drug allergy  Xarelto, severity nonspecified     History of present illness    88-year-old  male with a past medical history of hypertension, chronic A-fib, diabetes mellitus , BPH ,severe osteoarthritis involving multiple knees, impaired hearing, bilateral knee replacement got hospitalized on 4- 23- 2024 with increasing shortness of breath and chest discomfort.  It was 
Date:   4/29/2024  Patient name: Carl Frias  Date of admission:  4/23/2024  1:56 PM  MRN:   [id number]  YOB: 1935  PCP: Aleksandr Finney MD    Reason for Admission: Hypomagnesemia [E83.42]  Hyponatremia [E87.1]  ROXI (acute kidney injury) (HCC) [N17.9]  Systolic CHF, acute (HCC) [I50.21]  Congestive heart failure, unspecified HF chronicity, unspecified heart failure type (HCC) [I50.9]    Cardiology follow up: Congestive heart failure systolic and diastolic acute on chronic, A-fib, history of ventricular arrhythmia        Referring physician: Dr Arsenio Ruiz     Impression     Admission  4/23/2024 increasing shortness of breath, peripheral edema and UTI, urinary retention, urine culture grew E. coli, ROXI on CKD  Significant improvement in the renal function after placing Crenshaw catheter and treating for UTI     Abnormal high-sensitivity troponin most likely type II  congestive heart failure systolic and diastolic ejection fraction 50%  Episode of nonsustained V. tach heart rate 160, 12 beats 2/18/2024  Chronic A-fib  Severely dilated LA  Hypertension  Moderate left ventricular hypertrophy  Type 2 diabetes mellitus  Benign prostatic hypertrophy  Impaired hearing, severe  Severe osteoarthritis involving multiple joints poor mobility  Back pain  Obesity BMI 37  Poor mobility  History of easy bruising  Iron deficiency anemia serum iron 6, saturation 3%, TIBC 175 lab work 4/23/2024  No history of coronary intervention, no TIA or CVA    Past surgical history  Hernia repair left inguinal, bilateral knee joint replacement bilateral, testicle removal, eye surgery     Drug allergy  Xarelto, severity nonspecified     History of present illness     88-year-old  male with a past medical history of hypertension, chronic A-fib, diabetes mellitus , BPH ,severe osteoarthritis involving multiple knees, impaired hearing, bilateral knee replacement got hospitalized on 4- 23- 2024 with increasing shortness of 
Date:   4/30/2024  Patient name: Carl Frias  Date of admission:  4/23/2024  1:56 PM  MRN:   [id number]  YOB: 1935  PCP: Aleksandr Finney MD    Reason for Admission: Hypomagnesemia [E83.42]  Hyponatremia [E87.1]  ROXI (acute kidney injury) (HCC) [N17.9]  Systolic CHF, acute (HCC) [I50.21]  Congestive heart failure, unspecified HF chronicity, unspecified heart failure type (HCC) [I50.9]    Cardiology follow up: Congestive heart failure systolic and diastolic acute on chronic, A-fib, history of ventricular arrhythmia        Referring physician: Dr Arsenio Ruiz     Impression     Admission  4/23/2024 increasing shortness of breath, peripheral edema and UTI, urinary retention, urine culture grew E. coli, ROXI on CKD  Significant improvement in the renal function after placing Crenshaw catheter and treating for UTI     Abnormal high-sensitivity troponin most likely type II  congestive heart failure systolic and diastolic ejection fraction 50%  Episode of nonsustained V. tach heart rate 160, 12 beats 2/18/2024  Chronic A-fib  Severely dilated LA  Hypertension  Moderate left ventricular hypertrophy  Type 2 diabetes mellitus  Benign prostatic hypertrophy  Impaired hearing, severe  Severe osteoarthritis involving multiple joints poor mobility  Back pain  Obesity BMI 37  Poor mobility  History of easy bruising  Iron deficiency anemia serum iron 6, saturation 3%, TIBC 175 lab work 4/23/2024  No history of coronary intervention, no TIA or CVA    Past surgical history  Hernia repair left inguinal, bilateral knee joint replacement bilateral, testicle removal, eye surgery     Drug allergy  Xarelto, severity nonspecified     History of present illness     88-year-old  male with a past medical history of hypertension, chronic A-fib, diabetes mellitus , BPH ,severe osteoarthritis involving multiple knees, impaired hearing, bilateral knee replacement got hospitalized on 4- 23- 2024 with increasing shortness of 
Dr. Felipe (Cardiology) put on patient list for consult.  
IN-PATIENT SERVICE   [facility]  [facility]  Xander Carson MD; Aleksandr Finney MD; Arsenio Ruiz MD; MD Jayleen Fajardo MD; Nisa Williamson MD; Miranda Youssef MD        Progress Note    4/30/2024    12:07 PM    Name:   Carl Frias  MRN:     [id number]     Acct:      [id number]   Room:   [facility] Day:  7  Admit Date:  4/23/2024  1:56 PM    PCP:   Aleksandr Finney MD  Code Status:  DNR-CCA    Subjective:     C/C:   Chief Complaint   Patient presents with    Shortness of Breath         Interval History Status: Improving    HPI:   The patient is a 88 y.o. with chronic medical problem of systolic and diastolic congestive heart failure with most recent echo on February 2024 showed ejection fraction of 50% with severely dilated left atrium, hypertension, A-fib on Eliquis, type II diabetes mellitus, testicular cancer, benign prostatic hyperplasia with lower urinary tract symptoms presented to the ED complaining of shortness of breath and chest pain.  In the ED EKG was done and did not show any specific ST or T wave changes, A-fib with controlled rate and occasional PVC with a prolonged QT interval.  Hemoglobin 9.1, baseline around 11,  Troponin was elevated around baseline, BNP was significantly elevated at 5500, sodium was 127,  Was found on ROXI, creatinine 1.8.  Patient has history of urine retention due to benign prostatic hyperplasia      Review of Systems:     Denies any shortness of breath or cough  Denies chest pain or palpitations  Denies abdominal pain, diarrhea vomiting  Denies any new numbness tremors or weakness.      Medications:     Allergies:    Allergies   Allergen Reactions    Rivaroxaban Other (See Comments)     Hematuria recurrent       Current Meds:   Scheduled Meds:    pantoprazole  40 mg Oral QAM AC    bumetanide  1 mg Oral Daily    apixaban  2.5 mg Oral BID    aspirin  81 mg Oral Every Other Day    lisinopril  10 mg Oral 
[facility]  Progress Note      NAME:  Carl Frias  MEDICAL RECORD NUMBER:  [id number]  AGE: 88 y.o.   GENDER: male  : 1935  TODAY'S DATE:  2024    Concern regarding penile lesion verbalized via secure message to Dr Contreras. He states that pt can follow up as outpatient for biopsy. Order obtained for outpatient dermatology consult. Dr Cosme notified.     Radha Sabillon, BSN, RN, CWOCN, [facility]  Wound, Ostomy, and Continence Nursing  [phone]              
Nutrition Assessment     Type and Reason for Visit: RD Nutrition Re-Screen/LOS    Nutrition Recommendations/Plan:   Continue diet as ordered.     Malnutrition Assessment:  Malnutrition Status: No malnutrition    Nutrition Assessment:  Pt being seen for length of stay.  Pt states his appetite has been good with no complaints voiced.    Current Nutrition Therapies:    ADULT DIET; Regular; 4 carb choices (60 gm/meal); 1500 ml    Anthropometric Measures:  Height: 167.6 cm (5' 6\")  Current Body Wt: 102.5 kg (226 lb)   BMI: 36.5    Nutrition Diagnosis:   Overweight/Obese related to excessive energy intake as evidenced by BMI    Nutrition Interventions:   Food and/or Nutrient Delivery: Continue Current Diet  Nutrition Education/Counseling: No recommendation at this time  Coordination of Nutrition Care: Continue to monitor while inpatient       Fernanda Cantu RD, LD  [phone]    
Occupational Therapy  Facility/Department: [facility] PROGRESSIVE CARE  Rehabilitation Occupational Therapy Daily Treatment Note    Date: 24  Patient Name: Carl Frias       Room:   MRN: [id number]  Account: [id number]   : 1935  (88 y.o.) Gender: male      RN reports patient is medically stable for therapy treatment this date. Chart reviewed prior to treatment and patient is agreeable for therapy.  All lines intact and patient positioned comfortably at end of treatment.  All patient needs addressed prior to ending therapy session.      Pt currently functioning below baseline.  Recommend daily inpatient skilled therapy at time of discharge to maximize long term outcomes and prevent re-admission. Please refer to AM-PAC score for current level of function.               Past Medical History:  has a past medical history of Diabetes mellitus (HCC), Hypertension, and Testicular cancer (HCC).  Past Surgical History:   has a past surgical history that includes Testicle removal (); hernia repair (); eye surgery (Bilateral); and joint replacement (Bilateral).    Restrictions  Restrictions/Precautions: General Precautions  Implants present? : Metal implants (B knee TKA)  Other position/activity restrictions: Telemetry, barajas cath, R hand IV, Balaji knee high minerva hose  Required Braces or Orthoses?: No    Subjective  Subjective: Pt resting in bed upon arrival agreeable to OT. No c/o at this time. Pt's granddaughter present and pt's spouse of facetime throughout session.  Restrictions/Precautions: General Precautions             Objective     Cognition  Overall Cognitive Status: WFL  Arousal/Alertness: Appropriate responses to stimuli  Following Commands: Follows one step commands with repetition;Follows one step commands with increased time  Attention Span: Appears intact  Safety Judgement: Decreased awareness of need for safety;Decreased awareness of need for assistance  Problem Solving: Assistance 
Occupational Therapy  [facility]   INPATIENT OCCUPATIONAL THERAPY  PROGRESS NOTE  Date: 2024  Patient Name: Carl Frias       Room:   MRN: [id number]    : 1935  (88 y.o.)  Gender: male   Referring Practitioner: Dr. Youssef  Diagnosis: Acute on chronic combined CHF      Discharge Recommendations:  Further Occupational Therapy is recommended upon facility discharge.    OT Equipment Recommendations  Equipment Needed: Yes  Mobility Devices: ADL Assistive Devices  ADL Assistive Devices: Reacher, Sock-Aid Hard, Long-handled Shoe Horn, Emergency Alert System    Restrictions/Precautions  Restrictions/Precautions  Restrictions/Precautions: General Precautions  Required Braces or Orthoses?: No  Implants present? : Metal implants (B TKA)    Subjective  Subjective  Subjective: \"Ok i'll show you what I got\"  Subjective  Pain: pt denies pain at rest and during mobility  Comments: SUKHWINDER Barrett ok'd pt for OT/PTA co-tx. Pt agreeable to participate and pleasant/cooperative throughout.    Objective  Orientation  Overall Orientation Status: Within Normal Limits  Orientation Level: Oriented X4  Cognition  Overall Cognitive Status: Exceptions  Cognition Comment: Pt [place] requiring therapist to repeat directions more than once. Pt not fully aware of deficits this date.    Activities of Daily Living  ADL  Feeding: Setup, Based on clinical judgement  Grooming: Stand by assistance, Based on clinical judgement  UE Bathing: Stand by assistance, Based on clinical judgement  LE Bathing: Moderate assistance  LE Bathing Skilled Clinical Factors: per PCT report, standing with BUE support on RW to complete  UE Dressing: Minimal assistance, Based on clinical judgement  UE Dressing Skilled Clinical Factors: Pt assisting in donning/doffing gown seated EOB.  LE Dressing: Moderate assistance, Based on clinical judgement  Toileting: Moderate assistance, Based on clinical judgement  Additional Comments: Pt declines 
Occupational Therapy  [facility]   INPATIENT OCCUPATIONAL THERAPY  PROGRESS NOTE  Date: 2024  Patient Name: Carl Frias       Room:   MRN: [id number]    : 1935  (88 y.o.)  Gender: male   Referring Practitioner: Dr. Youssef  Diagnosis: Acute on chronic combined CHF      Discharge Recommendations:  Further Occupational Therapy is recommended upon facility discharge.    OT Equipment Recommendations  Equipment Needed: Yes  Mobility Devices: ADL Assistive Devices  ADL Assistive Devices: Reacher, Sock-Aid Hard, Long-handled Shoe Horn, Emergency Alert System    Restrictions/Precautions  Restrictions/Precautions  Restrictions/Precautions: General Precautions  Implants present? : Metal implants (B knee TKA)  Position Activity Restriction  Other position/activity restrictions: Telemetry, barajas cath, R hand IV, Balaji knee high minerva hose         Subjective  Subjective  Subjective: pt alert and motivated  Subjective  Pain: pt denies  Comments: co-tx c PJ PTA    Objective  Orientation  Overall Orientation Status: Within Normal Limits  Orientation Level: Oriented X4  Cognition  Overall Cognitive Status: WFL  Arousal/Alertness: Appropriate responses to stimuli  Following Commands: Follows one step commands with repetition;Follows one step commands with increased time  Attention Span: Appears intact  Safety Judgement: Decreased awareness of need for safety;Decreased awareness of need for assistance  Problem Solving: Assistance required to generate solutions;Assistance required to implement solutions;Assistance required to correct errors made;Assistance required to identify errors made;Decreased awareness of errors  Insights: Decreased awareness of deficits  Initiation: Requires cues for some  Sequencing: Requires cues for some  Cognition Comment: Pt [place] requiring therapist to repeat directions more than once. Pt not fully aware of deficits this date.    Activities of Daily 
Okay to give Aldactone per Cardiology   
Oscar Manning MD   Patient:  MILY PELAYO   YOB: 1935  MRN:  [id number]  Location: [facility]    2109-01 4/24/24 12:40 PM   [phone] [place] [facility] From: Tegan Davey RE: MILY PELAYO RM: 2109-01 anemia  Unr   
PT was working with patient. He was up to the bathroom and got his IV caught on something and it became dislodged. Also when patient was back in bed patient's urine is red/ pink. Writer changed position of barajas securement leg sticker to a closer position to reduce likelihood of more trauma during ambulation. Writer to place another IV.   
Physical Therapy        Physical Therapy Cancel Note      DATE: 2024    NAME: Carl Frias  MRN: [id number]   : 1935      Patient not seen this date for Physical Therapy due to:    Patient Declined: Cx; Upon approach patient eating breakfast & reporting fatigue. Patient requesting to rest after breakfast & that writer come back this afternoon. Writer agreeable to patient's request. Tanmay continue to follow for therapy updates. Attempt made from .      Electronically signed by Pauline Ordoñez PTA on 2024 at 8:46 AM      
Physical Therapy  Facility/Department: [facility] PROGRESSIVE CARE  Physical Therapy Initial Assessment    Name: Carl Frias  : 1935  MRN: [id number]  Date of Service: 2024    Discharge Recommendations:  Therapy recommended at discharge, Patient would benefit from continued therapy after discharge   PT Equipment Recommendations  Equipment Needed:  (CTA)      Patient Diagnosis(es): The primary encounter diagnosis was Congestive heart failure, unspecified HF chronicity, unspecified heart failure type (HCC). Diagnoses of ROXI (acute kidney injury) (HCC), Hyponatremia, Hypomagnesemia, and Systolic CHF, acute (HCC) were also pertinent to this visit.  Past Medical History:  has a past medical history of Diabetes mellitus (HCC), Hypertension, and Testicular cancer (HCC).  Past Surgical History:  has a past surgical history that includes Testicle removal (); hernia repair (); eye surgery (Bilateral); and joint replacement (Bilateral).    Assessment   Assessment: Pt presents with generalized weakness as well as postural, balance, safety awareness, and endurance deficits impeding overall safety and capacity for functional mobility at house hold level. Pt requires 1-2 A for bed mobility, 2A with RW for transfers, and 1A then eventual 2 A for safe ambulation of 10' distances as pt fatigues. Pt may be unsafe to return to prior home arrangements. PT services warranted to address deficits  Treatment Diagnosis: impaired mobility and endurance  Specific Instructions for Next Treatment: Progress ambulation and attempt stair climb. Overall safety training, Dynamic/static postural training,  Therapy Prognosis: Fair  Decision Making: Medium Complexity  Exam: ROM, MMT, Balance, and functional mobility assessments  Clinical Presentation: evolving  Barriers to Learning: Cognition, [place]  Requires PT Follow-Up: Yes  Activity Tolerance  Activity Tolerance: Patient limited by fatigue;Patient limited by endurance     Plan   Physical 
Physical Therapy  Rehabilitation Physical Therapy    Date: 2024  Patient Name: Carl Frias      Room:   MRN: [id number]    : 1935  (88 y.o.)  Gender: male     Referring Practitioner: Gracy Penn MD  Diagnosis: Systolic CHF, acute  Additional Pertinent Hx: The patient is a 88 y.o.   /  male who presents withShortness of Breath  Patient with chronic medical problem of systolic and diastolic congestive heart failure with most recent echo on 2024 showed ejection fraction of 50% with severely dilated left atrium, hypertension, A-fib on Eliquis, type II diabetes mellitus, testicular cancer, benign prostatic hyperplasia with lower urinary tract symptoms presented to the ED complaining of shortness of breath and chest pain.  Patient was having shortness of breath for a a month or so.  And had a problem with the swelling and weakness but it seems to be getting to the point where he cannot walk.    Discharge Recommendations:  Therapy recommended at discharge, Patient would benefit from continued therapy after discharge  Equipment Needed:  (CTA)    Assessment  Assessment  Activity Tolerance: Patient limited by fatigue;Patient limited by endurance;Other (comment) (gait distance limited by low back pain)  Discharge Recommendations: Therapy recommended at discharge;Patient would benefit from continued therapy after discharge    Plan  Physical Therapy Plan  General Plan: 6-7 times per week  Specific Instructions for Next Treatment: Progress ambulation and attempt stair climb. Overall safety training, Dynamic/static postural training,  Current Treatment Recommendations: Strengthening, Balance training, Functional mobility training, Transfer training, ROM, Stair training, Gait training, Endurance training, Home exercise program, Safety education & training, Patient/Caregiver education & training, Positioning, Therapeutic activities     Restrictions  Restrictions/Precautions: 
RN let Resident and Dr. Felipe know that patient HR went down to 40, but immediately came back up to mid 60s. No further orders, strip posted in chart.   
Rehabilitation Physical Therapy    Date: 2024  Patient Name: Carl Frias      Room:   MRN: [id number]    : 1935  (88 y.o.)  Gender: male   Referring Practitioner: Gracy Penn MD  Diagnosis: Systolic CHF, acute  Additional Pertinent Hx: The patient is a 88 y.o.   /  male who presents withShortness of Breath  Patient with chronic medical problem of systolic and diastolic congestive heart failure with most recent echo on 2024 showed ejection fraction of 50% with severely dilated left atrium, hypertension, A-fib on Eliquis, type II diabetes mellitus, testicular cancer, benign prostatic hyperplasia with lower urinary tract symptoms presented to the ED complaining of shortness of breath and chest pain.  Patient was having shortness of breath for a a month or so.  And had a problem with the swelling and weakness but it seems to be getting to the point where he cannot walk.    Discharge Recommendations:  Therapy recommended at discharge, Patient would benefit from continued therapy after discharge  Equipment Needed:  (CTA)    Assessment  Assessment  Activity Tolerance: Patient tolerated treatment well;Patient limited by fatigue;Patient limited by endurance (Pt cooperative but fatigues quickly with poor endurance)  Discharge Recommendations: Therapy recommended at discharge;Patient would benefit from continued therapy after discharge    Plan  Physical Therapy Plan  General Plan: 6-7 times per week  Specific Instructions for Next Treatment: Progress ambulation and attempt stair climb. Overall safety training, Dynamic/static postural training,  Current Treatment Recommendations: Strengthening, Balance training, Functional mobility training, Transfer training, ROM, Stair training, Gait training, Endurance training, Home exercise program, Safety education & training, Patient/Caregiver education & training, Positioning, Therapeutic activities 
Transfer of care note    The patient is a 88 y.o. with chronic medical problem of systolic and diastolic congestive heart failure with most recent echo on February 2024 showed ejection fraction of 50% with severely dilated left atrium, hypertension, A-fib on Eliquis, type II diabetes mellitus, testicular cancer, benign prostatic hyperplasia with lower urinary tract symptoms presented to the ED complaining of shortness of breath and chest pain.  In the ED EKG was done and did not show any specific ST or T wave changes, A-fib with controlled rate and occasional PVC with a prolonged QT interval.  Hemoglobin 9.1, baseline around 11,  Troponin was elevated around baseline, BNP was significantly elevated at 5500, sodium was 127,  Was found on ROXI, creatinine 1.8.  Patient has history of urine retention due to benign prostatic hyperplasia    ROXI secondary to urinary retention due to benign prostatic hyperplasia versus UTI-resolved  - urologist was consulted for concern of urinary retention, recommends on chronic Crenshaw cath, creatinine normalized, Crenshaw cath was removed for voiding trial, bladder scan after the showed 0 ml.  -Will continue BPH medication including finasteride 5 mg daily and Flomax 0.4 mg daily  - on Rocephin, did receive 7 days of antibiotic, urine culture positive for E. coli and Citrobacter koseri, sensitive to ceftriaxone. Might need to be discharged on Abx.       Acute on chronic exacerbation of combined systolic and diastolic heart failure-resolved    Hyponatremia-resolved, on fluid restriction 1500 mL    Anemia of chronic disease  Patient hemoglobin 9.9, baseline around 11  He was negative fecal blood test  heme-onc was consulted, B12 and folat within normal limit, iron and TIBC low, will follow ferritin  IV Venofer, B12 per  heme-onc recommendation and will be discharged home ferrous sulfate 325 twice daily and B12 tablets    Follow-up with dermatology as an outpatient for concerning of Penile lesion 
Writer messaged Katey ALVARADO about pt's q2 EKG order. Order has been discontinued.   
Writer messaged Katey ALVARADO about pt's ua results. See new order.   
Writer sent perfect serve to Dr. Courtney for new consult. No new orders at this time.   
checking glucose twice daily. 4/5/24   Aleksandr Finney MD   spironolactone (ALDACTONE) 25 MG tablet Take 1 tablet by mouth daily 4/4/24   Aleksandr Finney MD   carvedilol (COREG) 12.5 MG tablet Take by mouth  Patient not taking: Reported on 4/23/2024    Kia Bell MD   furosemide (LASIX) 20 MG tablet Take 1 tablet by mouth Every Day  Patient not taking: Reported on 4/23/2024    Kia Bell MD   nystatin (MYCOSTATIN) 667262 UNIT/GM powder Apply 3 times daily for 7 days.  Patient not taking: Reported on 4/23/2024 3/22/24   Margarito Piper MD   finasteride (PROSCAR) 5 MG tablet Take 1 tablet by mouth daily 3/12/24   Aleksandr Finney MD   Continuous Blood Gluc Sensor (FREESTYLE BEULAH 3 SENSOR) MISC Apply sensor every 14 days. 3/12/24   Aleksandr Finney MD   bumetanide (BUMEX) 2 MG tablet Take 1 tablet by mouth daily 2/23/24   Michael Pino MD   apixaban (ELIQUIS) 2.5 MG TABS tablet Take 1 tablet by mouth 2 times daily 2/21/24 3/22/24  Brett Hamilton MD   metoprolol succinate (TOPROL XL) 25 MG extended release tablet Take 1 tablet by mouth daily 2/21/24   Brett Hamilton MD   aspirin 81 MG EC tablet Take 1 tablet by mouth every other day 1/9/24   Aleksandr Finney MD   metFORMIN (GLUCOPHAGE) 1000 MG tablet Take 1 tablet by mouth 2 times daily (with meals) 12/14/23   Ld Crump MD   senna-docusate (SENNA S) 8.6-50 MG per tablet Take 2 tablets by mouth at bedtime 8/29/23   Aleksandr Finney MD   glipiZIDE (GLUCOTROL) 10 MG tablet Take 1 tablet by mouth 2 times daily (before meals) 8/29/23 8/23/24  Aleksandr Finney MD   lisinopril (PRINIVIL;ZESTRIL) 10 MG tablet Take 1 tablet by mouth daily 1/3/23   Rudy Pino MD   TRUE METRIX BLOOD GLUCOSE TEST strip TEST BLOOD SUGAR TWICE DAILY 12/16/22   Aleksandr Finney MD   tamsulosin (FLOMAX) 0.4 MG capsule TAKE 1 CAPSULE EVERY DAY 9/28/22   Aleksandr Finney MD   ACCU-CHEK ERIKA PLUS strip TEST BLOOD SUGAR THREE TIMES DAILY AS NEEDED 
Short Term Goals: By discharge  Short Term Goal 1: Pt will demo bed mobility with SUP to increase indep with ADLs.  Short Term Goal 2: Pt will demo functional transfer with AD and good safety with SUP to increase indep with ADLs.  Short Term Goal 3: Pt will demo functional mobility during ADLs with AD and good safety with SUP to increase indep with ADLs.  Short Term Goal 4: Pt will demo toileting routine with min A to increase indep with ADLs.  Short Term Goal 5: Pt will demo UB ADLs with set up to increase indep with ADLs.  Occupational Therapy Plan  Times Per Week: 4-5x/wk,1x/day  Times Per Day: Once a day  Specific Instructions for Next Treatment: AE for LB dressing/bathing.  Current Treatment Recommendations: Functional mobility training, Endurance training, Strengthening, ROM, Stair training, Pain management, Safety education & training, Patient/Caregiver education & training, Equipment evaluation, education, & procurement, Positioning, Self-Care / ADL, Co-Treatment    Assessment  Activity Tolerance  Activity Tolerance: Patient Tolerated treatment well, Patient limited by fatigue  Performance deficits / Impairments: Decreased functional mobility , Decreased safe awareness, Decreased ADL status, Decreased posture, Decreased ROM, Decreased endurance, Decreased high-level IADLs, Decreased sensation, Decreased strength  Discharge Recommendations: Patient would benefit from continued therapy after discharge  OT Equipment Recommendations  Equipment Needed: Yes  Mobility Devices: ADL Assistive Devices  ADL Assistive Devices: Reacher, Sock-Aid Hard, Long-handled Shoe Horn, Emergency Alert System  Other: TBD  Safety Devices  Type of Devices: Call light within reach, Left in bed, Chair alarm in place, All fall risk precautions in place, Patient at risk for falls    AM-PAC Daily Activities Inpatient  AM-PAC Daily Activity - Inpatient   How much help is needed for putting on and taking off regular lower body clothing?: A 
assistance  Interventions: Safety awareness training, Verbal cues (vcs for midline orientation to surface, hand placement & eccentric control)  Sit to Stand: Contact-guard assistance  Stand to Sit: Contact-guard assistance, Assist X1  Stand Pivot Transfers: Contact-guard assistance, Assist X2 (with RW)  Bed to Chair: Contact-guard assistance, Assist X2 (with RW)  Toilet Transfer: Maximum assistance, Assist X2    Gait Training  Gait  Gait Training: Yes  Overall Level of Assistance: Contact-guard assistance  Distance (ft): 38 Feet  Assistive Device: Gait belt, Walker, rolling  Interventions: Safety awareness training, Verbal cues (vcs for closer proximity to RW, forward gaze, & trunk extension; good carryover)  Base of Support: Narrowed  Speed/Thi: Slow  Step Length: Left shortened, Right shortened  Swing Pattern: Right symmetrical, Left symmetrical  Gait Abnormalities: Decreased step clearance (flexed posture; downward gaze)    Balance  Sitting: Impaired (Seated EOB with BUE support on bed, seated on toilet.)  Sitting - Static: Good (unsupported)  Sitting - Dynamic: Fair (occasional)  Standing: With support (RW)     PT Exercises  Exercise Treatment: functional mobility; see above  A/AROM Exercises: supine bilat LE SLR, hip abd, heel slides x10 reps  Circulation/Endurance Exercises: ankle pumps, quad sets, glut sets x15 reps  Pressure Relief Exercises: self adjsuts  Functional Mobility Circuit Training: STSs from EOB<>RW x3 reps  Static Sitting Balance Exercises: sat EOB trunk unsupported, feet on floor ~5 min  Dynamic Sitting Balance Exercises: dynamic reaching, weight shifting, postural ex  Static Standing Balance Exercises: standing @ RW ~3 min  Dynamic Standing Balance Exercises: weight shifting, postural ex, pre-gait activity @ RW with bilat UE support  Postural Correction Exercises: vcs for trunk extension & forward gaze (improved carryover from last visit)  Breathing Techniques: vcs to exhale with exertion 
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 complication  No acute osseous or soft tissue abnormality    Medications:   Scheduled Meds:   bumetanide  1 mg Oral Daily    cefTRIAXone (ROCEPHIN) IV  1,000 mg IntraVENous Q24H    iron sucrose  300 mg IntraVENous Daily    apixaban  2.5 mg Oral BID    aspirin  81 mg Oral Every Other Day    [Held by provider] lisinopril  10 mg Oral Daily    metoprolol succinate  25 mg Oral Daily    [Held by provider] spironolactone  25 mg Oral Daily    tamsulosin  0.4 mg Oral Daily    miconazole   Topical BID    insulin lispro  0-4 Units SubCUTAneous TID WC    insulin lispro  0-4 Units SubCUTAneous Nightly    sodium chloride flush  5-40 mL IntraVENous 2 times per day    finasteride  5 mg Oral Daily     Continuous Infusions:   dextrose      sodium chloride       CBC:   Recent Labs     04/24/24  0329 04/25/24  0323 04/25/24  1432 04/26/24  0538   WBC 8.8 10.2  --  8.6   HGB 8.6* 7.3* 8.9* 8.4*    349  --  326     BMP:    Recent Labs     04/24/24 0329 04/24/24  0925 04/24/24  2119 04/25/24  0323 04/26/24  0538   *   < > 133* 136 136   K 3.7  --   --  3.3* 3.4*   CL 97*  --   --  98 99   CO2 23  --   --  26 27   BUN 53*  --   --  51* 41*   CREATININE 1.8*  --   --  1.7* 1.2   GLUCOSE 120*  --   --  110* 120*    < > = values in this interval not displayed.     Hepatic: No results for input(s): \"AST\", \"ALT\", \"ALB\", \"BILITOT\", \"ALKPHOS\" in the last 72 hours.  Troponin: No results for input(s): \"TROPONINI\" in the last 72 hours.  BNP: No results for input(s): \"BNP\" in the last 72 hours.  Lipids:   Recent Labs     04/24/24  0329   CHOL 148   HDL 28*     INR: No results for 
failure  -Bumex 2 mg daily  -Continue home medications  carvedilol 12.5 mg daily, metoprolol 25 mg daily and  Aldactone 25 mg   -lisinopril 10 mg on hold   -Continue compression stocking.        ROXI secondary to urinary retention due to benign prostatic hyperplasia versus acute cardiorenal syndrome versus UTI  - urologist was consulted for concern of urinary retention, recommends on chronic Crenshaw cath, creatinine normalized, Crenshaw cath was removed for voiding trial, bladder scan shows 0 ml , follow repeat bladder scan  -consult nephrologist DC the IV Bumex with restart Bumex 2 mg daily  -renal ultrasound showed bilateral hydronephrosis.   -BMP daily  -Will continue BPH medication including finasteride 5 mg daily and Flomax 0.4 mg daily  -continue on Rocephin, urine culture positive for E. coli and Citrobacter koseri, sensitive to ceftriaxone, will be discharged on cefexime for total of 10 days.     Hyponatremia-resolved  -fluid restriction 1500 mL      A-fib  -Rate controlled, continue home medication including Eliquis 2.5 mg 2 times daily   -aspirin every other day  [id number] Score for Atrial Fibrillation Stroke Risk    Risk   Factors   Component Value   C CHF Yes 1   H HTN Yes 1   A2 Age >= 75 Yes,  (88 y.o.) 2   D DM Yes 1   S2 Prior Stroke/TIA No 0   V Vascular Disease No 0   A Age 65-74 No,  (88 y.o.) 0   Sc Sex male 0     [id number]  Score   5   Score last updated 4/23/24 4:32 PM EDT        Type 2 diabetes mellitus  -Low-dose sliding  -POCT  -Hypoglycemia treatment ordered     Anemia of chronic disease  -negative fecal blood test  - heme-onc was consulted, B12 and folat within normal limit, iron and TIBC low, will follow ferritin  -On IV Venofer, B12 per  heme-onc recommendation and will be discharged home ferrous sulfate 325 twice daily and B12 tablets  -Transfuse if hemoglobin less than 7           DVT prophylaxis: Eliquis 2.5 mg, twice daily  GI prophylaxis: Not indicated  Diet: Fluid restriction 1500 
joint tenderness, deformity or swelling   Extremities - peripheral pulses normal, no pedal edema, no clubbing or cyanosis   Skin - normal coloration and turgor, no rashes, no suspicious skin lesions noted ,    DATA:    Labs:   CBC:   Recent Labs     04/25/24  0323 04/25/24  1432 04/26/24  0538   WBC 10.2  --  8.6   HGB 7.3* 8.9* 8.4*   HCT 21.9* 27.7* 25.4*     --  326       BMP:   Recent Labs     04/26/24  0538 04/27/24  0532    141   K 3.4* 4.2   CO2 27 27   BUN 41* 36*   CREATININE 1.2 1.2   LABGLOM 59* 59*   GLUCOSE 120* 176*       PT/INR: No results for input(s): \"PROTIME\", \"INR\" in the last 72 hours.    IMAGING DATA:  XR KNEE LEFT (3 VIEWS)   Final Result   Left knee arthroplasty without complication.      No acute osseous or soft tissue abnormality.         US RENAL LIMITED   Final Result   1. Bilateral hydronephrosis.  This was also seen on the CT scan dated   03/11/2024 may be due to vesicoureteral reflux versus bladder outlet   obstruction..   2. Cholelithiasis.         XR CHEST PORTABLE   Final Result   1. Stable mild enlargement of the cardiac silhouette. No acute pulmonary   abnormality.             Primary Problem  Systolic CHF, acute ([facility])    Active Hospital Problems    Diagnosis Date Noted    Congestive heart failure (HCC) [I50.9] 04/24/2024    Iron deficiency anemia [D50.9] 04/24/2024    ROXI (acute kidney injury) ([facility]) [N17.9] 04/24/2024    Systolic CHF, acute ([facility]) [I50.21] 04/23/2024       IMPRESSION:   CHF  Anemia  ROXI  History testicular cancer    RECOMMENDATIONS:  I reviewed the laboratory studies, prior records, outside records, discussed diagnosis and treatment recommendations  NEGATIVE stool for occult blood  Continue IV Venofer. Give ferrous sulphate 325 BID at discharge  Given B12 injection and start B12 pill at discharge  Given his age underlying bone marrow pathology cannot be ruled out   Discharge okay once stable from primary team and will discuss BM as outpatient 
secondary to urinary retention due to benign prostatic hyperplasia versus acute cardiorenal syndrome versus UTI  -Bladder scan every shift-straight cath considered if bladder scan more than 400 cc  -On external catheter, post void residual volume was 1000, had staight cath at night, urologist was consulted for concern of urinary retention, creatinine is 1.8.  Will consult nephrologist  -renal ultrasound showed bilateral hydronephrosis.   -BMP daily  -Will continue BPH medication including finasteride 5 mg daily and Flomax 0.4 mg daily  -Started on Rocephin     Hyponatremia-improved  -Likely dilutional hyponatremia, will have fluid restriction 1500 mL  -Follow serum sodium every 6, serum osmolality, urine sodium, urine osmolality      A-fib  -Rate controlled, continue home medication including Eliquis 2.5 mg 2 times daily   -aspirin every other day  [id number] Score for Atrial Fibrillation Stroke Risk    Risk   Factors   Component Value   C CHF Yes 1   H HTN Yes 1   A2 Age >= 75 Yes,  (88 y.o.) 2   D DM Yes 1   S2 Prior Stroke/TIA No 0   V Vascular Disease No 0   A Age 65-74 No,  (88 y.o.) 0   Sc Sex male 0     [id number]  Score   5   Score last updated 4/23/24 4:32 PM EDT        Type 2 diabetes mellitus  -Low-dose sliding  -POCT  -Hypoglycemia treatment ordered    Anemia of chronic disease  -Follow fecal blood test  -Transfuse if hemoglobin less than 7           DVT prophylaxis: Eliquis 2.5 mg, twice daily  GI prophylaxis: Not indicated  Diet: Fluid restriction 1500 mL, diabetic diet  Disposition:  tbd     OT/PT/SW     Code Status: DNR CCA with no intubation            Plan will be discussed with the attending, Dr Sara Keenan MD  TY Resident  4/24/2024 7:41 AM         Attending Physician Statement  I have discussed the care of Carl Frias and I have examined the patient myself and taken ROS and HPI, including pertinent history and exam findings, with the resident. I have reviewed the key 
Solving: Assistance required to generate solutions;Assistance required to implement solutions;Assistance required to correct errors made;Assistance required to identify errors made;Decreased awareness of errors  Insights: Decreased awareness of deficits  Initiation: Requires cues for some  Sequencing: Requires cues for some  Cognition Comment: Pt [place] requiring therapist to repeat directions more than once. Pt not fully aware of deficits this date.  Orientation  Overall Orientation Status: Within Normal Limits  Orientation Level: Oriented X4    Sensation  Overall Sensation Status: Impaired (Pt reports that occasionally his Left distal LE will \"fall asleep\" indicating intermittent Numbness)    Education Given To: Patient;Family  Education Provided: Role of Therapy;ADL Adaptive Strategies;Fall Prevention Strategies;Plan of Care;Transfer Training;Energy Conservation;Equipment;IADL Safety  Education Provided Comments: OT role, POC, safety, importance of OOB activity, continued mobility, deep breathing/ECT, call light use, AD use/safety, fall prevention.  Education Method: Verbal;Demonstration  Barriers to Learning: Hearing  Education Outcome: Verbalized understanding;Continued education needed    AM-PAC - ADL  AM-PAC Daily Activity - Inpatient   How much help is needed for putting on and taking off regular lower body clothing?: Total  How much help is needed for bathing (which includes washing, rinsing, drying)?: A Lot  How much help is needed for toileting (which includes using toilet, bedpan, or urinal)?: A Lot  How much help is needed for putting on and taking off regular upper body clothing?: A Little  How much help is needed for taking care of personal grooming?: A Little  How much help for eating meals?: A Little  AM-PAC Inpatient Daily Activity Raw Score: 14  AM-PAC Inpatient ADL T-Scale Score : 33.39  ADL Inpatient CMS 0-100% Score: 59.67  ADL Inpatient CMS G-Code Modifier : CK    Goals  Short Term Goals  Time 
patient myself and taken ROS and HPI, including pertinent history and exam findings, with the resident. I have reviewed the key elements of all parts of the encounter with the resident.  I agree with the assessment, plan and orders as documented by the resident.      Electronically signed by Ld Crump MD        
the key elements of all parts of the encounter with the resident.  I agree with the assessment, plan and orders as documented by the resident.      Electronically signed by Ld Crump MD

## 2024-04-30 NOTE — CARE COORDINATION
Transportation arranged for patient to go to [facility] at 1845 via [employer]. Facility informed. Bedside nurse informed.     Number for Report: [phone]  Electronically signed by JOHNY Delong on 4/30/2024 at 4:38 PM

## 2024-04-30 NOTE — PLAN OF CARE
Problem: ABCDS Injury Assessment  Goal: Absence of physical injury  Outcome: Progressing     Problem: Skin/Tissue Integrity  Goal: Absence of new skin breakdown  Description: 1.  Monitor for areas of redness and/or skin breakdown  2.  Assess vascular access sites hourly  3.  Every 4-6 hours minimum:  Change oxygen saturation probe site  4.  Every 4-6 hours:  If on nasal continuous positive airway pressure, respiratory therapy assess nares and determine need for appliance change or resting period.  Outcome: Progressing     Problem: Pain  Goal: Verbalizes/displays adequate comfort level or baseline comfort level  Outcome: Progressing     Problem: Safety - Adult  Goal: Free from fall injury  Outcome: Progressing   Pt assessed as a fall risk this shift. Remains free from falls and accidental injury at this time. Fall precautions in place, including falling star sign and fall risk band on pt. Floor free from obstacles, and bed is locked and in lowest position. Adequate lighting provided.  Pt encouraged to call  for any need.  Bed alarm activated. Will continue to monitor needs during hourly rounding, and reinforce education on use of call light.  
  Problem: ABCDS Injury Assessment  Goal: Absence of physical injury  Outcome: Progressing     Problem: Skin/Tissue Integrity  Goal: Absence of new skin breakdown  Description: 1.  Monitor for areas of redness and/or skin breakdown  2.  Assess vascular access sites hourly  3.  Every 4-6 hours minimum:  Change oxygen saturation probe site  4.  Every 4-6 hours:  If on nasal continuous positive airway pressure, respiratory therapy assess nares and determine need for appliance change or resting period.  Outcome: Progressing     Problem: Safety - Adult  Goal: Free from fall injury  Outcome: Progressing  Pt assessed as a fall risk this shift. Remains free from falls and accidental injury at this time. Fall precautions in place, including falling star sign and fall risk band on pt. Floor free from obstacles, and bed is locked and in lowest position. Adequate lighting provided.  Pt encouraged to call  for any need.  Bed alarm activated. Will continue to monitor needs during hourly rounding, and reinforce education on use of call light.     
  Problem: Discharge Planning  Goal: Discharge to home or other facility with appropriate resources  4/27/2024 1642 by Nena Iniguez, RN  Outcome: Progressing     Problem: ABCDS Injury Assessment  Goal: Absence of physical injury  4/27/2024 1642 by Nena Iniguez, RN  Outcome: Progressing     Problem: Skin/Tissue Integrity  Goal: Absence of new skin breakdown  Description: 1.  Monitor for areas of redness and/or skin breakdown  2.  Assess vascular access sites hourly  3.  Every 4-6 hours minimum:  Change oxygen saturation probe site  4.  Every 4-6 hours:  If on nasal continuous positive airway pressure, respiratory therapy assess nares and determine need for appliance change or resting period.  4/27/2024 1642 by Nnea Iniguez, RN  Outcome: Progressing     Problem: Safety - Adult  Goal: Free from fall injury  4/27/2024 1642 by Nena Iniguez, RN  Outcome: Progressing   Pt assessed as a fall risk this shift. Remains free from falls and accidental injury at this time. Fall precautions in place, including falling star sign and fall risk band on pt. Floor free from obstacles, and bed is locked and in lowest position. Adequate lighting provided.  Pt encouraged to call  for any need.  Bed alarm activated. Will continue to monitor needs during hourly rounding, and reinforce education on use of call light.  
  Problem: Discharge Planning  Goal: Discharge to home or other facility with appropriate resources  4/28/2024 0334 by Becca Toledo RN  Outcome: Progressing  Flowsheets (Taken 4/28/2024 0334)  Discharge to home or other facility with appropriate resources:   Identify discharge learning needs (meds, wound care, etc)   Arrange for needed discharge resources and transportation as appropriate   Arrange for interpreters to assist at discharge as needed     Problem: ABCDS Injury Assessment  Goal: Absence of physical injury  4/28/2024 0334 by Becca Toledo RN  Outcome: Progressing  Note: Pt assessed as a fall risk this shift. Remains free from falls and accidental injury at this time. Fall precautions in place, including falling star sign and fall risk band on pt. Floor free from obstacles, and bed is locked and in lowest position. Adequate lighting provided.  Pt encouraged to call  for any need.  Bed alarm activated. Will continue to monitor needs during hourly rounding, and reinforce education on use of call light.       Problem: Skin/Tissue Integrity  Goal: Absence of new skin breakdown  Description: 1.  Monitor for areas of redness and/or skin breakdown  2.  Assess vascular access sites hourly  3.  Every 4-6 hours minimum:  Change oxygen saturation probe site  4.  Every 4-6 hours:  If on nasal continuous positive airway pressure, respiratory therapy assess nares and determine need for appliance change or resting period.  4/28/2024 0334 by Becca Toledo RN  Outcome: Progressing  Note: Skin assessment complete. Waffle mattress in place. Coccyx reddened. Sensicare applied PRN. Turned and repositioned every two hours.  Area kept free from moisture. Proper nourishment and fluids encouraged, as appropriate. Will continue to monitor for additional needs and changes in skin breakdown.       Problem: Pain  Goal: Verbalizes/displays adequate comfort level or baseline comfort level  4/28/2024 0334 by 
  Problem: Discharge Planning  Goal: Discharge to home or other facility with appropriate resources  4/29/2024 0324 by Becca Toledo RN  Outcome: Progressing  Flowsheets (Taken 4/28/2024 0334)  Discharge to home or other facility with appropriate resources:   Identify discharge learning needs (meds, wound care, etc)   Arrange for needed discharge resources and transportation as appropriate   Arrange for interpreters to assist at discharge as needed     Problem: ABCDS Injury Assessment  Goal: Absence of physical injury  4/29/2024 0324 by Becca Toledo RN  Outcome: Progressing  Flowsheets (Taken 4/29/2024 0324)  Absence of Physical Injury: Implement safety measures based on patient assessment     Problem: Skin/Tissue Integrity  Goal: Absence of new skin breakdown  Description: 1.  Monitor for areas of redness and/or skin breakdown  2.  Assess vascular access sites hourly  3.  Every 4-6 hours minimum:  Change oxygen saturation probe site  4.  Every 4-6 hours:  If on nasal continuous positive airway pressure, respiratory therapy assess nares and determine need for appliance change or resting period.  4/29/2024 0324 by Becca Toledo RN  Outcome: Progressing  Note: Skin assessment complete. Waffle mattress in place. Coccyx reddened. Sensicare applied PRN. Turned and repositioned every two hours.  Area kept free from moisture. Proper nourishment and fluids encouraged, as appropriate. Will continue to monitor for additional needs and changes in skin breakdown.       Problem: Pain  Goal: Verbalizes/displays adequate comfort level or baseline comfort level  4/29/2024 0324 by Becca Toledo RN  Outcome: Progressing  Flowsheets (Taken 4/29/2024 0324)  Verbalizes/displays adequate comfort level or baseline comfort level: Implement non-pharmacological measures as appropriate and evaluate response  Note: No pain at this time.  0/10 pain scale.       Problem: Safety - Adult  Goal: Free from fall 
  Problem: Discharge Planning  Goal: Discharge to home or other facility with appropriate resources  4/30/2024 0033 by Radha Del Valle RN  Outcome: Progressing     Problem: ABCDS Injury Assessment  Goal: Absence of physical injury  4/30/2024 0033 by Radha Del Valle RN  Outcome: Progressing     Problem: Skin/Tissue Integrity  Goal: Absence of new skin breakdown  Description: 1.  Monitor for areas of redness and/or skin breakdown  2.  Assess vascular access sites hourly  3.  Every 4-6 hours minimum:  Change oxygen saturation probe site  4.  Every 4-6 hours:  If on nasal continuous positive airway pressure, respiratory therapy assess nares and determine need for appliance change or resting period.  4/30/2024 0033 by Radha Del Valle RN  Outcome: Progressing     Problem: Pain  Goal: Verbalizes/displays adequate comfort level or baseline comfort level  4/30/2024 0033 by Radha Del Valle RN  Outcome: Progressing     Problem: Safety - Adult  Goal: Free from fall injury  4/30/2024 0033 by Radha Del Valle RN  Outcome: Progressing     Problem: Respiratory - Adult  Goal: Achieves optimal ventilation and oxygenation  4/30/2024 0033 by Radha Del Valle RN  Outcome: Progressing  Flowsheets (Taken 4/29/2024 2045)  Achieves optimal ventilation and oxygenation:   Assess for changes in respiratory status   Assess for changes in mentation and behavior     Problem: Cardiovascular - Adult  Goal: Maintains optimal cardiac output and hemodynamic stability  4/30/2024 0033 by Radha Del Valle RN  Outcome: Progressing  Flowsheets (Taken 4/29/2024 2045)  Maintains optimal cardiac output and hemodynamic stability: Monitor blood pressure and heart rate     Problem: Skin/Tissue Integrity - Adult  Goal: Skin integrity remains intact  4/30/2024 0033 by Radha Del Valle RN  Outcome: Progressing     Problem: Musculoskeletal - Adult  Goal: Return mobility to safest level of function  4/30/2024 0033 by Radha Del Valle RN  Outcome: Progressing     Problem: Gastrointestinal - 
  Problem: Discharge Planning  Goal: Discharge to home or other facility with appropriate resources  Outcome: Progressing  Note: Discharge planning will begin when the patient meets discharge criteria to go home or to a facility     Problem: ABCDS Injury Assessment  Goal: Absence of physical injury  Outcome: Progressing  Note: Patient is using the vannessa steady with staff to prevent injury     Problem: Skin/Tissue Integrity  Goal: Absence of new skin breakdown  Description: 1.  Monitor for areas of redness and/or skin breakdown  2.  Assess vascular access sites hourly  3.  Every 4-6 hours minimum:  Change oxygen saturation probe site  4.  Every 4-6 hours:  If on nasal continuous positive airway pressure, respiratory therapy assess nares and determine need for appliance change or resting period.  Outcome: Progressing  Note: Patient is being turned as needed to prevent skin breakdown     Problem: Pain  Goal: Verbalizes/displays adequate comfort level or baseline comfort level  Outcome: Progressing  Note: Pain is being managed with rest, repositioning and medication      Problem: Safety - Adult  Goal: Free from fall injury  Outcome: Progressing  Note: Call light within reach, bed alarm on, socks on and bed in lowest position      
  Problem: Genitourinary - Adult  Goal: Absence of urinary retention  Outcome: Not Progressing     Problem: Discharge Planning  Goal: Discharge to home or other facility with appropriate resources  Outcome: Progressing  Flowsheets (Taken 4/23/2024 2045)  Discharge to home or other facility with appropriate resources:   Identify barriers to discharge with patient and caregiver   Arrange for needed discharge resources and transportation as appropriate   Refer to discharge planning if patient needs post-hospital services based on physician order or complex needs related to functional status, cognitive ability or social support system     Problem: ABCDS Injury Assessment  Goal: Absence of physical injury  Outcome: Progressing     Problem: Skin/Tissue Integrity  Goal: Absence of new skin breakdown  Description: 1.  Monitor for areas of redness and/or skin breakdown  2.  Assess vascular access sites hourly  3.  Every 4-6 hours minimum:  Change oxygen saturation probe site  4.  Every 4-6 hours:  If on nasal continuous positive airway pressure, respiratory therapy assess nares and determine need for appliance change or resting period.  Outcome: Progressing     Problem: Pain  Goal: Verbalizes/displays adequate comfort level or baseline comfort level  Outcome: Progressing  Flowsheets (Taken 4/23/2024 2000)  Verbalizes/displays adequate comfort level or baseline comfort level:   Encourage patient to monitor pain and request assistance   Assess pain using appropriate pain scale     Problem: Safety - Adult  Goal: Free from fall injury  Outcome: Progressing     Problem: Respiratory - Adult  Goal: Achieves optimal ventilation and oxygenation  Outcome: Progressing     Problem: Cardiovascular - Adult  Goal: Maintains optimal cardiac output and hemodynamic stability  Outcome: Progressing     Problem: Skin/Tissue Integrity - Adult  Goal: Skin integrity remains intact  Outcome: Progressing  Flowsheets (Taken 4/23/2024 2045)  Skin 
Adult  Goal: Electrolytes maintained within normal limits  4/24/2024 1635 by Cyndy Rubalcava, RN  Outcome: Progressing     Problem: Chronic Conditions and Co-morbidities  Goal: Patient's chronic conditions and co-morbidity symptoms are monitored and maintained or improved  Outcome: Progressing     Problem: Genitourinary - Adult  Goal: Absence of urinary retention  4/24/2024 1635 by Cyndy Rubalcava, RN  Outcome: Progressing  4/24/2024 0325 by Macarena Fraire, RN  Outcome: Not Progressing     
co-morbidity symptoms are monitored and maintained or improved  Outcome: Progressing     
screen, instruct family/caregiver to ask for assistance with transferring infant if caregiver noted to have fall risk factors     Problem: Respiratory - Adult  Goal: Achieves optimal ventilation and oxygenation  4/25/2024 0404 by Shilpa Tolbert RN  Outcome: Progressing  Flowsheets (Taken 4/25/2024 0045)  Achieves optimal ventilation and oxygenation:   Assess for changes in respiratory status   Assess for changes in mentation and behavior     Problem: Cardiovascular - Adult  Goal: Maintains optimal cardiac output and hemodynamic stability  4/25/2024 0404 by Shilpa Tolbert RN  Outcome: Progressing  Flowsheets (Taken 4/25/2024 0045)  Maintains optimal cardiac output and hemodynamic stability:   Monitor blood pressure and heart rate   Assess for signs of decreased cardiac output   Monitor urine output and notify Licensed Independent Practitioner for values outside of normal range     Problem: Skin/Tissue Integrity - Adult  Goal: Skin integrity remains intact  4/25/2024 0404 by Shilpa Tolbert RN  Outcome: Progressing  Flowsheets  Taken 4/25/2024 0305  Skin Integrity Remains Intact:   Monitor for areas of redness and/or skin breakdown   Assess vascular access sites hourly  Taken 4/25/2024 0045  Skin Integrity Remains Intact:   Monitor for areas of redness and/or skin breakdown   Assess vascular access sites hourly     Problem: Musculoskeletal - Adult  Goal: Return mobility to safest level of function  4/25/2024 0404 by Shilpa Tolbert RN  Outcome: Progressing  Flowsheets (Taken 4/25/2024 0045)  Return Mobility to Safest Level of Function:   Assess patient stability and activity tolerance for standing, transferring and ambulating with or without assistive devices   Assist with transfers and ambulation using safe patient handling equipment as needed     Problem: Gastrointestinal - Adult  Goal: Maintains adequate nutritional intake  4/25/2024 0404 by Shilpa Tolbert RN  Outcome: Progressing  Flowsheets (Taken 
29-Jul-2019 05:12

## 2024-05-01 ENCOUNTER — TELEPHONE (OUTPATIENT)
Dept: INTERNAL MEDICINE CLINIC | Age: 89
End: 2024-05-01

## 2024-05-15 NOTE — PROGRESS NOTES
LM for patient with information regarding consult for CHF tomorrow at 1 pm. Call back number was given.

## 2024-05-16 ENCOUNTER — HOSPITAL ENCOUNTER (OUTPATIENT)
Dept: OTHER | Age: 89
Discharge: HOME | End: 2024-05-16
Payer: MEDICARE

## 2024-05-16 ENCOUNTER — HOSPITAL ENCOUNTER (OUTPATIENT)
Age: 89
Setting detail: SPECIMEN
Discharge: HOME | End: 2024-05-16
Payer: MEDICARE

## 2024-05-16 LAB
ANION GAP SERPL CALCULATED.3IONS-SCNC: 12 MMOL/L (ref 9–17)
BNP SERPL-MCNC: 2243 PG/ML
BUN SERPL-MCNC: 29 MG/DL (ref 8–23)
CHLORIDE SERPL-SCNC: 102 MMOL/L (ref 98–107)
CO2 SERPL-SCNC: 24 MMOL/L (ref 20–31)
CREAT SERPL-MCNC: 1.4 MG/DL (ref 0.7–1.2)
GFR, ESTIMATED: 49 ML/MIN/1.73M2
POTASSIUM SERPL-SCNC: 3.9 MMOL/L (ref 3.7–5.3)
SODIUM SERPL-SCNC: 138 MMOL/L (ref 135–144)

## 2024-05-16 PROCEDURE — 36415 COLL VENOUS BLD VENIPUNCTURE: CPT

## 2024-05-16 PROCEDURE — 99211 OFF/OP EST MAY X REQ PHY/QHP: CPT

## 2024-05-16 PROCEDURE — 99202 OFFICE O/P NEW SF 15 MIN: CPT

## 2024-05-16 PROCEDURE — 84520 ASSAY OF UREA NITROGEN: CPT

## 2024-05-16 PROCEDURE — 80051 ELECTROLYTE PANEL: CPT

## 2024-05-16 PROCEDURE — 82565 ASSAY OF CREATININE: CPT

## 2024-05-16 PROCEDURE — 83880 ASSAY OF NATRIURETIC PEPTIDE: CPT

## 2024-05-16 NOTE — PROGRESS NOTES
Pt checked in and completed labs for CHF Clinic and left without being seen by clinic. Called and left message on phone to return call.

## 2024-05-17 ENCOUNTER — HOSPITAL ENCOUNTER (OUTPATIENT)
Dept: OTHER | Age: 89
Discharge: HOME | End: 2024-05-17
Payer: MEDICARE

## 2024-05-17 VITALS
HEART RATE: 70 BPM | DIASTOLIC BLOOD PRESSURE: 52 MMHG | SYSTOLIC BLOOD PRESSURE: 130 MMHG | HEIGHT: 66 IN | BODY MASS INDEX: 35.72 KG/M2 | RESPIRATION RATE: 18 BRPM | WEIGHT: 222.3 LBS | OXYGEN SATURATION: 98 %

## 2024-05-17 PROCEDURE — 99202 OFFICE O/P NEW SF 15 MIN: CPT

## 2024-05-17 NOTE — PROGRESS NOTES
[facility]  Heart Failure Clinic      [address] [place] [address]  Phone: [phone]  Fax: [phone]      NAME: Carl Frias  MEDICAL RECORD NUMBER:  [id number]  AGE: 88 y.o.   GENDER: male  : 1935  EPISODE DATE:  2024      Carl Frias was referred to the [facility] by Dr. ALISA Ware for heart failure services.he is being followed by Cardiologist, Dr. JACQUES Felipe.     ECHO EF%: 50% Date: 24      Recent Cardiology follow-up apt: 24  Follow-up apt recommended: asap  Upcoming testing: n/a  Medication Management: no  Nephrologist: Dr. Corona     Carl Frias is a 88 y.o. male here for the Heart Failure Services.  He is here today for a Heart Failure assessment/consultation, monitoring medication effectiveness, reviewing necessary labs, transition of care, extensive Heart Failure education, reporting any concerns or symptoms and obtaining any necessary medication adjustments or orders from the patients health care team.    Vital Signs:   BP (!) 130/52   Pulse 70   Resp 18   Ht 1.676 m (5' 6\")   Wt 100.8 kg (222 lb 4.8 oz)   SpO2 98%   BMI 35.88 kg/m²    O2 Device: None (Room air)        Weight loss/gain +1.3lbs      Nursing Assessment:    Respiratory:    Assessment  Charting Type: Admission    Breath Sounds  Respiratory Pattern: Regular  Breath Sounds Bilateral: Clear  Right Upper Lobe: Clear  Right Middle Lobe: Clear  Right Lower Lobe: Clear, Diminished  Left Upper Lobe: Clear  Left Lower Lobe: Clear, Diminished         Cardiac  Cardiac Regularity: Regularly Irregular  Cardiac Symptoms: Peripheral edema, Lightheaded (momentart lightheadedness when changin  positions)  Implanted Cardiac Device (Pacemaker, ICD, PA Sensor): No    Peripheral Vascular  Peripheral Vascular (WDL): Exceptions to WDL  Edema: Right lower extremity, Left lower extremity  RLE Edema: +2, Non-pitting  LLE Edema: +2, Non-pitting      Subjective data:    Shortness of Breath:

## 2024-05-20 ENCOUNTER — TELEPHONE (OUTPATIENT)
Dept: OTHER | Age: 89
End: 2024-05-20

## 2024-05-20 NOTE — TELEPHONE ENCOUNTER
Called and spoke to pt wife, Naomi and discussed new instructions. She wrote down and read back correctly. Apt moved up to 6/4/24 for lab recheck and reassessment after medication change.       Nick Corona MD Thorn, Angela D, RN; Trinidad Dolan; Delphine Oates MA    Please increase Bumex to 1 mg p.o. in the morning and 0.5 mg in the afternoon.  Continue to monitor weights closely.  2 g/day dietary sodium restriction          Previous Messages       ----- Message -----  From: Elvie Donato, SUKHWINDER  Sent: 5/17/2024   3:08 PM EDT  To: Nick Corona MD; RICHARD Higgins Dr.,  Please review CHF Clinic assessment and labs. Pt weight up 1.3lbs since discharge but has increased swelling to BLE, now 1-2+nonpitting. Has baseline SOB with exertion but increased SOB and coughing at night- will be seeing Pulmonology for possible sleep study. Lungs were clear with clear, diminished bases. /52, HR 70, SPO2 98% RA. Heart rate irregular with last EKG in Afib. Compliant with 1500ml fluid restriction, but likely not with Sodium.    BNP 2243 from 2359, BUN 29 from 36 and Creatinine unchanged at 1.4, Potassium 3.9.   His diuretics are Bumex 1mg daily and Spironolactone 25mg daily    Please advise. Thanks,  Elvie Donato RN  [facility]  [phone]

## 2024-05-30 ENCOUNTER — OFFICE VISIT (OUTPATIENT)
Dept: INTERNAL MEDICINE CLINIC | Age: 89
End: 2024-05-30

## 2024-05-30 VITALS
SYSTOLIC BLOOD PRESSURE: 130 MMHG | BODY MASS INDEX: 34.2 KG/M2 | OXYGEN SATURATION: 96 % | HEIGHT: 66 IN | HEART RATE: 67 BPM | WEIGHT: 212.8 LBS | DIASTOLIC BLOOD PRESSURE: 62 MMHG

## 2024-05-30 DIAGNOSIS — I10 ESSENTIAL HYPERTENSION: ICD-10-CM

## 2024-05-30 DIAGNOSIS — N13.8 BPH WITH OBSTRUCTION/LOWER URINARY TRACT SYMPTOMS: Primary | ICD-10-CM

## 2024-05-30 DIAGNOSIS — B35.6 JOCK ITCH: ICD-10-CM

## 2024-05-30 DIAGNOSIS — N40.1 BPH WITH OBSTRUCTION/LOWER URINARY TRACT SYMPTOMS: Primary | ICD-10-CM

## 2024-05-30 DIAGNOSIS — D68.69 SECONDARY HYPERCOAGULABLE STATE (HCC): ICD-10-CM

## 2024-05-30 DIAGNOSIS — N18.31 CHRONIC KIDNEY DISEASE, STAGE 3A (HCC): ICD-10-CM

## 2024-05-30 DIAGNOSIS — K52.1 DIARRHEA DUE TO DRUG: ICD-10-CM

## 2024-05-30 DIAGNOSIS — R39.11 BENIGN PROSTATIC HYPERPLASIA WITH URINARY HESITANCY: ICD-10-CM

## 2024-05-30 DIAGNOSIS — N40.1 BENIGN PROSTATIC HYPERPLASIA WITH URINARY HESITANCY: ICD-10-CM

## 2024-05-30 DIAGNOSIS — I48.20 CHRONIC ATRIAL FIBRILLATION (HCC): ICD-10-CM

## 2024-05-30 DIAGNOSIS — Z00.00 MEDICARE ANNUAL WELLNESS VISIT, SUBSEQUENT: ICD-10-CM

## 2024-05-30 DIAGNOSIS — E11.42 DIABETIC PERIPHERAL NEUROPATHY (HCC): ICD-10-CM

## 2024-05-30 DIAGNOSIS — I50.42 CHRONIC COMBINED SYSTOLIC AND DIASTOLIC CONGESTIVE HEART FAILURE (HCC): ICD-10-CM

## 2024-05-30 RX ORDER — NYSTATIN 100000 [USP'U]/G
POWDER TOPICAL
Qty: 1 EACH | Refills: 3 | Status: SHIPPED | OUTPATIENT
Start: 2024-05-30

## 2024-05-30 RX ORDER — LISINOPRIL 10 MG/1
10 TABLET ORAL DAILY
COMMUNITY

## 2024-05-30 SDOH — ECONOMIC STABILITY: INCOME INSECURITY: HOW HARD IS IT FOR YOU TO PAY FOR THE VERY BASICS LIKE FOOD, HOUSING, MEDICAL CARE, AND HEATING?: PATIENT DECLINED

## 2024-05-30 SDOH — ECONOMIC STABILITY: FOOD INSECURITY: WITHIN THE PAST 12 MONTHS, YOU WORRIED THAT YOUR FOOD WOULD RUN OUT BEFORE YOU GOT MONEY TO BUY MORE.: PATIENT DECLINED

## 2024-05-30 SDOH — ECONOMIC STABILITY: FOOD INSECURITY: WITHIN THE PAST 12 MONTHS, THE FOOD YOU BOUGHT JUST DIDN'T LAST AND YOU DIDN'T HAVE MONEY TO GET MORE.: PATIENT DECLINED

## 2024-05-30 SDOH — ECONOMIC STABILITY: HOUSING INSECURITY
IN THE LAST 12 MONTHS, WAS THERE A TIME WHEN YOU DID NOT HAVE A STEADY PLACE TO SLEEP OR SLEPT IN A SHELTER (INCLUDING NOW)?: PATIENT DECLINED

## 2024-05-30 ASSESSMENT — PATIENT HEALTH QUESTIONNAIRE - PHQ9
SUM OF ALL RESPONSES TO PHQ QUESTIONS 1-9: 0
SUM OF ALL RESPONSES TO PHQ QUESTIONS 1-9: 0
SUM OF ALL RESPONSES TO PHQ9 QUESTIONS 1 & 2: 0
1. LITTLE INTEREST OR PLEASURE IN DOING THINGS: NOT AT ALL
SUM OF ALL RESPONSES TO PHQ QUESTIONS 1-9: 0
2. FEELING DOWN, DEPRESSED OR HOPELESS: NOT AT ALL
SUM OF ALL RESPONSES TO PHQ QUESTIONS 1-9: 0

## 2024-05-30 NOTE — PROGRESS NOTES
Depression Monitoring  Discontinued       
in the am and 0.5mg in the pm) 30 tablet 3    glucose monitoring kit Use to check glucose as directed. 1 kit 0    Alcohol Swabs PADS Use one swab to cleanse skin prior to checking glucose twice daily. 300 each 3    spironolactone (ALDACTONE) 25 MG tablet Take 1 tablet by mouth daily 90 tablet 3    finasteride (PROSCAR) 5 MG tablet Take 1 tablet by mouth daily 90 tablet 3    apixaban (ELIQUIS) 2.5 MG TABS tablet Take 1 tablet by mouth 2 times daily 60 tablet 0    metoprolol succinate (TOPROL XL) 25 MG extended release tablet Take 1 tablet by mouth daily 90 tablet 1    aspirin 81 MG EC tablet Take 1 tablet by mouth every other day 90 tablet 1    glipiZIDE (GLUCOTROL) 10 MG tablet Take 1 tablet by mouth 2 times daily (before meals) 180 tablet 3    TRUE METRIX BLOOD GLUCOSE TEST strip TEST BLOOD SUGAR TWICE DAILY 200 strip 3    tamsulosin (FLOMAX) 0.4 MG capsule TAKE 1 CAPSULE EVERY DAY 90 capsule 3    ACCU-CHEK ERIKA PLUS strip TEST BLOOD SUGAR THREE TIMES DAILY AS NEEDED 200 strip 11    blood glucose monitor strips by Other route 2 times daily True Metrix Blood Glucose Meter 100 strip 11    Elastic Bandages & Supports MISC Apply Truss strap for L groin tenderness/hernia 1 each 0    Blood Glucose Monitoring Suppl (ACCU-CHEK ERIKA) DEVIN 1 each by Does not apply route daily 1 Device 0     No current facility-administered medications for this visit.       ALLERGIES:      Allergies   Allergen Reactions    Rivaroxaban Other (See Comments)     Hematuria recurrent       SOCIAL HISTORY   Reviewed and no change from previous record.     Carl  reports that he quit smoking about 35 years ago. His smoking use included cigarettes. He started smoking about 55 years ago. He has a 400.0 pack-year smoking history. He has never used smokeless tobacco.    FAMILY HISTORY:    Reviewed and No change from previous visit    REVIEW OF SYSTEMS:    Review of Systems        OBJECTIVE      Physical exam        Physical Exam   Vitals:  /62

## 2024-05-31 ENCOUNTER — HOSPITAL ENCOUNTER (OUTPATIENT)
Age: 89
Setting detail: SPECIMEN
Discharge: HOME | End: 2024-05-31
Payer: MEDICARE

## 2024-05-31 ENCOUNTER — HOSPITAL ENCOUNTER (OUTPATIENT)
Dept: OTHER | Age: 89
Discharge: HOME | End: 2024-05-31
Payer: MEDICARE

## 2024-05-31 VITALS
HEIGHT: 66 IN | SYSTOLIC BLOOD PRESSURE: 112 MMHG | DIASTOLIC BLOOD PRESSURE: 42 MMHG | RESPIRATION RATE: 18 BRPM | OXYGEN SATURATION: 99 % | WEIGHT: 212.9 LBS | BODY MASS INDEX: 34.22 KG/M2 | HEART RATE: 64 BPM

## 2024-05-31 LAB
ANION GAP SERPL CALCULATED.3IONS-SCNC: 12 MMOL/L (ref 9–17)
BNP SERPL-MCNC: 1639 PG/ML
BUN SERPL-MCNC: 51 MG/DL (ref 8–23)
CHLORIDE SERPL-SCNC: 100 MMOL/L (ref 98–107)
CO2 SERPL-SCNC: 24 MMOL/L (ref 20–31)
CREAT SERPL-MCNC: 1.2 MG/DL (ref 0.7–1.2)
GFR, ESTIMATED: 59 ML/MIN/1.73M2
POTASSIUM SERPL-SCNC: 4.1 MMOL/L (ref 3.7–5.3)
SODIUM SERPL-SCNC: 136 MMOL/L (ref 135–144)

## 2024-05-31 PROCEDURE — 36415 COLL VENOUS BLD VENIPUNCTURE: CPT

## 2024-05-31 PROCEDURE — 82565 ASSAY OF CREATININE: CPT

## 2024-05-31 PROCEDURE — 80051 ELECTROLYTE PANEL: CPT

## 2024-05-31 PROCEDURE — 83880 ASSAY OF NATRIURETIC PEPTIDE: CPT

## 2024-05-31 PROCEDURE — 84520 ASSAY OF UREA NITROGEN: CPT

## 2024-05-31 PROCEDURE — 99211 OFF/OP EST MAY X REQ PHY/QHP: CPT

## 2024-05-31 NOTE — PROGRESS NOTES
[facility]  Heart Failure Clinic      [address] [place] [address]  Phone: [phone]  Fax: [phone]      NAME: Carl Frias  MEDICAL RECORD NUMBER:  [id number]  AGE: 88 y.o.   GENDER: male  : 1935  EPISODE DATE:  2024      Carl Frias was referred to the [facility] by Dr. ALISA Ware for heart failure services.he is being followed by Cardiologist, Dr. JACQUES Felipe.                ECHO EF%: 50%       Date: 24       Recent Cardiology follow-up apt: 24  Follow-up apt recommended: asap  Upcoming testing: n/a  Medication Management: no  Nephrologist: Dr. Corona     Carl Frias is a 88 y.o. male here for the Heart Failure Services.  He is here today for a Heart Failure assessment/consultation, monitoring medication effectiveness, reviewing necessary labs, transition of care, extensive Heart Failure education, reporting any concerns or symptoms and obtaining any necessary medication adjustments or orders from the patients health care team.    Vital Signs:   BP (!) 112/42   Pulse 64   Resp 18   Ht 1.676 m (5' 6\")   Wt 96.6 kg (212 lb 14.4 oz)   SpO2 99%   BMI 34.36 kg/m²    O2 Device: None (Room air)        Weight loss/gain -9.4lbs      Nursing Assessment:    Respiratory:    Assessment  Charting Type: Reassessment    Breath Sounds  Respiratory Pattern: Regular  Breath Sounds Bilateral: Clear  Right Upper Lobe: Clear  Right Middle Lobe: Clear  Right Lower Lobe: Clear, Diminished  Left Upper Lobe: Clear  Left Lower Lobe: Clear, Diminished         Cardiac  Cardiac Regularity: Regularly Irregular  Cardiac Symptoms: Peripheral edema, Lightheaded  Implanted Cardiac Device (Pacemaker, ICD, PA Sensor): No    Peripheral Vascular  Peripheral Vascular (WDL): Exceptions to WDL  Edema: Right lower extremity, Left lower extremity  RLE Edema: +1, Non-pitting  LLE Edema: +1, Non-pitting      Subjective data:    Shortness of Breath:   Dyspnea on exertion  Dyspnea with

## 2024-06-02 PROBLEM — N18.31 CHRONIC KIDNEY DISEASE, STAGE 3A (HCC): Status: ACTIVE | Noted: 2024-06-02

## 2024-06-14 ENCOUNTER — TELEPHONE (OUTPATIENT)
Dept: UROLOGY | Age: 89
End: 2024-06-14

## 2024-06-14 NOTE — TELEPHONE ENCOUNTER
Pt's daughter LM on clinic line asking for condom catheters.  Pt currently does not use catheters, he will need an apt to talk to CNP, [employer].

## 2024-06-17 ENCOUNTER — OFFICE VISIT (OUTPATIENT)
Dept: UROLOGY | Age: 89
End: 2024-06-17
Payer: MEDICARE

## 2024-06-17 VITALS
HEIGHT: 66 IN | TEMPERATURE: 97.4 F | WEIGHT: 213 LBS | OXYGEN SATURATION: 96 % | HEART RATE: 78 BPM | BODY MASS INDEX: 34.23 KG/M2 | SYSTOLIC BLOOD PRESSURE: 126 MMHG | DIASTOLIC BLOOD PRESSURE: 74 MMHG

## 2024-06-17 DIAGNOSIS — N39.41 URGE INCONTINENCE OF URINE: ICD-10-CM

## 2024-06-17 DIAGNOSIS — N40.1 BPH WITH OBSTRUCTION/LOWER URINARY TRACT SYMPTOMS: Primary | ICD-10-CM

## 2024-06-17 DIAGNOSIS — N13.8 BPH WITH OBSTRUCTION/LOWER URINARY TRACT SYMPTOMS: Primary | ICD-10-CM

## 2024-06-17 PROCEDURE — G8417 CALC BMI ABV UP PARAM F/U: HCPCS | Performed by: NURSE PRACTITIONER

## 2024-06-17 PROCEDURE — G8427 DOCREV CUR MEDS BY ELIG CLIN: HCPCS | Performed by: NURSE PRACTITIONER

## 2024-06-17 PROCEDURE — 99213 OFFICE O/P EST LOW 20 MIN: CPT | Performed by: NURSE PRACTITIONER

## 2024-06-17 PROCEDURE — 1123F ACP DISCUSS/DSCN MKR DOCD: CPT | Performed by: NURSE PRACTITIONER

## 2024-06-17 PROCEDURE — 1036F TOBACCO NON-USER: CPT | Performed by: NURSE PRACTITIONER

## 2024-06-17 RX ORDER — BLOOD-GLUCOSE CONTROL, LOW
EACH MISCELLANEOUS
COMMUNITY
Start: 2024-04-09

## 2024-06-17 ASSESSMENT — ENCOUNTER SYMPTOMS
WHEEZING: 0
SHORTNESS OF BREATH: 0
EYE PAIN: 0
NAUSEA: 0
ABDOMINAL PAIN: 0
EYE REDNESS: 0
GASTROINTESTINAL NEGATIVE: 1
RESPIRATORY NEGATIVE: 1
COLOR CHANGE: 0
COUGH: 0
VOMITING: 0
EYES NEGATIVE: 1
BACK PAIN: 0
ALLERGIC/IMMUNOLOGIC NEGATIVE: 1

## 2024-06-17 NOTE — PROGRESS NOTES
[facility] PHYSICIANS [facility] UROLOGY CENTER  [address]  [facility] [address]  Dept: [phone]    [facility] Urology Office Note - Established    Patient:  Carl Frias  YOB: 1935  Date: 6/17/2024    The patient is a 88 y.o. male who presents todayfor evaluation of the following problems:   Chief Complaint   Patient presents with    discuss condom cath       HPI  Patient is an 88-year-old male with a history of BPH who is on combo therapy with Flomax and finasteride.  He suffers from urinary incontinence.  He is on diuretics which makes this issue worse.  Urinary incontinence is most bothersome at night.  He will wake up saturated. His ambulation is impaired and unable to make it to the bathroom in time.  He has had issues with yeast infections in the past.  His home health care nurse suggested external catheter, which she has not tried in the past.  He is interested in doing this.    Summary of old records: N/A    Additional History: N/A    Procedures Today: N/A    Urinalysis today:  No results found for this visit on 06/17/24.  Last several PSA's:  Lab Results   Component Value Date    PSA 0.10 04/25/2024    PSA 0.34 03/31/2016    PSA 0.37 06/19/2013     Last total testosterone:  Lab Results   Component Value Date    TESTOSTERONE 80 (L) 05/09/2017       Last BUN and creatinine:  Lab Results   Component Value Date    BUN 51 (H) 05/31/2024     Lab Results   Component Value Date    CREATININE 1.2 05/31/2024       Additional Lab/Culture results: none    Imaging Reviewed during this Office Visit: none      PAST MEDICAL, FAMILY AND SOCIAL HISTORY UPDATE:  Past Medical History:   Diagnosis Date    Diabetes mellitus (HCC)     Hypertension     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

## 2024-06-18 NOTE — PROGRESS NOTES
SPOKE TO PT'S SPOUSE REMINDING OF APPOINTMENT ON 6/19/24 AT 1:00. SPOUSE VERBALIZED UNDERSTANDING.

## 2024-06-19 ENCOUNTER — HOSPITAL ENCOUNTER (OUTPATIENT)
Dept: OTHER | Age: 89
Discharge: HOME | End: 2024-06-19
Payer: MEDICARE

## 2024-06-19 ENCOUNTER — HOSPITAL ENCOUNTER (OUTPATIENT)
Age: 89
Setting detail: SPECIMEN
Discharge: HOME | End: 2024-06-19
Payer: MEDICARE

## 2024-06-19 VITALS
DIASTOLIC BLOOD PRESSURE: 50 MMHG | OXYGEN SATURATION: 97 % | WEIGHT: 214.8 LBS | RESPIRATION RATE: 18 BRPM | HEIGHT: 66 IN | BODY MASS INDEX: 34.52 KG/M2 | SYSTOLIC BLOOD PRESSURE: 118 MMHG | HEART RATE: 58 BPM

## 2024-06-19 LAB
ANION GAP SERPL CALCULATED.3IONS-SCNC: 14 MMOL/L (ref 9–17)
BNP SERPL-MCNC: 1975 PG/ML
BUN SERPL-MCNC: 46 MG/DL (ref 8–23)
CHLORIDE SERPL-SCNC: 101 MMOL/L (ref 98–107)
CO2 SERPL-SCNC: 23 MMOL/L (ref 20–31)
CREAT SERPL-MCNC: 1.4 MG/DL (ref 0.7–1.2)
GFR, ESTIMATED: 49 ML/MIN/1.73M2
POTASSIUM SERPL-SCNC: 4.2 MMOL/L (ref 3.7–5.3)
SODIUM SERPL-SCNC: 138 MMOL/L (ref 135–144)

## 2024-06-19 PROCEDURE — 83880 ASSAY OF NATRIURETIC PEPTIDE: CPT

## 2024-06-19 PROCEDURE — 84520 ASSAY OF UREA NITROGEN: CPT

## 2024-06-19 PROCEDURE — 99211 OFF/OP EST MAY X REQ PHY/QHP: CPT

## 2024-06-19 PROCEDURE — 36415 COLL VENOUS BLD VENIPUNCTURE: CPT

## 2024-06-19 PROCEDURE — 80051 ELECTROLYTE PANEL: CPT

## 2024-06-19 PROCEDURE — 82565 ASSAY OF CREATININE: CPT

## 2024-06-19 NOTE — PROGRESS NOTES
[facility]  Heart Failure Clinic      [address] [address]  Phone: [phone]  Fax: [phone]      NAME: Carl Frias  MEDICAL RECORD NUMBER:  [id number]  AGE: 88 y.o.   GENDER: male  : 1935  EPISODE DATE:  2024      Carl Frias was referred to the [facility] by Dr. ALISA Ware for heart failure services.he is being followed by Cardiologist, Dr. JACQUES Felipe.                ECHO EF%: 50%       Date: 24       Recent Cardiology follow-up apt: 24  Follow-up apt recommended: asap  Upcoming testing: n/a  Medication Management: no  Nephrologist: Dr. Corona     Carl Frias is a 88 y.o. male here for the Heart Failure Services.  He is here today for a Heart Failure assessment/consultation, monitoring medication effectiveness, reviewing necessary labs, transition of care, extensive Heart Failure education, reporting any concerns or symptoms and obtaining any necessary medication adjustments or orders from the patients health care team.    Vital Signs:   BP (!) 118/50   Pulse 58   Resp 18   Ht 1.676 m (5' 6\")   Wt 97.4 kg (214 lb 12.8 oz)   SpO2 97%   BMI 34.67 kg/m²    O2 Device: None (Room air)        Weight loss/gain +2.1lbs      Nursing Assessment:    Respiratory:    Assessment  Charting Type: Reassessment    Breath Sounds  Respiratory Pattern: Regular  Breath Sounds Bilateral: Clear  Right Upper Lobe: Clear  Right Middle Lobe: Clear  Right Lower Lobe: Clear, Diminished  Left Upper Lobe: Clear  Left Lower Lobe: Clear         Cardiac  Cardiac Regularity: Regularly Irregular  Cardiac Symptoms: Peripheral edema  Implanted Cardiac Device (Pacemaker, ICD, PA Sensor): No    Peripheral Vascular  Peripheral Vascular (WDL): Exceptions to WDL  Edema: Left lower extremity, Right lower extremity  RLE Edema: +1, Non-pitting  LLE Edema: +2, Non-pitting      Subjective data:    Shortness of Breath:   Denies shortness of breath  Denies all other shortness of

## 2024-06-20 RX ORDER — TAMSULOSIN HYDROCHLORIDE 0.4 MG/1
CAPSULE ORAL
Qty: 90 CAPSULE | Refills: 0 | Status: SHIPPED | OUTPATIENT
Start: 2024-06-20

## 2024-06-26 RX ORDER — TAMSULOSIN HYDROCHLORIDE 0.4 MG/1
CAPSULE ORAL
Qty: 90 CAPSULE | Refills: 0 | Status: SHIPPED | OUTPATIENT
Start: 2024-06-26

## 2024-07-02 ENCOUNTER — OFFICE VISIT (OUTPATIENT)
Dept: PULMONOLOGY | Age: 89
End: 2024-07-02
Payer: MEDICARE

## 2024-07-02 VITALS
RESPIRATION RATE: 20 BRPM | HEART RATE: 72 BPM | SYSTOLIC BLOOD PRESSURE: 102 MMHG | BODY MASS INDEX: 34.54 KG/M2 | WEIGHT: 214 LBS | DIASTOLIC BLOOD PRESSURE: 54 MMHG | TEMPERATURE: 98.1 F | OXYGEN SATURATION: 94 %

## 2024-07-02 DIAGNOSIS — G47.33 OSA (OBSTRUCTIVE SLEEP APNEA): Primary | ICD-10-CM

## 2024-07-02 DIAGNOSIS — I10 ESSENTIAL HYPERTENSION: ICD-10-CM

## 2024-07-02 PROCEDURE — 1036F TOBACCO NON-USER: CPT | Performed by: INTERNAL MEDICINE

## 2024-07-02 PROCEDURE — G8427 DOCREV CUR MEDS BY ELIG CLIN: HCPCS | Performed by: INTERNAL MEDICINE

## 2024-07-02 PROCEDURE — G8417 CALC BMI ABV UP PARAM F/U: HCPCS | Performed by: INTERNAL MEDICINE

## 2024-07-02 PROCEDURE — 99203 OFFICE O/P NEW LOW 30 MIN: CPT | Performed by: INTERNAL MEDICINE

## 2024-07-02 PROCEDURE — 1123F ACP DISCUSS/DSCN MKR DOCD: CPT | Performed by: INTERNAL MEDICINE

## 2024-07-02 RX ORDER — TAMSULOSIN HYDROCHLORIDE 0.4 MG/1
CAPSULE ORAL
Qty: 90 CAPSULE | Refills: 0 | Status: SHIPPED | OUTPATIENT
Start: 2024-07-02

## 2024-07-02 NOTE — PROGRESS NOTES
[facility]PIRATORY SPECIALISTS  [address]  [facility] [address]  Dept: [phone]  Dept Fax: [phone]      7/2/24    Patient: Carl Pelayo  YOB: 1935    Dear Aleksandr Finney MD,    I had the pleasure of seeing one of your patients, CARL PELAYO today in the office today.  Please find attached my note with the assessment and plan of care.  Thank you for allowing me to participate in the care of this patient.  I will keep you updated on this patient's follow up and I look forward to serving you and your patients again in the future.    Hugo Thorpe MD  7/2/2024 2:40 PM

## 2024-07-03 NOTE — PATIENT INSTRUCTIONS
F/U check out- called patient- Left voice mail, provided phone number to [facility] - mailed [facility] sleep center information.  Writer recommended patient call to schedule his HOME SLEEP STUDY.   MARIBEL

## 2024-07-08 RX ORDER — TAMSULOSIN HYDROCHLORIDE 0.4 MG/1
CAPSULE ORAL
Qty: 90 CAPSULE | Refills: 0 | OUTPATIENT
Start: 2024-07-08

## 2024-07-09 ENCOUNTER — HOSPITAL ENCOUNTER (OUTPATIENT)
Dept: OTHER | Age: 89
Discharge: HOME | End: 2024-07-09
Payer: MEDICARE

## 2024-07-09 ENCOUNTER — HOSPITAL ENCOUNTER (OUTPATIENT)
Age: 89
Setting detail: SPECIMEN
Discharge: HOME | End: 2024-07-09
Payer: MEDICARE

## 2024-07-09 VITALS
WEIGHT: 208.8 LBS | HEART RATE: 85 BPM | RESPIRATION RATE: 18 BRPM | BODY MASS INDEX: 33.56 KG/M2 | OXYGEN SATURATION: 99 % | DIASTOLIC BLOOD PRESSURE: 52 MMHG | SYSTOLIC BLOOD PRESSURE: 116 MMHG | HEIGHT: 66 IN

## 2024-07-09 LAB
ANION GAP SERPL CALCULATED.3IONS-SCNC: 17 MMOL/L (ref 9–17)
BNP SERPL-MCNC: 2733 PG/ML
BUN SERPL-MCNC: 78 MG/DL (ref 8–23)
CHLORIDE SERPL-SCNC: 101 MMOL/L (ref 98–107)
CO2 SERPL-SCNC: 21 MMOL/L (ref 20–31)
CREAT SERPL-MCNC: 2.3 MG/DL (ref 0.7–1.2)
GFR, ESTIMATED: 27 ML/MIN/1.73M2
POTASSIUM SERPL-SCNC: 4.6 MMOL/L (ref 3.7–5.3)
SODIUM SERPL-SCNC: 139 MMOL/L (ref 135–144)

## 2024-07-09 PROCEDURE — 84520 ASSAY OF UREA NITROGEN: CPT

## 2024-07-09 PROCEDURE — 80051 ELECTROLYTE PANEL: CPT

## 2024-07-09 PROCEDURE — 83880 ASSAY OF NATRIURETIC PEPTIDE: CPT

## 2024-07-09 PROCEDURE — 82565 ASSAY OF CREATININE: CPT

## 2024-07-09 PROCEDURE — 99211 OFF/OP EST MAY X REQ PHY/QHP: CPT

## 2024-07-09 PROCEDURE — 36415 COLL VENOUS BLD VENIPUNCTURE: CPT

## 2024-07-09 NOTE — PROGRESS NOTES
TARAS LEFT REMINDING PATIENT OF APPOINTMENT ON 7/9/2024 AT 1:00.  
 Average packs/day: 20.0 packs/day for 20.0 years (400.0 ttl pk-yrs)       Types: Cigarettes       Start date:        Quit date:        Years since quittin.4    Smokeless tobacco: Never    Tobacco comments:       quit 42 years ago    Substance Use Topics    Alcohol use: No       Comment: quite 42 years ago     Drug use: No      BMP:         Lab Results   Component Value Date/Time      2024 12:49 PM     K 4.2 2024 12:49 PM      2024 12:49 PM     CO2 23 2024 12:49 PM     BUN 46 2024 12:49 PM     CREATININE 1.4 2024 12:49 PM            Lab Results   Component Value Date/Time     K 4.2 2024 12:49 PM     K 4.1 2024 11:31 AM     K 3.9 2024 12:58 PM            Lab Results   Component Value Date/Time     MG 1.9 2024 05:41 AM     MG 1.7 2024 05:32 AM     MG 1.6 2024 05:38 AM            Lab Results   Component Value Date/Time     CREATININE 1.4 2024 12:49 PM     CREATININE 1.2 2024 11:31 AM     CREATININE 1.4 2024 12:58 PM     CREATININE 1.4 2024 09:26 AM     CREATININE 1.3 2024 05:41 AM     CREATININE 1.2 2024 05:32 AM         ProBNP:         Lab Results   Component Value Date/Time     PROBNP 1,975 2024 12:49 PM     PROBNP 1,639 2024 11:31 AM     PROBNP 2,243 2024 12:58 PM      [unfilled]      BP Readings from Last 3 Encounters:   24 (!) 118/50   24 126/74   24 (!) 112/42          Wt Readings from Last 6 Encounters:   24 97.4 kg (214 lb 12.8 oz)   24 96.6 kg (213 lb)   24 96.6 kg (212 lb 14.4 oz)   24 96.5 kg (212 lb 12.8 oz)   24 100.8 kg (222 lb 4.8 oz)   24 100.6 kg (221 lb 12.5 oz)               Medication Assessment       Current medications:  Current Facility-Administered Medications          Current Outpatient Medications   Medication Sig Dispense Refill    Blood Glucose Calibration (TRUE METRIX LEVEL 2) Normal SOLN

## 2024-07-11 ENCOUNTER — TELEPHONE (OUTPATIENT)
Dept: INTERNAL MEDICINE CLINIC | Age: 89
End: 2024-07-11

## 2024-07-11 NOTE — TELEPHONE ENCOUNTER
Daughter called stating that pt went to go get hearing aids and was told that left ear was plugged and needed to be flushed wants to see if pt can come in for an ear cleaning?

## 2024-07-12 ENCOUNTER — TELEPHONE (OUTPATIENT)
Dept: OTHER | Age: 89
End: 2024-07-12

## 2024-07-12 ENCOUNTER — HOSPITAL ENCOUNTER (OUTPATIENT)
Age: 89
Setting detail: SPECIMEN
Discharge: HOME | End: 2024-07-12
Payer: MEDICARE

## 2024-07-12 LAB
ANION GAP SERPL CALCULATED.3IONS-SCNC: 16 MMOL/L (ref 9–17)
BUN SERPL-MCNC: 71 MG/DL (ref 8–23)
CALCIUM SERPL-MCNC: 9.6 MG/DL (ref 8.6–10.4)
CHLORIDE SERPL-SCNC: 104 MMOL/L (ref 98–107)
CO2 SERPL-SCNC: 20 MMOL/L (ref 20–31)
CREAT SERPL-MCNC: 1.7 MG/DL (ref 0.7–1.2)
GFR, ESTIMATED: 39 ML/MIN/1.73M2
GLUCOSE SERPL-MCNC: 127 MG/DL (ref 70–99)
POTASSIUM SERPL-SCNC: 4.2 MMOL/L (ref 3.7–5.3)
SODIUM SERPL-SCNC: 140 MMOL/L (ref 135–144)

## 2024-07-12 PROCEDURE — 80048 BASIC METABOLIC PNL TOTAL CA: CPT

## 2024-07-12 PROCEDURE — 36415 COLL VENOUS BLD VENIPUNCTURE: CPT

## 2024-07-12 NOTE — TELEPHONE ENCOUNTER
Reviewed BMP lab results completed today, BUN decreased to 71 from 78 and Creatinine 1.7 from 2.3  These were follow up labs to OV 7/9/24. Nephrology had given instructions for patient to hold Bumex and Spironolactone for 2 days and repeat labs.    Called and discussed with Nephrologist Dr. DAVIDE Bloom. He gives instructions for pt to restart Bumex at a reduced dosing of 1mg daily. Will continue to hold Spironolactone. He will repeat labs next week. , with available apt 7/18/24 11:00. Will update cardiology on pt assessment and labs.     Called and left message with new instructions and requested return call to confirm and schedule follow-up.

## 2024-07-16 RX ORDER — PANTOPRAZOLE SODIUM 40 MG/1
40 TABLET, DELAYED RELEASE ORAL
Qty: 90 TABLET | Refills: 3 | Status: SHIPPED | OUTPATIENT
Start: 2024-07-16

## 2024-07-16 RX ORDER — AMLODIPINE BESYLATE 5 MG/1
5 TABLET ORAL DAILY
Qty: 90 TABLET | Refills: 3 | Status: SHIPPED | OUTPATIENT
Start: 2024-07-16

## 2024-07-16 RX ORDER — BUMETANIDE 1 MG/1
1 TABLET ORAL DAILY
Qty: 90 TABLET | Refills: 3 | Status: SHIPPED | OUTPATIENT
Start: 2024-07-16

## 2024-07-17 ENCOUNTER — NURSE ONLY (OUTPATIENT)
Dept: INTERNAL MEDICINE CLINIC | Age: 89
End: 2024-07-17

## 2024-07-17 ENCOUNTER — HOSPITAL ENCOUNTER (OUTPATIENT)
Dept: SLEEP CENTER | Age: 89
Discharge: HOME | End: 2024-07-19
Attending: INTERNAL MEDICINE
Payer: MEDICARE

## 2024-07-17 VITALS — BODY MASS INDEX: 33.57 KG/M2 | OXYGEN SATURATION: 98 % | WEIGHT: 208 LBS | HEART RATE: 100 BPM

## 2024-07-17 DIAGNOSIS — H61.23 BILATERAL IMPACTED CERUMEN: Primary | ICD-10-CM

## 2024-07-17 DIAGNOSIS — G47.33 OSA (OBSTRUCTIVE SLEEP APNEA): ICD-10-CM

## 2024-07-17 PROCEDURE — G0399 HOME SLEEP TEST/TYPE 3 PORTA: HCPCS

## 2024-07-17 NOTE — PROGRESS NOTES
Patient here for ear wash of bilateral impacted cerumen. Impaction resolved and patient tolerated well.

## 2024-07-18 ENCOUNTER — TELEPHONE (OUTPATIENT)
Dept: OTHER | Age: 89
End: 2024-07-18

## 2024-07-18 ENCOUNTER — HOSPITAL ENCOUNTER (OUTPATIENT)
Dept: OTHER | Age: 89
Discharge: HOME | End: 2024-07-18

## 2024-07-18 ENCOUNTER — HOSPITAL ENCOUNTER (OUTPATIENT)
Age: 89
Setting detail: SPECIMEN
Discharge: HOME | End: 2024-07-18

## 2024-07-18 NOTE — TELEPHONE ENCOUNTER
Message received from pt's daughter, Hannah, that pt is unable to make today's CHF clinic appt due to scheduling conflict with other doctor appts today. Request to reschedule to Mon 7/22. Writer spoke with JACQUES Donato RN okay to r/s to Mon 7/22/24 at 1330. Writer called and spoke with Hannah, agreeable to 7/22/24 at 1330, appt r/s. Hannah voices appreciation for r/s.

## 2024-07-19 ENCOUNTER — TELEPHONE (OUTPATIENT)
Dept: OTHER | Age: 89
End: 2024-07-19

## 2024-07-22 ENCOUNTER — HOSPITAL ENCOUNTER (OUTPATIENT)
Age: 89
Setting detail: SPECIMEN
Discharge: HOME | End: 2024-07-22
Payer: MEDICARE

## 2024-07-22 ENCOUNTER — HOSPITAL ENCOUNTER (OUTPATIENT)
Dept: OTHER | Age: 89
Discharge: HOME | End: 2024-07-22
Payer: MEDICARE

## 2024-07-22 VITALS
RESPIRATION RATE: 18 BRPM | WEIGHT: 206.5 LBS | DIASTOLIC BLOOD PRESSURE: 44 MMHG | SYSTOLIC BLOOD PRESSURE: 102 MMHG | OXYGEN SATURATION: 98 % | HEIGHT: 66 IN | BODY MASS INDEX: 33.19 KG/M2

## 2024-07-22 LAB
ANION GAP SERPL CALCULATED.3IONS-SCNC: 15 MMOL/L (ref 9–17)
BNP SERPL-MCNC: 2375 PG/ML
BUN SERPL-MCNC: 65 MG/DL (ref 8–23)
CALCIUM SERPL-MCNC: 9.4 MG/DL (ref 8.6–10.4)
CHLORIDE SERPL-SCNC: 102 MMOL/L (ref 98–107)
CO2 SERPL-SCNC: 19 MMOL/L (ref 20–31)
CREAT SERPL-MCNC: 1.9 MG/DL (ref 0.7–1.2)
GFR, ESTIMATED: 34 ML/MIN/1.73M2
GLUCOSE SERPL-MCNC: 259 MG/DL (ref 70–99)
POTASSIUM SERPL-SCNC: 4.6 MMOL/L (ref 3.7–5.3)
SODIUM SERPL-SCNC: 136 MMOL/L (ref 135–144)

## 2024-07-22 PROCEDURE — 83880 ASSAY OF NATRIURETIC PEPTIDE: CPT

## 2024-07-22 PROCEDURE — 36415 COLL VENOUS BLD VENIPUNCTURE: CPT

## 2024-07-22 PROCEDURE — 80048 BASIC METABOLIC PNL TOTAL CA: CPT

## 2024-07-22 PROCEDURE — 99211 OFF/OP EST MAY X REQ PHY/QHP: CPT

## 2024-07-22 NOTE — PROGRESS NOTES
1mg in am and 0.5mg in pm, 6/19/24 Bumex increased to 1 mg twice daily for 3 days and then return to previous dose of 1 mg in the am and 0.5mg in the pm. 7/9/24,Bumex and Spironolactone was put on hold, 7/12/24 Bumex was restarted at decreased dose of 1mg daily      Further Assessment / Education     Verbally reviewed importance of medication compliance with patient; patient verbalized understanding.    Discussed 1500mg/day sodium restricted diet; patient verbalized understanding.    Moderate daily exercise encouraged as tolerated. Discussed rest breaks as needed; patient verbalized understanding.    Patient instructed to weigh self at the same time of each day, using same clothes and same scale; reinforced teaching to monitor for 3-5 lb weight increase over 1-2 days, and to notify the CHF clinic at [phone] or physician office if weight change noted.  Patient verbalized understanding.    Signs and symptoms of CHF discussed with patient, such as feeling more tired than normal, feeling short of breath, coughing that increases when you lie down, sudden weight gain, swelling of your feet, legs or belly.  Patient verbalized understanding to notify the CHF clinic at [phone] or physician office if these symptoms occur.    Compliance with plan of care and further disease process causes discussed with patient, patient encouraged to keep all follow up appointments.  Patient verbalized understanding.      Further Assessment / Plan     Recommendations for patient this visit:  1500mg Sodium daily restriction. Check nutritional labels  1500ml daily fluid restriction  Daily weights, BP and heart rate log and bring with you to your appointments.  Call to report increased weight gain, swelling or SOB    New Orders:   Pt to continue Bumex 1 mg daily and continue to hold Spironolactone and repeat labs in 2 weeks.    Follow-up plans:  Pt to return to CHF Clinic 8/5/24

## 2024-07-30 LAB — STATUS: NORMAL

## 2024-08-05 ENCOUNTER — HOSPITAL ENCOUNTER (OUTPATIENT)
Age: 89
Setting detail: SPECIMEN
Discharge: HOME | End: 2024-08-05
Payer: MEDICARE

## 2024-08-05 ENCOUNTER — HOSPITAL ENCOUNTER (OUTPATIENT)
Dept: OTHER | Age: 89
Discharge: HOME | End: 2024-08-05
Payer: MEDICARE

## 2024-08-05 ENCOUNTER — TELEPHONE (OUTPATIENT)
Dept: PULMONOLOGY | Age: 89
End: 2024-08-05

## 2024-08-05 VITALS
BODY MASS INDEX: 32.67 KG/M2 | OXYGEN SATURATION: 100 % | HEIGHT: 66 IN | HEART RATE: 100 BPM | SYSTOLIC BLOOD PRESSURE: 110 MMHG | WEIGHT: 203.3 LBS | DIASTOLIC BLOOD PRESSURE: 58 MMHG | RESPIRATION RATE: 18 BRPM

## 2024-08-05 DIAGNOSIS — G47.33 OSA (OBSTRUCTIVE SLEEP APNEA): Primary | ICD-10-CM

## 2024-08-05 LAB
ANION GAP SERPL CALCULATED.3IONS-SCNC: 13 MMOL/L (ref 9–17)
BNP SERPL-MCNC: 2239 PG/ML
BUN SERPL-MCNC: 57 MG/DL (ref 8–23)
CHLORIDE SERPL-SCNC: 105 MMOL/L (ref 98–107)
CO2 SERPL-SCNC: 20 MMOL/L (ref 20–31)
CREAT SERPL-MCNC: 1.9 MG/DL (ref 0.7–1.2)
GFR, ESTIMATED: 34 ML/MIN/1.73M2
POTASSIUM SERPL-SCNC: 4.7 MMOL/L (ref 3.7–5.3)
SODIUM SERPL-SCNC: 138 MMOL/L (ref 135–144)

## 2024-08-05 PROCEDURE — 99211 OFF/OP EST MAY X REQ PHY/QHP: CPT

## 2024-08-05 PROCEDURE — 80051 ELECTROLYTE PANEL: CPT

## 2024-08-05 PROCEDURE — 36415 COLL VENOUS BLD VENIPUNCTURE: CPT

## 2024-08-05 PROCEDURE — 83880 ASSAY OF NATRIURETIC PEPTIDE: CPT

## 2024-08-05 PROCEDURE — 84520 ASSAY OF UREA NITROGEN: CPT

## 2024-08-05 PROCEDURE — 82565 ASSAY OF CREATININE: CPT

## 2024-08-05 NOTE — PROGRESS NOTES
[facility]  Heart Failure Clinic      [address] [address]  Phone: [phone]  Fax: [phone]      NAME: Carl Frias  MEDICAL RECORD NUMBER:  [id number]  AGE: 88 y.o.   GENDER: male  : 1935  EPISODE DATE:  2024      Carl Frias was referred to the [facility] [facility] by Dr. ALISA Ware for heart failure services.he is being followed by Cardiologist, Dr. JACQUES Felipe.                ECHO EF%: 50%       Date: 24       Recent Cardiology follow-up apt: 24  Follow-up apt recommended: Apt 24  Upcoming testing: unknown  Medication Management: no  Nephrologist: Dr. Corona     Carl Frias is a 88 y.o. male here for the Heart Failure Services.  He is here today for a Heart Failure assessment/consultation, monitoring medication effectiveness, reviewing necessary labs, transition of care, extensive Heart Failure education, reporting any concerns or symptoms and obtaining any necessary medication adjustments or orders from the patients health care team.    Vital Signs:   BP (!) 110/58   Pulse 100   Resp 18   Ht 1.676 m (5' 6\")   Wt 92.2 kg (203 lb 4.8 oz)   SpO2 100%   BMI 32.81 kg/m²    O2 Device: None (Room air)        Weight loss/gain -3.2lbs      Nursing Assessment:    Respiratory:    Assessment  Charting Type: Reassessment    Breath Sounds  Respiratory Pattern: Regular  Right Upper Lobe: Clear  Right Middle Lobe: Clear  Right Lower Lobe: Clear  Left Upper Lobe: Clear  Left Lower Lobe: Clear         Cardiac  Cardiac Regularity: Regularly Irregular  Cardiac Symptoms: Peripheral edema  Implanted Cardiac Device (Pacemaker, ICD, PA Sensor): No    Peripheral Vascular  Peripheral Vascular (WDL): Exceptions to WDL  Edema: Right lower extremity, Left lower extremity  RLE Edema: +1, Non-pitting  LLE Edema: +1, Non-pitting      Subjective data:    Shortness of Breath:   Denies shortness of breath    Other Heart Failure Findings:   Cough at night or when lying

## 2024-08-05 NOTE — TELEPHONE ENCOUNTER
Daughter states she would like to try an auto titrating machine.  Please complete the order w/settings and I'll fax everything to his DME company.

## 2024-08-06 NOTE — TELEPHONE ENCOUNTER
[facility]  Obstetric History and Physical    Chief Complaint   Patient presents with   • Scheduled      Pt repeat c/s at 38 wks and 0 days for poly. Pt denies ctx, LOF, and vaginal bleeding. Pt reports good fetal movement.        Subjective     Patient is a 26 y.o. female  currently at 38w0d, who presents for scheduled  due to polyhydramnios. She has been noting increasing shortness of air. She notes contractions started last pm and remain mild but are continuing. She denies bleeding or leaking fluid. She is feeling fetal movement.    She also reports she noted a cough since yesterday and had a low grade temp yesterday. She denies acute soa or n/v. She denies severe headache or vision changes.     Pt desires repeat c/s for this delivery. She has been counseled regarding risks of surgery to include bleeding, transfusion, infection, damage to internal organs, anesthetic complications, DVT/PE and death. Discussed tubal sterilization and pt declined. She has been counseled regarding increased risks of complications with multiple  deliveries and that future pregnancies are not advised. Pt notes understanding.     Her prenatal care is complicated by .  Patient Active Problem List   Diagnosis   • Late prenatal care   • Previous  section   • Anxiety   • Large for dates   • Polyhydramnios in third trimester   • Polyhydramnios affecting pregnancy     Her previous obstetric/gynecological history is noted for c/s X 2    The following portions of the patients history were reviewed and updated as appropriate: current medications, allergies, past medical history, past surgical history, past family history, past social history and problem list .       Prenatal Information:  Prenatal Results     POC Urine Glucose/Protein     Test Value Reference Range Date Time    Urine Glucose  Negative mg/dL Negative 22 1534    Urine Protein  Negative mg/dL Negative 22 1534          Initial  FAxed demos, ins card, order w/settings, office  note and sleep study to [facility] #[phone]   Prenatal Labs     Test Value Reference Range Date Time    Hemoglobin        Hematocrit        Platelets        Rubella IgG  1.87 index Immune >0.99 11/07/22 1159    Hepatitis B SAg  Negative  Negative 11/07/22 1159    Hepatitis C Ab  0.1 s/co ratio 0.0 - 0.9 11/07/22 1159    RPR  Non Reactive  Non Reactive 11/07/22 1159    ABO  A   11/07/22 1159    Rh  Positive   11/07/22 1159    Antibody Screen        HIV  Non Reactive  Non Reactive 11/07/22 1159    Urine Culture  Final report   11/07/22 0944    Gonorrhea  Negative  Negative 11/07/22 1133    Chlamydia  Negative  Negative 11/07/22 1133    TSH        HgB A1c   5.2 % 4.8 - 5.6 11/07/22 1159          2nd and 3rd Trimester     Test Value Reference Range Date Time    Hemoglobin (repeated)  12.2 g/dL 12.0 - 15.9 12/07/22 0858       11.1 g/dL 12.0 - 15.9 11/30/22 1439       10.4 g/dL 11.1 - 15.9 11/14/22 1456       10.5 g/dL 11.1 - 15.9 11/07/22 1159    Hematocrit (repeated)  37.5 % 34.0 - 46.6 12/07/22 0858       33.7 % 34.0 - 46.6 11/30/22 1439       32.6 % 34.0 - 46.6 11/14/22 1456       31.9 % 34.0 - 46.6 11/07/22 1159    Platelets   186 10*3/mm3 140 - 450 12/07/22 0858       182 10*3/mm3 140 - 450 11/30/22 1439       232 x10E3/uL 150 - 450 11/14/22 1456       220 x10E3/uL 150 - 450 11/07/22 1159    GCT  122 mg/dL 70 - 139 11/14/22 1456    Antibody Screen (repeated)  Negative  Negative 11/07/22 1159    GTT Fasting ^ 62 mg/dL  01/13/16     GTT 1 Hr        GTT 2 Hr        GTT 3 Hr        Group B Strep  Negative  Negative 11/07/22 1133          Drug Screening     Test Value Reference Range Date Time    Amphetamine Screen  Negative ng/mL Ocnybx=1554 11/07/22 0944    Barbiturate Screen  Negative ng/mL Nddocq=589 11/07/22 0944    Benzodiazepine Screen  Negative ng/mL Mmnruq=786 11/07/22 0944    Methadone Screen  Negative ng/mL Mdmtjs=024 11/07/22 0944    Phencyclidine Screen  Negative ng/mL Cutoff=25 11/07/22 0944    Opiates Screen  Negative ng/mL Lfjskt=116 11/07/22 0944     THC Screen  Negative ng/mL Cutoff=20 11/07/22 0944    Cocaine Screen  Negative ng/mL Smdfjv=713 11/07/22 0944    Propoxyphene Screen  Negative ng/mL Jdvahh=075 11/07/22 0944    Buprenorphine Screen        Methamphetamine Screen        Oxycodone Screen        Tricyclic Antidepressants Screen              Other (Risk screening)     Test Value Reference Range Date Time    Varicella IgG ^ had infection   11/07/22     Parvovirus IgG        CMV IgG        Cystic Fibrosis        Hemoglobin electrophoresis        NIPT        MSAFP-4        AFP (for NTD only)              Legend    ^: Historical                      External Prenatal Results     Pregnancy Outside Results - Transcribed From Office Records - See Scanned Records For Details     Test Value Date Time    ABO  A  11/07/22 1159    Rh  Positive  11/07/22 1159    Antibody Screen  Negative  11/07/22 1159    Varicella IgG ^ had infection  11/07/22     Rubella  1.87 index 11/07/22 1159    Hgb  12.2 g/dL 12/07/22 0858       11.1 g/dL 11/30/22 1439       10.4 g/dL 11/14/22 1456       10.5 g/dL 11/07/22 1159    Hct  37.5 % 12/07/22 0858       33.7 % 11/30/22 1439       32.6 % 11/14/22 1456       31.9 % 11/07/22 1159    Glucose Fasting GTT ^ 62 mg/dL 01/13/16     Glucose Tolerance Test 1 hour       Glucose Tolerance Test 3 hour       Gonorrhea (discrete)  Negative  11/07/22 1133    Chlamydia (discrete)  Negative  11/07/22 1133    RPR  Non Reactive  11/07/22 1159    VDRL       Syphilis Antibody       HBsAg  Negative  11/07/22 1159    Herpes Simplex Virus PCR       Herpes Simplex VIrus Culture       HIV  Non Reactive  11/07/22 1159    Hep C RNA Quant PCR       Hep C Antibody  0.1 s/co ratio 11/07/22 1159    AFP       Group B Strep  Negative  11/07/22 1133    GBS Susceptibility to Clindamycin       GBS Susceptibility to Erythromycin       Fetal Fibronectin       Genetic Testing, Maternal Blood             Drug Screening     Test Value Date Time    Urine Drug Screen        Amphetamine Screen  Negative ng/mL 22    Barbiturate Screen  Negative ng/mL 22    Benzodiazepine Screen  Negative ng/mL 22    Methadone Screen  Negative ng/mL 22    Phencyclidine Screen  Negative ng/mL 22    Opiates Screen  Negative  16 1708    THC Screen  Negative  16 1708    Cocaine Screen       Propoxyphene Screen  Negative ng/mL 22    Buprenorphine Screen       Methamphetamine Screen       Oxycodone Screen       Tricyclic Antidepressants Screen             Legend    ^: Historical                         Past OB History:     OB History    Para Term  AB Living   3 2 2 0 0 2   SAB IAB Ectopic Molar Multiple Live Births   0 0 0 0 0 2      # Outcome Date GA Lbr Jeet/2nd Weight Sex Delivery Anes PTL Lv   3 Current            2 Term 17 38w6d  3185 g (7 lb 0.4 oz) M CS-LTranv Spinal N CHRISTOPHER      Birth Comments: PANDA Or1      Name: MANISH BAGLEYDARCY      Apgar1: 8  Apgar5: 9   1 Term 16 37w1d  2540 g (5 lb 9.6 oz) F CS-LTranv Spinal N CHRISTOPHER      Complications: IUGR (intrauterine growth restriction) affecting care of mother, Oligohydramnios in pacheco pregnancy in third trimester, Breech presentation      Apgar1: 8  Apgar5: 9       Past Medical History: Past Medical History:   Diagnosis Date   • Anxiety     does not take anything   • Asthma     childhood   • Depression     current, denies any thoughts of self-harm, not medicated   • Herpes     no issues during pregnancy   • Ovarian cyst       Past Surgical History Past Surgical History:   Procedure Laterality Date   • ADENOIDECTOMY     • APPENDECTOMY     •  SECTION N/A 3/16/2016    Procedure:  SECTION PRIMARY;  Surgeon: Marine Peterson MD;  Location: [facility] LABOR DELIVERY;  Service:    •  SECTION N/A 2017    Procedure:  SECTION REPEAT;  Surgeon: Marine Peterson MD;  Location: [facility] LABOR DELIVERY;  Service:    • TONSILLECTOMY         Family History: Family History   Problem Relation Age of Onset   • Heart defect Brother    • Heart defect Sister    • Congenital heart disease Neg Hx    • Breast cancer Neg Hx    • Ovarian cancer Neg Hx    • Uterine cancer Neg Hx    • Colon cancer Neg Hx       Social History:  reports that she has never smoked. She has never used smokeless tobacco.   reports that she does not currently use alcohol.   reports no history of drug use.        General ROS: Pertinent items are noted in HPI    Objective       Vital Signs Range for the last 24 hours  Temperature: Temp:  [98.8 °F (37.1 °C)] 98.8 °F (37.1 °C)   Temp Source: Temp src: Oral   BP: BP: (120-121)/(88-93) 120/88   Pulse: Heart Rate:  [117-118] 117   Respirations: Resp:  [18] 18   SPO2: SpO2:  [100 %] 100 %   O2 Amount (l/min):     O2 Devices Device (Oxygen Therapy): room air   Weight: Weight:  [67 kg (147 lb 12.8 oz)] 67 kg (147 lb 12.8 oz)     Physical Examination: General appearance - alert, well appearing, and in no distress  Mental status - alert, oriented to person, place, and time  Chest - clear to auscultation, no wheezes, rales or rhonchi, symmetric air entry  Heart - normal rate and regular rhythm  Abdomen - gravid, soft, nontender  Neurological - alert, oriented, normal speech, no focal findings or movement disorder noted  Extremities - bilateral lower ext are without cords, tenderness or edema  Skin - normal coloration and turgor noted    Presentation: def   Cervix:      Dilation:     Effacement:     Station:         Fetal Heart Rate Assessment   Reactive tracing noted                              Uterine Assessment   Irregular ctx noted                                          Assessment & Plan       Previous  section    Late prenatal care    Large for dates    Polyhydramnios affecting pregnancy        Assessment:  1.  Intrauterine pregnancy at 38w0d gestation with reactive fetal status.    2.  Polyhydramnios with hx of prior c/s X 2: pt  desires repeat c/s for delivery. Will proceed at this time due to symptomatic polyhydramnios.     Pt with mild cough and low grade temp yesterday: respiratory panel is pending.    Minimally increased BP: neg for preeclamptic symptoms, ordered CMP and urine protein/creat ratio. Will follow labs and BP's.  4.  GBS status:   Strep Gp B MERCEDES   Date Value Ref Range Status   2022 Negative Negative Final     Comment:     Centers for Disease Control and Prevention (CDC) and American Congress  of Obstetricians and Gynecologists (ACOG) guidelines for prevention of   group B streptococcal (GBS) disease specify co-collection of  a vaginal and rectal swab specimen to maximize sensitivity of GBS  detection. Per the CDC and ACOG, swabbing both the lower vagina and  rectum substantially increases the yield of detection compared with  sampling the vagina alone.  Penicillin G, ampicillin, or cefazolin are indicated for intrapartum  prophylaxis of  GBS colonization. Reflex susceptibility  testing should be performed prior to use of clindamycin only on GBS  isolates from penicillin-allergic women who are considered a high risk  for anaphylaxis. Treatment with vancomycin without additional testing  is warranted if resistance to clindamycin is noted.         Plan:  1. Proceed with scheduled .   2. Plan of care has been reviewed with patient   3.  Risks, benefits of treatment plan have been discussed.  4.  All questions have been answered.        Annette Lantigua MD  2022  09:43 EST

## 2024-08-12 PROBLEM — E87.6 HYPOKALEMIA: Status: ACTIVE | Noted: 2024-08-12

## 2024-08-12 PROBLEM — E13.22: Status: ACTIVE | Noted: 2024-08-12

## 2024-08-12 PROBLEM — N18.32 STAGE 3B CHRONIC KIDNEY DISEASE (HCC): Status: ACTIVE | Noted: 2024-08-12

## 2024-08-13 DIAGNOSIS — N40.1 BPH WITH OBSTRUCTION/LOWER URINARY TRACT SYMPTOMS: ICD-10-CM

## 2024-08-13 DIAGNOSIS — B35.6 JOCK ITCH: ICD-10-CM

## 2024-08-13 DIAGNOSIS — N13.8 BPH WITH OBSTRUCTION/LOWER URINARY TRACT SYMPTOMS: ICD-10-CM

## 2024-08-13 RX ORDER — NYSTATIN 100000 [USP'U]/G
POWDER TOPICAL
Qty: 1 EACH | Refills: 3 | Status: SHIPPED | OUTPATIENT
Start: 2024-08-13

## 2024-08-19 RX ORDER — LISINOPRIL 10 MG/1
10 TABLET ORAL DAILY
Qty: 30 TABLET | Refills: 3 | Status: SHIPPED | OUTPATIENT
Start: 2024-08-19

## 2024-08-26 ENCOUNTER — HOSPITAL ENCOUNTER (OUTPATIENT)
Dept: OTHER | Age: 89
Discharge: HOME | End: 2024-08-26
Payer: MEDICARE

## 2024-08-26 ENCOUNTER — HOSPITAL ENCOUNTER (OUTPATIENT)
Age: 89
Setting detail: SPECIMEN
Discharge: HOME | End: 2024-08-26
Payer: MEDICARE

## 2024-08-26 VITALS
OXYGEN SATURATION: 97 % | WEIGHT: 205.6 LBS | HEART RATE: 97 BPM | SYSTOLIC BLOOD PRESSURE: 116 MMHG | HEIGHT: 66 IN | DIASTOLIC BLOOD PRESSURE: 72 MMHG | RESPIRATION RATE: 18 BRPM | BODY MASS INDEX: 33.04 KG/M2

## 2024-08-26 LAB
ANION GAP SERPL CALCULATED.3IONS-SCNC: 14 MMOL/L (ref 9–17)
BNP SERPL-MCNC: 3174 PG/ML
BUN SERPL-MCNC: 31 MG/DL (ref 8–23)
CHLORIDE SERPL-SCNC: 102 MMOL/L (ref 98–107)
CO2 SERPL-SCNC: 19 MMOL/L (ref 20–31)
CREAT SERPL-MCNC: 1.5 MG/DL (ref 0.7–1.2)
GFR, ESTIMATED: 45 ML/MIN/1.73M2
POTASSIUM SERPL-SCNC: 4.1 MMOL/L (ref 3.7–5.3)
SODIUM SERPL-SCNC: 135 MMOL/L (ref 135–144)

## 2024-08-26 PROCEDURE — 99211 OFF/OP EST MAY X REQ PHY/QHP: CPT

## 2024-08-26 PROCEDURE — 84520 ASSAY OF UREA NITROGEN: CPT

## 2024-08-26 PROCEDURE — 80051 ELECTROLYTE PANEL: CPT

## 2024-08-26 PROCEDURE — 83880 ASSAY OF NATRIURETIC PEPTIDE: CPT

## 2024-08-26 PROCEDURE — 36415 COLL VENOUS BLD VENIPUNCTURE: CPT

## 2024-08-26 PROCEDURE — 82565 ASSAY OF CREATININE: CPT

## 2024-08-26 NOTE — PROGRESS NOTES
AM    CO2 19 08/26/2024 11:59 AM    BUN 31 08/26/2024 11:59 AM    CREATININE 1.5 08/26/2024 11:59 AM     Lab Results   Component Value Date/Time    K 4.1 08/26/2024 11:59 AM    K 4.7 08/05/2024 12:06 PM    K 4.6 07/22/2024 01:13 PM     Lab Results   Component Value Date/Time    MG 1.9 04/28/2024 05:41 AM    MG 1.7 04/27/2024 05:32 AM    MG 1.6 04/26/2024 05:38 AM     Lab Results   Component Value Date/Time    CREATININE 1.5 08/26/2024 11:59 AM    CREATININE 1.9 08/05/2024 12:06 PM    CREATININE 1.9 07/22/2024 01:13 PM    CREATININE 1.7 07/12/2024 11:56 AM    CREATININE 2.3 07/09/2024 12:46 PM    CREATININE 1.4 06/19/2024 12:49 PM       ProBNP:   Lab Results   Component Value Date/Time    PROBNP 3,174 08/26/2024 11:59 AM    PROBNP 2,239 08/05/2024 12:06 PM    PROBNP 2,375 07/22/2024 01:13 PM     [unfilled]  BP Readings from Last 3 Encounters:   08/26/24 116/72   08/05/24 (!) 110/58   07/22/24 (!) 102/44     Wt Readings from Last 6 Encounters:   08/26/24 93.3 kg (205 lb 9.6 oz)   08/05/24 92.2 kg (203 lb 4.8 oz)   07/22/24 93.7 kg (206 lb 8 oz)   07/17/24 94.3 kg (208 lb)   07/09/24 94.7 kg (208 lb 12.8 oz)   07/02/24 97.1 kg (214 lb)       Medication Assessment      Current medications:  Current Outpatient Medications   Medication Sig Dispense Refill    lisinopril (PRINIVIL;ZESTRIL) 10 MG tablet Take 1 tablet by mouth daily 30 tablet 3    bumetanide (BUMEX) 1 MG tablet TAKE 1 TABLET EVERY DAY 90 tablet 3    amLODIPine (NORVASC) 5 MG tablet TAKE 1 TABLET EVERY DAY 90 tablet 3    tamsulosin (FLOMAX) 0.4 MG capsule  90 capsule 0    metFORMIN (GLUCOPHAGE) 1000 MG tablet Take 0.5 tablets by mouth 2 times daily (with meals)      finasteride (PROSCAR) 5 MG tablet Take 1 tablet by mouth daily 90 tablet 3    apixaban (ELIQUIS) 2.5 MG TABS tablet Take 1 tablet by mouth 2 times daily 60 tablet 0    metoprolol succinate (TOPROL XL) 25 MG extended release tablet Take 1 tablet by mouth daily 90 tablet 1    aspirin 81 MG EC tablet  Sodium and 2 L fluid daily restrictions - Check nutrition labels  Continue daily weights.    New Orders:   None    Follow-up plans:  Pt to repeat labs at next CHF Clinic apt 9/18/24 or sooner if needed.

## 2024-08-27 ENCOUNTER — OFFICE VISIT (OUTPATIENT)
Dept: UROLOGY | Age: 89
End: 2024-08-27
Payer: MEDICARE

## 2024-08-27 VITALS — TEMPERATURE: 97 F | HEART RATE: 74 BPM | SYSTOLIC BLOOD PRESSURE: 139 MMHG | DIASTOLIC BLOOD PRESSURE: 79 MMHG

## 2024-08-27 DIAGNOSIS — N13.30 HYDRONEPHROSIS, UNSPECIFIED HYDRONEPHROSIS TYPE: ICD-10-CM

## 2024-08-27 DIAGNOSIS — N39.41 URGE INCONTINENCE OF URINE: Primary | ICD-10-CM

## 2024-08-27 DIAGNOSIS — R35.1 NOCTURIA: ICD-10-CM

## 2024-08-27 PROCEDURE — 1036F TOBACCO NON-USER: CPT | Performed by: UROLOGY

## 2024-08-27 PROCEDURE — G8417 CALC BMI ABV UP PARAM F/U: HCPCS | Performed by: UROLOGY

## 2024-08-27 PROCEDURE — 1123F ACP DISCUSS/DSCN MKR DOCD: CPT | Performed by: UROLOGY

## 2024-08-27 PROCEDURE — 51798 US URINE CAPACITY MEASURE: CPT | Performed by: UROLOGY

## 2024-08-27 PROCEDURE — 99214 OFFICE O/P EST MOD 30 MIN: CPT | Performed by: UROLOGY

## 2024-08-27 PROCEDURE — G8427 DOCREV CUR MEDS BY ELIG CLIN: HCPCS | Performed by: UROLOGY

## 2024-08-27 ASSESSMENT — ENCOUNTER SYMPTOMS
ABDOMINAL PAIN: 0
BACK PAIN: 0
COUGH: 0
SHORTNESS OF BREATH: 0
EYE PAIN: 0
COLOR CHANGE: 0
RESPIRATORY NEGATIVE: 1
ALLERGIC/IMMUNOLOGIC NEGATIVE: 1
EYES NEGATIVE: 1
VOMITING: 0
NAUSEA: 0
WHEEZING: 0
GASTROINTESTINAL NEGATIVE: 1
EYE REDNESS: 0

## 2024-08-27 NOTE — PROGRESS NOTES
[facility] PHYSICIANS [facility] UROLOGY CENTER  [address]  [facility] [address]  Dept: [phone]    [facility] Urology Office Note - Established    Patient:  Carl Frias  YOB: 1935  Date: 8/27/2024    The patient is a 88 y.o. male who presents todayfor evaluation of the following problems:   Chief Complaint   Patient presents with    Bladder Problem     Patient states he feels like he does not empty his bladder all the way.        HPI  He is here in follow up for incomplete emptying.   PSA is normal.   No flank pain.   No hematuria.   CT showed bilateral hydro with a distended bladder in march.   PVR today is 5ml.   He is on Flomax and finasteride.       Summary of old records: N/A    Additional History: N/A    Procedures Today: N/A    Urinalysis today:  No results found for this visit on 08/27/24.  Last several PSA's:  Lab Results   Component Value Date    PSA 0.10 04/25/2024    PSA 0.34 03/31/2016    PSA 0.37 06/19/2013     Last total testosterone:  Lab Results   Component Value Date    TESTOSTERONE 80 (L) 05/09/2017       AUA Symptom Score (8/27/2024):                               Last BUN and creatinine:  Lab Results   Component Value Date    BUN 31 (H) 08/26/2024     Lab Results   Component Value Date    CREATININE 1.5 (H) 08/26/2024       Additional Lab/Culture results: none    Imaging Reviewed during this Office Visit: renal ultrasound showed mild bilateral hydro.   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

## 2024-08-27 NOTE — PROGRESS NOTES
Review of Systems   Constitutional: Negative.  Negative for appetite change, chills and fever.   HENT: Negative.     Eyes: Negative.  Negative for pain, redness and visual disturbance.   Respiratory: Negative.  Negative for cough, shortness of breath and wheezing.    Cardiovascular: Negative.  Negative for chest pain and leg swelling.   Gastrointestinal: Negative.  Negative for abdominal pain, nausea and vomiting.   Endocrine: Negative.    Genitourinary:  Positive for frequency. Negative for difficulty urinating, dysuria, flank pain, hematuria, testicular pain and urgency.        Urinary incontinence    Musculoskeletal: Negative.  Negative for back pain, joint swelling and myalgias.   Skin: Negative.  Negative for color change, rash and wound.   Allergic/Immunologic: Negative.    Neurological: Negative.  Negative for dizziness, tremors, weakness, numbness and headaches.   Hematological: Negative.  Negative for adenopathy. Does not bruise/bleed easily.   Psychiatric/Behavioral: Negative.

## 2024-08-29 PROBLEM — Z85.47 PERSONAL HISTORY OF MALIGNANT NEOPLASM OF TESTIS: Status: ACTIVE | Noted: 2024-02-22

## 2024-08-29 PROBLEM — M19.90 UNSPECIFIED OSTEOARTHRITIS, UNSPECIFIED SITE: Status: ACTIVE | Noted: 2024-04-30

## 2024-08-29 PROBLEM — R26.89 OTHER ABNORMALITIES OF GAIT AND MOBILITY: Status: ACTIVE | Noted: 2024-02-22

## 2024-08-29 PROBLEM — R53.83 OTHER FATIGUE: Status: ACTIVE | Noted: 2024-05-01

## 2024-08-29 PROBLEM — N18.31 CHRONIC KIDNEY DISEASE, STAGE 3A (HCC): Status: ACTIVE | Noted: 2024-04-30

## 2024-08-29 PROBLEM — E87.1 HYPO-OSMOLALITY AND HYPONATREMIA: Status: ACTIVE | Noted: 2024-02-22

## 2024-08-29 PROBLEM — I25.2 OLD MYOCARDIAL INFARCTION: Status: ACTIVE | Noted: 2024-02-22

## 2024-08-29 PROBLEM — Z79.84 LONG TERM (CURRENT) USE OF ORAL HYPOGLYCEMIC DRUGS: Status: ACTIVE | Noted: 2024-02-22

## 2024-08-29 PROBLEM — R60.0 LOCALIZED EDEMA: Status: ACTIVE | Noted: 2024-02-22

## 2024-08-29 PROBLEM — D68.69 OTHER THROMBOPHILIA (HCC): Status: ACTIVE | Noted: 2024-04-30

## 2024-08-29 PROBLEM — R26.2 DIFFICULTY IN WALKING, NOT ELSEWHERE CLASSIFIED: Status: ACTIVE | Noted: 2024-05-02

## 2024-08-29 PROBLEM — Z87.891 PERSONAL HISTORY OF NICOTINE DEPENDENCE: Status: ACTIVE | Noted: 2024-02-22

## 2024-08-29 PROBLEM — H91.90 UNSPECIFIED HEARING LOSS, UNSPECIFIED EAR: Status: ACTIVE | Noted: 2024-02-22

## 2024-08-29 PROBLEM — Z74.09 OTHER REDUCED MOBILITY: Status: ACTIVE | Noted: 2024-05-01

## 2024-08-29 PROBLEM — I47.20 VENTRICULAR TACHYCARDIA, UNSPECIFIED (HCC): Status: ACTIVE | Noted: 2024-02-22

## 2024-08-29 PROBLEM — Z90.79 ACQUIRED ABSENCE OF OTHER GENITAL ORGAN(S): Status: ACTIVE | Noted: 2024-02-22

## 2024-08-29 PROBLEM — R26.81 UNSTEADINESS ON FEET: Status: ACTIVE | Noted: 2024-02-22

## 2024-08-29 PROBLEM — K80.00 CALCULUS OF GALLBLADDER WITH ACUTE CHOLECYSTITIS WITHOUT OBSTRUCTION: Status: ACTIVE | Noted: 2024-04-30

## 2024-08-29 PROBLEM — N13.30 UNSPECIFIED HYDRONEPHROSIS: Status: ACTIVE | Noted: 2024-04-30

## 2024-08-29 PROBLEM — Z96.653 PRESENCE OF ARTIFICIAL KNEE JOINT, BILATERAL: Status: ACTIVE | Noted: 2024-02-22

## 2024-08-29 PROBLEM — J90 PLEURAL EFFUSION, NOT ELSEWHERE CLASSIFIED: Status: ACTIVE | Noted: 2024-02-22

## 2024-08-29 PROBLEM — E83.42 HYPOMAGNESEMIA: Status: ACTIVE | Noted: 2024-04-30

## 2024-08-29 PROBLEM — Z74.1 NEED FOR ASSISTANCE WITH PERSONAL CARE: Status: ACTIVE | Noted: 2024-05-16

## 2024-08-29 PROBLEM — Z91.148 PATIENT'S OTHER NONCOMPLIANCE WITH MEDICATION REGIMEN FOR OTHER REASON: Status: ACTIVE | Noted: 2024-02-22

## 2024-08-29 PROBLEM — R53.1 WEAKNESS: Status: ACTIVE | Noted: 2024-02-22

## 2024-08-29 PROBLEM — I11.0 HYPERTENSIVE HEART DISEASE WITH HEART FAILURE (HCC): Status: ACTIVE | Noted: 2024-02-22

## 2024-08-29 PROBLEM — B96.20 UNSPECIFIED ESCHERICHIA COLI (E. COLI) AS THE CAUSE OF DISEASES CLASSIFIED ELSEWHERE: Status: ACTIVE | Noted: 2024-04-30

## 2024-08-29 PROBLEM — R23.3 SPONTANEOUS ECCHYMOSES: Status: ACTIVE | Noted: 2024-02-22

## 2024-08-29 PROBLEM — E66.9 OBESITY, UNSPECIFIED: Status: ACTIVE | Noted: 2024-02-22

## 2024-08-29 PROBLEM — M15.9 POLYOSTEOARTHRITIS, UNSPECIFIED: Status: ACTIVE | Noted: 2024-02-22

## 2024-08-29 PROBLEM — Z91.81 HISTORY OF FALLING: Status: ACTIVE | Noted: 2024-02-22

## 2024-08-29 PROBLEM — N39.0 URINARY TRACT INFECTION, SITE NOT SPECIFIED: Status: ACTIVE | Noted: 2024-04-30

## 2024-08-29 PROBLEM — Z92.3 PERSONAL HISTORY OF IRRADIATION: Status: ACTIVE | Noted: 2024-02-22

## 2024-09-04 ENCOUNTER — HOSPITAL ENCOUNTER (OUTPATIENT)
Dept: ULTRASOUND IMAGING | Age: 89
Discharge: HOME | End: 2024-09-06
Attending: UROLOGY
Payer: MEDICARE

## 2024-09-04 DIAGNOSIS — N13.30 HYDRONEPHROSIS, UNSPECIFIED HYDRONEPHROSIS TYPE: ICD-10-CM

## 2024-09-04 PROCEDURE — 76770 US EXAM ABDO BACK WALL COMP: CPT

## 2024-09-04 NOTE — TELEPHONE ENCOUNTER
LVM for patient requesting he return call. [FreeTextEntry1] : right posterior right leg ulcer about 6 cm with fibrous exudate, no cellulitis

## 2024-09-18 ENCOUNTER — HOSPITAL ENCOUNTER (OUTPATIENT)
Age: 89
Setting detail: SPECIMEN
Discharge: HOME | End: 2024-09-18

## 2024-09-18 ENCOUNTER — HOSPITAL ENCOUNTER (OUTPATIENT)
Dept: OTHER | Age: 89
Discharge: HOME | End: 2024-09-18

## 2024-09-23 ENCOUNTER — HOSPITAL ENCOUNTER (OUTPATIENT)
Age: 89
Setting detail: SPECIMEN
Discharge: HOME | End: 2024-09-23

## 2024-09-23 ENCOUNTER — HOSPITAL ENCOUNTER (OUTPATIENT)
Dept: OTHER | Age: 89
Discharge: HOME | End: 2024-09-23

## 2024-09-30 ENCOUNTER — HOSPITAL ENCOUNTER (OUTPATIENT)
Age: 89
Setting detail: SPECIMEN
Discharge: HOME | End: 2024-09-30
Payer: MEDICARE

## 2024-09-30 ENCOUNTER — HOSPITAL ENCOUNTER (OUTPATIENT)
Dept: OTHER | Age: 89
Discharge: HOME | End: 2024-09-30
Payer: MEDICARE

## 2024-09-30 VITALS
BODY MASS INDEX: 32.54 KG/M2 | RESPIRATION RATE: 18 BRPM | HEIGHT: 66 IN | OXYGEN SATURATION: 98 % | HEART RATE: 98 BPM | DIASTOLIC BLOOD PRESSURE: 60 MMHG | SYSTOLIC BLOOD PRESSURE: 118 MMHG | WEIGHT: 202.5 LBS

## 2024-09-30 LAB
ANION GAP SERPL CALCULATED.3IONS-SCNC: 13 MMOL/L (ref 9–17)
BNP SERPL-MCNC: 2701 PG/ML
BUN SERPL-MCNC: 43 MG/DL (ref 8–23)
CHLORIDE SERPL-SCNC: 102 MMOL/L (ref 98–107)
CO2 SERPL-SCNC: 22 MMOL/L (ref 20–31)
CREAT SERPL-MCNC: 1.7 MG/DL (ref 0.7–1.2)
GFR, ESTIMATED: 39 ML/MIN/1.73M2
POTASSIUM SERPL-SCNC: 3.9 MMOL/L (ref 3.7–5.3)
SODIUM SERPL-SCNC: 137 MMOL/L (ref 135–144)

## 2024-09-30 PROCEDURE — 83880 ASSAY OF NATRIURETIC PEPTIDE: CPT

## 2024-09-30 PROCEDURE — 36415 COLL VENOUS BLD VENIPUNCTURE: CPT

## 2024-09-30 PROCEDURE — 82565 ASSAY OF CREATININE: CPT

## 2024-09-30 PROCEDURE — 99211 OFF/OP EST MAY X REQ PHY/QHP: CPT

## 2024-09-30 PROCEDURE — 80051 ELECTROLYTE PANEL: CPT

## 2024-09-30 PROCEDURE — 84520 ASSAY OF UREA NITROGEN: CPT

## 2024-09-30 NOTE — PROGRESS NOTES
[facility]  Heart Failure Clinic      [address] [place] [address]  Phone: [phone]  Fax: [phone]      NAME: Carl Frias  MEDICAL RECORD NUMBER:  [id number]  AGE: 88 y.o.   GENDER: male  : 1935  EPISODE DATE:  2024      Carl Frias was referred to the [facility] by Dr. ALISA Ware for heart failure services.he is being followed by Cardiologist, Dr. JACQUES Felipe.                ECHO EF%: 50%       Date: 24       Recent Cardiology follow-up apt: 24  Follow-up apt recommended: unknown  Upcoming testing: unknown  Medication Management: no  Nephrologist: Dr.N. Bustamante              Carl Frias is a 88 y.o. male here for the Heart Failure Services.  He is here today for a Heart Failure assessment/consultation, monitoring medication effectiveness, reviewing necessary labs, transition of care, extensive Heart Failure education, reporting any concerns or symptoms and obtaining any necessary medication adjustments or orders from the patients health care team.    Vital Signs:   /60   Pulse 98   Resp 18   Ht 1.676 m (5' 6\")   Wt 91.9 kg (202 lb 8 oz)   SpO2 98%   BMI 32.68 kg/m²    O2 Device: None (Room air)        Weight loss/gain -0.4lbs      Nursing Assessment:    Respiratory:    Assessment  Charting Type: Reassessment    Breath Sounds  Respiratory Pattern: Regular  Right Upper Lobe: Clear  Right Middle Lobe: Clear  Right Lower Lobe: Clear  Left Upper Lobe: Clear  Left Lower Lobe: Clear    Cough/Sputum  Cough: Non-productive  Frequency: Occasional    Cardiac  Cardiac Regularity: Regularly Irregular  Cardiac Symptoms: Peripheral edema  Implanted Cardiac Device (Pacemaker, ICD, PA Sensor): No    Peripheral Vascular  Peripheral Vascular (WDL): Exceptions to WDL  Edema: Left upper extremity, Right lower extremity  RLE Edema: Trace  LLE Edema: Trace      Subjective data:    Shortness of Breath:   Dyspnea on exertion  Denies all other shortness of

## 2024-10-04 ENCOUNTER — TELEPHONE (OUTPATIENT)
Dept: OTHER | Age: 89
End: 2024-10-04

## 2024-10-04 NOTE — TELEPHONE ENCOUNTER
Received message from Nephrologist, Dr. LATANYA Bloom indicating he had reviewed assessment and lab results and gave instructions to continue current medications. Called pt and left VM.

## 2024-10-08 ENCOUNTER — OFFICE VISIT (OUTPATIENT)
Dept: UROLOGY | Age: 89
End: 2024-10-08
Payer: MEDICARE

## 2024-10-08 VITALS
BODY MASS INDEX: 32.7 KG/M2 | SYSTOLIC BLOOD PRESSURE: 112 MMHG | HEART RATE: 68 BPM | TEMPERATURE: 96.8 F | RESPIRATION RATE: 16 BRPM | HEIGHT: 66 IN | DIASTOLIC BLOOD PRESSURE: 60 MMHG | OXYGEN SATURATION: 100 %

## 2024-10-08 DIAGNOSIS — R35.1 NOCTURIA: Primary | ICD-10-CM

## 2024-10-08 DIAGNOSIS — N13.30 HYDRONEPHROSIS, UNSPECIFIED HYDRONEPHROSIS TYPE: ICD-10-CM

## 2024-10-08 PROCEDURE — 1036F TOBACCO NON-USER: CPT | Performed by: UROLOGY

## 2024-10-08 PROCEDURE — G8427 DOCREV CUR MEDS BY ELIG CLIN: HCPCS | Performed by: UROLOGY

## 2024-10-08 PROCEDURE — G8417 CALC BMI ABV UP PARAM F/U: HCPCS | Performed by: UROLOGY

## 2024-10-08 PROCEDURE — G8484 FLU IMMUNIZE NO ADMIN: HCPCS | Performed by: UROLOGY

## 2024-10-08 PROCEDURE — 99214 OFFICE O/P EST MOD 30 MIN: CPT | Performed by: UROLOGY

## 2024-10-08 PROCEDURE — 1123F ACP DISCUSS/DSCN MKR DOCD: CPT | Performed by: UROLOGY

## 2024-10-08 ASSESSMENT — ENCOUNTER SYMPTOMS
VOMITING: 0
EYE REDNESS: 0
WHEEZING: 0
ABDOMINAL PAIN: 0
CONSTIPATION: 0
COUGH: 0
SHORTNESS OF BREATH: 0
NAUSEA: 0
EYE PAIN: 0
BACK PAIN: 0
DIARRHEA: 0

## 2024-10-08 NOTE — PROGRESS NOTES
[facility] PHYSICIANS [facility] UROLOGY CENTER  [address]  [facility] [address]  Dept: [phone]    [facility] Urology Office Note - Established    Patient:  Carl Frias  YOB: 1935  Date: 10/8/2024    The patient is a 89 y.o. male who presents todayfor evaluation of the following problems:   Chief Complaint   Patient presents with    Urge incontinence of urine     6 week US        HPI  Here for leaking.   He has BPH.   He is on  Flomax and Finasteride.   He has some leaking at night.   PSA is 0.10.  Ultrasound reviewed, which shows mild bilateral hydro.       Summary of old records: N/A    Additional History: N/A    Procedures Today: N/A    Urinalysis today:  No results found for this visit on 10/08/24.  Last several PSA's:  Lab Results   Component Value Date    PSA 0.10 04/25/2024    PSA 0.34 03/31/2016    PSA 0.37 06/19/2013     Last total testosterone:  Lab Results   Component Value Date    TESTOSTERONE 80 (L) 05/09/2017       AUA Symptom Score (10/8/2024):                               Last BUN and creatinine:  Lab Results   Component Value Date    BUN 43 (H) 09/30/2024     Lab Results   Component Value Date    CREATININE 1.7 (H) 09/30/2024       Additional Lab/Culture results: none    Imaging Reviewed during this Office Visit:   Ultrasound reviewed, hydro improved.     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

## 2024-10-29 ENCOUNTER — TELEPHONE (OUTPATIENT)
Dept: OTHER | Age: 89
End: 2024-10-29

## 2024-10-29 NOTE — TELEPHONE ENCOUNTER
Reminder call to pt regarding CHF clinic appointment 10/30/24 at 2 pm, verbally confirmed appointment with pt's daughter. Instructed to check in with the lab for blood work.

## 2024-10-30 ENCOUNTER — HOSPITAL ENCOUNTER (OUTPATIENT)
Dept: OTHER | Age: 89
Discharge: HOME | End: 2024-10-30
Payer: MEDICARE

## 2024-10-30 ENCOUNTER — HOSPITAL ENCOUNTER (OUTPATIENT)
Age: 89
Setting detail: SPECIMEN
Discharge: HOME | End: 2024-10-30
Payer: MEDICARE

## 2024-10-30 VITALS
OXYGEN SATURATION: 97 % | HEART RATE: 69 BPM | DIASTOLIC BLOOD PRESSURE: 48 MMHG | WEIGHT: 204.7 LBS | RESPIRATION RATE: 16 BRPM | BODY MASS INDEX: 32.9 KG/M2 | HEIGHT: 66 IN | SYSTOLIC BLOOD PRESSURE: 112 MMHG

## 2024-10-30 LAB
ANION GAP SERPL CALCULATED.3IONS-SCNC: 11 MMOL/L (ref 9–16)
BNP SERPL-MCNC: 2210 PG/ML (ref 0–300)
BUN SERPL-MCNC: 45 MG/DL (ref 8–23)
CHLORIDE SERPL-SCNC: 104 MMOL/L (ref 98–107)
CO2 SERPL-SCNC: 26 MMOL/L (ref 20–31)
CREAT SERPL-MCNC: 1.8 MG/DL (ref 0.7–1.2)
GFR, ESTIMATED: 36 ML/MIN/1.73M2
POTASSIUM SERPL-SCNC: 4.3 MMOL/L (ref 3.7–5.3)
SODIUM SERPL-SCNC: 141 MMOL/L (ref 136–145)

## 2024-10-30 PROCEDURE — 82565 ASSAY OF CREATININE: CPT

## 2024-10-30 PROCEDURE — 99211 OFF/OP EST MAY X REQ PHY/QHP: CPT

## 2024-10-30 PROCEDURE — 83880 ASSAY OF NATRIURETIC PEPTIDE: CPT

## 2024-10-30 PROCEDURE — 84520 ASSAY OF UREA NITROGEN: CPT

## 2024-10-30 PROCEDURE — 36415 COLL VENOUS BLD VENIPUNCTURE: CPT

## 2024-10-30 PROCEDURE — 80051 ELECTROLYTE PANEL: CPT

## 2024-10-30 NOTE — PROGRESS NOTES
days, and to notify the CHF clinic at [phone] or physician office if weight change noted.  Patient verbalized understanding.    Signs and symptoms of CHF discussed with patient, such as feeling more tired than normal, feeling short of breath, coughing that increases when you lie down, sudden weight gain, swelling of your feet, legs or belly.  Patient verbalized understanding to notify the CHF clinic at [phone] or physician office if these symptoms occur.    Compliance with plan of care and further disease process causes discussed with patient, patient encouraged to keep all follow up appointments.  Patient verbalized understanding.    Further Assessment / Plan     Recommendations for patient this visit:  Wear CPAP nightly  1500mg Sodium and 2 L fluid daily restrictions - Check nutrition labels  Continue daily weights.    New Orders:   None    Follow-up plans:  Pt to repeat labs at next CHF Clinic apt 11/26/24 or sooner if needed.

## 2024-10-31 ENCOUNTER — OFFICE VISIT (OUTPATIENT)
Dept: INTERNAL MEDICINE CLINIC | Age: 89
End: 2024-10-31

## 2024-10-31 VITALS
WEIGHT: 204 LBS | SYSTOLIC BLOOD PRESSURE: 110 MMHG | HEART RATE: 75 BPM | OXYGEN SATURATION: 98 % | HEIGHT: 65 IN | BODY MASS INDEX: 33.99 KG/M2 | DIASTOLIC BLOOD PRESSURE: 70 MMHG

## 2024-10-31 DIAGNOSIS — N18.32 STAGE 3B CHRONIC KIDNEY DISEASE (HCC): ICD-10-CM

## 2024-10-31 DIAGNOSIS — E11.42 TYPE 2 DIABETES MELLITUS WITH DIABETIC POLYNEUROPATHY, WITHOUT LONG-TERM CURRENT USE OF INSULIN (HCC): Primary | ICD-10-CM

## 2024-10-31 DIAGNOSIS — M79.89 LEG SWELLING: ICD-10-CM

## 2024-10-31 LAB — HBA1C MFR BLD: 6 %

## 2024-10-31 ASSESSMENT — ENCOUNTER SYMPTOMS
CONSTIPATION: 0
DIARRHEA: 0
ABDOMINAL PAIN: 0
VOMITING: 0
WHEEZING: 0
ALLERGIC/IMMUNOLOGIC NEGATIVE: 1
COUGH: 0
SHORTNESS OF BREATH: 0
BACK PAIN: 0
NAUSEA: 0

## 2024-10-31 NOTE — PROGRESS NOTES
\"Have you been to the ER, urgent care clinic since your last visit?  Hospitalized since your last visit?\"    NO    “Have you seen or consulted any other health care providers outside our system since your last visit?”    Yes, Urology and Cardiology              
Results   Component Value Date    CHOL 148 04/24/2024    HDL 28 (L) 04/24/2024    TRIG 140 04/24/2024     _____________________________________________________    Assessment & Plan     1. Type 2 diabetes mellitus with diabetic polyneuropathy, without long-term current use of insulin (HCC)  Discussed diabetic education issues of long term diabetic complications, hypoglycemic symptoms, hyperglycemic symptoms, diet, medications- side effects and need for compliance, importance of exercise, importance of appointments with Podiatrist, importance of annual examinations with Opthalmology and Diabetic sick day rules with patient.     - POCT glycosylated hemoglobin (Hb A1C)    2. Stage 3b chronic kidney disease (HCC)  Stable.  Continue follow-up with nephrology    3. Leg swelling  Continue with diuretics.  Continue follow-up with cardiology.  Start using compression stockings, patient has at home has not been using it.  _____________________________________________________  Follow up and instructions:    Return in about 6 weeks (around 12/12/2024).  Carl received counseling on the following healthy behaviors: nutrition, exercise, and medication adherence  Discussed use, benefit, and side effects of prescribed medications.  Barriers to medication compliance addressed.  All patient questions answered.  Pt voiced understanding.   Patient given educational materials - see patient instructions    Drew Bowen MD   Internal Medicine  [facility]  10/31/2024, 4:07 PM    This note is created with the assistance of a speech-recognition program. While intending to generate a document that actually reflects the content of the visit, the document can still have some mistakes which may not have been identified and corrected by editing.

## 2024-11-06 DIAGNOSIS — E11.42 TYPE 2 DIABETES MELLITUS WITH DIABETIC POLYNEUROPATHY, WITHOUT LONG-TERM CURRENT USE OF INSULIN (HCC): ICD-10-CM

## 2024-11-07 RX ORDER — GLIPIZIDE 10 MG/1
TABLET ORAL
Qty: 180 TABLET | Refills: 3 | Status: SHIPPED | OUTPATIENT
Start: 2024-11-07

## 2024-11-19 ENCOUNTER — OFFICE VISIT (OUTPATIENT)
Dept: PULMONOLOGY | Age: 89
End: 2024-11-19
Payer: MEDICARE

## 2024-11-19 VITALS
BODY MASS INDEX: 28 KG/M2 | RESPIRATION RATE: 18 BRPM | OXYGEN SATURATION: 98 % | HEIGHT: 71 IN | SYSTOLIC BLOOD PRESSURE: 112 MMHG | HEART RATE: 58 BPM | WEIGHT: 200 LBS | DIASTOLIC BLOOD PRESSURE: 63 MMHG

## 2024-11-19 DIAGNOSIS — I10 ESSENTIAL HYPERTENSION: ICD-10-CM

## 2024-11-19 DIAGNOSIS — G47.33 OSA (OBSTRUCTIVE SLEEP APNEA): Primary | ICD-10-CM

## 2024-11-19 PROCEDURE — G8427 DOCREV CUR MEDS BY ELIG CLIN: HCPCS | Performed by: INTERNAL MEDICINE

## 2024-11-19 PROCEDURE — 1036F TOBACCO NON-USER: CPT | Performed by: INTERNAL MEDICINE

## 2024-11-19 PROCEDURE — 1123F ACP DISCUSS/DSCN MKR DOCD: CPT | Performed by: INTERNAL MEDICINE

## 2024-11-19 PROCEDURE — G8484 FLU IMMUNIZE NO ADMIN: HCPCS | Performed by: INTERNAL MEDICINE

## 2024-11-19 PROCEDURE — 1159F MED LIST DOCD IN RCRD: CPT | Performed by: INTERNAL MEDICINE

## 2024-11-19 PROCEDURE — 99214 OFFICE O/P EST MOD 30 MIN: CPT | Performed by: INTERNAL MEDICINE

## 2024-11-19 PROCEDURE — G8417 CALC BMI ABV UP PARAM F/U: HCPCS | Performed by: INTERNAL MEDICINE

## 2024-11-19 ASSESSMENT — SLEEP AND FATIGUE QUESTIONNAIRES
HOW LIKELY ARE YOU TO NOD OFF OR FALL ASLEEP WHILE SITTING INACTIVE IN A PUBLIC PLACE: SLIGHT CHANCE OF DOZING
ESS TOTAL SCORE: 12
HOW LIKELY ARE YOU TO NOD OFF OR FALL ASLEEP WHILE LYING DOWN TO REST IN THE AFTERNOON WHEN CIRCUMSTANCES PERMIT: HIGH CHANCE OF DOZING
HOW LIKELY ARE YOU TO NOD OFF OR FALL ASLEEP IN A CAR, WHILE STOPPED FOR A FEW MINUTES IN TRAFFIC: WOULD NEVER DOZE
HOW LIKELY ARE YOU TO NOD OFF OR FALL ASLEEP WHILE SITTING AND READING: SLIGHT CHANCE OF DOZING
HOW LIKELY ARE YOU TO NOD OFF OR FALL ASLEEP WHEN YOU ARE A PASSENGER IN A CAR FOR AN HOUR WITHOUT A BREAK: MODERATE CHANCE OF DOZING
HOW LIKELY ARE YOU TO NOD OFF OR FALL ASLEEP WHILE SITTING QUIETLY AFTER LUNCH WITHOUT ALCOHOL: MODERATE CHANCE OF DOZING
HOW LIKELY ARE YOU TO NOD OFF OR FALL ASLEEP WHILE WATCHING TV: SLIGHT CHANCE OF DOZING
HOW LIKELY ARE YOU TO NOD OFF OR FALL ASLEEP WHILE SITTING AND TALKING TO SOMEONE: MODERATE CHANCE OF DOZING

## 2024-11-19 NOTE — PROGRESS NOTES
PULMONARY OP  PROGRESS NOTE      Patient:  Carl Frias  YOB: 1935    MRN: [id number]     Acct:        Pt seen and Chart reviewed.  Carl Frias is here in followup for   1. ALBERTINA (obstructive sleep apnea)    2. Essential hypertension          History of Present Illness  The patient is an 89-year-old male who presents for evaluation of sleep apnea and essential hypertension. He is accompanied by his daughter.    He has been diagnosed with severe sleep apnea, experiencing 38.6 events per hour. He has been using a CPAP machine but finds the mask uncomfortable and is seeking an alternative. He reports feeling fatigued and sleepy during the day, often falling asleep while watching TV. Additionally, he experiences a mild cough when lying down in bed.    His blood sugar levels have been consistently between 116 and 120 but dropped to 83 this morning.    He has been managing leg swelling by reducing his salt intake.    He has never received an influenza vaccine.    SOCIAL HISTORY  He quit smoking and drinking alcohol in 1968.         Review of Systems -   General ROS: Completed and except as mentioned above were negative   Psychological ROS:  Completed and except as mentioned above were negative  Ophthalmic ROS:  Completed and except as mentioned above were negative  ENT ROS:  Completed and except as mentioned above were negative  Allergy and Immunology ROS:  Completed and except as mentioned above werenegative  Hematological and Lymphatic ROS:  Completed and except as mentioned above were negative  Endocrine ROS: Completed and except as mentioned above were negative  Respiratory ROS:  Completed and except asmentioned above were negative  Cardiovascular ROS:  Completed and except as mentioned above were negative  Gastrointestinal ROS: Completed and except as mentioned above were negative  Genito-Urinary ROS:  Completedand except as mentioned above were negative  Musculoskeletal ROS:  Completed and except as

## 2024-11-23 ENCOUNTER — APPOINTMENT (OUTPATIENT)
Dept: GENERAL RADIOLOGY | Age: 89
End: 2024-11-23
Payer: MEDICARE

## 2024-11-23 ENCOUNTER — APPOINTMENT (OUTPATIENT)
Dept: CT IMAGING | Age: 89
End: 2024-11-23
Payer: MEDICARE

## 2024-11-23 ENCOUNTER — HOSPITAL ENCOUNTER (EMERGENCY)
Age: 89
Discharge: HOME | End: 2024-11-23
Attending: EMERGENCY MEDICINE
Payer: MEDICARE

## 2024-11-23 VITALS
DIASTOLIC BLOOD PRESSURE: 49 MMHG | SYSTOLIC BLOOD PRESSURE: 113 MMHG | BODY MASS INDEX: 28 KG/M2 | HEIGHT: 71 IN | HEART RATE: 81 BPM | WEIGHT: 200 LBS | OXYGEN SATURATION: 97 % | TEMPERATURE: 98.2 F | RESPIRATION RATE: 28 BRPM
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
## 2024-11-23 DIAGNOSIS — R07.81 RIB PAIN ON LEFT SIDE: ICD-10-CM

## 2024-11-23 LAB
ANION GAP SERPL CALCULATED.3IONS-SCNC: 13 MMOL/L (ref 9–16)
BASOPHILS # BLD: 0.1 K/UL (ref 0–0.2)
BASOPHILS NFR BLD: 1 % (ref 0–2)
BUN SERPL-MCNC: 54 MG/DL (ref 8–23)
CALCIUM SERPL-MCNC: 9.7 MG/DL (ref 8.6–10.4)
CHLORIDE SERPL-SCNC: 103 MMOL/L (ref 98–107)
CO2 SERPL-SCNC: 23 MMOL/L (ref 20–31)
CREAT SERPL-MCNC: 2 MG/DL (ref 0.7–1.2)
EKG Q-T INTERVAL: 392 MS
EKG Q-T INTERVAL: 406 MS
EKG QRS DURATION: 68 MS
EKG QRS DURATION: 74 MS
EKG QTC CALCULATION (BAZETT): 401 MS
EKG QTC CALCULATION (BAZETT): 410 MS
EKG R AXIS: -11 DEGREES
EKG R AXIS: -17 DEGREES
EKG T AXIS: -8 DEGREES
EKG T AXIS: 3 DEGREES
EKG VENTRICULAR RATE: 59 BPM
EKG VENTRICULAR RATE: 66 BPM
EOSINOPHIL # BLD: 0.3 K/UL (ref 0–0.4)
EOSINOPHILS RELATIVE PERCENT: 3 % (ref 0–4)
ERYTHROCYTE [DISTWIDTH] IN BLOOD BY AUTOMATED COUNT: 14.1 % (ref 11.5–14.9)
GFR, ESTIMATED: 32 ML/MIN/1.73M2
GLUCOSE SERPL-MCNC: 82 MG/DL (ref 74–99)
HCT VFR BLD AUTO: 27.9 % (ref 41–53)
HGB BLD-MCNC: 9.3 G/DL (ref 13.5–17.5)
LYMPHOCYTES NFR BLD: 2.1 K/UL (ref 1–4.8)
LYMPHOCYTES RELATIVE PERCENT: 19 % (ref 24–44)
MAGNESIUM SERPL-MCNC: 1.9 MG/DL (ref 1.6–2.4)
MCH RBC QN AUTO: 28.9 PG (ref 26–34)
MCHC RBC AUTO-ENTMCNC: 33.2 G/DL (ref 31–37)
MCV RBC AUTO: 87.2 FL (ref 80–100)
MONOCYTES NFR BLD: 0.8 K/UL (ref 0.1–1.3)
MONOCYTES NFR BLD: 8 % (ref 1–7)
NEUTROPHILS NFR BLD: 69 % (ref 36–66)
NEUTS SEG NFR BLD: 7.4 K/UL (ref 1.3–9.1)
PLATELET # BLD AUTO: 391 K/UL (ref 150–450)
PMV BLD AUTO: 7 FL (ref 6–12)
POTASSIUM SERPL-SCNC: 4.6 MMOL/L (ref 3.7–5.3)
RBC # BLD AUTO: 3.2 M/UL (ref 4.5–5.9)
SODIUM SERPL-SCNC: 139 MMOL/L (ref 136–145)
TROPONIN I SERPL HS-MCNC: 77 NG/L (ref 0–22)
TROPONIN I SERPL HS-MCNC: 82 NG/L (ref 0–22)
WBC OTHER # BLD: 10.6 K/UL (ref 3.5–11)

## 2024-11-23 PROCEDURE — 2580000003 HC RX 258: Performed by: EMERGENCY MEDICINE

## 2024-11-23 PROCEDURE — 99285 EMERGENCY DEPT VISIT HI MDM: CPT

## 2024-11-23 PROCEDURE — 71101 X-RAY EXAM UNILAT RIBS/CHEST: CPT

## 2024-11-23 PROCEDURE — 83735 ASSAY OF MAGNESIUM: CPT

## 2024-11-23 PROCEDURE — 72125 CT NECK SPINE W/O DYE: CPT

## 2024-11-23 PROCEDURE — 80048 BASIC METABOLIC PNL TOTAL CA: CPT

## 2024-11-23 PROCEDURE — 70450 CT HEAD/BRAIN W/O DYE: CPT

## 2024-11-23 PROCEDURE — 85025 COMPLETE CBC W/AUTO DIFF WBC: CPT

## 2024-11-23 PROCEDURE — 84484 ASSAY OF TROPONIN QUANT: CPT

## 2024-11-23 PROCEDURE — 36415 COLL VENOUS BLD VENIPUNCTURE: CPT

## 2024-11-23 RX ORDER — 0.9 % SODIUM CHLORIDE 0.9 %
500 INTRAVENOUS SOLUTION INTRAVENOUS ONCE
Status: COMPLETED | OUTPATIENT
Start: 2024-11-23 | End: 2024-11-23

## 2024-11-23 RX ADMIN — SODIUM CHLORIDE 500 ML: 9 INJECTION, SOLUTION INTRAVENOUS at 16:35

## 2024-11-23 ASSESSMENT — ENCOUNTER SYMPTOMS
BACK PAIN: 0
SHORTNESS OF BREATH: 0
NAUSEA: 0
VOMITING: 0
ABDOMINAL PAIN: 0
COUGH: 0

## 2024-11-23 NOTE — ED PROVIDER NOTES
[facility] ED  eMERGENCY dEPARTMENT eNCOUnter   Attending Attestation     Pt Name: Carl Frias  MRN: [id number]  Birthdate 1935  Date of evaluation: 11/23/24       Carl Frias is a 89 y.o. male who presents with Fall and Rib Injury      History:   89-year-old male presenting to the ER complaining of left-sided rib pain after a fall yesterday.  Patient states he believes he got up and became dizzy and ended up resulting in him falling on the railing on the bed and injuring the left side of his rib cage.  The patient states he has not felt dizzy since.    Exam: Vitals:   Vitals:    11/23/24 1531 11/23/24 1601 11/23/24 1635 11/23/24 1640   BP: 91/60 (!) 108/49 (!) 106/58    Pulse:    69   Resp:    14   Temp:       SpO2: 98% 98% 97% 97%   Weight:       Height:         On reassessment, the patient did feel back to baseline and wanted to go home.  A repeat troponin was pending at the time.  Second troponin did decrease from the first and was closer to the range of his baseline troponin levels.  The patient is likely slightly dehydrated and was provided with fluid in the ER.  Cardiac workup in the ER did not display positive signs of acute cardiac ischemia.  EKG was reviewed and interpreted by myself, the ED physician.  Patient was instructed to follow-up with the primary and to return to the ER immediately if symptoms worsen or change.  Patient and family understand and agree with the plan.    I performed a history and physical examination of the patient and discussed management with the resident. I reviewed the resident’s note and agree with the documented findings and plan of care. Any areas of disagreement are noted on the chart. I was personally present for the key portions of any procedures. I have documented in the chart those procedures where I was not present during the key portions. I have personally reviewed all images and agree with the resident's interpretation. I have reviewed the emergency nurses  Declined

## 2024-11-23 NOTE — DISCHARGE INSTRUCTIONS
Be sure to be your primary care provider.  Return to emergency department for any acutely worsening/new symptoms or any other acute concerns.

## 2024-11-23 NOTE — ED PROVIDER NOTES
[facility] ED  Emergency Department Encounter  Emergency Medicine Resident     Pt Name:Carl Frias  MRN: [id number]  Birthdate 1935  Date of evaluation: 11/23/24  PCP:  Aleksandr Finney MD  Note Started: 3:25 PM EST      CHIEF COMPLAINT       Chief Complaint   Patient presents with    Fall    Rib Injury       HISTORY OF PRESENT ILLNESS  (Location/Symptom, Timing/Onset, Context/Setting, Quality, Duration, Modifying Factors, Severity.)      Carl Frias is a 89 y.o. male who presents with complaints of left lower lateral rib pain status post fall yesterday.  States he was sitting in a lawn chair and when he got up was feeling lightheaded, he went to the front door and braced himself for a while before using his walker to get inside.  States he got to the side of his bed and started to feel lightheaded again and fell hitting the side of his walker with his left ribs and then fell into the bed.  Denies hitting head or loss of consciousness.  States last fall was a few months ago, did not seek care at that time.  Denies headache, changes in vision, chest pain, palpitations, shortness of breath, cough, abdominal pain, focal weakness or numbness.  Family member at bedside reports concern that patient may not be drinking much water, does note that he is on a fluid restriction for his heart failure and that when he does drink fluids he drinks things like coffee or Coke Zero rather than water.  Patient is on Eliquis.    PAST MEDICAL / SURGICAL / SOCIAL / FAMILY HISTORY      has a past medical history of Diabetes mellitus (HCC), Hypertension, and Testicular cancer (HCC).       has a past surgical history that includes Testicle removal (2008); hernia repair (1988); eye surgery (Bilateral); and joint replacement (Bilateral).      Social History     Socioeconomic History    Marital status:      Spouse name: Not on file    Number of children: Not on file    Years of education: Not on file    Highest education

## 2024-11-25 LAB
EKG Q-T INTERVAL: 392 MS
EKG Q-T INTERVAL: 406 MS
EKG QRS DURATION: 68 MS
EKG QRS DURATION: 74 MS
EKG QTC CALCULATION (BAZETT): 401 MS
EKG QTC CALCULATION (BAZETT): 410 MS
EKG R AXIS: -11 DEGREES
EKG R AXIS: -17 DEGREES
EKG T AXIS: -8 DEGREES
EKG T AXIS: 3 DEGREES
EKG VENTRICULAR RATE: 59 BPM
EKG VENTRICULAR RATE: 66 BPM

## 2024-11-25 RX ORDER — LISINOPRIL 10 MG/1
10 TABLET ORAL DAILY
Qty: 90 TABLET | Refills: 3 | Status: SHIPPED | OUTPATIENT
Start: 2024-11-25

## 2024-11-25 NOTE — TELEPHONE ENCOUNTER
Requesting refill for Lisinopril to  [facility]     Last office visit  10/31/2024

## 2024-11-26 ENCOUNTER — HOSPITAL ENCOUNTER (OUTPATIENT)
Age: 89
Setting detail: SPECIMEN
Discharge: HOME | End: 2024-11-26
Payer: MEDICARE

## 2024-11-26 ENCOUNTER — HOSPITAL ENCOUNTER (OUTPATIENT)
Dept: OTHER | Age: 89
Discharge: HOME | End: 2024-11-26
Payer: MEDICARE

## 2024-11-26 VITALS
OXYGEN SATURATION: 98 % | RESPIRATION RATE: 18 BRPM | HEART RATE: 65 BPM | DIASTOLIC BLOOD PRESSURE: 48 MMHG | WEIGHT: 202.6 LBS | BODY MASS INDEX: 28.36 KG/M2 | SYSTOLIC BLOOD PRESSURE: 110 MMHG | HEIGHT: 71 IN

## 2024-11-26 LAB
ANION GAP SERPL CALCULATED.3IONS-SCNC: 14 MMOL/L (ref 9–16)
BNP SERPL-MCNC: 3550 PG/ML (ref 0–300)
BUN SERPL-MCNC: 52 MG/DL (ref 8–23)
CHLORIDE SERPL-SCNC: 104 MMOL/L (ref 98–107)
CO2 SERPL-SCNC: 21 MMOL/L (ref 20–31)
CREAT SERPL-MCNC: 1.9 MG/DL (ref 0.7–1.2)
GFR, ESTIMATED: 34 ML/MIN/1.73M2
POTASSIUM SERPL-SCNC: 4.2 MMOL/L (ref 3.7–5.3)
SODIUM SERPL-SCNC: 139 MMOL/L (ref 136–145)

## 2024-11-26 PROCEDURE — 83880 ASSAY OF NATRIURETIC PEPTIDE: CPT

## 2024-11-26 PROCEDURE — 80051 ELECTROLYTE PANEL: CPT

## 2024-11-26 PROCEDURE — 99211 OFF/OP EST MAY X REQ PHY/QHP: CPT

## 2024-11-26 PROCEDURE — 36415 COLL VENOUS BLD VENIPUNCTURE: CPT

## 2024-11-26 PROCEDURE — 84520 ASSAY OF UREA NITROGEN: CPT

## 2024-11-26 PROCEDURE — 82565 ASSAY OF CREATININE: CPT

## 2024-11-26 NOTE — PROGRESS NOTES
Called pt and spoke to daughterLucy confirming CHF Clinic apt scheduled tomorrow, 11/26/24.   
shortness of breath    Other Heart Failure Findings:   Cough at night or when lying down  Denies lower extremity edema  Denies fluid weight gain  Denies unusual fatigue  Denies early saiety or abdominal fullness    General Findings:  Lightheadedness with proposition changes      Comments:    - Pt was seen in CHF Clinic for reassessment, accompanied by his daughter. He had recently been seen in the ED on 11/23/24 from a fall. Pt reports the fall happened after momentary light headedness when attempting to get up from his chair and felt to possibly be dehydrated with elevated BUN 54 and Creatinine of 2.0 and was given a 500cc of NS.     Pt reports he is feeling well with mild pain on his right rib area. He reports that that he had not been having lightheadedness with position changes before and has not had any since. His weight is down 2.1lbs since his last visit and edema to his BLE have resolved. He reports baseline SOB with exertion only and a mild occasional cough at night. Pt awaits a new CPAP for his ALBERTINA. He denies any cardiac symptoms or unusual fatigue and reports normal appetite and elimination. Pt reports that at the time of his fall, he had noticed decreased urinary frequency and had less fluid intake at the time but he is now urinating normally.    Pt reports he has been compliant with his medications.  He states he is not longer adding salt to foods but is not consistently checking labels. Pt reports that he had been only  drinking  about 32 oz for a little while but is now back up to his usual amount of 64 oz daily. Reinforced education on a cardiac healthy diet with 1500mg sodium and 2 L fluid restrictions. Discussed fall safety and using his walker when getting out of a chair. I also encouraged him to start exercising regularly such as walking with his walker and or sit down exercises for his heart health.     Reviewed labs; BNP increased to 3550 from 2210, BUN 52 from 54 and Creatinine 1.9 from

## 2024-12-13 ENCOUNTER — TELEPHONE (OUTPATIENT)
Dept: INTERNAL MEDICINE CLINIC | Age: 89
End: 2024-12-13

## 2024-12-19 ENCOUNTER — TELEPHONE (OUTPATIENT)
Dept: OTHER | Age: 89
End: 2024-12-19

## 2024-12-19 NOTE — TELEPHONE ENCOUNTER
Pt's daughter calls to cancel CHF clinic appointment 12/20/24 due to anticipated snow in the morning. Writer will have JACQUES Donato RN call back to reschedule appointment.

## 2024-12-30 ENCOUNTER — TELEPHONE (OUTPATIENT)
Dept: OTHER | Age: 89
End: 2024-12-30

## 2024-12-30 NOTE — TELEPHONE ENCOUNTER
Reminder call to pt. Unable to leave a message on pt's number. Writer calls pt's wife's phone number, VM with reminder of CHF clinic appointment 12/32/24 at noon. Instructions to check in with lab for blood work, call back number [phone] also provided.

## 2024-12-31 ENCOUNTER — HOSPITAL ENCOUNTER (OUTPATIENT)
Age: 89
Setting detail: SPECIMEN
Discharge: HOME | End: 2024-12-31
Payer: MEDICARE

## 2024-12-31 ENCOUNTER — HOSPITAL ENCOUNTER (OUTPATIENT)
Dept: OTHER | Age: 89
Discharge: HOME | End: 2024-12-31
Payer: MEDICARE

## 2024-12-31 VITALS
RESPIRATION RATE: 18 BRPM | SYSTOLIC BLOOD PRESSURE: 112 MMHG | HEART RATE: 70 BPM | HEIGHT: 71 IN | BODY MASS INDEX: 28.17 KG/M2 | DIASTOLIC BLOOD PRESSURE: 50 MMHG | OXYGEN SATURATION: 100 % | WEIGHT: 201.2 LBS

## 2024-12-31 LAB
ANION GAP SERPL CALCULATED.3IONS-SCNC: 12 MMOL/L (ref 9–16)
BNP SERPL-MCNC: 5517 PG/ML (ref 0–300)
BUN SERPL-MCNC: 51 MG/DL (ref 8–23)
CHLORIDE SERPL-SCNC: 104 MMOL/L (ref 98–107)
CO2 SERPL-SCNC: 22 MMOL/L (ref 20–31)
CREAT SERPL-MCNC: 2.2 MG/DL (ref 0.7–1.2)
GFR, ESTIMATED: 28 ML/MIN/1.73M2
POTASSIUM SERPL-SCNC: 3.8 MMOL/L (ref 3.7–5.3)
SODIUM SERPL-SCNC: 138 MMOL/L (ref 136–145)

## 2024-12-31 PROCEDURE — 82565 ASSAY OF CREATININE: CPT

## 2024-12-31 PROCEDURE — 99211 OFF/OP EST MAY X REQ PHY/QHP: CPT

## 2024-12-31 PROCEDURE — 80051 ELECTROLYTE PANEL: CPT

## 2024-12-31 PROCEDURE — 84520 ASSAY OF UREA NITROGEN: CPT

## 2024-12-31 PROCEDURE — 83880 ASSAY OF NATRIURETIC PEPTIDE: CPT

## 2024-12-31 PROCEDURE — 36415 COLL VENOUS BLD VENIPUNCTURE: CPT

## 2024-12-31 NOTE — PROGRESS NOTES
strip 3    ACCU-CHEK ERIKA PLUS strip TEST BLOOD SUGAR THREE TIMES DAILY AS NEEDED 200 strip 11    blood glucose monitor strips by Other route 2 times daily True Metrix Blood Glucose Meter 100 strip 11    Elastic Bandages & Supports MISC Apply Truss strap for L groin tenderness/hernia 1 each 0    Blood Glucose Monitoring Suppl (ACCU-CHEK ERIKA) DEVIN 1 each by Does not apply route daily 1 Device 0     No current facility-administered medications for this encounter.          Heart Failure medications:    Metoprolol Succinate 25mg daily  Bumetanide 1mg daily  Spironolactone 25mg daily - On Hold     Get With The Guidelines:  Mr. Frias is on a Beta-Blocker for (HFrEF) (systolic) EF </= 40%  Yes                       Mr. Frias is on an Ace-Inhibitor / ARB / Entresto for (HFrEF) (systolic) EF </= 40% No:                                           Mr. Frias is on a Aldosterone Receptor Antagonist for (HFrEF) (systolic) EF </= 35% or EF </= 40% with MI (Okay to use if SCr </= 2.5mg/dL in men, SCr </= 2mg/dL in women; Potassium < 5.0meq/L) Yes                   Mr. Frias is on a Diuretic Yes  Mr. Frias is on a SGLT2 Inhibitor No: Pt was taking Bumex 1 mg every other day instead of daily   Heart Failure Medication Adherence: Currently taking their Heart Failure medications as prescribed.     Non-Heart Failure Medication Adherence: Currently taking their non-Heart Failure medications as prescribed.     Medication Adverse Reactions Noted: none     Barriers to Medication: Cost     Recent Medication changes: None      Further Assessment / Education     Verbally reviewed importance of medication compliance with patient; patient verbalized understanding.    Discussed 1500mg/day sodium restricted diet; patient verbalized understanding.    Moderate daily exercise encouraged as tolerated. Discussed rest breaks as needed; patient verbalized understanding.    Patient instructed to weigh self at the same time of each day, using same clothes and

## 2025-01-01 ENCOUNTER — HOSPITAL ENCOUNTER (INPATIENT)
Age: 89
LOS: 5 days | Discharge: HOME | DRG: 291 | End: 2025-01-06
Attending: EMERGENCY MEDICINE
Payer: MEDICARE

## 2025-01-01 DIAGNOSIS — I50.9 CHRONIC CONGESTIVE HEART FAILURE, UNSPECIFIED HEART FAILURE TYPE (HCC): Primary | ICD-10-CM

## 2025-01-01 DIAGNOSIS — D64.9 ANEMIA, UNSPECIFIED TYPE: ICD-10-CM

## 2025-01-01 DIAGNOSIS — N18.4 STAGE 4 CHRONIC KIDNEY DISEASE (HCC): ICD-10-CM

## 2025-01-01 DIAGNOSIS — R53.1 WEAKNESS: ICD-10-CM

## 2025-01-01 DIAGNOSIS — D50.8 OTHER IRON DEFICIENCY ANEMIA: ICD-10-CM

## 2025-01-01 DIAGNOSIS — R53.83 OTHER FATIGUE: ICD-10-CM

## 2025-01-01 LAB
ALBUMIN SERPL-MCNC: 3.1 G/DL (ref 3.5–5.2)
ALP SERPL-CCNC: 50 U/L (ref 40–129)
ALT SERPL-CCNC: 6 U/L (ref 10–50)
ANION GAP SERPL CALCULATED.3IONS-SCNC: 13 MMOL/L (ref 9–16)
AST SERPL-CCNC: 12 U/L (ref 10–50)
BASOPHILS # BLD: 0.1 K/UL (ref 0–0.2)
BASOPHILS NFR BLD: 1 % (ref 0–2)
BILIRUB SERPL-MCNC: 0.2 MG/DL (ref 0–1.2)
BUN SERPL-MCNC: 55 MG/DL (ref 8–23)
CALCIUM SERPL-MCNC: 8.8 MG/DL (ref 8.6–10.4)
CHLORIDE SERPL-SCNC: 103 MMOL/L (ref 98–107)
CO2 SERPL-SCNC: 22 MMOL/L (ref 20–31)
CREAT SERPL-MCNC: 2.3 MG/DL (ref 0.7–1.2)
EOSINOPHIL # BLD: 0.2 K/UL (ref 0–0.4)
EOSINOPHILS RELATIVE PERCENT: 2 % (ref 0–4)
ERYTHROCYTE [DISTWIDTH] IN BLOOD BY AUTOMATED COUNT: 14.7 % (ref 11.5–14.9)
GFR, ESTIMATED: 27 ML/MIN/1.73M2
GLUCOSE BLD-MCNC: 68 MG/DL (ref 75–110)
GLUCOSE SERPL-MCNC: 100 MG/DL (ref 74–99)
HCT VFR BLD AUTO: 20.5 % (ref 41–53)
HGB BLD-MCNC: 6.7 G/DL (ref 13.5–17.5)
LYMPHOCYTES NFR BLD: 1.2 K/UL (ref 1–4.8)
LYMPHOCYTES RELATIVE PERCENT: 15 % (ref 24–44)
MCH RBC QN AUTO: 27.3 PG (ref 26–34)
MCHC RBC AUTO-ENTMCNC: 32.7 G/DL (ref 31–37)
MCV RBC AUTO: 83.4 FL (ref 80–100)
MONOCYTES NFR BLD: 0.6 K/UL (ref 0.1–1.3)
MONOCYTES NFR BLD: 8 % (ref 1–7)
NEUTROPHILS NFR BLD: 74 % (ref 36–66)
NEUTS SEG NFR BLD: 5.9 K/UL (ref 1.3–9.1)
PLATELET # BLD AUTO: 504 K/UL (ref 150–450)
PMV BLD AUTO: 6.2 FL (ref 6–12)
POTASSIUM SERPL-SCNC: 4.2 MMOL/L (ref 3.7–5.3)
PROT SERPL-MCNC: 6.7 G/DL (ref 6.6–8.7)
RBC # BLD AUTO: 2.46 M/UL (ref 4.5–5.9)
SODIUM SERPL-SCNC: 138 MMOL/L (ref 136–145)
WBC OTHER # BLD: 8 K/UL (ref 3.5–11)

## 2025-01-01 PROCEDURE — 85025 COMPLETE CBC W/AUTO DIFF WBC: CPT

## 2025-01-01 PROCEDURE — 86850 RBC ANTIBODY SCREEN: CPT

## 2025-01-01 PROCEDURE — 36430 TRANSFUSION BLD/BLD COMPNT: CPT

## 2025-01-01 PROCEDURE — 82947 ASSAY GLUCOSE BLOOD QUANT: CPT

## 2025-01-01 PROCEDURE — 2500000003 HC RX 250 WO HCPCS

## 2025-01-01 PROCEDURE — 81001 URINALYSIS AUTO W/SCOPE: CPT

## 2025-01-01 PROCEDURE — 86920 COMPATIBILITY TEST SPIN: CPT

## 2025-01-01 PROCEDURE — 36415 COLL VENOUS BLD VENIPUNCTURE: CPT

## 2025-01-01 PROCEDURE — 99285 EMERGENCY DEPT VISIT HI MDM: CPT

## 2025-01-01 PROCEDURE — 2060000000 HC ICU INTERMEDIATE R&B

## 2025-01-01 PROCEDURE — 80053 COMPREHEN METABOLIC PANEL: CPT

## 2025-01-01 PROCEDURE — P9016 RBC LEUKOCYTES REDUCED: HCPCS

## 2025-01-01 PROCEDURE — 86900 BLOOD TYPING SEROLOGIC ABO: CPT

## 2025-01-01 PROCEDURE — 30233N1 TRANSFUSION OF NONAUTOLOGOUS RED BLOOD CELLS INTO PERIPHERAL VEIN, PERCUTANEOUS APPROACH: ICD-10-PCS

## 2025-01-01 PROCEDURE — 86901 BLOOD TYPING SEROLOGIC RH(D): CPT

## 2025-01-01 RX ORDER — BUMETANIDE 1 MG/1
1 TABLET ORAL DAILY
Status: DISCONTINUED | OUTPATIENT
Start: 2025-01-01 | End: 2025-01-06 | Stop reason: HOSPADM

## 2025-01-01 RX ORDER — SODIUM CHLORIDE 0.9 % (FLUSH) 0.9 %
5-40 SYRINGE (ML) INJECTION PRN
Status: DISCONTINUED | OUTPATIENT
Start: 2025-01-01 | End: 2025-01-06 | Stop reason: HOSPADM

## 2025-01-01 RX ORDER — LISINOPRIL 10 MG/1
10 TABLET ORAL DAILY
Status: DISCONTINUED | OUTPATIENT
Start: 2025-01-01 | End: 2025-01-06 | Stop reason: HOSPADM

## 2025-01-01 RX ORDER — POLYETHYLENE GLYCOL 3350 17 G/17G
17 POWDER, FOR SOLUTION ORAL DAILY PRN
Status: DISCONTINUED | OUTPATIENT
Start: 2025-01-01 | End: 2025-01-06 | Stop reason: HOSPADM

## 2025-01-01 RX ORDER — ACETAMINOPHEN 650 MG/1
650 SUPPOSITORY RECTAL EVERY 6 HOURS PRN
Status: DISCONTINUED | OUTPATIENT
Start: 2025-01-01 | End: 2025-01-06 | Stop reason: HOSPADM

## 2025-01-01 RX ORDER — ASPIRIN 81 MG/1
81 TABLET ORAL EVERY OTHER DAY
Status: DISCONTINUED | OUTPATIENT
Start: 2025-01-01 | End: 2025-01-06 | Stop reason: HOSPADM

## 2025-01-01 RX ORDER — MIDODRINE HYDROCHLORIDE 5 MG/1
5 TABLET ORAL
Status: DISCONTINUED | OUTPATIENT
Start: 2025-01-01 | End: 2025-01-06 | Stop reason: HOSPADM

## 2025-01-01 RX ORDER — INSULIN LISPRO 100 [IU]/ML
0-4 INJECTION, SOLUTION INTRAVENOUS; SUBCUTANEOUS
Status: DISCONTINUED | OUTPATIENT
Start: 2025-01-01 | End: 2025-01-06 | Stop reason: HOSPADM

## 2025-01-01 RX ORDER — AMLODIPINE BESYLATE 5 MG/1
5 TABLET ORAL DAILY
Status: DISCONTINUED | OUTPATIENT
Start: 2025-01-01 | End: 2025-01-06 | Stop reason: HOSPADM

## 2025-01-01 RX ORDER — ONDANSETRON 4 MG/1
4 TABLET, ORALLY DISINTEGRATING ORAL EVERY 8 HOURS PRN
Status: DISCONTINUED | OUTPATIENT
Start: 2025-01-01 | End: 2025-01-06 | Stop reason: HOSPADM

## 2025-01-01 RX ORDER — SODIUM CHLORIDE 9 MG/ML
INJECTION, SOLUTION INTRAVENOUS PRN
Status: DISCONTINUED | OUTPATIENT
Start: 2025-01-01 | End: 2025-01-06 | Stop reason: HOSPADM

## 2025-01-01 RX ORDER — FINASTERIDE 5 MG/1
5 TABLET, FILM COATED ORAL DAILY
Status: DISCONTINUED | OUTPATIENT
Start: 2025-01-01 | End: 2025-01-06 | Stop reason: HOSPADM

## 2025-01-01 RX ORDER — METOPROLOL SUCCINATE 25 MG/1
25 TABLET, EXTENDED RELEASE ORAL DAILY
Status: DISCONTINUED | OUTPATIENT
Start: 2025-01-01 | End: 2025-01-06 | Stop reason: HOSPADM

## 2025-01-01 RX ORDER — TAMSULOSIN HYDROCHLORIDE 0.4 MG/1
0.4 CAPSULE ORAL DAILY
Status: DISCONTINUED | OUTPATIENT
Start: 2025-01-01 | End: 2025-01-06 | Stop reason: HOSPADM

## 2025-01-01 RX ORDER — SPIRONOLACTONE 25 MG/1
25 TABLET ORAL DAILY
Status: DISCONTINUED | OUTPATIENT
Start: 2025-01-01 | End: 2025-01-06 | Stop reason: HOSPADM

## 2025-01-01 RX ORDER — ACETAMINOPHEN 325 MG/1
650 TABLET ORAL EVERY 6 HOURS PRN
Status: DISCONTINUED | OUTPATIENT
Start: 2025-01-01 | End: 2025-01-06 | Stop reason: HOSPADM

## 2025-01-01 RX ORDER — ONDANSETRON 2 MG/ML
4 INJECTION INTRAMUSCULAR; INTRAVENOUS EVERY 6 HOURS PRN
Status: DISCONTINUED | OUTPATIENT
Start: 2025-01-01 | End: 2025-01-06 | Stop reason: HOSPADM

## 2025-01-01 RX ORDER — SODIUM CHLORIDE 0.9 % (FLUSH) 0.9 %
5-40 SYRINGE (ML) INJECTION EVERY 12 HOURS SCHEDULED
Status: DISCONTINUED | OUTPATIENT
Start: 2025-01-01 | End: 2025-01-06 | Stop reason: HOSPADM

## 2025-01-01 RX ORDER — GLIPIZIDE 5 MG/1
10 TABLET ORAL 2 TIMES DAILY
Status: DISCONTINUED | OUTPATIENT
Start: 2025-01-01 | End: 2025-01-06 | Stop reason: HOSPADM

## 2025-01-01 RX ADMIN — SODIUM CHLORIDE, PRESERVATIVE FREE 10 ML: 5 INJECTION INTRAVENOUS at 21:00

## 2025-01-01 ASSESSMENT — PAIN - FUNCTIONAL ASSESSMENT
PAIN_FUNCTIONAL_ASSESSMENT: NONE - DENIES PAIN

## 2025-01-01 NOTE — CONSENT
Informed Consent for Blood Component Transfusion Note    I have discussed with the patient the rationale for blood component transfusion; its benefits in treating or preventing fatigue, organ damage, or death; and its risk which includes mild transfusion reactions, rare risk of blood borne infection, or more serious but rare reactions. I have discussed the alternatives to transfusion, including the risk and consequences of not receiving transfusion. The patient had an opportunity to ask questions and had agreed to proceed with transfusion of blood components.    Electronically signed by Victor Manuel Sevilla MD on 1/1/25 at 5:16 PM EST

## 2025-01-01 NOTE — ED NOTES
Report given to SUKHWINDER Bedolla from [facility].   Report method by phone   The following was reviewed with receiving RN:   Current vital signs:  BP (!) 127/39   Pulse 71   Temp 97.7 °F (36.5 °C) (Oral)   Resp 15   Wt 91.2 kg (201 lb)   SpO2 98%   BMI 28.03 kg/m²                MEWS Score: 1     Any medication or safety alerts were reviewed. Any pending diagnostics and notifications were also reviewed, as well as any safety concerns or issues, abnormal labs, abnormal imaging, and abnormal assessment findings. Questions were answered.

## 2025-01-01 NOTE — H&P
[facility]  IN-PATIENT SERVICE  [facility]  IN-PATIENT SERVICE   [facility]     HISTORY AND PHYSICAL EXAMINATION            Date:   1/1/2025  Patient name:  Carl Frias  Date of admission:  1/1/2025 12:52 PM  MRN:   [id number]  Account:  [id number]  YOB: 1935  PCP:    No primary care provider on file.  Room:   [address] [place] [address]  Code Status:    Prior    Chief Complaint:     Chief Complaint   Patient presents with    high BNP       History Obtained From:     patient, electronic medical record    History of Present Illness:     This is 89 years old f male with a past medical history of heart failure, type 2 diabetes mellitus, hypertension, and testicular cancer.    Patient presented from CHF clinic with increased proBNP 5517 with lower extremity edema.  On 11/26 was 3550 with no signs of fluid overload.  And on 10/30/2024 was 2210 with edema.  Patient denied any bleeding in stool or urine, shortness of breath, chest pain, or fever/chills, patient hemoglobin today was 6.7.  Patient was started on 1 unit red blood cells transfusion in the ED.  Kidney function is deteriorated with creatinine 2.3 (baseline about 2) and BUN 55. Liver function WNL. On examination, patient had lower limb edema +2 which is more than his baseline.  I discussed patient code with the patient and his daughter in the room.  Patient requested to change his code to full code.    Patient was admitted for further management of his symptoms      Past Medical History:     Past Medical History:   Diagnosis Date    Diabetes mellitus (HCC)     Hypertension     Testicular cancer (HCC)     sarcoma of left testis        Past SurgicalHistory:     Past Surgical History:   Procedure Laterality Date    EYE SURGERY Bilateral     HERNIA REPAIR  1988    left inguinal    JOINT REPLACEMENT Bilateral     TESTICLE REMOVAL  2008        Medications Prior to Admission:  Glucose Meter 21   Aleksandr Finney MD   Elastic Bandages & Supports MISC Apply Truss strap for L groin tenderness/hernia 21   Tabby Arevalo APRN - CNP   Blood Glucose Monitoring Suppl (ACCU-CHEK ERIKA) DEVIN 1 each by Does not apply route daily 21   Aleksandr Finney MD        Allergies:     Rivaroxaban    Social History:     Tobacco:    reports that he quit smoking about 57 years ago. His smoking use included cigarettes. He started smoking about 71 years ago. He has a 4.2 pack-year smoking history. He has never used smokeless tobacco.  Alcohol:      reports no history of alcohol use.  Drug Use:  reports no history of drug use.    Family History:     Family History   Problem Relation Age of Onset    Other Mother         lung disease       Review of Systems:     Positive and Negative as described in HPI.    Review of Systems   Constitutional:  Negative for activity change, appetite change, chills, diaphoresis, fatigue and fever.   HENT:  Negative for congestion, dental problem, drooling, ear discharge and ear pain.    Eyes:  Negative for pain, discharge and itching.   Respiratory:  Negative for apnea, cough, choking, chest tightness and shortness of breath.    Cardiovascular:  Negative for chest pain and palpitations.   Gastrointestinal:  Negative for abdominal distention, abdominal pain, anal bleeding, blood in stool and constipation.   Genitourinary:  Negative for difficulty urinating, dysuria, enuresis and flank pain.   Musculoskeletal:  Negative for arthralgias, back pain, gait problem and joint swelling.   Skin:  Negative for color change, pallor and rash.   Neurological:  Negative for dizziness, facial asymmetry, light-headedness and headaches.       Physical Exam:   /74   Pulse 63   Temp 97.7 °F (36.5 °C) (Oral)   Resp 21   Wt 91.2 kg (201 lb)   SpO2 97%   BMI 28.03 kg/m²   Temp (24hrs), Av.8 °F (36.6 °C), Min:97.5 °F (36.4 °C), Max:98.3 °F (36.8 °C)    No results for

## 2025-01-01 NOTE — ED PROVIDER NOTES
[facility] EMERGENCY DEPARTMENT  EMERGENCY DEPARTMENT ENCOUNTER      Pt Name: Carl Frias  MRN: [id number]  Birthdate 1935  Date of evaluation: 1/1/25      CHIEF COMPLAINT       Chief Complaint   Patient presents with    high BNP     HISTORY OF PRESENT ILLNESS   HPI 89 y.o. male presents with c/o laboratory abnormalities.  Patient sent to this emergency department for treatment of ROXI, patient had lab. Kulwant studies performed yesterday creatinine 2.2 baseline of 1.9.  BUN 51 baseline 50.  Electrolytes are otherwise within normal limits proBNP is elevated.  Patient reports that he otherwise feels well he does have chronic fatigue he is short of breath at night when he lays down flat.  He denies any coughing fevers.  He reports good urine output.  He is on anticoagulants he has not noted any blood in his stool.  He has bilateral edema in his legs.  He denies any chest pain.  REVIEW OF SYSTEMS       Review of Systems  10 systems reviewed and negative unless otherwise noted in the HPI  PAST MEDICAL HISTORY     Past Medical History:   Diagnosis Date    Diabetes mellitus (HCC)     Hypertension     Testicular cancer (HCC)     sarcoma of left testis       SURGICAL HISTORY       Past Surgical History:   Procedure Laterality Date    EYE SURGERY Bilateral     HERNIA REPAIR  1988    left inguinal    JOINT REPLACEMENT Bilateral     TESTICLE REMOVAL  2008       CURRENT MEDICATIONS       Previous Medications    ACCU-CHEK ERIKA PLUS STRIP    TEST BLOOD SUGAR THREE TIMES DAILY AS NEEDED    ALCOHOL SWABS PADS    Use one swab to cleanse skin prior to checking glucose twice daily.    AMLODIPINE (NORVASC) 5 MG TABLET    TAKE 1 TABLET EVERY DAY    APIXABAN (ELIQUIS) 2.5 MG TABS TABLET    Take 1 tablet by mouth 2 times daily    ASPIRIN 81 MG EC TABLET    Take 1 tablet by mouth every other day    BLOOD GLUCOSE CALIBRATION (TRUE METRIX LEVEL 2) NORMAL SOLN        BLOOD GLUCOSE MONITOR STRIPS    by Other route 2 times daily True

## 2025-01-02 ENCOUNTER — APPOINTMENT (OUTPATIENT)
Age: 89
DRG: 291 | End: 2025-01-02
Payer: MEDICARE

## 2025-01-02 PROBLEM — I50.9 CHRONIC CONGESTIVE HEART FAILURE (HCC): Status: ACTIVE | Noted: 2025-01-02

## 2025-01-02 PROBLEM — N18.4 STAGE 4 CHRONIC KIDNEY DISEASE (HCC): Status: ACTIVE | Noted: 2025-01-02

## 2025-01-02 LAB
ANION GAP SERPL CALCULATED.3IONS-SCNC: 10 MMOL/L (ref 9–16)
BACTERIA URNS QL MICRO: ABNORMAL
BASOPHILS # BLD: 0.09 K/UL (ref 0–0.2)
BASOPHILS NFR BLD: 1 % (ref 0–2)
BILIRUB UR QL STRIP: NEGATIVE
BUN SERPL-MCNC: 53 MG/DL (ref 8–23)
CALCIUM SERPL-MCNC: 8.9 MG/DL (ref 8.6–10.4)
CASTS #/AREA URNS LPF: ABNORMAL /LPF
CHLORIDE SERPL-SCNC: 107 MMOL/L (ref 98–107)
CLARITY UR: ABNORMAL
CO2 SERPL-SCNC: 22 MMOL/L (ref 20–31)
COLOR UR: YELLOW
CREAT SERPL-MCNC: 2.1 MG/DL (ref 0.7–1.2)
EOSINOPHIL # BLD: 0.35 K/UL (ref 0–0.4)
EOSINOPHILS RELATIVE PERCENT: 4 % (ref 0–4)
EPI CELLS #/AREA URNS HPF: ABNORMAL /HPF
ERYTHROCYTE [DISTWIDTH] IN BLOOD BY AUTOMATED COUNT: 14.2 % (ref 11.5–14.9)
FERRITIN SERPL-MCNC: 225 NG/ML
FOLATE SERPL-MCNC: 10.8 NG/ML (ref 4.8–24.2)
FREE KAPPA/LAMBDA RATIO: 0.74 (ref 0.22–1.74)
GFR, ESTIMATED: 30 ML/MIN/1.73M2
GLUCOSE BLD-MCNC: 133 MG/DL (ref 75–110)
GLUCOSE BLD-MCNC: 239 MG/DL (ref 75–110)
GLUCOSE BLD-MCNC: 250 MG/DL (ref 75–110)
GLUCOSE BLD-MCNC: 81 MG/DL (ref 75–110)
GLUCOSE BLD-MCNC: 85 MG/DL (ref 75–110)
GLUCOSE SERPL-MCNC: 77 MG/DL (ref 74–99)
GLUCOSE UR STRIP-MCNC: NEGATIVE MG/DL
HCT VFR BLD AUTO: 22.1 % (ref 41–53)
HCT VFR BLD AUTO: 22.5 % (ref 41–53)
HCT VFR BLD AUTO: 22.5 % (ref 41–53)
HCT VFR BLD AUTO: 25.2 % (ref 41–53)
HGB BLD-MCNC: 7.3 G/DL (ref 13.5–17.5)
HGB BLD-MCNC: 7.4 G/DL (ref 13.5–17.5)
HGB BLD-MCNC: 7.4 G/DL (ref 13.5–17.5)
HGB BLD-MCNC: 8.3 G/DL (ref 13.5–17.5)
HGB UR QL STRIP.AUTO: ABNORMAL
IRON SATN MFR SERPL: 7 % (ref 20–55)
IRON SERPL-MCNC: 13 UG/DL (ref 61–157)
KAPPA LC FREE SER-MCNC: 97.5 MG/L
KETONES UR STRIP-MCNC: NEGATIVE MG/DL
LAMBDA LC FREE SERPL-MCNC: 131 MG/L (ref 4.2–27.7)
LDH SERPL-CCNC: 132 U/L (ref 135–225)
LEUKOCYTE ESTERASE UR QL STRIP: ABNORMAL
LYMPHOCYTES NFR BLD: 2.09 K/UL (ref 1–4.8)
LYMPHOCYTES RELATIVE PERCENT: 24 % (ref 24–44)
MCH RBC QN AUTO: 27.9 PG (ref 26–34)
MCHC RBC AUTO-ENTMCNC: 32.9 G/DL (ref 31–37)
MCV RBC AUTO: 84.9 FL (ref 80–100)
MONOCYTES NFR BLD: 0.78 K/UL (ref 0.1–1.3)
MONOCYTES NFR BLD: 9 % (ref 1–7)
MORPHOLOGY: ABNORMAL
NEUTROPHILS NFR BLD: 62 % (ref 36–66)
NEUTS SEG NFR BLD: 5.39 K/UL (ref 1.3–9.1)
NITRITE UR QL STRIP: NEGATIVE
PH UR STRIP: 6 [PH] (ref 5–8)
PLATELET # BLD AUTO: 458 K/UL (ref 150–450)
PMV BLD AUTO: 6.2 FL (ref 6–12)
POTASSIUM SERPL-SCNC: 4 MMOL/L (ref 3.7–5.3)
PROT UR STRIP-MCNC: ABNORMAL MG/DL
RBC # BLD AUTO: 2.65 M/UL (ref 4.5–5.9)
RBC #/AREA URNS HPF: ABNORMAL /HPF
RETICS # AUTO: 0.04 M/UL (ref 0.02–0.1)
RETICS/RBC NFR AUTO: 1.5 % (ref 0.5–2)
SODIUM SERPL-SCNC: 139 MMOL/L (ref 136–145)
SP GR UR STRIP: 1.01 (ref 1–1.03)
TIBC SERPL-MCNC: 184 UG/DL (ref 250–450)
UNSATURATED IRON BINDING CAPACITY: 171 UG/DL (ref 112–347)
UROBILINOGEN UR STRIP-ACNC: NORMAL EU/DL (ref 0–1)
VIT B12 SERPL-MCNC: 373 PG/ML (ref 232–1245)
WBC #/AREA URNS HPF: ABNORMAL /HPF
WBC OTHER # BLD: 8.7 K/UL (ref 3.5–11)

## 2025-01-02 PROCEDURE — 83516 IMMUNOASSAY NONANTIBODY: CPT

## 2025-01-02 PROCEDURE — 97530 THERAPEUTIC ACTIVITIES: CPT

## 2025-01-02 PROCEDURE — 85018 HEMOGLOBIN: CPT

## 2025-01-02 PROCEDURE — 93306 TTE W/DOPPLER COMPLETE: CPT | Performed by: INTERNAL MEDICINE

## 2025-01-02 PROCEDURE — 6360000004 HC RX CONTRAST MEDICATION

## 2025-01-02 PROCEDURE — 83010 ASSAY OF HAPTOGLOBIN QUANT: CPT

## 2025-01-02 PROCEDURE — 83540 ASSAY OF IRON: CPT

## 2025-01-02 PROCEDURE — 87086 URINE CULTURE/COLONY COUNT: CPT

## 2025-01-02 PROCEDURE — 99223 1ST HOSP IP/OBS HIGH 75: CPT | Performed by: INTERNAL MEDICINE

## 2025-01-02 PROCEDURE — 2060000000 HC ICU INTERMEDIATE R&B

## 2025-01-02 PROCEDURE — 97166 OT EVAL MOD COMPLEX 45 MIN: CPT

## 2025-01-02 PROCEDURE — 6370000000 HC RX 637 (ALT 250 FOR IP)

## 2025-01-02 PROCEDURE — 82784 ASSAY IGA/IGD/IGG/IGM EACH: CPT

## 2025-01-02 PROCEDURE — 87186 SC STD MICRODIL/AGAR DIL: CPT

## 2025-01-02 PROCEDURE — APPNB60 APP NON BILLABLE TIME 46-60 MINS: Performed by: NURSE PRACTITIONER

## 2025-01-02 PROCEDURE — C8929 TTE W OR WO FOL WCON,DOPPLER: HCPCS

## 2025-01-02 PROCEDURE — 2500000003 HC RX 250 WO HCPCS

## 2025-01-02 PROCEDURE — 82746 ASSAY OF FOLIC ACID SERUM: CPT

## 2025-01-02 PROCEDURE — 82607 VITAMIN B-12: CPT

## 2025-01-02 PROCEDURE — 85025 COMPLETE CBC W/AUTO DIFF WBC: CPT

## 2025-01-02 PROCEDURE — 83550 IRON BINDING TEST: CPT

## 2025-01-02 PROCEDURE — 2580000003 HC RX 258: Performed by: INTERNAL MEDICINE

## 2025-01-02 PROCEDURE — 85045 AUTOMATED RETICULOCYTE COUNT: CPT

## 2025-01-02 PROCEDURE — 82947 ASSAY GLUCOSE BLOOD QUANT: CPT

## 2025-01-02 PROCEDURE — 97535 SELF CARE MNGMENT TRAINING: CPT

## 2025-01-02 PROCEDURE — 82728 ASSAY OF FERRITIN: CPT

## 2025-01-02 PROCEDURE — 83615 LACTATE (LD) (LDH) ENZYME: CPT

## 2025-01-02 PROCEDURE — 83521 IG LIGHT CHAINS FREE EACH: CPT

## 2025-01-02 PROCEDURE — 6360000002 HC RX W HCPCS: Performed by: INTERNAL MEDICINE

## 2025-01-02 PROCEDURE — 36415 COLL VENOUS BLD VENIPUNCTURE: CPT

## 2025-01-02 PROCEDURE — 85014 HEMATOCRIT: CPT

## 2025-01-02 PROCEDURE — 80048 BASIC METABOLIC PNL TOTAL CA: CPT

## 2025-01-02 PROCEDURE — 99222 1ST HOSP IP/OBS MODERATE 55: CPT | Performed by: STUDENT IN AN ORGANIZED HEALTH CARE EDUCATION/TRAINING PROGRAM

## 2025-01-02 PROCEDURE — 87077 CULTURE AEROBIC IDENTIFY: CPT

## 2025-01-02 RX ORDER — CEPHALEXIN 500 MG/1
500 CAPSULE ORAL EVERY 12 HOURS SCHEDULED
Status: DISCONTINUED | OUTPATIENT
Start: 2025-01-02 | End: 2025-01-04

## 2025-01-02 RX ORDER — SODIUM FERRIC GLUCONATE COMPLEX IN SUCROSE 12.5 MG/ML
125 INJECTION INTRAVENOUS DAILY
Status: DISCONTINUED | OUTPATIENT
Start: 2025-01-02 | End: 2025-01-02

## 2025-01-02 RX ADMIN — INSULIN LISPRO 2 UNITS: 100 INJECTION, SOLUTION INTRAVENOUS; SUBCUTANEOUS at 20:29

## 2025-01-02 RX ADMIN — MICONAZOLE NITRATE: 20 POWDER TOPICAL at 01:53

## 2025-01-02 RX ADMIN — PERFLUTREN 2.5 ML: 6.52 INJECTION, SUSPENSION INTRAVENOUS at 14:52

## 2025-01-02 RX ADMIN — MIDODRINE HYDROCHLORIDE 5 MG: 5 TABLET ORAL at 13:20

## 2025-01-02 RX ADMIN — TAMSULOSIN HYDROCHLORIDE 0.4 MG: 0.4 CAPSULE ORAL at 09:30

## 2025-01-02 RX ADMIN — MIDODRINE HYDROCHLORIDE 5 MG: 5 TABLET ORAL at 09:30

## 2025-01-02 RX ADMIN — SODIUM CHLORIDE 125 MG: 9 INJECTION, SOLUTION INTRAVENOUS at 22:48

## 2025-01-02 RX ADMIN — METOPROLOL SUCCINATE 25 MG: 25 TABLET, EXTENDED RELEASE ORAL at 09:30

## 2025-01-02 RX ADMIN — SODIUM CHLORIDE, PRESERVATIVE FREE 10 ML: 5 INJECTION INTRAVENOUS at 22:43

## 2025-01-02 RX ADMIN — SODIUM CHLORIDE, PRESERVATIVE FREE 10 ML: 5 INJECTION INTRAVENOUS at 09:30

## 2025-01-02 RX ADMIN — SODIUM CHLORIDE, PRESERVATIVE FREE 10 ML: 5 INJECTION INTRAVENOUS at 20:30

## 2025-01-02 RX ADMIN — MICONAZOLE NITRATE: 20 POWDER TOPICAL at 20:30

## 2025-01-02 RX ADMIN — FINASTERIDE 5 MG: 5 TABLET, FILM COATED ORAL at 09:30

## 2025-01-02 RX ADMIN — MICONAZOLE NITRATE: 20 POWDER TOPICAL at 09:32

## 2025-01-02 RX ADMIN — INSULIN LISPRO 1 UNITS: 100 INJECTION, SOLUTION INTRAVENOUS; SUBCUTANEOUS at 18:03

## 2025-01-02 NOTE — WOUND CARE
[facility]  Consult Note       NAME:  Carl Frias  MEDICAL RECORD NUMBER:  [id number]  AGE: 89 y.o.   GENDER: male  : 1935  TODAY'S DATE:  2025    Subjective: Pt admitted for anemia. Hgb 6.7 noted with out patient labs. Discovered upon admission blanchable erythema and stage two pressure injury to coccyx.       Carl Frias is a 89 y.o. male with inpatient referral to [facility] for: coccyx wound.       Wound Identification:  Wound Type: pressure  Contributing Factors: edema, diabetes, decreased mobility, obesity, anticoagulation therapy, and intermittent incontinence.      Wound History: pt and caretaker, his wife Naomi, stated she was unaware of wound   Current Wound Care Treatment:  none     Patient Goal of Care:  [x] Wound Healing  [] Odor Control  [] Palliative Care  [] Pain Control   [x] Other: infection prevention        PAST MEDICAL HISTORY        Diagnosis Date    Diabetes mellitus (HCC)     Hypertension     Testicular cancer (HCC)     sarcoma of left testis       PAST SURGICAL HISTORY    Past Surgical History:   Procedure Laterality Date    EYE SURGERY Bilateral     HERNIA REPAIR      left inguinal    JOINT REPLACEMENT Bilateral     TESTICLE REMOVAL         FAMILY HISTORY    Family History   Problem Relation Age of Onset    Other Mother         lung disease       SOCIAL HISTORY    Social History     Tobacco Use    Smoking status: Former     Current packs/day: 0.00     Average packs/day: 0.3 packs/day for 14.0 years (4.2 ttl pk-yrs)     Types: Cigarettes     Start date:      Quit date:      Years since quittin.0    Smokeless tobacco: Never    Tobacco comments:     quit 42 years ago    Substance Use Topics    Alcohol use: No     Comment: quite 42 years ago     Drug use: No         ALLERGIES    Allergies   Allergen Reactions    Rivaroxaban Other (See Comments)     Hematuria recurrent       HOME MEDICATIONS  Prior to Admission

## 2025-01-02 NOTE — CONSULTS
Diabetic peripheral neuropathy (HCC)     History of inguinal hernia repair     Chronic combined systolic and diastolic congestive heart failure (HCC)     Other constipation     Hydrocele, unspecified     Low back pain, unspecified     Other specified abdominal hernia without obstruction or gangrene     Radiculopathy, lumbosacral region     Other atopic dermatitis     Acute kidney failure, unspecified (HCC)     Unstable gait     Easy bruisability     Acute on chronic combined systolic (congestive) and diastolic (congestive) heart failure (HCC)     Acquired left foot drop     Secondary hypercoagulable state ([facility])     Systolic CHF, acute (HCC)     Hypertensive heart disease with heart failure ([facility])     Iron deficiency anemia, unspecified     ROXI (acute kidney injury) ([facility])     Deficiency of other specified B group vitamins     Diarrhea due to drug     Chronic kidney disease, stage 3a (HCC)     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)     Hypokalemia     Stage 3b chronic kidney disease (HCC)     Acquired absence of other genital organ(s)     Calculus of gallbladder with acute cholecystitis without obstruction     Difficulty in walking, not elsewhere classified     Hypo-osmolality and hyponatremia     Hypomagnesemia     Localized edema     Long term (current) use of oral hypoglycemic drugs     Need for assistance with personal care     Obesity, unspecified     Old myocardial infarction     Other abnormalities of gait and mobility     Other reduced mobility     Other thrombophilia (HCC)     Patient's other noncompliance with medication regimen for other reason     History of falling     Personal history of irradiation     Personal history of malignant neoplasm of testis     Personal history of nicotine dependence     Pleural effusion, not elsewhere classified     Polyosteoarthritis, unspecified     Unspecified osteoarthritis, unspecified site     Presence of artificial knee joint, bilateral      Spontaneous ecchymoses     Unspecified Escherichia coli (E. coli) as the cause of diseases classified elsewhere     Unspecified hearing loss, unspecified ear     Unspecified hydronephrosis     Unsteadiness on feet     Urinary tract infection, site not specified     Ventricular tachycardia, unspecified (HCC)     Other fatigue     Weakness     Anemia      Plan of Treatment:   Medications reviewed  1: Episodes of nonsustained V. tach, continue metoprolol succinate 25 mg daily  2: Chronic A-fib heart rate under control continue metoprolol succinate 25 mg a day Eliquis on hold because of severe anemia  3: Diastolic CHF diuretics on hold lisinopril on hold, increasing BUN/creatinine and low blood pressure,    BUN 36 and creatinine 1.4 on 4/15/2024  I agree with current management  Okay for EGD and colonoscopy  Keep patient well-hydrated and oxygenated during procedure    Electronically signed by Alcides Felipe MD on 1/2/2025 at 4:40 PM

## 2025-01-02 NOTE — CONSULTS
Department of Internal Medicine  Nephrology Nick Corona MD   Consult Note    Reason for consultation: Management of acute kidney injury superimposed on chronic kidney disease stage IIIa.    Referring physician: Zoey Bowen MD.    History of present illness: This is a 89 y.o. male with a significant past medical history of systemic hypertension, type 2 diabetes mellitus, benign prostatic hyperplasia] history of urinary retention], and chronic kidney disease stage IIIa [baseline creatinine 1.4 to 1.8 mg/dL and follows up with Dr. Bloom of [facility] [place]], who presented to the hospital with complaints of elevated BUN/creatinine.  Patient follows up with the congestive heart failure clinic and was noted to have worsening azotemia with BUN/creatinine of 251/2.2 mg/dL and hence referral to the emergency department.  His left ventricular ejection fraction was 50% in February 2024.    Renal ultrasound performed 9/4/2024 showed simple left renal cyst with mild bilateral hydronephrosis.  No significant postvoid residual.  Patient does not report increased dyspnea.    Home medications included spironolactone 25 mg p.o. daily, lisinopril 10 mg p.o. daily and Bumex 1 mg p.o. every day.    Vital signs were stable at presentation.  Incidentally hemoglobin was 6.7 g/dL and patient was admitted for PRBC transfusion.    Rivaroxaban    Past Medical History:   Diagnosis Date    Diabetes mellitus (HCC)     Hypertension     Testicular cancer (HCC)     sarcoma of left testis     Scheduled Meds:   [Held by provider] cephALEXin  500 mg Oral 2 times per day    [Held by provider] amLODIPine  5 mg Oral Daily    [Held by provider] apixaban  2.5 mg Oral BID    [Held by provider] aspirin  81 mg Oral Every Other Day    [Held by provider] bumetanide  1 mg Oral Daily    finasteride  5 mg Oral Daily    [Held by provider] lisinopril  10 mg Oral Daily    metoprolol succinate  25 mg Oral Daily    miconazole   Topical BID     30 min   Housing Stability: Low Risk  (2025)    Housing Stability Vital Sign     Unable to Pay for Housing in the Last Year: No     Number of Times Moved in the Last Year: 0     Homeless in the Last Year: No     Review of systems: CNS - no headache or dizziness; Cardiac - no chest pain; Respiratory - no shortness of breath; Gastrointestinal -   No nausea, vomiting or diarrhea; Musculoskeletal - general body aches; Skin/Integument - no rashes.    Physical Exam:    VITALS:  /67   Pulse 50   Temp 97.9 °F (36.6 °C) (Oral)   Resp 17   Ht 1.803 m (5' 11\")   Wt 88.9 kg (196 lb)   SpO2 100%   BMI 27.34 kg/m²   TEMPERATURE:  Current - Temp: 97.9 °F (36.6 °C); Max - Temp  Av °F (36.7 °C)  Min: 97.3 °F (36.3 °C)  Max: 98.4 °F (36.9 °C)  RESPIRATIONS RANGE: Resp  Av.5  Min: 16  Max: 18  PULSE RANGE: Pulse  Av  Min: 50  Max: 85  BLOOD PRESSURE RANGE:  Systolic (24hrs), Av , Min:103 , Max:129  ; Diastolic (24hrs), Av, Min:50, Max:74   PULSE OXIMETRY RANGE: SpO2  Av.7 %  Min: 95 %  Max: 100 %  24HR INTAKE/OUTPUT:    Intake/Output Summary (Last 24 hours) at 2025 1811  Last data filed at 2025 1727  Gross per 24 hour   Intake 606.67 ml   Output 975 ml   Net -368.33 ml     Constitutional: alert, appears stated age, and cooperative    Skin: Skin color, texture, turgor normal. No rashes or lesions    Head: Normocephalic, without obvious abnormality, atraumatic     ENT:  no epistaxis or rhinorrhea; hard of hearing.    Neck:    Supple with no JVD    Cardiovascular/Edema: regular rate and rhythm, S1, S2 normal, no murmur, click, rub or gallop    Respiratory: Lungs: clear to auscultation bilaterally    Abdomen: soft, non-tender; bowel sounds normal; no masses,  no organomegaly    Back: symmetric, no curvature. ROM normal. No CVA tenderness.    Extremities: edema +    Neuro:  Grossly normal    CBC:   Recent Labs     25  1338 25  0042 25  0536 25  1519   WBC 8.0  --

## 2025-01-02 NOTE — PROGRESS NOTES
Notified GI of pt and family discussion pertaining to any need for a EGD or colonoscopy. Pt is willing to receive EGD if recommended by GI.  Also notified of Cardiology clearance.

## 2025-01-02 NOTE — PROGRESS NOTES
[facility]   Occupational Therapy Evaluation  Date: 25  Patient Name: Carl Frias       Room: -  MRN: [id number]  Account: [id number]   : 1935  (89 y.o.) Gender: male     Discharge Recommendations:  Further Occupational Therapy is recommended upon facility discharge.      OT Equipment Recommendations  Other: CTA    Referring Practitioner: Shannon Meadows MD  Diagnosis: Anemia        Treatment Diagnosis: impaired self-care status    Past Medical History:  has a past medical history of Diabetes mellitus (HCC), Hypertension, and Testicular cancer (HCC).    Past Surgical History:   has a past surgical history that includes Testicle removal (); hernia repair (); eye surgery (Bilateral); and joint replacement (Bilateral).    Restrictions  Restrictions/Precautions  Restrictions/Precautions: General Precautions, Fall Risk  Activity Level: Up as Tolerated, Up with Assist  Required Braces or Orthoses?: No      Vitals  Vitals  O2 Device: None (Room air)     Subjective  Subjective: \"Can you get something to scrath my back\" Pt pleasant and agreeable to engage in OT eval  Comments: Ok per SUKHWINDER Gonzalez for OT eval  Pain  Pre-Pain: 0  Post-Pain: 0    Social/Functional History  Social/Functional History  Lives With: Spouse  Type of Home: House  Home Layout: Two level, Able to Live on Main level with bedroom/bathroom, Performs ADL's on one level  Home Access: Stairs to enter with rails  Entrance Stairs - Number of Steps: 4 DIMPLE  Entrance Stairs - Rails: Both  Bathroom Shower/Tub: Walk-in shower, Shower chair with back, Curtain  Bathroom Toilet: Handicap height  Bathroom Equipment: Grab bars in shower, Safety frame, Grab bars around toilet, Hand-held shower  Bathroom Accessibility: Accessible  Home Equipment: Walker - Rolling, Wheelchair - Manual, Grab bars, Hospital bed  Has the patient had two or more falls in the past year or any fall with injury in the past year?: No (1

## 2025-01-02 NOTE — PROGRESS NOTES
[facility]  IN-PATIENT SERVICE  [facility]    PROGRESS NOTE             1/2/2025    7:28 AM    Name:   Carl Frias  MRN:     [id number]     Acct:      [id number]   Room:   2122/2122-01   Day:  1  Admit Date:  1/1/2025 12:52 PM    PCP:  No primary care provider on file.  Code Status:  Full Code    Subjective:     C/C:   Chief Complaint   Patient presents with    high BNP     Interval History Status: not changed.    Patient was seen and examined at bedside.  Vitally stable.  On room air. No overnight events  Pain improved 7.3.  Patient told the nurse that his stool \"sloppy joes.\"  GI consulted; no bowel movement yet   H&H every 8 hours.    Kidney function improved creatinine 2.1 BUN 53  Patient urine analysis suggested infection with small hemoglobin, trace protein, large LE, numerous WBC, 0-2 casts, too many bacteria, and 0-2 epithelial cells which exclude contamination    Brief History:     This is 89 years old f male with a past medical history of heart failure, type 2 diabetes mellitus, hypertension, and testicular cancer.     Patient presented from CHF clinic with increased proBNP 5517 with lower extremity edema.  On 11/26 was 3550 with no signs of fluid overload.  And on 10/30/2024 was 2210 with edema.  Patient denied any bleeding in stool or urine, shortness of breath, chest pain, or fever/chills, patient hemoglobin today was 6.7.  Patient was started on 1 unit red blood cells transfusion in the ED.  Kidney function is deteriorated with creatinine 2.3 (baseline about 2) and BUN 55. Liver function WNL. On examination, patient had lower limb edema +2 which is more than his baseline.  I discussed patient code with the patient and his daughter in the room.  Patient requested to change his code to full code.     Patient was admitted for further management of his symptoms    Review of Systems:     Review of Systems   Constitutional:  Negative for activity change,  medical history of Diabetes mellitus (HCC), Hypertension, and Testicular cancer (HCC).    Social History:   reports that he quit smoking about 57 years ago. His smoking use included cigarettes. He started smoking about 71 years ago. He has a 4.2 pack-year smoking history. He has never used smokeless tobacco. He reports that he does not drink alcohol and does not use drugs.     Family History:   Family History   Problem Relation Age of Onset    Other Mother         lung disease       Vitals:  BP (!) 129/56   Pulse 73   Temp 97.3 °F (36.3 °C) (Oral)   Resp 16   Ht 1.803 m (5' 11\")   Wt 89.3 kg (196 lb 13.9 oz)   SpO2 95%   BMI 27.46 kg/m²   Temp (24hrs), Av.9 °F (36.6 °C), Min:97.3 °F (36.3 °C), Max:98.4 °F (36.9 °C)    Recent Labs     25  2247 25  0025 25  0643   POCGLU 68* 85 81       I/O(24Hr):    Intake/Output Summary (Last 24 hours) at 2025 0728  Last data filed at 2025 0224  Gross per 24 hour   Intake 606.67 ml   Output 375 ml   Net 231.67 ml       Labs:    [unfilled]    Lab Results   Component Value Date/Time    SPECIAL NOT REPORTED 10/26/2020 10:51 AM     Lab Results   Component Value Date/Time    CULTURE (A) 2024 12:56 AM     ESCHERICHIA COLI >100,000 CFU/ML Identification by MALDI-TOF    CULTURE (A) 2024 12:56 AM     CITROBACTER KOSERI (DIVERSUS) >100,000 CFU/ML Identification by MALDI-TOF       [unfilled]    Radiology:    No results found.      Physical Examination:        Physical Exam  Constitutional:       General: He is not in acute distress.  Cardiovascular:      Rate and Rhythm: Normal rate and regular rhythm.      Pulses: Normal pulses.      Heart sounds: Normal heart sounds.   Pulmonary:      Effort: Pulmonary effort is normal.      Breath sounds: Normal breath sounds.   Abdominal:      General: Abdomen is flat. Bowel sounds are normal.      Palpations: Abdomen is soft.   Musculoskeletal:      Right lower leg: Edema present.      Left lower leg:

## 2025-01-02 NOTE — PROGRESS NOTES
Vitals were being performed when pt arrived to the floor, O2 sensor showed good wave form with HR dropping into 30s. Same quickly recovered into 60s+. Pt asymptomatic at this time. Pt endorses dark stools recently that look like \"sloppy joes.\" Also, appetite has decreased. Pt previously had wound to bottom which will be reviewed with staff. Pt more lethargic and sedentary recently per daughter.    =======================    Wound to buttocks noted on avatar and photo taken for charting.

## 2025-01-02 NOTE — PLAN OF CARE
Problem: Chronic Conditions and Co-morbidities  Goal: Patient's chronic conditions and co-morbidity symptoms are monitored and maintained or improved  1/2/2025 1826 by Lisa Bucio RN  Outcome: Progressing  Flowsheets (Taken 1/2/2025 1826)  Care Plan - Patient's Chronic Conditions and Co-Morbidity Symptoms are Monitored and Maintained or Improved:   Monitor and assess patient's chronic conditions and comorbid symptoms for stability, deterioration, or improvement   Collaborate with multidisciplinary team to address chronic and comorbid conditions and prevent exacerbation or deterioration   Update acute care plan with appropriate goals if chronic or comorbid symptoms are exacerbated and prevent overall improvement and discharge  1/2/2025 0550 by Rachael Hussein RN  Outcome: Progressing     Problem: Discharge Planning  Goal: Discharge to home or other facility with appropriate resources  1/2/2025 1826 by Lisa Bucio RN  Outcome: Progressing  Flowsheets (Taken 1/2/2025 1826)  Discharge to home or other facility with appropriate resources:   Identify barriers to discharge with patient and caregiver   Identify discharge learning needs (meds, wound care, etc)   Arrange for interpreters to assist at discharge as needed  1/2/2025 0550 by Rachael Hussein RN  Outcome: Progressing     Problem: Safety - Adult  Goal: Free from fall injury  1/2/2025 1826 by Lisa Bucio RN  Outcome: Progressing  Flowsheets (Taken 1/2/2025 1826)  Free From Fall Injury: Instruct family/caregiver on patient safety  Note: The patient remained free from falls this shift, call light within reach, bed in locked and lowest position.  Side rails up x2.  Continue to monitor closely.    1/2/2025 0550 by Rachael Hussein RN  Outcome: Progressing     Problem: ABCDS Injury Assessment  Goal: Absence of physical injury  1/2/2025 1826 by Lisa Bucio RN  Outcome: Progressing  Note: Fall assessment performed and appropriate measures implemented. Room

## 2025-01-02 NOTE — PLAN OF CARE
Problem: Chronic Conditions and Co-morbidities  Goal: Patient's chronic conditions and co-morbidity symptoms are monitored and maintained or improved  1/2/2025 0550 by Rachael Hussein RN  Outcome: Progressing     Problem: Discharge Planning  Goal: Discharge to home or other facility with appropriate resources  1/2/2025 0550 by Rachael Hussein RN  Outcome: Progressing     Problem: Safety - Adult  Goal: Free from fall injury  1/2/2025 0550 by Rachael Hussein RN  Outcome: Progressing     Problem: ABCDS Injury Assessment  Goal: Absence of physical injury  1/2/2025 0550 by Rachael Hussein RN  Outcome: Progressing     Problem: Skin/Tissue Integrity  Goal: Absence of new skin breakdown  Description: 1.  Monitor for areas of redness and/or skin breakdown  2.  Assess vascular access sites hourly  3.  Every 4-6 hours minimum:  Change oxygen saturation probe site  4.  Every 4-6 hours:  If on nasal continuous positive airway pressure, respiratory therapy assess nares and determine need for appliance change or resting period.  Outcome: Progressing     Problem: Hematologic - Adult  Goal: Maintains hematologic stability  Outcome: Progressing

## 2025-01-02 NOTE — CARE COORDINATION
Case Management Assessment  Initial Evaluation    Date/Time of Evaluation: 1/2/2025 1:50 PM  Assessment Completed by: Selene Pedro RN    If patient is discharged prior to next notation, then this note serves as note for discharge by case management.    Patient Name: Carl Frias                   YOB: 1935  Diagnosis: Anemia [D64.9]  Anemia, unspecified type [D64.9]  Stage 4 chronic kidney disease (HCC) [N18.4]  Chronic congestive heart failure, unspecified heart failure type (HCC) [I50.9]                   Date / Time: 1/1/2025 12:52 PM    Patient Admission Status: Inpatient   Readmission Risk (Low < 19, Mod (19-27), High > 27): Readmission Risk Score: 20.4    Current PCP: No primary care provider on file.  PCP verified by CM? No    Chart Reviewed: Yes      History Provided by: Patient  Patient Orientation: Alert and Oriented    Patient Cognition: Alert    Hospitalization in the last 30 days (Readmission):  No    If yes, Readmission Assessment in CM Navigator will be completed.    Advance Directives:      Code Status: Full Code   Patient's Primary Decision Maker is: Legal Next of Kin    Primary Decision Maker: Naomi Frias - Spouse - [phone]    Secondary Decision Maker: CeronHannah - Child - [phone]    Supplemental (Other) Decision Maker: Lucy Lazcano - Child - [phone]    Discharge Planning:    Patient lives with:   Type of Home:    Primary Care Giver: Self  Patient Support Systems include: Family Members   Current Financial resources: Medicare  Current community resources: None  Current services prior to admission: None            Current DME:              Type of Home Care services:  None    ADLS  Prior functional level: Independent in ADLs/IADLs  Current functional level: Independent in ADLs/IADLs    PT AM-PAC:   /24  OT AM-PAC:   /24    Family can provide assistance at DC: Yes  Would you like Case Management to discuss the discharge plan with any other family  members/significant others, and if so, who? Yes  Plans to Return to Present Housing: Yes  Other Identified Issues/Barriers to RETURNING to current housing: no  Potential Assistance needed at discharge: N/A            Potential DME:    Patient expects to discharge to: House  Plan for transportation at discharge: Family    Financial    Payor: [employer] MEDICARE / Plan: [employer] GOLD PLUS HMO / Product Type: *No Product type* /     Does insurance require precert for SNF: No    Potential assistance Purchasing Medications: No  Meds-to-Beds request:        [facility] - [place], [place] - [address] - P [phone] - F [phone]  [address] [address]  Phone: [phone] Fax: [phone]    [id number] - [place], [place] - [address] - P [phone] - F [phone]  [address] [address]  Phone: [phone] Fax: [phone]    [facility] [id number] - [place], [place] - [address] - P [phone] - F [phone]  [address] [address]  Phone: [phone] Fax: [phone]      Notes:    Factors facilitating achievement of predicted outcomes: Family support, Motivated, Cooperative, and Pleasant    Barriers to discharge: Medical complications    Additional Case Management Notes: 1/2/25 [employer] Pt is from home in a two story home with his wife. DME Walker VNS denies. Plan is to discharge to home with no needs. Will continue to follow for needs. Family and pt denies VNS. Will follow .//tv    The Plan for Transition of Care is related to the following treatment goals of Anemia [D64.9]  Anemia, unspecified type [D64.9]  Stage 4 chronic kidney disease (HCC) [N18.4]  Chronic congestive heart failure, unspecified heart failure type (HCC) [I50.9]    IF APPLICABLE: The Patient and/or patient representative Carl and his family were provided with a choice of provider and agrees with the discharge plan. Freedom of choice list with basic

## 2025-01-02 NOTE — CONSULTS
Gastroenterology Consult Note    Patient:   Carl Frias   Admit date:  2025  Facility:   [facility]  Referring/PCP: No primary care provider on file.  Date:     2025  Consultant:   KALEN West NP, Torey Washburn MD    Subjective:     This 89 y.o. male was admitted 2025 with a diagnosis of \"Anemia [D64.9]  Anemia, unspecified type [D64.9]  Stage 4 chronic kidney disease (HCC) [N18.4]  Chronic congestive heart failure, unspecified heart failure type (HCC) [I50.9]\" and is seen in consultation regarding   Chief Complaint   Patient presents with    high BNP     89/M w/ pmhx of DM, HTN, A.fib on Eliquis, testicular cancer presented from CHF clinic with increased proBNP 5517, BLE edema. In the ED hgb found to be 6.7 for which GI consult was placed.  Patient is poor historian.  Denies any overt GI bleeding.  Denies any abdominal pain, nausea, vomiting, fevers, chills, chest pain, shortness of breath.  Reports some looser stools yesterday.  No BM so far today.  Reports good appetite.  No dysphagia, odynophagia, dyspeptic symptoms.    Does not recall any past endoscopy.  On Eliquis for A.fib.  He believes his last dose was Wednesday.    Patient also has UTI.  ROXI on CKD.    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       Past Surgical History:  Past Surgical History:   Procedure Laterality Date    EYE SURGERY Bilateral     HERNIA REPAIR      left inguinal    JOINT REPLACEMENT Bilateral     TESTICLE REMOVAL         Social History:  Social History     Tobacco Use    Smoking status: Former     Current packs/day: 0.00     Average packs/day: 0.3 packs/day for 14.0 years (4.2 ttl pk-yrs)     Types: Cigarettes     Start date:      Quit date: 1968     Years since quittin.0    Smokeless tobacco: Never    Tobacco comments:     quit 42 years ago     AST  --  12  --   --    ALT  --  6*  --   --    ALKPHOS  --  50  --   --    BILITOT  --  0.2  --   --      Assessment:   Principal Problem:    Anemia  Resolved Problems:    * No resolved hospital problems. *        Plan:   Acute anemia, no overt GI bleeding  Hgb November 2024 9.3  No prior endoscopy on file  -Check anemia profile  -Patient initially not eager for endoscopic workup however after cardiac clearance is interested.   -Trend HH  -Transfuse for hgb < 7    2. Acute on chronic CHF exacerbation with HFpEF  proBNP 5517  BLE edema  -Cariology consulted    3. ROXI on CKD    4. A.fib on Eliquis  -Currently on hold    5. HTN    6. BPH    This plan was formulated in collaboration with Dr. Washburn    Electronically signed by KALEN West NP on 1/2/2025 at 12:43 PM     Note is dictated utilizing voice recognition software. Unfortunately this leads to occasional typographical errors. Please contact our office if you have any questions.    Attending Attestation:    I have discussed the care of Carl KATIE Mcmillaniz and I have examined the patient myselft and taken ros and hpi , including pertinent history and exam findings,  with the author of this note . I have reviewed the key elements of all parts of the encounter with the nurse practitioner/resident.  I agree with the assessment, plan and orders as documented by the above health care provider with the following addendum    More than 50% of the time was spent taking care of this patient in addition to the nurse practitioner time.  That also included history taking follow-up physical examination and review of system.    Electronically signed by Torey Washburn MD

## 2025-01-02 NOTE — PROGRESS NOTES
Comprehensive Nutrition Assessment    Type and Reason for Visit:  Positive nutrition screen (wt loss, poor appetite)    Nutrition Recommendations/Plan:   Will provide 4 carbohydrate choices per tray with 2g Na diet restriction and 1500 ml fluid restriction  Will provide ensure High Protein twice daily     Malnutrition Assessment:  Malnutrition Status:  At risk for malnutrition (01/02/25 1606)    Context:  Chronic Illness     Findings of the 6 clinical characteristics of malnutrition:  Energy Intake:  75% or less estimated energy requirements for 1 month or longer  Weight Loss:   (14% wt loss over 8 months)     Body Fat Loss:  Unable to assess     Muscle Mass Loss:  Unable to assess    Fluid Accumulation:  Unable to assess     Strength:  Not Performed    Nutrition Assessment:    Pt admitted due to CHF/CKD. Review of wt historys shows 33# wt loss since April . Daughter requests supplement for pt due to sore on butt.    Nutrition Related Findings:    Labs/Meds: Reviewed, PMH: CHF, CM, Testicular Ca, CKD Wound Type: Pressure Injury       Current Nutrition Intake & Therapies:    Average Meal Intake: 51-75%     ADULT DIET; Regular; 4 carb choices (60 gm/meal); Low Sodium (2 gm); 1500 ml  ADULT ORAL NUTRITION SUPPLEMENT; Dinner, Breakfast; Low Calorie/High Protein Oral Supplement    Anthropometric Measures:  Height: 180.3 cm (5' 11\")  Ideal Body Weight (IBW): 172 lbs (78 kg)    Admission Body Weight: 88.9 kg (196 lb)  Current Body Weight: 89 kg (196 lb 3.4 oz), 114.1 % IBW. Weight Source: Bed scale  Current BMI (kg/m2): 27.4  Usual Body Weight: 103.9 kg (229 lb) (4/24)     % Weight Change (Calculated): -14.3                    BMI Categories: Overweight (BMI 25.0-29.9)    Estimated Daily Nutrient Needs:  Energy Requirements Based On: Formula  Weight Used for Energy Requirements: Current  Energy (kcal/day): Colo x 1.2= 1900 kcal  Weight Used for Protein Requirements: Current  Protein (g/day): 1.3g/kg= 115 g     Fluid

## 2025-01-03 ENCOUNTER — ANESTHESIA EVENT (OUTPATIENT)
Dept: ENDOSCOPY | Age: 89
End: 2025-01-03
Payer: MEDICARE

## 2025-01-03 ENCOUNTER — APPOINTMENT (OUTPATIENT)
Dept: ULTRASOUND IMAGING | Age: 89
DRG: 291 | End: 2025-01-03
Payer: MEDICARE

## 2025-01-03 ENCOUNTER — ANESTHESIA (OUTPATIENT)
Dept: ENDOSCOPY | Age: 89
End: 2025-01-03
Payer: MEDICARE

## 2025-01-03 PROBLEM — K25.9 GASTRIC ULCER: Status: ACTIVE | Noted: 2025-01-03

## 2025-01-03 LAB
ABO/RH: NORMAL
ANION GAP SERPL CALCULATED.3IONS-SCNC: 8 MMOL/L (ref 9–16)
ANTIBODY SCREEN: NEGATIVE
ARM BAND NUMBER: NORMAL
BASOPHILS # BLD: 0.07 K/UL (ref 0–0.2)
BASOPHILS NFR BLD: 1 % (ref 0–2)
BLOOD BANK BLOOD PRODUCT EXPIRATION DATE: NORMAL
BLOOD BANK DISPENSE STATUS: NORMAL
BLOOD BANK ISBT PRODUCT BLOOD TYPE: 6200
BLOOD BANK PRODUCT CODE: NORMAL
BLOOD BANK SAMPLE EXPIRATION: NORMAL
BLOOD BANK UNIT TYPE AND RH: NORMAL
BPU ID: NORMAL
BUN SERPL-MCNC: 51 MG/DL (ref 8–23)
CALCIUM SERPL-MCNC: 8.8 MG/DL (ref 8.6–10.4)
CHLORIDE SERPL-SCNC: 108 MMOL/L (ref 98–107)
CO2 SERPL-SCNC: 23 MMOL/L (ref 20–31)
COMPONENT: NORMAL
CREAT SERPL-MCNC: 2 MG/DL (ref 0.7–1.2)
CREAT UR-MCNC: 41.3 MG/DL (ref 39–259)
CROSSMATCH RESULT: NORMAL
ECHO AO ROOT DIAM: 3.7 CM
ECHO AO ROOT INDEX: 1.77 CM/M2
ECHO AR MAX VEL PISA: 3.2 M/S
ECHO AV AREA PEAK VELOCITY: 1.8 CM2
ECHO AV AREA VTI: 1.8 CM2
ECHO AV AREA/BSA PEAK VELOCITY: 0.9 CM2/M2
ECHO AV AREA/BSA VTI: 0.9 CM2/M2
ECHO AV MEAN GRADIENT: 7 MMHG
ECHO AV MEAN VELOCITY: 1.2 M/S
ECHO AV PEAK GRADIENT: 11 MMHG
ECHO AV PEAK VELOCITY: 1.7 M/S
ECHO AV REGURGITANT PHT: 479 MS
ECHO AV VELOCITY RATIO: 0.47
ECHO AV VTI: 41 CM
ECHO BSA: 2.11 M2
ECHO EST RA PRESSURE: 15 MMHG
ECHO LA AREA 2C: 31 CM2
ECHO LA AREA 4C: 42.6 CM2
ECHO LA DIAMETER INDEX: 3.06 CM/M2
ECHO LA DIAMETER: 6.4 CM
ECHO LA MAJOR AXIS: 8.4 CM
ECHO LA MINOR AXIS: 7.7 CM
ECHO LA TO AORTIC ROOT RATIO: 1.73
ECHO LA VOL BP: 137 ML (ref 18–58)
ECHO LA VOL MOD A2C: 100 ML (ref 18–58)
ECHO LA VOL MOD A4C: 172 ML (ref 18–58)
ECHO LA VOL/BSA BIPLANE: 66 ML/M2 (ref 16–34)
ECHO LA VOLUME INDEX MOD A2C: 48 ML/M2 (ref 16–34)
ECHO LA VOLUME INDEX MOD A4C: 82 ML/M2 (ref 16–34)
ECHO LV E' LATERAL VELOCITY: 12.1 CM/S
ECHO LV E' SEPTAL VELOCITY: 7.29 CM/S
ECHO LV EDV A2C: 135 ML
ECHO LV EDV A4C: 164 ML
ECHO LV EDV INDEX A4C: 78 ML/M2
ECHO LV EDV NDEX A2C: 65 ML/M2
ECHO LV EF PHYSICIAN: 55 %
ECHO LV EJECTION FRACTION A2C: 51 %
ECHO LV EJECTION FRACTION A4C: 58 %
ECHO LV EJECTION FRACTION BIPLANE: 54 % (ref 55–100)
ECHO LV ESV A2C: 66 ML
ECHO LV ESV A4C: 68 ML
ECHO LV ESV INDEX A2C: 32 ML/M2
ECHO LV ESV INDEX A4C: 33 ML/M2
ECHO LV FRACTIONAL SHORTENING: 30 % (ref 28–44)
ECHO LV INTERNAL DIMENSION DIASTOLE INDEX: 2.54 CM/M2
ECHO LV INTERNAL DIMENSION DIASTOLIC: 5.3 CM (ref 4.2–5.9)
ECHO LV INTERNAL DIMENSION SYSTOLIC INDEX: 1.77 CM/M2
ECHO LV INTERNAL DIMENSION SYSTOLIC: 3.7 CM
ECHO LV IVSD: 1.2 CM (ref 0.6–1)
ECHO LV MASS 2D: 256.6 G (ref 88–224)
ECHO LV MASS INDEX 2D: 122.8 G/M2 (ref 49–115)
ECHO LV POSTERIOR WALL DIASTOLIC: 1.2 CM (ref 0.6–1)
ECHO LV RELATIVE WALL THICKNESS RATIO: 0.45
ECHO LVOT AREA: 3.5 CM2
ECHO LVOT AV VTI INDEX: 0.52
ECHO LVOT DIAM: 2.1 CM
ECHO LVOT MEAN GRADIENT: 2 MMHG
ECHO LVOT PEAK GRADIENT: 3 MMHG
ECHO LVOT PEAK VELOCITY: 0.8 M/S
ECHO LVOT STROKE VOLUME INDEX: 35.3 ML/M2
ECHO LVOT SV: 73.7 ML
ECHO LVOT VTI: 21.3 CM
ECHO MV E DECELERATION TIME (DT): 233 MS
ECHO MV E VELOCITY: 1.36 M/S
ECHO MV E/E' LATERAL: 11.24
ECHO MV E/E' RATIO (AVERAGED): 14.95
ECHO MV E/E' SEPTAL: 18.66
ECHO RIGHT VENTRICULAR SYSTOLIC PRESSURE (RVSP): 40 MMHG
ECHO RV TAPSE: 1.5 CM (ref 1.7–?)
ECHO TV REGURGITANT MAX VELOCITY: 2.5 M/S
ECHO TV REGURGITANT PEAK GRADIENT: 22 MMHG
EOSINOPHIL # BLD: 0.3 K/UL (ref 0–0.4)
EOSINOPHILS RELATIVE PERCENT: 4 % (ref 0–4)
ERYTHROCYTE [DISTWIDTH] IN BLOOD BY AUTOMATED COUNT: 15.3 % (ref 11.5–14.9)
GFR, ESTIMATED: 32 ML/MIN/1.73M2
GLIADIN IGA SER IA-ACNC: 2.3 U/ML
GLIADIN IGA SER IA-ACNC: 2.4 U/ML
GLIADIN IGG SER IA-ACNC: 0.6 U/ML
GLIADIN IGG SER IA-ACNC: <0.4 U/ML
GLUCOSE BLD-MCNC: 110 MG/DL (ref 75–110)
GLUCOSE BLD-MCNC: 114 MG/DL (ref 75–110)
GLUCOSE BLD-MCNC: 118 MG/DL (ref 75–110)
GLUCOSE BLD-MCNC: 138 MG/DL (ref 75–110)
GLUCOSE BLD-MCNC: 287 MG/DL (ref 75–110)
GLUCOSE SERPL-MCNC: 110 MG/DL (ref 74–99)
HAPTOGLOB SERPL-MCNC: 295 MG/DL (ref 30–200)
HCT VFR BLD AUTO: 21.6 % (ref 41–53)
HCT VFR BLD AUTO: 24.3 % (ref 41–53)
HGB BLD-MCNC: 7.1 G/DL (ref 13.5–17.5)
HGB BLD-MCNC: 8 G/DL (ref 13.5–17.5)
IGA SERPL-MCNC: 481 MG/DL (ref 70–400)
IGA SERPL-MCNC: 485 MG/DL (ref 70–400)
LYMPHOCYTES NFR BLD: 1.85 K/UL (ref 1–4.8)
LYMPHOCYTES RELATIVE PERCENT: 25 % (ref 24–44)
MCH RBC QN AUTO: 28.1 PG (ref 26–34)
MCHC RBC AUTO-ENTMCNC: 33 G/DL (ref 31–37)
MCV RBC AUTO: 85.4 FL (ref 80–100)
MONOCYTES NFR BLD: 0.74 K/UL (ref 0.1–1.3)
MONOCYTES NFR BLD: 10 % (ref 1–7)
MORPHOLOGY: ABNORMAL
MORPHOLOGY: ABNORMAL
NEUTROPHILS NFR BLD: 60 % (ref 36–66)
NEUTS SEG NFR BLD: 4.44 K/UL (ref 1.3–9.1)
PLATELET # BLD AUTO: 453 K/UL (ref 150–450)
PMV BLD AUTO: 6.1 FL (ref 6–12)
POTASSIUM SERPL-SCNC: 3.9 MMOL/L (ref 3.7–5.3)
RBC # BLD AUTO: 2.53 M/UL (ref 4.5–5.9)
SODIUM SERPL-SCNC: 139 MMOL/L (ref 136–145)
TOTAL PROTEIN, URINE: 29 MG/DL
TRANSFUSION STATUS: NORMAL
TTG IGA SER IA-ACNC: 1.6 U/ML
TTG IGA SER IA-ACNC: 1.8 U/ML
UNIT DIVISION: 0
UNIT ISSUE DATE/TIME: NORMAL
URINE TOTAL PROTEIN CREATININE RATIO: 0.7
WBC OTHER # BLD: 7.4 K/UL (ref 3.5–11)

## 2025-01-03 PROCEDURE — 0DB68ZX EXCISION OF STOMACH, VIA NATURAL OR ARTIFICIAL OPENING ENDOSCOPIC, DIAGNOSTIC: ICD-10-PCS | Performed by: STUDENT IN AN ORGANIZED HEALTH CARE EDUCATION/TRAINING PROGRAM

## 2025-01-03 PROCEDURE — 2709999900 HC NON-CHARGEABLE SUPPLY: Performed by: STUDENT IN AN ORGANIZED HEALTH CARE EDUCATION/TRAINING PROGRAM

## 2025-01-03 PROCEDURE — 2720000010 HC SURG SUPPLY STERILE: Performed by: STUDENT IN AN ORGANIZED HEALTH CARE EDUCATION/TRAINING PROGRAM

## 2025-01-03 PROCEDURE — 82570 ASSAY OF URINE CREATININE: CPT

## 2025-01-03 PROCEDURE — 3700000000 HC ANESTHESIA ATTENDED CARE: Performed by: STUDENT IN AN ORGANIZED HEALTH CARE EDUCATION/TRAINING PROGRAM

## 2025-01-03 PROCEDURE — 85018 HEMOGLOBIN: CPT

## 2025-01-03 PROCEDURE — 43239 EGD BIOPSY SINGLE/MULTIPLE: CPT | Performed by: STUDENT IN AN ORGANIZED HEALTH CARE EDUCATION/TRAINING PROGRAM

## 2025-01-03 PROCEDURE — 2580000003 HC RX 258: Performed by: INTERNAL MEDICINE

## 2025-01-03 PROCEDURE — 6360000002 HC RX W HCPCS: Performed by: NURSE ANESTHETIST, CERTIFIED REGISTERED

## 2025-01-03 PROCEDURE — 84156 ASSAY OF PROTEIN URINE: CPT

## 2025-01-03 PROCEDURE — 2500000003 HC RX 250 WO HCPCS: Performed by: STUDENT IN AN ORGANIZED HEALTH CARE EDUCATION/TRAINING PROGRAM

## 2025-01-03 PROCEDURE — 88305 TISSUE EXAM BY PATHOLOGIST: CPT

## 2025-01-03 PROCEDURE — 80048 BASIC METABOLIC PNL TOTAL CA: CPT

## 2025-01-03 PROCEDURE — 85014 HEMATOCRIT: CPT

## 2025-01-03 PROCEDURE — 76775 US EXAM ABDO BACK WALL LIM: CPT

## 2025-01-03 PROCEDURE — 3700000001 HC ADD 15 MINUTES (ANESTHESIA): Performed by: STUDENT IN AN ORGANIZED HEALTH CARE EDUCATION/TRAINING PROGRAM

## 2025-01-03 PROCEDURE — 7100000000 HC PACU RECOVERY - FIRST 15 MIN: Performed by: STUDENT IN AN ORGANIZED HEALTH CARE EDUCATION/TRAINING PROGRAM

## 2025-01-03 PROCEDURE — 2580000003 HC RX 258: Performed by: NURSE ANESTHETIST, CERTIFIED REGISTERED

## 2025-01-03 PROCEDURE — 2060000000 HC ICU INTERMEDIATE R&B

## 2025-01-03 PROCEDURE — 6360000002 HC RX W HCPCS

## 2025-01-03 PROCEDURE — 82947 ASSAY GLUCOSE BLOOD QUANT: CPT

## 2025-01-03 PROCEDURE — 97161 PT EVAL LOW COMPLEX 20 MIN: CPT

## 2025-01-03 PROCEDURE — 99233 SBSQ HOSP IP/OBS HIGH 50: CPT | Performed by: INTERNAL MEDICINE

## 2025-01-03 PROCEDURE — 3609013000 HC EGD TRANSORAL CONTROL BLEEDING ANY METHOD: Performed by: STUDENT IN AN ORGANIZED HEALTH CARE EDUCATION/TRAINING PROGRAM

## 2025-01-03 PROCEDURE — 0W3P8ZZ CONTROL BLEEDING IN GASTROINTESTINAL TRACT, VIA NATURAL OR ARTIFICIAL OPENING ENDOSCOPIC: ICD-10-PCS | Performed by: STUDENT IN AN ORGANIZED HEALTH CARE EDUCATION/TRAINING PROGRAM

## 2025-01-03 PROCEDURE — 36415 COLL VENOUS BLD VENIPUNCTURE: CPT

## 2025-01-03 PROCEDURE — 2580000003 HC RX 258

## 2025-01-03 PROCEDURE — 6370000000 HC RX 637 (ALT 250 FOR IP): Performed by: STUDENT IN AN ORGANIZED HEALTH CARE EDUCATION/TRAINING PROGRAM

## 2025-01-03 PROCEDURE — 85025 COMPLETE CBC W/AUTO DIFF WBC: CPT

## 2025-01-03 PROCEDURE — 7100000001 HC PACU RECOVERY - ADDTL 15 MIN: Performed by: STUDENT IN AN ORGANIZED HEALTH CARE EDUCATION/TRAINING PROGRAM

## 2025-01-03 PROCEDURE — 6360000002 HC RX W HCPCS: Performed by: INTERNAL MEDICINE

## 2025-01-03 DEVICE — WORKING LENGTH 235CM, WORKING CHANNEL 2.8MM
Type: IMPLANTABLE DEVICE | Site: STOMACH | Status: FUNCTIONAL
Brand: RESOLUTION 360 CLIP

## 2025-01-03 RX ORDER — SODIUM CHLORIDE 9 MG/ML
INJECTION, SOLUTION INTRAVENOUS
Status: DISCONTINUED | OUTPATIENT
Start: 2025-01-03 | End: 2025-01-03 | Stop reason: SDUPTHER

## 2025-01-03 RX ORDER — ONDANSETRON 2 MG/ML
INJECTION INTRAMUSCULAR; INTRAVENOUS
Status: DISCONTINUED | OUTPATIENT
Start: 2025-01-03 | End: 2025-01-03 | Stop reason: SDUPTHER

## 2025-01-03 RX ORDER — DEXAMETHASONE SODIUM PHOSPHATE 4 MG/ML
INJECTION, SOLUTION INTRA-ARTICULAR; INTRALESIONAL; INTRAMUSCULAR; INTRAVENOUS; SOFT TISSUE
Status: DISCONTINUED | OUTPATIENT
Start: 2025-01-03 | End: 2025-01-03 | Stop reason: SDUPTHER

## 2025-01-03 RX ORDER — LIDOCAINE HYDROCHLORIDE 20 MG/ML
INJECTION, SOLUTION EPIDURAL; INFILTRATION; INTRACAUDAL; PERINEURAL
Status: DISCONTINUED | OUTPATIENT
Start: 2025-01-03 | End: 2025-01-03 | Stop reason: SDUPTHER

## 2025-01-03 RX ORDER — PROPOFOL 10 MG/ML
INJECTION, EMULSION INTRAVENOUS
Status: DISCONTINUED | OUTPATIENT
Start: 2025-01-03 | End: 2025-01-03 | Stop reason: SDUPTHER

## 2025-01-03 RX ADMIN — MICONAZOLE NITRATE: 20 POWDER TOPICAL at 20:38

## 2025-01-03 RX ADMIN — SODIUM CHLORIDE: 9 INJECTION, SOLUTION INTRAVENOUS at 14:18

## 2025-01-03 RX ADMIN — SODIUM CHLORIDE 125 MG: 9 INJECTION, SOLUTION INTRAVENOUS at 09:24

## 2025-01-03 RX ADMIN — INSULIN LISPRO 2 UNITS: 100 INJECTION, SOLUTION INTRAVENOUS; SUBCUTANEOUS at 20:38

## 2025-01-03 RX ADMIN — ONDANSETRON 4 MG: 2 INJECTION, SOLUTION INTRAMUSCULAR; INTRAVENOUS at 14:30

## 2025-01-03 RX ADMIN — LIDOCAINE HYDROCHLORIDE 50 MG: 20 INJECTION, SOLUTION EPIDURAL; INFILTRATION; INTRACAUDAL; PERINEURAL at 14:25

## 2025-01-03 RX ADMIN — SODIUM CHLORIDE, PRESERVATIVE FREE 10 ML: 5 INJECTION INTRAVENOUS at 20:39

## 2025-01-03 RX ADMIN — PANTOPRAZOLE SODIUM 40 MG: 40 INJECTION, POWDER, FOR SOLUTION INTRAVENOUS at 17:39

## 2025-01-03 RX ADMIN — DEXAMETHASONE SODIUM PHOSPHATE 4 MG: 4 INJECTION INTRA-ARTICULAR; INTRALESIONAL; INTRAMUSCULAR; INTRAVENOUS; SOFT TISSUE at 14:30

## 2025-01-03 RX ADMIN — PROPOFOL 50 MG: 10 INJECTION, EMULSION INTRAVENOUS at 14:31

## 2025-01-03 RX ADMIN — MICONAZOLE NITRATE: 20 POWDER TOPICAL at 09:19

## 2025-01-03 RX ADMIN — PROPOFOL 100 MG: 10 INJECTION, EMULSION INTRAVENOUS at 14:25

## 2025-01-03 ASSESSMENT — PAIN - FUNCTIONAL ASSESSMENT
PAIN_FUNCTIONAL_ASSESSMENT: 0-10
PAIN_FUNCTIONAL_ASSESSMENT: NONE - DENIES PAIN

## 2025-01-03 ASSESSMENT — PAIN SCALES - GENERAL: PAINLEVEL_OUTOF10: 0

## 2025-01-03 NOTE — PROGRESS NOTES
Identification by MALDI-TOF       [unfilled]    Radiology:    No results found.      Physical Examination:        Physical Exam  Constitutional:       General: He is not in acute distress.  Cardiovascular:      Rate and Rhythm: Normal rate and regular rhythm.      Pulses: Normal pulses.      Heart sounds: Normal heart sounds.   Pulmonary:      Effort: Pulmonary effort is normal.      Breath sounds: Normal breath sounds.   Abdominal:      General: Abdomen is flat. Bowel sounds are normal.      Palpations: Abdomen is soft.   Musculoskeletal:      Right lower leg: Edema present.      Left lower leg: Edema present.   Skin:     General: Skin is warm and dry.   Neurological:      Mental Status: He is oriented to person, place, and time.           Assessment:        Primary Problem  Anemia    Active Hospital Problems    Diagnosis Date Noted    Chronic congestive heart failure (HCC) [I50.9] 01/02/2025    Stage 4 chronic kidney disease (HCC) [N18.4] 01/02/2025    Anemia [D64.9] 01/01/2025       Plan:        Acute on chronic diastolic CHF exacerbation with HFpEF  Patient presented from CHF clinic with increased proBNP 5517 with lower extremity edema.  On 11/26 was 3550 with no signs of fluid overload.  And on 10/30/2024 was 2210 with edema  lower limb edema +2; no change in symptoms  Echo on 12/2024: EF 50%  Spironolactone, aspirin on hold  Midodrine 5 mg 3 times daily; hold if SBP > 00  Echo on 1/2/2024 EF 55 to 60%, normal diastolic function, RV dilated, mild MR, mild TR, mild pulmonary hypertension RVSP 40, LA dilated, RA dilated  Episodes of nonsustained V. tach on 1/2/2025, continue metoprolol succinate 25 mg daily   Cardiology consulted      Acute anemia  Patient hemoglobin today was 6.7;  One unit PRBCs transfusion in process the ED  Pain improved 7.3.  Patient told the nurse that his stool \"sloppy joes.\"  Pain H&H every 8 hours.  GI consulted  H&H every 8 hours  Occult stool pending  Lactate 132  Reticulocyte

## 2025-01-03 NOTE — PROGRESS NOTES
Physician Progress Note      PATIENT:               MILY PELAYO  [facility] #:                  [id number]  :                       1935  ADMIT DATE:       2025 12:52 PM  DISCH DATE:  RESPONDING  PROVIDER #:        Miranda Youssef MD          QUERY TEXT:    Patient admitted with CHF exacerbation.  Noted documentation of Acute Kidney   Injury.  In order to support the diagnosis of ROXI, please include additional   clinical indicators in your documentation.? Or please document if the   diagnosis of ROXI has been ruled out after further study.  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  Options provided:  -- Acute kidney injury evidenced by, Please document evidence as well as a   numerical baseline creatinine, if known.  -- Currently resolved acute kidney injury was evidenced by, Please document   evidence as well as a numerical baseline creatinine, if known.  -- Acute kidney injury ruled out after study  -- Other - I will add my own diagnosis  -- Disagree - Not applicable / Not valid  -- Disagree - Clinically unable to determine / Unknown  -- Refer to Clinical Documentation Reviewer    PROVIDER RESPONSE TEXT:    Acute kidney injury was ruled out after study.    Query created by: Cristal Pulido on 1/3/2025 11:14 AM      Electronically signed by:  Miranda Youssef MD 1/3/2025 12:41 PM

## 2025-01-03 NOTE — ANESTHESIA PRE PROCEDURE
MD Shannon   25 mg at 01/02/25 0930   • miconazole (MICOTIN) 2 % powder   Topical BID Shannon Meadows MD   Given at 01/03/25 0919   • [Held by provider] spironolactone (ALDACTONE) tablet 25 mg  25 mg Oral Daily Shannon Meadows MD       • tamsulosin (FLOMAX) capsule 0.4 mg  0.4 mg Oral Daily Shannon Meadows MD   0.4 mg at 01/02/25 0930   • [Held by provider] glipiZIDE (GLUCOTROL) tablet 10 mg  10 mg Oral BID Shannon Meadows MD       • sodium chloride flush 0.9 % injection 5-40 mL  5-40 mL IntraVENous 2 times per day Shannon Meadows MD   10 mL at 01/02/25 2030   • sodium chloride flush 0.9 % injection 5-40 mL  5-40 mL IntraVENous PRN Shannon Meadows MD   10 mL at 01/02/25 2243   • 0.9 % sodium chloride infusion   IntraVENous PRN Shannon Meadows MD       • ondansetron (ZOFRAN-ODT) disintegrating tablet 4 mg  4 mg Oral Q8H PRN Shannon Meadows MD        Or   • ondansetron (ZOFRAN) injection 4 mg  4 mg IntraVENous Q6H PRN Shannon Meadows MD       • polyethylene glycol (GLYCOLAX) packet 17 g  17 g Oral Daily PRN Shannon Meadows MD       • acetaminophen (TYLENOL) tablet 650 mg  650 mg Oral Q6H PRN Shannon Meadows MD        Or   • acetaminophen (TYLENOL) suppository 650 mg  650 mg Rectal Q6H PRN Shannon Meadows MD       • insulin lispro (HUMALOG,ADMELOG) injection vial 0-4 Units  0-4 Units SubCUTAneous 4x Daily AC & HS Shannon Meadows MD   2 Units at 01/02/25 2029   • midodrine (PROAMATINE) tablet 5 mg  5 mg Oral TID WC Shannon Meadows MD   5 mg at 01/02/25 1320       Allergies:    Allergies   Allergen Reactions   • Rivaroxaban Other (See Comments)     Hematuria recurrent       Problem List:    Patient Active Problem List   Diagnosis Code   • Benign prostatic hyperplasia without lower urinary tract symptoms N40.0   • Essential hypertension I10   • 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

## 2025-01-03 NOTE — CARE COORDINATION
ONGOING DISCHARGE PLAN:    Unable to speak to pt. At this time. Per Nursing, pt. Is off the floor for EGD.    Writer reviewed chart notes.     Pt. Is from Home w/ Wife.     VNS, was denied.     HGB today 7.1, Cr+ 2.0.     Nephro/GI, continue to follow.     Will continue to follow for additional discharge needs.    If patient is discharged prior to next notation, then this note serves as note for discharge by case management.    Electronically signed by Duyen Munguia RN on 1/3/2025 at 2:21 PM

## 2025-01-03 NOTE — PLAN OF CARE
Endoscopy results noted, ok to restart anticoagulation meds from a gi standpoint gi signing off f/u outpt ..Sonia Marks, APRN - CNP

## 2025-01-03 NOTE — PLAN OF CARE
Problem: Chronic Conditions and Co-morbidities  Goal: Patient's chronic conditions and co-morbidity symptoms are monitored and maintained or improved  1/3/2025 0428 by Rachael Hussein RN  Outcome: Progressing     Problem: Discharge Planning  Goal: Discharge to home or other facility with appropriate resources  1/3/2025 0428 by Rachael Hussein RN  Outcome: Progressing     Problem: Safety - Adult  Goal: Free from fall injury  1/3/2025 0428 by Rachael Hussein RN  Outcome: Progressing     Problem: ABCDS Injury Assessment  Goal: Absence of physical injury  1/3/2025 0428 by Rachael Hussein RN  Outcome: Progressing     Problem: Skin/Tissue Integrity  Goal: Absence of new skin breakdown  Description: 1.  Monitor for areas of redness and/or skin breakdown  2.  Assess vascular access sites hourly  3.  Every 4-6 hours minimum:  Change oxygen saturation probe site  4.  Every 4-6 hours:  If on nasal continuous positive airway pressure, respiratory therapy assess nares and determine need for appliance change or resting period.  1/3/2025 0428 by Rachael Hussein RN  Outcome: Progressing     Problem: Hematologic - Adult  Goal: Maintains hematologic stability  1/3/2025 0428 by Rachael Hussein RN  Outcome: Progressing     Problem: Cardiovascular - Adult  Goal: Maintains optimal cardiac output and hemodynamic stability  1/3/2025 0428 by Rachael Hussein RN  Outcome: Progressing     Problem: Cardiovascular - Adult  Goal: Absence of cardiac dysrhythmias or at baseline  1/3/2025 0428 by Rachael Hussein RN  Outcome: Progressing     Problem: Nutrition Deficit:  Goal: Optimize nutritional status  1/3/2025 0428 by Rachael Hussein RN  Outcome: Progressing

## 2025-01-03 NOTE — PROGRESS NOTES
Date:   1/3/2025  Patient name: Carl Frias  Date of admission:  1/1/2025 12:52 PM  MRN:   [id number]  YOB: 1935  PCP: No primary care provider on file.    Reason for Admission: Anemia [D64.9]  Anemia, unspecified type [D64.9]  Stage 4 chronic kidney disease (HCC) [N18.4]  Chronic congestive heart failure, unspecified heart failure type (HCC) [I50.9]    Cardiology follow-up: Congestive heart failure, chronic A-fib       Referring physician: Dr Arsenio Ruiz     Impression  Admission 1/1/2025 severe anemia hemoglobin 6.5, increasing swelling over the legs, markedly elevated proBNP  EGD 1/3/2025 one 5 mm ulcer with adherent blood in the stomach 1 clip, also biopsy obtained for H. pylori  Congestive heart failure diastolic ejection fraction 55-60% 2D echo 1/2/2024  Episode of nonsustained V. tach heart rate 160, 12 beats 2/18/2024, 1-2-2025  Chronic A-fib on low-dose Eliquis  Severely dilated LA  Hypertension  Moderate left ventricular hypertrophy  Type 2 diabetes mellitus  Chronic kidney disease  Benign prostatic hypertrophy  Impaired hearing, severe  Severe osteoarthritis involving multiple joints poor mobility  Back pain  Obesity BMI 37  Poor mobility  History of easy bruising  Iron deficiency anemia serum iron 6, saturation 3%, TIBC 175 lab work 4/23/2024     Admission  4/23/2024 increasing shortness of breath, peripheral edema and UTI, urinary retention, urine culture grew E. coli, ROXI on CKD  Significant improvement in the renal function after placing Crenshaw catheter and treating for UTI   Abnormal high-sensitivity troponin most likely type II    No history of coronary intervention, no TIA or CVA     Past surgical history  Hernia repair left inguinal, bilateral knee joint replacement bilateral, testicle removal, eye surgery     Drug allergy  Xarelto, severity nonspecified     History of present illness  89-year-old  male with a past medical history of hypertension, chronic A-fib, diabetes  oral hypoglycemic drugs     Need for assistance with personal care     Obesity, unspecified     Old myocardial infarction     Other abnormalities of gait and mobility     Other reduced mobility     Other thrombophilia (HCC)     Patient's other noncompliance with medication regimen for other reason     History of falling     Personal history of irradiation     Personal history of malignant neoplasm of testis     Personal history of nicotine dependence     Pleural effusion, not elsewhere classified     Polyosteoarthritis, unspecified     Unspecified osteoarthritis, unspecified site     Presence of artificial knee joint, bilateral     Spontaneous ecchymoses     Unspecified Escherichia coli (E. coli) as the cause of diseases classified elsewhere     Unspecified hearing loss, unspecified ear     Unspecified hydronephrosis     Unsteadiness on feet     Urinary tract infection, site not specified     Ventricular tachycardia, unspecified (HCC)     Other fatigue     Weakness     Anemia     Chronic congestive heart failure (HCC)     Stage 4 chronic kidney disease (HCC)     Gastric ulcer      Plan of Treatment:   Medications reviewed  1: Episodes of nonsustained V. tach, continue metoprolol succinate 25 mg daily  2: Chronic A-fib heart rate under control continue metoprolol succinate 25 mg a day Eliquis on hold because of severe anemia  3: Diastolic CHF, diuretics on hold lisinopril on hold, increasing BUN/creatinine and low blood pressure,    BUN 36 and creatinine 1.4 on 4/15/2024  4: Peptic ulcer disease status post clip placed on Protonix 40 mg IV twice a day    Electronically signed by Alcides Felipe MD on 1/3/2025 at 6:04 PM

## 2025-01-03 NOTE — PROGRESS NOTES
IntraVENous Daily    [Held by provider] amLODIPine  5 mg Oral Daily    [Held by provider] apixaban  2.5 mg Oral BID    [Held by provider] aspirin  81 mg Oral Every Other Day    [Held by provider] bumetanide  1 mg Oral Daily    finasteride  5 mg Oral Daily    [Held by provider] lisinopril  10 mg Oral Daily    metoprolol succinate  25 mg Oral Daily    miconazole   Topical BID    [Held by provider] spironolactone  25 mg Oral Daily    tamsulosin  0.4 mg Oral Daily    [Held by provider] glipiZIDE  10 mg Oral BID    sodium chloride flush  5-40 mL IntraVENous 2 times per day    insulin lispro  0-4 Units SubCUTAneous 4x Daily AC & HS    midodrine  5 mg Oral TID WC     Continuous Infusions:   sodium chloride      sodium chloride       PRN Meds:.sodium chloride, sodium chloride flush, sodium chloride, ondansetron **OR** ondansetron, polyethylene glycol, acetaminophen **OR** acetaminophen    Physical Exam:    VITALS:  BP (!) 128/55   Pulse 69   Temp 98.1 °F (36.7 °C) (Oral)   Resp 18   Ht 1.803 m (5' 11\")   Wt 88.9 kg (196 lb)   SpO2 94%   BMI 27.34 kg/m²   TEMPERATURE:  Current - Temp: 98.1 °F (36.7 °C); Max - Temp  Av °F (36.7 °C)  Min: 97.3 °F (36.3 °C)  Max: 98.4 °F (36.9 °C)  RESPIRATIONS RANGE: Resp  Av  Min: 16  Max: 18  PULSE RANGE: Pulse  Av.6  Min: 50  Max: 74  BLOOD PRESSURE RANGE:  Systolic (24hrs), Av , Min:103 , Max:144   ; Diastolic (24hrs), Av, Min:55, Max:79    PULSE OXIMETRY RANGE: SpO2  Av.8 %  Min: 94 %  Max: 100 %  24HR INTAKE/OUTPUT:    Intake/Output Summary (Last 24 hours) at 1/3/2025 0653  Last data filed at 2025 2208  Gross per 24 hour   Intake --   Output 800 ml   Net -800 ml     Constitutional: alert, appears stated age, and cooperative    Skin: Skin color, texture, turgor normal. No rashes or lesions    Head: Normocephalic, without obvious abnormality, atraumatic     ENT:  no epistaxis or rhinorrhea; hard of hearing.    Neck:    Supple with no

## 2025-01-03 NOTE — ANESTHESIA POSTPROCEDURE EVALUATION
Department of Anesthesiology  Postprocedure Note    Patient: Carl Frias  MRN: [id number]  YOB: 1935  Date of evaluation: 1/3/2025    Procedure Summary       Date: 01/03/25 Room / Location: [place] [address] / [facility]    Anesthesia Start: 1418 Anesthesia Stop: 1440    Procedure: ESOPHAGOGASTRODUODENOSCOPY BIOPSY WITH CLIPP APPLICATION X 1 (Esophagus) Diagnosis:       Anemia, unspecified type      (Anemia, unspecified type [D64.9])    Surgeons: Torey Washburn MD Responsible Provider: Rangel Nova MD    Anesthesia Type: general, TIVA ASA Status: 3            Anesthesia Type: No value filed.    Lakshmi Phase I: Lakshmi Score: 10    Lakshmi Phase II:      Anesthesia Post Evaluation    Patient location during evaluation: PACU  Patient participation: complete - patient participated  Level of consciousness: awake and alert  Airway patency: patent  Nausea & Vomiting: no vomiting  Cardiovascular status: hemodynamically stable  Respiratory status: acceptable  Hydration status: euvolemic  Comments: POST- ANESTHESIA EVALUATION       Pt Name: Carl Frias  MRN: [id number]  YOB: 1935  Date of evaluation: 1/3/2025  Time:  3:07 PM      /68   Pulse 80   Temp 97.9 °F (36.6 °C)   Resp 25   Ht 1.803 m (5' 11\")   Wt 88.9 kg (196 lb)   SpO2 96%   BMI 27.34 kg/m²      Consciousness Level  Awake  Cardiopulmonary Status  Stable  Pain Adequately Treated YES  Nausea / Vomiting  NO  Adequate Hydration  YES  Anesthesia Related Complications NONE      Electronically signed by Rangel Nova MD on 1/3/2025 at 3:07 PM         Pain management: satisfactory to patient    No notable events documented.

## 2025-01-03 NOTE — PROGRESS NOTES
Physical Therapy  Facility/Department: [facility]  Physical Therapy Initial Assessment    Name: Carl Frias  : 1935  MRN: [id number]  Date of Service: 1/3/2025    Discharge Recommendations:  Home with assist PRN   PT Equipment Recommendations  Equipment Needed: No      Patient Diagnosis(es): The primary encounter diagnosis was Chronic congestive heart failure, unspecified heart failure type (HCC). Diagnoses of Stage 4 chronic kidney disease (HCC) and Anemia, unspecified type were also pertinent to this visit.  Past Medical History:  has a past medical history of Diabetes mellitus (HCC), Hypertension, and Testicular cancer (HCC).  Past Surgical History:  has a past surgical history that includes Testicle removal (); hernia repair (); eye surgery (Bilateral); and joint replacement (Bilateral).    Assessment  Body Structures, Functions, Activity Limitations Requiring Skilled Therapeutic Intervention: Decreased functional mobility ;Decreased endurance  Assessment: Impaired mobility due to decreased tolerance to activity  Decision Making: Low Complexity  History: Anemia  Exam: decreased endurance, mobility  Clinical Presentation: evolving  Requires PT Follow-Up: Yes  Activity Tolerance  Activity Tolerance: Patient tolerated evaluation without incident    Plan  Physical Therapy Plan  General Plan: 5-7 times per week  Current Treatment Recommendations: Stair training, Gait training, Therapeutic activities, Endurance training  Safety Devices  Type of Devices:  (pt left with transport)    Restrictions  Restrictions/Precautions  Restrictions/Precautions: General Precautions, Fall Risk  Activity Level: Up as Tolerated, Up with Assist  Required Braces or Orthoses?: No     Subjective  General  Family/Caregiver Present: No  Follows Commands: Within Functional Limits  General  General Comments: pt is [place]  Subjective  Subjective: pt is pleasant and cooperative         Social/Functional History  Social/Functional

## 2025-01-03 NOTE — OP NOTE
Esophagogastroduodenoscopy    DATE OF PROCEDURE: 1/3/2025     SURGEON: Torey Washburn MD  Facility: [facility]  ASSISTANT: None  PREOPERATIVE DIAGNOSIS: Anemia    Diagnosis:    POSTOPERATIVE DIAGNOSIS: As described below (see findings and impression)    OPERATION: Upper GI endoscopy with Biopsy and control bleed    ANESTHESIA: Monitored Anesthesia Care (MAC)     ESTIMATED BLOOD LOSS: Less than 50 ml    COMPLICATIONS: None.     SPECIMENS:  Was Obtained:     ID Type Source Tests Collected by Time Destination   A : stomach biopsy Tissue Stomach SURGICAL PATHOLOGY Torey Washburn MD 1/3/2025 7850         HISTORY: The patient is a 89 y.o. year old male with history of above preop diagnosis.  I recommended esophagogastroduodenoscopy with possible biopsy and I explained the risk, benefits, expected outcome, and alternatives to the procedure.  Risks included but are not limited to bleeding, infection, respiratory distress, hypotension, and perforation of the esophagus, stomach, or duodenum.  Patient understands and is in agreement.      PROCEDURE: The patient was given IV Monitored Anesthesia Care (MAC) and vitals monitoring per anesthesia department.  The patient was placed in the left lateral decubitus position. The patient was monitored continuously with ECG tracing, pulse oximetry, blood pressure monitoring, and direct observation. The gastroscope was inserted into the mouth and advanced under direct vision to second portion of the duodenum.  A careful inspection was made as the gastroscope was withdrawn, including a retroflexed view of the proximal stomach; findings and interventions are described below. Appropriate photodocumentation was obtained. Post sedation patient was stable with stable vital signs and stable O2 saturations.       Findings:    Retropharyngeal area was grossly normal appearing    Esophagus:   Normal upper, middle esophagus  Irregular Z-line without evidence of nodules or ulceration,  or varices    Stomach:  Few subcentimeter clean-based ulcers in the antrum and body and pyloric channel  1 ulcer with adherent blood in lesser curvature mid body-status post 1 clip  Gastritis-biopsy obtained to r/o- H pylori     Stomach appropriately distensible with gas on direct and retroflexion view    Duodenum:     Descending: normal    Bulb: normal    The scope was removed and the patient tolerated the procedure well.     Impression-   One 5 mm ulcer with adherent blood-clipped  Few small benign looking clean-based ulcers  Gastritis-biopsied    Recommendations/Plan:   Return to hospital bed for ongoing care  Clear liquid for today, advance from tomorrow  PPI twice daily  Okay to resume antiplatelets and blood thinners from tonight  Avoid NSAIDs  Follow pathology result  Patient will need colonoscopy for anemia timing based on clinical status     Electronically signed by Torey Washburn MD  on 1/3/2025 at 2:34 PM   this note is created with the assistance of a speech recognition program.  While intending to generate a document that actually reflects the content of the visit, the document can still have some errors including those of syntax and sound a like substitutions which may escape proof reading.  It such instances, actual meaning can be extrapolated by contextual diversion.

## 2025-01-04 LAB
ANION GAP SERPL CALCULATED.3IONS-SCNC: 9 MMOL/L (ref 9–16)
BASOPHILS # BLD: 0 K/UL (ref 0–0.2)
BASOPHILS NFR BLD: 0 % (ref 0–2)
BUN SERPL-MCNC: 43 MG/DL (ref 8–23)
CALCIUM SERPL-MCNC: 9.2 MG/DL (ref 8.6–10.4)
CHLORIDE SERPL-SCNC: 108 MMOL/L (ref 98–107)
CO2 SERPL-SCNC: 21 MMOL/L (ref 20–31)
CREAT SERPL-MCNC: 1.9 MG/DL (ref 0.7–1.2)
EOSINOPHIL # BLD: 0 K/UL (ref 0–0.4)
EOSINOPHILS RELATIVE PERCENT: 0 % (ref 0–4)
ERYTHROCYTE [DISTWIDTH] IN BLOOD BY AUTOMATED COUNT: 15.1 % (ref 11.5–14.9)
GFR, ESTIMATED: 34 ML/MIN/1.73M2
GLUCOSE BLD-MCNC: 136 MG/DL (ref 75–110)
GLUCOSE BLD-MCNC: 159 MG/DL (ref 75–110)
GLUCOSE BLD-MCNC: 192 MG/DL (ref 75–110)
GLUCOSE BLD-MCNC: 215 MG/DL (ref 75–110)
GLUCOSE SERPL-MCNC: 179 MG/DL (ref 74–99)
HCT VFR BLD AUTO: 22.2 % (ref 41–53)
HCT VFR BLD AUTO: 22.4 % (ref 41–53)
HCT VFR BLD AUTO: 22.8 % (ref 41–53)
HCT VFR BLD AUTO: 23.9 % (ref 41–53)
HCT VFR BLD AUTO: 24.8 % (ref 41–53)
HGB BLD-MCNC: 7.3 G/DL (ref 13.5–17.5)
HGB BLD-MCNC: 7.4 G/DL (ref 13.5–17.5)
HGB BLD-MCNC: 7.4 G/DL (ref 13.5–17.5)
HGB BLD-MCNC: 7.8 G/DL (ref 13.5–17.5)
HGB BLD-MCNC: 8.1 G/DL (ref 13.5–17.5)
LYMPHOCYTES NFR BLD: 0.95 K/UL (ref 1–4.8)
LYMPHOCYTES RELATIVE PERCENT: 17 % (ref 24–44)
MCH RBC QN AUTO: 27.9 PG (ref 26–34)
MCHC RBC AUTO-ENTMCNC: 32 G/DL (ref 31–37)
MCV RBC AUTO: 87.2 FL (ref 80–100)
MICROORGANISM SPEC CULT: ABNORMAL
MONOCYTES NFR BLD: 0.45 K/UL (ref 0.1–1.3)
MONOCYTES NFR BLD: 8 % (ref 1–7)
MORPHOLOGY: ABNORMAL
MORPHOLOGY: ABNORMAL
NEUTROPHILS NFR BLD: 75 % (ref 36–66)
NEUTS SEG NFR BLD: 4.2 K/UL (ref 1.3–9.1)
PLATELET # BLD AUTO: 495 K/UL (ref 150–450)
PMV BLD AUTO: 6.3 FL (ref 6–12)
POTASSIUM SERPL-SCNC: 4.2 MMOL/L (ref 3.7–5.3)
RBC # BLD AUTO: 2.62 M/UL (ref 4.5–5.9)
SERVICE CMNT-IMP: ABNORMAL
SODIUM SERPL-SCNC: 138 MMOL/L (ref 136–145)
SPECIMEN DESCRIPTION: ABNORMAL
WBC OTHER # BLD: 5.6 K/UL (ref 3.5–11)

## 2025-01-04 PROCEDURE — 2060000000 HC ICU INTERMEDIATE R&B

## 2025-01-04 PROCEDURE — 80048 BASIC METABOLIC PNL TOTAL CA: CPT

## 2025-01-04 PROCEDURE — 36415 COLL VENOUS BLD VENIPUNCTURE: CPT

## 2025-01-04 PROCEDURE — 82947 ASSAY GLUCOSE BLOOD QUANT: CPT

## 2025-01-04 PROCEDURE — 85014 HEMATOCRIT: CPT

## 2025-01-04 PROCEDURE — 85025 COMPLETE CBC W/AUTO DIFF WBC: CPT

## 2025-01-04 PROCEDURE — 6360000002 HC RX W HCPCS

## 2025-01-04 PROCEDURE — 2580000003 HC RX 258: Performed by: STUDENT IN AN ORGANIZED HEALTH CARE EDUCATION/TRAINING PROGRAM

## 2025-01-04 PROCEDURE — 85018 HEMOGLOBIN: CPT

## 2025-01-04 PROCEDURE — 51798 US URINE CAPACITY MEASURE: CPT

## 2025-01-04 PROCEDURE — 93005 ELECTROCARDIOGRAM TRACING: CPT

## 2025-01-04 PROCEDURE — 6370000000 HC RX 637 (ALT 250 FOR IP)

## 2025-01-04 PROCEDURE — 99233 SBSQ HOSP IP/OBS HIGH 50: CPT

## 2025-01-04 PROCEDURE — 2500000003 HC RX 250 WO HCPCS: Performed by: STUDENT IN AN ORGANIZED HEALTH CARE EDUCATION/TRAINING PROGRAM

## 2025-01-04 PROCEDURE — 6360000002 HC RX W HCPCS: Performed by: STUDENT IN AN ORGANIZED HEALTH CARE EDUCATION/TRAINING PROGRAM

## 2025-01-04 PROCEDURE — 6370000000 HC RX 637 (ALT 250 FOR IP): Performed by: STUDENT IN AN ORGANIZED HEALTH CARE EDUCATION/TRAINING PROGRAM

## 2025-01-04 PROCEDURE — 2580000003 HC RX 258

## 2025-01-04 RX ORDER — LEVOFLOXACIN 500 MG/1
250 TABLET, FILM COATED ORAL DAILY
Status: DISCONTINUED | OUTPATIENT
Start: 2025-01-04 | End: 2025-01-06 | Stop reason: HOSPADM

## 2025-01-04 RX ADMIN — TAMSULOSIN HYDROCHLORIDE 0.4 MG: 0.4 CAPSULE ORAL at 08:52

## 2025-01-04 RX ADMIN — SODIUM CHLORIDE, PRESERVATIVE FREE 10 ML: 5 INJECTION INTRAVENOUS at 21:48

## 2025-01-04 RX ADMIN — LEVOFLOXACIN 250 MG: 500 TABLET, FILM COATED ORAL at 18:39

## 2025-01-04 RX ADMIN — PANTOPRAZOLE SODIUM 40 MG: 40 INJECTION, POWDER, FOR SOLUTION INTRAVENOUS at 04:54

## 2025-01-04 RX ADMIN — APIXABAN 2.5 MG: 2.5 TABLET, FILM COATED ORAL at 21:48

## 2025-01-04 RX ADMIN — MICONAZOLE NITRATE: 20 POWDER TOPICAL at 08:53

## 2025-01-04 RX ADMIN — METOPROLOL SUCCINATE 25 MG: 25 TABLET, EXTENDED RELEASE ORAL at 08:52

## 2025-01-04 RX ADMIN — PANTOPRAZOLE SODIUM 40 MG: 40 INJECTION, POWDER, FOR SOLUTION INTRAVENOUS at 18:01

## 2025-01-04 RX ADMIN — FINASTERIDE 5 MG: 5 TABLET, FILM COATED ORAL at 09:12

## 2025-01-04 RX ADMIN — MICONAZOLE NITRATE: 20 POWDER TOPICAL at 21:49

## 2025-01-04 RX ADMIN — SODIUM CHLORIDE 125 MG: 9 INJECTION, SOLUTION INTRAVENOUS at 09:59

## 2025-01-04 RX ADMIN — SODIUM CHLORIDE, PRESERVATIVE FREE 10 ML: 5 INJECTION INTRAVENOUS at 08:55

## 2025-01-04 RX ADMIN — INSULIN LISPRO 1 UNITS: 100 INJECTION, SOLUTION INTRAVENOUS; SUBCUTANEOUS at 18:00

## 2025-01-04 NOTE — CARE COORDINATION
ONGOING DISCHARGE PLAN:    Patient is alert and oriented x4.    Spoke with patient regarding discharge plan and patient confirms that plan is still to discharge to home with no needs    Urology consult     Bumex restarted    Labs    Will continue to follow for additional discharge needs.    If patient is discharged prior to next notation, then this note serves as note for discharge by case management.    Electronically signed by Selene Pedro RN on 1/4/2025 at 2:49 PM

## 2025-01-04 NOTE — PLAN OF CARE
Problem: Chronic Conditions and Co-morbidities  Goal: Patient's chronic conditions and co-morbidity symptoms are monitored and maintained or improved  Outcome: Progressing  Note: Sx stable     Problem: Discharge Planning  Goal: Discharge to home or other facility with appropriate resources  Outcome: Progressing     Problem: Safety - Adult  Goal: Free from fall injury  Outcome: Progressing     Problem: ABCDS Injury Assessment  Goal: Absence of physical injury  Outcome: Progressing     Problem: Skin/Tissue Integrity  Goal: Absence of new skin breakdown  Description: 1.  Monitor for areas of redness and/or skin breakdown  2.  Assess vascular access sites hourly  3.  Every 4-6 hours minimum:  Change oxygen saturation probe site  4.  Every 4-6 hours:  If on nasal continuous positive airway pressure, respiratory therapy assess nares and determine need for appliance change or resting period.  Outcome: Progressing  Note: No new skin issues     Problem: Hematologic - Adult  Goal: Maintains hematologic stability  Outcome: Progressing     Problem: Cardiovascular - Adult  Goal: Maintains optimal cardiac output and hemodynamic stability  Outcome: Progressing  Goal: Absence of cardiac dysrhythmias or at baseline  Outcome: Progressing     Problem: Nutrition Deficit:  Goal: Optimize nutritional status  Outcome: Not Progressing  Note: Diet remains clear liquid     Problem: Pain  Goal: Verbalizes/displays adequate comfort level or baseline comfort level  Outcome: Progressing     Problem: Nutrition Deficit:  Goal: Optimize nutritional status  Outcome: Not Progressing  Note: Diet remains clear liquid

## 2025-01-04 NOTE — PROGRESS NOTES
appears stated age, and cooperative    Skin: Skin color, texture, turgor normal. No rashes or lesions    Head: Normocephalic, without obvious abnormality, atraumatic     ENT:  no epistaxis or rhinorrhea; hard of hearing.    Neck:    Supple with no JVD    Cardiovascular/Edema: regular rate and rhythm, S1, S2 normal, no murmur, click, rub or gallop    Respiratory: Lungs: clear to auscultation bilaterally    Abdomen: soft, non-tender; bowel sounds normal; no masses,  no organomegaly    Back: symmetric, no curvature. ROM normal. No CVA tenderness.    Extremities: edema +    Neuro:  Grossly normal    CBC:   Recent Labs     01/02/25  0536 01/02/25  1519 01/03/25  0713 01/03/25  1545 01/04/25  0438 01/04/25  0733 01/04/25  1530   WBC 8.7  --  7.4  --  5.6  --   --    HGB 7.4*   < > 7.1*   < > 7.3* 7.4* 7.8*   *  --  453*  --  495*  --   --     < > = values in this interval not displayed.     BMP:    Recent Labs     01/02/25  0536 01/03/25  0713 01/04/25  0438    139 138   K 4.0 3.9 4.2    108* 108*   CO2 22 23 21   BUN 53* 51* 43*   CREATININE 2.1* 2.0* 1.9*   GLUCOSE 77 110* 179*     Lab Results   Component Value Date/Time    NITRU NEGATIVE 01/01/2025 11:41 PM    COLORU Yellow 01/01/2025 11:41 PM    PHUR 6.0 01/01/2025 11:41 PM    PHUR 5.5 04/24/2024 12:56 AM    WBCUA TOO NUMEROUS TO COUNT 01/01/2025 11:41 PM    RBCUA 0 TO 2 01/01/2025 11:41 PM    MUCUS NOT REPORTED 10/26/2020 10:51 AM    TRICHOMONAS NOT REPORTED 10/26/2020 10:51 AM    YEAST NOT REPORTED 10/26/2020 10:51 AM    BACTERIA MANY 01/01/2025 11:41 PM    SPECGRAV =1.030 06/12/2012 04:03 PM    LEUKOCYTESUR LARGE 01/01/2025 11:41 PM    UROBILINOGEN Normal 01/01/2025 11:41 PM    BILIRUBINUR NEGATIVE 01/01/2025 11:41 PM    BLOODU neg 06/12/2012 04:03 PM    GLUCOSEU NEGATIVE 01/01/2025 11:41 PM    KETUA NEGATIVE 01/01/2025 11:41 PM    AMORPHOUS NOT REPORTED 10/26/2020 10:51 AM     Urine Osmolarity:   Lab Results   Component Value Date/Time    OSMOU

## 2025-01-04 NOTE — PROGRESS NOTES
Date:   1/4/2025  Patient name: Carl Frias  Date of admission:  1/1/2025 12:52 PM  MRN:   [id number]  YOB: 1935  PCP: No primary care provider on file.    Reason for Admission: Anemia [D64.9]  Anemia, unspecified type [D64.9]  Stage 4 chronic kidney disease (HCC) [N18.4]  Chronic congestive heart failure, unspecified heart failure type (HCC) [I50.9]    Cardiology follow-up: Congestive heart failure, chronic A-fib        Referring physician: Dr Arsenio Ruiz     Impression  Admission 1/1/2025 severe anemia hemoglobin 6.5, increasing swelling over the legs, markedly elevated proBNP  EGD 1/3/2025 one 5 mm ulcer with adherent blood in the stomach 1 clip, also biopsy obtained for H. pylori  Congestive heart failure diastolic ejection fraction 55-60% 2D echo 1/2/2024  Episode of nonsustained V. tach heart rate 160, 12 beats 2/18/2024, 1-2-2025  Chronic A-fib on low-dose Eliquis  Severely dilated LA  Hypertension  Moderate left ventricular hypertrophy  Type 2 diabetes mellitus  Chronic kidney disease  Benign prostatic hypertrophy  Impaired hearing, severe  Severe osteoarthritis involving multiple joints poor mobility  Back pain  Obesity BMI 37  Poor mobility  History of easy bruising  Iron deficiency anemia serum iron 13, saturation 7%, TIBC 184 lab work 1/2/2025    Admission  4/23/2024 increasing shortness of breath, peripheral edema and UTI, urinary retention, urine culture grew E. coli, ROXI on CKD  Significant improvement in the renal function after placing Crenshaw catheter and treating for UTI   Abnormal high-sensitivity troponin most likely type II     No history of coronary intervention, no TIA or CVA     Past surgical history  Hernia repair left inguinal, bilateral knee joint replacement bilateral, testicle removal, eye surgery     Drug allergy  Xarelto, severity nonspecified     History of present illness  89-year-old  male with a past medical history of hypertension, chronic A-fib, diabetes  mellitus , BPH , testicular cancer ,severe osteoarthritis involving multiple knees, impaired hearing, bilateral knee replacement got hospitalized on 1/1/2025.  He was at CHF clinic and he had elevated proBNP of 5517 with lower extremities edema he was sent to ER .blood checkup in the ER showed  his hemoglobin was 6.7.  He also had elevated creatinine 2.3 and BUN 55.  Patient denied any bleeding in the stool or urine no shortness of breath or chest pain no fever or chills.  Blood pressure on admission 112/50 heart rate 74 oxygen saturation 96% temperature 98.3  Lab work on admission  WBC 8.0 hemoglobin 6.7, platelets 504  Albumin 3.1, sodium 138, potassium 4.2, BUN 55, creatinine 2.3, glucose 100  proBNP 5517  Urine examination showed bacteria many WBC many, large leukocyte Estrace, turbid urine yellowish color  He received 1 unit blood transfusion    Current evaluation  Patient seen and examined, medications and labs checked  He had EGD today under MAC 1/3/2024  It showed few subcentimeter clean-based ulcers in the antrum and body and pyloric channel  1 ulcer with adherent blood in the lesser curvature mid body status post 1 clip  Gastritis biopsy obtained for H. pylori, duodenum normal  He was resting on bed did not appear in any distress  He is on clear liquid  Neck veins were normal no peripheral edema  Chest expansion was poor diminished breath sound at bases  ECG monitor A-fib heart rate 65  Blood pressure 130/70 oxygen saturation 94% room air     Sodium 138, potassium 4.2, BUN 43, creatinine 1.9, slight improvement than yesterday, glucose 179  Hemoglobin early morning 7.3 repeat hemoglobin 7.4 and 7.8     Investigation workup  ECG 11/23/2024  A-fib low voltage nonspecific ST changes heart rate 66  ECG 4/27/2024  A-fib, heart rate 81, occasional PVCs/aberrant conduction complex, 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

## 2025-01-04 NOTE — PROGRESS NOTES
[facility]  IN-PATIENT SERVICE  [facility]    PROGRESS NOTE             1/4/2025    8:14 AM    Name:   Carl Frias  MRN:     [id number]     Acct:      [id number]   Room:   2122/2122-01   Day:  3  Admit Date:  1/1/2025 12:52 PM    PCP:  No primary care provider on file.  Code Status:  Full Code    Subjective:     C/C:   Chief Complaint   Patient presents with    high BNP     Interval History Status: not changed.    Patient was seen and examined at bedside.  Vitally stable.  On room air. No overnight events  EGD yesterday showed few subcentimeter clean-based ulcer in the antrum and pylorus.  1 ulcer with adherent blood and lesser curvature that was clipped and gastritis that was biopsied to rule out H. pylori.  Patient will need colonoscopy  Creatinine 1.9 BUN 43  Kappa 97.5/lambda 131/free kappa lambda ratio 0.74  Hemoglobin 7.4  Urine culture gram-negative rods  Urology consulted  Bumex restarted per Nephrology  Celiac panel negative/IgA 485  US renal limited moderate right and mild-to-moderate left hydronephrosis which is increased from the prior ultrasound. Simple bilateral renal cysts.  No follow-up imaging is recommended.     Lactate 132  Reticulocyte 1.5/absolute reticulocyte 0.043  Vitamin B12 373 and folate 10.8  Iron 13/TIBC 184/iron saturation 7  Ferritin 225  Patient urine analysis suggested infection with small hemoglobin, trace protein, large LE, numerous WBC, 0-2 casts, too many bacteria, and 0-2 epithelial cells which exclude contamination    Brief History:     This is 89 years old f male with a past medical history of heart failure, type 2 diabetes mellitus, hypertension, and testicular cancer.     Patient presented from CHF clinic with increased proBNP 5517 with lower extremity edema.  On 11/26 was 3550 with no signs of fluid overload.  And on 10/30/2024 was 2210 with edema.  Patient denied any bleeding in stool or urine, shortness of breath, chest  improved 7.3.  Patient told the nurse that his stool \"sloppy joes.\"  Pain H&H every 8 hours.  GI consulted  H&H every 8 hours  Occult stool pending  Lactate 132  Reticulocyte 1.5/absolute reticulocyte 0.043  Vitamin B12 373 and folate 10.8  Iron 13/TIBC 184/iron saturation 7  Ferritin 225  Celiac panel negative/IgA 485  Lovenox and aspirin on hold  EGD yesterday showed few subcentimeter clean-based ulcer in the antrum and pylorus.  1 ulcer with adherent blood and lesser curvature that was clipped and gastritis that was biopsied to rule out H. pylori.  Patient will need colonoscopy    Acute kidney injury; stage IIIb chronic kidney disease  Initial kidney function revealed deterioration with creatinine 2.3>2 (baseline about 2) and BUN 55  Creatinine 2.2> 2.3> 2.1> 2> 1.9  Patient urine analysis suggested infection with small hemoglobin, trace protein, large LE, numerous WBC, 0-2 casts, too many bacteria, and 0-2 epithelial cells which exclude contamination  Bumex on hold  Kappa 97.5/lambda 131/free kappa lambda ratio 0.74  US renal limited moderate right and mild-to-moderate left hydronephrosis which is increased from the prior ultrasound. Simple bilateral renal cysts.  No follow-up imaging is recommended.  Nephrology consulted     Chronic atrial fibrillation  Eliquis 2.5 mg twice daily on hold  Metoprolol 25 mg     Hypertension  Lisinopril, Norvasc, Bumex, spironolactone on hold    Type 2 diabetes mellitus  Hemoglobin A1c 6  Low sliding scale  POCT by 4    Benign prostate hyperplasia  Tamsulosin 0.4 mg  Finasteride 5 mg         DVT prophylaxis: EPC cuffs  GI prophylaxis: IV Protonix 40 mg twice daily  Diet: Clear diet    Plan will be discussed with the attending, Dr Reg Meadows MD  PGY I transitional year resident  1/4/2025 8:14 AM       Electronically signed by Shannon Meadows MD     Attending Physician Statement  I have discussed the care of Carl Frias and I have examined the patient myself and

## 2025-01-04 NOTE — PLAN OF CARE
Problem: Chronic Conditions and Co-morbidities  Goal: Patient's chronic conditions and co-morbidity symptoms are monitored and maintained or improved  Outcome: Progressing  Flowsheets (Taken 1/3/2025 2048)  Care Plan - Patient's Chronic Conditions and Co-Morbidity Symptoms are Monitored and Maintained or Improved:   Monitor and assess patient's chronic conditions and comorbid symptoms for stability, deterioration, or improvement   Collaborate with multidisciplinary team to address chronic and comorbid conditions and prevent exacerbation or deterioration   Update acute care plan with appropriate goals if chronic or comorbid symptoms are exacerbated and prevent overall improvement and discharge     Problem: Discharge Planning  Goal: Discharge to home or other facility with appropriate resources  Outcome: Progressing  Flowsheets (Taken 1/3/2025 2048)  Discharge to home or other facility with appropriate resources:   Identify barriers to discharge with patient and caregiver   Arrange for needed discharge resources and transportation as appropriate   Identify discharge learning needs (meds, wound care, etc)     Problem: Safety - Adult  Goal: Free from fall injury  Outcome: Progressing  Flowsheets (Taken 1/3/2025 2048)  Free From Fall Injury: Instruct family/caregiver on patient safety     Problem: ABCDS Injury Assessment  Goal: Absence of physical injury  Outcome: Progressing  Flowsheets (Taken 1/3/2025 2048)  Absence of Physical Injury: Implement safety measures based on patient assessment     Problem: Skin/Tissue Integrity  Goal: Absence of new skin breakdown  Description: 1.  Monitor for areas of redness and/or skin breakdown  2.  Assess vascular access sites hourly  3.  Every 4-6 hours minimum:  Change oxygen saturation probe site  4.  Every 4-6 hours:  If on nasal continuous positive airway pressure, respiratory therapy assess nares and determine need for appliance change or resting period.  Outcome: Progressing

## 2025-01-05 LAB
ANION GAP SERPL CALCULATED.3IONS-SCNC: 8 MMOL/L (ref 9–16)
BASOPHILS # BLD: 0 K/UL (ref 0–0.2)
BASOPHILS NFR BLD: 0 % (ref 0–2)
BUN SERPL-MCNC: 35 MG/DL (ref 8–23)
CALCIUM SERPL-MCNC: 9.1 MG/DL (ref 8.6–10.4)
CHLORIDE SERPL-SCNC: 108 MMOL/L (ref 98–107)
CO2 SERPL-SCNC: 23 MMOL/L (ref 20–31)
CREAT SERPL-MCNC: 2.1 MG/DL (ref 0.7–1.2)
DATE, STOOL #1: ABNORMAL
EOSINOPHIL # BLD: 0.2 K/UL (ref 0–0.4)
EOSINOPHILS RELATIVE PERCENT: 2 % (ref 0–4)
ERYTHROCYTE [DISTWIDTH] IN BLOOD BY AUTOMATED COUNT: 14.9 % (ref 11.5–14.9)
GFR, ESTIMATED: 30 ML/MIN/1.73M2
GLUCOSE BLD-MCNC: 120 MG/DL (ref 75–110)
GLUCOSE BLD-MCNC: 189 MG/DL (ref 75–110)
GLUCOSE BLD-MCNC: 193 MG/DL (ref 75–110)
GLUCOSE BLD-MCNC: 204 MG/DL (ref 75–110)
GLUCOSE SERPL-MCNC: 128 MG/DL (ref 74–99)
HCT VFR BLD AUTO: 23.8 % (ref 41–53)
HCT VFR BLD AUTO: 24.4 % (ref 41–53)
HEMOCCULT SP1 STL QL: POSITIVE
HGB BLD-MCNC: 7.9 G/DL (ref 13.5–17.5)
HGB BLD-MCNC: 8.2 G/DL (ref 13.5–17.5)
LYMPHOCYTES NFR BLD: 1.1 K/UL (ref 1–4.8)
LYMPHOCYTES RELATIVE PERCENT: 13 % (ref 24–44)
MCH RBC QN AUTO: 28.3 PG (ref 26–34)
MCHC RBC AUTO-ENTMCNC: 33.3 G/DL (ref 31–37)
MCV RBC AUTO: 84.9 FL (ref 80–100)
MONOCYTES NFR BLD: 0.9 K/UL (ref 0.1–1.3)
MONOCYTES NFR BLD: 10 % (ref 1–7)
NEUTROPHILS NFR BLD: 75 % (ref 36–66)
NEUTS SEG NFR BLD: 6.7 K/UL (ref 1.3–9.1)
PLATELET # BLD AUTO: 487 K/UL (ref 150–450)
PMV BLD AUTO: 6.3 FL (ref 6–12)
POTASSIUM SERPL-SCNC: 4.3 MMOL/L (ref 3.7–5.3)
RBC # BLD AUTO: 2.8 M/UL (ref 4.5–5.9)
SODIUM SERPL-SCNC: 139 MMOL/L (ref 136–145)
TIME, STOOL #1: ABNORMAL
WBC OTHER # BLD: 8.9 K/UL (ref 3.5–11)

## 2025-01-05 PROCEDURE — 6360000002 HC RX W HCPCS: Performed by: STUDENT IN AN ORGANIZED HEALTH CARE EDUCATION/TRAINING PROGRAM

## 2025-01-05 PROCEDURE — 85014 HEMATOCRIT: CPT

## 2025-01-05 PROCEDURE — 2580000003 HC RX 258: Performed by: STUDENT IN AN ORGANIZED HEALTH CARE EDUCATION/TRAINING PROGRAM

## 2025-01-05 PROCEDURE — 2580000003 HC RX 258

## 2025-01-05 PROCEDURE — 2500000003 HC RX 250 WO HCPCS: Performed by: STUDENT IN AN ORGANIZED HEALTH CARE EDUCATION/TRAINING PROGRAM

## 2025-01-05 PROCEDURE — 6370000000 HC RX 637 (ALT 250 FOR IP)

## 2025-01-05 PROCEDURE — 85025 COMPLETE CBC W/AUTO DIFF WBC: CPT

## 2025-01-05 PROCEDURE — 51798 US URINE CAPACITY MEASURE: CPT

## 2025-01-05 PROCEDURE — 2060000000 HC ICU INTERMEDIATE R&B

## 2025-01-05 PROCEDURE — 80048 BASIC METABOLIC PNL TOTAL CA: CPT

## 2025-01-05 PROCEDURE — 99233 SBSQ HOSP IP/OBS HIGH 50: CPT

## 2025-01-05 PROCEDURE — 6370000000 HC RX 637 (ALT 250 FOR IP): Performed by: STUDENT IN AN ORGANIZED HEALTH CARE EDUCATION/TRAINING PROGRAM

## 2025-01-05 PROCEDURE — 85018 HEMOGLOBIN: CPT

## 2025-01-05 PROCEDURE — 36415 COLL VENOUS BLD VENIPUNCTURE: CPT

## 2025-01-05 PROCEDURE — 82272 OCCULT BLD FECES 1-3 TESTS: CPT

## 2025-01-05 PROCEDURE — 6360000002 HC RX W HCPCS

## 2025-01-05 PROCEDURE — 82947 ASSAY GLUCOSE BLOOD QUANT: CPT

## 2025-01-05 RX ADMIN — SODIUM CHLORIDE, PRESERVATIVE FREE 10 ML: 5 INJECTION INTRAVENOUS at 08:43

## 2025-01-05 RX ADMIN — TAMSULOSIN HYDROCHLORIDE 0.4 MG: 0.4 CAPSULE ORAL at 08:42

## 2025-01-05 RX ADMIN — MICONAZOLE NITRATE: 20 POWDER TOPICAL at 09:56

## 2025-01-05 RX ADMIN — SODIUM CHLORIDE 125 MG: 9 INJECTION, SOLUTION INTRAVENOUS at 10:04

## 2025-01-05 RX ADMIN — SODIUM CHLORIDE, PRESERVATIVE FREE 10 ML: 5 INJECTION INTRAVENOUS at 21:22

## 2025-01-05 RX ADMIN — FINASTERIDE 5 MG: 5 TABLET, FILM COATED ORAL at 09:56

## 2025-01-05 RX ADMIN — INSULIN LISPRO 1 UNITS: 100 INJECTION, SOLUTION INTRAVENOUS; SUBCUTANEOUS at 11:49

## 2025-01-05 RX ADMIN — APIXABAN 2.5 MG: 2.5 TABLET, FILM COATED ORAL at 21:21

## 2025-01-05 RX ADMIN — LEVOFLOXACIN 250 MG: 500 TABLET, FILM COATED ORAL at 17:24

## 2025-01-05 RX ADMIN — MICONAZOLE NITRATE: 20 POWDER TOPICAL at 21:26

## 2025-01-05 RX ADMIN — ASPIRIN 81 MG: 81 TABLET, COATED ORAL at 09:56

## 2025-01-05 RX ADMIN — INSULIN LISPRO 1 UNITS: 100 INJECTION, SOLUTION INTRAVENOUS; SUBCUTANEOUS at 21:29

## 2025-01-05 RX ADMIN — PANTOPRAZOLE SODIUM 40 MG: 40 INJECTION, POWDER, FOR SOLUTION INTRAVENOUS at 17:24

## 2025-01-05 RX ADMIN — INSULIN LISPRO 1 UNITS: 100 INJECTION, SOLUTION INTRAVENOUS; SUBCUTANEOUS at 17:24

## 2025-01-05 RX ADMIN — PANTOPRAZOLE SODIUM 40 MG: 40 INJECTION, POWDER, FOR SOLUTION INTRAVENOUS at 03:39

## 2025-01-05 RX ADMIN — BUMETANIDE 1 MG: 1 TABLET ORAL at 08:42

## 2025-01-05 RX ADMIN — METOPROLOL SUCCINATE 25 MG: 25 TABLET, EXTENDED RELEASE ORAL at 08:42

## 2025-01-05 RX ADMIN — APIXABAN 2.5 MG: 2.5 TABLET, FILM COATED ORAL at 08:42

## 2025-01-05 NOTE — CONSULTS
unspecified (HCC)    Other fatigue    Weakness    Anemia    Chronic congestive heart failure (HCC)    Stage 4 chronic kidney disease (HCC)    Gastric ulcer       Plan:   BL hydronephrosis - appears to be chronic, but can be further evaluated on an outpatient basis with Dr. Piper.   Continue flomax and finasteride for BPH.  Monitor PVRs x 2, and if there is difficulty emptying or the patient is found to be in retention please place barajas catheter.    The patient will follow up in the [facility] clinic here on [facility] with my partners (Dr. Piper).  Please call to schedule an appointment.      Thank you for the consultation.  Please call with any questions.    Dr. Joseph Johansen Jr.   [facility]   [facility] , [place] [facility] [address]  O: [phone]  F: [phone]        Joseph Johansen Jr, MD  9:27 AM 1/5/2025

## 2025-01-05 NOTE — PLAN OF CARE
Problem: Chronic Conditions and Co-morbidities  Goal: Patient's chronic conditions and co-morbidity symptoms are monitored and maintained or improved  1/5/2025 0217 by Kelly Scott RN  Outcome: Progressing  1/4/2025 1827 by Jeanne Denney RN  Outcome: Progressing  Note: Sx stable     Problem: Discharge Planning  Goal: Discharge to home or other facility with appropriate resources  1/5/2025 0217 by Kelly Scott RN  Outcome: Progressing  1/4/2025 1827 by Jeanne Denney RN  Outcome: Progressing     Problem: Safety - Adult  Goal: Free from fall injury  1/5/2025 0217 by Kelly Scott RN  Outcome: Progressing  Note: Patient weak.  Alert and oriented.  Bed alarm on.  1/4/2025 1827 by Jeanne Denney RN  Outcome: Progressing     Problem: ABCDS Injury Assessment  Goal: Absence of physical injury  1/5/2025 0217 by Kelly Scott RN  Outcome: Progressing  1/4/2025 1827 by Jeanne Denney RN  Outcome: Progressing     Problem: Skin/Tissue Integrity  Goal: Absence of new skin breakdown  Description: 1.  Monitor for areas of redness and/or skin breakdown  2.  Assess vascular access sites hourly  3.  Every 4-6 hours minimum:  Change oxygen saturation probe site  4.  Every 4-6 hours:  If on nasal continuous positive airway pressure, respiratory therapy assess nares and determine need for appliance change or resting period.  1/5/2025 0217 by Kelly Scott RN  Outcome: Progressing  Note: Coccyx area reddened with tear.  Waffle mattress applied. Assisted with turning and repositioning.  1/4/2025 1827 by Jeanne Denney RN  Outcome: Progressing  Note: No new skin issues     Problem: Hematologic - Adult  Goal: Maintains hematologic stability  1/5/2025 0217 by Kelly Scott RN  Outcome: Progressing  Note: Hemoglobin last check 8.3.  No signs of bleeding.  1/4/2025 1827 by Jeanne Denney RN  Outcome: Progressing     Problem: Nutrition Deficit:  Goal: Optimize nutritional  status  1/5/2025 0217 by Kelly Scott RN  Outcome: Progressing  1/4/2025 1827 by Jeanne Denney RN  Outcome: Not Progressing  Note: Diet remains clear liquid     Problem: Cardiovascular - Adult  Goal: Maintains optimal cardiac output and hemodynamic stability  1/5/2025 0217 by Kelly Scott RN  Outcome: Progressing  Note: VS stable.  Telemetry atrial fib with controlled rate.  1/4/2025 1827 by Jeanne Denney RN  Outcome: Progressing  Goal: Absence of cardiac dysrhythmias or at baseline  1/5/2025 0217 by Kelly Scott RN  Outcome: Progressing  1/4/2025 1827 by Jeanne Denney RN  Outcome: Progressing     Problem: Pain  Goal: Verbalizes/displays adequate comfort level or baseline comfort level  1/5/2025 0217 by Kelly Scott RN  Outcome: Progressing  Note: Denies pain.  1/4/2025 1827 by Jeanne Denney RN  Outcome: Progressing     Problem: Nutrition Deficit:  Goal: Optimize nutritional status  1/5/2025 0217 by Kelly Scott RN  Outcome: Progressing  1/4/2025 1827 by Jeanne Denney RN  Outcome: Not Progressing  Note: Diet remains clear liquid

## 2025-01-05 NOTE — CARE COORDINATION
ONGOING DISCHARGE PLAN:    Patient is alert and oriented x4.    Spoke with patient regarding discharge plan and patient confirms that plan is still to dsicharge to home with no needs    Urology following patient     Nephro following     Cre 2.1    Labs    Will continue to follow for additional discharge needs.    If patient is discharged prior to next notation, then this note serves as note for discharge by case management.    Electronically signed by Selene Pedro RN on 1/5/2025 at 3:11 PM

## 2025-01-05 NOTE — PLAN OF CARE
Problem: Chronic Conditions and Co-morbidities  Goal: Patient's chronic conditions and co-morbidity symptoms are monitored and maintained or improved  1/5/2025 1502 by Anat Segura RN  Outcome: Progressing     Problem: Discharge Planning  Goal: Discharge to home or other facility with appropriate resources  1/5/2025 1502 by Anat Segura RN  Outcome: Progressing     Problem: Safety - Adult  Goal: Free from fall injury  1/5/2025 1502 by Anat Segura RN  Outcome: Progressing     Problem: ABCDS Injury Assessment  Goal: Absence of physical injury  1/5/2025 1502 by Anat Segura RN  Outcome: Progressing     Problem: Skin/Tissue Integrity  Goal: Absence of new skin breakdown  Description: 1.  Monitor for areas of redness and/or skin breakdown  2.  Assess vascular access sites hourly  3.  Every 4-6 hours minimum:  Change oxygen saturation probe site  4.  Every 4-6 hours:  If on nasal continuous positive airway pressure, respiratory therapy assess nares and determine need for appliance change or resting period.  1/5/2025 1502 by Anat Segura RN  Outcome: Progressing

## 2025-01-05 NOTE — PROGRESS NOTES
[facility]  IN-PATIENT SERVICE  [facility]    PROGRESS NOTE             1/5/2025    7:48 AM    Name:   Carl Frias  MRN:     [id number]     Acct:      [id number]   Room:   2122/2122-01   Day:  4  Admit Date:  1/1/2025 12:52 PM    PCP:  No primary care provider on file.  Code Status:  Full Code    Subjective:     C/C:   Chief Complaint   Patient presents with    high BNP     Interval History   Patient was seen and examined at the bedside.  Patient is awake, alert, oriented to time, place, and person.  No any active events over the night.  Patient has no any active complaints.  Vital signs within normal limits.  Today hemoglobin came back 8.2.  Creatinine increased to 2.1.  Compared to yesterday 1.9.  Nephrology on board.  Will continue follow-up.  Urology consulted today regarding right and mild to moderate left hydronephrosis, recommended to place Crenshaw catheter if retention is noted.    Brief History:   This is 89 years old f male with a past medical history of heart failure, type 2 diabetes mellitus, hypertension, and testicular cancer.  Patient presented from CHF clinic with increased proBNP 5517 with lower extremity edema.  On 11/26 was 3550 with no signs of fluid overload.  And on 10/30/2024 was 2210 with edema.  Patient denied any bleeding in stool or urine, shortness of breath, chest pain, or fever/chills, patient hemoglobin today was 6.7.  Patient was started on 1 unit red blood cells transfusion in the ED.  Kidney function is deteriorated with creatinine 2.3 (baseline about 2) and BUN 55. Liver function WNL. On examination, patient had lower limb edema +2 which is more than his baseline.  I discussed patient code with the patient and his daughter in the room.  Patient requested to change his code to full code.  Patient was admitted for further management of his symptoms  Review of Systems:     Review of Systems   Constitutional:  Negative for chills, fatigue  curvature that was clipped and gastritis that was biopsied to rule out H. pylori.  Patient will need colonoscopy will monitor hemoglobin.  As of 09/05/2025 hemoglobin 8.2.     Acute kidney injury; stage IIIb chronic kidney disease  Initial kidney function revealed deterioration with creatinine 2.3>2 (baseline about 2) and BUN 55  Creatinine 2.2> 2.3> 2.1> 2> 1.9 > 2.1  Patient urine analysis suggested infection with small hemoglobin, trace protein, large LE, numerous WBC, 0-2 casts, too many bacteria, and 0-2 epithelial cells which exclude contamination  Bumex was on hold, resumed  Kappa 97.5/lambda 131/free kappa lambda ratio 0.74  US renal limited moderate right and mild-to-moderate left hydronephrosis which is increased from the prior ultrasound. Simple bilateral renal cysts.  No follow-up imaging is recommended.  Nephrology consulted     Chronic atrial fibrillation  Eliquis 2.5 mg twice daily on hold  Metoprolol 25 mg     Hypertension  Lisinopril, Norvasc, Bumex, spironolactone on hold  Nephrology on board, Bumex 1 mg resumed at on 01/01/2025.       Type 2 diabetes mellitus  Hemoglobin A1c 6  Low sliding scale  POCT by 4     Benign prostate hyperplasia  Tamsulosin 0.4 mg  Finasteride 5 mg   Urology on board, recommended to continue current medications, outpatient follow-up, in case of retention, catheterization.        DVT prophylaxis: EPC cuffs  GI prophylaxis: IV Protonix 40 mg twice daily  Diet: Clear diet     Plan will be discussed with the attending, Dr Andrés Boone MD  PGY I Family Medicine Resident  1/5/2025 7:48 AM     Attending Physician Statement  I have discussed the care of Carl Frias and I have examined the patient myself and taken ROS and HPI, including pertinent history and exam findings, with the resident. I have reviewed the key elements of all parts of the encounter with the resident.  I agree with the assessment, plan and orders as documented by the resident.    Dc when ok

## 2025-01-05 NOTE — PROGRESS NOTES
Date:   1/5/2025  Patient name: Carl Frias  Date of admission:  1/1/2025 12:52 PM  MRN:   [id number]  YOB: 1935  PCP: No primary care provider on file.    Reason for Admission: Anemia [D64.9]  Anemia, unspecified type [D64.9]  Stage 4 chronic kidney disease (HCC) [N18.4]  Chronic congestive heart failure, unspecified heart failure type (HCC) [I50.9]    Cardiology follow-up: Congestive heart failure, chronic A-fib        Referring physician: Dr Arsenio Ruiz     Impression  Admission 1/1/2025 severe anemia hemoglobin 6.5, increasing swelling over the legs, markedly elevated proBNP  EGD 1/3/2025 one 5 mm ulcer with adherent blood in the stomach 1 clip, also biopsy obtained for H. pylori  Congestive heart failure diastolic ejection fraction 55-60% 2D echo 1/2/2024  Episode of nonsustained V. tach heart rate 160, 12 beats 2/18/2024, 1-2-2025  Chronic A-fib on low-dose Eliquis  Severely dilated LA  Hypertension  Moderate left ventricular hypertrophy  Type 2 diabetes mellitus  Chronic kidney disease  Benign prostatic hypertrophy  Impaired hearing, severe  Severe osteoarthritis involving multiple joints poor mobility  Back pain  Obesity BMI 37  Poor mobility  History of easy bruising  Iron deficiency anemia serum iron 13, saturation 7%, TIBC 184 lab work 1/2/2025     Admission  4/23/2024 increasing shortness of breath, peripheral edema and UTI, urinary retention, urine culture grew E. coli, ROXI on CKD  Significant improvement in the renal function after placing Crenshaw catheter and treating for UTI   Abnormal high-sensitivity troponin most likely type II    No history of coronary intervention, no TIA or CVA     Past surgical history  Hernia repair left inguinal, bilateral knee joint replacement bilateral, testicle removal, eye surgery     Drug allergy  Xarelto, severity nonspecified     History of present illness  89-year-old  male with a past medical history of hypertension, chronic A-fib, diabetes

## 2025-01-05 NOTE — PROGRESS NOTES
IMPRESSION/RECOMMENDATIONS:      1.  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  Plasma free light chain ratio 0.74 is within normal range.  Renal function is stable with serum creatinine 1.9 mg/dL today.      2.  Leukocyturia -  Urine culture is pending.    3.  Systemic hypertension - blood pressure control is adequate.    4. Bilateral Hydronephrosis moderate right and mild to moderate left- urology consulted    4.  Anemia - acute on chronic.   Secondary to chronic kidney disease, chronic disease and iron deficiency [iron saturation 7% and ferritin 225].  EGD yesterday showed few subcentimeter clean-based ulcer in the antrum and pylorus. 1 ulcer with adherent blood and lesser curvature that was clipped and gastritis that was biopsied to rule out H. pylori.   Continue IV iron loading dose.    Plan  Continue Bumex 1 mg p.o. daily.  Monitor urine output closely.  Basic metabolic profile daily.    No objection on discharge from nephrology standpoint  Follow-up outpatient with urology    Prognosis is guarded.    Kee Bloom MD   Attending Nephrologist  1/5/2025 6:44 PM

## 2025-01-06 VITALS
DIASTOLIC BLOOD PRESSURE: 57 MMHG | TEMPERATURE: 98.2 F | WEIGHT: 204.15 LBS | RESPIRATION RATE: 18 BRPM | BODY MASS INDEX: 28.58 KG/M2 | HEART RATE: 72 BPM | OXYGEN SATURATION: 98 % | HEIGHT: 71 IN | SYSTOLIC BLOOD PRESSURE: 143 MMHG

## 2025-01-06 LAB
ANION GAP SERPL CALCULATED.3IONS-SCNC: 8 MMOL/L (ref 9–16)
BASOPHILS # BLD: 0.1 K/UL (ref 0–0.2)
BASOPHILS NFR BLD: 1 % (ref 0–2)
BUN SERPL-MCNC: 36 MG/DL (ref 8–23)
CALCIUM SERPL-MCNC: 8.9 MG/DL (ref 8.6–10.4)
CHLORIDE SERPL-SCNC: 107 MMOL/L (ref 98–107)
CO2 SERPL-SCNC: 24 MMOL/L (ref 20–31)
CREAT SERPL-MCNC: 2.2 MG/DL (ref 0.7–1.2)
EOSINOPHIL # BLD: 0.3 K/UL (ref 0–0.4)
EOSINOPHILS RELATIVE PERCENT: 5 % (ref 0–4)
ERYTHROCYTE [DISTWIDTH] IN BLOOD BY AUTOMATED COUNT: 15.2 % (ref 11.5–14.9)
GFR, ESTIMATED: 28 ML/MIN/1.73M2
GLUCOSE BLD-MCNC: 109 MG/DL (ref 75–110)
GLUCOSE BLD-MCNC: 119 MG/DL (ref 75–110)
GLUCOSE SERPL-MCNC: 122 MG/DL (ref 74–99)
HCT VFR BLD AUTO: 22.2 % (ref 41–53)
HCT VFR BLD AUTO: 24.3 % (ref 41–53)
HGB BLD-MCNC: 7.3 G/DL (ref 13.5–17.5)
HGB BLD-MCNC: 7.9 G/DL (ref 13.5–17.5)
LYMPHOCYTES NFR BLD: 1.8 K/UL (ref 1–4.8)
LYMPHOCYTES RELATIVE PERCENT: 26 % (ref 24–44)
MCH RBC QN AUTO: 28.1 PG (ref 26–34)
MCHC RBC AUTO-ENTMCNC: 33 G/DL (ref 31–37)
MCV RBC AUTO: 85.1 FL (ref 80–100)
MONOCYTES NFR BLD: 0.8 K/UL (ref 0.1–1.3)
MONOCYTES NFR BLD: 11 % (ref 1–7)
NEUTROPHILS NFR BLD: 57 % (ref 36–66)
NEUTS SEG NFR BLD: 4 K/UL (ref 1.3–9.1)
PLATELET # BLD AUTO: 437 K/UL (ref 150–450)
PMV BLD AUTO: 6.6 FL (ref 6–12)
POTASSIUM SERPL-SCNC: 4 MMOL/L (ref 3.7–5.3)
RBC # BLD AUTO: 2.6 M/UL (ref 4.5–5.9)
SODIUM SERPL-SCNC: 139 MMOL/L (ref 136–145)
WBC OTHER # BLD: 7 K/UL (ref 3.5–11)

## 2025-01-06 PROCEDURE — 6360000002 HC RX W HCPCS: Performed by: STUDENT IN AN ORGANIZED HEALTH CARE EDUCATION/TRAINING PROGRAM

## 2025-01-06 PROCEDURE — 85014 HEMATOCRIT: CPT

## 2025-01-06 PROCEDURE — 6370000000 HC RX 637 (ALT 250 FOR IP)

## 2025-01-06 PROCEDURE — 85018 HEMOGLOBIN: CPT

## 2025-01-06 PROCEDURE — 6360000002 HC RX W HCPCS

## 2025-01-06 PROCEDURE — 51798 US URINE CAPACITY MEASURE: CPT

## 2025-01-06 PROCEDURE — 85025 COMPLETE CBC W/AUTO DIFF WBC: CPT

## 2025-01-06 PROCEDURE — 36415 COLL VENOUS BLD VENIPUNCTURE: CPT

## 2025-01-06 PROCEDURE — 82947 ASSAY GLUCOSE BLOOD QUANT: CPT

## 2025-01-06 PROCEDURE — 2500000003 HC RX 250 WO HCPCS: Performed by: STUDENT IN AN ORGANIZED HEALTH CARE EDUCATION/TRAINING PROGRAM

## 2025-01-06 PROCEDURE — 6370000000 HC RX 637 (ALT 250 FOR IP): Performed by: STUDENT IN AN ORGANIZED HEALTH CARE EDUCATION/TRAINING PROGRAM

## 2025-01-06 PROCEDURE — 2580000003 HC RX 258: Performed by: STUDENT IN AN ORGANIZED HEALTH CARE EDUCATION/TRAINING PROGRAM

## 2025-01-06 PROCEDURE — 2580000003 HC RX 258

## 2025-01-06 PROCEDURE — 99239 HOSP IP/OBS DSCHRG MGMT >30: CPT

## 2025-01-06 PROCEDURE — 80048 BASIC METABOLIC PNL TOTAL CA: CPT

## 2025-01-06 RX ORDER — LEVOFLOXACIN 250 MG/1
250 TABLET, FILM COATED ORAL DAILY
Qty: 3 TABLET | Refills: 0 | Status: SHIPPED | OUTPATIENT
Start: 2025-01-06 | End: 2025-01-09

## 2025-01-06 RX ADMIN — SODIUM CHLORIDE, PRESERVATIVE FREE 10 ML: 5 INJECTION INTRAVENOUS at 08:59

## 2025-01-06 RX ADMIN — APIXABAN 2.5 MG: 2.5 TABLET, FILM COATED ORAL at 08:57

## 2025-01-06 RX ADMIN — AMLODIPINE BESYLATE 5 MG: 5 TABLET ORAL at 08:56

## 2025-01-06 RX ADMIN — BUMETANIDE 1 MG: 1 TABLET ORAL at 08:56

## 2025-01-06 RX ADMIN — PANTOPRAZOLE SODIUM 40 MG: 40 INJECTION, POWDER, FOR SOLUTION INTRAVENOUS at 03:52

## 2025-01-06 RX ADMIN — METOPROLOL SUCCINATE 25 MG: 25 TABLET, EXTENDED RELEASE ORAL at 08:56

## 2025-01-06 RX ADMIN — SODIUM CHLORIDE 125 MG: 9 INJECTION, SOLUTION INTRAVENOUS at 10:24

## 2025-01-06 RX ADMIN — TAMSULOSIN HYDROCHLORIDE 0.4 MG: 0.4 CAPSULE ORAL at 08:56

## 2025-01-06 RX ADMIN — FINASTERIDE 5 MG: 5 TABLET, FILM COATED ORAL at 08:56

## 2025-01-06 ASSESSMENT — ENCOUNTER SYMPTOMS
BLOOD IN STOOL: 0
STRIDOR: 0
COLOR CHANGE: 0
CHOKING: 0
DIARRHEA: 0
CONSTIPATION: 0
CHEST TIGHTNESS: 0
SHORTNESS OF BREATH: 0
WHEEZING: 0
NAUSEA: 0
ABDOMINAL DISTENTION: 0
APNEA: 0
COUGH: 0
ABDOMINAL PAIN: 0
VOMITING: 0

## 2025-01-06 NOTE — PROGRESS NOTES
Date:   1/6/2025  Patient name: Carl Frias  Date of admission:  1/1/2025 12:52 PM  MRN:   [id number]  YOB: 1935  PCP: No primary care provider on file.    Reason for Admission: Anemia [D64.9]  Anemia, unspecified type [D64.9]  Stage 4 chronic kidney disease (HCC) [N18.4]  Chronic congestive heart failure, unspecified heart failure type (HCC) [I50.9]    Cardiology follow-up: Congestive heart failure, chronic A-fib        Referring physician: Dr Arsenio Ruiz     Impression  Admission 1/1/2025 severe anemia hemoglobin 6.5, increasing swelling over the legs, markedly elevated proBNP  EGD 1/3/2025 one 5 mm ulcer with adherent blood in the stomach 1 clip, also biopsy obtained for H. pylori  Congestive heart failure diastolic ejection fraction 55-60% 2D echo 1/2/2024  Episode of nonsustained V. tach heart rate 160, 12 beats 2/18/2024, 1-2-2025  Chronic A-fib on low-dose Eliquis  Severely dilated LA  Hypertension  Moderate left ventricular hypertrophy  Type 2 diabetes mellitus  Chronic kidney disease  Benign prostatic hypertrophy  Impaired hearing, severe  Severe osteoarthritis involving multiple joints poor mobility  Back pain  Obesity BMI 37  Poor mobility  History of easy bruising  Iron deficiency anemia serum iron 13, saturation 7%, TIBC 184 lab work 1/2/2025    Admission  4/23/2024 increasing shortness of breath, peripheral edema and UTI, urinary retention, urine culture grew E. coli, ROXI on CKD  Significant improvement in the renal function after placing Crenshaw catheter and treating for UTI  Abnormal high-sensitivity troponin most likely type II    No history of coronary intervention, no TIA or CVA     Past surgical history  Hernia repair left inguinal, bilateral knee joint replacement bilateral, testicle removal, eye surgery     Drug allergy  Xarelto, severity nonspecified    Current evaluation  Patient seen and examined, medications and labs checked  He had EGD  under MAC 1/3/2024  It showed few

## 2025-01-06 NOTE — PROGRESS NOTES
[facility]  IN-PATIENT SERVICE  [facility]    PROGRESS NOTE             1/6/2025    9:55 AM    Name:   Carl Frias  MRN:     [id number]     Acct:      [id number]   Room:   2122/2122-01   Day:  5  Admit Date:  1/1/2025 12:52 PM    PCP:  No primary care provider on file.  Code Status:  Full Code    Subjective:     C/C:   Chief Complaint   Patient presents with    high BNP     Interval History   Patient was seen and examined at the bedside.  Patient is awake, alert, oriented to time, place, and person.  No any acute events over the night.  Patient has no any active complaints today.  Vital signs within normal limits.  Blood work significant today for hemoglobin 7.3.  Will recheck H&H today in the afternoon.  Cardiology on board, holding was reviewed.  Cardiology okay to discharge if patient is stable.  Nephrology on board, notes was reviewed.  Physical therapy following up.    Brief History:   This is 89 years old f male with a past medical history of heart failure, type 2 diabetes mellitus, hypertension, and testicular cancer.  Patient presented from CHF clinic with increased proBNP 5517 with lower extremity edema.  On 11/26 was 3550 with no signs of fluid overload.  And on 10/30/2024 was 2210 with edema.  Patient denied any bleeding in stool or urine, shortness of breath, chest pain, or fever/chills, patient hemoglobin today was 6.7.  Patient was started on 1 unit red blood cells transfusion in the ED.  Kidney function is deteriorated with creatinine 2.3 (baseline about 2) and BUN 55. Liver function WNL. On examination, patient had lower limb edema +2 which is more than his baseline.  I discussed patient code with the patient and his daughter in the room.  Patient requested to change his code to full code.  Patient was admitted for further management of his symptoms    Review of Systems:     Review of Systems   Constitutional:  Negative for chills, fatigue and fever.  attending, Dr Andrés Boone MD  PGY I Family Medicine Resident  1/6/2025 9:55 AM     Attending Physician Statement  I have discussed the care of Carl Frias and I have examined the patient myself and taken ROS and HPI, including pertinent history and exam findings, with the resident. I have reviewed the key elements of all parts of the encounter with the resident.  I agree with the assessment, plan and orders as documented by the resident.      Electronically signed by Drew Bowen MD

## 2025-01-06 NOTE — CARE COORDINATION
ONGOING DISCHARGE PLAN:    Patient is alert and oriented x4.    Spoke with patient regarding discharge plan and patient confirms that plan is still to discharge to home with no needs    Ok to discharge per Cardiology    Labs ordered     Possible discharge to home today     Will continue to follow for additional discharge needs.    If patient is discharged prior to next notation, then this note serves as note for discharge by case management.    Electronically signed by Selene Pedro RN on 1/6/2025 at 12:45 PM

## 2025-01-06 NOTE — PLAN OF CARE
Problem: Chronic Conditions and Co-morbidities  Goal: Patient's chronic conditions and co-morbidity symptoms are monitored and maintained or improved  1/6/2025 0123 by Kelly Scott RN  Outcome: Progressing  1/5/2025 1502 by Anat Segura RN  Outcome: Progressing     Problem: Discharge Planning  Goal: Discharge to home or other facility with appropriate resources  1/6/2025 0123 by Kelly Scott RN  Outcome: Progressing  1/5/2025 1502 by Anat Segura RN  Outcome: Progressing     Problem: Safety - Adult  Goal: Free from fall injury  1/6/2025 0123 by Kelly Scott RN  Outcome: Progressing  Note: Patient alert and oriented.  Bed alarm on.  Using call light appropriately.  1/5/2025 1502 by Anat Segura RN  Outcome: Progressing     Problem: ABCDS Injury Assessment  Goal: Absence of physical injury  1/6/2025 0123 by Kelly Scott RN  Outcome: Progressing  1/5/2025 1502 by Anat Segura RN  Outcome: Progressing     Problem: Skin/Tissue Integrity  Goal: Absence of new skin breakdown  Description: 1.  Monitor for areas of redness and/or skin breakdown  2.  Assess vascular access sites hourly  3.  Every 4-6 hours minimum:  Change oxygen saturation probe site  4.  Every 4-6 hours:  If on nasal continuous positive airway pressure, respiratory therapy assess nares and determine need for appliance change or resting period.  1/6/2025 0123 by Kelly Scott RN  Outcome: Progressing  Note: Remains on waffle mattress.  Assisted with turning and repositioning.  Zinc paste applied to buttocks.  1/5/2025 1502 by Anat Segura RN  Outcome: Progressing     Problem: Hematologic - Adult  Goal: Maintains hematologic stability  Outcome: Progressing  Note: Hemoglobin monitored and stable.  No signs of bleeding.     Problem: Nutrition Deficit:  Goal: Optimize nutritional status  Outcome: Progressing     Problem: Cardiovascular - Adult  Goal:  Maintains optimal cardiac output and hemodynamic stability  Outcome: Progressing  Note: Telemetry atrial fib controlled rate.  VS stable.  Goal: Absence of cardiac dysrhythmias or at baseline  Outcome: Progressing     Problem: Pain  Goal: Verbalizes/displays adequate comfort level or baseline comfort level  Outcome: Progressing  Note: Denies pain.

## 2025-01-06 NOTE — PROGRESS NOTES
Department of Internal Medicine  Nephrology Kee Bloom MD  Progress Note    Reason for consultation: Management of acute kidney injury superimposed on chronic kidney disease stage IIIa.    Referring physician: Zoey Bowen MD.    Interval history: Patient was seen and examined today and he does not have shortness of breath.  Scr stable at 2.2 mg/dl  BP stable  Hb 7.3  EGD=showed few subcentimeter clean-based ulcer in the antrum and pylorus. 1 ulcer with adherent blood and lesser curvature that was clipped and gastritis that was biopsied to rule out H. pylori.   Continue IV iron loading dose.     Renal ultrasound obtained today showed moderate right and mild to moderate left hydronephrosis which has increased from the prior ultrasound as well as simple bilateral renal cysts.    History of present illness: This is a 89 y.o. male with a significant past medical history of systemic hypertension, type 2 diabetes mellitus, benign prostatic hyperplasia] history of urinary retention], and chronic kidney disease stage IIIa [baseline creatinine 1.4 to 1.8 mg/dL and follows up with Dr. Bloom of [facility] [place]], who presented to the hospital with complaints of elevated BUN/creatinine.  Patient follows up with the congestive heart failure clinic and was noted to have worsening azotemia with BUN/creatinine of 251/2.2 mg/dL and hence referral to the emergency department.  His left ventricular ejection fraction was 50% in February 2024.  Renal ultrasound performed 9/4/2024 showed simple left renal cyst with mild bilateral hydronephrosis.  No significant postvoid residual.  Patient does not report increased dyspnea.  Home medications included spironolactone 25 mg p.o. daily, lisinopril 10 mg p.o. daily and Bumex 1 mg p.o. every day.    Vital signs were stable at presentation.  Incidentally hemoglobin was 6.7 g/dL and patient was admitted for PRBC transfusion.    Scheduled Meds:   levoFLOXacin  250 mg

## 2025-01-06 NOTE — PROGRESS NOTES
Pt has been discharged.  Reviewed follow up and new medication. All questions answered. Telemetry and IV removed. All belongings gathered and returned to patient.

## 2025-01-07 ENCOUNTER — ENROLLMENT (OUTPATIENT)
Dept: CARE COORDINATION | Age: 89
End: 2025-01-07

## 2025-01-07 ENCOUNTER — CARE COORDINATION (OUTPATIENT)
Dept: CARE COORDINATION | Age: 89
End: 2025-01-07

## 2025-01-07 LAB
EKG Q-T INTERVAL: 400 MS
EKG QRS DURATION: 76 MS
EKG QTC CALCULATION (BAZETT): 438 MS
EKG R AXIS: -20 DEGREES
EKG T AXIS: -3 DEGREES
EKG VENTRICULAR RATE: 72 BPM
SURGICAL PATHOLOGY REPORT: NORMAL

## 2025-01-07 PROCEDURE — 93010 ELECTROCARDIOGRAM REPORT: CPT | Performed by: INTERNAL MEDICINE

## 2025-01-07 NOTE — CARE COORDINATION
Care Transitions Note    Initial Call - Call within 2 business days of discharge: Yes    Attempted to reach patient for transitions of care follow up. Unable to reach patient.    Outreach Attempts:   HIPAA compliant voicemail left for patient, family,  .     Patient: Carl Frias    Patient : 1935   MRN: [id number]    Reason for Admission: chf  Discharge Date: 25  RURS: Readmission Risk Score: 21    Last Discharge Facility       Date Complaint Diagnosis Description Type Department Provider    25 high BNP Chronic congestive heart failure, unspecified heart failure type (HCC) ... ED to Hosp-Admission (Discharged) (ADMITTED) [facility]        # 1 attempt-Attempted initial 24 hour hospital follow up call. Left a Hipaa compliant message with name and call back information. Requested return call to [phone].     Was this an external facility discharge? No    Follow Up Appointment:   Patient has hospital follow up appointment scheduled within 14 days of discharge.    Future Appointments         Provider Specialty Dept Phone    2025 11:15 AM Miranda Youssef MD Internal Medicine [phone]    2025 12:00 PM [facility] CHF CLINIC RM 2 Cardiology [phone]    2025 1:00 PM Hugo Thorpe MD Pulmonology [phone]            Plan for follow-up on next business day. 2nd attempt 24 hr HFU call     Camila Gandhi RN

## 2025-01-07 NOTE — DISCHARGE SUMMARY
[facility]   IN-PATIENT SERVICE   [facility]    Discharge Summary     Patient ID: Carl Frias  :  1935   MRN: [id number]     ACCOUNT:  [id number]   Patient's PCP: No primary care provider on file.  Admit Date: 2025   Discharge Date: 2025  Length of Stay: 5  Code Status:  Prior  Admitting Physician: Drew Bowen MD  Discharge Physician: Trenton Boone MD     Active Discharge Diagnoses:       Primary Problem  Anemia      Hospital Problems  Active Hospital Problems    Diagnosis Date Noted    Gastric ulcer [K25.9] 2025    Chronic congestive heart failure (HCC) [I50.9] 2025    Stage 4 chronic kidney disease (HCC) [N18.4] 2025    Anemia [D64.9] 2025       Admission Condition:  poor     Discharged Condition: fair    Hospital Stay:       Hospital Course:    Mr. Carl POP  Is 89 years old male with PMH significant for heart failure, type 2 diabetes, hypertension, testicular cancer.  Patient first presented from CHF clinic with increased proBNP 5570 with lower extremity edema.  On admission patient denied any shortness of breath, chest pain, or fever, chills.  In ED patient's hemoglobin was found to be 6.7.  Patient was started on 1 unit of red blood cells transfusion.  Kidney function was 35 evaluated with creatinine 2.3.  On examination patient had lower limb edema +2.  Patient was admitted for bleeding evaluation as well as signs and symptoms of acute on chronic diastolic heart failure.  During hospital stay EGD performed at, he was found to have clean-based ulcer in the antrum and pylorus.  1 ulcer with adherent clot and lesser curvature that was clipped.  Since procedure his hemoglobin was closely monitored.  Patient was started on IV iron.  While in the hospital he was found to have ROXI, Bumex was on hold.  Ultrasound renal was positive for mild to moderate left hydronephrosis which is increased from his prior ultrasounds.  No  CONSULT TO INTERNAL MEDICINE  IP CONSULT TO CARDIOLOGY  IP CONSULT TO SOCIAL WORK  IP CONSULT TO GI  IP CONSULT TO UROLOGY      The patient was seen and examined on day of discharge and this discharge summary is in conjunction with any daily progress note from day of discharge.    Discharge plan:       Disposition: Home    Physician Follow Up:   Joseph Johansen Jr., MD  [id number]  [facility] [address]  [phone]    Follow up      Luís Shankar APRN - NP  [address]  [address]  [place] [address]  [phone]          Aleksandr Finney MD  [address]  [address]  [place] [address]  [phone]    Follow up      Kee Bloom MD  [address]  [address]  [place] [id number] [phone]    Follow up in 1 week(s)           Diet: cardiac diet, diabetic diet  Activity: As tolerated  Instructions to Patient: Given on discharge  Discharge Medications:      Medication List      Some of the medications listed here do not show instructions, such as how often to take the medication. Ask your doctor or nurse how to use these medications.  Specifically ask about this and similar medications: tamsulosin (FLOMAX) 0.4 MG capsule       START taking these medications      levoFLOXacin 250 MG tablet  Commonly known as: LEVAQUIN  Take 1 tablet by mouth daily for 3 doses            CONTINUE taking these medications      * Accu-Chek Estella Gardenia  1 each by Does not apply route daily     * glucose monitoring kit  Use to check glucose as directed.     Alcohol Swabs Pads  Use one swab to cleanse skin prior to checking glucose twice daily.     amLODIPine 5 MG tablet  Commonly known as: NORVASC  TAKE 1 TABLET EVERY DAY     apixaban 2.5 MG Tabs tablet  Commonly known as: Eliquis  Take 1 tablet by mouth 2 times daily     aspirin 81 MG EC tablet  Take 1 tablet by mouth every other day     * blood glucose test strips  by Other route 2 times daily True Metrix Blood Glucose Meter     * Accu-Chek Estella Plus

## 2025-01-08 ENCOUNTER — CARE COORDINATION (OUTPATIENT)
Dept: CARE COORDINATION | Age: 89
End: 2025-01-08

## 2025-01-08 ENCOUNTER — TELEPHONE (OUTPATIENT)
Dept: GASTROENTEROLOGY | Age: 89
End: 2025-01-08

## 2025-01-08 DIAGNOSIS — I50.22 CHRONIC SYSTOLIC CONGESTIVE HEART FAILURE (HCC): Primary | ICD-10-CM

## 2025-01-08 DIAGNOSIS — K27.9 H PYLORI ULCER: Primary | ICD-10-CM

## 2025-01-08 DIAGNOSIS — B96.81 H PYLORI ULCER: Primary | ICD-10-CM

## 2025-01-08 PROCEDURE — 1111F DSCHRG MED/CURRENT MED MERGE: CPT

## 2025-01-08 RX ORDER — AMOXICILLIN 500 MG/1
1000 CAPSULE ORAL 2 TIMES DAILY
Qty: 56 CAPSULE | Refills: 0 | Status: ON HOLD | OUTPATIENT
Start: 2025-01-08

## 2025-01-08 RX ORDER — CLARITHROMYCIN 500 MG/1
500 TABLET ORAL 2 TIMES DAILY
Qty: 28 TABLET | Refills: 0 | Status: ON HOLD | OUTPATIENT
Start: 2025-01-08 | End: 2025-01-22

## 2025-01-08 NOTE — CARE COORDINATION
you been contacted by a [facility] Pharmacist?: No  Have you scheduled your follow up appointment?: Yes  How are you going to get to your appointment?: Car - family or friend to transport  Do you feel like you have everything you need to keep you well at home?: Yes  Care Transitions Interventions     Other Services: Declined     Specialty Service Referral: Declined              Follow Up Appointment:   Patient does not have a follow up appointment scheduled at time of call.  Sent request for sooner HFU appt     Future Appointments         Provider Specialty Dept Phone    1/23/2025 11:15 AM Miranda Youssef MD Internal Medicine [phone]    1/30/2025 12:00 PM [facility] CHF CLINIC  2 Cardiology [phone]    11/18/2025 1:00 PM Hugo Thorpe MD Pulmonology [phone]            Care Transition Nurse provided contact information.  Plan for follow-up call in 6-10 days based on severity of symptoms and risk factors.  Plan for next call: symptom management-bleeding, chf s/s/tx, bp, diet  follow-up appointment-follow up on HFU appt      Camila Gandhi RN

## 2025-01-08 NOTE — TELEPHONE ENCOUNTER
Pt daughter called in (Hannah) and advised she has POA with pt. Writer was able to verify info and went over medications and h pylori results with Hannah. Writer did advise pt needs to do h pylori test again in 4 weeks. Hannah advised understanding. No further concerns at this time.

## 2025-01-08 NOTE — TELEPHONE ENCOUNTER
Writer called pt d/t pt tested positive for H Pylori. Pt daughter answered and advised pt is still sleeping. Writer will call pt back to discuss infection and what medications were sent to pharmacy.

## 2025-01-09 ENCOUNTER — TELEPHONE (OUTPATIENT)
Dept: INTERNAL MEDICINE CLINIC | Age: 89
End: 2025-01-09

## 2025-01-09 NOTE — TELEPHONE ENCOUNTER
Tried calling pt to rs his appt on 1/23/25 due to a change in Dr Saleh schedule. I was unable to leave a message, vm was not set up. Will try again later.

## 2025-01-10 ENCOUNTER — TELEPHONE (OUTPATIENT)
Dept: GASTROENTEROLOGY | Age: 89
End: 2025-01-10

## 2025-01-10 NOTE — TELEPHONE ENCOUNTER
Writer attempted to contact patient to set up a follow visit in the office, voicemail is not set up.

## 2025-01-11 ENCOUNTER — HOSPITAL ENCOUNTER (INPATIENT)
Age: 89
LOS: 6 days | Discharge: HOME | End: 2025-01-17
Attending: EMERGENCY MEDICINE | Admitting: INTERNAL MEDICINE
Payer: MEDICARE

## 2025-01-11 ENCOUNTER — APPOINTMENT (OUTPATIENT)
Dept: GENERAL RADIOLOGY | Age: 89
End: 2025-01-11
Attending: EMERGENCY MEDICINE
Payer: MEDICARE

## 2025-01-11 DIAGNOSIS — I50.32 CHRONIC DIASTOLIC CONGESTIVE HEART FAILURE (HCC): Primary | ICD-10-CM

## 2025-01-11 DIAGNOSIS — N18.4 STAGE 4 CHRONIC KIDNEY DISEASE (HCC): ICD-10-CM

## 2025-01-11 DIAGNOSIS — I50.9 CHRONIC CONGESTIVE HEART FAILURE, UNSPECIFIED HEART FAILURE TYPE (HCC): ICD-10-CM

## 2025-01-11 DIAGNOSIS — E11.42 TYPE 2 DIABETES MELLITUS WITH DIABETIC POLYNEUROPATHY, UNSPECIFIED WHETHER LONG TERM INSULIN USE (HCC): ICD-10-CM

## 2025-01-11 DIAGNOSIS — E16.2 HYPOGLYCEMIA: ICD-10-CM

## 2025-01-11 LAB
ANION GAP SERPL CALCULATED.3IONS-SCNC: 10 MMOL/L (ref 9–16)
BASOPHILS # BLD: 0 K/UL (ref 0–0.2)
BASOPHILS NFR BLD: 0 % (ref 0–2)
BUN SERPL-MCNC: 49 MG/DL (ref 8–23)
CALCIUM SERPL-MCNC: 8.8 MG/DL (ref 8.6–10.4)
CHLORIDE SERPL-SCNC: 98 MMOL/L (ref 98–107)
CO2 SERPL-SCNC: 23 MMOL/L (ref 20–31)
CREAT SERPL-MCNC: 1.9 MG/DL (ref 0.7–1.2)
EOSINOPHIL # BLD: 0.56 K/UL (ref 0–0.4)
EOSINOPHILS RELATIVE PERCENT: 6 % (ref 0–4)
ERYTHROCYTE [DISTWIDTH] IN BLOOD BY AUTOMATED COUNT: 16 % (ref 11.5–14.9)
GFR, ESTIMATED: 34 ML/MIN/1.73M2
GLUCOSE BLD-MCNC: 104 MG/DL (ref 75–110)
GLUCOSE BLD-MCNC: 150 MG/DL (ref 75–110)
GLUCOSE BLD-MCNC: 45 MG/DL (ref 75–110)
GLUCOSE BLD-MCNC: 47 MG/DL (ref 75–110)
GLUCOSE BLD-MCNC: 52 MG/DL (ref 75–110)
GLUCOSE BLD-MCNC: 67 MG/DL (ref 75–110)
GLUCOSE BLD-MCNC: 96 MG/DL (ref 75–110)
GLUCOSE SERPL-MCNC: 54 MG/DL (ref 74–99)
HCT VFR BLD AUTO: 23.7 % (ref 41–53)
HGB BLD-MCNC: 7.9 G/DL (ref 13.5–17.5)
LYMPHOCYTES NFR BLD: 1.97 K/UL (ref 1–4.8)
LYMPHOCYTES RELATIVE PERCENT: 21 % (ref 24–44)
MCH RBC QN AUTO: 28.1 PG (ref 26–34)
MCHC RBC AUTO-ENTMCNC: 33.4 G/DL (ref 31–37)
MCV RBC AUTO: 84.2 FL (ref 80–100)
MONOCYTES NFR BLD: 0.75 K/UL (ref 0.1–1.3)
MONOCYTES NFR BLD: 8 % (ref 1–7)
MORPHOLOGY: ABNORMAL
MORPHOLOGY: ABNORMAL
NEUTROPHILS NFR BLD: 65 % (ref 36–66)
NEUTS SEG NFR BLD: 6.12 K/UL (ref 1.3–9.1)
PLATELET # BLD AUTO: 496 K/UL (ref 150–450)
PMV BLD AUTO: 6.4 FL (ref 6–12)
POTASSIUM SERPL-SCNC: 4.1 MMOL/L (ref 3.7–5.3)
RBC # BLD AUTO: 2.82 M/UL (ref 4.5–5.9)
SODIUM SERPL-SCNC: 131 MMOL/L (ref 136–145)
WBC OTHER # BLD: 9.4 K/UL (ref 3.5–11)

## 2025-01-11 PROCEDURE — 85025 COMPLETE CBC W/AUTO DIFF WBC: CPT

## 2025-01-11 PROCEDURE — 36415 COLL VENOUS BLD VENIPUNCTURE: CPT

## 2025-01-11 PROCEDURE — 6370000000 HC RX 637 (ALT 250 FOR IP): Performed by: EMERGENCY MEDICINE

## 2025-01-11 PROCEDURE — 99285 EMERGENCY DEPT VISIT HI MDM: CPT

## 2025-01-11 PROCEDURE — 1200000000 HC SEMI PRIVATE

## 2025-01-11 PROCEDURE — 71045 X-RAY EXAM CHEST 1 VIEW: CPT

## 2025-01-11 PROCEDURE — 82947 ASSAY GLUCOSE BLOOD QUANT: CPT

## 2025-01-11 PROCEDURE — 80048 BASIC METABOLIC PNL TOTAL CA: CPT

## 2025-01-11 PROCEDURE — 2500000003 HC RX 250 WO HCPCS

## 2025-01-11 PROCEDURE — 6370000000 HC RX 637 (ALT 250 FOR IP)

## 2025-01-11 RX ORDER — PANTOPRAZOLE SODIUM 40 MG/1
40 TABLET, DELAYED RELEASE ORAL
Status: DISCONTINUED | OUTPATIENT
Start: 2025-01-12 | End: 2025-01-13

## 2025-01-11 RX ORDER — AMLODIPINE BESYLATE 5 MG/1
5 TABLET ORAL DAILY
Status: DISCONTINUED | OUTPATIENT
Start: 2025-01-11 | End: 2025-01-17 | Stop reason: HOSPADM

## 2025-01-11 RX ORDER — DEXTROSE MONOHYDRATE 100 MG/ML
INJECTION, SOLUTION INTRAVENOUS CONTINUOUS PRN
Status: DISCONTINUED | OUTPATIENT
Start: 2025-01-11 | End: 2025-01-17 | Stop reason: HOSPADM

## 2025-01-11 RX ORDER — LISINOPRIL 10 MG/1
10 TABLET ORAL DAILY
Status: DISCONTINUED | OUTPATIENT
Start: 2025-01-11 | End: 2025-01-17 | Stop reason: HOSPADM

## 2025-01-11 RX ORDER — AMOXICILLIN 500 MG/1
1000 CAPSULE ORAL 2 TIMES DAILY
Status: DISCONTINUED | OUTPATIENT
Start: 2025-01-11 | End: 2025-01-13

## 2025-01-11 RX ORDER — SODIUM CHLORIDE 9 MG/ML
INJECTION, SOLUTION INTRAVENOUS PRN
Status: DISCONTINUED | OUTPATIENT
Start: 2025-01-11 | End: 2025-01-17 | Stop reason: HOSPADM

## 2025-01-11 RX ORDER — TAMSULOSIN HYDROCHLORIDE 0.4 MG/1
0.4 CAPSULE ORAL DAILY
Status: DISCONTINUED | OUTPATIENT
Start: 2025-01-11 | End: 2025-01-17 | Stop reason: HOSPADM

## 2025-01-11 RX ORDER — POLYETHYLENE GLYCOL 3350 17 G/17G
17 POWDER, FOR SOLUTION ORAL DAILY PRN
Status: DISCONTINUED | OUTPATIENT
Start: 2025-01-11 | End: 2025-01-17 | Stop reason: HOSPADM

## 2025-01-11 RX ORDER — SODIUM CHLORIDE 0.9 % (FLUSH) 0.9 %
5-40 SYRINGE (ML) INJECTION EVERY 12 HOURS SCHEDULED
Status: DISCONTINUED | OUTPATIENT
Start: 2025-01-11 | End: 2025-01-17 | Stop reason: HOSPADM

## 2025-01-11 RX ORDER — ACETAMINOPHEN 650 MG/1
650 SUPPOSITORY RECTAL EVERY 6 HOURS PRN
Status: DISCONTINUED | OUTPATIENT
Start: 2025-01-11 | End: 2025-01-17 | Stop reason: HOSPADM

## 2025-01-11 RX ORDER — ACETAMINOPHEN 325 MG/1
650 TABLET ORAL EVERY 6 HOURS PRN
Status: DISCONTINUED | OUTPATIENT
Start: 2025-01-11 | End: 2025-01-17 | Stop reason: HOSPADM

## 2025-01-11 RX ORDER — POTASSIUM CHLORIDE 7.45 MG/ML
10 INJECTION INTRAVENOUS PRN
Status: DISCONTINUED | OUTPATIENT
Start: 2025-01-11 | End: 2025-01-13 | Stop reason: CLARIF

## 2025-01-11 RX ORDER — ASPIRIN 81 MG/1
81 TABLET ORAL EVERY OTHER DAY
Status: DISCONTINUED | OUTPATIENT
Start: 2025-01-11 | End: 2025-01-17 | Stop reason: HOSPADM

## 2025-01-11 RX ORDER — BUMETANIDE 1 MG/1
1 TABLET ORAL DAILY
Status: DISCONTINUED | OUTPATIENT
Start: 2025-01-11 | End: 2025-01-17

## 2025-01-11 RX ORDER — CLARITHROMYCIN 500 MG/1
500 TABLET ORAL 2 TIMES DAILY
Status: DISCONTINUED | OUTPATIENT
Start: 2025-01-11 | End: 2025-01-13

## 2025-01-11 RX ORDER — SODIUM CHLORIDE 0.9 % (FLUSH) 0.9 %
5-40 SYRINGE (ML) INJECTION PRN
Status: DISCONTINUED | OUTPATIENT
Start: 2025-01-11 | End: 2025-01-17 | Stop reason: HOSPADM

## 2025-01-11 RX ORDER — ONDANSETRON 4 MG/1
4 TABLET, ORALLY DISINTEGRATING ORAL EVERY 8 HOURS PRN
Status: DISCONTINUED | OUTPATIENT
Start: 2025-01-11 | End: 2025-01-17 | Stop reason: HOSPADM

## 2025-01-11 RX ORDER — METOPROLOL SUCCINATE 25 MG/1
25 TABLET, EXTENDED RELEASE ORAL DAILY
Status: DISCONTINUED | OUTPATIENT
Start: 2025-01-11 | End: 2025-01-17 | Stop reason: HOSPADM

## 2025-01-11 RX ORDER — ENOXAPARIN SODIUM 100 MG/ML
40 INJECTION SUBCUTANEOUS DAILY
Status: DISCONTINUED | OUTPATIENT
Start: 2025-01-11 | End: 2025-01-11

## 2025-01-11 RX ORDER — FINASTERIDE 5 MG/1
5 TABLET, FILM COATED ORAL DAILY
Status: DISCONTINUED | OUTPATIENT
Start: 2025-01-11 | End: 2025-01-17 | Stop reason: HOSPADM

## 2025-01-11 RX ORDER — MAGNESIUM SULFATE HEPTAHYDRATE 40 MG/ML
2000 INJECTION, SOLUTION INTRAVENOUS PRN
Status: DISCONTINUED | OUTPATIENT
Start: 2025-01-11 | End: 2025-01-13 | Stop reason: CLARIF

## 2025-01-11 RX ORDER — SPIRONOLACTONE 25 MG/1
25 TABLET ORAL DAILY
Status: DISCONTINUED | OUTPATIENT
Start: 2025-01-11 | End: 2025-01-17 | Stop reason: HOSPADM

## 2025-01-11 RX ORDER — ONDANSETRON 2 MG/ML
4 INJECTION INTRAMUSCULAR; INTRAVENOUS EVERY 6 HOURS PRN
Status: DISCONTINUED | OUTPATIENT
Start: 2025-01-11 | End: 2025-01-17 | Stop reason: HOSPADM

## 2025-01-11 RX ORDER — POTASSIUM CHLORIDE 1500 MG/1
40 TABLET, EXTENDED RELEASE ORAL PRN
Status: DISCONTINUED | OUTPATIENT
Start: 2025-01-11 | End: 2025-01-13 | Stop reason: CLARIF

## 2025-01-11 RX ADMIN — Medication 15 G: at 14:47

## 2025-01-11 RX ADMIN — SODIUM CHLORIDE, PRESERVATIVE FREE 10 ML: 5 INJECTION INTRAVENOUS at 22:28

## 2025-01-11 RX ADMIN — AMOXICILLIN 1000 MG: 500 CAPSULE ORAL at 22:28

## 2025-01-11 RX ADMIN — Medication 15 G: at 14:30

## 2025-01-11 RX ADMIN — CLARITHROMYCIN 500 MG: 500 TABLET, FILM COATED ORAL at 22:28

## 2025-01-11 RX ADMIN — APIXABAN 2.5 MG: 2.5 TABLET, FILM COATED ORAL at 22:28

## 2025-01-11 ASSESSMENT — PAIN SCALES - GENERAL: PAINLEVEL_OUTOF10: 0

## 2025-01-11 NOTE — H&P
and symptoms as outlined, requirement for current medical therapies and most importantly because of direct risk to patient if care not provided in a hospital setting.    Katey Au MD  1/11/2025  4:44 PM    To be discussed with attending    Copy sent to Dr. Mclean primary care provider on file.

## 2025-01-11 NOTE — PLAN OF CARE
Problem: Chronic Conditions and Co-morbidities  Goal: Patient's chronic conditions and co-morbidity symptoms are monitored and maintained or improved  Outcome: Progressing     Problem: Discharge Planning  Goal: Discharge to home or other facility with appropriate resources  Outcome: Progressing     Problem: Skin/Tissue Integrity  Goal: Absence of new skin breakdown  Description: 1.  Monitor for areas of redness and/or skin breakdown  2.  Assess vascular access sites hourly  3.  Every 4-6 hours minimum:  Change oxygen saturation probe site  4.  Every 4-6 hours:  If on nasal continuous positive airway pressure, respiratory therapy assess nares and determine need for appliance change or resting period.  Outcome: Progressing     Problem: ABCDS Injury Assessment  Goal: Absence of physical injury  Outcome: Progressing     Problem: Risk for Elopement  Goal: Patient will not exit the unit/facility without proper excort  Outcome: Progressing     Problem: Safety - Adult  Goal: Free from fall injury  Outcome: Progressing

## 2025-01-11 NOTE — ED NOTES
Mode of arrival (squad #, walk in, police, etc) : walk in        Chief complaint(s): Hypoglycemia        Arrival Note (brief scenario, treatment PTA, etc).: Pt arrives to the ED stating \"my blood is running low\". In talking to the patient it seems the patients blood sugar has been low. Pt states that since he started the antibiotics his BS has been around 75-60. Pt does report feeling dizzy. Pt states he was just admitted here for an ulcer and had surgery and there has not been complications with that. No blood in the stool, not vomiting.         C= \"Have you ever felt that you should Cut down on your drinking?\"  No  A= \"Have people Annoyed you by criticizing your drinking?\"  No  G= \"Have you ever felt bad or Guilty about your drinking?\"  No  E= \"Have you ever had a drink as an Eye-opener first thing in the morning to steady your nerves or to help a hangover?\"  No      Deferred []      Reason for deferring: N/A    *If yes to two or more: probable alcohol abuse.*

## 2025-01-11 NOTE — ED NOTES
Report given to SUKHWINDER Benoit from [employer].   Report method in person   The following was reviewed with receiving RN:   Current vital signs:  /68   Pulse 64   Temp 97.8 °F (36.6 °C) (Oral)   Resp 20   Wt 92.5 kg (204 lb)   SpO2 98%   BMI 28.45 kg/m²                MEWS Score: 1     Any medication or safety alerts were reviewed. Any pending diagnostics and notifications were also reviewed, as well as any safety concerns or issues, abnormal labs, abnormal imaging, and abnormal assessment findings. Questions were answered.

## 2025-01-11 NOTE — ED PROVIDER NOTES
TAKE 1 TABLET TWICE DAILY BEFORE MEALS    GLUCOSE MONITORING KIT    Use to check glucose as directed.    LISINOPRIL (PRINIVIL;ZESTRIL) 10 MG TABLET    Take 1 tablet by mouth daily    METFORMIN (GLUCOPHAGE) 1000 MG TABLET    Take 1 tablet by mouth daily (with breakfast)    METOPROLOL SUCCINATE (TOPROL XL) 25 MG EXTENDED RELEASE TABLET    Take 1 tablet by mouth daily    NYSTATIN (MYCOSTATIN) 461528 UNIT/GM POWDER    Apply 3 times daily for 7 days.    OMEPRAZOLE (PRILOSEC) 20 MG DELAYED RELEASE CAPSULE    Take 1 capsule by mouth 2 times daily (before meals) for 14 days    SPIRONOLACTONE (ALDACTONE) 25 MG TABLET    Take 1 tablet by mouth daily    TAMSULOSIN (FLOMAX) 0.4 MG CAPSULE        TRUE METRIX BLOOD GLUCOSE TEST STRIP    TEST BLOOD SUGAR TWICE DAILY       ALLERGIES     is allergic to rivaroxaban.    SOCIAL HISTORY      reports that he quit smoking about 57 years ago. His smoking use included cigarettes. He started smoking about 71 years ago. He has a 4.2 pack-year smoking history. He has never used smokeless tobacco. He reports that he does not drink alcohol and does not use drugs.    PHYSICAL EXAM     INITIAL VITALS: /63   Pulse 64   Temp 97.8 °F (36.6 °C) (Oral)   Resp 20   Wt 92.5 kg (204 lb)   SpO2 97%   BMI 28.45 kg/m²      Physical Exam  Vitals and nursing note reviewed.   Constitutional:       General: He is not in acute distress.     Appearance: He is well-developed. He is not diaphoretic.   HENT:      Head: Normocephalic and atraumatic.   Eyes:      General: No scleral icterus.        Right eye: No discharge.         Left eye: No discharge.      Conjunctiva/sclera: Conjunctivae normal.      Pupils: Pupils are equal, round, and reactive to light.   Cardiovascular:      Rate and Rhythm: Normal rate and regular rhythm.      Heart sounds: Normal heart sounds. No murmur heard.     No friction rub. No gallop.   Pulmonary:      Effort: Pulmonary effort is normal. No respiratory distress.      Breath  (36.6 °C)      TempSrc: Oral      SpO2: 94% 99%  97%   Weight: 92.5 kg (204 lb)        MEDICATIONS GIVEN TO PATIENT THIS ENCOUNTER:  Orders Placed This Encounter   Medications    glucose chewable tablet 16 g    OR Linked Order Group     dextrose bolus 10% 125 mL     dextrose bolus 10% 250 mL    glucagon injection 1 mg    dextrose 10 % infusion     DISCHARGE PRESCRIPTIONS:  New Prescriptions    No medications on file     PHYSICIAN CONSULTS ORDERED THIS ENCOUNTER:  IP CONSULT TO PRIMARY CARE PROVIDER    FINAL IMPRESSION      1. Hypoglycemia          DISPOSITION/PLAN   DISPOSITION Decision To Admit 01/11/2025 03:14:20 PM   DISPOSITION CONDITION Stable           PATIENT REFERREDTO:  No follow-up provider specified.    DISCHARGEMEDICATIONS:  New Prescriptions    No medications on file       (Please note that portions of this note were completed with a voice recognition program.  Efforts were made to edit thedictations but occasionally words are mis-transcribed.)    Justus Norton MD  Attending Emergency Physician                        Justus Norton MD  01/11/25 8430

## 2025-01-11 NOTE — ED NOTES
Dr. Norton notified of glucose 67. Per Dr. Norton okay to hold dextrose containing fluid and repeated glucose in 30 minutes. cassie Palomino RN notified

## 2025-01-12 LAB
ANION GAP SERPL CALCULATED.3IONS-SCNC: 10 MMOL/L (ref 9–16)
BACTERIA URNS QL MICRO: ABNORMAL
BASOPHILS # BLD: 0 K/UL (ref 0–0.2)
BASOPHILS NFR BLD: 0 % (ref 0–2)
BILIRUB UR QL STRIP: NEGATIVE
BUN SERPL-MCNC: 59 MG/DL (ref 8–23)
CALCIUM SERPL-MCNC: 8.8 MG/DL (ref 8.6–10.4)
CASTS #/AREA URNS LPF: ABNORMAL /LPF
CHLORIDE SERPL-SCNC: 102 MMOL/L (ref 98–107)
CLARITY UR: CLEAR
CO2 SERPL-SCNC: 23 MMOL/L (ref 20–31)
COLOR UR: YELLOW
CREAT SERPL-MCNC: 2.3 MG/DL (ref 0.7–1.2)
EOSINOPHIL # BLD: 0.55 K/UL (ref 0–0.4)
EOSINOPHILS RELATIVE PERCENT: 6 % (ref 0–4)
EPI CELLS #/AREA URNS HPF: ABNORMAL /HPF
ERYTHROCYTE [DISTWIDTH] IN BLOOD BY AUTOMATED COUNT: 16.2 % (ref 11.5–14.9)
GFR, ESTIMATED: 27 ML/MIN/1.73M2
GLUCOSE BLD-MCNC: 147 MG/DL (ref 75–110)
GLUCOSE BLD-MCNC: 271 MG/DL (ref 75–110)
GLUCOSE BLD-MCNC: 275 MG/DL (ref 75–110)
GLUCOSE BLD-MCNC: 98 MG/DL (ref 75–110)
GLUCOSE SERPL-MCNC: 123 MG/DL (ref 74–99)
GLUCOSE UR STRIP-MCNC: NEGATIVE MG/DL
HCT VFR BLD AUTO: 22.5 % (ref 41–53)
HGB BLD-MCNC: 7.6 G/DL (ref 13.5–17.5)
HGB UR QL STRIP.AUTO: ABNORMAL
KETONES UR STRIP-MCNC: NEGATIVE MG/DL
LEUKOCYTE ESTERASE UR QL STRIP: ABNORMAL
LYMPHOCYTES NFR BLD: 2.37 K/UL (ref 1–4.8)
LYMPHOCYTES RELATIVE PERCENT: 26 % (ref 24–44)
MCH RBC QN AUTO: 28.3 PG (ref 26–34)
MCHC RBC AUTO-ENTMCNC: 33.7 G/DL (ref 31–37)
MCV RBC AUTO: 83.9 FL (ref 80–100)
MONOCYTES NFR BLD: 0.73 K/UL (ref 0.1–1.3)
MONOCYTES NFR BLD: 8 % (ref 1–7)
MORPHOLOGY: ABNORMAL
NEUTROPHILS NFR BLD: 60 % (ref 36–66)
NEUTS SEG NFR BLD: 5.45 K/UL (ref 1.3–9.1)
NITRITE UR QL STRIP: NEGATIVE
PH UR STRIP: 5.5 [PH] (ref 5–8)
PLATELET # BLD AUTO: 476 K/UL (ref 150–450)
PMV BLD AUTO: 6.6 FL (ref 6–12)
POTASSIUM SERPL-SCNC: 4.5 MMOL/L (ref 3.7–5.3)
PROT UR STRIP-MCNC: NEGATIVE MG/DL
RBC # BLD AUTO: 2.68 M/UL (ref 4.5–5.9)
RBC #/AREA URNS HPF: ABNORMAL /HPF
SODIUM SERPL-SCNC: 135 MMOL/L (ref 136–145)
SP GR UR STRIP: 1.01 (ref 1–1.03)
UROBILINOGEN UR STRIP-ACNC: NORMAL EU/DL (ref 0–1)
WBC #/AREA URNS HPF: ABNORMAL /HPF
WBC OTHER # BLD: 9.1 K/UL (ref 3.5–11)

## 2025-01-12 PROCEDURE — 2500000003 HC RX 250 WO HCPCS

## 2025-01-12 PROCEDURE — 36415 COLL VENOUS BLD VENIPUNCTURE: CPT

## 2025-01-12 PROCEDURE — 87086 URINE CULTURE/COLONY COUNT: CPT

## 2025-01-12 PROCEDURE — 6370000000 HC RX 637 (ALT 250 FOR IP)

## 2025-01-12 PROCEDURE — 82947 ASSAY GLUCOSE BLOOD QUANT: CPT

## 2025-01-12 PROCEDURE — 80048 BASIC METABOLIC PNL TOTAL CA: CPT

## 2025-01-12 PROCEDURE — 85025 COMPLETE CBC W/AUTO DIFF WBC: CPT

## 2025-01-12 PROCEDURE — 1200000000 HC SEMI PRIVATE

## 2025-01-12 PROCEDURE — 81001 URINALYSIS AUTO W/SCOPE: CPT

## 2025-01-12 PROCEDURE — 99223 1ST HOSP IP/OBS HIGH 75: CPT | Performed by: INTERNAL MEDICINE

## 2025-01-12 PROCEDURE — 6370000000 HC RX 637 (ALT 250 FOR IP): Performed by: INTERNAL MEDICINE

## 2025-01-12 PROCEDURE — 6360000002 HC RX W HCPCS

## 2025-01-12 PROCEDURE — 97166 OT EVAL MOD COMPLEX 45 MIN: CPT

## 2025-01-12 PROCEDURE — 97162 PT EVAL MOD COMPLEX 30 MIN: CPT

## 2025-01-12 PROCEDURE — 92610 EVALUATE SWALLOWING FUNCTION: CPT

## 2025-01-12 PROCEDURE — 2580000003 HC RX 258

## 2025-01-12 RX ORDER — INSULIN LISPRO 100 [IU]/ML
0-8 INJECTION, SOLUTION INTRAVENOUS; SUBCUTANEOUS
Status: DISCONTINUED | OUTPATIENT
Start: 2025-01-12 | End: 2025-01-17 | Stop reason: HOSPADM

## 2025-01-12 RX ORDER — INSULIN LISPRO 100 [IU]/ML
0-8 INJECTION, SOLUTION INTRAVENOUS; SUBCUTANEOUS
Status: DISCONTINUED | OUTPATIENT
Start: 2025-01-12 | End: 2025-01-12

## 2025-01-12 RX ADMIN — AMLODIPINE BESYLATE 5 MG: 5 TABLET ORAL at 09:39

## 2025-01-12 RX ADMIN — SODIUM CHLORIDE, PRESERVATIVE FREE 10 ML: 5 INJECTION INTRAVENOUS at 21:36

## 2025-01-12 RX ADMIN — TAMSULOSIN HYDROCHLORIDE 0.4 MG: 0.4 CAPSULE ORAL at 09:39

## 2025-01-12 RX ADMIN — APIXABAN 2.5 MG: 2.5 TABLET, FILM COATED ORAL at 09:39

## 2025-01-12 RX ADMIN — AMOXICILLIN 1000 MG: 500 CAPSULE ORAL at 21:37

## 2025-01-12 RX ADMIN — APIXABAN 2.5 MG: 2.5 TABLET, FILM COATED ORAL at 21:36

## 2025-01-12 RX ADMIN — FINASTERIDE 5 MG: 5 TABLET, FILM COATED ORAL at 09:39

## 2025-01-12 RX ADMIN — INSULIN LISPRO 4 UNITS: 100 INJECTION, SOLUTION INTRAVENOUS; SUBCUTANEOUS at 17:33

## 2025-01-12 RX ADMIN — METOPROLOL SUCCINATE 25 MG: 25 TABLET, EXTENDED RELEASE ORAL at 09:39

## 2025-01-12 RX ADMIN — AMOXICILLIN 1000 MG: 500 CAPSULE ORAL at 09:41

## 2025-01-12 RX ADMIN — CEFEPIME 2000 MG: 2 INJECTION, POWDER, FOR SOLUTION INTRAVENOUS at 13:08

## 2025-01-12 RX ADMIN — PANTOPRAZOLE SODIUM 40 MG: 40 TABLET, DELAYED RELEASE ORAL at 05:24

## 2025-01-12 RX ADMIN — CLARITHROMYCIN 500 MG: 500 TABLET, FILM COATED ORAL at 09:41

## 2025-01-12 RX ADMIN — SODIUM CHLORIDE, PRESERVATIVE FREE 10 ML: 5 INJECTION INTRAVENOUS at 09:42

## 2025-01-12 RX ADMIN — CLARITHROMYCIN 500 MG: 500 TABLET, FILM COATED ORAL at 21:36

## 2025-01-12 NOTE — PROGRESS NOTES
Sent Dr Ruiz a message informing him that patients blood sugar is 275. No orders placed.   Waiting for a response

## 2025-01-12 NOTE — PLAN OF CARE
Problem: Chronic Conditions and Co-morbidities  Goal: Patient's chronic conditions and co-morbidity symptoms are monitored and maintained or improved  1/12/2025 1522 by Kaycee Choi RN  Outcome: Progressing     Problem: Discharge Planning  Goal: Discharge to home or other facility with appropriate resources  1/12/2025 1522 by Kaycee Choi RN  Outcome: Progressing     Problem: Skin/Tissue Integrity  Goal: Absence of new skin breakdown  Description: 1.  Monitor for areas of redness and/or skin breakdown  2.  Assess vascular access sites hourly  3.  Every 4-6 hours minimum:  Change oxygen saturation probe site  4.  Every 4-6 hours:  If on nasal continuous positive airway pressure, respiratory therapy assess nares and determine need for appliance change or resting period.  1/12/2025 1522 by Kaycee Choi RN  Outcome: Progressing     Problem: ABCDS Injury Assessment  Goal: Absence of physical injury  1/12/2025 1522 by Kaycee Choi RN  Outcome: Progressing     Problem: Risk for Elopement  Goal: Patient will not exit the unit/facility without proper excort  1/12/2025 1522 by Kaycee Choi RN  Outcome: Progressing     Problem: Safety - Adult  Goal: Free from fall injury  1/12/2025 1522 by Kaycee Choi RN  Outcome: Progressing     Problem: Hematologic - Adult  Goal: Maintains hematologic stability  1/12/2025 1522 by Kaycee Choi RN  Outcome: Progressing

## 2025-01-12 NOTE — ACP (ADVANCE CARE PLANNING)
Advance Care Planning     Advance Care Planning Activator (Inpatient)  Conversation Note      Date of ACP Conversation: 1/12/2025     Conversation Conducted with: Patient with Decision Making Capacity    ACP Activator: Thalia Spangler RN    Health Care Decision Maker:     Current Designated Health Care Decision Maker:     Primary Decision Maker: Naomi Frias - Spouse - [phone]    Secondary Decision Maker: Hannah Ceron - Child - [phone]    Supplemental (Other) Decision Maker: Lucy Lazcano - Child - [phone]  Click here to complete Healthcare Decision Makers including section of the Healthcare Decision Maker Relationship (ie \"Primary\")  Today we documented Decision Maker(s) consistent with Legal Next of Kin hierarchy.    Care Preferences    Ventilation:  \"If you were in your present state of health and suddenly became very ill and were unable to breathe on your own, what would your preference be about the use of a ventilator (breathing machine) if it were available to you?\"      Would the patient desire the use of ventilator (breathing machine)?: yes    \"If your health worsens and it becomes clear that your chance of recovery is unlikely, what would your preference be about the use of a ventilator (breathing machine) if it were available to you?\"     Would the patient desire the use of ventilator (breathing machine)?: Unsure      Resuscitation  \"CPR works best to restart the heart when there is a sudden event, like a heart attack, in someone who is otherwise healthy. Unfortunately, CPR does not typically restart the heart for people who have serious health conditions or who are very sick.\"    \"In the event your heart stopped as a result of an underlying serious health condition, would you want attempts to be made to restart your heart (answer \"yes\" for attempt to resuscitate) or would you prefer a natural death (answer \"no\" for do not attempt to resuscitate)?\" yes       [] Yes   [] No   Educated

## 2025-01-12 NOTE — CARE COORDINATION
Case Management Assessment  Initial Evaluation    Date/Time of Evaluation: 1/12/2025 9:20AM  Assessment Completed by: Thalia Spangler RN    If patient is discharged prior to next notation, then this note serves as note for discharge by case management.    Patient Name: Carl Frias                   YOB: 1935  Diagnosis: Hypoglycemia [E16.2]                   Date / Time: 1/11/2025  1:34 PM    Patient Admission Status: Inpatient   Readmission Risk (Low < 19, Mod (19-27), High > 27): Readmission Risk Score: 24.9    Current PCP: No primary care provider on file.  PCP verified by CM? No    Chart Reviewed: Yes      History Provided by: Patient  Patient Orientation: Alert and Oriented    Patient Cognition: Alert    Hospitalization in the last 30 days (Readmission):  Yes    If yes, Readmission Assessment in CM Navigator will be completed.    Advance Directives:      Code Status: Full Code   Patient's Primary Decision Maker is: Legal Next of Kin    Primary Decision Maker: Olga Friaslinda - Spouse - [phone]    Secondary Decision Maker: Hannah Ceron - Child - [phone]    Supplemental (Other) Decision Maker: Lucy Lazcano - Child - [phone]    Discharge Planning:    Patient lives with: Spouse/Significant Other Type of Home: House  Primary Care Giver: Self  Patient Support Systems include: Spouse/Significant Other, Family Members   Current Financial resources: Medicare  Current community resources: None  Current services prior to admission: Durable Medical Equipment            Current DME: Shower Chair, Wheelchair, Walker            Type of Home Care services:  PT, OT, Nursing Services    ADLS  Prior functional level: Assistance with the following:, Mobility, Shopping, Housework  Current functional level: Assistance with the following:, Mobility, Shopping, Housework    PT AM-PAC: 18 /24  OT AM-PAC: 19 /24    Family can provide assistance at DC: Yes  Would you like Case Management to discuss the  Yes    Thalia Spangler RN  Case Management Department  Ph: [phone] Fax: [phone]

## 2025-01-12 NOTE — PLAN OF CARE
Problem: Chronic Conditions and Co-morbidities  Goal: Patient's chronic conditions and co-morbidity symptoms are monitored and maintained or improved  1/12/2025 0458 by Rachael Hussein RN  Outcome: Progressing     Problem: Discharge Planning  Goal: Discharge to home or other facility with appropriate resources  1/12/2025 0458 by Rachael Hussein RN  Outcome: Progressing     Problem: Skin/Tissue Integrity  Goal: Absence of new skin breakdown  Description: 1.  Monitor for areas of redness and/or skin breakdown  2.  Assess vascular access sites hourly  3.  Every 4-6 hours minimum:  Change oxygen saturation probe site  4.  Every 4-6 hours:  If on nasal continuous positive airway pressure, respiratory therapy assess nares and determine need for appliance change or resting period.  1/12/2025 0458 by Rachael Hussein RN  Outcome: Progressing     Problem: ABCDS Injury Assessment  Goal: Absence of physical injury  1/12/2025 0458 by Rachael Hussein RN  Outcome: Progressing     Problem: Risk for Elopement  Goal: Patient will not exit the unit/facility without proper excort  1/12/2025 0458 by Rachael Hussein RN  Outcome: Progressing     Problem: Safety - Adult  Goal: Free from fall injury  1/12/2025 0458 by Rachael Hussein RN  Outcome: Progressing     Problem: Hematologic - Adult  Goal: Maintains hematologic stability  Outcome: Progressing

## 2025-01-12 NOTE — PROGRESS NOTES
Physical Therapy  [facility]   Physical Therapy Evaluation  Date: 25  Patient Name: Carl Frias       Room: -  MRN: [id number]  Account: [id number]   : 1935  (89 y.o.) Gender: male     Discharge Recommendations:  The patient would benefit from additional Physical Therapy after discharge from the facility upon return to their home          Past Medical History:  has a past medical history of Diabetes mellitus (HCC), Hypertension, and Testicular cancer (HCC).  Past Surgical History:   has a past surgical history that includes Testicle removal (); hernia repair (); eye surgery (Bilateral); joint replacement (Bilateral); and Upper gastrointestinal endoscopy (N/A, 1/3/2025).    Subjective  Subjective  Subjective: pt is pleasant and cooperative   General  Family/Caregiver Present: No  Follows Commands: Within Functional Limits  General  General Comments: pt is [place]             Social/Functional History  Social/Functional History  Lives With: Spouse  Type of Home: House  Home Layout: Two level, Performs ADL's on one level, Able to Live on Main level with bedroom/bathroom  Home Access: Stairs to enter with rails  Entrance Stairs - Number of Steps: 4  Entrance Stairs - Rails: Both  Bathroom Shower/Tub: Walk-in shower, Shower chair with back, Curtain  Bathroom Toilet: Handicap height  Bathroom Equipment: Grab bars in shower, Safety frame, Grab bars around toilet, Hand-held shower  Bathroom Accessibility: Accessible  Home Equipment: Wheelchair - Manual, Walker - Rolling, Grab bars, Hospital bed, Lift chair  Has the patient had two or more falls in the past year or any fall with injury in the past year?: No (1 fall)  Prior Level of Assist for ADLs: Independent  Prior Level of Assist for Homemaking: Needs assistance (spouse completes)  Homemaking Responsibilities: No  Prior Level of Assist for Ambulation: Independent household ambulator, with or without device (primarily uses

## 2025-01-12 NOTE — PROGRESS NOTES
Facility/Department: [facility] MED SURG   CLINICAL BEDSIDE SWALLOW EVALUATION    NAME: Carl Frias  : 1935  MRN: [id number]    ADMISSION DATE: 2025  ADMITTING DIAGNOSIS: has Benign prostatic hyperplasia without lower urinary tract symptoms; Essential hypertension; Type 2 diabetes mellitus with chronic kidney disease (HCC); Type 2 diabetes mellitus with diabetic polyneuropathy (HCC); Cardiomegaly; Unspecified atrial fibrillation (HCC); Chronic atrial fibrillation (HCC); Diabetic peripheral neuropathy (HCC); History of inguinal hernia repair; Chronic combined systolic and diastolic congestive heart failure (HCC); Other constipation; Hydrocele, unspecified; Low back pain, unspecified; Other specified abdominal hernia without obstruction or gangrene; Radiculopathy, lumbosacral region; Other atopic dermatitis; Acute kidney failure, unspecified ([facility]); Unstable gait; Easy bruisability; Acute on chronic combined systolic (congestive) and diastolic (congestive) heart failure ([facility]); Acquired left foot drop; Secondary hypercoagulable state ([facility]); Systolic CHF, acute ([facility]); Hypertensive heart disease with heart failure ([facility]); Iron deficiency anemia, unspecified; ROXI (acute kidney injury) ([facility]); Deficiency of other specified B group vitamins; Diarrhea due to drug; Chronic kidney disease, stage 3a ([facility]); 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

## 2025-01-12 NOTE — PROGRESS NOTES
Occupational Therapy Initial Evaluation  Facility/Department: [facility] MED SURG   Patient Name: Carl Frias        MRN: [id number]    : 1935    Date of Service: 2025    Chief Complaint   Patient presents with    Hypoglycemia     Past Medical History:  has a past medical history of Diabetes mellitus (HCC), Hypertension, and Testicular cancer (HCC).  Past Surgical History:  has a past surgical history that includes Testicle removal (); hernia repair (); eye surgery (Bilateral); joint replacement (Bilateral); and Upper gastrointestinal endoscopy (N/A, 1/3/2025).    Discharge Recommendations     OT Equipment Recommendations  Equipment Needed: No  Other: has all recommended DME    Assessment  Performance deficits / Impairments: Decreased functional mobility ;Decreased ADL status;Decreased strength;Decreased safe awareness;Decreased endurance;Decreased sensation;Decreased balance;Decreased high-level IADLs  Assessment: Pt presents with decreased ADL IND secondary to deficits noted above. At baseline, pt is IND with ADLs and functional mobility. Currently, pt is requiring SBA for transfers and CGA for functional mobility with use of RW, CGA for LB ADLs. Continued OT services are warranted while pt is hospitalized and upon d/c to maximize IND and safety and facilitate return to PLOF. Pt does display the safe functional abilities to return to prior living arrangements as pt has 24/7 assist from wife and good family support from 3 daughters who live locally.  Prognosis: Good  Decision Making: Medium Complexity  REQUIRES OT FOLLOW-UP: Yes  Activity Tolerance  Activity Tolerance: Patient Tolerated treatment well  Safety Devices  Type of Devices: All fall risk precautions in place;Call light within reach;Chair alarm in place;Gait belt;Patient at risk for falls;Left in chair  Restraints  Restraints Initially in Place: No    AM-PAC  AM-PAC Daily Activity - Inpatient   How much help is needed for putting on and taking  clinical judgement  LE Bathing: Contact guard assistance  UE Dressing: Supervision;Based on clinical judgement  LE Dressing: Contact guard assistance  LE Dressing Skilled Clinical Factors: Pt demos ability to reach to floor level to simulate threading; CGA for standing balance  Putting On/Taking Off Footwear: Stand by assistance  Putting On/Taking Off Footwear Skilled Clinical Factors: Pt able to doff/don socks reaching to floor level with extra time with SBA  Toileting: Contact guard assistance;Based on clinical judgement    Balance  Balance  Sitting: Impaired (SBA dynamic sitting while reaching to floor level for LBD)  Standing: Impaired (CGA static/dynamic tolerating ~1- 2 min supported at RW this date)    Transfers/Mobility    Transfers  Sit to stand: Stand by assistance  Stand to sit: Stand by assistance  Transfer Comments: verbal cues for sfety as pt attempting to stand prior to donning of gait belt and cues for proper hand placement    Functional Mobility: Contact guard assistance  Functional Mobility Skilled Clinical Factors: Pt completed functional mobility throughout room to bedsdie chair with CGA with use of RW. Pt demo's 1 slight LOB with pt able to correct self with support from RW however states \"floor isn't like his at home\" stating he has carpet at home. Min cues for safety with RW.         Patient Education  Patient Education  Education Given To: Patient  Education Provided: Role of Therapy;Plan of Care;Transfer Training;Mobility Training  Education Provided Comments: Role of OT, OT POC, safety during transfer training, safety during functional mobility with use of RW, 1-assist for mobility and use of call light  Education Method: Verbal  Barriers to Learning: None  Education Outcome: Verbalized understanding;Demonstrated understanding;Continued education needed    Goals  Short Term Goals  Time Frame for Short Term Goals: By discharge, pt will  Short Term Goal 1: demo ability to perform LB

## 2025-01-13 PROBLEM — A04.8 H. PYLORI INFECTION: Status: ACTIVE | Noted: 2025-01-13

## 2025-01-13 LAB
ANION GAP SERPL CALCULATED.3IONS-SCNC: 10 MMOL/L (ref 9–16)
BASOPHILS # BLD: 0 K/UL (ref 0–0.2)
BASOPHILS NFR BLD: 0 % (ref 0–2)
BUN SERPL-MCNC: 66 MG/DL (ref 8–23)
CALCIUM SERPL-MCNC: 8.8 MG/DL (ref 8.6–10.4)
CHLORIDE SERPL-SCNC: 105 MMOL/L (ref 98–107)
CO2 SERPL-SCNC: 22 MMOL/L (ref 20–31)
CREAT SERPL-MCNC: 2.5 MG/DL (ref 0.7–1.2)
DATE, STOOL #1: NORMAL
EOSINOPHIL # BLD: 0.54 K/UL (ref 0–0.4)
EOSINOPHILS RELATIVE PERCENT: 7 % (ref 0–4)
ERYTHROCYTE [DISTWIDTH] IN BLOOD BY AUTOMATED COUNT: 15.7 % (ref 11.5–14.9)
GFR, ESTIMATED: 24 ML/MIN/1.73M2
GLUCOSE BLD-MCNC: 150 MG/DL (ref 75–110)
GLUCOSE BLD-MCNC: 182 MG/DL (ref 75–110)
GLUCOSE BLD-MCNC: 217 MG/DL (ref 75–110)
GLUCOSE BLD-MCNC: 322 MG/DL (ref 75–110)
GLUCOSE SERPL-MCNC: 218 MG/DL (ref 74–99)
HCT VFR BLD AUTO: 20.3 % (ref 41–53)
HCT VFR BLD AUTO: 23.8 % (ref 41–53)
HEMOCCULT SP1 STL QL: NEGATIVE
HGB BLD-MCNC: 7 G/DL (ref 13.5–17.5)
HGB BLD-MCNC: 8 G/DL (ref 13.5–17.5)
LYMPHOCYTES NFR BLD: 1.39 K/UL (ref 1–4.8)
LYMPHOCYTES RELATIVE PERCENT: 18 % (ref 24–44)
MCH RBC QN AUTO: 28.4 PG (ref 26–34)
MCHC RBC AUTO-ENTMCNC: 34.2 G/DL (ref 31–37)
MCV RBC AUTO: 82.9 FL (ref 80–100)
MICROORGANISM SPEC CULT: NO GROWTH
MONOCYTES NFR BLD: 0.69 K/UL (ref 0.1–1.3)
MONOCYTES NFR BLD: 9 % (ref 1–7)
MORPHOLOGY: ABNORMAL
NEUTROPHILS NFR BLD: 66 % (ref 36–66)
NEUTS SEG NFR BLD: 5.08 K/UL (ref 1.3–9.1)
PLATELET # BLD AUTO: 390 K/UL (ref 150–450)
PMV BLD AUTO: 6.2 FL (ref 6–12)
POTASSIUM SERPL-SCNC: 4.8 MMOL/L (ref 3.7–5.3)
RBC # BLD AUTO: 2.45 M/UL (ref 4.5–5.9)
SODIUM SERPL-SCNC: 137 MMOL/L (ref 136–145)
SPECIMEN DESCRIPTION: NORMAL
TIME, STOOL #1: NORMAL
WBC OTHER # BLD: 7.7 K/UL (ref 3.5–11)

## 2025-01-13 PROCEDURE — 30233N1 TRANSFUSION OF NONAUTOLOGOUS RED BLOOD CELLS INTO PERIPHERAL VEIN, PERCUTANEOUS APPROACH: ICD-10-PCS | Performed by: INTERNAL MEDICINE

## 2025-01-13 PROCEDURE — 86901 BLOOD TYPING SEROLOGIC RH(D): CPT

## 2025-01-13 PROCEDURE — 2500000003 HC RX 250 WO HCPCS

## 2025-01-13 PROCEDURE — 6370000000 HC RX 637 (ALT 250 FOR IP)

## 2025-01-13 PROCEDURE — 85025 COMPLETE CBC W/AUTO DIFF WBC: CPT

## 2025-01-13 PROCEDURE — P9016 RBC LEUKOCYTES REDUCED: HCPCS

## 2025-01-13 PROCEDURE — 86900 BLOOD TYPING SEROLOGIC ABO: CPT

## 2025-01-13 PROCEDURE — APPNB30 APP NON BILLABLE TIME 0-30 MINS: Performed by: NURSE PRACTITIONER

## 2025-01-13 PROCEDURE — 85014 HEMATOCRIT: CPT

## 2025-01-13 PROCEDURE — 36415 COLL VENOUS BLD VENIPUNCTURE: CPT

## 2025-01-13 PROCEDURE — 2580000003 HC RX 258

## 2025-01-13 PROCEDURE — 6370000000 HC RX 637 (ALT 250 FOR IP): Performed by: INTERNAL MEDICINE

## 2025-01-13 PROCEDURE — 86850 RBC ANTIBODY SCREEN: CPT

## 2025-01-13 PROCEDURE — 36430 TRANSFUSION BLD/BLD COMPNT: CPT

## 2025-01-13 PROCEDURE — 99233 SBSQ HOSP IP/OBS HIGH 50: CPT | Performed by: INTERNAL MEDICINE

## 2025-01-13 PROCEDURE — 86920 COMPATIBILITY TEST SPIN: CPT

## 2025-01-13 PROCEDURE — 82947 ASSAY GLUCOSE BLOOD QUANT: CPT

## 2025-01-13 PROCEDURE — 99223 1ST HOSP IP/OBS HIGH 75: CPT | Performed by: INTERNAL MEDICINE

## 2025-01-13 PROCEDURE — 85018 HEMOGLOBIN: CPT

## 2025-01-13 PROCEDURE — 82270 OCCULT BLOOD FECES: CPT

## 2025-01-13 PROCEDURE — 1200000000 HC SEMI PRIVATE

## 2025-01-13 PROCEDURE — 6370000000 HC RX 637 (ALT 250 FOR IP): Performed by: NURSE PRACTITIONER

## 2025-01-13 PROCEDURE — 6360000002 HC RX W HCPCS

## 2025-01-13 PROCEDURE — 80048 BASIC METABOLIC PNL TOTAL CA: CPT

## 2025-01-13 RX ORDER — AMOXICILLIN 500 MG/1
500 CAPSULE ORAL 2 TIMES DAILY
Status: DISCONTINUED | OUTPATIENT
Start: 2025-01-13 | End: 2025-01-17 | Stop reason: HOSPADM

## 2025-01-13 RX ORDER — CLARITHROMYCIN 500 MG/1
500 TABLET ORAL DAILY
Status: DISCONTINUED | OUTPATIENT
Start: 2025-01-14 | End: 2025-01-17 | Stop reason: HOSPADM

## 2025-01-13 RX ORDER — PANTOPRAZOLE SODIUM 40 MG/1
40 TABLET, DELAYED RELEASE ORAL
Status: DISCONTINUED | OUTPATIENT
Start: 2025-01-13 | End: 2025-01-17 | Stop reason: HOSPADM

## 2025-01-13 RX ORDER — SODIUM CHLORIDE 9 MG/ML
INJECTION, SOLUTION INTRAVENOUS PRN
Status: DISCONTINUED | OUTPATIENT
Start: 2025-01-13 | End: 2025-01-17 | Stop reason: HOSPADM

## 2025-01-13 RX ADMIN — PANTOPRAZOLE SODIUM 40 MG: 40 TABLET, DELAYED RELEASE ORAL at 16:47

## 2025-01-13 RX ADMIN — INSULIN LISPRO 6 UNITS: 100 INJECTION, SOLUTION INTRAVENOUS; SUBCUTANEOUS at 21:33

## 2025-01-13 RX ADMIN — CLARITHROMYCIN 500 MG: 500 TABLET, FILM COATED ORAL at 10:44

## 2025-01-13 RX ADMIN — SODIUM CHLORIDE, PRESERVATIVE FREE 40 MG: 5 INJECTION INTRAVENOUS at 12:02

## 2025-01-13 RX ADMIN — INSULIN LISPRO 2 UNITS: 100 INJECTION, SOLUTION INTRAVENOUS; SUBCUTANEOUS at 16:47

## 2025-01-13 RX ADMIN — SODIUM CHLORIDE, PRESERVATIVE FREE 10 ML: 5 INJECTION INTRAVENOUS at 21:34

## 2025-01-13 RX ADMIN — SODIUM CHLORIDE: 9 INJECTION, SOLUTION INTRAVENOUS at 10:59

## 2025-01-13 RX ADMIN — CEFEPIME 1000 MG: 1 INJECTION, POWDER, FOR SOLUTION INTRAMUSCULAR; INTRAVENOUS at 11:05

## 2025-01-13 RX ADMIN — METOPROLOL SUCCINATE 25 MG: 25 TABLET, EXTENDED RELEASE ORAL at 10:43

## 2025-01-13 RX ADMIN — AMOXICILLIN 500 MG: 500 CAPSULE ORAL at 21:33

## 2025-01-13 RX ADMIN — FINASTERIDE 5 MG: 5 TABLET, FILM COATED ORAL at 10:44

## 2025-01-13 RX ADMIN — AMOXICILLIN 1000 MG: 500 CAPSULE ORAL at 10:44

## 2025-01-13 RX ADMIN — SODIUM CHLORIDE, PRESERVATIVE FREE 10 ML: 5 INJECTION INTRAVENOUS at 10:44

## 2025-01-13 RX ADMIN — TAMSULOSIN HYDROCHLORIDE 0.4 MG: 0.4 CAPSULE ORAL at 10:44

## 2025-01-13 RX ADMIN — PANTOPRAZOLE SODIUM 40 MG: 40 TABLET, DELAYED RELEASE ORAL at 06:30

## 2025-01-13 RX ADMIN — INSULIN LISPRO 2 UNITS: 100 INJECTION, SOLUTION INTRAVENOUS; SUBCUTANEOUS at 11:55

## 2025-01-13 RX ADMIN — AMLODIPINE BESYLATE 5 MG: 5 TABLET ORAL at 10:43

## 2025-01-13 ASSESSMENT — ENCOUNTER SYMPTOMS
COUGH: 0
DIARRHEA: 0
SHORTNESS OF BREATH: 0
NAUSEA: 0
ABDOMINAL PAIN: 0
APNEA: 0
COLOR CHANGE: 0
VOMITING: 0
CHOKING: 0
CHEST TIGHTNESS: 0
WHEEZING: 0
CONSTIPATION: 0
STRIDOR: 0

## 2025-01-13 NOTE — PROGRESS NOTES
The potassium/magnesium sliding scale has been automatically discontinued per Pharmacy and Therapeutic Committee approved policy because the patient has decreased renal function (CrCl<30 ml/min).  The patient's current K/Mag levels are currently:    Recent Labs     01/11/25  1350 01/12/25  0653 01/13/25  0906   K 4.1 4.5 4.8       Estimated Creatinine Clearance: 24 mL/min (A) (based on SCr of 2.5 mg/dL (H)).  For patients with decreased renal function (below 30ml/min) needing potassium/magnesium supplementation, please order individual bolus doses with appropriate monitoring.      Please contact the inpatient pharmacy at [phone] with any concerns.  Thank you.    Lizy Addison [facility]  1/13/2025  11:57 AM

## 2025-01-13 NOTE — PROGRESS NOTES
[facility]   OCCUPATIONAL THERAPY MISSED TREATMENT NOTE   INPATIENT   Date: 25  Patient Name: Carl Frias       Room:   MRN: [id number]   Account #: [id number]    : 1935  (89 y.o.)  Gender: male                 REASON FOR MISSED TREATMENT:        Unable to participate this date d/t receiving transfusion. Will continue to follow.       Electronically signed by BIB Fu  on 25 at 3:16 PM EST

## 2025-01-13 NOTE — DISCHARGE INSTR - COC
Continuity of Care Form    Patient Name: Carl Frias   :  1935  MRN:  [id number]    Admit date:  2025  Discharge date:  25  Code Status Order: Full Code   Advance Directives:   Advance Care Flowsheet Documentation             Admitting Physician:  Miranda Youssef MD  PCP: No primary care provider on file.    Discharging Nurse: SUKHWINDER Emery  Discharging Hospital Unit/Room#: 2047/2047-01  Discharging Unit Phone Number: [phone]    Emergency Contact:   Extended Emergency Contact Information  Primary Emergency Contact: Naomi Frias  Address: [address]  Home Phone: [phone]  Mobile Phone: [phone]  Relation: Spouse  Secondary Emergency Contact: Lucy Lazcano   [facility]  Home Phone: [phone]  Relation: Child    Past Surgical History:  Past Surgical History:   Procedure Laterality Date    EYE SURGERY Bilateral     HERNIA REPAIR      left inguinal    JOINT REPLACEMENT Bilateral     TESTICLE REMOVAL      UPPER GASTROINTESTINAL ENDOSCOPY N/A 1/3/2025    ESOPHAGOGASTRODUODENOSCOPY BIOPSY WITH CLIPP APPLICATION X 1 performed by Torey Washburn MD at [facility]       Immunization History:   Immunization History   Administered Date(s) Administered    COVID-19, MODERNA BLUE border, Primary or Immunocompromised, (age 12y+), IM, 100 mcg/0.5mL 2021, 2021, 2021    Influenza Vaccine, unspecified formulation 2014, 2016    Influenza, FLUZONE High Dose (age 65 y+), IM, Quadv, 0.7mL 2024    Influenza, FLUZONE High Dose, (age 65 y+), IM, Trivalent PF, 0.5mL 2015, 2016, 2017, 10/30/2018    Pneumococcal, PCV-13, PREVNAR 13, (age 6w+), IM, 0.5mL 2015    Pneumococcal, PPSV23, PNEUMOVAX 23, (age 2y+), SC/IM, 0.5mL 2014, 2016       Active Problems:  Patient Active Problem List   Diagnosis Code    Benign prostatic hyperplasia without lower urinary tract symptoms  N40.0    Essential hypertension I10    Type 2 diabetes mellitus with chronic kidney disease (HCC) E11.22    Type 2 diabetes mellitus with diabetic polyneuropathy (HCC) E11.42    Cardiomegaly I51.7    Unspecified atrial fibrillation (HCC) I48.91    Chronic atrial fibrillation (HCC) I48.20    Diabetic peripheral neuropathy (HCC) E11.42    History of inguinal hernia repair Z98.890, Z87.19    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    Other constipation K59.09    Hydrocele, unspecified N43.3    Low back pain, unspecified M54.50    Other specified abdominal hernia without obstruction or gangrene K45.8    Radiculopathy, lumbosacral region M54.17    Other atopic dermatitis L20.89    Acute kidney failure, unspecified (HCC) N17.9    Unstable gait R26.81    Easy bruisability R23.3    Acute on chronic combined systolic (congestive) and diastolic (congestive) heart failure (HCC) I50.43    Acquired left foot drop M21.372    Secondary hypercoagulable state ([facility]) D68.69    Systolic CHF, acute ([facility]) I50.21    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) I11.0    Iron deficiency anemia, unspecified D50.9    ROXI (acute kidney injury) ([facility]) N17.9    Deficiency of other specified B group vitamins E53.8    Diarrhea due to drug K52.1    Chronic kidney disease, stage 3a (HCC) N18.31    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

## 2025-01-13 NOTE — CARE COORDINATION
ONGOING DISCHARGE PLAN:    Patient is alert and oriented x4.    Spoke with patient regarding discharge plan and patient confirms that plan is still home. Pt is now agreeable to [facility].  Freedom of choice list provided. Pt would like [facility]. Referral sent and notified Steven from [facility] who states pt can be accepted.                       Post Acute Facility/Agency List     Provided patient with the following list, the list includes the overall star ratings obtained from CMS per the Medicare Web site (www.Medicare.gov):     [] Long Term Acute Care Facilities  [] Acute Inpatient Rehabilitation Facilities  [] Skilled Nursing Facilities  [] Hospice Facilities  [x] Home Care    Provided verbal instructions on how to utilize the QR Code to obtain additional detailed star ratings from www.Medicare.gov     offered to print and provide the detailed list:    [x]Accepted   []Declined    The following printed detailed lists were provided:     [facility]    Pt follows at [facility] and has f/u appt  1/30 @ 2pm.  Also has appt with PCP Dr. Youssef 1/22 @ 1pm.    Cre 2.3, nephrology consulted.    Awaiting urine culture. Currently on IV Cefepime and PO Amoxil.    Will continue to follow for additional discharge needs.    If patient is discharged prior to next notation, then this note serves as note for discharge by case management.    Electronically signed by Thalia Wyatt RN on 1/13/2025 at 10:40 AM

## 2025-01-13 NOTE — PLAN OF CARE
Problem: Safety - Adult  Goal: Free from fall injury  1/13/2025 0426 by Alida Harding, RN  Outcome: Progressing  Note: Bed in lowest and locked position.  2 bedrails used for pt safety.  Bed alarm in use in pt's room.  Hourly rounds done by staff and RN.  Phone and call light within pt's reach.         Problem: Hematologic - Adult  Goal: Maintains hematologic stability  1/13/2025 0426 by Alida Harding, RN  Outcome: Progressing  Note: Vitals taken per unit policy.  Labs drawn as ordered.

## 2025-01-13 NOTE — PROGRESS NOTES
[facility]  IN-PATIENT SERVICE  [facility] [place]    PROGRESS NOTE             1/13/2025    7:52 AM    Name:   Carl Frias  MRN:     [id number]     Acct:      [id number]   Room:   2047/2047-01   Day:  2  Admit Date:  1/11/2025  1:34 PM    PCP:  No primary care provider on file.  Code Status:  Full Code    Subjective:     C/C:   Chief Complaint   Patient presents with    Hypoglycemia     Interval History   Patient was seen and examined at the bedside.  Patient is awake, alert, oriented to time, place, and person.  Over the night patient's glucose was 275.  Patient started on medium dose sliding scale of lispro.  Today in the morning glucose was 150.  Was ordered yesterday, no results in EMR.  Intake/output no results in EMR.  According to nurse, patient's urination is normal.  Patient passed swallowing studies.  Patient has no any active complaints today.    Vital signs significant for soft blood pressure 102/50.  Blood work pending    Brief History:   The patient is a 89 y.o.   /  male who presents withHypoglycemia  and he is admitted to the hospital for the management of hypoglycemia.  PMH significant for heart failure, type 2 diabetes, hypertension, testicular cancer.     Patient recently discharged 1/6/25 after treatment for CHF exacerbation.  Since discharge patient was doing fine.  However daughter say that they picked up antibiotics the day after discharge.  Picked up amoxicillin and clarithromycin.  They report there was no order for Levaquin.  He continued all other home medications as advised.  After starting the antibiotics, they noted that his blood sugars started to fluctuate he seemed to be low glycemic more often than normal running in the 50s range.  When low blood sugars he seemed a little more fatigued, confused.  Denies any other symptoms.  Patient reports feeling better today now that his blood sugar is improved following glucose

## 2025-01-13 NOTE — PLAN OF CARE
Problem: Chronic Conditions and Co-morbidities  Goal: Patient's chronic conditions and co-morbidity symptoms are monitored and maintained or improved  Outcome: Progressing     Problem: Discharge Planning  Goal: Discharge to home or other facility with appropriate resources  Outcome: Progressing     Problem: Skin/Tissue Integrity  Goal: Absence of new skin breakdown  Description: 1.  Monitor for areas of redness and/or skin breakdown  2.  Assess vascular access sites hourly  3.  Every 4-6 hours minimum:  Change oxygen saturation probe site  4.  Every 4-6 hours:  If on nasal continuous positive airway pressure, respiratory therapy assess nares and determine need for appliance change or resting period.  Outcome: Progressing     Problem: ABCDS Injury Assessment  Goal: Absence of physical injury  Outcome: Progressing     Problem: Risk for Elopement  Goal: Patient will not exit the unit/facility without proper excort  Outcome: Progressing     Problem: Safety - Adult  Goal: Free from fall injury  1/13/2025 1809 by Alana Zheng, RN  Outcome: Progressing  1/13/2025 0426 by Alida Harding RN  Outcome: Progressing  Note: Bed in lowest and locked position.  2 bedrails used for pt safety.  Bed alarm in use in pt's room.  Hourly rounds done by staff and RN.  Phone and call light within pt's reach.         Problem: Hematologic - Adult  Goal: Maintains hematologic stability  1/13/2025 1809 by Alana Zheng, RN  Outcome: Progressing  1/13/2025 0426 by Alida Harding RN  Outcome: Progressing  Note: Vitals taken per unit policy.  Labs drawn as ordered.

## 2025-01-13 NOTE — CONSULTS
intact    LABS AND IMAGING:    CBC  Recent Labs     01/11/25  1350 01/12/25  0653 01/13/25  0906   WBC 9.4 9.1 7.7   HGB 7.9* 7.6* 7.0*   HCT 23.7* 22.5* 20.3*   MCV 84.2 83.9 82.9   * 476* 390       BMP  Recent Labs     01/11/25  1350 01/12/25  0653 01/13/25  0906   * 135* 137   K 4.1 4.5 4.8   CL 98 102 105   CO2 23 23 22   BUN 49* 59* 66*   CREATININE 1.9* 2.3* 2.5*   GLUCOSE 54* 123* 218*   CALCIUM 8.8 8.8 8.8             IMPRESSION/plan  1. Anemia egd  1/3 /25 with gastritis and one ulcer in the stomach with adherent clot that was clipped, fobt negative, he has not had overt gi bleeding   Will need colonoscopy timing to be determined; pt is agreeable  Trend hh q 8 hours; currently receiving transfusion   Continue ppi-changed to oral route  Consider iron replacment  Hold the ac if possible     2.ROXI    3.possible UTI   Mgt per primary service    4.h pylori infection  On tx with ppi amoxil and biaxin  will need all meds for a total of 14 days, and outpt testing to confirm eradication    5.hypoglycemia-improved   Mgt per primary service        This plan was formulated in collaboration with Dr. Beaver  .  Thank you for allowing us to participate in the care of your patient.    Electronically signed by: KALEN Sharma CNP on 1/13/2025 at 11:17 AM       Attending Physician Statement  I have discussed the care of Carl Frias and I have examined the patient myselft and taken ros and hpi , including pertinent history and exam findings,  with the author of this note . I have reviewed the key elements of all parts of the encounter with the nurse practitioner/resident.  I agree with the assessment, plan and orders as documented by the above health care provider     More than 50% of the time was spent taking care of this patient in addition to the nurse practitioner time.  That also included history taking follow-up physical examination and review of system.       Recommend  PPI  Finish the H. pylori  treatment  Iron infusion  Close monitoring  Will hold off on endoscopy at this time unless the patient shows sign of ongoing bleeding  Outpatient colonoscopy    Electronically signed by Mateo Beaver MD

## 2025-01-13 NOTE — CONSENT
Informed Consent for Blood Component Transfusion Note    I have discussed with the patient the rationale for blood component transfusion; its benefits in treating or preventing fatigue, organ damage, or death; and its risk which includes mild transfusion reactions, rare risk of blood borne infection, or more serious but rare reactions. I have discussed the alternatives to transfusion, including the risk and consequences of not receiving transfusion. The patient had an opportunity to ask questions and had agreed to proceed with transfusion of blood components.    Electronically signed by Trenton Boone MD on 1/13/25 at 5:43 PM EST

## 2025-01-14 ENCOUNTER — APPOINTMENT (OUTPATIENT)
Dept: CT IMAGING | Age: 89
End: 2025-01-14
Payer: MEDICARE

## 2025-01-14 ENCOUNTER — TELEPHONE (OUTPATIENT)
Dept: INTERNAL MEDICINE CLINIC | Age: 89
End: 2025-01-14

## 2025-01-14 ENCOUNTER — APPOINTMENT (OUTPATIENT)
Dept: ULTRASOUND IMAGING | Age: 89
End: 2025-01-14
Payer: MEDICARE

## 2025-01-14 LAB
ABO/RH: NORMAL
ANION GAP SERPL CALCULATED.3IONS-SCNC: 8 MMOL/L (ref 9–16)
ANTIBODY SCREEN: NEGATIVE
ARM BAND NUMBER: NORMAL
BASOPHILS # BLD: 0.08 K/UL (ref 0–0.2)
BASOPHILS NFR BLD: 1 % (ref 0–2)
BLOOD BANK BLOOD PRODUCT EXPIRATION DATE: NORMAL
BLOOD BANK DISPENSE STATUS: NORMAL
BLOOD BANK ISBT PRODUCT BLOOD TYPE: 600
BLOOD BANK PRODUCT CODE: NORMAL
BLOOD BANK SAMPLE EXPIRATION: NORMAL
BLOOD BANK UNIT TYPE AND RH: NORMAL
BPU ID: NORMAL
BUN SERPL-MCNC: 73 MG/DL (ref 8–23)
CALCIUM SERPL-MCNC: 8.8 MG/DL (ref 8.6–10.4)
CHLORIDE SERPL-SCNC: 106 MMOL/L (ref 98–107)
CHLORIDE UR-SCNC: 24 MMOL/L
CO2 SERPL-SCNC: 23 MMOL/L (ref 20–31)
COMPONENT: NORMAL
CREAT SERPL-MCNC: 3 MG/DL (ref 0.7–1.2)
CREAT UR-MCNC: 52.3 MG/DL (ref 39–259)
CROSSMATCH RESULT: NORMAL
EOSINOPHIL # BLD: 0.47 K/UL (ref 0–0.4)
EOSINOPHILS RELATIVE PERCENT: 6 % (ref 0–4)
ERYTHROCYTE [DISTWIDTH] IN BLOOD BY AUTOMATED COUNT: 15.8 % (ref 11.5–14.9)
GFR, ESTIMATED: 19 ML/MIN/1.73M2
GLUCOSE BLD-MCNC: 153 MG/DL (ref 75–110)
GLUCOSE BLD-MCNC: 190 MG/DL (ref 75–110)
GLUCOSE BLD-MCNC: 291 MG/DL (ref 75–110)
GLUCOSE BLD-MCNC: 302 MG/DL (ref 75–110)
GLUCOSE SERPL-MCNC: 184 MG/DL (ref 74–99)
HCT VFR BLD AUTO: 22.5 % (ref 41–53)
HCT VFR BLD AUTO: 22.7 % (ref 41–53)
HCT VFR BLD AUTO: 23.1 % (ref 41–53)
HCT VFR BLD AUTO: 24.6 % (ref 41–53)
HGB BLD-MCNC: 7.5 G/DL (ref 13.5–17.5)
HGB BLD-MCNC: 7.5 G/DL (ref 13.5–17.5)
HGB BLD-MCNC: 7.8 G/DL (ref 13.5–17.5)
HGB BLD-MCNC: 8.2 G/DL (ref 13.5–17.5)
LYMPHOCYTES NFR BLD: 2.21 K/UL (ref 1–4.8)
LYMPHOCYTES RELATIVE PERCENT: 28 % (ref 24–44)
MCH RBC QN AUTO: 28.5 PG (ref 26–34)
MCHC RBC AUTO-ENTMCNC: 33.8 G/DL (ref 31–37)
MCV RBC AUTO: 84.4 FL (ref 80–100)
MONOCYTES NFR BLD: 0.79 K/UL (ref 0.1–1.3)
MONOCYTES NFR BLD: 10 % (ref 1–7)
MORPHOLOGY: ABNORMAL
NEUTROPHILS NFR BLD: 55 % (ref 36–66)
NEUTS SEG NFR BLD: 4.35 K/UL (ref 1.3–9.1)
PLATELET # BLD AUTO: 370 K/UL (ref 150–450)
PMV BLD AUTO: 6.6 FL (ref 6–12)
POTASSIUM SERPL-SCNC: 5 MMOL/L (ref 3.7–5.3)
RBC # BLD AUTO: 2.74 M/UL (ref 4.5–5.9)
SODIUM SERPL-SCNC: 137 MMOL/L (ref 136–145)
TOTAL PROTEIN, URINE: 11 MG/DL
TRANSFUSION STATUS: NORMAL
UNIT DIVISION: 0
UNIT ISSUE DATE/TIME: NORMAL
URINE TOTAL PROTEIN CREATININE RATIO: 0.21
WBC OTHER # BLD: 7.9 K/UL (ref 3.5–11)

## 2025-01-14 PROCEDURE — 82436 ASSAY OF URINE CHLORIDE: CPT

## 2025-01-14 PROCEDURE — 84156 ASSAY OF PROTEIN URINE: CPT

## 2025-01-14 PROCEDURE — 85018 HEMOGLOBIN: CPT

## 2025-01-14 PROCEDURE — 36415 COLL VENOUS BLD VENIPUNCTURE: CPT

## 2025-01-14 PROCEDURE — 99231 SBSQ HOSP IP/OBS SF/LOW 25: CPT | Performed by: INTERNAL MEDICINE

## 2025-01-14 PROCEDURE — APPSS30 APP SPLIT SHARED TIME 16-30 MINUTES: Performed by: NURSE PRACTITIONER

## 2025-01-14 PROCEDURE — 85014 HEMATOCRIT: CPT

## 2025-01-14 PROCEDURE — 6370000000 HC RX 637 (ALT 250 FOR IP)

## 2025-01-14 PROCEDURE — 74176 CT ABD & PELVIS W/O CONTRAST: CPT

## 2025-01-14 PROCEDURE — 82947 ASSAY GLUCOSE BLOOD QUANT: CPT

## 2025-01-14 PROCEDURE — 80048 BASIC METABOLIC PNL TOTAL CA: CPT

## 2025-01-14 PROCEDURE — 85025 COMPLETE CBC W/AUTO DIFF WBC: CPT

## 2025-01-14 PROCEDURE — 82570 ASSAY OF URINE CREATININE: CPT

## 2025-01-14 PROCEDURE — 1200000000 HC SEMI PRIVATE

## 2025-01-14 PROCEDURE — 76775 US EXAM ABDO BACK WALL LIM: CPT

## 2025-01-14 PROCEDURE — 2500000003 HC RX 250 WO HCPCS

## 2025-01-14 PROCEDURE — 99233 SBSQ HOSP IP/OBS HIGH 50: CPT | Performed by: INTERNAL MEDICINE

## 2025-01-14 PROCEDURE — 6370000000 HC RX 637 (ALT 250 FOR IP): Performed by: NURSE PRACTITIONER

## 2025-01-14 PROCEDURE — 51798 US URINE CAPACITY MEASURE: CPT

## 2025-01-14 PROCEDURE — 6370000000 HC RX 637 (ALT 250 FOR IP): Performed by: INTERNAL MEDICINE

## 2025-01-14 PROCEDURE — 97530 THERAPEUTIC ACTIVITIES: CPT

## 2025-01-14 RX ORDER — FERROUS SULFATE 325(65) MG
325 TABLET ORAL 2 TIMES DAILY WITH MEALS
Status: DISCONTINUED | OUTPATIENT
Start: 2025-01-14 | End: 2025-01-17 | Stop reason: HOSPADM

## 2025-01-14 RX ADMIN — PANTOPRAZOLE SODIUM 40 MG: 40 TABLET, DELAYED RELEASE ORAL at 15:46

## 2025-01-14 RX ADMIN — FERROUS SULFATE TAB 325 MG (65 MG ELEMENTAL FE) 325 MG: 325 (65 FE) TAB at 15:46

## 2025-01-14 RX ADMIN — INSULIN LISPRO 6 UNITS: 100 INJECTION, SOLUTION INTRAVENOUS; SUBCUTANEOUS at 21:11

## 2025-01-14 RX ADMIN — SODIUM CHLORIDE, PRESERVATIVE FREE 10 ML: 5 INJECTION INTRAVENOUS at 21:11

## 2025-01-14 RX ADMIN — INSULIN LISPRO 2 UNITS: 100 INJECTION, SOLUTION INTRAVENOUS; SUBCUTANEOUS at 12:58

## 2025-01-14 RX ADMIN — AMOXICILLIN 500 MG: 500 CAPSULE ORAL at 09:49

## 2025-01-14 RX ADMIN — INSULIN LISPRO 4 UNITS: 100 INJECTION, SOLUTION INTRAVENOUS; SUBCUTANEOUS at 16:40

## 2025-01-14 RX ADMIN — CLARITHROMYCIN 500 MG: 500 TABLET, FILM COATED ORAL at 09:49

## 2025-01-14 RX ADMIN — PANTOPRAZOLE SODIUM 40 MG: 40 TABLET, DELAYED RELEASE ORAL at 05:48

## 2025-01-14 RX ADMIN — TAMSULOSIN HYDROCHLORIDE 0.4 MG: 0.4 CAPSULE ORAL at 09:49

## 2025-01-14 RX ADMIN — METOPROLOL SUCCINATE 25 MG: 25 TABLET, EXTENDED RELEASE ORAL at 09:49

## 2025-01-14 RX ADMIN — FINASTERIDE 5 MG: 5 TABLET, FILM COATED ORAL at 09:48

## 2025-01-14 RX ADMIN — FERROUS SULFATE TAB 325 MG (65 MG ELEMENTAL FE) 325 MG: 325 (65 FE) TAB at 12:58

## 2025-01-14 RX ADMIN — AMOXICILLIN 500 MG: 500 CAPSULE ORAL at 21:10

## 2025-01-14 RX ADMIN — SODIUM CHLORIDE, PRESERVATIVE FREE 10 ML: 5 INJECTION INTRAVENOUS at 09:55

## 2025-01-14 ASSESSMENT — ENCOUNTER SYMPTOMS
WHEEZING: 0
VOMITING: 0
APNEA: 0
STRIDOR: 0
SHORTNESS OF BREATH: 0
DIARRHEA: 0
COLOR CHANGE: 0
COUGH: 0
CONSTIPATION: 0
NAUSEA: 0
ABDOMINAL PAIN: 0
CHEST TIGHTNESS: 0
BACK PAIN: 0
CHOKING: 0

## 2025-01-14 NOTE — PROGRESS NOTES
Department of Urology  Urology Progress Note    Patient:  Carl Frias  MRN: [id number]  YOB: 1935    Interval hx:   Urology asked to see patient again d/t worsening cr.   ROXI on CKD.  Hx of chronic bilateral hydro.   He is pain free and feels he is voiding normally.   PVR was unremarkable.        Patient Vitals for the past 24 hrs:   BP Temp Temp src Pulse Resp SpO2   01/14/25 0624 (!) 107/50 97.2 °F (36.2 °C) Oral 56 18 99 %   01/13/25 1831 (!) 102/41 98.7 °F (37.1 °C) Oral 52 18 98 %   01/13/25 1538 (!) 103/59 98 °F (36.7 °C) Oral 68 18 98 %       Intake/Output Summary (Last 24 hours) at 1/14/2025 1507  Last data filed at 1/14/2025 1301  Gross per 24 hour   Intake 1025.33 ml   Output --   Net 1025.33 ml       Recent Labs     01/12/25  0653 01/13/25  0906 01/13/25  1632 01/14/25  0000 01/14/25  0601 01/14/25  1359   WBC 9.1 7.7  --   --  7.9  --    HGB 7.6* 7.0*   < > 7.5* 7.8* 8.2*   HCT 22.5* 20.3*   < > 22.5* 23.1* 24.6*   MCV 83.9 82.9  --   --  84.4  --    * 390  --   --  370  --     < > = values in this interval not displayed.     Recent Labs     01/12/25  0653 01/13/25  0906 01/14/25  0601   * 137 137   K 4.5 4.8 5.0    105 106   CO2 23 22 23   BUN 59* 66* 73*   CREATININE 2.3* 2.5* 3.0*       Recent Labs     01/12/25  0337   COLORU Yellow   PHUR 5.5   WBCUA 21 TO 50*   RBCUA 0 TO 2   BACTERIA None   LEUKOCYTESUR MOD*   UROBILINOGEN Normal   BILIRUBINUR NEGATIVE       Additional Lab/culture results:    Physical Exam:  Constitutional: Patient in no acute distress;   Neuro: alert and oriented to person place and time.    Psych: Mood and affect normal.  Skin: Normal  Lungs: Respiratory effort normal  Cardiovascular:  Normal peripheral pulses  Abdomen: Soft, non-tender, non-distended with no CVA, flank pain  Bladder non-tender and not distended.      Interval Imaging Findings:   XR CHEST PORTABLE    Result Date: 1/11/2025  EXAMINATION: ONE XRAY VIEW OF THE CHEST 1/11/2025 1:50 pm  COMPARISON: November 23, 2024 HISTORY: ORDERING SYSTEM PROVIDED HISTORY: shortness of breath TECHNOLOGIST PROVIDED HISTORY: shortness of breath FINDINGS: Cardiac size enlarged but stable.  No overt pulmonary edema.  No pneumothorax or focal consolidation.  Minimally blunted right costophrenic angle may be chronic scarring versus small effusion.  Mediastinum unremarkable.  No acute osseous abnormality.     1. Cardiomegaly. 2. Minimally blunted right costophrenic angle may be chronic scarring versus small effusion.       Impression:    Patient Active Problem List   Diagnosis    Benign prostatic hyperplasia without lower urinary tract symptoms    Essential hypertension    Type 2 diabetes mellitus with chronic kidney disease (HCC)    Type 2 diabetes mellitus with diabetic polyneuropathy (HCC)    Cardiomegaly    Unspecified atrial fibrillation (HCC)    Chronic atrial fibrillation (HCC)    Diabetic peripheral neuropathy (HCC)    History of inguinal hernia repair    Chronic combined systolic and diastolic congestive heart failure (HCC)    Other constipation    Hydrocele, unspecified    Low back pain, unspecified    Other specified abdominal hernia without obstruction or gangrene    Radiculopathy, lumbosacral region    Other atopic dermatitis    Acute kidney failure, unspecified (HCC)    Unstable gait    Easy bruisability    Acute on chronic combined systolic (congestive) and diastolic (congestive) heart failure (HCC)    Acquired left foot drop    Secondary hypercoagulable state (HCC)    Systolic CHF, acute (HCC)    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

## 2025-01-14 NOTE — PROGRESS NOTES
Physical Therapy        Physical Therapy Cancel Note      DATE: 2025    NAME: Carl Frias  MRN: [id number]   : 1935      Patient not seen this date for Physical Therapy due to:    Patient Declined: Attempted to see pt at 1408. Pt sitting up in bedside chair on arrival. Pt declining therapy at this time reporting he already walked and worked with OT this AM, does not want to do therapy again. Will continue to follow for PT needs.      Electronically signed by Yaya Billingsley PTA on 2025 at 3:07 PM

## 2025-01-14 NOTE — PLAN OF CARE
Problem: Chronic Conditions and Co-morbidities  Goal: Patient's chronic conditions and co-morbidity symptoms are monitored and maintained or improved  1/14/2025 0301 by Adelso Montenegro RN  Outcome: Progressing     Problem: Discharge Planning  Goal: Discharge to home or other facility with appropriate resources  1/14/2025 0301 by Adelso Montenegro RN  Outcome: Progressing  Flowsheets (Taken 1/13/2025 2109)  Discharge to home or other facility with appropriate resources:   Identify barriers to discharge with patient and caregiver   Arrange for needed discharge resources and transportation as appropriate     Problem: Skin/Tissue Integrity  Goal: Absence of new skin breakdown  Description: 1.  Monitor for areas of redness and/or skin breakdown  2.  Assess vascular access sites hourly  3.  Every 4-6 hours minimum:  Change oxygen saturation probe site  4.  Every 4-6 hours:  If on nasal continuous positive airway pressure, respiratory therapy assess nares and determine need for appliance change or resting period.  1/14/2025 0301 by Adelso Montenegro RN  Outcome: Progressing     Problem: Safety - Adult  Goal: Free from fall injury  1/14/2025 0301 by Adelso Montenegro RN  Outcome: Progressing     Problem: Hematologic - Adult  Goal: Maintains hematologic stability  1/14/2025 0301 by Adelso Montenegro RN  Outcome: Progressing  Flowsheets (Taken 1/13/2025 2109)  Maintains hematologic stability: Assess for signs and symptoms of bleeding or hemorrhage

## 2025-01-14 NOTE — PROGRESS NOTES
Spoke with floor, nuclear medicine kidney flow and function study will be done tomorrow morning.  Please hold any diuretic meds.  Any questions please call nuclear medicine at [phone]. Electronically signed by Monae Mendoza on 1/14/2025 at 3:27 PM

## 2025-01-14 NOTE — PLAN OF CARE
Problem: Discharge Planning  Goal: Discharge to home or other facility with appropriate resources  1/14/2025 1007 by Tammy Guillen RN  Outcome: Progressing  1/14/2025 0301 by Adelso Montenegro RN  Outcome: Progressing  Flowsheets (Taken 1/13/2025 2109)  Discharge to home or other facility with appropriate resources:   Identify barriers to discharge with patient and caregiver   Arrange for needed discharge resources and transportation as appropriate     Problem: Skin/Tissue Integrity  Goal: Absence of new skin breakdown  1/14/2025 0301 by Adelso Montenegro RN  Outcome: Progressing     Problem: Safety - Adult  Goal: Free from fall injury  1/14/2025 0301 by Adelso Montenegro RN  Outcome: Progressing     Problem: Safety - Adult  Goal: Free from fall injury  1/14/2025 0301 by Adelso Montenegro RN  Outcome: Progressing     Problem: Hematologic - Adult  Goal: Maintains hematologic stability  1/14/2025 0301 by Adelso Montenegro RN  Outcome: Progressing  Flowsheets (Taken 1/13/2025 2109)  Maintains hematologic stability: Assess for signs and symptoms of bleeding or hemorrhage

## 2025-01-14 NOTE — PROGRESS NOTES
APRN - CNP on 1/14/2025 at 9:40 AM     Attending Physician Statement  I have discussed the care of Carl Frias and I have examined the patient myselft and taken ros and hpi , including pertinent history and exam findings,  with the author of this note . I have reviewed the key elements of all parts of the encounter with the nurse practitioner/resident.  I agree with the assessment, plan and orders as documented by the above health care provider     More than 50% of the time was spent taking care of this patient in addition to the nurse practitioner time.  That also included history taking follow-up physical examination and review of system.           Electronically signed by Mateo Beaver MD

## 2025-01-14 NOTE — CARE COORDINATION
Patient is agreeable to RPM monitoring,  Kit # [id number] for CHF, Patient is a readmission today from 1/6/25 and was admitted then for  CHF . He ius here this time for Hypoglycemia..  Took RPM kit to Patients room, where both daughters are at bedside.  Placed KIT in Patient Closet.    Electronically signed by Annie Huber RN on 1/14/2025 at 4:17 PM

## 2025-01-14 NOTE — CONSULTS
Department of Internal Medicine  Nephrology Nick Corona MD   Consult Note    Reason for consultation: Management of acute kidney injury and chronic kidney disease stage IIIb.     Requesting physician: Miranda Youssef MD.    History of present illness: This is a 89 y.o. male with a significant past medical history of Testicular cancer, systemic hypertension, type 2 diabetes mellitus, benign prostatic hyperplasia] history of urinary retention], and chronic kidney disease stage IIIa [baseline creatinine 1.4 to 1.8 mg/dL and follows up with Dr. Bloom of renal services of [place]], who presented to the [facility] further evaluation of hypoglycemia.    He was recently admitted for exacerbation of CHF and was discharged home on antibiotics [amoxicillin and clarithromycin] but family noted frequent episodes of hypoglycemia with blood glucose dropping to 50 mg/dL range associated with confusion. At presentation his initial blood glucose was 45 mg/dL and was given 50% dextrose infusion.  Chest x-ray showed stable cardiomegaly with mild right costophrenic angle blunting.    He was recently discharged from the hospital on 1/6/2025.  At presentation on 1/11/2025, BUN/creatinine 1149/1.9 mg/dL.  He does have a history of chronic hydronephrosis which appears to have worsened slightly on recent scan.  He was placed on Bumex 1 mg p.o. daily at admission but it was held as serum creatinine has increased to 3 mg/dL.  He is a poor historian.    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

## 2025-01-14 NOTE — PROGRESS NOTES
Occupational Therapy  [facility]   INPATIENT OCCUPATIONAL THERAPY  PROGRESS NOTE  Date: 2025  Patient Name: Carl Frias       Room:   MRN: [id number]    : 1935  (89 y.o.)  Gender: male             Discharge Recommendations:  Further Occupational Therapy is recommended upon facility discharge.    OT Equipment Recommendations  Equipment Needed: No  Other: has all recommended DME    Restrictions/Precautions  Restrictions/Precautions  Restrictions/Precautions: General Precautions  Activity Level: Up as Tolerated  Required Braces or Orthoses?: No  Implants Present? 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  Pain  Pre-Pain: 0  Post-Pain: 0  Comments: [facility] blair tx. Daughter in room throughout session.    Objective  Orientation  Overall Orientation Status: Within Functional Limits  Cognition  Overall Cognitive Status: Exceptions  Arousal/Alertness: Appears intact  Following Commands: Follows multistep commands with increased time;Follows multistep commands with repitition  Attention Span: Appears intact  Memory: Appears intact  Safety Judgement: Decreased awareness of need for assistance;Decreased awareness of need for safety  Problem Solving: Assistance required to implement solutions;Assistance required to identify errors made;Assistance required to correct errors made  Insights: Decreased awareness of deficits  Initiation: Requires cues for some  Sequencing: Requires cues for some  Cognition Comment: min cues for safety, hearing is a barrier    Activities of Daily Living  Additional Comments: pt declines ADLs this date    Balance  Balance  Sitting Balance: Stand by assistance  Standing Balance: Contact guard assistance  Standing Balance  Time: 6 minutes, 2 minutes  Activity: functional mobility, reaching activity  Comment: pt demo unsteadiness at times  departs)    AM-[facility] Daily Activities Inpatient  AM-P[facility] Daily Activity - Inpatient   How much help is needed for putting on and taking off regular lower body clothing?: A Little  How much help is needed for bathing (which includes washing, rinsing, drying)?: A Little  How much help is needed for toileting (which includes using toilet, bedpan, or urinal)?: A Little  How much help is needed for putting on and taking off regular upper body clothing?: A Little  How much help is needed for taking care of personal grooming?: A Little  How much help for eating meals?: None  AM-[facility] Inpatient Daily Activity Raw Score: 19  AM-[facility] Inpatient ADL T-Scale Score : 40.22  ADL Inpatient CMS 0-100% Score: 42.8  ADL Inpatient CMS G-Code Modifier : CK    OT Minutes  OT Individual Minutes  Time In: 1102  Time Out: 1143  Minutes: 41      Electronically signed by BIB Bullock on 1/14/25 at 12:13 PM EST

## 2025-01-14 NOTE — CARE COORDINATION
ONGOING DISCHARGE PLAN:    Patient is alert and oriented x4.    Spoke with patient regarding discharge plan and patient confirms that plan is still home with [facility]. Daughter Hannah at the bedside and we discussed [facility]. She states pt's spouse is agreeable to [facility] as well.      Hgb 7.8 today. Creatinine 3.0 (2.5 yesterday). Nephrology following.    +H-Pylori - PO Amoxil and PO Biaxin through 1/27/25.    Hx CHF - pt already [facility] at [facility] and referral was placed for [facility] to be co-signed. [facility]F Clinic appt 1/30 @ 12pm and F/U Dr. Youssef 1/22 @ 1pm.     Will continue to follow for additional discharge needs.    If patient is discharged prior to next notation, then this note serves as note for discharge by case management.    Electronically signed by Thalia Wyatt RN on 1/14/2025 at 10:59 AM

## 2025-01-14 NOTE — PROGRESS NOTES
glucose 45 prior to dextrose treatment.  104 following dextrose administration.  Chest x-ray stable cardiomegaly, mild right costophrenic angle blunting.  Review of Systems:     Review of Systems   Constitutional:  Negative for chills, fatigue and fever.   Respiratory:  Negative for apnea, cough, choking, chest tightness, shortness of breath, wheezing and stridor.    Cardiovascular:  Negative for chest pain, palpitations and leg swelling.   Gastrointestinal:  Negative for abdominal pain, constipation, diarrhea, nausea and vomiting.   Genitourinary:  Negative for dysuria, enuresis, flank pain and urgency.   Musculoskeletal:  Negative for arthralgias and back pain.   Skin:  Negative for color change, pallor, rash and wound.       Medications:     Allergies:    Allergies   Allergen Reactions    Rivaroxaban Other (See Comments)     Hematuria recurrent       Current Meds:   Scheduled Meds:    clarithromycin  500 mg Oral Daily    amoxicillin  500 mg Oral BID    pantoprazole  40 mg Oral BID AC    insulin lispro  0-8 Units SubCUTAneous 4x Daily AC & HS    sodium chloride flush  5-40 mL IntraVENous 2 times per day    aspirin  81 mg Oral Every Other Day    [Held by provider] amLODIPine  5 mg Oral Daily    [Held by provider] bumetanide  1 mg Oral Daily    finasteride  5 mg Oral Daily    [Held by provider] lisinopril  10 mg Oral Daily    metoprolol succinate  25 mg Oral Daily    [Held by provider] spironolactone  25 mg Oral Daily    tamsulosin  0.4 mg Oral Daily    [Held by provider] apixaban  2.5 mg Oral BID     Continuous Infusions:    sodium chloride      dextrose      sodium chloride 20 mL/hr at 01/13/25 1059     PRN Meds: sodium chloride, glucose, dextrose bolus **OR** dextrose bolus, glucagon (rDNA), dextrose, sodium chloride flush, sodium chloride, ondansetron **OR** ondansetron, polyethylene glycol, acetaminophen **OR** acetaminophen    Data:     Past Medical History:   has a past medical history of Diabetes mellitus  Appearance: Normal appearance.   Cardiovascular:      Rate and Rhythm: Normal rate and regular rhythm.      Pulses: Normal pulses.      Heart sounds: Normal heart sounds. No murmur heard.     No friction rub. No gallop.   Pulmonary:      Effort: Pulmonary effort is normal. No respiratory distress.      Breath sounds: Normal breath sounds. No stridor. No wheezing, rhonchi or rales.   Abdominal:      General: There is no distension.      Palpations: Abdomen is soft.      Tenderness: There is no abdominal tenderness. There is no guarding.   Neurological:      Mental Status: He is alert.       Assessment:        Primary Problem  Hypoglycemia    Active Hospital Problems    Diagnosis Date Noted    H. pylori infection [A04.8] 01/13/2025    Hypoglycemia [E16.2] 01/11/2025       Plan:      Hypoglycemia, type 2 diabetes  Patient admitted for low blood glucose, 45 in ED. reportedly running 50s to 60s following treatment at home.  Last A1c 6 10.31.24  Patient taking metformin 1000 mg; glipizide 10 mg twice daily for meals at home.  Patient likely does not need to be continuing with this medication due to his age and A1c  Hypoglycemia protocol in place  Hold off on insulin at this time.  Last admission lispro only intermittently used 1-2 units max.  POCT glucose checks  Urinalysis positive for WBC, casts.  Urine culture pending  Today in the morning 01/12/2025 patient glucose 123.  Patient started on medium dose sliding scale due to high glucose readings.     H. Pylori  Due to low hemoglobin last admission, EGD was conducted by GI  EGD showed gastritis, biopsy taken positive for H. Pylori  Patient started on PPI, amoxicillin, clarithromycin treatment  Will continue treatment at this time.  Possibly combination of restarting diabetes medications and possibly clarithromycin hypoglycemia but will hold off on diabetes medications at this time.     Chronic diastolic CHF preserved ejection fraction  Recently discharged following CHF

## 2025-01-15 ENCOUNTER — APPOINTMENT (OUTPATIENT)
Dept: NUCLEAR MEDICINE | Age: 89
End: 2025-01-15
Payer: MEDICARE

## 2025-01-15 LAB
ANION GAP SERPL CALCULATED.3IONS-SCNC: 10 MMOL/L (ref 9–16)
BASOPHILS # BLD: 0.1 K/UL (ref 0–0.2)
BASOPHILS NFR BLD: 1 % (ref 0–2)
BUN SERPL-MCNC: 71 MG/DL (ref 8–23)
CALCIUM SERPL-MCNC: 8.9 MG/DL (ref 8.6–10.4)
CHLORIDE SERPL-SCNC: 106 MMOL/L (ref 98–107)
CO2 SERPL-SCNC: 21 MMOL/L (ref 20–31)
CREAT SERPL-MCNC: 2.5 MG/DL (ref 0.7–1.2)
EOSINOPHIL # BLD: 0.6 K/UL (ref 0–0.4)
EOSINOPHILS RELATIVE PERCENT: 6 % (ref 0–4)
ERYTHROCYTE [DISTWIDTH] IN BLOOD BY AUTOMATED COUNT: 16.5 % (ref 11.5–14.9)
FERRITIN SERPL-MCNC: 400 NG/ML
GFR, ESTIMATED: 24 ML/MIN/1.73M2
GLUCOSE BLD-MCNC: 129 MG/DL (ref 75–110)
GLUCOSE BLD-MCNC: 153 MG/DL (ref 75–110)
GLUCOSE BLD-MCNC: 192 MG/DL (ref 75–110)
GLUCOSE BLD-MCNC: 206 MG/DL (ref 75–110)
GLUCOSE BLD-MCNC: 238 MG/DL (ref 75–110)
GLUCOSE SERPL-MCNC: 147 MG/DL (ref 74–99)
HCT VFR BLD AUTO: 22.7 % (ref 41–53)
HCT VFR BLD AUTO: 24.2 % (ref 41–53)
HGB BLD-MCNC: 7.6 G/DL (ref 13.5–17.5)
HGB BLD-MCNC: 8.1 G/DL (ref 13.5–17.5)
IRON SATN MFR SERPL: 21 % (ref 20–55)
IRON SERPL-MCNC: 45 UG/DL (ref 61–157)
LYMPHOCYTES NFR BLD: 1.8 K/UL (ref 1–4.8)
LYMPHOCYTES RELATIVE PERCENT: 20 % (ref 24–44)
MCH RBC QN AUTO: 28.2 PG (ref 26–34)
MCHC RBC AUTO-ENTMCNC: 33.3 G/DL (ref 31–37)
MCV RBC AUTO: 84.7 FL (ref 80–100)
MONOCYTES NFR BLD: 0.6 K/UL (ref 0.1–1.3)
MONOCYTES NFR BLD: 7 % (ref 1–7)
NEUTROPHILS NFR BLD: 66 % (ref 36–66)
NEUTS SEG NFR BLD: 5.8 K/UL (ref 1.3–9.1)
PLATELET # BLD AUTO: 375 K/UL (ref 150–450)
PMV BLD AUTO: 6.3 FL (ref 6–12)
POTASSIUM SERPL-SCNC: 4.9 MMOL/L (ref 3.7–5.3)
RBC # BLD AUTO: 2.86 M/UL (ref 4.5–5.9)
SODIUM SERPL-SCNC: 137 MMOL/L (ref 136–145)
TIBC SERPL-MCNC: 213 UG/DL (ref 250–450)
UNSATURATED IRON BINDING CAPACITY: 168 UG/DL (ref 112–347)
WBC OTHER # BLD: 8.9 K/UL (ref 3.5–11)

## 2025-01-15 PROCEDURE — 6370000000 HC RX 637 (ALT 250 FOR IP): Performed by: NURSE PRACTITIONER

## 2025-01-15 PROCEDURE — 2500000003 HC RX 250 WO HCPCS: Performed by: INTERNAL MEDICINE

## 2025-01-15 PROCEDURE — 82728 ASSAY OF FERRITIN: CPT

## 2025-01-15 PROCEDURE — 2500000003 HC RX 250 WO HCPCS

## 2025-01-15 PROCEDURE — 6360000002 HC RX W HCPCS: Performed by: INTERNAL MEDICINE

## 2025-01-15 PROCEDURE — 85025 COMPLETE CBC W/AUTO DIFF WBC: CPT

## 2025-01-15 PROCEDURE — 82947 ASSAY GLUCOSE BLOOD QUANT: CPT

## 2025-01-15 PROCEDURE — 6370000000 HC RX 637 (ALT 250 FOR IP)

## 2025-01-15 PROCEDURE — 85018 HEMOGLOBIN: CPT

## 2025-01-15 PROCEDURE — A9562 TC99M MERTIATIDE: HCPCS | Performed by: INTERNAL MEDICINE

## 2025-01-15 PROCEDURE — 1200000000 HC SEMI PRIVATE

## 2025-01-15 PROCEDURE — 36415 COLL VENOUS BLD VENIPUNCTURE: CPT

## 2025-01-15 PROCEDURE — 85014 HEMATOCRIT: CPT

## 2025-01-15 PROCEDURE — 78708 K FLOW/FUNCT IMAGE W/DRUG: CPT

## 2025-01-15 PROCEDURE — 83550 IRON BINDING TEST: CPT

## 2025-01-15 PROCEDURE — 3430000000 HC RX DIAGNOSTIC RADIOPHARMACEUTICAL: Performed by: INTERNAL MEDICINE

## 2025-01-15 PROCEDURE — 97530 THERAPEUTIC ACTIVITIES: CPT

## 2025-01-15 PROCEDURE — 99233 SBSQ HOSP IP/OBS HIGH 50: CPT | Performed by: INTERNAL MEDICINE

## 2025-01-15 PROCEDURE — 83540 ASSAY OF IRON: CPT

## 2025-01-15 PROCEDURE — 80048 BASIC METABOLIC PNL TOTAL CA: CPT

## 2025-01-15 PROCEDURE — 6370000000 HC RX 637 (ALT 250 FOR IP): Performed by: INTERNAL MEDICINE

## 2025-01-15 RX ORDER — FUROSEMIDE 10 MG/ML
40 INJECTION INTRAMUSCULAR; INTRAVENOUS ONCE
Status: COMPLETED | OUTPATIENT
Start: 2025-01-15 | End: 2025-01-15

## 2025-01-15 RX ORDER — SODIUM CHLORIDE 0.9 % (FLUSH) 0.9 %
10 SYRINGE (ML) INJECTION PRN
Status: DISCONTINUED | OUTPATIENT
Start: 2025-01-15 | End: 2025-01-17 | Stop reason: HOSPADM

## 2025-01-15 RX ADMIN — PANTOPRAZOLE SODIUM 40 MG: 40 TABLET, DELAYED RELEASE ORAL at 09:26

## 2025-01-15 RX ADMIN — ASPIRIN 81 MG: 81 TABLET, COATED ORAL at 09:25

## 2025-01-15 RX ADMIN — SODIUM CHLORIDE, PRESERVATIVE FREE 10 ML: 5 INJECTION INTRAVENOUS at 10:34

## 2025-01-15 RX ADMIN — Medication 2.9 MILLICURIE: at 10:33

## 2025-01-15 RX ADMIN — SODIUM CHLORIDE, PRESERVATIVE FREE 10 ML: 5 INJECTION INTRAVENOUS at 20:27

## 2025-01-15 RX ADMIN — INSULIN LISPRO 2 UNITS: 100 INJECTION, SOLUTION INTRAVENOUS; SUBCUTANEOUS at 13:06

## 2025-01-15 RX ADMIN — INSULIN LISPRO 2 UNITS: 100 INJECTION, SOLUTION INTRAVENOUS; SUBCUTANEOUS at 17:33

## 2025-01-15 RX ADMIN — METOPROLOL SUCCINATE 25 MG: 25 TABLET, EXTENDED RELEASE ORAL at 09:29

## 2025-01-15 RX ADMIN — TAMSULOSIN HYDROCHLORIDE 0.4 MG: 0.4 CAPSULE ORAL at 09:25

## 2025-01-15 RX ADMIN — INSULIN LISPRO 2 UNITS: 100 INJECTION, SOLUTION INTRAVENOUS; SUBCUTANEOUS at 20:26

## 2025-01-15 RX ADMIN — AMOXICILLIN 500 MG: 500 CAPSULE ORAL at 09:26

## 2025-01-15 RX ADMIN — FERROUS SULFATE TAB 325 MG (65 MG ELEMENTAL FE) 325 MG: 325 (65 FE) TAB at 17:33

## 2025-01-15 RX ADMIN — FUROSEMIDE 40 MG: 10 INJECTION, SOLUTION INTRAMUSCULAR; INTRAVENOUS at 10:56

## 2025-01-15 RX ADMIN — AMOXICILLIN 500 MG: 500 CAPSULE ORAL at 20:27

## 2025-01-15 RX ADMIN — PANTOPRAZOLE SODIUM 40 MG: 40 TABLET, DELAYED RELEASE ORAL at 17:33

## 2025-01-15 RX ADMIN — FERROUS SULFATE TAB 325 MG (65 MG ELEMENTAL FE) 325 MG: 325 (65 FE) TAB at 09:25

## 2025-01-15 RX ADMIN — CLARITHROMYCIN 500 MG: 500 TABLET, FILM COATED ORAL at 09:27

## 2025-01-15 RX ADMIN — SODIUM CHLORIDE, PRESERVATIVE FREE 10 ML: 5 INJECTION INTRAVENOUS at 09:25

## 2025-01-15 RX ADMIN — FINASTERIDE 5 MG: 5 TABLET, FILM COATED ORAL at 09:27

## 2025-01-15 ASSESSMENT — PAIN SCALES - GENERAL: PAINLEVEL_OUTOF10: 0

## 2025-01-15 ASSESSMENT — ENCOUNTER SYMPTOMS
COLOR CHANGE: 0
APNEA: 0
ABDOMINAL PAIN: 0
STRIDOR: 0
DIARRHEA: 0
NAUSEA: 0
SHORTNESS OF BREATH: 0
WHEEZING: 0
CONSTIPATION: 0
VOMITING: 0
CHEST TIGHTNESS: 0
COUGH: 0
BACK PAIN: 0
CHOKING: 0

## 2025-01-15 NOTE — PROGRESS NOTES
[facility]   INPATIENT OCCUPATIONAL THERAPY  PROGRESS NOTE  Date: 1/15/2025  Patient Name: Carl Frias       Room:   MRN: [id number]    : 1935  (89 y.o.)  Gender: male             Discharge Recommendations:  The patient would benefit from additional Occupational Therapy after discharge from the facility at an Outpatient level of care.    OT Equipment Recommendations  Equipment Needed: No  Other: has all recommended DME    Restrictions/Precautions  Restrictions/Precautions  Restrictions/Precautions: General Precautions  Activity Level: Up as Tolerated  Required Braces or Orthoses?: No  Implants Present? :  (bilat TKA)    O2 Device: None (Room air)    Subjective  Subjective  Subjective: pt declined ADLs this date pt wanted to working on activity tolerance.  Pain  Pre-Pain: 0  Post-Pain: 0  Comments: SUKHWINDER blair participation with skilled services.    Objective  Orientation  Overall Orientation Status: Within Functional Limits  Cognition  Overall Cognitive Status: Exceptions  Arousal/Alertness: Appears intact  Following Commands: Follows multistep commands with increased time;Follows multistep commands with repitition  Attention Span: Appears intact  Memory: Appears intact  Safety Judgement: Decreased awareness of need for assistance;Decreased awareness of need for safety  Problem Solving: Assistance required to implement solutions;Assistance required to identify errors made;Assistance required to correct errors made  Insights: Decreased awareness of deficits  Initiation: Requires cues for some  Sequencing: Requires cues for some  Cognition Comment: min cues for safety, hearing is a barrier    Activities of Daily Living  Additional Comments: pt declines ADLs this date    Balance  Balance  Sitting Balance: Stand by assistance  Standing Balance: Contact guard assistance  Standing Balance  Time: 16 minutes and 24seconds  Activity: Cardiac HEP  Comment: pt demo unsteadiness at  item retrieval/transport with SUP with use of LRAD with GOOD safety in order to increase ADL/IADL IND.  Short Term Goal 5: participate in 15+ minutes of therapeutic exercises/therapeutic activities in order to increase activity tolerance for ADLs.  Occupational Therapy Plan  Times Per Week: 3-5x  Current Treatment Recommendations: Balance training, ROM, Strengthening, Functional mobility training, Endurance training, Safety education & training, Patient/Caregiver education & training, Equipment evaluation, education, & procurement, Self-Care / ADL, Positioning    Assessment  Activity Tolerance  Activity Tolerance: Patient Tolerated treatment well  Assessment  Performance deficits / Impairments: Decreased functional mobility , Decreased ADL status, Decreased strength, Decreased safe awareness, Decreased endurance, Decreased sensation, Decreased balance, Decreased high-level IADLs  Assessment: Pt presents with decreased ADL IND secondary to deficits noted above. At baseline, pt is IND with ADLs and functional mobility. Currently, pt is requiring SBA for transfers and CGA for functional mobility with use of RW, CGA for LB ADLs. Continued OT services are warranted while pt is hospitalized and upon d/c to maximize IND and safety and facilitate return to PLOF. Pt does display the safe functional abilities to return to prior living arrangements as pt has 24/7 assist from wife and good family support from 3 daughters who live locally.  Prognosis: Good  Decision Making: Medium Complexity  OT Equipment Recommendations  Equipment Needed: No  Other: has all recommended DME  Safety Devices  Type of Devices: All fall risk precautions in place, Call light within reach, Chair alarm in place, Gait belt, Patient at risk for falls, Left in chair (daughter in room as writer departs)    AM-PAC Daily Activities Inpatient       OT Minutes  OT Individual Minutes  Time In: 1444  Time Out: 1509  Minutes: 25      Electronically signed by Alejandro

## 2025-01-15 NOTE — PROGRESS NOTES
Physical Therapy        Physical Therapy Cancel Note      DATE: 1/15/2025    NAME: Carl Frias  MRN: [id number]   : 1935      Patient not seen this date for Physical Therapy due to:    Patient Declined: Cx; patient declined PT intervention citing fatigue, per patient states \"I just got done with therapy\" referring to OT. Patient could not be encouraged. Will continue to follow for patient updates/needs.      Electronically signed by Pauline Ordoñez PTA on 1/15/2025 at 3:57 PM

## 2025-01-15 NOTE — PLAN OF CARE
Problem: Chronic Conditions and Co-morbidities  Goal: Patient's chronic conditions and co-morbidity symptoms are monitored and maintained or improved  Outcome: Progressing     Problem: Discharge Planning  Goal: Discharge to home or other facility with appropriate resources  Outcome: Progressing     Problem: Skin/Tissue Integrity  Goal: Absence of new skin breakdown  Description: 1.  Monitor for areas of redness and/or skin breakdown  2.  Assess vascular access sites hourly  3.  Every 4-6 hours minimum:  Change oxygen saturation probe site  4.  Every 4-6 hours:  If on nasal continuous positive airway pressure, respiratory therapy assess nares and determine need for appliance change or resting period.  Outcome: Progressing     Problem: ABCDS Injury Assessment  Goal: Absence of physical injury  Outcome: Progressing     Problem: Risk for Elopement  Goal: Patient will not exit the unit/facility without proper excort  Outcome: Progressing     Problem: Safety - Adult  Goal: Free from fall injury  Outcome: Progressing     Problem: Hematologic - Adult  Goal: Maintains hematologic stability  Outcome: Progressing

## 2025-01-15 NOTE — CARE COORDINATION
ONGOING DISCHARGE PLAN:    Patient is alert and oriented x4.    Spoke with patient regarding discharge plan and patient confirms that plan is still to return home with family and visiting nurse services.    Cr 2.5 today, nephrology following. Urology consulted-bilateral hydronephrosis.     History of CHF, follows with [facility], next appointment 1/30/25 at 12:00 PM. Referral by previous CM to [facility] to be cosigned.     Hospital follow up appointment scheduled 1/22/25 at 1:00 PM.    Patient agreed 1/14 to remote patient monitoring. The kit was delivered by [employer], case , remains in closet in patient's room.     Will continue to follow for additional discharge needs.    If patient is discharged prior to next notation, then this note serves as note for discharge by case management.    Electronically signed by Qiana Bravo RN on 1/15/2025 at 9:45 AM

## 2025-01-15 NOTE — PROGRESS NOTES
[facility]  IN-PATIENT SERVICE  [facility]    PROGRESS NOTE             1/15/2025    1:01 PM    Name:   Carl Frias  MRN:     [id number]     Acct:      [id number]   Room:   2047/2047-01   Day:  4  Admit Date:  1/11/2025  1:34 PM    PCP:  Aleksandr Finney MD  Code Status:  Full Code    Subjective:     C/C:   Chief Complaint   Patient presents with    Hypoglycemia     Interval History   Patient was seen and examined at the bedside.  Acute events over the night.  No any active complaints today.  Last bowel movement yesterday, soft, no blood.  Vital signs only significant for soft blood pressure.  Examination significant for 1+ pitting edema in lower extremities, mild rhonchi throughout lung fields.  Today glucose 153.  Hemoglobin 7.8.  Kidney function declining further with creatinine 3.0.  GI consulted, notes reviewed, recommended outpatient colonoscopy, iron replacement therapy.    Brief History:   The patient is a 89 y.o.   /  male who presents withHypoglycemia  and he is admitted to the hospital for the management of hypoglycemia.  PMH significant for heart failure, type 2 diabetes, hypertension, testicular cancer.     Patient recently discharged 1/6/25 after treatment for CHF exacerbation.  Since discharge patient was doing fine.  However daughter say that they picked up antibiotics the day after discharge.  Picked up amoxicillin and clarithromycin.  They report there was no order for Levaquin.  He continued all other home medications as advised.  After starting the antibiotics, they noted that his blood sugars started to fluctuate he seemed to be low glycemic more often than normal running in the 50s range.  When low blood sugars he seemed a little more fatigued, confused.  Denies any other symptoms.  Patient reports feeling better today now that his blood sugar is improved following glucose treatment.  In ED, mildly hyponatremic 131, glucose 45  today.  Chest x-ray stable cardiomegaly.  Edema mildly present.  Do not think CHF exacerbation at this time  Echo 1/3/25 -55 to 60% ejection fraction  Continue with home medications Bumex, spironolactone 25  09/12/2025 Bumex, spironolactone, lisinopril on hold due to acute kidney injury.     Stage IIIb chronic kidney disease  Patient hyponatremic 131, possibly due to dehydration.  Encourage drinking fluids and regular diet versus low-sodium.   Creatinine 1.9, patient's baseline  Continue to monitor  01/12/2025 patient developed ROXI his creatinine jumped from 1.9-2.3.  On 01/14/2025 patient kidney function declining further with creatinine 3.0.  -nephrotoxic medications including Bumex, spironolactone, lisinopril on hold  -Nephrology consulted, appreciate recommendations  -Kidney ultrasound pending  -Will continue to monitor his kidney functions.     Chronic A-fib  Eliquis 2.5 twice daily on hold due to low hemoglobin levels.     Chronic anemia  Hemoglobin 7.9, stable  Continue to monitor  Severe anemia  01/13/2025, patient's hemoglobin dropped to 7.0, requiring blood transfusion.  -Blood transfused, will monitor H&H every 8 hours.  09/14/2025 hemoglobin 7.8.  -Stool occult blood is negative  -GI on board, recommended to continue PPI, to finish Helicobacter pylori treatment, iron infusions, no endoscopy at this time, outpatient colonoscopy.     Hypertension  Home medications: Amlodipine 5 mg, lisinopril 10 mg, Toprol-XL 25 mg  Continue to monitor     BPH  Continue home medication Proscar 5 mg, Flomax 0.4 mg        Concern for UTI  Microscopic urinalysis positive for WBC 21-50, casts, no bacteria.  -Urine culture pending  -Pharmacology consulted, recommended consider cefepime, Zosyn, meropenem as an inpatient treatment for UTI.  -Patient started on cefepime on 01/12/2025  -01/13/2025, urine culture negative, cefepime stopped.      1/15  Patient seen and examined, creatinine slightly better at 2.5 today, patient

## 2025-01-15 NOTE — PLAN OF CARE
Problem: Chronic Conditions and Co-morbidities  Goal: Patient's chronic conditions and co-morbidity symptoms are monitored and maintained or improved  Outcome: Progressing     Problem: Discharge Planning  Goal: Discharge to home or other facility with appropriate resources  Outcome: Progressing     Problem: Skin/Tissue Integrity  Goal: Absence of new skin breakdown  Description: 1.  Monitor for areas of redness and/or skin breakdown  2.  Assess vascular access sites hourly  3.  Every 4-6 hours minimum:  Change oxygen saturation probe site  4.  Every 4-6 hours:  If on nasal continuous positive airway pressure, respiratory therapy assess nares and determine need for appliance change or resting period.  Outcome: Progressing     Problem: Hematologic - Adult  Goal: Maintains hematologic stability  Outcome: Progressing

## 2025-01-15 NOTE — PROGRESS NOTES
Department of Internal Medicine  Nephrology Nick Corona MD  Progress Note    Reason for consultation: Management of acute kidney injury and chronic kidney disease stage IIIb.    Requesting physician: Miranda Youssef MD.    Interval history: Patient was seen and examined today and he does not have dyspnea at rest.  He is nonoliguric.  Laboratory studies were reviewed and BUN/creatinine are trending downwards.    History of present illness: This is a 89 y.o. male with a significant past medical history of Testicular cancer, systemic hypertension, type 2 diabetes mellitus, benign prostatic hyperplasia] history of urinary retention], and chronic kidney disease stage IIIa [baseline creatinine 1.4 to 1.8 mg/dL and follows up with Dr. Bloom of renal services of [place]], who presented to the ED for further evaluation of hypoglycemia.    He was recently admitted for exacerbation of CHF and was discharged home on antibiotics [amoxicillin and clarithromycin] but family noted frequent episodes of hypoglycemia with blood glucose dropping to 50 mg/dL range associated with confusion. At presentation his initial blood glucose was 45 mg/dL and was given 50% dextrose infusion.  Chest x-ray showed stable cardiomegaly with mild right costophrenic angle blunting.    He was recently discharged from the hospital on 1/6/2025.  At presentation on 1/11/2025, BUN/creatinine was   49/1.9 mg/dL.  He does have a history of chronic hydronephrosis which appears to have worsened slightly on recent scan.  He was placed on Bumex 1 mg p.o. daily at admission but it was held as serum creatinine has increased to 3 mg/dL. He is a poor historian.    Scheduled Meds:   ferrous sulfate  325 mg Oral BID WC    clarithromycin  500 mg Oral Daily    amoxicillin  500 mg Oral BID    pantoprazole  40 mg Oral BID AC    insulin lispro  0-8 Units SubCUTAneous 4x Daily AC & HS    sodium chloride flush  5-40 mL IntraVENous 2 times per day    aspirin  81 mg Oral  finasteride  Avoid nephrotoxic agents.  Basic metabolic profile daily.    2.  Normocytic anemia - likely secondary to chronic kidney disease.  Will check iron stores and if adequate start erythropoiesis stimulating agents.    3.  Systemic hypertension - blood pressure control is adequate.    Prognosis is guarded.    Nick Corona MD FACP  Attending Nephrologist  1/15/2025 12:56 PM

## 2025-01-16 LAB
ANION GAP SERPL CALCULATED.3IONS-SCNC: 8 MMOL/L (ref 9–16)
BASOPHILS # BLD: 0.1 K/UL (ref 0–0.2)
BASOPHILS NFR BLD: 1 % (ref 0–2)
BUN SERPL-MCNC: 70 MG/DL (ref 8–23)
CALCIUM SERPL-MCNC: 9.3 MG/DL (ref 8.6–10.4)
CHLORIDE SERPL-SCNC: 106 MMOL/L (ref 98–107)
CO2 SERPL-SCNC: 24 MMOL/L (ref 20–31)
CREAT SERPL-MCNC: 2.3 MG/DL (ref 0.7–1.2)
EOSINOPHIL # BLD: 0.6 K/UL (ref 0–0.4)
EOSINOPHILS RELATIVE PERCENT: 6 % (ref 0–4)
ERYTHROCYTE [DISTWIDTH] IN BLOOD BY AUTOMATED COUNT: 16.3 % (ref 11.5–14.9)
GFR, ESTIMATED: 27 ML/MIN/1.73M2
GLUCOSE BLD-MCNC: 135 MG/DL (ref 75–110)
GLUCOSE BLD-MCNC: 186 MG/DL (ref 75–110)
GLUCOSE BLD-MCNC: 202 MG/DL (ref 75–110)
GLUCOSE BLD-MCNC: 213 MG/DL (ref 75–110)
GLUCOSE SERPL-MCNC: 151 MG/DL (ref 74–99)
HCT VFR BLD AUTO: 24.5 % (ref 41–53)
HGB BLD-MCNC: 8.3 G/DL (ref 13.5–17.5)
LYMPHOCYTES NFR BLD: 1.9 K/UL (ref 1–4.8)
LYMPHOCYTES RELATIVE PERCENT: 18 % (ref 24–44)
MCH RBC QN AUTO: 28.7 PG (ref 26–34)
MCHC RBC AUTO-ENTMCNC: 33.7 G/DL (ref 31–37)
MCV RBC AUTO: 85 FL (ref 80–100)
MONOCYTES NFR BLD: 0.6 K/UL (ref 0.1–1.3)
MONOCYTES NFR BLD: 6 % (ref 1–7)
NEUTROPHILS NFR BLD: 69 % (ref 36–66)
NEUTS SEG NFR BLD: 7.3 K/UL (ref 1.3–9.1)
PLATELET # BLD AUTO: 376 K/UL (ref 150–450)
PMV BLD AUTO: 7.1 FL (ref 6–12)
POTASSIUM SERPL-SCNC: 4.8 MMOL/L (ref 3.7–5.3)
RBC # BLD AUTO: 2.89 M/UL (ref 4.5–5.9)
SODIUM SERPL-SCNC: 138 MMOL/L (ref 136–145)
WBC OTHER # BLD: 10.5 K/UL (ref 3.5–11)

## 2025-01-16 PROCEDURE — 6370000000 HC RX 637 (ALT 250 FOR IP): Performed by: NURSE PRACTITIONER

## 2025-01-16 PROCEDURE — 6370000000 HC RX 637 (ALT 250 FOR IP)

## 2025-01-16 PROCEDURE — 36415 COLL VENOUS BLD VENIPUNCTURE: CPT

## 2025-01-16 PROCEDURE — 1200000000 HC SEMI PRIVATE

## 2025-01-16 PROCEDURE — 2500000003 HC RX 250 WO HCPCS

## 2025-01-16 PROCEDURE — 82947 ASSAY GLUCOSE BLOOD QUANT: CPT

## 2025-01-16 PROCEDURE — 99233 SBSQ HOSP IP/OBS HIGH 50: CPT | Performed by: INTERNAL MEDICINE

## 2025-01-16 PROCEDURE — 85025 COMPLETE CBC W/AUTO DIFF WBC: CPT

## 2025-01-16 PROCEDURE — 80048 BASIC METABOLIC PNL TOTAL CA: CPT

## 2025-01-16 PROCEDURE — 6370000000 HC RX 637 (ALT 250 FOR IP): Performed by: INTERNAL MEDICINE

## 2025-01-16 RX ADMIN — SODIUM CHLORIDE, PRESERVATIVE FREE 10 ML: 5 INJECTION INTRAVENOUS at 21:11

## 2025-01-16 RX ADMIN — TAMSULOSIN HYDROCHLORIDE 0.4 MG: 0.4 CAPSULE ORAL at 08:53

## 2025-01-16 RX ADMIN — INSULIN LISPRO 2 UNITS: 100 INJECTION, SOLUTION INTRAVENOUS; SUBCUTANEOUS at 16:31

## 2025-01-16 RX ADMIN — INSULIN LISPRO 2 UNITS: 100 INJECTION, SOLUTION INTRAVENOUS; SUBCUTANEOUS at 21:07

## 2025-01-16 RX ADMIN — PANTOPRAZOLE SODIUM 40 MG: 40 TABLET, DELAYED RELEASE ORAL at 16:32

## 2025-01-16 RX ADMIN — CLARITHROMYCIN 500 MG: 500 TABLET, FILM COATED ORAL at 08:53

## 2025-01-16 RX ADMIN — SODIUM CHLORIDE, PRESERVATIVE FREE 10 ML: 5 INJECTION INTRAVENOUS at 08:53

## 2025-01-16 RX ADMIN — PANTOPRAZOLE SODIUM 40 MG: 40 TABLET, DELAYED RELEASE ORAL at 06:45

## 2025-01-16 RX ADMIN — FERROUS SULFATE TAB 325 MG (65 MG ELEMENTAL FE) 325 MG: 325 (65 FE) TAB at 16:32

## 2025-01-16 RX ADMIN — INSULIN LISPRO 2 UNITS: 100 INJECTION, SOLUTION INTRAVENOUS; SUBCUTANEOUS at 11:24

## 2025-01-16 RX ADMIN — AMOXICILLIN 500 MG: 500 CAPSULE ORAL at 21:07

## 2025-01-16 RX ADMIN — FINASTERIDE 5 MG: 5 TABLET, FILM COATED ORAL at 08:53

## 2025-01-16 RX ADMIN — AMOXICILLIN 500 MG: 500 CAPSULE ORAL at 08:53

## 2025-01-16 RX ADMIN — FERROUS SULFATE TAB 325 MG (65 MG ELEMENTAL FE) 325 MG: 325 (65 FE) TAB at 08:53

## 2025-01-16 ASSESSMENT — ENCOUNTER SYMPTOMS
STRIDOR: 0
WHEEZING: 0
COUGH: 0
DIARRHEA: 0
COLOR CHANGE: 0
SHORTNESS OF BREATH: 0
NAUSEA: 0
VOMITING: 0
ABDOMINAL PAIN: 0
CONSTIPATION: 0
CHOKING: 0
CHEST TIGHTNESS: 0
APNEA: 0

## 2025-01-16 ASSESSMENT — PAIN SCALES - GENERAL: PAINLEVEL_OUTOF10: 0

## 2025-01-16 NOTE — CONSULTS
Date:   1/16/2025  Patient name: Carl Frias  Date of admission:  1/11/2025  1:34 PM  MRN:   [id number]  YOB: 1935  PCP: Aleksandr Finney MD    Reason for Admission: Hypoglycemia [E16.2]    Cardiology consult cardiology consult: Diastolic CHF, chronic A-fib       Referring physician: Dr Arsenio Ruiz     Impression      Admission 1/1/2025 severe anemia hemoglobin 6.5, increasing swelling over the legs, markedly elevated proBNP  EGD 1/3/2025 one 5 mm ulcer with adherent blood in the stomach 1 clip, also biopsy obtained for H. pylori  Congestive heart failure diastolic ejection fraction 55-60% 2D echo 1/2/2024  Episode of nonsustained V. tach heart rate 160, 12 beats 2/18/2024, 1-2-2025  Chronic A-fib on low-dose Eliquis  Severely dilated LA  Hypertension  Moderate left ventricular hypertrophy  Type 2 diabetes mellitus  Chronic kidney disease  Bilateral hydronephrosis and hydroureter CT abdomen 1/14/2025  Stable cholelithiasis  Benign prostatic hypertrophy  Impaired hearing, severe  Severe osteoarthritis involving multiple joints poor mobility  Back pain  Obesity BMI 37  Poor mobility  History of easy bruising  Iron deficiency anemia serum iron 13, saturation 7%, TIBC 184 lab work 1/2/2025     Admission  4/23/2024 increasing shortness of breath, peripheral edema and UTI, urinary retention, urine culture grew E. coli, ROXI on CKD  Significant improvement in the renal function after placing Crenshaw catheter and treating for UTI   Abnormal high-sensitivity troponin most likely type II    No history of coronary intervention, no TIA or CVA     Past surgical history  Hernia repair left inguinal, bilateral knee joint replacement bilateral, testicle removal, eye surgery     Drug allergy  Xarelto, severity nonspecified     History of present illness  89-year-old  male with a past medical history of hypertension, diastolic CHF chronic A-fib, diabetes mellitus , BPH , testicular cancer ,severe osteoarthritis  Crenshaw catheter he is on Flomax    High CHADS2 score patient does need anticoagulation  Will discuss with the structural heart disease specialist for Watchman procedure  When patient gets Watchman procedure if they need full anticoagulation for at least 45 days  Long-term prognosis guarded  If patient keeps on running low blood pressure add midodrine 5 mg 3 times a day patient does need low-dose beta-blocker      Electronically signed by Alcides Felipe MD on 1/16/2025 at 3:58 PM

## 2025-01-16 NOTE — PROGRESS NOTES
Department of Internal Medicine  Nephrology Nick Corona MD  Progress Note    Reason for consultation: Management of acute kidney injury and chronic kidney disease stage IIIb.    Requesting physician: Miranda Youssef MD.    Interval history:   Patient is comfortable at rest and does not have any complaints. CT scan performed to further evaluate urinary retention   showed stable bilateral hydronephrosis and hydroureter to the level of the urinary bladder with stable cholelithiasis.    History of present illness: This is a 89 y.o. male with a significant past medical history of Testicular cancer, systemic hypertension, type 2 diabetes mellitus, benign prostatic hyperplasia] history of urinary retention], and chronic kidney disease stage IIIa [baseline creatinine 1.4 to 1.8 mg/dL and follows up with Dr. Bloom of renal services of [place]], who presented to the ED for further evaluation of hypoglycemia.    He was recently admitted for exacerbation of CHF and was discharged home on antibiotics [amoxicillin and clarithromycin] but family noted frequent episodes of hypoglycemia with blood glucose dropping to 50 mg/dL range associated with confusion. At presentation his initial blood glucose was 45 mg/dL and was given 50% dextrose infusion.  Chest x-ray showed stable cardiomegaly with mild right costophrenic angle blunting.    He was recently discharged from the hospital on 1/6/2025.  At presentation on 1/11/2025, BUN/creatinine was   49/1.9 mg/dL.  He does have a history of chronic hydronephrosis which appears to have worsened slightly on recent scan.  He was placed on Bumex 1 mg p.o. daily at admission but it was held as serum creatinine has increased to 3 mg/dL. He is a poor historian.    Scheduled Meds:   ferrous sulfate  325 mg Oral BID WC    clarithromycin  500 mg Oral Daily    amoxicillin  500 mg Oral BID    pantoprazole  40 mg Oral BID AC    insulin lispro  0-8 Units SubCUTAneous 4x Daily AC & HS    sodium

## 2025-01-16 NOTE — CARE COORDINATION
ONGOING DISCHARGE PLAN:     Patient is alert and oriented x4.     Spoke with patient regarding discharge plan and patient confirms that plan is still to return home with family and visiting nurse services.     Cr 2.3 today, nephrology following. Holding diuretics.    Urology consulted-bilateral hydronephrosis. Suspect secondary to bladder outlet obstruction.     History of CHF, follows with [facility], next appointment 1/30/25 at 12:00 PM. Referral by previous CM to [facility] to be cosigned.      Hospital follow up appointment scheduled 1/22/25 at 1:00 PM.     Patient agreed 1/14 to remote patient monitoring. The kit was delivered by [employer], case , remains in closet in patient's room.     IMM letter provided to patient.  Patient offered four hours to make informed decision regarding appeal process; patient agreeable to discharge.      Will continue to follow for additional discharge needs.     If patient is discharged prior to next notation, then this note serves as note for discharge by case management.    Electronically signed by Qiana Bravo RN on 1/16/2025 at 10:18 AM

## 2025-01-16 NOTE — PLAN OF CARE
Problem: Chronic Conditions and Co-morbidities  Goal: Patient's chronic conditions and co-morbidity symptoms are monitored and maintained or improved  1/16/2025 1405 by Kelly Lujan RN  Outcome: Progressing  1/16/2025 0435 by Victor Manuel Yun RN  Outcome: Progressing     Problem: Discharge Planning  Goal: Discharge to home or other facility with appropriate resources  Outcome: Progressing     Problem: Skin/Tissue Integrity  Goal: Absence of new skin breakdown  Description: 1.  Monitor for areas of redness and/or skin breakdown  2.  Assess vascular access sites hourly  3.  Every 4-6 hours minimum:  Change oxygen saturation probe site  4.  Every 4-6 hours:  If on nasal continuous positive airway pressure, respiratory therapy assess nares and determine need for appliance change or resting period.  1/16/2025 1405 by Kelly Lujan RN  Outcome: Progressing  1/16/2025 0435 by Victor Manuel Yun RN  Outcome: Progressing     Problem: ABCDS Injury Assessment  Goal: Absence of physical injury  Outcome: Progressing     Problem: Risk for Elopement  Goal: Patient will not exit the unit/facility without proper excort  Outcome: Progressing     Problem: Safety - Adult  Goal: Free from fall injury  Outcome: Progressing     Problem: Hematologic - Adult  Goal: Maintains hematologic stability  Outcome: Progressing     Problem: Pain  Goal: Verbalizes/displays adequate comfort level or baseline comfort level  Outcome: Progressing

## 2025-01-16 NOTE — PROGRESS NOTES
[facility]  IN-PATIENT SERVICE  [facility]    PROGRESS NOTE             1/16/2025    7:37 AM    Name:   Carl Frias  MRN:     [id number]     Acct:      [id number]   Room:   2047/2047-01  IP Day:  5  Admit Date:  1/11/2025  1:34 PM    PCP:  Aleksandr Finney MD  Code Status:  Full Code    Subjective:     C/C:   Chief Complaint   Patient presents with    Hypoglycemia     Interval History     Patient was seen and examined at the bedside.  Patient is awake, alert, oriented to time, place, and person.  No any acute events over the night.  No any active complaints today.  On physical examination lower extremities edema, significant rhonchi on auscultation.  Patient fluid overloaded.  No output recorded.  Will consider catheterization.  Today kidney functions coming back with creatinine 2.3, and EGFR 27.  Hemoglobin 8.3.  Nephrology note reviewed, recommended iron studies to start erythropoiesis stimulating agents.  As well as monitoring urine output closely  Neurology consulted, recommended to place Crenshaw and monitor creatinine.  Brief History:   The patient is a 89 y.o.   /  male who presents withHypoglycemia  and he is admitted to the hospital for the management of hypoglycemia.  PMH significant for heart failure, type 2 diabetes, hypertension, testicular cancer.     Patient recently discharged 1/6/25 after treatment for CHF exacerbation.  Since discharge patient was doing fine.  However daughter say that they picked up antibiotics the day after discharge.  Picked up amoxicillin and clarithromycin.  They report there was no order for Levaquin.  He continued all other home medications as advised.  After starting the antibiotics, they noted that his blood sugars started to fluctuate he seemed to be low glycemic more often than normal running in the 50s range.  When low blood sugars he seemed a little more fatigued, confused.  Denies any other symptoms.  Patient

## 2025-01-16 NOTE — PLAN OF CARE
Problem: Chronic Conditions and Co-morbidities  Goal: Patient's chronic conditions and co-morbidity symptoms are monitored and maintained or improved  1/16/2025 0435 by Victor Manuel Yun RN  Outcome: Progressing     Problem: Skin/Tissue Integrity  Goal: Absence of new skin breakdown  Description: 1.  Monitor for areas of redness and/or skin breakdown  2.  Assess vascular access sites hourly  3.  Every 4-6 hours minimum:  Change oxygen saturation probe site  4.  Every 4-6 hours:  If on nasal continuous positive airway pressure, respiratory therapy assess nares and determine need for appliance change or resting period.  1/16/2025 0435 by Victor Manuel Yun, RN  Outcome: Progressing

## 2025-01-16 NOTE — PROGRESS NOTES
Occupational Therapy  [facility]   OCCUPATIONAL THERAPY MISSED TREATMENT NOTE   INPATIENT   Date: 25  Patient Name: Carl Frias       Room:   MRN: [id number]   Account #: [id number]    : 1935  (89 y.o.)  Gender: male                 REASON FOR MISSED TREATMENT:  Patient with another ancillary department   -    RN in room at this time to place Crenshaw. Will continue to follow for OT needs.      Electronically signed by BIB Bullock on 25 at 9:27 AM EST

## 2025-01-16 NOTE — PROGRESS NOTES
Department of Urology  Urology Consult Note    Patient:  Carl Frias  MRN: [id number]  YOB: 1935    Interval hx:  Patient resting comfortably in bed this morning.   VSS, afebrile.   CT abd/pelvis w/o contrast showed bilateral hydro, no stones or masses.  Renogram showed split function of 42% (L) and 58% (R).   Partial obstruction bilaterally.   He denies flank pain.      Patient Vitals for the past 24 hrs:   BP Temp Temp src Pulse Resp SpO2   01/16/25 0725 (!) 126/57 98.2 °F (36.8 °C) Oral 74 14 98 %   01/16/25 0603 (!) 105/55 98.5 °F (36.9 °C) Oral 75 18 97 %   01/15/25 1757 109/74 98.2 °F (36.8 °C) Oral 66 16 100 %       Intake/Output Summary (Last 24 hours) at 1/16/2025 0843  Last data filed at 1/16/2025 0806  Gross per 24 hour   Intake 702 ml   Output --   Net 702 ml       Recent Labs     01/14/25  0601 01/14/25  1359 01/15/25  0006 01/15/25  0756 01/16/25  0637   WBC 7.9  --   --  8.9 10.5   HGB 7.8*   < > 7.6* 8.1* 8.3*   HCT 23.1*   < > 22.7* 24.2* 24.5*   MCV 84.4  --   --  84.7 85.0     --   --  375 376    < > = values in this interval not displayed.     Recent Labs     01/14/25  0601 01/15/25  0756 01/16/25  0637    137 138   K 5.0 4.9 4.8    106 106   CO2 23 21 24   BUN 73* 71* 70*   CREATININE 3.0* 2.5* 2.3*       No results for input(s): \"COLORU\", \"PHUR\", \"LABCAST\", \"WBCUA\", \"RBCUA\", \"MUCUS\", \"TRICHOMONAS\", \"YEAST\", \"BACTERIA\", \"CLARITYU\", \"SPECGRAV\", \"LEUKOCYTESUR\", \"UROBILINOGEN\", \"BILIRUBINUR\", \"BLOODU\" in the last 72 hours.    Invalid input(s): \"NITRATE\", \"GLUCOSEUKETONESUAMORPHOUS\"    Additional Lab/culture results:    Physical Exam:  Constitutional: Patient in no acute distress;   Neuro: alert and oriented to person place and time.    Psych: Mood and affect normal.  Abdomen: Soft, non-tender, non-distended with no CVA, flank pain  Bladder non-tender and not distended.      Interval Imaging Findings:   XR CHEST PORTABLE    Result Date: 1/11/2025  EXAMINATION: ONE  absence of other genital organ(s)    Calculus of gallbladder with acute cholecystitis without obstruction    Difficulty in walking, not elsewhere classified    Hypo-osmolality and hyponatremia    Hypomagnesemia    Localized edema    Long term (current) use of oral hypoglycemic drugs    Need for assistance with personal care    Obesity, unspecified    Old myocardial infarction    Other abnormalities of gait and mobility    Other reduced mobility    Other thrombophilia (HCC)    Patient's other noncompliance with medication regimen for other reason    History of falling    Personal history of irradiation    Personal history of malignant neoplasm of testis    Personal history of nicotine dependence    Pleural effusion, not elsewhere classified    Polyosteoarthritis, unspecified    Unspecified osteoarthritis, unspecified site    Presence of artificial knee joint, bilateral    Spontaneous ecchymoses    Unspecified Escherichia coli (E. coli) as the cause of diseases classified elsewhere    Unspecified hearing loss, unspecified ear    Unspecified hydronephrosis    Unsteadiness on feet    Urinary tract infection, site not specified    Ventricular tachycardia, unspecified (HCC)    Other fatigue    Weakness    Anemia    Chronic congestive heart failure (HCC)    Stage 4 chronic kidney disease (HCC)    Gastric ulcer    Hypoglycemia    H. pylori infection       Plan:   CT showed bilateral hydro, otherwise unremarkable from  standpoint.  Renal US showed partial obstruction. Suspect that his chronic hydronephrosis is secondary to his bladder outlet obstruction.  Continue combo therapy. Discussed barajas placement to see how cr responds and he is agreeable to this.  Will have nursing place barajas today. Case was d/w Dr. Piper who agrees with POC.     Electronically signed by KALEN Ponce CNP on 1/16/2025 at 8:43 AM

## 2025-01-16 NOTE — PROGRESS NOTES
Physical Therapy        Physical Therapy Cancel Note      DATE: 2025    NAME: Carl Frias  MRN: [id number]   : 1935      Patient not seen this date for Physical Therapy due to:    Patient Declined: Cx; patient sleeping upon approach, writer plans to pursue at a later time. Will continue to follow for patient needs/updates.      Electronically signed by Pauline Ordoñez PTA on 2025 at 3:57 PM

## 2025-01-17 VITALS
WEIGHT: 204 LBS | OXYGEN SATURATION: 99 % | BODY MASS INDEX: 28.56 KG/M2 | HEIGHT: 71 IN | DIASTOLIC BLOOD PRESSURE: 70 MMHG | TEMPERATURE: 97.9 F | SYSTOLIC BLOOD PRESSURE: 110 MMHG | HEART RATE: 75 BPM | RESPIRATION RATE: 16 BRPM

## 2025-01-17 LAB
ANION GAP SERPL CALCULATED.3IONS-SCNC: 8 MMOL/L (ref 9–16)
BASOPHILS # BLD: 0.1 K/UL (ref 0–0.2)
BASOPHILS NFR BLD: 1 % (ref 0–2)
BUN SERPL-MCNC: 59 MG/DL (ref 8–23)
CALCIUM SERPL-MCNC: 9.1 MG/DL (ref 8.6–10.4)
CHLORIDE SERPL-SCNC: 106 MMOL/L (ref 98–107)
CO2 SERPL-SCNC: 24 MMOL/L (ref 20–31)
CREAT SERPL-MCNC: 1.9 MG/DL (ref 0.7–1.2)
EOSINOPHIL # BLD: 0.5 K/UL (ref 0–0.4)
EOSINOPHILS RELATIVE PERCENT: 5 % (ref 0–4)
ERYTHROCYTE [DISTWIDTH] IN BLOOD BY AUTOMATED COUNT: 16.3 % (ref 11.5–14.9)
GFR, ESTIMATED: 34 ML/MIN/1.73M2
GLUCOSE BLD-MCNC: 119 MG/DL (ref 75–110)
GLUCOSE BLD-MCNC: 177 MG/DL (ref 75–110)
GLUCOSE SERPL-MCNC: 137 MG/DL (ref 74–99)
HCT VFR BLD AUTO: 24.8 % (ref 41–53)
HGB BLD-MCNC: 8.1 G/DL (ref 13.5–17.5)
LYMPHOCYTES NFR BLD: 1.7 K/UL (ref 1–4.8)
LYMPHOCYTES RELATIVE PERCENT: 16 % (ref 24–44)
MCH RBC QN AUTO: 28.1 PG (ref 26–34)
MCHC RBC AUTO-ENTMCNC: 32.7 G/DL (ref 31–37)
MCV RBC AUTO: 85.9 FL (ref 80–100)
MONOCYTES NFR BLD: 0.6 K/UL (ref 0.1–1.3)
MONOCYTES NFR BLD: 6 % (ref 1–7)
NEUTROPHILS NFR BLD: 72 % (ref 36–66)
NEUTS SEG NFR BLD: 7.3 K/UL (ref 1.3–9.1)
PLATELET # BLD AUTO: 368 K/UL (ref 150–450)
PMV BLD AUTO: 6.7 FL (ref 6–12)
POTASSIUM SERPL-SCNC: 4.9 MMOL/L (ref 3.7–5.3)
RBC # BLD AUTO: 2.89 M/UL (ref 4.5–5.9)
SODIUM SERPL-SCNC: 138 MMOL/L (ref 136–145)
WBC OTHER # BLD: 10.2 K/UL (ref 3.5–11)

## 2025-01-17 PROCEDURE — 36415 COLL VENOUS BLD VENIPUNCTURE: CPT

## 2025-01-17 PROCEDURE — 6370000000 HC RX 637 (ALT 250 FOR IP): Performed by: NURSE PRACTITIONER

## 2025-01-17 PROCEDURE — 80048 BASIC METABOLIC PNL TOTAL CA: CPT

## 2025-01-17 PROCEDURE — 85025 COMPLETE CBC W/AUTO DIFF WBC: CPT

## 2025-01-17 PROCEDURE — 6370000000 HC RX 637 (ALT 250 FOR IP)

## 2025-01-17 PROCEDURE — 2500000003 HC RX 250 WO HCPCS

## 2025-01-17 PROCEDURE — 82947 ASSAY GLUCOSE BLOOD QUANT: CPT

## 2025-01-17 PROCEDURE — 99239 HOSP IP/OBS DSCHRG MGMT >30: CPT | Performed by: INTERNAL MEDICINE

## 2025-01-17 RX ORDER — BUMETANIDE 1 MG/1
0.5 TABLET ORAL DAILY
Status: DISCONTINUED | OUTPATIENT
Start: 2025-01-17 | End: 2025-01-17 | Stop reason: HOSPADM

## 2025-01-17 RX ADMIN — BUMETANIDE 0.5 MG: 1 TABLET ORAL at 14:46

## 2025-01-17 RX ADMIN — ASPIRIN 81 MG: 81 TABLET, COATED ORAL at 08:33

## 2025-01-17 RX ADMIN — FINASTERIDE 5 MG: 5 TABLET, FILM COATED ORAL at 08:28

## 2025-01-17 RX ADMIN — PANTOPRAZOLE SODIUM 40 MG: 40 TABLET, DELAYED RELEASE ORAL at 14:46

## 2025-01-17 RX ADMIN — SODIUM CHLORIDE, PRESERVATIVE FREE 10 ML: 5 INJECTION INTRAVENOUS at 08:28

## 2025-01-17 RX ADMIN — FERROUS SULFATE TAB 325 MG (65 MG ELEMENTAL FE) 325 MG: 325 (65 FE) TAB at 08:28

## 2025-01-17 RX ADMIN — PANTOPRAZOLE SODIUM 40 MG: 40 TABLET, DELAYED RELEASE ORAL at 05:41

## 2025-01-17 RX ADMIN — AMOXICILLIN 500 MG: 500 CAPSULE ORAL at 08:29

## 2025-01-17 RX ADMIN — TAMSULOSIN HYDROCHLORIDE 0.4 MG: 0.4 CAPSULE ORAL at 08:28

## 2025-01-17 RX ADMIN — CLARITHROMYCIN 500 MG: 500 TABLET, FILM COATED ORAL at 08:29

## 2025-01-17 ASSESSMENT — ENCOUNTER SYMPTOMS
NAUSEA: 0
STRIDOR: 0
COLOR CHANGE: 0
SHORTNESS OF BREATH: 0
CONSTIPATION: 0
WHEEZING: 0
COUGH: 0
VOMITING: 0
ABDOMINAL PAIN: 0
DIARRHEA: 0
CHOKING: 0
APNEA: 0
CHEST TIGHTNESS: 0

## 2025-01-17 ASSESSMENT — PAIN SCALES - GENERAL: PAINLEVEL_OUTOF10: 0

## 2025-01-17 NOTE — DISCHARGE SUMMARY
dizziness, drowsiness, abdominal pain, loss of consciousness, or any other symptoms you find concerning please return to the ED for follow-up evaluation.  -If you have been given medication please take them as prescribed. Do not take more medication than recommended at any given time.   -Please feel free return to the hospital if your symptoms worsen or any new concerning symptoms develop.  Follow-up with your primary care physician as needed for all other the concerns.      Discharge Medications:      Medication List      Some of the medications listed here do not show instructions, such as how often to take the medication. Ask your doctor or nurse how to use these medications.  Specifically ask about this and similar medications: tamsulosin (FLOMAX) 0.4 MG capsule       CONTINUE taking these medications      * Accu-Chek Estella Gardenia  1 each by Does not apply route daily     * glucose monitoring kit  Use to check glucose as directed.     Alcohol Swabs Pads  Use one swab to cleanse skin prior to checking glucose twice daily.     amLODIPine 5 MG tablet  Commonly known as: NORVASC  TAKE 1 TABLET EVERY DAY     amoxicillin 500 MG capsule  Commonly known as: AMOXIL  Take 2 capsules by mouth 2 times daily     apixaban 2.5 MG Tabs tablet  Commonly known as: Eliquis  Take 1 tablet by mouth 2 times daily     aspirin 81 MG EC tablet  Take 1 tablet by mouth every other day     * blood glucose test strips  by Other route 2 times daily True Metrix Blood Glucose Meter     * Accu-Chek Estella Plus strip  Generic drug: blood glucose test strips  TEST BLOOD SUGAR THREE TIMES DAILY AS NEEDED     * True Metrix Blood Glucose Test strip  Generic drug: blood glucose test strips  TEST BLOOD SUGAR TWICE DAILY     bumetanide 1 MG tablet  Commonly known as: BUMEX  TAKE 1 TABLET EVERY DAY     clarithromycin 500 MG tablet  Commonly known as: BIAXIN  Take 1 tablet by mouth 2 times daily for 14 days     Elastic Bandages & Supports Misc  Apply Truss

## 2025-01-17 NOTE — PROGRESS NOTES
Comprehensive Nutrition Assessment    Type and Reason for Visit:  Initial, LOS (LOS 6-days)    Nutrition Recommendations/Plan:   Continue current diet and Ensure High Protein once daily.     Malnutrition Assessment:  Malnutrition Status:  At risk for malnutrition (Current medical condition and wound) (01/17/25 0742)    Context:  Chronic Illness     Findings of the 6 clinical characteristics of malnutrition:  Energy Intake:  Mild decrease in energy intake  Weight Loss:   (6.8% wt loss in 11 months; may in part be d/t fluid shifts)     Body Fat Loss:  No body fat loss     Muscle Mass Loss:  No muscle mass loss    Fluid Accumulation:  Mild (Moderate; may not relate to nutrition status) Extremities   Strength:  Not Performed    Nutrition Assessment:    Pt admitted for hypoglycemia, 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 Pt reporting being able to eat most of the foods provided; will order vanilla Ensure High Protein once daily to assist with wound healing. Probable discharge after nephrologist clearance.    Nutrition Related Findings:    Labs: Gluc 137, GFR 34. Edema: +2 RLE; +1 LLE. BM 1/17. Meds reviewed. Wound Type:  (Wound present - refer to nursing flowsheet)       Current Nutrition Intake & Therapies:    Average Meal Intake: %     ADULT DIET; Regular  ADULT ORAL NUTRITION SUPPLEMENT; Dinner; Low Calorie/High Protein Oral Supplement    Anthropometric Measures:  Height: 180.3 cm (5' 10.98\")  Ideal Body Weight (IBW): 172 lbs (78 kg)    Admission Body Weight: 92.5 kg (203 lb 14.8 oz)  Current Body Weight: 92.5 kg (203 lb 14.8 oz), 118.6 % IBW. Weight Source: Other (ED Admission)  Current BMI (kg/m2): 28.5  Usual Body Weight: 99.3 kg (218 lb 14.7 oz) (2/18/2024 (~11mo.) ED Admission)     % Weight Change (Calculated): -6.8  Weight Adjustment For: No Adjustment                 BMI Categories: Overweight (BMI 25.0-29.9)    Estimated Daily Nutrient Needs:  Energy Requirements  Based On: Formula  Weight Used for Energy Requirements: Admission  Energy (kcal/day): 3192-3752 kcal/day based on Thornton-St. Jeor 1.2-1.3 factor  Weight Used for Protein Requirements: Admission  Protein (g/day): 74-93 g/day based on 0.8-1 g/kg  Method Used for Fluid Requirements: Defer to provider    Nutrition Diagnosis:   Increased nutrient needs related to other (Healing) as evidenced by wounds    Nutrition Interventions:   Food and/or Nutrient Delivery: Continue Current Diet, Start Oral Nutrition Supplement  Nutrition Education/Counseling: No recommendation at this time  Coordination of Nutrition Care: Continue to monitor while inpatient       Goals:  Goals: Meet at least 75% of estimated needs  Type of Goal: New goal  Previous Goal Met: New Goal    Nutrition Monitoring and Evaluation:   Behavioral-Environmental Outcomes: None Identified  Food/Nutrient Intake Outcomes: Food and Nutrient Intake, Supplement Intake  Physical Signs/Symptoms Outcomes: Biochemical Data, GI Status, Fluid Status or Edema, Skin, Weight    Discharge Planning:    Continue current diet     Yaritza Burrows, MS, LD, RD   Contact: [phone]

## 2025-01-17 NOTE — PLAN OF CARE
Problem: Safety - Adult  Goal: Free from fall injury  1/17/2025 0328 by Anat Soto RN  Outcome: Progressing  Flowsheets (Taken 1/17/2025 0328)  Free From Fall Injury: Instruct family/caregiver on patient safety     Problem: Chronic Conditions and Co-morbidities  Goal: Patient's chronic conditions and co-morbidity symptoms are monitored and maintained or improved  1/17/2025 1501 by Yuly Amanda RN  Outcome: Adequate for Discharge  1/17/2025 0328 by Anat Soto RN  Outcome: Progressing  Flowsheets (Taken 1/17/2025 0328)  Care Plan - Patient's Chronic Conditions and Co-Morbidity Symptoms are Monitored and Maintained or Improved:   Monitor and assess patient's chronic conditions and comorbid symptoms for stability, deterioration, or improvement   Collaborate with multidisciplinary team to address chronic and comorbid conditions and prevent exacerbation or deterioration   Update acute care plan with appropriate goals if chronic or comorbid symptoms are exacerbated and prevent overall improvement and discharge     Problem: Skin/Tissue Integrity  Goal: Absence of new skin breakdown  Description: 1.  Monitor for areas of redness and/or skin breakdown  2.  Assess vascular access sites hourly  3.  Every 4-6 hours minimum:  Change oxygen saturation probe site  4.  Every 4-6 hours:  If on nasal continuous positive airway pressure, respiratory therapy assess nares and determine need for appliance change or resting period.  1/17/2025 1501 by Yuly Amanda RN  Outcome: Adequate for Discharge  1/17/2025 0328 by Anat Soto RN  Outcome: Progressing  Note: Monitoring skin     Problem: ABCDS Injury Assessment  Goal: Absence of physical injury  1/17/2025 1501 by Yuly Amanda RN  Outcome: Adequate for Discharge  1/17/2025 0328 by Anat Soto RN  Outcome: Progressing  Flowsheets (Taken 1/17/2025 0328)  Absence of Physical Injury: Implement safety

## 2025-01-17 NOTE — CARE COORDINATION
ONGOING DISCHARGE PLAN:    Patient is alert and oriented x4.    Spoke with patient regarding discharge plan and patient confirms that plan is still to return home with visiting nurse services, Sebastian Murphy, as well as family.    Cr 1.9 today, nephrology on board. Holding diuretics.    Urology-bilateral hydronephrosis, suspect bladder outlet obstruction. Indwelling Crenshaw catheter to remain on discharge with follow up outpatient.     Hospital follow up appointment 1/22/25 at 1:00 PM. Patient agreeable to remote patient monitoring, kit was delivered to room on 1/14/25.     Will continue to follow for additional discharge needs.    If patient is discharged prior to next notation, then this note serves as note for discharge by case management.    Electronically signed by Qiana Bravo RN on 1/17/2025 at 11:08 AM

## 2025-01-17 NOTE — PROGRESS NOTES
anemia  01/13/2025, patient's hemoglobin dropped to 7.0, requiring blood transfusion.  -Blood transfused, will monitor H&H every 8 hours.  09/14/2025 hemoglobin 7.8.  -Stool occult blood is negative  -GI on board, recommended to continue PPI, to finish Helicobacter pylori treatment, iron infusions, no endoscopy at this time, outpatient colonoscopy.     Hypertension  Home medications: Amlodipine 5 mg, lisinopril 10 mg, Toprol-XL 25 mg on hold for now  Continue to monitor     BPH  Continue home medication Proscar 5 mg, Flomax 0.4 mg        Concern for UTI  Microscopic urinalysis positive for WBC 21-50, casts, no bacteria.  -Urine culture pending  -Pharmacology consulted, recommended consider cefepime, Zosyn, meropenem as an inpatient treatment for UTI.  -Patient started on cefepime on 01/12/2025  -01/13/2025, urine culture negative, cefepime stopped.          Plan will be discussed with the attending, Dr Domonique Tuttle MD  PGY I   1/17/2025 8:08 AM

## 2025-01-17 NOTE — PLAN OF CARE
Problem: Chronic Conditions and Co-morbidities  Goal: Patient's chronic conditions and co-morbidity symptoms are monitored and maintained or improved  1/17/2025 0328 by Anat Soto, RN  Outcome: Progressing  Flowsheets (Taken 1/17/2025 0328)  Care Plan - Patient's Chronic Conditions and Co-Morbidity Symptoms are Monitored and Maintained or Improved:   Monitor and assess patient's chronic conditions and comorbid symptoms for stability, deterioration, or improvement   Collaborate with multidisciplinary team to address chronic and comorbid conditions and prevent exacerbation or deterioration   Update acute care plan with appropriate goals if chronic or comorbid symptoms are exacerbated and prevent overall improvement and discharge  1/16/2025 1405 by Kelly Lujan RN  Outcome: Progressing     Problem: Discharge Planning  Goal: Discharge to home or other facility with appropriate resources  1/17/2025 0328 by Anat Soto, RN  Outcome: Progressing  Flowsheets (Taken 1/17/2025 0328)  Discharge to home or other facility with appropriate resources:   Identify barriers to discharge with patient and caregiver   Arrange for needed discharge resources and transportation as appropriate   Identify discharge learning needs (meds, wound care, etc)   Arrange for interpreters to assist at discharge as needed   Refer to discharge planning if patient needs post-hospital services based on physician order or complex needs related to functional status, cognitive ability or social support system  1/16/2025 1405 by Kelly Lujan RN  Outcome: Progressing     Problem: Skin/Tissue Integrity  Goal: Absence of new skin breakdown  Description: 1.  Monitor for areas of redness and/or skin breakdown  2.  Assess vascular access sites hourly  3.  Every 4-6 hours minimum:  Change oxygen saturation probe site  4.  Every 4-6 hours:  If on nasal continuous positive airway pressure, respiratory therapy assess nares

## 2025-01-17 NOTE — PROGRESS NOTES
Physical Therapy        Physical Therapy Cancel Note      DATE: 2025    NAME: Carl Frias  MRN: [id number]   : 1935      Patient not seen this date for Physical Therapy due to:    Patient Declined: Attempted to see pt 2x this date. First attempt made at 0924, pt was initially asleep on arrival, multiple attempts to wake pt to vocal stimulus. Pt opens eyes acknowledges writer, then closes eyes. Writer introducing self with therapy and POC for session, and pt would open eyes intermittently to look at writer then close. After asking multiple time if he would like therapy, pt requests to check back at later time.    2nd attempt made at 1135. Pt declining at this time to eat lunch, session deffered. Will continue to follow for PT needs.      Electronically signed by Yaya Billingsley PTA on 2025 at 11:41 AM

## 2025-01-17 NOTE — PROGRESS NOTES
Attending Physician Statement  I have discussed the care of Carl Frias and I have examined the patient myself and taken ROS and HPI, including pertinent history and exam findings, with the resident. I have reviewed the key elements of all parts of the encounter with the resident.  I agree with the assessment, plan and orders as documented by the resident.    Patient seen and examined, creatinine has improved, diuretics held, defer to nephrology for diuretics  Hemoglobin has been stable at 8.1 no active bleeding present, as per cardiology has high Ancelmo vas and will need anticoagulation,  Will discuss with cardiology and resume low-dose Eliquis and follow-up with cardiology closely and watch for signs of bleeding  Patient eager to get discharged today, will eventually need to be on diuretics, will discharge today if okay with nephrology to have repeat BMP CBC in 1 week  Patient has Crenshaw's catheter will discharge with Crenshaw's catheter and has urology follow-up as outpatient  Electronically signed by Miranda Youssef MD

## 2025-01-17 NOTE — PROGRESS NOTES
Dr. Imam smith with patient discharging. Discharge paperwork went over with pt and daughter at bedside. Barajas cath supplies given to pt. Pt understands how to change barajas leg bag strap and how to take care of barajas at home. Pt understands to f/u with urology within 2 weeks.   Pt seen bedside, alert and oriented x4. Pt responded to all presented questions and frequently initiated verbal communication with good speech intelligibility. He was able to follow directions. [place] level 5. Pt seen bedside, alert and oriented x4. Pt responded to all presented questions and frequently initiated verbal communication with good speech intelligibility. He was able to follow directions.

## 2025-01-20 ENCOUNTER — HOSPITAL ENCOUNTER (INPATIENT)
Age: 89
LOS: 3 days | Discharge: HOME | DRG: 378 | End: 2025-01-24
Attending: EMERGENCY MEDICINE | Admitting: INTERNAL MEDICINE
Payer: MEDICARE

## 2025-01-20 ENCOUNTER — CARE COORDINATION (OUTPATIENT)
Dept: CARE COORDINATION | Age: 89
End: 2025-01-20

## 2025-01-20 DIAGNOSIS — D64.9 ANEMIA, UNSPECIFIED TYPE: ICD-10-CM

## 2025-01-20 DIAGNOSIS — E16.2 HYPOGLYCEMIA: ICD-10-CM

## 2025-01-20 DIAGNOSIS — K92.2 GASTROINTESTINAL HEMORRHAGE, UNSPECIFIED GASTROINTESTINAL HEMORRHAGE TYPE: Primary | ICD-10-CM

## 2025-01-20 DIAGNOSIS — I50.21 SYSTOLIC CHF, ACUTE (HCC): Primary | ICD-10-CM

## 2025-01-20 LAB
BASOPHILS # BLD: 0.07 K/UL (ref 0–0.2)
BASOPHILS NFR BLD: 1 % (ref 0–2)
DATE, STOOL #1: ABNORMAL
EOSINOPHIL # BLD: 0.13 K/UL (ref 0–0.4)
EOSINOPHILS RELATIVE PERCENT: 2 % (ref 0–4)
ERYTHROCYTE [DISTWIDTH] IN BLOOD BY AUTOMATED COUNT: 16 % (ref 11.5–14.9)
GLUCOSE BLD-MCNC: 96 MG/DL (ref 75–110)
HCT VFR BLD AUTO: 21.2 % (ref 41–53)
HEMOCCULT SP1 STL QL: POSITIVE
HGB BLD-MCNC: 7 G/DL (ref 13.5–17.5)
INR PPP: 1.2
LACTATE BLDV-SCNC: 1.3 MMOL/L (ref 0.5–2.2)
LYMPHOCYTES NFR BLD: 1.63 K/UL (ref 1–4.8)
LYMPHOCYTES RELATIVE PERCENT: 25 % (ref 24–44)
MCH RBC QN AUTO: 28.4 PG (ref 26–34)
MCHC RBC AUTO-ENTMCNC: 32.9 G/DL (ref 31–37)
MCV RBC AUTO: 86.5 FL (ref 80–100)
MONOCYTES NFR BLD: 0.85 K/UL (ref 0.1–1.3)
MONOCYTES NFR BLD: 13 % (ref 1–7)
MORPHOLOGY: ABNORMAL
MORPHOLOGY: ABNORMAL
NEUTROPHILS NFR BLD: 59 % (ref 36–66)
NEUTS SEG NFR BLD: 3.82 K/UL (ref 1.3–9.1)
PLATELET # BLD AUTO: 308 K/UL (ref 150–450)
PMV BLD AUTO: 6.7 FL (ref 6–12)
PROTHROMBIN TIME: 16.6 SEC (ref 11.8–14.6)
RBC # BLD AUTO: 2.45 M/UL (ref 4.5–5.9)
TIME, STOOL #1: 2313
WBC OTHER # BLD: 6.5 K/UL (ref 3.5–11)

## 2025-01-20 PROCEDURE — 84484 ASSAY OF TROPONIN QUANT: CPT

## 2025-01-20 PROCEDURE — 86901 BLOOD TYPING SEROLOGIC RH(D): CPT

## 2025-01-20 PROCEDURE — 83690 ASSAY OF LIPASE: CPT

## 2025-01-20 PROCEDURE — 86920 COMPATIBILITY TEST SPIN: CPT

## 2025-01-20 PROCEDURE — 85610 PROTHROMBIN TIME: CPT

## 2025-01-20 PROCEDURE — 82947 ASSAY GLUCOSE BLOOD QUANT: CPT

## 2025-01-20 PROCEDURE — 86900 BLOOD TYPING SEROLOGIC ABO: CPT

## 2025-01-20 PROCEDURE — 83605 ASSAY OF LACTIC ACID: CPT

## 2025-01-20 PROCEDURE — 80053 COMPREHEN METABOLIC PANEL: CPT

## 2025-01-20 PROCEDURE — 86850 RBC ANTIBODY SCREEN: CPT

## 2025-01-20 PROCEDURE — 83735 ASSAY OF MAGNESIUM: CPT

## 2025-01-20 PROCEDURE — 93005 ELECTROCARDIOGRAM TRACING: CPT | Performed by: EMERGENCY MEDICINE

## 2025-01-20 PROCEDURE — 82270 OCCULT BLOOD FECES: CPT

## 2025-01-20 PROCEDURE — 99285 EMERGENCY DEPT VISIT HI MDM: CPT

## 2025-01-20 PROCEDURE — 85025 COMPLETE CBC W/AUTO DIFF WBC: CPT

## 2025-01-20 PROCEDURE — 36415 COLL VENOUS BLD VENIPUNCTURE: CPT

## 2025-01-20 RX ORDER — SODIUM CHLORIDE 9 MG/ML
INJECTION, SOLUTION INTRAVENOUS PRN
Status: DISCONTINUED | OUTPATIENT
Start: 2025-01-20 | End: 2025-01-21

## 2025-01-20 ASSESSMENT — PAIN - FUNCTIONAL ASSESSMENT: PAIN_FUNCTIONAL_ASSESSMENT: NONE - DENIES PAIN

## 2025-01-20 NOTE — CARE COORDINATION
severity of symptoms and risk factors.  Plan for next call: symptom management-fool up on confusion s/s/tx of CHF, check on RD and LSW  follow-up appointment-check for sooner HFU appt  referrals-follow up on RPM      Camila Gandhi RN

## 2025-01-21 ENCOUNTER — ANESTHESIA EVENT (OUTPATIENT)
Dept: ENDOSCOPY | Age: 89
DRG: 378 | End: 2025-01-21
Payer: MEDICARE

## 2025-01-21 ENCOUNTER — ANESTHESIA (OUTPATIENT)
Dept: ENDOSCOPY | Age: 89
DRG: 378 | End: 2025-01-21
Payer: MEDICARE

## 2025-01-21 ENCOUNTER — APPOINTMENT (OUTPATIENT)
Dept: CT IMAGING | Age: 89
DRG: 378 | End: 2025-01-21
Payer: MEDICARE

## 2025-01-21 ENCOUNTER — CARE COORDINATION (OUTPATIENT)
Dept: CARE COORDINATION | Age: 89
End: 2025-01-21

## 2025-01-21 PROBLEM — K92.2 GI BLEED: Status: ACTIVE | Noted: 2025-01-21

## 2025-01-21 LAB
ALBUMIN SERPL-MCNC: 2.9 G/DL (ref 3.5–5.2)
ALP SERPL-CCNC: 51 U/L (ref 40–129)
ALT SERPL-CCNC: 14 U/L (ref 10–50)
ANION GAP SERPL CALCULATED.3IONS-SCNC: 7 MMOL/L (ref 9–16)
ANION GAP SERPL CALCULATED.3IONS-SCNC: 9 MMOL/L (ref 9–16)
AST SERPL-CCNC: 18 U/L (ref 10–50)
BACTERIA URNS QL MICRO: ABNORMAL
BASOPHILS # BLD: 0.07 K/UL (ref 0–0.2)
BASOPHILS NFR BLD: 1 % (ref 0–2)
BILIRUB SERPL-MCNC: <0.2 MG/DL (ref 0–1.2)
BILIRUB UR QL STRIP: NEGATIVE
BNP SERPL-MCNC: 2698 PG/ML (ref 0–300)
BUN SERPL-MCNC: 52 MG/DL (ref 8–23)
BUN SERPL-MCNC: 54 MG/DL (ref 8–23)
CALCIUM SERPL-MCNC: 8.4 MG/DL (ref 8.6–10.4)
CALCIUM SERPL-MCNC: 8.4 MG/DL (ref 8.6–10.4)
CASTS #/AREA URNS LPF: ABNORMAL /LPF
CHLORIDE SERPL-SCNC: 100 MMOL/L (ref 98–107)
CHLORIDE SERPL-SCNC: 100 MMOL/L (ref 98–107)
CLARITY UR: CLEAR
CO2 SERPL-SCNC: 21 MMOL/L (ref 20–31)
CO2 SERPL-SCNC: 23 MMOL/L (ref 20–31)
COLOR UR: ABNORMAL
CREAT SERPL-MCNC: 1.8 MG/DL (ref 0.7–1.2)
CREAT SERPL-MCNC: 2 MG/DL (ref 0.7–1.2)
EKG Q-T INTERVAL: 452 MS
EKG QRS DURATION: 84 MS
EKG QTC CALCULATION (BAZETT): 470 MS
EKG R AXIS: -15 DEGREES
EKG T AXIS: 10 DEGREES
EKG VENTRICULAR RATE: 65 BPM
EOSINOPHIL # BLD: 0.2 K/UL (ref 0–0.4)
EOSINOPHILS RELATIVE PERCENT: 3 % (ref 0–4)
EPI CELLS #/AREA URNS HPF: ABNORMAL /HPF
ERYTHROCYTE [DISTWIDTH] IN BLOOD BY AUTOMATED COUNT: 16.2 % (ref 11.5–14.9)
GFR, ESTIMATED: 32 ML/MIN/1.73M2
GFR, ESTIMATED: 36 ML/MIN/1.73M2
GLUCOSE BLD-MCNC: 105 MG/DL (ref 75–110)
GLUCOSE BLD-MCNC: 105 MG/DL (ref 75–110)
GLUCOSE BLD-MCNC: 108 MG/DL (ref 75–110)
GLUCOSE BLD-MCNC: 109 MG/DL (ref 75–110)
GLUCOSE BLD-MCNC: 111 MG/DL (ref 75–110)
GLUCOSE BLD-MCNC: 116 MG/DL (ref 75–110)
GLUCOSE BLD-MCNC: 117 MG/DL (ref 75–110)
GLUCOSE BLD-MCNC: 127 MG/DL (ref 75–110)
GLUCOSE BLD-MCNC: 129 MG/DL (ref 75–110)
GLUCOSE BLD-MCNC: 131 MG/DL (ref 75–110)
GLUCOSE BLD-MCNC: 30 MG/DL (ref 75–110)
GLUCOSE BLD-MCNC: 49 MG/DL (ref 75–110)
GLUCOSE BLD-MCNC: 49 MG/DL (ref 75–110)
GLUCOSE BLD-MCNC: 51 MG/DL (ref 75–110)
GLUCOSE BLD-MCNC: 54 MG/DL (ref 75–110)
GLUCOSE BLD-MCNC: 54 MG/DL (ref 75–110)
GLUCOSE BLD-MCNC: 70 MG/DL (ref 75–110)
GLUCOSE BLD-MCNC: 84 MG/DL (ref 75–110)
GLUCOSE BLD-MCNC: 85 MG/DL (ref 75–110)
GLUCOSE BLD-MCNC: 87 MG/DL (ref 75–110)
GLUCOSE BLD-MCNC: 89 MG/DL (ref 75–110)
GLUCOSE BLD-MCNC: 89 MG/DL (ref 75–110)
GLUCOSE BLD-MCNC: 93 MG/DL (ref 75–110)
GLUCOSE SERPL-MCNC: 35 MG/DL (ref 74–99)
GLUCOSE SERPL-MCNC: 82 MG/DL (ref 74–99)
GLUCOSE UR STRIP-MCNC: NEGATIVE MG/DL
HCT VFR BLD AUTO: 22.8 % (ref 41–53)
HCT VFR BLD AUTO: 24.4 % (ref 41–53)
HCT VFR BLD AUTO: 26 % (ref 41–53)
HGB BLD-MCNC: 7.6 G/DL (ref 13.5–17.5)
HGB BLD-MCNC: 8.1 G/DL (ref 13.5–17.5)
HGB BLD-MCNC: 8.6 G/DL (ref 13.5–17.5)
HGB UR QL STRIP.AUTO: ABNORMAL
KETONES UR STRIP-MCNC: NEGATIVE MG/DL
LEUKOCYTE ESTERASE UR QL STRIP: ABNORMAL
LIPASE SERPL-CCNC: 20 U/L (ref 13–60)
LYMPHOCYTES NFR BLD: 2.08 K/UL (ref 1–4.8)
LYMPHOCYTES RELATIVE PERCENT: 32 % (ref 24–44)
MAGNESIUM SERPL-MCNC: 1.8 MG/DL (ref 1.6–2.4)
MCH RBC QN AUTO: 27.9 PG (ref 26–34)
MCHC RBC AUTO-ENTMCNC: 33.5 G/DL (ref 31–37)
MCV RBC AUTO: 83.3 FL (ref 80–100)
MONOCYTES NFR BLD: 0.78 K/UL (ref 0.1–1.3)
MONOCYTES NFR BLD: 12 % (ref 1–7)
MORPHOLOGY: ABNORMAL
MORPHOLOGY: ABNORMAL
NEUTROPHILS NFR BLD: 52 % (ref 36–66)
NEUTS SEG NFR BLD: 3.37 K/UL (ref 1.3–9.1)
NITRITE UR QL STRIP: NEGATIVE
PH UR STRIP: 6.5 [PH] (ref 5–8)
PLATELET # BLD AUTO: 281 K/UL (ref 150–450)
PMV BLD AUTO: 6.6 FL (ref 6–12)
POTASSIUM SERPL-SCNC: 4.4 MMOL/L (ref 3.7–5.3)
POTASSIUM SERPL-SCNC: 4.4 MMOL/L (ref 3.7–5.3)
PROT SERPL-MCNC: 5.9 G/DL (ref 6.6–8.7)
PROT UR STRIP-MCNC: ABNORMAL MG/DL
RBC # BLD AUTO: 2.74 M/UL (ref 4.5–5.9)
RBC #/AREA URNS HPF: ABNORMAL /HPF
SODIUM SERPL-SCNC: 130 MMOL/L (ref 136–145)
SODIUM SERPL-SCNC: 130 MMOL/L (ref 136–145)
SP GR UR STRIP: 1.01 (ref 1–1.03)
TROPONIN I SERPL HS-MCNC: 74 NG/L (ref 0–22)
UROBILINOGEN UR STRIP-ACNC: NORMAL EU/DL (ref 0–1)
WBC #/AREA URNS HPF: ABNORMAL /HPF
WBC OTHER # BLD: 6.5 K/UL (ref 3.5–11)

## 2025-01-21 PROCEDURE — 96361 HYDRATE IV INFUSION ADD-ON: CPT

## 2025-01-21 PROCEDURE — 2580000003 HC RX 258

## 2025-01-21 PROCEDURE — 96360 HYDRATION IV INFUSION INIT: CPT

## 2025-01-21 PROCEDURE — 2580000003 HC RX 258: Performed by: EMERGENCY MEDICINE

## 2025-01-21 PROCEDURE — 30233N1 TRANSFUSION OF NONAUTOLOGOUS RED BLOOD CELLS INTO PERIPHERAL VEIN, PERCUTANEOUS APPROACH: ICD-10-PCS | Performed by: INTERNAL MEDICINE

## 2025-01-21 PROCEDURE — 74174 CTA ABD&PLVS W/CONTRAST: CPT

## 2025-01-21 PROCEDURE — 6360000004 HC RX CONTRAST MEDICATION: Performed by: EMERGENCY MEDICINE

## 2025-01-21 PROCEDURE — 6370000000 HC RX 637 (ALT 250 FOR IP): Performed by: INTERNAL MEDICINE

## 2025-01-21 PROCEDURE — 83880 ASSAY OF NATRIURETIC PEPTIDE: CPT

## 2025-01-21 PROCEDURE — 85014 HEMATOCRIT: CPT

## 2025-01-21 PROCEDURE — 6360000002 HC RX W HCPCS: Performed by: NURSE ANESTHETIST, CERTIFIED REGISTERED

## 2025-01-21 PROCEDURE — 2580000003 HC RX 258: Performed by: NURSE PRACTITIONER

## 2025-01-21 PROCEDURE — 2500000003 HC RX 250 WO HCPCS

## 2025-01-21 PROCEDURE — 2500000003 HC RX 250 WO HCPCS: Performed by: EMERGENCY MEDICINE

## 2025-01-21 PROCEDURE — 0DB68ZX EXCISION OF STOMACH, VIA NATURAL OR ARTIFICIAL OPENING ENDOSCOPIC, DIAGNOSTIC: ICD-10-PCS | Performed by: INTERNAL MEDICINE

## 2025-01-21 PROCEDURE — 2580000003 HC RX 258: Performed by: INTERNAL MEDICINE

## 2025-01-21 PROCEDURE — 3609012400 HC EGD TRANSORAL BIOPSY SINGLE/MULTIPLE: Performed by: INTERNAL MEDICINE

## 2025-01-21 PROCEDURE — 3700000000 HC ANESTHESIA ATTENDED CARE: Performed by: INTERNAL MEDICINE

## 2025-01-21 PROCEDURE — 2500000003 HC RX 250 WO HCPCS: Performed by: INTERNAL MEDICINE

## 2025-01-21 PROCEDURE — 70450 CT HEAD/BRAIN W/O DYE: CPT

## 2025-01-21 PROCEDURE — APPNB60 APP NON BILLABLE TIME 46-60 MINS: Performed by: NURSE PRACTITIONER

## 2025-01-21 PROCEDURE — 85025 COMPLETE CBC W/AUTO DIFF WBC: CPT

## 2025-01-21 PROCEDURE — 6360000002 HC RX W HCPCS: Performed by: EMERGENCY MEDICINE

## 2025-01-21 PROCEDURE — P9016 RBC LEUKOCYTES REDUCED: HCPCS

## 2025-01-21 PROCEDURE — 7100000001 HC PACU RECOVERY - ADDTL 15 MIN: Performed by: INTERNAL MEDICINE

## 2025-01-21 PROCEDURE — 88342 IMHCHEM/IMCYTCHM 1ST ANTB: CPT

## 2025-01-21 PROCEDURE — 80048 BASIC METABOLIC PNL TOTAL CA: CPT

## 2025-01-21 PROCEDURE — 7100000000 HC PACU RECOVERY - FIRST 15 MIN: Performed by: INTERNAL MEDICINE

## 2025-01-21 PROCEDURE — 2060000000 HC ICU INTERMEDIATE R&B

## 2025-01-21 PROCEDURE — 88305 TISSUE EXAM BY PATHOLOGIST: CPT

## 2025-01-21 PROCEDURE — 96374 THER/PROPH/DIAG INJ IV PUSH: CPT

## 2025-01-21 PROCEDURE — 81001 URINALYSIS AUTO W/SCOPE: CPT

## 2025-01-21 PROCEDURE — 85018 HEMOGLOBIN: CPT

## 2025-01-21 PROCEDURE — 2709999900 HC NON-CHARGEABLE SUPPLY: Performed by: INTERNAL MEDICINE

## 2025-01-21 PROCEDURE — 6360000002 HC RX W HCPCS

## 2025-01-21 PROCEDURE — 36415 COLL VENOUS BLD VENIPUNCTURE: CPT

## 2025-01-21 PROCEDURE — 6370000000 HC RX 637 (ALT 250 FOR IP)

## 2025-01-21 PROCEDURE — 93010 ELECTROCARDIOGRAM REPORT: CPT | Performed by: INTERNAL MEDICINE

## 2025-01-21 RX ORDER — POLYETHYLENE GLYCOL 3350 17 G/17G
17 POWDER, FOR SOLUTION ORAL DAILY PRN
Status: DISCONTINUED | OUTPATIENT
Start: 2025-01-21 | End: 2025-01-24 | Stop reason: HOSPADM

## 2025-01-21 RX ORDER — FINASTERIDE 5 MG/1
5 TABLET, FILM COATED ORAL DAILY
Status: DISCONTINUED | OUTPATIENT
Start: 2025-01-21 | End: 2025-01-24 | Stop reason: HOSPADM

## 2025-01-21 RX ORDER — DEXTROSE MONOHYDRATE 100 MG/ML
INJECTION, SOLUTION INTRAVENOUS
Status: COMPLETED
Start: 2025-01-21 | End: 2025-01-21

## 2025-01-21 RX ORDER — DEXTROSE MONOHYDRATE 100 MG/ML
INJECTION, SOLUTION INTRAVENOUS CONTINUOUS
Status: DISCONTINUED | OUTPATIENT
Start: 2025-01-21 | End: 2025-01-21

## 2025-01-21 RX ORDER — ASPIRIN 81 MG/1
81 TABLET ORAL EVERY OTHER DAY
Status: CANCELLED | OUTPATIENT
Start: 2025-01-21

## 2025-01-21 RX ORDER — LIDOCAINE HYDROCHLORIDE 10 MG/ML
INJECTION, SOLUTION EPIDURAL; INFILTRATION; INTRACAUDAL; PERINEURAL
Status: DISCONTINUED | OUTPATIENT
Start: 2025-01-21 | End: 2025-01-21 | Stop reason: SDUPTHER

## 2025-01-21 RX ORDER — DEXTROSE MONOHYDRATE 100 MG/ML
INJECTION, SOLUTION INTRAVENOUS
Status: DISCONTINUED
Start: 2025-01-21 | End: 2025-01-22

## 2025-01-21 RX ORDER — METOPROLOL SUCCINATE 25 MG/1
25 TABLET, EXTENDED RELEASE ORAL DAILY
Status: DISCONTINUED | OUTPATIENT
Start: 2025-01-21 | End: 2025-01-24 | Stop reason: HOSPADM

## 2025-01-21 RX ORDER — DEXTROSE MONOHYDRATE 50 MG/ML
INJECTION, SOLUTION INTRAVENOUS CONTINUOUS
Status: DISCONTINUED | OUTPATIENT
Start: 2025-01-21 | End: 2025-01-21

## 2025-01-21 RX ORDER — ACETAMINOPHEN 650 MG/1
650 SUPPOSITORY RECTAL EVERY 6 HOURS PRN
Status: DISCONTINUED | OUTPATIENT
Start: 2025-01-21 | End: 2025-01-24 | Stop reason: HOSPADM

## 2025-01-21 RX ORDER — SODIUM CHLORIDE 0.9 % (FLUSH) 0.9 %
10 SYRINGE (ML) INJECTION PRN
Status: DISCONTINUED | OUTPATIENT
Start: 2025-01-21 | End: 2025-01-24 | Stop reason: HOSPADM

## 2025-01-21 RX ORDER — INSULIN LISPRO 100 [IU]/ML
0-4 INJECTION, SOLUTION INTRAVENOUS; SUBCUTANEOUS
Status: CANCELLED | OUTPATIENT
Start: 2025-01-21

## 2025-01-21 RX ORDER — TAMSULOSIN HYDROCHLORIDE 0.4 MG/1
0.4 CAPSULE ORAL DAILY
Status: DISCONTINUED | OUTPATIENT
Start: 2025-01-21 | End: 2025-01-24 | Stop reason: HOSPADM

## 2025-01-21 RX ORDER — BISACODYL 10 MG
10 SUPPOSITORY, RECTAL RECTAL DAILY PRN
Status: DISCONTINUED | OUTPATIENT
Start: 2025-01-21 | End: 2025-01-24 | Stop reason: HOSPADM

## 2025-01-21 RX ORDER — DEXTROSE MONOHYDRATE 100 MG/ML
INJECTION, SOLUTION INTRAVENOUS CONTINUOUS
Status: DISPENSED | OUTPATIENT
Start: 2025-01-21 | End: 2025-01-21

## 2025-01-21 RX ORDER — AMLODIPINE BESYLATE 5 MG/1
5 TABLET ORAL DAILY
Status: DISCONTINUED | OUTPATIENT
Start: 2025-01-21 | End: 2025-01-24 | Stop reason: HOSPADM

## 2025-01-21 RX ORDER — SPIRONOLACTONE 25 MG/1
25 TABLET ORAL DAILY
Status: DISCONTINUED | OUTPATIENT
Start: 2025-01-21 | End: 2025-01-24 | Stop reason: HOSPADM

## 2025-01-21 RX ORDER — ONDANSETRON 4 MG/1
4 TABLET, ORALLY DISINTEGRATING ORAL EVERY 8 HOURS PRN
Status: DISCONTINUED | OUTPATIENT
Start: 2025-01-21 | End: 2025-01-24 | Stop reason: HOSPADM

## 2025-01-21 RX ORDER — SODIUM CHLORIDE 9 MG/ML
INJECTION, SOLUTION INTRAVENOUS PRN
Status: DISCONTINUED | OUTPATIENT
Start: 2025-01-21 | End: 2025-01-24 | Stop reason: HOSPADM

## 2025-01-21 RX ORDER — ACETAMINOPHEN 325 MG/1
650 TABLET ORAL EVERY 6 HOURS PRN
Status: DISCONTINUED | OUTPATIENT
Start: 2025-01-21 | End: 2025-01-24 | Stop reason: HOSPADM

## 2025-01-21 RX ORDER — SODIUM CHLORIDE 0.9 % (FLUSH) 0.9 %
10 SYRINGE (ML) INJECTION ONCE
Status: COMPLETED | OUTPATIENT
Start: 2025-01-21 | End: 2025-01-21

## 2025-01-21 RX ORDER — DEXTROSE MONOHYDRATE 100 MG/ML
INJECTION, SOLUTION INTRAVENOUS CONTINUOUS PRN
Status: DISCONTINUED | OUTPATIENT
Start: 2025-01-21 | End: 2025-01-24 | Stop reason: HOSPADM

## 2025-01-21 RX ORDER — BUMETANIDE 1 MG/1
1 TABLET ORAL DAILY
Status: DISCONTINUED | OUTPATIENT
Start: 2025-01-21 | End: 2025-01-24 | Stop reason: HOSPADM

## 2025-01-21 RX ORDER — ONDANSETRON 2 MG/ML
4 INJECTION INTRAMUSCULAR; INTRAVENOUS EVERY 6 HOURS PRN
Status: DISCONTINUED | OUTPATIENT
Start: 2025-01-21 | End: 2025-01-24 | Stop reason: HOSPADM

## 2025-01-21 RX ORDER — DEXTROSE MONOHYDRATE 100 MG/ML
INJECTION, SOLUTION INTRAVENOUS CONTINUOUS
Status: DISCONTINUED | OUTPATIENT
Start: 2025-01-21 | End: 2025-01-22

## 2025-01-21 RX ORDER — LISINOPRIL 10 MG/1
10 TABLET ORAL DAILY
Status: DISCONTINUED | OUTPATIENT
Start: 2025-01-21 | End: 2025-01-24 | Stop reason: HOSPADM

## 2025-01-21 RX ORDER — 0.9 % SODIUM CHLORIDE 0.9 %
100 INTRAVENOUS SOLUTION INTRAVENOUS ONCE
Status: COMPLETED | OUTPATIENT
Start: 2025-01-21 | End: 2025-01-21

## 2025-01-21 RX ORDER — SODIUM CHLORIDE 0.9 % (FLUSH) 0.9 %
5-40 SYRINGE (ML) INJECTION EVERY 12 HOURS SCHEDULED
Status: DISCONTINUED | OUTPATIENT
Start: 2025-01-21 | End: 2025-01-24 | Stop reason: HOSPADM

## 2025-01-21 RX ORDER — PANTOPRAZOLE SODIUM 40 MG/1
40 TABLET, DELAYED RELEASE ORAL
Status: CANCELLED | OUTPATIENT
Start: 2025-01-21

## 2025-01-21 RX ORDER — IOPAMIDOL 755 MG/ML
100 INJECTION, SOLUTION INTRAVASCULAR
Status: COMPLETED | OUTPATIENT
Start: 2025-01-21 | End: 2025-01-21

## 2025-01-21 RX ORDER — PROPOFOL 10 MG/ML
INJECTION, EMULSION INTRAVENOUS
Status: DISCONTINUED | OUTPATIENT
Start: 2025-01-21 | End: 2025-01-21 | Stop reason: SDUPTHER

## 2025-01-21 RX ADMIN — SODIUM CHLORIDE, PRESERVATIVE FREE 10 ML: 5 INJECTION INTRAVENOUS at 22:52

## 2025-01-21 RX ADMIN — TAMSULOSIN HYDROCHLORIDE 0.4 MG: 0.4 CAPSULE ORAL at 08:44

## 2025-01-21 RX ADMIN — POLYETHYLENE GLYCOL-3350 AND ELECTROLYTES 4000 ML: 236; 6.74; 5.86; 2.97; 22.74 POWDER, FOR SOLUTION ORAL at 17:27

## 2025-01-21 RX ADMIN — DEXTROSE MONOHYDRATE: 100 INJECTION, SOLUTION INTRAVENOUS at 00:04

## 2025-01-21 RX ADMIN — PROPOFOL 100 MG: 10 INJECTION, EMULSION INTRAVENOUS at 14:39

## 2025-01-21 RX ADMIN — BUMETANIDE 1 MG: 1 TABLET ORAL at 08:44

## 2025-01-21 RX ADMIN — DEXTROSE MONOHYDRATE 125 ML: 100 INJECTION, SOLUTION INTRAVENOUS at 05:15

## 2025-01-21 RX ADMIN — DEXTROSE MONOHYDRATE: 100 INJECTION, SOLUTION INTRAVENOUS at 04:47

## 2025-01-21 RX ADMIN — SODIUM CHLORIDE: 9 INJECTION, SOLUTION INTRAVENOUS at 14:36

## 2025-01-21 RX ADMIN — IOPAMIDOL 100 ML: 755 INJECTION, SOLUTION INTRAVENOUS at 01:04

## 2025-01-21 RX ADMIN — DEXTROSE MONOHYDRATE: 100 INJECTION, SOLUTION INTRAVENOUS at 02:11

## 2025-01-21 RX ADMIN — MICONAZOLE NITRATE: 20 POWDER TOPICAL at 22:52

## 2025-01-21 RX ADMIN — MICONAZOLE NITRATE: 20 POWDER TOPICAL at 08:46

## 2025-01-21 RX ADMIN — LIDOCAINE HYDROCHLORIDE 50 MG: 10 INJECTION, SOLUTION EPIDURAL; INFILTRATION; INTRACAUDAL; PERINEURAL at 14:39

## 2025-01-21 RX ADMIN — SODIUM CHLORIDE, PRESERVATIVE FREE 10 ML: 5 INJECTION INTRAVENOUS at 01:04

## 2025-01-21 RX ADMIN — DEXTROSE MONOHYDRATE: 50 INJECTION, SOLUTION INTRAVENOUS at 02:21

## 2025-01-21 RX ADMIN — SODIUM CHLORIDE, PRESERVATIVE FREE 10 ML: 5 INJECTION INTRAVENOUS at 08:46

## 2025-01-21 RX ADMIN — SODIUM CHLORIDE, PRESERVATIVE FREE 40 MG: 5 INJECTION INTRAVENOUS at 00:49

## 2025-01-21 RX ADMIN — FINASTERIDE 5 MG: 5 TABLET, FILM COATED ORAL at 08:44

## 2025-01-21 RX ADMIN — SODIUM CHLORIDE 100 ML: 9 INJECTION, SOLUTION INTRAVENOUS at 01:05

## 2025-01-21 RX ADMIN — DEXTROSE MONOHYDRATE: 100 INJECTION, SOLUTION INTRAVENOUS at 19:00

## 2025-01-21 RX ADMIN — SODIUM CHLORIDE, PRESERVATIVE FREE 40 MG: 5 INJECTION INTRAVENOUS at 12:37

## 2025-01-21 ASSESSMENT — PAIN - FUNCTIONAL ASSESSMENT
PAIN_FUNCTIONAL_ASSESSMENT: CRITICAL CARE PAIN OBSERVATION TOOL (CPOT)
PAIN_FUNCTIONAL_ASSESSMENT: 0-10

## 2025-01-21 ASSESSMENT — ENCOUNTER SYMPTOMS
ABDOMINAL PAIN: 0
BLOOD IN STOOL: 1
SHORTNESS OF BREATH: 0
EYE PAIN: 0
COLOR CHANGE: 0
BACK PAIN: 0

## 2025-01-21 ASSESSMENT — PAIN SCALES - GENERAL: PAINLEVEL_OUTOF10: 0

## 2025-01-21 NOTE — ED PROVIDER NOTES
CONSULT TO GI  IP CONSULT TO NEPHROLOGY  FINAL IMPRESSION      1. Gastrointestinal hemorrhage, unspecified gastrointestinal hemorrhage type    2. Anemia, unspecified type    3. Hypoglycemia          DISPOSITION/PLAN   DISPOSITION Admitted 01/21/2025 04:38:55 AM               OUTPATIENT FOLLOW UP THE PATIENT:  No follow-up provider specified.    DO Amanda Mcbride Nathan R, DO  01/21/25 0712

## 2025-01-21 NOTE — ANESTHESIA PRE PROCEDURE
Type 2 diabetes mellitus with diabetic polyneuropathy ([facility]) E11.42   • Cardiomegaly I51.7   • Unspecified atrial fibrillation ([facility]) I48.91   • Chronic atrial fibrillation ([facility]) I48.20   • Diabetic peripheral neuropathy ([facility]) E11.42   • History of inguinal hernia repair Z98.890, Z87.19   • 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   • Other constipation K59.09   • Hydrocele, unspecified N43.3   • Low back pain, unspecified M54.50   • Other specified abdominal hernia without obstruction or gangrene K45.8   • Radiculopathy, lumbosacral region M54.17   • Other atopic dermatitis L20.89   • Acute kidney failure, unspecified ([facility]) N17.9   • Unstable gait R26.81   • Easy bruisability R23.3   • Acute on chronic combined systolic (congestive) and diastolic (congestive) heart failure ([facility]) I50.43   • Acquired left foot drop M21.372   • Secondary hypercoagulable state ([facility]) D68.69   • Systolic CHF, acute ([facility]) I50.21   • Hypertensive heart disease with heart failure ([facility]) I11.0   • Iron deficiency anemia, unspecified D50.9   • ROXI (acute kidney injury) ([facility]) N17.9   • Deficiency of other specified B group vitamins E53.8   • Diarrhea due to drug K52.1   • Chronic kidney disease, stage 3a ([facility]) N18.31   • Secondary diabetes mellitus with chronic kidney disease ([facility]) E13.22   • Hypokalemia E87.6   • Stage 3b chronic kidney disease ([facility]) N18.32   • Acquired absence of other genital organ(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

## 2025-01-21 NOTE — ANESTHESIA PRE PROCEDURE
Findings:         Anesthesia Plan      general and TIVA     ASA 3       Induction: intravenous.      Anesthetic plan and risks discussed with patient.      Plan discussed with CRNA.                  Rangel Nova MD   1/21/2025

## 2025-01-21 NOTE — CONSULTS
Department of Internal Medicine  Nephrology Nick Corona MD   Consult Note    Reason for consultation: Management of chronic kidney disease stage IIIb.    Requesting physician: Arsenio Ruiz MD.    History of present illness: This is a 89 y.o. 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 Bloom of renal services of [facility]], who presented to the hospital yesterday for evaluation of melena stools associated with dizziness. He has a chronic Crenshaw catheter and states that he started noticing blood in his catheter 3 days prior to this presentation. Vital signs were stable at presentation with blood pressure 128/85 mmHg and pulse 65 bpm.   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    Laboratory studies were remarkable for hemoglobin 7.0 g/dL; BUN/creatinine 54/2.0 mg/dL with positive stool occult blood.  He had been recently admitted to [facility] from 1/11/25 to 1/17/2025 for management of symptomatic hypoglycemia.     Rivaroxaban    Past Medical History:   Diagnosis Date    Diabetes mellitus (HCC)     Hypertension     Testicular cancer (HCC)     sarcoma of left testis     Scheduled Meds:   pantoprazole (PROTONIX) 40 mg in sodium chloride (PF) 0.9 % 10 mL injection  40 mg IntraVENous Q12H    amLODIPine  5 mg Oral Daily    bumetanide  1 mg Oral Daily    finasteride  5 mg Oral Daily    lisinopril  10 mg Oral Daily

## 2025-01-21 NOTE — ED NOTES
Pt. Family notified RN that patient was no acting himself and hallucinating.   Pt. BG was 30, and  was notified.   D10 was verbally ordered.

## 2025-01-21 NOTE — ACP (ADVANCE CARE PLANNING)
Advance Care Planning     Advance Care Planning Activator (Inpatient)  Conversation Note      Date of ACP Conversation: 1/21/2025     Conversation Conducted with: Patient with Decision Making Capacity and Healthcare Decision Maker    ACP Activator: Qiana Bravo RN    Health Care Decision Maker:     Current Designated Health Care Decision Maker:     Primary Decision Maker: Naomi Frias - Spouse - [phone]    Secondary Decision Maker: Hannah Ceron - Child - [phone]    Supplemental (Other) Decision Maker: Lucy Lazcano - Child - [phone]  Click here to complete Healthcare Decision Makers including section of the Healthcare Decision Maker Relationship (ie \"Primary\")  Today we documented Decision Maker(s) consistent with ACP documents on file.    Care Preferences    Ventilation:  \"If you were in your present state of health and suddenly became very ill and were unable to breathe on your own, what would your preference be about the use of a ventilator (breathing machine) if it were available to you?\"      Would the patient desire the use of ventilator (breathing machine)?: yes    \"If your health worsens and it becomes clear that your chance of recovery is unlikely, what would your preference be about the use of a ventilator (breathing machine) if it were available to you?\"     Would the patient desire the use of ventilator (breathing machine)?: No      Resuscitation  \"CPR works best to restart the heart when there is a sudden event, like a heart attack, in someone who is otherwise healthy. Unfortunately, CPR does not typically restart the heart for people who have serious health conditions or who are very sick.\"    \"In the event your heart stopped as a result of an underlying serious health condition, would you want attempts to be made to restart your heart (answer \"yes\" for attempt to resuscitate) or would you prefer a natural death (answer \"no\" for do not attempt to resuscitate)?\" yes       [] Yes

## 2025-01-21 NOTE — ED NOTES
Report given to SUKHWINDER Lang from ICU.   Report method in person   The following was reviewed with receiving RN:   Current vital signs:  BP (!) 117/59   Pulse 65   Temp 97.4 °F (36.3 °C) (Oral)   Resp 16   Ht 1.778 m (5' 10\")   Wt 90.7 kg (200 lb)   SpO2 95%   BMI 28.70 kg/m²                MEWS Score: 1     Any medication or safety alerts were reviewed. Any pending diagnostics and notifications were also reviewed, as well as any safety concerns or issues, abnormal labs, abnormal imaging, and abnormal assessment findings. Questions were answered.

## 2025-01-21 NOTE — OP NOTE
PROCEDURE NOTE    DATE OF PROCEDURE: 1/21/2025     SURGEON: Koffi Chauhan MD  Facility: \"[facility]  ASSISTANT: None  Anesthesia: Monitored anesthesia care  PREOPERATIVE DIAGNOSIS: Upper GI bleed    Diagnosis:    POSTOPERATIVE DIAGNOSIS: As described below    OPERATION: Upper GI endoscopy with Biopsy    ANESTHESIA: Moderate Sedation     ESTIMATED BLOOD LOSS: Less than 50 ml    COMPLICATIONS: None.     SPECIMENS:  Was Obtained: gastric mucosa biopsy    HISTORY: The patient is a 89 y.o. year old male with history of above preop diagnosis.  I recommended esophagogastroduodenoscopy with possible biopsy and I explained the risk, benefits, expected outcome, and alternatives to the procedure.  Risks included but are not limited to bleeding, infection, respiratory distress, hypotension, and perforation of the esophagus, stomach, or duodenum.  Patient understands and is in agreement.      PROCEDURE: The patient was given IV conscious sedation.  The patient's SPO2 remained above 90% throughout the procedure.The gastroscope was inserted orally and advanced under direct vision through the esophagus, through the stomach, through the pylorus, and into the descending duodenum.      Post sedation note :The patient's SPO2 remained above 90% throughout the procedure.the vital signs remained stable , and no immediate complication form the procedure noted, patient will be ready for d/c when criteria is met .      Findings:    Retropharyngeal area was grossly normal appearing    Esophagus: normal;    Esophagogastric markings: Diaphragmatic hiatus- 40 cm; GE junction- 40 cm; Squamo-columnar junction- 40 cm    Stomach:    Fundus: normal    Body: abnormal: hemoclip attached to the mucosa    Antrum: normal  Biopsy obtained to r/o- H pylori     Duodenum:     Descending: normal    Bulb: normal    The scope was removed and the patient tolerated the procedure well.     Impression- Normal exam    Recommendations/Plan:   F/U Biopsies  PPI

## 2025-01-21 NOTE — H&P
[facility] Internal Medicine  Arsenio Ruiz MD; Michael Pino MD, Aleksandr Finney MD, Miranda Youssef MD,   Jayleen Londono MD; Nisa Williamson MD, Drew Bowen MD.    [facility] Internal Medicine   IN-PATIENT SERVICE   [facility]    HISTORY AND PHYSICAL EXAMINATION            Date:   1/21/2025  Patient name:  Carl Frias  Date of admission:  1/20/2025  9:33 PM  MRN:   [id number]  Account:  [id number]  YOB: 1935  PCP:    Aleksandr Finney MD  Room:   [facility]/NONE  Code Status:    Full Code    Chief Complaint:     Chief Complaint   Patient presents with    Dizziness    Rectal Bleeding     Pt. States ongoing for 3 days, pt. Also states he is dizzy, denies pain at this time       History Obtained From:     Patient/EMR/Bedside RN    History of Present Illness:   Carl Frias is a 89 y.o.  /  male who presents with Dizziness and Rectal Bleeding (Pt. States ongoing for 3 days, pt. Also states he is dizzy, denies pain at this time) and is admitted to the hospital for the management of GI bleed. 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 Was also admitted 1/1-1/6 for anemia requiring blood transfusion, EGD completed which revealed gastric ulcer requiring clipping.       Presented to ED per EMS, complaining of rectal bleeding and dizziness x3 days. Also has chronic barajas catheter that he reports had blood present 2 days ago. He has been taking low dose Eliquis for atrial fibrillation. No active rectal bleeding while in ED but stool positive for OB. Hgb 7.0. 1 unit PRBC transfused. 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

## 2025-01-21 NOTE — CONSULTS
Gastroenterology Consult Note    Patient:   Carl Frias   Admit date:  1/20/2025  Facility:   [facility]  Referring/PCP: Aleksandr Finney MD  Date:     1/21/2025  Consultant:   KALEN West NP, Koffi Chauhan MD    Subjective:     This 89 y.o. male was admitted 1/20/2025 with a diagnosis of \"GI bleed [K92.2]\" and is seen in consultation regarding   Chief Complaint   Patient presents with    Dizziness    Rectal Bleeding     Pt. States ongoing for 3 days, pt. Also states he is dizzy, denies pain at this time      89/M w/ pmhx of DM, HTN, testicular cancer, A.fib on Eliquis, CHG presents to ED with dizziness, rectal bleeding.  Patient reports dark bloody stools x 3 days.  Also c/o dizziness.  Patient denied any chest pain, shortness of breath, nausea, vomiting.    Patient was recently admitted 1/2/25 for CHF exacerbation and found to have hgb 6.7.  Patient had EGD 1/3/25 that revealed 5mm ulcer in the lesser curvature with adherent clot s/p clip application.  He also had few subcentimeter clean-based ulcers in the antrum and pyloric channel. BX were positive for H.Pylori.  Patient was treated with biaxin, amoxicllin, and PPI.  Last scheduled dose is tomorrow.    Hgb on admission 7.0.  Patient was given 1 unit of PRBCs.  Hgb currently 7.6. FOBT was positive.  Creatinine at baseline.    CTA w/ no signs of active bleed    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       Past Surgical History:  Past Surgical History:   Procedure Laterality Date    EYE SURGERY Bilateral     HERNIA REPAIR  1988    left inguinal    JOINT REPLACEMENT Bilateral     TESTICLE REMOVAL  2008    UPPER GASTROINTESTINAL ENDOSCOPY N/A 1/3/2025    ESOPHAGOGASTRODUODENOSCOPY BIOPSY WITH CLIPP APPLICATION X 1 performed by Torey Washburn MD at [facility]       Social History:  Social History     Tobacco  Keshawn

## 2025-01-21 NOTE — ANESTHESIA POSTPROCEDURE EVALUATION
Department of Anesthesiology  Postprocedure Note    Patient: Carl Frias  MRN: [id number]  YOB: 1935  Date of evaluation: 1/21/2025    Procedure Summary       Date: 01/21/25 Room / Location: [place] [address] / [facility]    Anesthesia Start: 1436 Anesthesia Stop: 1451    Procedure: ESOPHAGOGASTRODUODENOSCOPY BIOPSY (Esophagus) Diagnosis:       Gastrointestinal hemorrhage, unspecified gastrointestinal hemorrhage type      (Gastrointestinal hemorrhage, unspecified gastrointestinal hemorrhage type [K92.2])    Surgeons: Koffi Chauhan MD Responsible Provider: Rangel Nova MD    Anesthesia Type: general, TIVA ASA Status: 3            Anesthesia Type: No value filed.    Lakshmi Phase I: Lakshmi Score: 10    Lakshmi Phase II:      Anesthesia Post Evaluation    Patient location during evaluation: PACU  Patient participation: complete - patient participated  Level of consciousness: awake and alert  Airway patency: patent  Nausea & Vomiting: no vomiting  Cardiovascular status: hemodynamically stable  Respiratory status: acceptable  Hydration status: euvolemic  Comments: POST- ANESTHESIA EVALUATION       Pt Name: Carl Frias  MRN: [id number]  YOB: 1935  Date of evaluation: 1/21/2025  Time:  3:49 PM      BP (!) 108/43   Pulse 75   Temp 98.4 °F (36.9 °C) (Oral)   Resp 16   Ht 1.778 m (5' 10\")   Wt 90.7 kg (200 lb)   SpO2 97%   BMI 28.70 kg/m²      Consciousness Level  Awake  Cardiopulmonary Status  Stable  Pain Adequately Treated YES  Nausea / Vomiting  NO  Adequate Hydration  YES  Anesthesia Related Complications NONE      Electronically signed by Rangel Nova MD on 1/21/2025 at 3:49 PM         Pain management: satisfactory to patient    No notable events documented.

## 2025-01-21 NOTE — DISCHARGE INSTR - COC
Resp 13   Ht 1.778 m (5' 10\")   Wt 90.7 kg (200 lb)   SpO2 97%   BMI 28.70 kg/m²     Last documented pain score (0-10 scale): Pain Level: 0  Last Weight:   Wt Readings from Last 1 Encounters:   01/20/25 90.7 kg (200 lb)     Mental Status:  oriented    IV Access:  - None    Nursing Mobility/ADLs:  Walking   Assisted  Transfer  Assisted  Bathing  Assisted  Dressing  Assisted  Toileting  Assisted  Feeding  Independent  Med Admin  Independent  Med Delivery   whole    Wound Care Documentation and Therapy:  Wound 02/18/24 Sacrum right buttocks (Active)   Wound Image   01/21/25 0800   Wound Etiology Pressure Stage 2 01/21/25 1200   Dressing Status Clean;Dry;Intact 01/21/25 1200   Wound Cleansed Not Cleansed 01/04/25 1230   Dressing/Treatment Zinc paste 01/21/25 0800   Wound Length (cm) 2 cm 01/21/25 0530   Wound Width (cm) 2 cm 01/21/25 0530   Wound Depth (cm) 0.1 cm 01/02/25 1530   Wound Surface Area (cm^2) 4 cm^2 01/21/25 0530   Change in Wound Size % (l*w) -30.72 01/21/25 0530   Wound Volume (cm^3) 0.306 cm^3 01/02/25 1530   Wound Assessment Pink/red 01/21/25 0800   Drainage Amount None (dry) 01/21/25 0800   Drainage Description Serosanguinous 01/03/25 1600   Odor None 01/06/25 0400   Mariajose-wound Assessment Blanchable erythema 01/06/25 0400   Margins Undefined edges 01/21/25 0800   Number of days: 337       Wound 02/19/24 Penis (Active)   Number of days: 336       Wound 02/19/24 Leg Left;Lower (Active)   Number of days: 336        Elimination:  Continence:   Bowel: Yes  Bladder: barajas  Urinary Catheter: Insertion Date: 01/20/25 and chronic barajas    Colostomy/Ileostomy/Ileal Conduit: No       Date of Last BM: 01/23/25    Intake/Output Summary (Last 24 hours) at 1/21/2025 1316  Last data filed at 1/21/2025 0600  Gross per 24 hour   Intake 802.26 ml   Output 1700 ml   Net -897.74 ml     I/O last 3 completed shifts:  In: 802.3 [I.V.:802.3]  Out: 1700 [Urine:1700]    Safety Concerns:     At Risk for Falls and at risk

## 2025-01-21 NOTE — CARE COORDINATION
Case Management Assessment  Initial Evaluation    Date/Time of Evaluation: 1/21/2025 11:03 AM  Assessment Completed by: Qiana Bravo RN    If patient is discharged prior to next notation, then this note serves as note for discharge by case management.    Patient Name: Carl Frias                   YOB: 1935  Diagnosis: GI bleed [K92.2]                   Date / Time: 1/20/2025  9:33 PM    Patient Admission Status: Inpatient   Readmission Risk (Low < 19, Mod (19-27), High > 27): Readmission Risk Score: 28.7    Current PCP: Aleksandr Finney MD  PCP verified by CM? Yes    Chart Reviewed: Yes      History Provided by: Patient, Child/Family  Patient Orientation: Alert and Oriented    Patient Cognition: Alert    Hospitalization in the last 30 days (Readmission):  Yes    If yes, Readmission Assessment in  Navigator will be completed.    Advance Directives:      Code Status: Full Code   Patient's Primary Decision Maker is: Legal Next of Kin (Naomi, wife, Lucy and Hannah daughters)    Primary Decision Maker: Naomi Frias - Spouse - [phone]    Secondary Decision Maker: Hannah Ceron - Child - [phone]    Supplemental (Other) Decision Maker: Lucy Lazcano - Child - [phone]    Discharge Planning:    Patient lives with: Spouse/Significant Other Type of Home: House  Primary Care Giver: Self  Patient Support Systems include: Spouse/Significant Other, Children, Family Members   Current Financial resources: Medicare  Current community resources: ECF/Home Care  Current services prior to admission: None            Current DME: Walker, Hospital Bed            Type of Home Care services:  None    ADLS  Prior functional level: Assistance with the following:, Bathing, Shopping, Mobility, Housework  Current functional level: Assistance with the following:, Bathing, Housework, Shopping, Mobility    PT AM-PAC:   /24  OT AM-PAC:   /24    Family can provide assistance at DC: Yes  Would you like Case

## 2025-01-21 NOTE — CONSENT
Informed Consent for Blood Component Transfusion Note    I have discussed with the daughter the rationale for blood component transfusion; its benefits in treating or preventing fatigue, organ damage, or death; and its risk which includes mild transfusion reactions, rare risk of blood borne infection, or more serious but rare reactions. I have discussed the alternatives to transfusion, including the risk and consequences of not receiving transfusion. The daughter had an opportunity to ask questions and had agreed to proceed with transfusion of blood components.    Electronically signed by Coleman Patel DO on 1/20/25 at 11:37 PM EST

## 2025-01-21 NOTE — PROGRESS NOTES
Remote Patient Monitoring Treatment Plan    Received request from [facility]/Camila Lebron RN   to order remote patient monitoring for in home monitoring of CHF; Condition managed by PCP.  and order completed.     Patient will be monitoring activity  blood pressure   pulse ox   weight.      Patient will engage in Remote Patient Monitoring each day to develop the skills necessary for self management.       RPM Care Team Responsibilities:   Alerts will be reviewed daily and addressed within 2-4 hours during operational hours (Monday -Friday 9 am-4 pm)  Alert response and intervention documented in patient medical record  Alert response escalated to PCP per protocol and documented in patient medical record  Patient monitored over approximately  days  Discharge from program based on self-management readiness    See care coordination encounters for additional details.

## 2025-01-21 NOTE — ED TRIAGE NOTES
Mode of arrival (squad #, walk in, police, etc) : EMS        Chief complaint(s): Rectal bleeding, Dizziness        Arrival Note (brief scenario, treatment PTA, etc).: Pt. Arrived via EMS with c/o rectal bleeding and dizziness. Pt. States rectal bleeding has been going on for past three days. Pt. States feeling dizzy today. Pt. Also has a barajas catheter, and comes from living at home, Pt. States noticing blood in the barajas catheter two days ago, not anymore. Pt. States has not noticed rectal bleeding recently as well. Pt. Is hard of hearing.        C= \"Have you ever felt that you should Cut down on your drinking?\"  No  A= \"Have people Annoyed you by criticizing your drinking?\"  No  G= \"Have you ever felt bad or Guilty about your drinking?\"  No  E= \"Have you ever had a drink as an Eye-opener first thing in the morning to steady your nerves or to help a hangover?\"  No      Deferred []      Reason for deferring: N/A    *If yes to two or more: probable alcohol abuse.*

## 2025-01-21 NOTE — CARE COORDINATION
Patient is currently inpatient!    Patient was presented to Social Work by TRUNG Warren,  who stated that this patient is interested in receiving hot meals,  and assistance in his home.          Plan of Care  HC will reach watch patient's chart for discharge!  HC will make referrals for patient to assess hot meals  and a referral for [employer] for assistance in his home.

## 2025-01-22 ENCOUNTER — ANESTHESIA (OUTPATIENT)
Dept: ENDOSCOPY | Age: 89
DRG: 378 | End: 2025-01-22
Payer: MEDICARE

## 2025-01-22 LAB
ANION GAP SERPL CALCULATED.3IONS-SCNC: 10 MMOL/L (ref 9–16)
BASOPHILS # BLD: 0.1 K/UL (ref 0–0.2)
BASOPHILS NFR BLD: 1 % (ref 0–2)
BUN SERPL-MCNC: 40 MG/DL (ref 8–23)
CALCIUM SERPL-MCNC: 8.9 MG/DL (ref 8.6–10.4)
CHLORIDE SERPL-SCNC: 105 MMOL/L (ref 98–107)
CO2 SERPL-SCNC: 20 MMOL/L (ref 20–31)
CREAT SERPL-MCNC: 1.8 MG/DL (ref 0.7–1.2)
EOSINOPHIL # BLD: 0.3 K/UL (ref 0–0.4)
EOSINOPHILS RELATIVE PERCENT: 5 % (ref 0–4)
ERYTHROCYTE [DISTWIDTH] IN BLOOD BY AUTOMATED COUNT: 16.5 % (ref 11.5–14.9)
GFR, ESTIMATED: 36 ML/MIN/1.73M2
GLUCOSE BLD-MCNC: 118 MG/DL (ref 75–110)
GLUCOSE BLD-MCNC: 133 MG/DL (ref 75–110)
GLUCOSE BLD-MCNC: 142 MG/DL (ref 75–110)
GLUCOSE BLD-MCNC: 147 MG/DL (ref 75–110)
GLUCOSE BLD-MCNC: 168 MG/DL (ref 75–110)
GLUCOSE BLD-MCNC: 270 MG/DL (ref 75–110)
GLUCOSE BLD-MCNC: 303 MG/DL (ref 75–110)
GLUCOSE SERPL-MCNC: 123 MG/DL (ref 74–99)
HCT VFR BLD AUTO: 24.9 % (ref 41–53)
HCT VFR BLD AUTO: 25.2 % (ref 41–53)
HCT VFR BLD AUTO: 25.7 % (ref 41–53)
HCT VFR BLD AUTO: 25.7 % (ref 41–53)
HGB BLD-MCNC: 8.2 G/DL (ref 13.5–17.5)
HGB BLD-MCNC: 8.4 G/DL (ref 13.5–17.5)
HGB BLD-MCNC: 8.4 G/DL (ref 13.5–17.5)
HGB BLD-MCNC: 8.5 G/DL (ref 13.5–17.5)
LYMPHOCYTES NFR BLD: 1.9 K/UL (ref 1–4.8)
LYMPHOCYTES RELATIVE PERCENT: 28 % (ref 24–44)
MAGNESIUM SERPL-MCNC: 1.9 MG/DL (ref 1.6–2.4)
MCH RBC QN AUTO: 28 PG (ref 26–34)
MCHC RBC AUTO-ENTMCNC: 33.3 G/DL (ref 31–37)
MCV RBC AUTO: 84.1 FL (ref 80–100)
MONOCYTES NFR BLD: 0.6 K/UL (ref 0.1–1.3)
MONOCYTES NFR BLD: 9 % (ref 1–7)
NEUTROPHILS NFR BLD: 57 % (ref 36–66)
NEUTS SEG NFR BLD: 4 K/UL (ref 1.3–9.1)
PLATELET # BLD AUTO: 340 K/UL (ref 150–450)
PMV BLD AUTO: 7.1 FL (ref 6–12)
POTASSIUM SERPL-SCNC: 4.6 MMOL/L (ref 3.7–5.3)
RBC # BLD AUTO: 3 M/UL (ref 4.5–5.9)
SODIUM SERPL-SCNC: 135 MMOL/L (ref 136–145)
TSH SERPL DL<=0.05 MIU/L-ACNC: 2.31 UIU/ML (ref 0.27–4.2)
WBC OTHER # BLD: 6.9 K/UL (ref 3.5–11)

## 2025-01-22 PROCEDURE — 85018 HEMOGLOBIN: CPT

## 2025-01-22 PROCEDURE — 2060000000 HC ICU INTERMEDIATE R&B

## 2025-01-22 PROCEDURE — 3609027000 HC COLONOSCOPY: Performed by: INTERNAL MEDICINE

## 2025-01-22 PROCEDURE — 36415 COLL VENOUS BLD VENIPUNCTURE: CPT

## 2025-01-22 PROCEDURE — 82947 ASSAY GLUCOSE BLOOD QUANT: CPT

## 2025-01-22 PROCEDURE — 6370000000 HC RX 637 (ALT 250 FOR IP): Performed by: INTERNAL MEDICINE

## 2025-01-22 PROCEDURE — 6360000002 HC RX W HCPCS: Performed by: NURSE ANESTHETIST, CERTIFIED REGISTERED

## 2025-01-22 PROCEDURE — 3700000001 HC ADD 15 MINUTES (ANESTHESIA): Performed by: INTERNAL MEDICINE

## 2025-01-22 PROCEDURE — 7100000001 HC PACU RECOVERY - ADDTL 15 MIN: Performed by: INTERNAL MEDICINE

## 2025-01-22 PROCEDURE — 3700000000 HC ANESTHESIA ATTENDED CARE: Performed by: INTERNAL MEDICINE

## 2025-01-22 PROCEDURE — 6370000000 HC RX 637 (ALT 250 FOR IP): Performed by: NURSE PRACTITIONER

## 2025-01-22 PROCEDURE — 80048 BASIC METABOLIC PNL TOTAL CA: CPT

## 2025-01-22 PROCEDURE — 2500000003 HC RX 250 WO HCPCS: Performed by: INTERNAL MEDICINE

## 2025-01-22 PROCEDURE — 2580000003 HC RX 258: Performed by: INTERNAL MEDICINE

## 2025-01-22 PROCEDURE — 85025 COMPLETE CBC W/AUTO DIFF WBC: CPT

## 2025-01-22 PROCEDURE — 2709999900 HC NON-CHARGEABLE SUPPLY: Performed by: INTERNAL MEDICINE

## 2025-01-22 PROCEDURE — 6370000000 HC RX 637 (ALT 250 FOR IP)

## 2025-01-22 PROCEDURE — 0DJD8ZZ INSPECTION OF LOWER INTESTINAL TRACT, VIA NATURAL OR ARTIFICIAL OPENING ENDOSCOPIC: ICD-10-PCS | Performed by: INTERNAL MEDICINE

## 2025-01-22 PROCEDURE — 83735 ASSAY OF MAGNESIUM: CPT

## 2025-01-22 PROCEDURE — 84443 ASSAY THYROID STIM HORMONE: CPT

## 2025-01-22 PROCEDURE — 85014 HEMATOCRIT: CPT

## 2025-01-22 PROCEDURE — 2580000003 HC RX 258: Performed by: ANESTHESIOLOGY

## 2025-01-22 PROCEDURE — 6360000002 HC RX W HCPCS: Performed by: INTERNAL MEDICINE

## 2025-01-22 PROCEDURE — 7100000000 HC PACU RECOVERY - FIRST 15 MIN: Performed by: INTERNAL MEDICINE

## 2025-01-22 RX ORDER — PROPOFOL 10 MG/ML
INJECTION, EMULSION INTRAVENOUS
Status: DISCONTINUED | OUTPATIENT
Start: 2025-01-22 | End: 2025-01-22 | Stop reason: SDUPTHER

## 2025-01-22 RX ORDER — SODIUM CHLORIDE 9 MG/ML
INJECTION, SOLUTION INTRAVENOUS CONTINUOUS
Status: DISCONTINUED | OUTPATIENT
Start: 2025-01-22 | End: 2025-01-22 | Stop reason: HOSPADM

## 2025-01-22 RX ORDER — AMOXICILLIN 500 MG/1
500 CAPSULE ORAL EVERY 12 HOURS SCHEDULED
Status: DISCONTINUED | OUTPATIENT
Start: 2025-01-22 | End: 2025-01-23

## 2025-01-22 RX ORDER — CLARITHROMYCIN 500 MG/1
500 TABLET ORAL DAILY
Status: DISCONTINUED | OUTPATIENT
Start: 2025-01-22 | End: 2025-01-23

## 2025-01-22 RX ORDER — INSULIN LISPRO 100 [IU]/ML
0-4 INJECTION, SOLUTION INTRAVENOUS; SUBCUTANEOUS
Status: DISCONTINUED | OUTPATIENT
Start: 2025-01-22 | End: 2025-01-24 | Stop reason: HOSPADM

## 2025-01-22 RX ADMIN — BUMETANIDE 1 MG: 1 TABLET ORAL at 10:26

## 2025-01-22 RX ADMIN — MICONAZOLE NITRATE: 20 POWDER TOPICAL at 21:27

## 2025-01-22 RX ADMIN — SODIUM CHLORIDE, PRESERVATIVE FREE 40 MG: 5 INJECTION INTRAVENOUS at 00:19

## 2025-01-22 RX ADMIN — PROPOFOL 50 MG: 10 INJECTION, EMULSION INTRAVENOUS at 14:02

## 2025-01-22 RX ADMIN — SODIUM CHLORIDE, PRESERVATIVE FREE 10 ML: 5 INJECTION INTRAVENOUS at 21:28

## 2025-01-22 RX ADMIN — INSULIN LISPRO 3 UNITS: 100 INJECTION, SOLUTION INTRAVENOUS; SUBCUTANEOUS at 21:28

## 2025-01-22 RX ADMIN — AMOXICILLIN 500 MG: 500 CAPSULE ORAL at 10:31

## 2025-01-22 RX ADMIN — PROPOFOL 150 MCG/KG/MIN: 10 INJECTION, EMULSION INTRAVENOUS at 14:03

## 2025-01-22 RX ADMIN — FINASTERIDE 5 MG: 5 TABLET, FILM COATED ORAL at 10:26

## 2025-01-22 RX ADMIN — TAMSULOSIN HYDROCHLORIDE 0.4 MG: 0.4 CAPSULE ORAL at 10:26

## 2025-01-22 RX ADMIN — CLARITHROMYCIN 500 MG: 500 TABLET, FILM COATED ORAL at 10:31

## 2025-01-22 RX ADMIN — AMOXICILLIN 500 MG: 500 CAPSULE ORAL at 21:28

## 2025-01-22 RX ADMIN — SODIUM CHLORIDE, PRESERVATIVE FREE 40 MG: 5 INJECTION INTRAVENOUS at 23:33

## 2025-01-22 RX ADMIN — SODIUM CHLORIDE: 9 INJECTION, SOLUTION INTRAVENOUS at 11:50

## 2025-01-22 ASSESSMENT — PAIN - FUNCTIONAL ASSESSMENT
PAIN_FUNCTIONAL_ASSESSMENT: 0-10
PAIN_FUNCTIONAL_ASSESSMENT: 0-10

## 2025-01-22 NOTE — ANESTHESIA POSTPROCEDURE EVALUATION
Department of Anesthesiology  Postprocedure Note    Patient: Carl Frias  MRN: [id number]  YOB: 1935  Date of evaluation: 1/22/2025    Procedure Summary       Date: 01/22/25 Room / Location: [place] [address] / [facility]    Anesthesia Start: 1358 Anesthesia Stop: 1426    Procedure: COLONOSCOPY DIAGNOSTIC (Rectum) Diagnosis:       Rectal bleeding      (Rectal bleeding [K62.5])    Surgeons: Koffi Chauhan MD Responsible Provider: Heavenly Subramanian MD    Anesthesia Type: general, TIVA ASA Status: 3            Anesthesia Type: No value filed.    Lakshmi Phase I: Lakshmi Score: 9    Lakshmi Phase II:      Anesthesia Post Evaluation    Comments: POST- ANESTHESIA EVALUATION       Pt Name: Calr Frias  MRN: [id number]  YOB: 1935  Date of evaluation: 1/22/2025  Time:  3:44 PM      /65   Pulse 89   Temp 98 °F (36.7 °C) (Infrared)   Resp 19   Ht 1.778 m (5' 10\")   Wt 83.4 kg (183 lb 13.8 oz)   SpO2 94%   BMI 26.38 kg/m²      Consciousness Level  Awake  Cardiopulmonary Status  Stable  Pain Adequately Treated YES  Nausea / Vomiting  NO  Adequate Hydration  YES  Anesthesia Related Complications NONE      Electronically signed by Heavenly Subramanian MD on 1/22/2025 at 3:44 PM      No notable events documented.

## 2025-01-22 NOTE — PLAN OF CARE
Problem: Chronic Conditions and Co-morbidities  Goal: Patient's chronic conditions and co-morbidity symptoms are monitored and maintained or improved  1/22/2025 1720 by Verona House RN  Outcome: Progressing  1/22/2025 0422 by Alejandro Hall RN  Outcome: Progressing  Flowsheets (Taken 1/21/2025 2000)  Care Plan - Patient's Chronic Conditions and Co-Morbidity Symptoms are Monitored and Maintained or Improved:   Monitor and assess patient's chronic conditions and comorbid symptoms for stability, deterioration, or improvement   Collaborate with multidisciplinary team to address chronic and comorbid conditions and prevent exacerbation or deterioration     Problem: Discharge Planning  Goal: Discharge to home or other facility with appropriate resources  1/22/2025 1720 by Verona House RN  Outcome: Progressing  1/22/2025 0422 by Alejandro Hall RN  Outcome: Progressing  Flowsheets (Taken 1/21/2025 2000)  Discharge to home or other facility with appropriate resources:   Identify barriers to discharge with patient and caregiver   Arrange for needed discharge resources and transportation as appropriate     Problem: Safety - Adult  Goal: Free from fall injury  1/22/2025 1720 by Verona House RN  Outcome: Progressing  1/22/2025 0422 by Alejandro Hall RN  Outcome: Progressing  Flowsheets (Taken 1/22/2025 0039)  Free From Fall Injury: Instruct family/caregiver on patient safety     Problem: ABCDS Injury Assessment  Goal: Absence of physical injury  1/22/2025 1720 by Verona House RN  Outcome: Progressing  1/22/2025 0422 by Alejandro Hall RN  Outcome: Progressing  Flowsheets (Taken 1/22/2025 0039)  Absence of Physical Injury: Implement safety measures based on patient assessment

## 2025-01-22 NOTE — PROGRESS NOTES
Pt daughter, Hannah called and informed that pt is currently hospitalized due to low blood sugar. She cancels apt 1/24/25. Pt to call clinic once patient is back home to reschedule.

## 2025-01-22 NOTE — ANESTHESIA POSTPROCEDURE EVALUATION
Department of Anesthesiology  Postprocedure Note    Patient: Carl Frias  MRN: [id number]  YOB: 1935  Date of evaluation: 1/22/2025    Procedure Summary       Date: 01/21/25 Room / Location: [place] [address] / [facility]    Anesthesia Start: 1436 Anesthesia Stop: 1451    Procedure: ESOPHAGOGASTRODUODENOSCOPY BIOPSY (Esophagus) Diagnosis:       Gastrointestinal hemorrhage, unspecified gastrointestinal hemorrhage type      (Gastrointestinal hemorrhage, unspecified gastrointestinal hemorrhage type [K92.2])    Surgeons: Koffi Chauhan MD Responsible Provider: Rangel Nova MD    Anesthesia Type: general, TIVA ASA Status: 3            Anesthesia Type: No value filed.    Lakshmi Phase I: Lakshmi Score: 10    Lakshmi Phase II:      Anesthesia Post Evaluation    Comments: POD #1 Patient had EGD yesterday. Daughter by bedside confirms no anesthesia related issues.        No notable events documented.

## 2025-01-22 NOTE — OP NOTE
PROCEDURE NOTE    DATE OF PROCEDURE: 1/22/2025    SURGEON: Koffi Chauhan MD  Facility : [facility]  ASSISTANT: None  Anesthesia: Monitored anesthesia care  PREOPERATIVE DIAGNOSIS: GI bleed    POSTOPERATIVE DIAGNOSIS: as described below    OPERATION: Total colonoscopy     ANESTHESIA: Moderate Sedation    ESTIMATED BLOOD LOSS: less than 50     COMPLICATIONS: None.     SPECIMENS:  Was Not Obtained    HISTORY: The patient is a 89 y.o. year old male with history of above preop diagnosis.  I recommended colonoscopy with possible biopsy or polypectomy and I explained the risk, benefits, expected outcome, and alternatives to the procedure.  Risks included but are not limited to bleeding, infection, respiratory distress, hypotension, and perforation of the colon and possibility of missing a lesion.  The patient understands and is in agreement.        PROCEDURE: The patient was given IV conscious sedation.  The patient's SPO2 remained above 90% throughout the procedure.     Digital rectal exam- normal    The colonoscope was inserted per rectum and advanced under direct vision to the cecum without difficulty.      Post sedation note :The patient's SPO2 remained above 90% throughout the procedure.the vital signs remained stable , and no immediate complication form the procedure noted, patient will be ready for d/c when criteria is met .        The prep was excellent.      Findings:  Terminal ileum: normal    Cecum: normal    Ascending colon: normal    Transverse colon: normal    Descending/Sigmoid colon: normal    Rectum/Anus: examined in normal and retroflexed positions and was normal; hemorrhoids moderate    Withdrawal Time was (minutes): 11    Diverticulosis: sigmoid (severe)    The colon was decompressed and the scope was removed.  The patient tolerated the procedure well.     Impression: Normal exam    Recommendations/Plan:   Will need Capsule endoscopy which can be done as out-patient  F/U Biopsies  F/U In Office

## 2025-01-22 NOTE — CARE COORDINATION
ONGOING DISCHARGE PLAN:    Patient remains in ICU     EGD done yesterday     Patient will have a Colonoscopy done today      Current with the CHF clinic    Magnesium and TSH labs    Will continue to follow for additional discharge needs.    If patient is discharged prior to next notation, then this note serves as note for discharge by case management.    Electronically signed by Selene Pedro on 1/22/2025 at 2:13 PM

## 2025-01-22 NOTE — H&P
Procedure History and Physical    Pre-Procedural Diagnosis:  GI bleed    Indications:  same    Procedure Planned: colonoscopy     History Obtained From:  patient    HISTORY OF PRESENT ILLNESS:       The patient is a 89 y.o. male who presents for the above procedure.        Past Medical History:    Past Medical History:   Diagnosis Date    Diabetes mellitus (HCC)     Hypertension     Testicular cancer (HCC)     sarcoma of left testis       Past Surgical History:    Past Surgical History:   Procedure Laterality Date    EYE SURGERY Bilateral     HERNIA REPAIR  1988    left inguinal    JOINT REPLACEMENT Bilateral     TESTICLE REMOVAL  2008    UPPER GASTROINTESTINAL ENDOSCOPY N/A 1/3/2025    ESOPHAGOGASTRODUODENOSCOPY BIOPSY WITH CLIPP APPLICATION X 1 performed by Torey Washburn MD at [facility] ENDO    UPPER GASTROINTESTINAL ENDOSCOPY N/A 1/21/2025    ESOPHAGOGASTRODUODENOSCOPY BIOPSY performed by Koffi Chauhan MD at [facility] ENDO       Medications:  Current Facility-Administered Medications   Medication Dose Route Frequency Provider Last Rate Last Admin    [Transfer Hold] clarithromycin (BIAXIN) tablet 500 mg  500 mg Oral Daily Luís Shankar APRN - NP   500 mg at 01/22/25 1031    [Transfer Hold] amoxicillin (AMOXIL) capsule 500 mg  500 mg Oral 2 times per day Luís Shankar APRN - NP   500 mg at 01/22/25 1031    0.9 % sodium chloride infusion   IntraVENous Continuous Heavenly Subramanian  mL/hr at 01/22/25 1150 New Bag at 01/22/25 1150    [Transfer Hold] pantoprazole (PROTONIX) 40 mg in sodium chloride (PF) 0.9 % 10 mL injection  40 mg IntraVENous Q12H Koffi Chauhan MD   40 mg at 01/22/25 0019    [Transfer Hold] amLODIPine (NORVASC) tablet 5 mg  5 mg Oral Daily Koffi Chauhan MD        [Transfer Hold] bumetanide (BUMEX) tablet 1 mg  1 mg Oral Daily Koffi Chauhan MD   1 mg at 01/22/25 1026    [Transfer Hold] finasteride (PROSCAR) tablet 5 mg  5 mg Oral Daily Koffi Chauhan MD   5 mg at 01/22/25 1026

## 2025-01-22 NOTE — PLAN OF CARE
Problem: Chronic Conditions and Co-morbidities  Goal: Patient's chronic conditions and co-morbidity symptoms are monitored and maintained or improved  Outcome: Progressing  Flowsheets (Taken 1/21/2025 2000)  Care Plan - Patient's Chronic Conditions and Co-Morbidity Symptoms are Monitored and Maintained or Improved:   Monitor and assess patient's chronic conditions and comorbid symptoms for stability, deterioration, or improvement   Collaborate with multidisciplinary team to address chronic and comorbid conditions and prevent exacerbation or deterioration     Problem: Discharge Planning  Goal: Discharge to home or other facility with appropriate resources  Outcome: Progressing  Flowsheets (Taken 1/21/2025 2000)  Discharge to home or other facility with appropriate resources:   Identify barriers to discharge with patient and caregiver   Arrange for needed discharge resources and transportation as appropriate     Problem: Safety - Adult  Goal: Free from fall injury  Outcome: Progressing  Flowsheets (Taken 1/22/2025 0039)  Free From Fall Injury: Instruct family/caregiver on patient safety     Problem: ABCDS Injury Assessment  Goal: Absence of physical injury  Outcome: Progressing  Flowsheets (Taken 1/22/2025 0039)  Absence of Physical Injury: Implement safety measures based on patient assessment     Problem: Skin/Tissue Integrity  Goal: Absence of new skin breakdown  Description: 1.  Monitor for areas of redness and/or skin breakdown  2.  Assess vascular access sites hourly  3.  Every 4-6 hours minimum:  Change oxygen saturation probe site  4.  Every 4-6 hours:  If on nasal continuous positive airway pressure, respiratory therapy assess nares and determine need for appliance change or resting period.  Outcome: Progressing     Problem: Pain  Goal: Verbalizes/displays adequate comfort level or baseline comfort level  Outcome: Progressing  Flowsheets (Taken 1/21/2025 2000)  Verbalizes/displays adequate comfort level or

## 2025-01-23 ENCOUNTER — CARE COORDINATION (OUTPATIENT)
Dept: OTHER | Facility: CLINIC | Age: 89
End: 2025-01-23

## 2025-01-23 LAB
ABO/RH: NORMAL
ANION GAP SERPL CALCULATED.3IONS-SCNC: 8 MMOL/L (ref 9–16)
ANTIBODY SCREEN: NEGATIVE
ARM BAND NUMBER: NORMAL
BASOPHILS # BLD: 0.1 K/UL (ref 0–0.2)
BASOPHILS NFR BLD: 1 % (ref 0–2)
BLOOD BANK BLOOD PRODUCT EXPIRATION DATE: NORMAL
BLOOD BANK DISPENSE STATUS: NORMAL
BLOOD BANK ISBT PRODUCT BLOOD TYPE: 6200
BLOOD BANK PRODUCT CODE: NORMAL
BLOOD BANK SAMPLE EXPIRATION: NORMAL
BLOOD BANK UNIT TYPE AND RH: NORMAL
BPU ID: NORMAL
BUN SERPL-MCNC: 32 MG/DL (ref 8–23)
CALCIUM SERPL-MCNC: 8.6 MG/DL (ref 8.6–10.4)
CHLORIDE SERPL-SCNC: 105 MMOL/L (ref 98–107)
CO2 SERPL-SCNC: 22 MMOL/L (ref 20–31)
COMPONENT: NORMAL
CREAT SERPL-MCNC: 1.6 MG/DL (ref 0.7–1.2)
CROSSMATCH RESULT: NORMAL
EOSINOPHIL # BLD: 0.5 K/UL (ref 0–0.4)
EOSINOPHILS RELATIVE PERCENT: 8 % (ref 0–4)
ERYTHROCYTE [DISTWIDTH] IN BLOOD BY AUTOMATED COUNT: 16.2 % (ref 11.5–14.9)
GFR, ESTIMATED: 41 ML/MIN/1.73M2
GLUCOSE BLD-MCNC: 125 MG/DL (ref 75–110)
GLUCOSE BLD-MCNC: 205 MG/DL (ref 75–110)
GLUCOSE BLD-MCNC: 223 MG/DL (ref 75–110)
GLUCOSE BLD-MCNC: 236 MG/DL (ref 75–110)
GLUCOSE SERPL-MCNC: 135 MG/DL (ref 74–99)
HCT VFR BLD AUTO: 24 % (ref 41–53)
HCT VFR BLD AUTO: 24.1 % (ref 41–53)
HCT VFR BLD AUTO: 24.2 % (ref 41–53)
HCT VFR BLD AUTO: 26.5 % (ref 41–53)
HGB BLD-MCNC: 7.9 G/DL (ref 13.5–17.5)
HGB BLD-MCNC: 8.2 G/DL (ref 13.5–17.5)
HGB BLD-MCNC: 8.4 G/DL (ref 13.5–17.5)
HGB BLD-MCNC: 8.7 G/DL (ref 13.5–17.5)
LYMPHOCYTES NFR BLD: 2 K/UL (ref 1–4.8)
LYMPHOCYTES RELATIVE PERCENT: 33 % (ref 24–44)
MCH RBC QN AUTO: 28.4 PG (ref 26–34)
MCHC RBC AUTO-ENTMCNC: 33.9 G/DL (ref 31–37)
MCV RBC AUTO: 83.9 FL (ref 80–100)
MONOCYTES NFR BLD: 0.6 K/UL (ref 0.1–1.3)
MONOCYTES NFR BLD: 10 % (ref 1–7)
NEUTROPHILS NFR BLD: 48 % (ref 36–66)
NEUTS SEG NFR BLD: 2.9 K/UL (ref 1.3–9.1)
PLATELET # BLD AUTO: 321 K/UL (ref 150–450)
PMV BLD AUTO: 6.9 FL (ref 6–12)
POTASSIUM SERPL-SCNC: 4.3 MMOL/L (ref 3.7–5.3)
RBC # BLD AUTO: 2.89 M/UL (ref 4.5–5.9)
SODIUM SERPL-SCNC: 135 MMOL/L (ref 136–145)
TRANSFUSION STATUS: NORMAL
UNIT DIVISION: 0
UNIT ISSUE DATE/TIME: NORMAL
WBC OTHER # BLD: 6 K/UL (ref 3.5–11)

## 2025-01-23 PROCEDURE — 2580000003 HC RX 258: Performed by: INTERNAL MEDICINE

## 2025-01-23 PROCEDURE — 97162 PT EVAL MOD COMPLEX 30 MIN: CPT

## 2025-01-23 PROCEDURE — 6370000000 HC RX 637 (ALT 250 FOR IP): Performed by: INTERNAL MEDICINE

## 2025-01-23 PROCEDURE — 97166 OT EVAL MOD COMPLEX 45 MIN: CPT

## 2025-01-23 PROCEDURE — APPSS30 APP SPLIT SHARED TIME 16-30 MINUTES: Performed by: NURSE PRACTITIONER

## 2025-01-23 PROCEDURE — 6370000000 HC RX 637 (ALT 250 FOR IP)

## 2025-01-23 PROCEDURE — 2060000000 HC ICU INTERMEDIATE R&B

## 2025-01-23 PROCEDURE — 6360000002 HC RX W HCPCS: Performed by: INTERNAL MEDICINE

## 2025-01-23 PROCEDURE — 82947 ASSAY GLUCOSE BLOOD QUANT: CPT

## 2025-01-23 PROCEDURE — 2500000003 HC RX 250 WO HCPCS: Performed by: INTERNAL MEDICINE

## 2025-01-23 PROCEDURE — 36415 COLL VENOUS BLD VENIPUNCTURE: CPT

## 2025-01-23 PROCEDURE — 80048 BASIC METABOLIC PNL TOTAL CA: CPT

## 2025-01-23 PROCEDURE — 85025 COMPLETE CBC W/AUTO DIFF WBC: CPT

## 2025-01-23 PROCEDURE — 97116 GAIT TRAINING THERAPY: CPT

## 2025-01-23 PROCEDURE — 85018 HEMOGLOBIN: CPT

## 2025-01-23 PROCEDURE — 97535 SELF CARE MNGMENT TRAINING: CPT

## 2025-01-23 PROCEDURE — 85014 HEMATOCRIT: CPT

## 2025-01-23 RX ORDER — AMOXICILLIN 500 MG/1
1000 CAPSULE ORAL EVERY 12 HOURS SCHEDULED
Status: DISCONTINUED | OUTPATIENT
Start: 2025-01-23 | End: 2025-01-24 | Stop reason: HOSPADM

## 2025-01-23 RX ORDER — GLIPIZIDE 5 MG/1
5 TABLET ORAL
Status: DISCONTINUED | OUTPATIENT
Start: 2025-01-23 | End: 2025-01-24 | Stop reason: HOSPADM

## 2025-01-23 RX ORDER — BENZONATATE 200 MG/1
200 CAPSULE ORAL 3 TIMES DAILY PRN
Status: DISCONTINUED | OUTPATIENT
Start: 2025-01-23 | End: 2025-01-24 | Stop reason: HOSPADM

## 2025-01-23 RX ORDER — CLARITHROMYCIN 500 MG/1
500 TABLET ORAL 2 TIMES DAILY
Status: DISCONTINUED | OUTPATIENT
Start: 2025-01-23 | End: 2025-01-24 | Stop reason: HOSPADM

## 2025-01-23 RX ADMIN — INSULIN LISPRO 1 UNITS: 100 INJECTION, SOLUTION INTRAVENOUS; SUBCUTANEOUS at 12:28

## 2025-01-23 RX ADMIN — BENZONATATE 200 MG: 200 CAPSULE ORAL at 12:28

## 2025-01-23 RX ADMIN — MICONAZOLE NITRATE: 20 POWDER TOPICAL at 07:46

## 2025-01-23 RX ADMIN — FINASTERIDE 5 MG: 5 TABLET, FILM COATED ORAL at 07:45

## 2025-01-23 RX ADMIN — GLIPIZIDE 5 MG: 5 TABLET ORAL at 17:24

## 2025-01-23 RX ADMIN — METOPROLOL SUCCINATE 25 MG: 25 TABLET, EXTENDED RELEASE ORAL at 07:45

## 2025-01-23 RX ADMIN — MICONAZOLE NITRATE: 20 POWDER TOPICAL at 20:51

## 2025-01-23 RX ADMIN — CLARITHROMYCIN 500 MG: 500 TABLET, FILM COATED ORAL at 20:50

## 2025-01-23 RX ADMIN — SODIUM CHLORIDE 250 MG: 9 INJECTION, SOLUTION INTRAVENOUS at 13:31

## 2025-01-23 RX ADMIN — INSULIN LISPRO 1 UNITS: 100 INJECTION, SOLUTION INTRAVENOUS; SUBCUTANEOUS at 17:24

## 2025-01-23 RX ADMIN — AMLODIPINE BESYLATE 5 MG: 5 TABLET ORAL at 07:45

## 2025-01-23 RX ADMIN — SODIUM CHLORIDE, PRESERVATIVE FREE 40 MG: 5 INJECTION INTRAVENOUS at 12:28

## 2025-01-23 RX ADMIN — CLARITHROMYCIN 500 MG: 500 TABLET, FILM COATED ORAL at 07:45

## 2025-01-23 RX ADMIN — SODIUM CHLORIDE, PRESERVATIVE FREE 10 ML: 5 INJECTION INTRAVENOUS at 07:46

## 2025-01-23 RX ADMIN — AMOXICILLIN 500 MG: 500 CAPSULE ORAL at 07:45

## 2025-01-23 RX ADMIN — INSULIN LISPRO 1 UNITS: 100 INJECTION, SOLUTION INTRAVENOUS; SUBCUTANEOUS at 20:50

## 2025-01-23 RX ADMIN — BUMETANIDE 1 MG: 1 TABLET ORAL at 07:45

## 2025-01-23 RX ADMIN — AMOXICILLIN 1000 MG: 500 CAPSULE ORAL at 20:49

## 2025-01-23 RX ADMIN — SODIUM CHLORIDE, PRESERVATIVE FREE 10 ML: 5 INJECTION INTRAVENOUS at 20:50

## 2025-01-23 RX ADMIN — SPIRONOLACTONE 25 MG: 25 TABLET ORAL at 07:45

## 2025-01-23 RX ADMIN — TAMSULOSIN HYDROCHLORIDE 0.4 MG: 0.4 CAPSULE ORAL at 07:45

## 2025-01-23 RX ADMIN — LISINOPRIL 10 MG: 10 TABLET ORAL at 07:45

## 2025-01-23 ASSESSMENT — PAIN SCALES - GENERAL: PAINLEVEL_OUTOF10: 0

## 2025-01-23 NOTE — PLAN OF CARE
Problem: Chronic Conditions and Co-morbidities  Goal: Patient's chronic conditions and co-morbidity symptoms are monitored and maintained or improved  Outcome: Progressing     Problem: ABCDS Injury Assessment  Goal: Absence of physical injury  Outcome: Progressing     Problem: Cardiovascular - Adult  Goal: Maintains optimal cardiac output and hemodynamic stability  Outcome: Progressing     Problem: Skin/Tissue Integrity - Adult  Goal: Incisions, wounds, or drain sites healing without S/S of infection  Outcome: Progressing     Problem: Musculoskeletal - Adult  Goal: Return mobility to safest level of function  Outcome: Progressing     Problem: Musculoskeletal - Adult  Goal: Maintain proper alignment of affected body part  Outcome: Progressing     Problem: Gastrointestinal - Adult  Goal: Minimal or absence of nausea and vomiting  Outcome: Progressing     Problem: Gastrointestinal - Adult  Goal: Maintains or returns to baseline bowel function  Outcome: Progressing

## 2025-01-23 NOTE — CARE COORDINATION
1/23/2025 1:38 PM  *  Patient Admitted                                                                                      RPM Note    Carl Frias has been admitted on 1/20/25 at [facility] due to GI Bleed . Patient is active in remote patient monitoring and has been paused at this time. RN has contacted the care manager to advise.   Will continue to monitor and follow up as needed.       WILL Carl, RN  Associate Care Manager   Cell: [phone]  [email or web address]

## 2025-01-23 NOTE — PLAN OF CARE
Problem: Chronic Conditions and Co-morbidities  Goal: Patient's chronic conditions and co-morbidity symptoms are monitored and maintained or improved  1/23/2025 0217 by Yesenia Hsieh RN  Outcome: Progressing     Problem: Discharge Planning  Goal: Discharge to home or other facility with appropriate resources  1/23/2025 0217 by Yesenia Hsieh RN  Outcome: Progressing     Problem: Safety - Adult  Goal: Free from fall injury  1/23/2025 0217 by Yesenia Hsieh RN  Outcome: Progressing     Problem: ABCDS Injury Assessment  Goal: Absence of physical injury  1/23/2025 0217 by Yesenia Hsieh RN  Outcome: Progressing     Problem: Skin/Tissue Integrity  Goal: Absence of new skin breakdown  Description: 1.  Monitor for areas of redness and/or skin breakdown  2.  Assess vascular access sites hourly  3.  Every 4-6 hours minimum:  Change oxygen saturation probe site  4.  Every 4-6 hours:  If on nasal continuous positive airway pressure, respiratory therapy assess nares and determine need for appliance change or resting period.  1/23/2025 0217 by Yesenia Hsieh RN  Outcome: Progressing     Problem: Pain  Goal: Verbalizes/displays adequate comfort level or baseline comfort level  1/23/2025 0217 by Yesenia Hsieh RN  Outcome: Progressing     Problem: Nutrition Deficit:  Goal: Optimize nutritional status  1/23/2025 0217 by Yesenia Hsieh RN  Outcome: Progressing     Problem: Cardiovascular - Adult  Goal: Maintains optimal cardiac output and hemodynamic stability  1/23/2025 0217 by Yesenia Hsieh RN  Outcome: Progressing  Flowsheets (Taken 1/23/2025 0000)  Maintains optimal cardiac output and hemodynamic stability:   Monitor blood pressure and heart rate   Monitor urine output and notify Licensed Independent Practitioner for values outside of normal range     Problem: Cardiovascular - Adult  Goal: Absence of cardiac dysrhythmias or at baseline  1/23/2025 0217 by Yesenia Hsieh RN  Outcome: Progressing  Flowsheets (Taken

## 2025-01-23 NOTE — ANESTHESIA POSTPROCEDURE EVALUATION
Department of Anesthesiology  Postprocedure Note    Patient: Calr Frias  MRN: [id number]  YOB: 1935  Date of evaluation: 1/23/2025    Procedure Summary       Date: 01/22/25 Room / Location: [place] [address] / [facility]    Anesthesia Start: 1358 Anesthesia Stop: 1426    Procedure: COLONOSCOPY DIAGNOSTIC (Rectum) Diagnosis:       Rectal bleeding      (Rectal bleeding [K62.5])    Surgeons: Koffi Chauhan MD Responsible Provider: Heavenly Subramanian MD    Anesthesia Type: general, TIVA ASA Status: 3            Anesthesia Type: No value filed.    Lakshmi Phase I: Lakshmi Score: 9    Lakshmi Phase II:      Anesthesia Post Evaluation    Comments: POD #1 Patient seen sitting up in chair. Denies any anesthesia related issues.    No notable events documented.

## 2025-01-23 NOTE — CARE COORDINATION
ONGOING DISCHARGE PLAN:    Patient is alert and oriented x4.    Spoke with patient regarding discharge plan and patient confirms that plan is still home with Sebastian Murphy.    99% on room  air    1/20 EGD-biopsies  1/21 Colonoscopy-normal    Need OP capsule endoscopy  Hgb 8.2  IV iron    PT/OT-ok for home w/ spouse, CGA       F/U appt with Dr Youssef on 2/6/25 @ 10:45am    Will continue to follow for additional discharge needs.    If patient is discharged prior to next notation, then this note serves as note for discharge by case management.    Electronically signed by Maryann England RN on 1/23/2025 at 11:38 AM

## 2025-01-24 ENCOUNTER — HOSPITAL ENCOUNTER (OUTPATIENT)
Dept: OTHER | Age: 89
Discharge: HOME | End: 2025-01-24

## 2025-01-24 ENCOUNTER — CARE COORDINATION (OUTPATIENT)
Dept: CARE COORDINATION | Age: 89
End: 2025-01-24

## 2025-01-24 VITALS
DIASTOLIC BLOOD PRESSURE: 54 MMHG | HEIGHT: 70 IN | RESPIRATION RATE: 18 BRPM | BODY MASS INDEX: 26.61 KG/M2 | OXYGEN SATURATION: 95 % | TEMPERATURE: 98.6 F | HEART RATE: 58 BPM | SYSTOLIC BLOOD PRESSURE: 108 MMHG | WEIGHT: 185.85 LBS

## 2025-01-24 PROBLEM — E44.1 MILD MALNUTRITION (HCC): Status: ACTIVE | Noted: 2025-01-24

## 2025-01-24 LAB
GLUCOSE BLD-MCNC: 126 MG/DL (ref 75–110)
GLUCOSE BLD-MCNC: 94 MG/DL (ref 75–110)
HCT VFR BLD AUTO: 23.8 % (ref 41–53)
HGB BLD-MCNC: 7.9 G/DL (ref 13.5–17.5)
SURGICAL PATHOLOGY REPORT: NORMAL

## 2025-01-24 PROCEDURE — 97110 THERAPEUTIC EXERCISES: CPT

## 2025-01-24 PROCEDURE — 2500000003 HC RX 250 WO HCPCS: Performed by: INTERNAL MEDICINE

## 2025-01-24 PROCEDURE — 85018 HEMOGLOBIN: CPT

## 2025-01-24 PROCEDURE — 82947 ASSAY GLUCOSE BLOOD QUANT: CPT

## 2025-01-24 PROCEDURE — 6370000000 HC RX 637 (ALT 250 FOR IP): Performed by: INTERNAL MEDICINE

## 2025-01-24 PROCEDURE — 2580000003 HC RX 258: Performed by: INTERNAL MEDICINE

## 2025-01-24 PROCEDURE — 85014 HEMATOCRIT: CPT

## 2025-01-24 PROCEDURE — 6360000002 HC RX W HCPCS: Performed by: INTERNAL MEDICINE

## 2025-01-24 PROCEDURE — 97116 GAIT TRAINING THERAPY: CPT

## 2025-01-24 PROCEDURE — 99239 HOSP IP/OBS DSCHRG MGMT >30: CPT | Performed by: INTERNAL MEDICINE

## 2025-01-24 PROCEDURE — 97530 THERAPEUTIC ACTIVITIES: CPT

## 2025-01-24 PROCEDURE — 36415 COLL VENOUS BLD VENIPUNCTURE: CPT

## 2025-01-24 RX ORDER — METOPROLOL SUCCINATE 25 MG/1
12.5 TABLET, EXTENDED RELEASE ORAL DAILY
Qty: 90 TABLET | Refills: 1 | Status: SHIPPED | OUTPATIENT
Start: 2025-01-24

## 2025-01-24 RX ADMIN — MICONAZOLE NITRATE: 20 POWDER TOPICAL at 07:43

## 2025-01-24 RX ADMIN — AMLODIPINE BESYLATE 5 MG: 5 TABLET ORAL at 07:41

## 2025-01-24 RX ADMIN — TAMSULOSIN HYDROCHLORIDE 0.4 MG: 0.4 CAPSULE ORAL at 07:41

## 2025-01-24 RX ADMIN — CLARITHROMYCIN 500 MG: 500 TABLET, FILM COATED ORAL at 07:44

## 2025-01-24 RX ADMIN — SPIRONOLACTONE 25 MG: 25 TABLET ORAL at 07:41

## 2025-01-24 RX ADMIN — SODIUM CHLORIDE, PRESERVATIVE FREE 40 MG: 5 INJECTION INTRAVENOUS at 12:12

## 2025-01-24 RX ADMIN — GLIPIZIDE 5 MG: 5 TABLET ORAL at 07:41

## 2025-01-24 RX ADMIN — GLIPIZIDE 5 MG: 5 TABLET ORAL at 15:59

## 2025-01-24 RX ADMIN — BUMETANIDE 1 MG: 1 TABLET ORAL at 07:41

## 2025-01-24 RX ADMIN — METFORMIN HYDROCHLORIDE 1000 MG: 1000 TABLET ORAL at 07:41

## 2025-01-24 RX ADMIN — LISINOPRIL 10 MG: 10 TABLET ORAL at 07:41

## 2025-01-24 RX ADMIN — AMOXICILLIN 1000 MG: 500 CAPSULE ORAL at 07:44

## 2025-01-24 RX ADMIN — SODIUM CHLORIDE, PRESERVATIVE FREE 40 MG: 5 INJECTION INTRAVENOUS at 00:09

## 2025-01-24 RX ADMIN — BENZONATATE 200 MG: 200 CAPSULE ORAL at 00:09

## 2025-01-24 RX ADMIN — SODIUM CHLORIDE, PRESERVATIVE FREE 10 ML: 5 INJECTION INTRAVENOUS at 07:42

## 2025-01-24 RX ADMIN — FINASTERIDE 5 MG: 5 TABLET, FILM COATED ORAL at 07:41

## 2025-01-24 RX ADMIN — METOPROLOL SUCCINATE 25 MG: 25 TABLET, EXTENDED RELEASE ORAL at 07:41

## 2025-01-24 NOTE — PLAN OF CARE
Problem: Chronic Conditions and Co-morbidities  Goal: Patient's chronic conditions and co-morbidity symptoms are monitored and maintained or improved  1/24/2025 0342 by Eb Tobias RN  Outcome: Progressing     Problem: Discharge Planning  Goal: Discharge to home or other facility with appropriate resources  Outcome: Progressing     Problem: Safety - Adult  Goal: Free from fall injury  Outcome: Progressing  Note: No falls noted this shift. Patient ambulates with x1 staff assistance without difficulty.  Bed kept in low position. Safe environment maintained. Bedside table & call light in reach. Uses call light appropriately when needing assistance.       Problem: ABCDS Injury Assessment  Goal: Absence of physical injury  1/24/2025 0342 by Eb Tobias, RN  Outcome: Progressing     Problem: Skin/Tissue Integrity  Goal: Absence of new skin breakdown  Description: 1.  Monitor for areas of redness and/or skin breakdown  2.  Assess vascular access sites hourly  3.  Every 4-6 hours minimum:  Change oxygen saturation probe site  4.  Every 4-6 hours:  If on nasal continuous positive airway pressure, respiratory therapy assess nares and determine need for appliance change or resting period.  Outcome: Progressing     Problem: Pain  Goal: Verbalizes/displays adequate comfort level or baseline comfort level  Outcome: Progressing  Note: Assessed all pain characteristics including level, type, location, frequency, and onset.  Non-pharmacologic interventions offered to pt as well.  Pt states pain is tolerable at this time.       Problem: Nutrition Deficit:  Goal: Optimize nutritional status  Outcome: Progressing     Problem: Cardiovascular - Adult  Goal: Maintains optimal cardiac output and hemodynamic stability  1/24/2025 0342 by Eb Tobias, RN  Outcome: Progressing     Problem: Cardiovascular - Adult  Goal: Absence of cardiac dysrhythmias or at baseline  Outcome: Progressing     Problem: Skin/Tissue Integrity - Adult  Goal: Skin

## 2025-01-24 NOTE — PLAN OF CARE
Problem: Chronic Conditions and Co-morbidities  Goal: Patient's chronic conditions and co-morbidity symptoms are monitored and maintained or improved  1/24/2025 1738 by Rosette Sharma RN  Outcome: Completed  1/24/2025 0342 by Eb Tobias RN  Outcome: Progressing     Problem: Discharge Planning  Goal: Discharge to home or other facility with appropriate resources  1/24/2025 1738 by Rosette Sharma RN  Outcome: Completed  1/24/2025 0342 by Eb Tobias RN  Outcome: Progressing     Problem: Safety - Adult  Goal: Free from fall injury  1/24/2025 1738 by Rosette Sharma RN  Outcome: Completed  1/24/2025 0342 by Eb Tobias RN  Outcome: Progressing  Note: No falls noted this shift. Patient ambulates with x1 staff assistance without difficulty.  Bed kept in low position. Safe environment maintained. Bedside table & call light in reach. Uses call light appropriately when needing assistance.       Problem: ABCDS Injury Assessment  Goal: Absence of physical injury  1/24/2025 1738 by Rosette Sharma RN  Outcome: Completed  1/24/2025 0342 by Eb Tobias RN  Outcome: Progressing     Problem: Skin/Tissue Integrity  Goal: Absence of new skin breakdown  Description: 1.  Monitor for areas of redness and/or skin breakdown  2.  Assess vascular access sites hourly  3.  Every 4-6 hours minimum:  Change oxygen saturation probe site  4.  Every 4-6 hours:  If on nasal continuous positive airway pressure, respiratory therapy assess nares and determine need for appliance change or resting period.  1/24/2025 1738 by Rosette Sharma RN  Outcome: Completed  1/24/2025 0342 by Eb Tobias RN  Outcome: Progressing     Problem: Pain  Goal: Verbalizes/displays adequate comfort level or baseline comfort level  1/24/2025 1738 by Rosette Sharma RN  Outcome: Completed  1/24/2025 0342 by Eb Tobias RN  Outcome: Progressing  Note: Assessed all pain characteristics including level, type, location, frequency, and onset.  Non-pharmacologic interventions offered to pt as

## 2025-01-24 NOTE — CARE COORDINATION
Referral from CTN, Camila Gandhi, SUKHWINDER-  Please follow up with patient CHF, ADA diet   ty//[id number]     Patient has been inpatient since referral was made.  Will reach out to patient upon discharge.  PRINCE Roman

## 2025-01-24 NOTE — CARE COORDINATION
Writer notified, Steven, from [facility], that Pt. Will DC to home today.     Writer is awaiting for LIZ to be completed.     Steven, will follow & will pull Information from Epic when completed.

## 2025-01-24 NOTE — CARE COORDINATION
ONGOING DISCHARGE PLAN:    Patient is alert and oriented x4.    Spoke with patient regarding discharge plan and patient confirms that plan is still to return to home w/ Wife/Family.     Multiple Daughters at the bedside & they confirm they will be helping w/ any needs.     Pt. Will Continue w/ [facility].     PT/OT on board. Rec home w/ Spouse.     HGB today 7.9.     F/U apt. Has already been made.     Will continue to follow for additional discharge needs.    If patient is discharged prior to next notation, then this note serves as note for discharge by case management.    Electronically signed by Duyen Munguia RN on 1/24/2025 at 12:56 PM

## 2025-01-25 NOTE — PROGRESS NOTES
[facility] Internal Medicine  Arsenio Ruiz MD; Michael Pino MD, Aleksandr Finney MD, Miranda Youssef MD,   Jayleen Londono MD; Nisa Williamson MD, Drew Bowen MD.    [facility] Internal Medicine   IN-PATIENT SERVICE   [facility]    Progress note            Date:   1/23/2025  Patient name:  Carl Frias  Date of admission:  1/20/2025  9:33 PM  MRN:   [id number]  Account:  [id number]  YOB: 1935  PCP:    Aleksandr Finney MD  Room:   2097/2097-01  Code Status:    Full Code    Chief Complaint:     Chief Complaint   Patient presents with    Dizziness    Rectal Bleeding     Pt. States ongoing for 3 days, pt. Also states he is dizzy, denies pain at this time       History Obtained From:     Patient/EMR/Bedside RN    History of Present Illness:   Carl Frias is a 89 y.o.  /  male who presents with Dizziness and Rectal Bleeding (Pt. States ongoing for 3 days, pt. Also states he is dizzy, denies pain at this time) and is admitted to the hospital for the management of GI bleed. 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 Was also admitted 1/1-1/6 for anemia requiring blood transfusion, EGD completed which revealed gastric ulcer requiring clipping.       Presented to ED per EMS, complaining of rectal bleeding and dizziness x3 days. Also has chronic barajas catheter that he reports had blood present 2 days ago. He has been taking low dose Eliquis for atrial fibrillation. No active rectal bleeding while in ED but stool positive for OB. Hgb 7.0. 1 unit PRBC transfused. 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  CT head 
     [facility] Internal Medicine  Arsenio Ruiz MD; Michael Pino MD, Aleksandr Finney MD, Miranda Youssef MD,   Jayleen Londono MD; Nisa Williamson MD, Drew Bowen MD.    [facility] Internal Medicine   IN-PATIENT SERVICE   [facility]    Progress note            Date:   1/22/2025  Patient name:  Carl Frias  Date of admission:  1/20/2025  9:33 PM  MRN:   [id number]  Account:  [id number]  YOB: 1935  PCP:    Aleksandr Finney MD  Room:   2008/2008-01  Code Status:    Full Code    Chief Complaint:     Chief Complaint   Patient presents with    Dizziness    Rectal Bleeding     Pt. States ongoing for 3 days, pt. Also states he is dizzy, denies pain at this time       History Obtained From:     Patient/EMR/Bedside RN    History of Present Illness:   Carl Frias is a 89 y.o.  /  male who presents with Dizziness and Rectal Bleeding (Pt. States ongoing for 3 days, pt. Also states he is dizzy, denies pain at this time) and is admitted to the hospital for the management of GI bleed. 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 Was also admitted 1/1-1/6 for anemia requiring blood transfusion, EGD completed which revealed gastric ulcer requiring clipping.       Presented to ED per EMS, complaining of rectal bleeding and dizziness x3 days. Also has chronic barajas catheter that he reports had blood present 2 days ago. He has been taking low dose Eliquis for atrial fibrillation. No active rectal bleeding while in ED but stool positive for OB. Hgb 7.0. 1 unit PRBC transfused. 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  CT head 
     [facility] Internal Medicine  Arsenio Ruiz MD; Michael Pino MD, Aleksandr Finney MD, Miranda Youssef MD,   Jayleen Londono MD; Nisa Williamson MD, Drew Bowen MD.    [facility] Internal Medicine   IN-PATIENT SERVICE   [facility]    Progress note            Date:   1/24/2025  Patient name:  Carl Frias  Date of admission:  1/20/2025  9:33 PM  MRN:   [id number]  Account:  [id number]  YOB: 1935  PCP:    Aleksandr Finney MD  Room:   2097/2097-01  Code Status:    Full Code    Chief Complaint:     Chief Complaint   Patient presents with    Dizziness    Rectal Bleeding     Pt. States ongoing for 3 days, pt. Also states he is dizzy, denies pain at this time       History Obtained From:     Patient/EMR/Bedside RN    History of Present Illness:   Carl Frias is a 89 y.o.  /  male who presents with Dizziness and Rectal Bleeding (Pt. States ongoing for 3 days, pt. Also states he is dizzy, denies pain at this time) and is admitted to the hospital for the management of GI bleed. 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 Was also admitted 1/1-1/6 for anemia requiring blood transfusion, EGD completed which revealed gastric ulcer requiring clipping.       Presented to ED per EMS, complaining of rectal bleeding and dizziness x3 days. Also has chronic barajas catheter that he reports had blood present 2 days ago. He has been taking low dose Eliquis for atrial fibrillation. No active rectal bleeding while in ED but stool positive for OB. Hgb 7.0. 1 unit PRBC transfused. 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  CT head 
    Department of Internal Medicine  Nephrology Kee Bloom MD   Consult Note    Reason for consultation: Management of chronic kidney disease stage IIIb.    Requesting physician: Arsenio Ruiz MD.    Subjective.    Patient seen and examined, no new complains.  Colonoscopy yesterday.  Scr stable at 1.6 mg/dl  BP stable  Non oliguric 4.4 L yesterday  Edema improved and stable    History of present illness: This is a 89 y.o. 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 Bloom of renal services of [place]], who presented to the hospital yesterday for evaluation of melena stools associated with dizziness. He has a chronic Crenshaw catheter and states that he started noticing blood in his catheter 3 days prior to this presentation. Vital signs were stable at presentation with blood pressure 128/85 mmHg and pulse 65 bpm.   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    Laboratory studies were remarkable for hemoglobin 7.0 g/dL; BUN/creatinine 54/2.0 mg/dL with positive stool occult blood.  He had been recently admitted to [facility] from 1/11/25 to 1/17/2025 for management of symptomatic hypoglycemia.     Rivaroxaban    Past Medical History:   Diagnosis Date    Diabetes mellitus (HCC)     Hypertension     Testicular cancer (HCC)     sarcoma of left testis     Scheduled Meds:   amoxicillin  1,000 mg Oral 2 times per day    
    Department of Internal Medicine  Nephrology Nick Corona MD  Progress Note    Reason for consultation: Management of chronic kidney disease stage IIIb.    Requesting physician: Arsenio Ruiz MD.    Interval history: Patient was seen and examined today and appears stable at rest.  He has indwelling Crenshaw catheter and is nonoliguric.  He is hemodynamically stable. He is on Bumex 1 mg p.o. daily and spironolactone 25 mg p.o. daily.    History of present illness: This is a 89 y.o. 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 Bloom of renal services of [place]], who presented to the hospital yesterday for evaluation of melena stools associated with dizziness. He has a chronic Crenshaw catheter and states that he started noticing blood in his catheter 3 days prior to this presentation. Vital signs were stable at presentation with blood pressure 128/85 mmHg and pulse 65 bpm.   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    Laboratory studies were remarkable for hemoglobin 7.0 g/dL; BUN/creatinine 54/2.0 mg/dL with positive stool occult blood.  He had been recently admitted to [facility] from 1/11/25 to 1/17/2025 for management of symptomatic hypoglycemia.     Scheduled Meds:   amoxicillin  1,000 mg Oral 2 times per day    clarithromycin  500 mg Oral BID    glipiZIDE  5 mg Oral BID AC    metFORMIN  1,000 mg Oral Daily with 
   GI Progress notes    1/23/2025   12:38 PM    Name:  Carl Frias  MRN:    [id number]     Acct:     [id number]   Room:  2097/2097-01   Day: 2     Admit Date: 1/20/2025  9:33 PM  PCP: Aleksandr Finney MD    Subjective:     C/C:   Chief Complaint   Patient presents with    Dizziness    Rectal Bleeding     Pt. States ongoing for 3 days, pt. Also states he is dizzy, denies pain at this time       Interval History: Status: improved.     Patient seen and examined.  No acute events overnight.  S/p colonoscopy yesterday with normal exam.  Hgb stable  Clinically doing well.  ROS:  Constitutional: negative for chills, fevers and sweats  Respiratory: negative for cough, dyspnea on exertion, hemoptysis, shortness of breath and wheezing  Cardiovascular: negative for chest pain, chest pressure/discomfort, dyspnea, lower extremity edema and palpitations  Gastrointestinal: negative for abdominal pain, constipation, diarrhea, nausea and vomiting  Neurological: negative for dizziness and headaches    Medications:     Allergies:   Allergies   Allergen Reactions    Rivaroxaban Other (See Comments)     Hematuria recurrent       Current Meds: benzonatate (TESSALON) capsule 200 mg, TID PRN  ferric gluconate (FERRLECIT) 250 mg in sodium chloride 0.9 % 100 mL IVPB, Once  clarithromycin (BIAXIN) tablet 500 mg, Daily  amoxicillin (AMOXIL) capsule 500 mg, 2 times per day  insulin lispro (HUMALOG,ADMELOG) injection vial 0-4 Units, 4x Daily AC & HS  pantoprazole (PROTONIX) 40 mg in sodium chloride (PF) 0.9 % 10 mL injection, Q12H  amLODIPine (NORVASC) tablet 5 mg, Daily  bumetanide (BUMEX) tablet 1 mg, Daily  finasteride (PROSCAR) tablet 5 mg, Daily  lisinopril (PRINIVIL;ZESTRIL) tablet 10 mg, Daily  metoprolol succinate (TOPROL XL) extended release tablet 25 mg, Daily  miconazole (MICOTIN) 2 % powder, BID  spironolactone (ALDACTONE) tablet 25 mg, Daily  tamsulosin (FLOMAX) capsule 0.4 mg, Daily  sodium chloride flush 0.9 % injection 5-40 mL, 
Accu check 133  
Attempted to see the patient, but patient is off the floor.  
Blood sugar 105 with 10% dextrose infusing, will notify anesthesia  
Comprehensive Nutrition Assessment    Type and Reason for Visit:  Initial, Positive nutrition screen, Wound    Nutrition Recommendations/Plan:   NPO ordered.  Will monitor diet advancement and recommend appropriate course of action.     Malnutrition Assessment:  Malnutrition Status:  At risk for malnutrition (01/21/25 1320)    Context:  Chronic Illness     Findings of the 6 clinical characteristics of malnutrition:  Energy Intake:  Mild decrease in energy intake  Weight Loss:   (12.8% wt loss in 9 months; may in part be d/t fluid shifts)     Body Fat Loss:  Unable to assess     Muscle Mass Loss:  Unable to assess    Fluid Accumulation:  Severe (May not relate to nutrition status) Extremities   Strength:  Not Performed    Nutrition Assessment:    Pt admitted to ICU for GI bleed, with PMH of DM2, CKD3b, Afib, CHF, polyoarthritis, gastric ulcer, testicular cancer; s/p bilat knee replacement. Pt is NPO for EGD this date. Will monitor for diet advancement.    Nutrition Related Findings:    Bloody stools. Labs: Na 130, GFR 36. Edema: +3 RLE/LLE. BM 1/21. Meds reviewed. Pt is hard of hearing. Wound Type: Pressure Injury, Stage II       Current Nutrition Intake & Therapies:    Average Meal Intake: NPO  Average Supplements Intake: NPO  Diet NPO    Anthropometric Measures:  Height: 177.8 cm (5' 10\")  Ideal Body Weight (IBW): 166 lbs (75 kg)    Admission Body Weight: 90.7 kg (199 lb 15.3 oz)  Current Body Weight: 90.7 kg (199 lb 15.3 oz), 120.5 % IBW. Weight Source: Stated  Current BMI (kg/m2): 28.7  Usual Body Weight: 104 kg (229 lb 4.5 oz) (4/25/2024 (~9mo.) ED Admission)     % Weight Change (Calculated): -12.8  Weight Adjustment For: No Adjustment                 BMI Categories: Overweight (BMI 25.0-29.9)    Estimated Daily Nutrient Needs:  Energy Requirements Based On: Formula     Energy (kcal/day): 2934-9500 kcal/day based on [place]-St. Jeor 1.2-1.3 factor  Weight Used for Protein Requirements: Admission  Protein 
Comprehensive Nutrition Assessment    Type and Reason for Visit:  Reassess    Nutrition Recommendations/Plan:   Continue current diet.   Continue oral nutrition suppelements.      Malnutrition Assessment:  Malnutrition Status:  Mild malnutrition (01/24/25 1909)    Context:  Acute Illness     Findings of the 6 clinical characteristics of malnutrition:  Energy Intake:  Mild decrease in energy intake  Weight Loss:  5% over 1 month     Body Fat Loss:  Unable to assess     Muscle Mass Loss:  Unable to assess    Fluid Accumulation:  Mild Extremities   Strength:  Not Performed    Nutrition Assessment:    Pt discharged before seen today. However, pt did appear mildly malnourished based on wt loss of 5.6% within 1 month.    Nutrition Related Findings:    Edema: +1 RLE, LLE. POC Glucose:  since 1/23. Na: 135 (1/23). Glucotrol, Metformin. Other labs and meds reviewed. BM: 1/23. Wound Type: Multiple, Pressure Injury, Stage I (abrasions)       Current Nutrition Intake & Therapies:    Average Meal Intake: % (1 meal recorded on 1/21)  Average Supplements Intake: Unable to assess  ADULT DIET; Regular; 5 carb choices (75 gm/meal); No Added Salt (3-4 gm)  ADULT ORAL NUTRITION SUPPLEMENT; Lunch, Dinner; Low Calorie/High Protein Oral Supplement    Anthropometric Measures:  Height: 177.8 cm (5' 10\")  Ideal Body Weight (IBW): 166 lbs (75 kg)    Admission Body Weight: 90.7 kg (199 lb 15.3 oz) (stated)  Current Body Weight: 84.3 kg (185 lb 13.6 oz), 112 % IBW. Weight Source: Bed scale  Current BMI (kg/m2): 26.7  Usual Body Weight: 89.3 kg (196 lb 13.9 oz) (1/1/25)     % Weight Change (Calculated): -5.6  Weight Adjustment For: No Adjustment                 BMI Categories: Overweight (BMI 25.0-29.9)    Estimated Daily Nutrient Needs:  Energy Requirements Based On: Formula     Energy (kcal/day): 1423-5729 kcal/day based on [place]-St. Jeor 1.2-1.3 factor  Weight Used for Protein Requirements: Admission  Protein (g/day): 
Date:   1/24/2025  Patient name: Carl Frias  Date of admission:  1/20/2025  9:33 PM  MRN:   [id number]  YOB: 1935  PCP: Aleksandr Finney MD    Reason for Admission: GI bleed [K92.2]    Cardiology consult cardiology consult: Diastolic CHF, chronic A-fib        Referring physician: Dr Arsenio Ruiz     Impression        Admission 1/20/2025 rectal bleeding, hematuria, dizziness, hemoglobin 7, has received 2 units blood transfusion   and colonoscopies 1/22/2025 normal  Congestive heart failure diastolic ejection fraction 55-60% 2D echo 1/2/2024  Episode of nonsustained V. tach heart rate 160, 12 beats 2/18/2024, 1-2-2025  Chronically elevated high-sensitivity troponin  Chronic A-fib on low-dose Eliquis  Severely dilated LA  No history of coronary intervention no history of TIA or CVA  Hypertension  Moderate left ventricular hypertrophy  Type 2 diabetes mellitus  Chronic kidney disease  Bilateral hydronephrosis and hydroureter CT abdomen 1/14/2025  Stable cholelithiasis  Benign prostatic hypertrophy  Impaired hearing, severe  Severe osteoarthritis involving multiple joints poor mobility  Back pain  Obesity BMI 37  Poor mobility  History of easy bruising  Iron deficiency anemia serum iron 13, saturation 7%, TIBC 184 lab work 1/2/2025    Admission 1/11/2025 severe anemia hemoglobin 6.5, increasing swelling over the legs, markedly elevated proBNP  EGD 1/3/2025 one 5 mm ulcer with adherent blood in the stomach 1 clip, also biopsy obtained for H. pylori    Admission  4/23/2024 increasing shortness of breath, peripheral edema and UTI, urinary retention, urine culture grew E. coli, ROIX on CKD  Significant improvement in the renal function after placing Crenshaw catheter and treating for UTI   Abnormal high-sensitivity troponin most likely type II     No history of coronary intervention, no TIA or CVA     Past surgical history  Hernia repair left inguinal, bilateral knee joint replacement bilateral, testicle 
Dr Corona notified of consult, creat 2.0, Dextrose infusion at 100ml/hr  
Dr Corona rounding at bedside. MD thinks patient would benefit from colonoscopy.  
Dr. Felipe notified of new consult   
Eliquis 2.5 mg bid resumed per Dr. Felipe's recommendation for chronic atrial fibrillation. Discharge medications list updated.  
JOSÉ Shankar NP notified of consult for GI service, pt received 1 unit PRBC in ER, repeat HGB pending,   
NP's notified of patient's run of Vtach. Patient asymptomatic - returned to baseline Afib. Orders placed for magnesium and TSH labs.   
Neema PRETTY put photo of patient's R. Buttock wound in chart.  
Notified Dr. Nova of patient's blood sugar with 10% dextrose infusing,  has had trouble maintaining blood sugar's since admission , no new orders   
Order placed by Dr. Williamson to transfer to [facility]   
Patient discharged to home per orders. IV x 2 discontinued.  Patient and family states all belongings are present.  Discharge instructions reviewed with daughter Hannah.  Questions regarding glipizide addressed with Katey Del Toro NP.  Patient will continue glipizide 10 mg bid and discontinue metformin.  
Patient received from Intermediate ICU. Vitals taken. Telemetry applied. No distress noted. See doc flowsheet and transfer navigator for details. Call light within reach, and pt educated on its use. Bed in lowest position, and locked. Side rails up x 2. Denied further questions or needs at this time.   
Per Dr. Williamson, hold cardiac meds this morning due to bradycardia and hypotension  
Physical Therapy  [facility]   Physical Therapy Treatment  Date: 25  Patient Name: Carl Frias       Room: 7-01  MRN: [id number]  Account: [id number]   : 1935  (89 y.o.) Gender: male     Discharge Recommendations:  Discharge Recommendations: Patient would benefit from continued therapy after discharge     PT Equipment Recommendations  Equipment Needed:  (TBD)    General  Patient assessed for rehabilitation services?: Yes  Additional Pertinent Hx: Per H & P note:Carl Frias is a 89 y.o.  /  male who presents with Dizziness and Rectal Bleeding (Pt. States ongoing for 3 days, pt. Also states he is dizzy, denies pain at this time) and is admitted to the hospital for the management of GI bleed. 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 Was also admitted - for anemia requiring blood transfusion, EGD completed which revealed gastric ulcer requiring clipping.       Presented to ED per EMS, complaining of rectal bleeding and dizziness x3 days. Also has chronic barajas catheter that he reports had blood present 2 days ago. He has been taking low dose Eliquis for atrial fibrillation. No active rectal bleeding while in ED but stool positive for OB. Hgb 7.0. 1 unit PRBC transfused. 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  CT head negative for acute intracranial abnormality. While in ED, family reported that patient was acting \"odd\" with hallucinations similar to when his blood sugar was low in the past. Glucose level 35. Required multiple D10 bolus and eventually transitioned to continuous D5 infusion then continuous D10 
Physical Therapy  [facility]   Physical Therapy Evaluation  Date: 25  Patient Name: Carl Frias       Room: 7-  MRN: [id number]  Account: [id number]   : 1935  (89 y.o.) Gender: male     Discharge Recommendations:  Discharge Recommendations: Patient would benefit from continued therapy after discharge           Past Medical History:  has a past medical history of Diabetes mellitus (HCC), Hypertension, and Testicular cancer (HCC).  Past Surgical History:   has a past surgical history that includes Testicle removal (); hernia repair (); eye surgery (Bilateral); joint replacement (Bilateral); Upper gastrointestinal endoscopy (N/A, 1/3/2025); Upper gastrointestinal endoscopy (N/A, 2025); and Colonoscopy (N/A, 2025).    Subjective  Subjective  Subjective: Pt requesting to walk to the bathroom for a BM.     General  Patient assessed for rehabilitation services?: Yes  Additional Pertinent Hx: Per H & P note:Carl Frias is a 89 y.o.  /  male who presents with Dizziness and Rectal Bleeding (Pt. States ongoing for 3 days, pt. Also states he is dizzy, denies pain at this time) and is admitted to the hospital for the management of GI bleed. 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 Was also admitted - for anemia requiring blood transfusion, EGD completed which revealed gastric ulcer requiring clipping.       Presented to ED per EMS, complaining of rectal bleeding and dizziness x3 days. Also has chronic barajas catheter that he reports had blood present 2 days ago. He has been taking low dose Eliquis for atrial fibrillation. No active rectal bleeding while in ED but stool positive for OB. Hgb 7.0. 1 unit PRBC transfused. CTA abdomen pelvis shows no evidence of active hemorrhage, does reveal severe stenosis of celiac 
[facility]   Occupational Therapy Evaluation  Date: 25  Patient Name: Carl Frias       Room: -  MRN: [id number]  Account: [id number]   : 1935  (89 y.o.) Gender: male     Discharge Recommendations:  The patient may need non-skilled ADL assistance after discharge.     OT Equipment Recommendations  Equipment Needed: No    Referring Practitioner: Dr. Ruiz  Diagnosis: GI Bleed     Treatment Diagnosis: Impaired self-care status    Past Medical History:  has a past medical history of Diabetes mellitus (HCC), Hypertension, and Testicular cancer (HCC).    Past Surgical History:   has a past surgical history that includes Testicle removal (); hernia repair (); eye surgery (Bilateral); joint replacement (Bilateral); Upper gastrointestinal endoscopy (N/A, 1/3/2025); Upper gastrointestinal endoscopy (N/A, 2025); and Colonoscopy (N/A, 2025).    Restrictions  Restrictions/Precautions  Restrictions/Precautions: Fall Risk (IVs left and right anterior forearms, urinary catheter, troponins 74 on 2025)  Activity Level:  (use lift equipment)  Required Braces or Orthoses?: No  Implants Present? : Metal implants (bilateral TKRs)      Vitals  Vitals  O2 Device: None (Room air)     Subjective  Subjective: \"I take a shower all on my own when I am there\"  Comments: Referring to home.  Pt goes on to give detailed description of bathroom set-up which is very handicap accessible and allows him to complete tasks independently and safely.  See PLOF/History for details.  Pain  Pre-Pain: 0 (denies)  Post-Pain: 0    Social/Functional History  Social/Functional History  Lives With: Spouse  Type of Home: House  Home Layout: Two level, Performs ADL's on one level, Able to Live on Main level with bedroom/bathroom  Home Access: Stairs to enter with rails  Entrance Stairs - Number of Steps: 4 DIMPLE  Entrance Stairs - Rails: Both  Bathroom Shower/Tub: Curtain, Walk-in shower, Shower 
RN agrees with Jacinta Rowell RN charting this shift 1/22 7p-2940p.  
[facility]   INPATIENT OCCUPATIONAL THERAPY  PROGRESS NOTE  Date: 2025  Patient Name: Carl Frias       Room:   MRN: [id number]    : 1935  (89 y.o.)  Gender: male   Referring Practitioner: Dr. Ruiz  Diagnosis: GI Bleed      Discharge Recommendations:  Further Occupational Therapy is recommended upon facility discharge.    OT Equipment Recommendations  Equipment Needed: No    Restrictions/Precautions  Restrictions/Precautions  Restrictions/Precautions: Fall Risk  Activity Level: Up with Assist  Required Braces or Orthoses?: No  Implants Present? : Metal implants    O2 Device: None (Room air)    Subjective  Subjective  Subjective: pt. originally refused this AM  \"I'm comfy\", but changed mind and agreed to take a walk. Pt. reported no pain this date. Pt. declines ADLs this date, states that he already washed up.  Pain  Pre-Pain: 0  Post-Pain: 0       Objective  Orientation  Overall Orientation Status: Within Functional Limits  Orientation Level: Oriented to place;Oriented to time;Oriented to situation;Oriented to person  Cognition  Overall Cognitive Status: WFL  Patient affect:: Normal    Activities of Daily Living  Putting On/Taking Off Footwear: Dependent/Total  Putting On/Taking Off Footwear Skilled Clinical Factors: daughter states pt usually able to don socks himself    Balance  Balance  Sitting Balance: Supervision (seated in upright chair)  Standing Balance: Contact guard assistance  Standing Balance  Time: 5-10 min  Activity: functional mobility/transfers in room/hallway  Comment: BUE support on RW, cuing for posture with observed flexed foward positioning    Transfers/Mobility  Transfers  Sit to stand: Contact guard assistance  Stand to sit: Contact guard assistance  Transfer Comments: w/RW    Functional Mobility  Functional - Mobility Device: Rolling Walker  Activity: Other (room/hallway mobility)  Assist Level: Minimal assistance  Functional Mobility 
previous CT scan on 01/14/2025. 7. Markedly thickened bladder wall. 8. Cholelithiasis without evidence of acute cholecystitis. 9. Diverticulosis of sigmoid colon without diverticulitis. 10. Small to moderate amount of gas scattered in multiple loops of small bowel with multiple fluid levels.  Acute enteritis may not be excluded. 11. Normal appendix. 12. No evidence of acute fracture or dislocation.     CT Head W/O Contrast    Result Date: 1/21/2025  No acute intracranial abnormality.     NM KIDNEY W FLOW AND FUNCTION W PHARMACOLOGICAL INTERVENTION    Result Date: 1/15/2025  Limited study due to low radiotracer dose used for the procedure. Limited study of the flow portion of the study to both kidneys. Evidence of concentration by both kidneys better on the right side than the left with significant retention of radiotracer in both upper urinary tracts particularly the right pelvocaliceal system in response to Lasix consistent with bilateral partial obstructive uropathy.     CT ABDOMEN PELVIS WO CONTRAST Additional Contrast? None    Result Date: 1/14/2025  1. Stable bilateral hydronephrosis and hydroureter to the level of the urinary bladder. 2. Stable cholelithiasis. 3. Trace right pleural effusion.     US RENAL LIMITED    Result Date: 1/14/2025  Persistent right hydronephrosis and nonvisualization of the left kidney.  I would suggest CT urography for further evaluation.         Plan:     Patient status inpatient in the Progressive Unit/Step down      KALEN ENGLAND NP  1/21/2025  4:28 AM      Please note that this chart was generated using voice recognition Dragon dictation software.  Although every effort was made to ensure the accuracy of this automated transcription, some errors in transcription [place] [address].   Phone [phone]

## 2025-01-27 ENCOUNTER — CARE COORDINATION (OUTPATIENT)
Dept: CARE COORDINATION | Age: 89
End: 2025-01-27

## 2025-01-27 DIAGNOSIS — K25.4 GASTROINTESTINAL HEMORRHAGE ASSOCIATED WITH GASTRIC ULCER: Primary | ICD-10-CM

## 2025-01-27 PROCEDURE — 1111F DSCHRG MED/CURRENT MED MERGE: CPT

## 2025-01-27 NOTE — CARE COORDINATION
Registered Dietitian Initial Assessment for Care Coordination      Name-Carl Frias  January 27, 2025    Initial Referral Reason: DM/CHF    Patient Care Team:  Aleksandr Finney MD as PCP - General (Internal Medicine)  Aleksandr Finney MD as PCP - [facility]  Minerva Box MD as Consulting Physician (Hematology and Oncology)  Kyaw Bowling MD as Consulting Physician (Radiation Oncology)  Ron Mayers MD Katragadda, Srinivas, MD as Consulting Physician (Pulmonary Disease)  Camila Gandhi RN as Care Transitions Nurse  Mono Anderson as Health   Hazel Mario RD, LD as Dietitian    Patient Active Problem List   Diagnosis    Benign prostatic hyperplasia without lower urinary tract symptoms    Essential hypertension    Type 2 diabetes mellitus with chronic kidney disease (HCC)    Type 2 diabetes mellitus with diabetic polyneuropathy (HCC)    Cardiomegaly    Unspecified atrial fibrillation (HCC)    Chronic atrial fibrillation (HCC)    Diabetic peripheral neuropathy (HCC)    History of inguinal hernia repair    Chronic combined systolic and diastolic congestive heart failure (HCC)    Other constipation    Hydrocele, unspecified    Low back pain, unspecified    Other specified abdominal hernia without obstruction or gangrene    Radiculopathy, lumbosacral region    Other atopic dermatitis    Acute kidney failure, unspecified (HCC)    Unstable gait    Easy bruisability    Acute on chronic combined systolic (congestive) and diastolic (congestive) heart failure (HCC)    Acquired left foot drop    Secondary hypercoagulable state (HCC)    Systolic CHF, acute (HCC)    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

## 2025-01-27 NOTE — CARE COORDINATION
Patient was referred to Social Work by Camila Gandhi/TRUNG, who stated that  this patient is in need of home delivered meals and also would like to apply  for Passport services.  HC made a referral to [employer] and will complete the Passport application  with the spouses assistance on tomorrow.    Plan of Care  HC will contact patient's spouse to complete the   Patient's referral to Passport on tomorrow....

## 2025-01-27 NOTE — CARE COORDINATION
Attempted to reach patient today for nutrition CC consult.  LVM with contact information requesting a return call to [phone].  Will attempt to reach again within 1-2 weeks.  PRINCE Proctor

## 2025-01-27 NOTE — CARE COORDINATION
Care Transitions Note    Initial Call - Call within 2 business days of discharge: Yes    Patient Current Location:  Home: [address] [facility] [address]    Care Transition Nurse contacted the patient by telephone to perform post hospital discharge assessment, verified name and  as identifiers. Provided introduction to self, and explanation of the Care Transition Nurse role.     Patient: Carl Frias    Patient : 1935   MRN: [id number]    Reason for Admission: gi bleed  Discharge Date: 25  RURS: Readmission Risk Score: 28.3      Last Discharge Facility       Date Complaint Diagnosis Description Type Department Provider    25 Dizziness; Rectal Bleeding Gastrointestinal hemorrhage, unspecified gastrointestinal hemorrhage type ... ED to Hosp-Admission (Discharged) (ADMITTED) Arsenio Avila MD; Amanda ...            Was this an external facility discharge? No    Additional needs identified to be addressed with provider                 Method of communication with provider:    .    Patients top risk factors for readmission: functional cognitive ability, functional physical ability, falls, medication management, support system, and utilization of services    Interventions to address risk factors:   Education:   Record daily weights.  Notify your doctor if you have a weight gain 2 pounds in a day, or 3-5 pounds in 1 week. Notify your doctor for  increased shortness of breath, or swelling in legs or feet.  Follow a low sodium diet.  Resume normal activity unless otherwise instructed by your doctor.  If you have a bacterial infection, your doctor may prescribe an antibiotic. Antibiotics are powerful drugs and  when used correctly, they can save lives. But like all medications, they can have side effects.  FINISH TAKING ALL YOUR ANTIBIOTICS  Feeling better doesn't mean your infection is gone and if you stop taking your antibiotics without your  physician's direction, your infection

## 2025-01-28 ENCOUNTER — CARE COORDINATION (OUTPATIENT)
Dept: CARE COORDINATION | Age: 89
End: 2025-01-28

## 2025-01-28 NOTE — CARE COORDINATION
2025 11:39 AM  *  RPM Verification RPM Verification and Welcome Call Successful? Yes,     Remote Patient Monitoring Welcome Note  Date/Time:  2025 11:39 AM  Patient Current Location: Home: [address] [facility] [address]  Verified patients name and  as identifiers.       Completed and confirmed the following:    [x] Patient received all RPM equipment (tablet, scale, blood pressure device and cuff, and pulse oximeter)  Cuff Size: regular (9.05\"-15.74\")    Weight Scale:  regular (<330lbs)                    [x] Instructed patient keep box for use when returning equipment                                                          [x] Reviewed Patient Welcome Letter with patient    [x]  Reviewed Consent Form    2025  Copy of consent form in chart.                 [x] Reviewed expectations for patient and care team  Monitoring hours M-F 9-4pm  It is important to take your vitals every day, even on the weekends,to keep your care team aware of how you are doing every day of the week.  Completing monitoring by 12pm on  so that alerts can be responded to in the same day  Patient weighs self at same time every day (or after urinating and waking up)  Take blood pressure 1-2 hrs after medications   RPM team may have different phone area code (including [place], [place], [place] or [place])     Carrie                          [x] Instructed patient to keep scale on flat surface                                                         [x] Instructed patient to keep tablet plugged in at all times                         [x] Instructed how to contact IT support  ([phone])  [x] Provided Remote Patient Monitoring care  information  KAREN    Emergency Contact Verified:  Naomi Frias- [phone]               All questions answered at this time.

## 2025-01-28 NOTE — CARE COORDINATION
RPM Green Alert Reviewed    2025 11:45 AM  *  RPM Alert   Remote Patient Monitoring Note      Date/Time:  2025 11:46 AM  Patient Current Location: Home: [address] [address]  LPN contacted patient by telephone regarding  WELCOME CALL   Verified patients name and  as identifiers.  Background: enrolled in RPM for RPM Care Plans: CHF    Clinical Interventions:   Weight Increase of 5 lbs In Last 7 Days as of last reading on 2025   Weight Increase of 3 lbs In Last Day as of last reading on 2025       CALLED [phone], spoke with Carl Frias and his wife, they both stated his weight is around 195 lbs. Per the HRS chart review, the inaccurate weight of 66.2 lbs. was recorded on 2025. The weight has been removed from [facility] HRS metrics.    Carl Frias notify to update activity in the tablet.    Plan/Follow Up: Will continue to review, monitor and address alerts with follow up based on severity of symptoms and risk factors.

## 2025-01-28 NOTE — CARE COORDINATION
HC attempted to contact the patient's spouse, for her assistance,  with completing a referral for the Passport program.  HC left her contact information for  patient's spouse to return the call.    Plan of Care  HC will follow up with spouse if no return call soon.

## 2025-01-29 ENCOUNTER — CARE COORDINATION (OUTPATIENT)
Dept: CARE COORDINATION | Age: 89
End: 2025-01-29

## 2025-01-29 DIAGNOSIS — N13.8 BPH WITH OBSTRUCTION/LOWER URINARY TRACT SYMPTOMS: ICD-10-CM

## 2025-01-29 DIAGNOSIS — N40.1 BPH WITH OBSTRUCTION/LOWER URINARY TRACT SYMPTOMS: ICD-10-CM

## 2025-01-29 DIAGNOSIS — E11.42 TYPE 2 DIABETES MELLITUS WITH DIABETIC POLYNEUROPATHY, WITHOUT LONG-TERM CURRENT USE OF INSULIN (HCC): ICD-10-CM

## 2025-01-29 RX ORDER — FINASTERIDE 5 MG/1
5 TABLET, FILM COATED ORAL DAILY
Qty: 90 TABLET | Refills: 3 | Status: SHIPPED | OUTPATIENT
Start: 2025-01-29

## 2025-01-29 NOTE — CARE COORDINATION
Per RD request, educational material mailed to patient.  Meal planner CHF  Low sodium charts   Diabetes snack list   Wound healing and diabetes   Glucerna coupons x 5  Ensure coupons x 5

## 2025-01-30 ENCOUNTER — CARE COORDINATION (OUTPATIENT)
Dept: CASE MANAGEMENT | Age: 89
End: 2025-01-30

## 2025-01-30 ENCOUNTER — TELEPHONE (OUTPATIENT)
Dept: INTERNAL MEDICINE CLINIC | Age: 89
End: 2025-01-30

## 2025-01-30 RX ORDER — DIPHENHYDRAMINE HCL 25 MG
TABLET ORAL
Qty: 1 KIT | Refills: 0 | Status: SHIPPED | OUTPATIENT
Start: 2025-01-30

## 2025-01-30 RX ORDER — GLUCOSAM/CHON-MSM1/C/MANG/BOSW 500-416.6
TABLET ORAL
Qty: 200 EACH | Refills: 3 | Status: SHIPPED | OUTPATIENT
Start: 2025-01-30

## 2025-01-30 RX ORDER — BLOOD-GLUCOSE CONTROL, LOW
EACH MISCELLANEOUS
Qty: 1 EACH | Refills: 3 | Status: SHIPPED | OUTPATIENT
Start: 2025-01-30

## 2025-01-30 RX ORDER — CALCIUM CITRATE/VITAMIN D3 200MG-6.25
TABLET ORAL
Qty: 200 EACH | Refills: 3 | Status: SHIPPED | OUTPATIENT
Start: 2025-01-30

## 2025-01-30 NOTE — CARE COORDINATION
-Remote Alert Monitoring Note      Date/Time:  2025 8:56 AM  Patient Current Location: Home: [address] [address]  Verified patients name and  as identifiers.    Rpm alert to be reviewed by the provider   red alert  blood pressure reading (86/49)  Vitals Recheck blood pressure reading (102/57)  Additional needs to be addressed by provider: No                   LPN contacted patient by telephone regarding red alert received   Background: CHF  Refer to 911 immediately if:  Patient unresponsive or unable to provide history  Change in cognition or sudden confusion  Patient unable to respond in complete sentences  Intense chest pain/tightness  Any concern for any clinical emergency  Red Alert: Provider response time of 1 hr required for any red alert requiring intervention  Yellow Alert: Provider response time of 3hr required for any escalated yellow alert  Patient Chief Complaint:  Blood Pressure BP Triage  Are you having any Chest Pain? no   Are you having any Shortness of Breath? no   Do you have a headache or have any vision changes? no   Are you having any numbness or tingling? no   Are you having any other health concerns or issues? no  Patient/Caregiver educated on how to properly take a blood pressure. Patient/Caregiver verbalizes understanding.     Clinical Interventions: Reviewed and followed up on alerts and treatments-spoke to patients wife and patient he denies chest pain, SOB, dizziness she states he is drinking fluids but not sure how much, advised to make sure patient is drinking fluids throughout the day , repeat BP now WNL no concerns at present time    Plan/Follow Up: Will continue to review, monitor and address alerts with follow up based on severity of symptoms and risk factors.  **For any new or worsening symptoms or you are concerned in anyway, please contact your Provider or report to the nearest Emergency Room.**

## 2025-01-30 NOTE — TELEPHONE ENCOUNTER
Ty from [facility] called in for verbal for physical therapy once a week for 7 more weeks. Orders will be faxed over.

## 2025-01-30 NOTE — CARE COORDINATION
Date/Time:  1/30/2025 8:51 AM  LPN attempted to reach patient by telephone regarding red alert BP 86/49 in remote patient monitoring program. Left HIPAA compliant message requesting a return call. Will attempt to reach patient again.  EC number same as patients ACM NOTIFIED

## 2025-01-31 ENCOUNTER — CARE COORDINATION (OUTPATIENT)
Dept: CARE COORDINATION | Age: 89
End: 2025-01-31

## 2025-01-31 VITALS
DIASTOLIC BLOOD PRESSURE: 57 MMHG | SYSTOLIC BLOOD PRESSURE: 109 MMHG | WEIGHT: 189 LBS | BODY MASS INDEX: 27.12 KG/M2 | OXYGEN SATURATION: 99 % | HEART RATE: 66 BPM

## 2025-02-03 ENCOUNTER — CARE COORDINATION (OUTPATIENT)
Dept: CARE COORDINATION | Age: 89
End: 2025-02-03

## 2025-02-03 NOTE — CARE COORDINATION
Care Transitions Note    Follow Up Call     Patient Current Location:  Home: [address] [facility] [address]    [facility] Care Coordinator contacted the patient by telephone. Verified name and  as identifiers.    Additional needs identified to be addressed with provider   No needs identified                 Method of communication with provider: face to face.    Care Summary Note: Writer spoke to patient's spouse Naomi she answered the phone. She states that patient is doing pretty good. Home care nurse  from [facility] has been out pt had PT today last week pt had two nurse visits. Naomi states he is eating and drinking fine. No new or worsening swelling. He does elevate legs. She denies he has c/o of cp/palpitations, sob or any other concerns. /67 today.[place]shaw cath patent states urine ligh in color /normal BM.Denies blood in stool.   They have received coupons for nutritional supplements. Discussed upcoming appointments daughter will take pt to follow up appointments.     Plan of care updates since last contact:  Rpm no concerns        Advance Care Planning:   Does patient have an Advance Directive: reviewed during previous call, see note. .    Medication Review:  No changes since last call.     Remote Patient Monitoring:  Offered patient enrollment in the Remote Patient Monitoring (RPM) program for in-home monitoring: Yes, patient enrolled; current status is activated and monitoring.    Assessments:  Care Transitions Subsequent and Final Call    Subsequent and Final Calls  Do you have any ongoing symptoms?: No  Have your medications changed?: No  Do you have any questions related to your medications?: No  Do you currently have any active services?: Yes  Are you currently active with any services?: Home Health  Do you have any needs or concerns that I can assist you with?: No  Care Transitions Interventions    Physical Therapy: Completed Other Services: Completed    Meals on Wheels: Completed

## 2025-02-05 ENCOUNTER — CARE COORDINATION (OUTPATIENT)
Dept: CARE COORDINATION | Age: 89
End: 2025-02-05

## 2025-02-05 NOTE — CARE COORDINATION
HC placed a follow up call to the spouse of the patient.  She reported that things  are going well. HC inquired if the patient's meals had started yet, and his spouse  reported that they came a couple of days ago.  HC asked the spouse if she had  spoken to the patient and to their daughter yet about applying for the Passport   services.  Spouse stated that they have services that are coming in presently and  they are giving the patient a shower and helping in other ways.  HC got the under-  standing that Homecare Services are with the patient.  HC explained that they will  assist the patient for so many weeks and then will leave.  HC mentioned that the   question would remain if the patient would like to apply for Passport.  Patient's   spouse was asked to speak with her family and let the HC know within the next   week or so.  Spouse stated that she has HC's name and phone number, and will  get back with her.      Plan of Care  HC will await a response from patient's spouse   regarding Passport application

## 2025-02-06 ENCOUNTER — CARE COORDINATION (OUTPATIENT)
Dept: CARE COORDINATION | Age: 89
End: 2025-02-06

## 2025-02-06 NOTE — CARE COORDINATION
Nutrition Care Coordinator Follow-Up visit:    Food Recall:eating 3 meals/d    Activity Level:  Sedentary:X  Lightly Active:  Moderately Active:  Very Active:    Adult BMI:  Underweight (below 18.5)  Normal Weight (18.5-24.9)  Overweight (25-29.9)X  Obese (30-39.9)  Morbidly Obese (>40)    Plan:  Plan was established with patient:  Increase dietary fiber by consuming whole grains, fruits and vegetables:  Limit dietary cholesterol to >200mg/day:  Increase water intake:  Avoid added sugar:  Avoid sweetened beverages:  Choose lean meats:X  Limit sodium intake:X    Monitoring:  Will monitor weight:  Will monitor adherence to meal plan:X  Will monitor adherence to exercise plan:  Will monitor HGA1c:    Handouts Provided :  Low Carb snacking:X  Carb counting /individual meal plan:X  Portion Control:  Food Labels:X  Physical Activity:  Low Fat/Cholesterol:  Hypo/Hyperglycemia:  Calorie Controlled Meal Plan:  DASH guidelines: X    Goals:  Increase water consumption to 8oz. 6-8 times daily:  Manage blood sugars by consuming 3 meals spaced every 4-5 hours with 2-3 snacks daily:discussed  Increase fiber and decrease fat intake by consuming 1-2 fruit servings and 2-3 vegetable servings per day.  Increase physical activity by:  Consume less than 2,000mg of sodium/day: discussed  Avoid consumption of sweetened beverages and added sugar by reading food labels:  Monitor blood sugars by using meter to check blood glucose before morning meal and 2 hours after a meal daily:  Decrease risk of coronary heart disease by consuming fish that contains omega-3 fatty acids at least twice a week, avoiding partially hydrogenated oil/trans fats and limiting saturated fat intake by reading food labels:discussed    Patient goals set:  1.Reviewed DASH guidelines- lowfat/cholesterol and low sodium. Reviewed reading food labels-what to look for on labels.  Encouraged to limit saturated fat, trans fat, cholesterol and sodium intake.  2. Reviewed

## 2025-02-11 ENCOUNTER — CARE COORDINATION (OUTPATIENT)
Dept: CASE MANAGEMENT | Age: 89
End: 2025-02-11

## 2025-02-11 ENCOUNTER — CARE COORDINATION (OUTPATIENT)
Dept: CARE COORDINATION | Age: 89
End: 2025-02-11

## 2025-02-11 ENCOUNTER — OFFICE VISIT (OUTPATIENT)
Dept: UROLOGY | Age: 89
End: 2025-02-11
Payer: MEDICARE

## 2025-02-11 VITALS — TEMPERATURE: 97.4 F | SYSTOLIC BLOOD PRESSURE: 118 MMHG | HEART RATE: 67 BPM | DIASTOLIC BLOOD PRESSURE: 60 MMHG

## 2025-02-11 DIAGNOSIS — N13.8 BPH WITH OBSTRUCTION/LOWER URINARY TRACT SYMPTOMS: Primary | ICD-10-CM

## 2025-02-11 DIAGNOSIS — N40.1 BPH WITH OBSTRUCTION/LOWER URINARY TRACT SYMPTOMS: Primary | ICD-10-CM

## 2025-02-11 DIAGNOSIS — R33.9 URINARY RETENTION: ICD-10-CM

## 2025-02-11 PROCEDURE — 1123F ACP DISCUSS/DSCN MKR DOCD: CPT | Performed by: UROLOGY

## 2025-02-11 PROCEDURE — 99214 OFFICE O/P EST MOD 30 MIN: CPT | Performed by: UROLOGY

## 2025-02-11 PROCEDURE — 1036F TOBACCO NON-USER: CPT | Performed by: UROLOGY

## 2025-02-11 PROCEDURE — G8427 DOCREV CUR MEDS BY ELIG CLIN: HCPCS | Performed by: UROLOGY

## 2025-02-11 PROCEDURE — 1159F MED LIST DOCD IN RCRD: CPT | Performed by: UROLOGY

## 2025-02-11 PROCEDURE — G8417 CALC BMI ABV UP PARAM F/U: HCPCS | Performed by: UROLOGY

## 2025-02-11 PROCEDURE — 1111F DSCHRG MED/CURRENT MED MERGE: CPT | Performed by: UROLOGY

## 2025-02-11 ASSESSMENT — ENCOUNTER SYMPTOMS
VOMITING: 0
ABDOMINAL PAIN: 0
WHEEZING: 0
EYE PAIN: 0
EYE REDNESS: 0
BACK PAIN: 0
NAUSEA: 0
DIARRHEA: 0
COUGH: 0
CONSTIPATION: 0
SHORTNESS OF BREATH: 0

## 2025-02-11 NOTE — PROGRESS NOTES
Indication/diagnosis: Urinary retention     Cath pull procedure  Risks and Benefits discussed with patient prior to procedure.    Procedure Date: 2/11/2025    Verbal consent obtained from patient prior to procedure.  Patient arrived with old 16F urethral catheter in place for a cath pull. Barajas ballon deflated and barajas removed without complication.      Encouraged to stay well hydrated.Verbalized understanding.   
Review of Systems   Constitutional:  Negative for chills, fatigue and fever.   Eyes:  Negative for pain, redness and visual disturbance.   Respiratory:  Negative for cough, shortness of breath and wheezing.    Cardiovascular:  Negative for chest pain and leg swelling.   Gastrointestinal:  Negative for abdominal pain, constipation, diarrhea, nausea and vomiting.   Genitourinary:  Negative for difficulty urinating, dysuria, flank pain, frequency, hematuria, scrotal swelling, testicular pain and urgency.        Catheter in place   Musculoskeletal:  Negative for back pain, joint swelling and myalgias.   Skin:  Negative for rash and wound.   Neurological:  Negative for dizziness, tremors and numbness.   Hematological:  Does not bruise/bleed easily.     
GASTROINTESTINAL ENDOSCOPY N/A 1/3/2025    ESOPHAGOGASTRODUODENOSCOPY BIOPSY WITH CLIPP APPLICATION X 1 performed by Torey Washburn MD at [facility] ENDO    UPPER GASTROINTESTINAL ENDOSCOPY N/A 2025    ESOPHAGOGASTRODUODENOSCOPY BIOPSY performed by Koffi Chauhan MD at [facility] ENDO     Family History   Problem Relation Age of Onset    Other Mother         lung disease     No outpatient medications have been marked as taking for the 25 encounter (Office Visit) with Margarito Piper MD.       Rivaroxaban  Social History     Tobacco Use   Smoking Status Former    Current packs/day: 0.00    Average packs/day: 0.3 packs/day for 14.0 years (4.2 ttl pk-yrs)    Types: Cigarettes    Start date:     Quit date:     Years since quittin.1   Smokeless Tobacco Never   Tobacco Comments    quit 42 years ago      (Ifpatient a smoker, smoking cessation counseling offered)    Social History     Substance and Sexual Activity   Alcohol Use No    Comment: quite 42 years ago        REVIEW OF SYSTEMS:  Review of Systems    Physical Exam:      Vitals:    25 1052   BP: 118/60   Pulse: 67   Temp: 97.4 °F (36.3 °C)     There is no height or weight on file to calculate BMI.  Patient is a 89 y.o. male in no acute distress and alert and oriented to person, place and time.  Physical Exam  Constitutional: Patient in no acute distress.  Neuro: Alert and oriented to person, place and time.  Psych: Mood normal, affect normal  Skin: No rash noted    Assessment and Plan      1. BPH with obstruction/lower urinary tract symptoms    2. Urinary retention       Retention is new.       Plan:    Assessment & Plan     Will remove barajas today.   Continue Flomax and Finasteride.   F/U in 4 weeks with a PVR.  We discussed surgical options, but we will hold off at this time.       Return in about 4 weeks (around 3/11/2025).    Prescriptions Ordered:  No orders of the defined types were placed in this encounter.    Orders Placed:  No orders of

## 2025-02-11 NOTE — PROGRESS NOTES
Called pt and confirmed CHF Clinic apt scheduled tomorrow, 2/12/25.   
Pt daughter called in to cancel CHF Clinic apt scheduled tomorrow, 2/12/25 due to weather. She reschedules apt for 2/19/25.  
No

## 2025-02-11 NOTE — CARE COORDINATION
Care Transitions Note    Follow Up Call     Patient Current Location:  Home: [address] [facility] [address]    Care Transition Nurse contacted the spouse/partner , family, daughter, Sophie RPM nurse  by telephone. Verified name and  as identifiers.    Additional needs identified to be addressed with provider                    Method of communication with provider:    .    Care Summary Note: Writer spoke to patient's wife and family, discussed patient's weight gain of 5 pounds in 7 days,red alert today Sophie RPM notified pcp office of weight gain, pcp office instructed patient to take an extra dose of Bumex ( 1 Mg) today, continue to check weight daily, elevate legs, notified medial professional if patient has any sob or dizziness, family will have patient take an extra dose of Bumex and will recheck his weight tomorrow, family also monitoring patient d/t barajas was removed today, patient has voided a small amount one time so far today, will continue to follow//JU    Plan of care updates since last contact:  Review of patient management of conditions/medications:         Advance Care Planning:   Does patient have an Advance Directive: reviewed during previous call, see note. .    Medication Review:  Medications changed since last call, reviewed today.     Remote Patient Monitoring:  Offered patient enrollment in the Remote Patient Monitoring (RPM) program for in-home monitoring: Yes, patient enrolled; current status is activated and monitoring.    Assessments:       Follow Up Appointment:   Reviewed upcoming appointment(s).  Future Appointments         Provider Specialty Dept Phone    2025 2:30 PM Aleksandr Finney MD Internal Medicine [phone]    2025 12:00 PM [facility]F CLINIC  2 Cardiology [phone]    3/11/2025 2:20 PM Margarito Piper MD Urology [phone]    2025 1:00 PM Hugo Thorpe MD Pulmonology [phone]            Care Transition Nurse provided contact

## 2025-02-11 NOTE — CARE COORDINATION
Date/Time:  2/11/2025 10:03 AM  LPN attempted to reach patient by telephone regarding red alert 5 lb weight increase in 7 days  in remote patient monitoring program. Left HIPAA compliant message requesting a return call. Will attempt to reach patient again.    
Take 1 extra dose of Bumex today.  Check weight daily.  If starts to have symptoms such as shortness of breath, dizziness reach out to medical professional.  Elevate legs  Keep upcoming appointment with heart failure clinic.  
first thing in the morning, after going to the bathroom; before eating breakfast, and before having anything to drink.  Instructed on importance of  not wearing shoes on the scale, and wearing the same type of clothing each day.  Clinical Interventions: spoke to patient wife she denies patient has has any chest pain, or SOB no dizziness she does states he has swelling in feet and legs, she states his appetite has been good and he has been eating very well lately, patient taking 25 mg spironolactone and 1 mg BUMEX as directed  no other concerns at present time all other vitals WNL   Plan/Follow Up: Will continue to review, monitor and address alerts with follow up based on severity of symptoms and risk factors.  **For any new or worsening symptoms or you are concerned in anyway, please contact your Provider or report to the nearest Emergency Room.**

## 2025-02-12 ENCOUNTER — HOSPITAL ENCOUNTER (OUTPATIENT)
Dept: OTHER | Age: 89
Discharge: HOME | End: 2025-02-12

## 2025-02-12 NOTE — TELEPHONE ENCOUNTER
Spoke with patients wife who agreed to have patient take one extra Bumex dose.   Will monitor patient for any new symptoms and call office if needed.  Confirmed appt with PCP and heart failure clinic for next week.   Will continue to monitor weight and vitals at home.

## 2025-02-13 ENCOUNTER — CARE COORDINATION (OUTPATIENT)
Dept: CARE COORDINATION | Age: 89
End: 2025-02-13

## 2025-02-13 ENCOUNTER — TELEPHONE (OUTPATIENT)
Dept: PRIMARY CARE CLINIC | Age: 89
End: 2025-02-13

## 2025-02-13 NOTE — CARE COORDINATION
factors.  Plan for next call: symptom management-follow up on weight , voiding, f/u appts      Camila Gandhi RN

## 2025-02-13 NOTE — TELEPHONE ENCOUNTER
02/13/25 10:15 AM    REMOTE PATIENT MONITORING  ALERT RESPONSE         ASSESSMENT AND PLAN   CHF  Asymptomatic weight gain  Cardiology increased Bumex to 2 mg daily (needs clarification)  Continue to monitor with RPM    CHIEF COMPLAINT    Chief Complaint   Patient presents with    RPM alert     Weight increase of 3.8 lbs in 1 days, 2.6 lbs over 5 days.    HPI    Carl Frias is a 89 y.o. male who is enrolled in [facility] for CHF. Alert received for asymptomatic weight gain.   Spoke to patient's wife. He is feeling well, and has no new or worsening CHF symptoms including increased SOB or edema. Cardiology called yesterday and told him to increase the Bumex to 2mg daily.   VS: 129/58/61 97%    REVIEW OF SYSTEMS    See HPI     CURRENT MEDICATIONS    Current Outpatient Rx   Medication Sig Dispense Refill    Blood Glucose Calibration (TRUE METRIX LEVEL 1) Low SOLN Use as directed 1 each 3    Blood Glucose Monitoring Suppl (TRUE METRIX AIR GLUCOSE METER) w/Device KIT Use as directed to check blood sugar 1 kit 0    blood glucose test strips (TRUE METRIX BLOOD GLUCOSE TEST) strip Test blood sugar 2 times daily 200 each 3    TRUEplus Lancets 33G MISC Test blood sugar 2 times daily 200 each 3    finasteride (PROSCAR) 5 MG tablet TAKE 1 TABLET EVERY DAY 90 tablet 3    metoprolol succinate (TOPROL XL) 25 MG extended release tablet Take 0.5 tablets by mouth daily 90 tablet 1    apixaban (ELIQUIS) 2.5 MG TABS tablet Take 1 tablet by mouth 2 times daily 60 tablet 0    amoxicillin (AMOXIL) 500 MG capsule Take 2 capsules by mouth 2 times daily 56 capsule 0    omeprazole (PRILOSEC) 20 MG delayed release capsule Take 1 capsule by mouth 2 times daily (before meals) for 14 days 28 capsule 0    lisinopril (PRINIVIL;ZESTRIL) 10 MG tablet Take 1 tablet by mouth daily 90 tablet 3    glipiZIDE (GLUCOTROL) 10 MG tablet TAKE 1 TABLET TWICE DAILY BEFORE MEALS (Patient not taking: Reported on 1/20/2025) 180 tablet 3    nystatin (MYCOSTATIN) 262350

## 2025-02-13 NOTE — CARE COORDINATION
Do you have any swelling in your hands of feet? no   Are you having any other health concerns or issues? no    Instructed Patient/Caregiver on:  Patient to weigh on the same scale at the same time each day  Patient to weigh first thing in the morning, after going to the bathroom; before eating breakfast, and before having anything to drink.  Instructed on importance of not wearing shoes on the scale, and wearing the same type of clothing each day.          Clinical Interventions: Reviewed and followed up on alerts and treatments-. 3.8lb weight increase overnight. Yesterdays weight was slightly lower than the rest this week. Pt is asymptomatic and in no apparent distress, speaking in full sentences.  Wife states he is doing fine and she denies any swelling or breathing concerns. She states she gave him the extra Bumex as directed yesterday and noticed an increase in his saturated brief this morning despite the weight increase. No concerns voiced. Will forward to NP per protocol.      Signs and symptoms of CHF discussed with patient, such as feeling more tired than normal, feeling short of breath, coughing that increases when lying down, sudden weight gain, swelling of the feet, legs or belly.  Patient verbalized understanding to notify physician office if these symptoms occur.    **For any new or worsening symptoms or you are concerned in anyway, please contact your Provider or report to the nearest Emergency Room.**    Plan/Follow Up: Will continue to review, monitor and address alerts with follow up based on severity of symptoms and risk factors.    SEAMUS Lenz [facility]/ CTN/ Remote Patient monitoring  [phone]

## 2025-02-14 ENCOUNTER — CARE COORDINATION (OUTPATIENT)
Dept: CARE COORDINATION | Age: 89
End: 2025-02-14

## 2025-02-14 NOTE — CARE COORDINATION
HC attempted to contact the patient.  There was no answer,  HC left a message for patient's wife and provided contact   information for a return call.    Plan of Care  HC will follow up with patient if no return  call within 3-5 days

## 2025-02-17 ENCOUNTER — CARE COORDINATION (OUTPATIENT)
Dept: CARE COORDINATION | Age: 89
End: 2025-02-17

## 2025-02-17 NOTE — CARE COORDINATION
HC spoke to patient's spouse last week and she informed the HC  that she and the patient don't like the home delivered meals.  She   stated that there's no taste and they aren't used to tasteless food.  HC informed the spouse of the patient that she would contact [employer] and request that they contact the family.  HC has emailed   [employer] and made the request.    Plan of Care  HC will follow up with the family in a week

## 2025-02-18 ENCOUNTER — OFFICE VISIT (OUTPATIENT)
Dept: INTERNAL MEDICINE CLINIC | Age: 89
End: 2025-02-18

## 2025-02-18 VITALS
OXYGEN SATURATION: 99 % | BODY MASS INDEX: 27.92 KG/M2 | HEART RATE: 72 BPM | DIASTOLIC BLOOD PRESSURE: 70 MMHG | HEIGHT: 70 IN | WEIGHT: 195 LBS | SYSTOLIC BLOOD PRESSURE: 124 MMHG

## 2025-02-18 DIAGNOSIS — D50.0 BLOOD LOSS ANEMIA: ICD-10-CM

## 2025-02-18 DIAGNOSIS — I42.0 DILATED CARDIOMYOPATHY (HCC): ICD-10-CM

## 2025-02-18 DIAGNOSIS — N18.32 STAGE 3B CHRONIC KIDNEY DISEASE (HCC): Primary | ICD-10-CM

## 2025-02-18 DIAGNOSIS — I50.42 CHRONIC COMBINED SYSTOLIC AND DIASTOLIC CONGESTIVE HEART FAILURE (HCC): ICD-10-CM

## 2025-02-18 RX ORDER — SPIRONOLACTONE 25 MG/1
25 TABLET ORAL DAILY
Qty: 90 TABLET | Refills: 1 | Status: SHIPPED | OUTPATIENT
Start: 2025-02-18 | End: 2025-08-17

## 2025-02-18 ASSESSMENT — PATIENT HEALTH QUESTIONNAIRE - PHQ9
2. FEELING DOWN, DEPRESSED OR HOPELESS: NOT AT ALL
1. LITTLE INTEREST OR PLEASURE IN DOING THINGS: NOT AT ALL
SUM OF ALL RESPONSES TO PHQ9 QUESTIONS 1 & 2: 0
SUM OF ALL RESPONSES TO PHQ QUESTIONS 1-9: 0

## 2025-02-18 NOTE — PROGRESS NOTES
OFFICE VISIT  PROGRESS NOTE         Date of patient's visit: 2/18/25  Patient's Name:  Carl Frias  YOB: 1935       Patient Care Team:  Aleksandr Finney MD as PCP - General (Internal Medicine)  Aleksandr Finney MD as PCP - Empaneled Provider  Minevra Box MD as Consulting Physician (Hematology and Oncology)  Kyaw Bowling MD as Consulting Physician (Radiation Oncology)  Ron Mayers MD Katragadda, Srinivas, MD as Consulting Physician (Pulmonary Disease)  Camila Gandhi RN as Care Transitions Nurse  Mono Anderson as Health   Hazel Mario RD, LD as Dietitian        SUBJECTIVE     HISTORY           History of present illness     Pertinent details  added to ,       Chief Complaint   Patient presents with    Hypertension     Follow up has been in and out of the hospital- wanted to ask if he should be taking spirolactone          Encounter Diagnoses   Name Primary?    Chronic combined systolic and diastolic congestive heart failure (HCC)     Stage 3b chronic kidney disease (HCC) Yes    Dilated cardiomyopathy (HCC)     Blood loss anemia         Symptom / problem          --history obtained from patient.-   Patient is known to have hypertensive heart disease with dilated cardiomyopathy and chronic congestive heart failure  At one time he had anasarca lasting for several years with significant perineal swellings   He has been following up with me and Dr. Felipe  He has been recently hospitalized with CHF decompensation    He is on Eliquis for atrial fibrillation  He had a GI bleed with massive blood loss and hemoglobin was quite low and when he was admitted to the hospital he had some sort of GI procedure done  And according to him some lesion was taken off    He is going to follow-up with GI

## 2025-02-18 NOTE — PROGRESS NOTES
\"Have you been to the ER, urgent care clinic since your last visit?  Hospitalized since your last visit?\"    YES    “Have you seen or consulted any other health care providers outside our system since your last visit?”    YES

## 2025-02-19 ENCOUNTER — CARE COORDINATION (OUTPATIENT)
Dept: CARE COORDINATION | Age: 89
End: 2025-02-19

## 2025-02-19 ENCOUNTER — TELEPHONE (OUTPATIENT)
Dept: PRIMARY CARE CLINIC | Age: 89
End: 2025-02-19

## 2025-02-19 ENCOUNTER — CARE COORDINATION (OUTPATIENT)
Dept: CASE MANAGEMENT | Age: 89
End: 2025-02-19

## 2025-02-19 ENCOUNTER — TELEPHONE (OUTPATIENT)
Dept: INTERNAL MEDICINE CLINIC | Age: 89
End: 2025-02-19

## 2025-02-19 ENCOUNTER — TELEPHONE (OUTPATIENT)
Dept: GASTROENTEROLOGY | Age: 89
End: 2025-02-19

## 2025-02-19 NOTE — CARE COORDINATION
Care Transitions Note    Follow Up Call     Attempted to reach spouse/partner  for transitions of care follow up.  Unable to reach spouse/partner .      Outreach Attempts:   HIPAA compliant voicemail left for spouse/partner .     Care Summary Note: 1st attempt.     Follow Up Appointment:   Future Appointments         Provider Specialty Dept Phone    2/20/2025  2:00 PM [facility] CHF CLINIC  2 Cardiology [phone]    3/11/2025 2:20 PM Margarito Piper MD Urology [phone]    4/1/2025 2:00 PM Aleksandr Finney MD Internal Medicine [phone]    11/18/2025 1:00 PM Hugo Thorpe MD Pulmonology [phone]            Plan for follow-up on next business day.  based on severity of symptoms and risk factors. Plan for next call:  symptom management-follow up on weight , voiding, f/u appts-final call hand off to FADI Ruiz.         Emily Huang LPN        October 9, 2019     Patient: Kiah Dutta   YOB: 2012   Date of Visit: 10/9/2019       To Whom it May Concern:    Kiah Dutta was seen in my clinic on 10/9/2019    Please excuse Kiah for her absence from school on the date listed above to be able to make her appointment. Patient may return on 10/14/19.    Sincerely,         SYLVIA SIDHU    Medical information is confidential and cannot be disclosed without the written consent of the patient or her representative.

## 2025-02-19 NOTE — CARE COORDINATION
-Remote Alert Monitoring Note      Date/Time:  2025 10:39 AM  Patient Current Location: Home: [address] [address]  Verified patients name and  as identifiers.    Rpm alert to be reviewed by the provider   red alert  weight (5 lb in 7 days )    Additional needs to be addressed by provider:  Spoke to patients wife jelena she states he is doing well no complaints of chest pain, SOB, dizziness she can see no swelling in feet, legs hands or abdomen, they have [facility] nurse coming out today  he is currently taking Bumex 1 mg daily, and all other medications as directed she states things are going well and she has no concerns at present time                   LPN contacted patient by telephone regarding red alert received   Background: CHF  Refer to [facility] immediately if:  Patient unresponsive or unable to provide history  Change in cognition or sudden confusion  Patient unable to respond in complete sentences  Intense chest pain/tightness  Any concern for any clinical emergency  Red Alert: Provider response time of 1 hr required for any red alert requiring intervention  Yellow Alert: Provider response time of 3hr required for any escalated yellow alert  Patient Chief Complaint:  Weight Weight Scale Triage  Was your weight obtained upon rising/waking today? YES   Was your weight obtained after voiding and/or use of the bathroom today? yes   Did you weigh yourself in the same amount of clothing today, compared to how you typically do? yes   Was the scale bumped or moved prior to today's weight? yes   Is your scale on a flat/hard surface? yes   Did you obtain your weight with shoes on? yes   If yes, is this something you normally do during your daily weights? yes   Were you standing up straight on the scale today? yes   Were you leaning on anything while obtaining your weight today? yes   Weight Education Provided to Patient or Caregiver:   Patient to weigh on the same scale at the same time each

## 2025-02-19 NOTE — TELEPHONE ENCOUNTER
02/19/25 2:14 PM     REMOTE PATIENT MONITORING HIGH ALERT RESPONSE     Message sent to patient via Patient Connect Portal for Remote Patient Monitoring     JENNY Melgoza, This is Hannah Amin the [facility] nurse practitioner with [facility]. I will be calling you shortly from [phone]. Please answer if you are able.       2:26 PM Attempted twice to reach pt but he is not answering the phone at this time. Left message introducing myself as the nurse practitioner with the [facility] remote patient monitoring program and that I would like to talk to him for a few minutes about the alert we received today for weight gain. Also, reminded him of appt in CHF Clinic tomorrow and urged him to keep this. Left my phone # in message and requested a return call by 4 PM today if at all possible.     ASSESSMENT AND PLAN   Weight gain  4:34 PM Pt's wife called back and confirms pt is aware of tomorrow's CHF Clinic appt and plans to attend.  --asking CHF Clinic, NP for clarification on current Bumex dose. See this documented in RPM dashboard but not in Epic. Epic med list still has Bumex 1 mg daily listed.    CHIEF COMPLAINT    Responding to a high RPM alert for weight gain of 5.6# in last 7 days. Weight has been on steady upward trend since 2/15.    ALLERGIES    Allergies   Allergen Reactions    Rivaroxaban Other (See Comments)     Hematuria recurrent     Hannah Amin, MCKENZIE, FNP-C, Remote Patient Monitoring NP, (ph) [phone]

## 2025-02-19 NOTE — TELEPHONE ENCOUNTER
Patients daughter is requesting a call regarding scheduling a EGD and can be reached at [phone] . Okay to leave a message.

## 2025-02-19 NOTE — TELEPHONE ENCOUNTER
[facility] 2/4th and 2/12th fax order for occupational health ?   Informed that yes the order has been received and is in Dr Chavez signature folder.

## 2025-02-20 ENCOUNTER — CARE COORDINATION (OUTPATIENT)
Dept: CARE COORDINATION | Age: 89
End: 2025-02-20

## 2025-02-20 ENCOUNTER — HOSPITAL ENCOUNTER (OUTPATIENT)
Age: 89
Setting detail: SPECIMEN
Discharge: HOME | End: 2025-02-20
Payer: MEDICARE

## 2025-02-20 ENCOUNTER — HOSPITAL ENCOUNTER (OUTPATIENT)
Dept: OTHER | Age: 89
Discharge: HOME | End: 2025-02-20
Payer: MEDICARE

## 2025-02-20 LAB
ANION GAP SERPL CALCULATED.3IONS-SCNC: 11 MMOL/L (ref 9–16)
BNP SERPL-MCNC: 3203 PG/ML (ref 0–300)
BUN SERPL-MCNC: 63 MG/DL (ref 8–23)
CHLORIDE SERPL-SCNC: 103 MMOL/L (ref 98–107)
CO2 SERPL-SCNC: 24 MMOL/L (ref 20–31)
CREAT SERPL-MCNC: 2 MG/DL (ref 0.7–1.2)
GFR, ESTIMATED: 32 ML/MIN/1.73M2
POTASSIUM SERPL-SCNC: 4.1 MMOL/L (ref 3.7–5.3)
SODIUM SERPL-SCNC: 138 MMOL/L (ref 136–145)

## 2025-02-20 PROCEDURE — 84520 ASSAY OF UREA NITROGEN: CPT

## 2025-02-20 PROCEDURE — 36415 COLL VENOUS BLD VENIPUNCTURE: CPT

## 2025-02-20 PROCEDURE — 82565 ASSAY OF CREATININE: CPT

## 2025-02-20 PROCEDURE — 99211 OFF/OP EST MAY X REQ PHY/QHP: CPT

## 2025-02-20 PROCEDURE — 80051 ELECTROLYTE PANEL: CPT

## 2025-02-20 PROCEDURE — 83880 ASSAY OF NATRIURETIC PEPTIDE: CPT

## 2025-02-20 RX ORDER — OMEPRAZOLE 20 MG/1
CAPSULE, DELAYED RELEASE ORAL
Qty: 28 CAPSULE | Refills: 0 | Status: SHIPPED | OUTPATIENT
Start: 2025-02-20

## 2025-02-20 RX ORDER — TAMSULOSIN HYDROCHLORIDE 0.4 MG/1
0.4 CAPSULE ORAL DAILY
Qty: 90 CAPSULE | Refills: 3 | Status: SHIPPED | OUTPATIENT
Start: 2025-02-20 | End: 2026-02-15

## 2025-02-20 NOTE — CARE COORDINATION
Care Transitions Note    Follow Up Call     Attempted to reach spouse/partner  for transitions of care follow up.  Unable to reach spouse/partner .      Outreach Attempts:   Multiple attempts to contact spouse/partner  at phone numbers on file.   HIPAA compliant voicemail left for patient, spouse/partner .     Care Summary Note: 2nd attempt.    Follow Up Appointment:   Future Appointments         Provider Specialty Dept Phone    2/20/2025  2:00 PM [facility] CHF CLINIC  2 Cardiology [phone]    3/11/2025 2:20 PM Margarito Piper MD Urology [phone]    4/1/2025 2:00 PM Aleksadnr Finney MD Internal Medicine [phone]    11/18/2025 1:00 PM Hugo Thorpe MD Pulmonology [phone]            No further follow-up call indicated Patient referred back to FADI Ruiz for continued management. based on severity of symptoms and risk factors.    Emily Huang LPN

## 2025-02-20 NOTE — CARE COORDINATION
FADI received RPM patient hand off from [facility] Camila العراقيham .  ACM will outreach to patient in 1-2 business days to enroll in a High Risk Care Management Program.

## 2025-02-20 NOTE — PROGRESS NOTES
[facility]  Heart Failure Clinic      [address] [address]  Phone: [phone]  Fax: [phone]      NAME: Carl Frias  MEDICAL RECORD NUMBER:  [id number]  AGE: 89 y.o.   GENDER: male  : 1935  EPISODE DATE:  2025      Carl Frias was referred to the [facility] by Dr. ALISA Ware for heart failure services.he is being followed by Cardiologist, Dr. JACQUES Felipe.    ECHO EF%: 55-60%  Date: 1/3/25             ECHO EF%: 50%       Date: 24       Recent Cardiology follow-up apt: 24  Follow-up apt recommended: unknown  Upcoming testing: unknown  Medication Management: no  Nephrologist: Dr.N. Bloom     Carl Frias is a 89 y.o. male here for the Heart Failure Services.  He is here today for a Heart Failure assessment/consultation, monitoring medication effectiveness, reviewing necessary labs, transition of care, extensive Heart Failure education, reporting any concerns or symptoms and obtaining any necessary medication adjustments or orders from the patients health care team.    Vital Signs:   BP (!) 100/48   Pulse 70   Ht 1.778 m (5' 10\")   Wt 91.1 kg (200 lb 12.8 oz)   SpO2 99%   BMI 28.81 kg/m²    O2 Device: None (Room air)        Weight loss/gain +15.lbs since discharge weight of 185 lbs on 25      Nursing Assessment:    Respiratory:    Assessment  Charting Type: Reassessment    Breath Sounds  Respiratory Pattern: Regular  Breath Sounds Bilateral: Clear  Right Upper Lobe: Clear  Right Middle Lobe: Clear  Right Lower Lobe: Clear  Left Upper Lobe: Clear, Diminished  Left Lower Lobe: Clear    Cough/Sputum  Cough: Non-productive  Frequency: Occasional    Cardiac  Cardiac Regularity: Irregular  Heart Sounds: S1, S2  Cardiac Symptoms: Palpitations  Implanted Cardiac Device (Pacemaker, ICD, PA Sensor): No    Peripheral Vascular  Peripheral Vascular (WDL): Exceptions to WDL  Edema: Left lower extremity, Right lower extremity  RLE Edema: +1,

## 2025-02-21 ENCOUNTER — CARE COORDINATION (OUTPATIENT)
Dept: CARE COORDINATION | Age: 89
End: 2025-02-21

## 2025-02-21 VITALS
WEIGHT: 200.8 LBS | OXYGEN SATURATION: 99 % | DIASTOLIC BLOOD PRESSURE: 48 MMHG | HEART RATE: 70 BPM | SYSTOLIC BLOOD PRESSURE: 100 MMHG | BODY MASS INDEX: 28.75 KG/M2 | HEIGHT: 70 IN

## 2025-02-21 NOTE — CARE COORDINATION
Ambulatory Care Coordination Note     2/21/2025 1:43 PM     ACM outreach attempt by this ACM today to offer care management services. ACM was unable to reach the patient by telephone today;   left voice message requesting a return phone call to this ACM.     ACM: ASHU POPE RN     Care Summary Note: CTN referral, patient is enrolled into RPM.     PCP/Specialist follow up:   Future Appointments         Provider Specialty Dept Phone    3/11/2025 2:20 PM Margarito Piper MD Urology [phone]    4/1/2025 2:00 PM Aleksandr Finney MD Internal Medicine [phone]    11/18/2025 1:00 PM Hugo Thorpe MD Pulmonology [phone]            Follow Up:   Plan for next ACM outreach in approximately 1 week to complete:  - disease specific assessments  - SDOH assessments  - goal progression  - education   - RPM.

## 2025-02-24 NOTE — TELEPHONE ENCOUNTER
Writer contacted daughter (Hannah) verified HIPAA to schedule for egd capsule with Dr Beaver-writer offered patient 4/23/25 at 730am however, daughter stated she will have to call our office back due to her father not wanting to get up so early-writer advised daughter to call back asap due to limited jennifer.

## 2025-02-24 NOTE — TELEPHONE ENCOUNTER
Daughter contacted office to notify her father has declined to schedule procedure capsule endoscopy with Dr Chauhan.

## 2025-02-25 ENCOUNTER — CARE COORDINATION (OUTPATIENT)
Dept: CASE MANAGEMENT | Age: 89
End: 2025-02-25

## 2025-02-25 NOTE — CARE COORDINATION
2025 11:22 AM  *  Alert and Triage   -Remote Alert Monitoring Note      Date/Time:  2025 11:25 AM  Patient Current Location: Home: [address] [address]  Verified patients name and  as identifiers.    Rpm alert to be reviewed by the provider   red alert  blood pressure reading (87/57)  Vitals Recheck blood pressure reading (107/49)  Additional needs to be addressed by provider: No                   LPN contacted patient by telephone regarding red alert received   Background: RPM monitoring for CHF  Refer to 911 immediately if:  Patient unresponsive or unable to provide history  Change in cognition or sudden confusion  Patient unable to respond in complete sentences  Intense chest pain/tightness  Any concern for any clinical emergency  Red Alert: Provider response time of 1 hr required for any red alert requiring intervention  Yellow Alert: Provider response time of 3hr required for any escalated yellow alert  Patient Chief Complaint:  Blood Pressure BP Triage  Are you having any Chest Pain? no   Are you having any Shortness of Breath? no   Do you have a headache or have any vision changes? no   Are you having any numbness or tingling? no   Are you having any other health concerns or issues? no  Patient/Caregiver educated on how to properly take a blood pressure. Patient/Caregiver verbalizes understanding.     Clinical Interventions:  LPN CC spoke with patient's wife, Naomi, HIPAA verified. States patient is doing fine. Denies dizziness/lightheadedness, weakness, CP, fatigue. Took meds at approx 9 AM. Drinking plenty of fluids. Agreeable to have patient check BP again.      Plan/Follow Up: Will continue to review, monitor and address alerts with follow up based on severity of symptoms and risk factors.  **For any new or worsening symptoms or you are concerned in anyway, please contact your Provider or report to the nearest Emergency Room.**

## 2025-02-25 NOTE — CARE COORDINATION
2/25/2025 10:34 AM  *  Unable to Reach Date/Time:  2/25/2025 11:07 AM  LPN attempted to reach patient by telephone regarding red alert in remote patient monitoring program. Left HIPAA compliant message requesting a return call. Will attempt to reach patient again.

## 2025-02-26 ENCOUNTER — CARE COORDINATION (OUTPATIENT)
Dept: CARE COORDINATION | Age: 89
End: 2025-02-26

## 2025-02-26 ENCOUNTER — CARE COORDINATION (OUTPATIENT)
Dept: CASE MANAGEMENT | Age: 89
End: 2025-02-26

## 2025-02-26 NOTE — CARE COORDINATION
HC phoned and spoke to the patient's spouse, who stated that the patient is   doing well.  Spouse stated that the patient is doing better, and there are no  current social needs at this time.      Plan of Care  HC will follow up with patient and family

## 2025-02-26 NOTE — CARE COORDINATION
2025 12:42 PM  *  Alert and Triage   -Remote Alert Monitoring Note      Date/Time:  2025 12:42 PM  Patient Current Location: Home: [address] [address]  Verified patients name and  as identifiers.    Rpm alert to be reviewed by the provider   red alert  weight (198)  Vitals Recheck weight (NA)  Additional needs to be addressed by provider: No                   LPN contacted patient by telephone regarding red alert received   Background: RPM monitoring for CHF  Refer to 911 immediately if:  Patient unresponsive or unable to provide history  Change in cognition or sudden confusion  Patient unable to respond in complete sentences  Intense chest pain/tightness  Any concern for any clinical emergency  Red Alert: Provider response time of 1 hr required for any red alert requiring intervention  Yellow Alert: Provider response time of 3hr required for any escalated yellow alert  Patient Chief Complaint:  Weight Weight Scale Triage  Was your weight obtained upon rising/waking today? yes   Was your weight obtained after voiding and/or use of the bathroom today? yes   Did you weigh yourself in the same amount of clothing today, compared to how you typically do? yes   Was the scale bumped or moved prior to today's weight? no   Is your scale on a flat/hard surface? yes   Did you obtain your weight with shoes on? no   If yes, is this something you normally do during your daily weights? NA   Were you standing up straight on the scale today? yes   Were you leaning on anything while obtaining your weight today? no   Weight Education Provided to Patient or Caregiver:   Patient to weigh on the same scale at the same time each day  Patient to weigh first thing in the morning, after going to the bathroom; before eating breakfast, and before having anything to drink.  Instructed on importance of  not wearing shoes on the scale, and wearing the same type of clothing each day.  Clinical Interventions:  LPN CC

## 2025-02-26 NOTE — CARE COORDINATION
Ambulatory Care Coordination Note     2025 2:26 PM     Patient Current Location:  Home: [address] [address]     This patient was received as a referral from Ambulatory Care Manager .    ACM contacted the family by telephone. Verified name and  with family as identifiers. Provided introduction to self, and explanation of the ACM role.   Family accepted care management services at this time.          ACM: ASHU POPE RN     Challenges to be reviewed by the provider   Additional needs identified to be addressed with provider No  none               Method of communication with provider: none.    Utilization: Initial Call - N/A    Care Summary Note: Spoke briefly with daughter who stated Carl was napping at this time. Introduced self, provided contact information for any future needs. Daughter stated he is doing ok at this time.     Offered patient enrollment in the Remote Patient Monitoring (RPM) program for in-home monitoring: Yes, patient enrolled; current status is activated and monitoring.     Assessments Completed:   General Assessment    Do you have any symptoms that are causing concern?: No          Medications Reviewed:   Not completed during this call: patient and wife were napping, daughter answered.     Advance Care Planning:   Reviewed and current     Care Planning:   Education Documentation  No documentation found.  Education Comments  No comments found.     ,    Goals Addressed    None          PCP/Specialist follow up:   Future Appointments         Provider Specialty Dept Phone    3/11/2025 2:20 PM Margarito Piper MD Urology [phone]    3/17/2025 1:00 PM [facility] RM 2 Cardiology [phone]    2025 2:00 PM Aleksandr Finney MD Internal Medicine [phone]    2025 1:00 PM Hugo Thorpe MD Pulmonology [phone]            Follow Up:   Plan for next ACM outreach in approximately 3 weeks to complete:  - disease specific assessments  - medication

## 2025-02-27 ENCOUNTER — TELEPHONE (OUTPATIENT)
Dept: INTERNAL MEDICINE CLINIC | Age: 89
End: 2025-02-27

## 2025-02-27 ENCOUNTER — CARE COORDINATION (OUTPATIENT)
Dept: CASE MANAGEMENT | Age: 89
End: 2025-02-27

## 2025-02-27 DIAGNOSIS — E11.42 TYPE 2 DIABETES MELLITUS WITH DIABETIC POLYNEUROPATHY, WITHOUT LONG-TERM CURRENT USE OF INSULIN (HCC): ICD-10-CM

## 2025-02-27 NOTE — TELEPHONE ENCOUNTER
Sebastian Murphy / Ezio calling to inform that patients blood pressure reading 92/40 non symptomatic

## 2025-02-27 NOTE — CARE COORDINATION
2025 8:35 AM  *  Alert and Triage   -Remote Alert Monitoring Note      Date/Time:  2025 8:39 AM  Patient Current Location: Home: [address] [address]  Verified patients name and  as identifiers.    Rpm alert to be reviewed by the provider   red alert  pulse ox reading (90%)  Vitals Recheck pulse ox reading (95%)  Additional needs to be addressed by provider: No                   LPN contacted patient by telephone regarding red alert received   Background: RPM monitoring for CHF  Refer to 911 immediately if:  Patient unresponsive or unable to provide history  Change in cognition or sudden confusion  Patient unable to respond in complete sentences  Intense chest pain/tightness  Any concern for any clinical emergency  Red Alert: Provider response time of 1 hr required for any red alert requiring intervention  Yellow Alert: Provider response time of 3hr required for any escalated yellow alert  Patient Chief Complaint:  Oxygen O2 Triage  Are you having any Chest Pain? no   Are you having any Shortness of Breath? no   Swelling in your hands or feet? no   Are you having any other health concerns or issues? no  .............................................................................................................................................................................................  Do you use oxygen? No   Patient educated on oxygen preparedness in case of an emergency?  No     Patient/Caregiver educated on how to how to properly place pulse oximeter. Patient/Caregiver verbalizes understanding.     Clinical Interventions:  LPN CC spoke with patient's wife, Naomi, HIPAA verified. Patient checked SpO2 himself. They were going to try to check it again. Denies SOB, CP, dizziness/lightheadedness, weakness, HA, cough, swelling. Doing much better per family. Has been getting exercise with C.    Plan/Follow Up: Will continue to review, monitor and address alerts with follow up based

## 2025-02-27 NOTE — TELEPHONE ENCOUNTER
[facility] faxed refill request for Glipizide   Last office visit 2/18/2025

## 2025-03-03 ENCOUNTER — CARE COORDINATION (OUTPATIENT)
Dept: CARE COORDINATION | Age: 89
End: 2025-03-03

## 2025-03-03 NOTE — CARE COORDINATION
Discussed lean protein sources- lean meats, fish, eggs, ect- will send a list of lean protein sources. Encouraged protein source at each meal and snack. We also discussed a high protein nutrition supplement, he is drinking an Ensure daily. Will send Glucerna and Ensure coupons to patient  Spoke with patient's wife who relays her  is doing well. She states he is eating better- reviewed lean protein sources and Glucern daily due to history of skin breakdown. Reviewed goals, no questions at this time. Will follow up in 3-4 weeks to review and answer questions.    PRINCE Bloom

## 2025-03-04 RX ORDER — GLIPIZIDE 10 MG/1
10 TABLET ORAL
Qty: 180 TABLET | Refills: 3 | Status: SHIPPED | OUTPATIENT
Start: 2025-03-04

## 2025-03-04 NOTE — TELEPHONE ENCOUNTER
Dr. Finney discontinued Amlodipine in patients chart.     Spoke with patients wife who verbalized understanding and agreed to stop medication.   Attempted to reach Ezio, no success.   Faxed the d/c order to Sebastian Murphy.

## 2025-03-07 ENCOUNTER — CARE COORDINATION (OUTPATIENT)
Dept: OTHER | Facility: CLINIC | Age: 89
End: 2025-03-07

## 2025-03-07 ENCOUNTER — TELEPHONE (OUTPATIENT)
Dept: INTERNAL MEDICINE CLINIC | Age: 89
End: 2025-03-07

## 2025-03-07 NOTE — CARE COORDINATION
Chart reviewed. Weight entered is normal. Incorrect weight of 22.8 lb entered yesterday.       WILL Carl, RN  Associate Care Manager   Cell: [phone]  [email or web address]

## 2025-03-07 NOTE — TELEPHONE ENCOUNTER
[facility] calling in regards to verifying if fax has been received for start of care?  Informed if she can refax to our office again.

## 2025-03-11 ENCOUNTER — OFFICE VISIT (OUTPATIENT)
Dept: UROLOGY | Age: 89
End: 2025-03-11
Payer: MEDICARE

## 2025-03-11 VITALS — TEMPERATURE: 97.6 F | SYSTOLIC BLOOD PRESSURE: 122 MMHG | HEART RATE: 67 BPM | DIASTOLIC BLOOD PRESSURE: 65 MMHG

## 2025-03-11 DIAGNOSIS — N40.1 BPH WITH OBSTRUCTION/LOWER URINARY TRACT SYMPTOMS: Primary | ICD-10-CM

## 2025-03-11 DIAGNOSIS — R33.9 URINARY RETENTION: ICD-10-CM

## 2025-03-11 DIAGNOSIS — R35.1 NOCTURIA: ICD-10-CM

## 2025-03-11 DIAGNOSIS — N13.8 BPH WITH OBSTRUCTION/LOWER URINARY TRACT SYMPTOMS: Primary | ICD-10-CM

## 2025-03-11 PROCEDURE — 1123F ACP DISCUSS/DSCN MKR DOCD: CPT | Performed by: UROLOGY

## 2025-03-11 PROCEDURE — 1036F TOBACCO NON-USER: CPT | Performed by: UROLOGY

## 2025-03-11 PROCEDURE — G8417 CALC BMI ABV UP PARAM F/U: HCPCS | Performed by: UROLOGY

## 2025-03-11 PROCEDURE — G8428 CUR MEDS NOT DOCUMENT: HCPCS | Performed by: UROLOGY

## 2025-03-11 PROCEDURE — 51798 US URINE CAPACITY MEASURE: CPT | Performed by: UROLOGY

## 2025-03-11 PROCEDURE — 99214 OFFICE O/P EST MOD 30 MIN: CPT | Performed by: UROLOGY

## 2025-03-11 ASSESSMENT — ENCOUNTER SYMPTOMS
VOMITING: 0
DIARRHEA: 0
NAUSEA: 0
EYE PAIN: 0
WHEEZING: 0
BACK PAIN: 0
SHORTNESS OF BREATH: 0
COUGH: 0
EYE REDNESS: 0
CONSTIPATION: 0
ABDOMINAL PAIN: 0

## 2025-03-11 NOTE — PROGRESS NOTES
Review of Systems   Constitutional:  Negative for chills, fatigue and fever.   Eyes:  Negative for pain, redness and visual disturbance.   Respiratory:  Negative for cough, shortness of breath and wheezing.    Cardiovascular:  Negative for chest pain and leg swelling.   Gastrointestinal:  Negative for abdominal pain, constipation, diarrhea, nausea and vomiting.   Genitourinary:  Negative for difficulty urinating, dysuria, flank pain, frequency, hematuria, scrotal swelling, testicular pain and urgency.        Urinary incontinence    Musculoskeletal:  Negative for back pain, joint swelling and myalgias.   Skin:  Negative for rash and wound.   Neurological:  Negative for dizziness, tremors and numbness.   Hematological:  Does not bruise/bleed easily.

## 2025-03-11 NOTE — PROGRESS NOTES
[facility] PHYSICIANS [facility] UROLOGY [facility]ENTER  [address]  [facility] [address]  Dept: [phone]    [facility] Urology Office Note - Established    Patient:  Carl Frias  YOB: 1935  Date: 3/11/2025    The patient is a 89 y.o. male who presents todayfor evaluation of the following problems:   Chief Complaint   Patient presents with    Benign Prostatic Hypertrophy     4 week PVR, Pt states he has a lot of leakage.        HPI  Here for BPH.   He had an episode of retention.   Crenshaw is out.   At night, he does void some.   It's only a few drips.   He wears a brief at night.   No hematuria or dysuria.       Summary of old records: N/A    Additional History: N/A    Procedures Today: N/A    Urinalysis today:  No results found for this visit on 03/11/25.  Last several PSA's:  Lab Results   Component Value Date    PSA 0.10 04/25/2024    PSA 0.34 03/31/2016    PSA 0.37 06/19/2013     Last total testosterone:  Lab Results   Component Value Date    TESTOSTERONE 80 (L) 05/09/2017       AUA Symptom Score (3/11/2025):                               Last BUN and creatinine:  Lab Results   Component Value Date    BUN 63 (H) 02/20/2025     Lab Results   Component Value Date    CREATININE 2.0 (H) 02/20/2025       Additional Lab/Culture results: none    Imaging Reviewed during this Office Visit: none  (results were independently reviewed by physician and radiology report verified)    PAST MEDICAL, FAMILY AND SOCIAL HISTORY UPDATE:  Past Medical History:   Diagnosis Date    Diabetes mellitus (HCC)     Hypertension     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

## 2025-03-12 ENCOUNTER — CARE COORDINATION (OUTPATIENT)
Dept: CARE COORDINATION | Age: 89
End: 2025-03-12

## 2025-03-12 NOTE — CARE COORDINATION
HC is signing off of patient at this time.  Patient has his home delivered meals  and hasn't made a decision to apply   for Passport Services, due to his family  providing his needs.  HC is discontinuing   all calls at this time.      Plan of Care  HC will sign off of patient at this time.

## 2025-03-13 ENCOUNTER — CARE COORDINATION (OUTPATIENT)
Dept: CASE MANAGEMENT | Age: 89
End: 2025-03-13

## 2025-03-13 NOTE — CARE COORDINATION
Remote Patient Monitoring Note      Date/Time:  3/13/2025 11:21 AM  Patient Current Location: Home: [address] [address]  LPN contacted patient by telephone regarding red alert received for blood pressure heart rate (46). Verified patients name and  as identifiers.  Background: CHF  Clinical Interventions: Reviewed and followed up on alerts and treatments-Spoke with patients daughter Lucy her dad is at home with another sibling while she is at the hospital with their mom for a procedure she states he was doing well this am, she will contact her sibling to have them repeat BP     Plan/Follow Up: Will continue to review, monitor and address alerts with follow up based on severity of symptoms and risk factors.

## 2025-03-17 ENCOUNTER — HOSPITAL ENCOUNTER (OUTPATIENT)
Age: 89
Setting detail: SPECIMEN
Discharge: HOME | End: 2025-03-17
Payer: MEDICARE

## 2025-03-17 ENCOUNTER — HOSPITAL ENCOUNTER (OUTPATIENT)
Dept: OTHER | Age: 89
Discharge: HOME | End: 2025-03-17
Payer: MEDICARE

## 2025-03-17 VITALS
RESPIRATION RATE: 18 BRPM | DIASTOLIC BLOOD PRESSURE: 60 MMHG | HEART RATE: 74 BPM | SYSTOLIC BLOOD PRESSURE: 102 MMHG | BODY MASS INDEX: 28.05 KG/M2 | HEIGHT: 70 IN | OXYGEN SATURATION: 99 % | WEIGHT: 195.9 LBS

## 2025-03-17 LAB
ANION GAP SERPL CALCULATED.3IONS-SCNC: 13 MMOL/L (ref 9–16)
BNP SERPL-MCNC: 2327 PG/ML (ref 0–300)
BUN SERPL-MCNC: 80 MG/DL (ref 8–23)
CHLORIDE SERPL-SCNC: 105 MMOL/L (ref 98–107)
CO2 SERPL-SCNC: 19 MMOL/L (ref 20–31)
CREAT SERPL-MCNC: 2 MG/DL (ref 0.7–1.2)
GFR, ESTIMATED: 32 ML/MIN/1.73M2
POTASSIUM SERPL-SCNC: 4.2 MMOL/L (ref 3.7–5.3)
SODIUM SERPL-SCNC: 137 MMOL/L (ref 136–145)

## 2025-03-17 PROCEDURE — 84520 ASSAY OF UREA NITROGEN: CPT

## 2025-03-17 PROCEDURE — 82565 ASSAY OF CREATININE: CPT

## 2025-03-17 PROCEDURE — 99211 OFF/OP EST MAY X REQ PHY/QHP: CPT

## 2025-03-17 PROCEDURE — 36415 COLL VENOUS BLD VENIPUNCTURE: CPT

## 2025-03-17 PROCEDURE — 83880 ASSAY OF NATRIURETIC PEPTIDE: CPT

## 2025-03-17 PROCEDURE — 80051 ELECTROLYTE PANEL: CPT

## 2025-03-17 NOTE — PROGRESS NOTES
[facility]  Heart Failure Clinic      [address] [address]  Phone: [phone]  Fax: [phone]      NAME: Carl Frias  MEDICAL RECORD NUMBER:  [id number]  AGE: 89 y.o.   GENDER: male  : 1935  EPISODE DATE:  3/17/2025      Carl Frias was referred to the [facility] by Dr. ALSIA Ware for heart failure services.he is being followed by Cardiologist, Dr. JACQUES Felipe.     ECHO EF%: 55-60%  Date: 1/3/25             ECHO EF%: 50%       Date: 24       Recent Cardiology follow-up apt: 24  Follow-up apt recommended: unknown  Upcoming testing: unknown  Medication Management: no  Nephrologist: Dr.N. Bloom     Carl Frias is a 89 y.o. male here for the Heart Failure Services.  He is here today for a Heart Failure assessment/consultation, monitoring medication effectiveness, reviewing necessary labs, transition of care, extensive Heart Failure education, reporting any concerns or symptoms and obtaining any necessary medication adjustments or orders from the patients health care team.    Vital Signs:   /60   Pulse 74   Resp 18   Ht 1.778 m (5' 10\")   Wt 88.9 kg (195 lb 14.4 oz)   SpO2 99%   BMI 28.11 kg/m²    O2 Device: None (Room air)        Weight loss/gain -4.9 lbs      Nursing Assessment:    Respiratory:    Assessment  Charting Type: Reassessment    Breath Sounds  Respiratory Pattern: Regular  Breath Sounds Bilateral: Clear  Right Upper Lobe: Clear  Right Middle Lobe: Clear  Right Lower Lobe: Clear  Left Upper Lobe: Clear  Left Lower Lobe: Clear    Cough/Sputum  Cough: Non-productive  Frequency: Occasional    Cardiac  Cardiac Regularity: Irregular  Heart Sounds: S1, S2  Implanted Cardiac Device (Pacemaker, ICD, PA Sensor): No    Peripheral Vascular  Peripheral Vascular (WDL): Within Defined Limits  Edema: None      Subjective data:    Shortness of Breath:   Dyspnea on exertion  Denies all other shortness of breath    Other Heart Failure Findings:

## 2025-03-19 ENCOUNTER — CARE COORDINATION (OUTPATIENT)
Dept: CARE COORDINATION | Age: 89
End: 2025-03-19

## 2025-03-20 NOTE — CARE COORDINATION
Ambulatory Care Coordination Note     3/20/2025 1:42 PM     ACM outreach attempt by this ACM today to perform care management follow up . ACM was unable to reach the patient, spouse/partner  by telephone today;   left voice message requesting a return phone call to this ACM.     ACM: ASHU POPE RN     Noted RPM metrics: Current Patient Metrics ---- Activity: - mins Blood Pressure: 159/71, 80bpm Pulseox: 98%, 88bpm Weight: 189.2lbs Note Created at: 03/20/2025 11:45 AM     PCP/Specialist follow up:   Future Appointments         Provider Specialty Dept Phone    4/1/2025 2:00 PM Aleksandr Finney MD Internal Medicine [phone]    4/16/2025 1:00 PM [facility] CHF CLINIC  2 Cardiology [phone]    9/23/2025 1:20 PM Margarito Piper MD Urology [phone]    11/18/2025 1:00 PM Hugo Thorpe MD Pulmonology [phone]            Follow Up:   Plan for next ACM outreach in approximately 2 weeks to complete:  - disease specific assessments  - goal progression  - education   - RPM.

## 2025-03-25 ENCOUNTER — CARE COORDINATION (OUTPATIENT)
Dept: CARE COORDINATION | Age: 89
End: 2025-03-25

## 2025-03-25 ENCOUNTER — HOSPITAL ENCOUNTER (OUTPATIENT)
Age: 89
Setting detail: SPECIMEN
Discharge: HOME | End: 2025-03-25

## 2025-03-25 DIAGNOSIS — D50.0 BLOOD LOSS ANEMIA: ICD-10-CM

## 2025-03-25 DIAGNOSIS — N18.32 STAGE 3B CHRONIC KIDNEY DISEASE (HCC): ICD-10-CM

## 2025-03-25 LAB
ANION GAP SERPL CALCULATED.3IONS-SCNC: 13 MMOL/L (ref 9–16)
BUN SERPL-MCNC: 76 MG/DL (ref 8–23)
CALCIUM SERPL-MCNC: 9.5 MG/DL (ref 8.6–10.4)
CHLORIDE SERPL-SCNC: 105 MMOL/L (ref 98–107)
CO2 SERPL-SCNC: 19 MMOL/L (ref 20–31)
CREAT SERPL-MCNC: 1.8 MG/DL (ref 0.7–1.2)
ERYTHROCYTE [DISTWIDTH] IN BLOOD BY AUTOMATED COUNT: 15.8 % (ref 11.8–14.4)
GFR, ESTIMATED: 36 ML/MIN/1.73M2
GLUCOSE SERPL-MCNC: 182 MG/DL (ref 74–99)
HCT VFR BLD AUTO: 28.4 % (ref 40.7–50.3)
HGB BLD-MCNC: 8.7 G/DL (ref 13–17)
MCH RBC QN AUTO: 26.8 PG (ref 25.2–33.5)
MCHC RBC AUTO-ENTMCNC: 30.6 G/DL (ref 28.4–34.8)
MCV RBC AUTO: 87.4 FL (ref 82.6–102.9)
NRBC BLD-RTO: 0 PER 100 WBC
PLATELET # BLD AUTO: 386 K/UL (ref 138–453)
PMV BLD AUTO: 9.4 FL (ref 8.1–13.5)
POTASSIUM SERPL-SCNC: 4.5 MMOL/L (ref 3.7–5.3)
RBC # BLD AUTO: 3.25 M/UL (ref 4.21–5.77)
SODIUM SERPL-SCNC: 137 MMOL/L (ref 136–145)
WBC OTHER # BLD: 9.3 K/UL (ref 3.5–11.3)

## 2025-03-25 NOTE — CARE COORDINATION
Nutrition Care Coordinator Follow-Up visit:    Food Recall:eating 3 meals/d    Activity Level:  Sedentary:X  Lightly Active:  Moderately Active:  Very Active:    Adult BMI:  Underweight (below 18.5)  Normal Weight (18.5-24.9)  Overweight (25-29.9)X  Obese (30-39.9)  Morbidly Obese (>40)    Plan:  Plan was established with patient:  Increase dietary fiber by consuming whole grains, fruits and vegetables:  Limit dietary cholesterol to >200mg/day:  Increase water intake:  Avoid added sugar:  Avoid sweetened beverages:  Choose lean meats:      Monitoring:  Will monitor weight:  Will monitor adherence to meal plan:X  Will monitor adherence to exercise plan:  Will monitor HGA1c:    Handouts Provided :  Low Carb snacking:  Carb counting /individual meal plan:  Portion Control:  Food Labels:X  Physical Activity:  Low Fat/Cholesterol:  Hypo/Hyperglycemia:  Calorie Controlled Meal Plan:    Goals:  Increase water consumption to 8oz. 6-8 times daily:  Manage blood sugars by consuming 3 meals spaced every 4-5 hours with 2-3 snacks daily:reviewed  Increase fiber and decrease fat intake by consuming 1-2 fruit servings and 2-3 vegetable servings per day.  Increase physical activity by:  Consume less than 2,000mg of sodium/day: reviewed  Avoid consumption of sweetened beverages and added sugar by reading food labels:  Monitor blood sugars by using meter to check blood glucose before morning meal and 2 hours after a meal daily:  Decrease risk of coronary heart disease by consuming fish that contains omega-3 fatty acids at least twice a week, avoiding partially hydrogenated oil/trans fats and limiting saturated fat intake by reading food labels:reviewed    Patient goals set:  1.Reviewed DASH guidelines- lowfat/cholesterol and low sodium. Reviewed reading food labels-what to look for on labels.  Encouraged to limit saturated fat, trans fat, cholesterol and sodium intake.  2. Reviewed avoiding canned, prepackaged foods, processed

## 2025-03-26 ENCOUNTER — CARE COORDINATION (OUTPATIENT)
Dept: CARE COORDINATION | Age: 89
End: 2025-03-26

## 2025-03-27 ENCOUNTER — CARE COORDINATION (OUTPATIENT)
Dept: CARE COORDINATION | Age: 89
End: 2025-03-27

## 2025-03-27 ENCOUNTER — CARE COORDINATION (OUTPATIENT)
Dept: CASE MANAGEMENT | Age: 89
End: 2025-03-27

## 2025-03-27 NOTE — CARE COORDINATION
Received a red alert from RPM team.   Left  for wife requesting a return call and asking her to recheck his pulse ox.

## 2025-03-27 NOTE — CARE COORDINATION
Date/Time:  3/27/2025 11:59 AM  LPN attempted to reach patient by telephone regarding red alert pulse ox 89 in remote patient monitoring program. Left HIPAA compliant message requesting a return call. Will attempt to reach patient again.

## 2025-03-27 NOTE — CARE COORDINATION
Date/Time:  3/27/2025 1:09 PM  LPN attempted to reach patient by telephone regarding red alert pulse ox 89 in remote patient monitoring program. Left HIPAA compliant message requesting a return call. Will attempt to reach patient again. ACM NOTIFIED

## 2025-03-28 NOTE — CARE COORDINATION
Noted patients vitals have improved today:  Current Patient Metrics ---- Activity: - mins Blood Pressure: 101/85, 100bpm Pulseox: 99%, 93bpm Weight: 189.8lbs Note Created at: 03/28/2025 11:50 AM

## 2025-04-01 ENCOUNTER — OFFICE VISIT (OUTPATIENT)
Dept: INTERNAL MEDICINE CLINIC | Age: 89
End: 2025-04-01

## 2025-04-01 VITALS
WEIGHT: 195 LBS | OXYGEN SATURATION: 100 % | HEIGHT: 70 IN | HEART RATE: 61 BPM | SYSTOLIC BLOOD PRESSURE: 128 MMHG | BODY MASS INDEX: 27.92 KG/M2 | DIASTOLIC BLOOD PRESSURE: 70 MMHG

## 2025-04-01 DIAGNOSIS — R63.0 POOR APPETITE: ICD-10-CM

## 2025-04-01 DIAGNOSIS — I48.20 CHRONIC ATRIAL FIBRILLATION (HCC): Primary | ICD-10-CM

## 2025-04-01 DIAGNOSIS — N40.1 BPH WITH OBSTRUCTION/LOWER URINARY TRACT SYMPTOMS: ICD-10-CM

## 2025-04-01 DIAGNOSIS — N13.8 BPH WITH OBSTRUCTION/LOWER URINARY TRACT SYMPTOMS: ICD-10-CM

## 2025-04-01 DIAGNOSIS — Z00.00 MEDICARE ANNUAL WELLNESS VISIT, SUBSEQUENT: ICD-10-CM

## 2025-04-01 DIAGNOSIS — K59.00 CONSTIPATION, UNSPECIFIED CONSTIPATION TYPE: ICD-10-CM

## 2025-04-01 DIAGNOSIS — I50.42 CHRONIC COMBINED SYSTOLIC AND DIASTOLIC CONGESTIVE HEART FAILURE (HCC): ICD-10-CM

## 2025-04-01 DIAGNOSIS — I10 ESSENTIAL HYPERTENSION: ICD-10-CM

## 2025-04-01 RX ORDER — AMOXICILLIN 250 MG
2 CAPSULE ORAL DAILY
COMMUNITY
Start: 2025-04-01

## 2025-04-01 ASSESSMENT — PATIENT HEALTH QUESTIONNAIRE - PHQ9
1. LITTLE INTEREST OR PLEASURE IN DOING THINGS: NOT AT ALL
SUM OF ALL RESPONSES TO PHQ QUESTIONS 1-9: 0
SUM OF ALL RESPONSES TO PHQ QUESTIONS 1-9: 0
2. FEELING DOWN, DEPRESSED OR HOPELESS: NOT AT ALL
SUM OF ALL RESPONSES TO PHQ QUESTIONS 1-9: 0
SUM OF ALL RESPONSES TO PHQ QUESTIONS 1-9: 0

## 2025-04-01 NOTE — PROGRESS NOTES
Medicare Annual Wellness Visit  Name: Carl Frias Today’s Date: 2025   MRN: [id number] Sex: Male   Age: 89 y.o. Ethnicity:  /    : 1935 Race:  /       Carl Frias is here for Medicare AWV    Screenings for behavioral, psychosocial and functional/safety risks, and cognitive dysfunction are all negative except as indicated below. These results, as well as other patient data from the Health Risk Assessment form, are documented in Flowsheets linked to this Encounter.    Allergies   Allergen Reactions    Rivaroxaban Other (See Comments)     Hematuria recurrent       Prior to Visit Medications    Medication Sig Taking? Authorizing Provider   senna-docusate (SENNA S) 8.6-50 MG per tablet Take 2 tablets by mouth daily Yes Aleksandr Finney MD   glipiZIDE (GLUCOTROL) 10 MG tablet Take 1 tablet by mouth 2 times daily (before meals) Yes Aleksandr Finney MD   tamsulosin (FLOMAX) 0.4 MG capsule Take 1 capsule by mouth daily Yes Aleksandr Finney MD   omeprazole (PRILOSEC) 20 MG delayed release capsule TAKE 1 CAPSULE BY MOUTH 2 TIMES A DAY BEFORE A MEAL FOR 14 DAYS Yes Torey Washburn MD   Blood Glucose Calibration (TRUE METRIX LEVEL 1) Low SOLN Use as directed Yes Aleksandr Finney MD   Blood Glucose Monitoring Suppl (TRUE METRIX AIR GLUCOSE METER) w/Device KIT Use as directed to check blood sugar Yes Aleksandr Finney MD   blood glucose test strips (TRUE METRIX BLOOD GLUCOSE TEST) strip Test blood sugar 2 times daily Yes Aleksandr Finney MD   TRUEplus Lancets 33G MISC Test blood sugar 2 times daily Yes Aleksandr Finney MD   finasteride (PROSCAR) 5 MG tablet TAKE 1 TABLET EVERY DAY Yes Aleksandr Finney MD   metoprolol succinate (TOPROL XL) 25 MG extended release tablet Take 0.5 tablets by mouth daily Yes Miranda Youssef MD   apixaban (ELIQUIS) 2.5 MG TABS tablet Take 1 tablet by mouth 2 times daily Yes Alcides Felipe MD   lisinopril (PRINIVIL;ZESTRIL) 10 MG tablet Take 1 tablet by

## 2025-04-04 ENCOUNTER — CARE COORDINATION (OUTPATIENT)
Dept: CASE MANAGEMENT | Age: 89
End: 2025-04-04

## 2025-04-04 ENCOUNTER — CARE COORDINATION (OUTPATIENT)
Dept: CARE COORDINATION | Age: 89
End: 2025-04-04

## 2025-04-04 NOTE — CARE COORDINATION
Date/Time:  4/4/2025 12:21 PM  LPN attempted to reach patient by telephone regarding red alert weight in crease  in remote patient monitoring program. Left HIPAA compliant message requesting a return call. Will attempt to reach patient again.    ACM NOTIFIED

## 2025-04-04 NOTE — CARE COORDINATION
Date/Time:  4/4/2025 11:24 AM  LPN attempted to reach patient by telephone regarding red alert weight in crease  in remote patient monitoring program. Left HIPAA compliant message requesting a return call. Will attempt to reach patient again.

## 2025-04-10 ENCOUNTER — CARE COORDINATION (OUTPATIENT)
Dept: CARE COORDINATION | Age: 89
End: 2025-04-10

## 2025-04-10 NOTE — CARE COORDINATION
Noted RPM metrics stable:  Current Patient Metrics ---- Activity: - mins Blood Pressure: 106/51, 82bpm Pulseox: 99%, 83bpm Weight: 192.4lbs Note Created at: 04/09/2025 11:58 AM    Will attempt follow up with patient in 3-5 days.

## 2025-04-12 DIAGNOSIS — E11.42 TYPE 2 DIABETES MELLITUS WITH DIABETIC POLYNEUROPATHY, WITHOUT LONG-TERM CURRENT USE OF INSULIN (HCC): ICD-10-CM

## 2025-04-15 ENCOUNTER — CARE COORDINATION (OUTPATIENT)
Dept: CARE COORDINATION | Age: 89
End: 2025-04-15

## 2025-04-15 NOTE — CARE COORDINATION
Ambulatory Care Coordination Note     4/15/2025 3:51 PM     ACM outreach attempt by this ACM today to perform care management follow up . ACM was unable to reach the spouse/partner  by telephone today;   left voice message requesting a return phone call to this ACM.     ACM: ASHU POPE RN     Care Summary Note: Noted RPM metrics stable:   Current Patient Metrics ---- Activity: - mins Blood Pressure: 108/58, 80bpm Pulseox: 97%, 81bpm Weight: 190.2lbs Note Created at: 04/15/2025 01:04 PM     PCP/Specialist follow up:   Future Appointments         Provider Specialty Dept Phone    4/16/2025  1:00 PM [facility] CHF CLINIC  2 Cardiology [phone]    6/3/2025 2:15 PM Aleksandr Finney MD Internal Medicine [phone]    9/23/2025 1:20 PM Mragarito Piper MD Urology [phone]    11/18/2025 1:00 PM Hugo Thorpe MD Pulmonology [phone]            Follow Up:   Plan for next ACM outreach in approximately 2 weeks to complete:  - SDOH assessments  - medication review  - goal progression  - education   - RPM.

## 2025-04-16 ENCOUNTER — HOSPITAL ENCOUNTER (OUTPATIENT)
Dept: OTHER | Age: 89
Discharge: HOME | End: 2025-04-16
Payer: MEDICARE

## 2025-04-16 ENCOUNTER — CARE COORDINATION (OUTPATIENT)
Dept: CASE MANAGEMENT | Age: 89
End: 2025-04-16

## 2025-04-16 ENCOUNTER — HOSPITAL ENCOUNTER (OUTPATIENT)
Age: 89
Setting detail: SPECIMEN
Discharge: HOME | End: 2025-04-16
Payer: MEDICARE

## 2025-04-16 VITALS
SYSTOLIC BLOOD PRESSURE: 108 MMHG | HEIGHT: 70 IN | RESPIRATION RATE: 18 BRPM | BODY MASS INDEX: 27.89 KG/M2 | OXYGEN SATURATION: 100 % | WEIGHT: 194.8 LBS | DIASTOLIC BLOOD PRESSURE: 48 MMHG | HEART RATE: 73 BPM

## 2025-04-16 LAB
ANION GAP SERPL CALCULATED.3IONS-SCNC: 12 MMOL/L (ref 9–16)
BNP SERPL-MCNC: 2001 PG/ML (ref 0–300)
BUN SERPL-MCNC: 77 MG/DL (ref 8–23)
CHLORIDE SERPL-SCNC: 103 MMOL/L (ref 98–107)
CO2 SERPL-SCNC: 21 MMOL/L (ref 20–31)
CREAT SERPL-MCNC: 1.9 MG/DL (ref 0.7–1.2)
GFR, ESTIMATED: 34 ML/MIN/1.73M2
POTASSIUM SERPL-SCNC: 4.2 MMOL/L (ref 3.7–5.3)
SODIUM SERPL-SCNC: 136 MMOL/L (ref 136–145)

## 2025-04-16 PROCEDURE — 84520 ASSAY OF UREA NITROGEN: CPT

## 2025-04-16 PROCEDURE — 83880 ASSAY OF NATRIURETIC PEPTIDE: CPT

## 2025-04-16 PROCEDURE — 82565 ASSAY OF CREATININE: CPT

## 2025-04-16 PROCEDURE — 99211 OFF/OP EST MAY X REQ PHY/QHP: CPT

## 2025-04-16 PROCEDURE — 36415 COLL VENOUS BLD VENIPUNCTURE: CPT

## 2025-04-16 PROCEDURE — 80051 ELECTROLYTE PANEL: CPT

## 2025-04-16 NOTE — CARE COORDINATION
-Remote Alert Monitoring Note      Date/Time:  2025 3:49 PM  Patient Current Location: Home: [address] [address]  Verified patients name and  as identifiers.    Rpm alert to be reviewed by the provider   red alert  weight (4 lb in 1 day)    Additional needs to be addressed by provider: No                   LPN contacted patient by telephone regarding red alert received   Background: CHF  Refer to 911 immediately if:  Patient unresponsive or unable to provide history  Change in cognition or sudden confusion  Patient unable to respond in complete sentences  Intense chest pain/tightness  Any concern for any clinical emergency  Red Alert: Provider response time of 1 hr required for any red alert requiring intervention  Yellow Alert: Provider response time of 3hr required for any escalated yellow alert  Patient Chief Complaint:  Weight Weight Scale Triage  Was your weight obtained upon rising/waking today? NO   Was your weight obtained after voiding and/or use of the bathroom today? no   Did you weigh yourself in the same amount of clothing today, compared to how you typically do? no   Was the scale bumped or moved prior to today's weight? no   Is your scale on a flat/hard surface? yes   Did you obtain your weight with shoes on? yes   If yes, is this something you normally do during your daily weights? no   Were you standing up straight on the scale today? yes   Were you leaning on anything while obtaining your weight today? no   Weight Education Provided to Patient or Caregiver:   Patient to weigh on the same scale at the same time each day  Patient to weigh first thing in the morning, after going to the bathroom; before eating breakfast, and before having anything to drink.  Instructed on importance of  not wearing shoes on the scale, and wearing the same type of clothing each day.  Clinical Interventions: Reviewed and followed up on alerts and treatments-Spoke to family patient is doing well he

## 2025-04-16 NOTE — PROGRESS NOTES
[facility]  Heart Failure Clinic      [address] [place] [address]  Phone: [phone]  Fax: [phone]      NAME: Carl Frias  MEDICAL RECORD NUMBER:  [id number]  AGE: 89 y.o.   GENDER: male  : 1935  EPISODE DATE:  2025      Carl Frias was referred to the [facility] by Dr. ALISA Ware for heart failure services.he is being followed by Cardiologist, Dr. JACQUES Felipe.     ECHO EF%: 55-60%  Date: 1/3/25             ECHO EF%: 50%       Date: 24       Recent Cardiology follow-up apt: 2/10/25  Follow-up apt recommended: 6 months  Upcoming testing: unknown  Medication Management: no  Nephrologist: Dr.N. Bloom     Carl Frias is a 89 y.o. male here for the Heart Failure Services.  He is here today for a Heart Failure assessment/consultation, monitoring medication effectiveness, reviewing necessary labs, transition of care, extensive Heart Failure education, reporting any concerns or symptoms and obtaining any necessary medication adjustments or orders from the patients health care team.    Vital Signs:   BP (!) 108/48   Pulse 73   Resp 18   Ht 1.778 m (5' 10\")   Wt 88.4 kg (194 lb 12.8 oz)   SpO2 100%   BMI 27.95 kg/m²    O2 Device: None (Room air)        Weight loss/gain -1.1lbs      Nursing Assessment:    Respiratory:    Assessment  Charting Type: Reassessment    Breath Sounds  Respiratory Pattern: Regular  Breath Sounds Bilateral: Clear  Right Upper Lobe: Clear  Right Middle Lobe: Clear  Right Lower Lobe: Clear, Diminished  Left Upper Lobe: Clear  Left Lower Lobe: Clear, Diminished    Cough/Sputum  Cough: Non-productive  Frequency: Occasional (at night when he first lies down, unchanges)    Cardiac  Cardiac Regularity: Regularly Irregular  Heart Sounds: S1, S2  Jugular Vein Distention (JVD) Present: No  Cardiac Symptoms: Chest pain, Lightheaded (lightheaded with position changes when he first wakes up)  Implanted Cardiac Device (Pacemaker, ICD, PA Sensor):

## 2025-04-17 ENCOUNTER — CARE COORDINATION (OUTPATIENT)
Dept: CARE COORDINATION | Age: 89
End: 2025-04-17

## 2025-04-17 RX ORDER — ISOPROPYL ALCOHOL 70 ML/100ML
SWAB TOPICAL
Qty: 100 EACH | Refills: 5 | Status: SHIPPED | OUTPATIENT
Start: 2025-04-17

## 2025-04-17 NOTE — CARE COORDINATION
Nutrition Care Coordinator Follow-Up visit:    Food Recall:eating 2-3 meals/d    Activity Level:  Sedentary:X  Lightly Active:  Moderately Active:  Very Active:    Adult BMI:  Underweight (below 18.5)  Normal Weight (18.5-24.9)  Overweight (25-29.9)X  Obese (30-39.9)  Morbidly Obese (>40)    Plan:  Plan was established with patient:  Increase dietary fiber by consuming whole grains, fruits and vegetables:X  Limit dietary cholesterol to >200mg/day:  Increase water intake:  Avoid added sugar:  Avoid sweetened beverages:  Choose lean meats:X  Limit sodium intake: X    Monitoring:  Will monitor weight:  Will monitor adherence to meal plan:X  Will monitor adherence to exercise plan:  Will monitor HGA1c:    Handouts Provided :  Low Carb snacking:  Carb counting /individual meal plan:  Portion Control:  Food Labels:X  Physical Activity:  Low Fat/Cholesterol:  Hypo/Hyperglycemia:  Calorie Controlled Meal Plan:    Goals:  Increase water consumption to 8oz. 6-8 times daily:  Manage blood sugars by consuming 3 meals spaced every 4-5 hours with 2-3 snacks daily:  Increase fiber and decrease fat intake by consuming 1-2 fruit servings and 2-3 vegetable servings per day.  Increase physical activity by:  Consume less than 2,000mg of sodium/day: reviewed  Avoid consumption of sweetened beverages and added sugar by reading food labels:  Monitor blood sugars by using meter to check blood glucose before morning meal and 2 hours after a meal daily:  Decrease risk of coronary heart disease by consuming fish that contains omega-3 fatty acids at least twice a week, avoiding partially hydrogenated oil/trans fats and limiting saturated fat intake by reading food labels:reviewed    Patient goals set:  1.Reviewed DASH guidelines- lowfat/cholesterol and low sodium. Reviewed reading food labels-what to look for on labels.  Encouraged to limit saturated fat, trans fat, cholesterol and sodium intake.  2. Reviewed avoiding canned, prepackaged

## 2025-04-17 NOTE — CARE COORDINATION
Per RD request, Second attempt to mail Ensure coupons x 4 and Glucerna coupons x4. Patient states did not receive ones mailed on 3.26

## 2025-04-17 NOTE — CARE COORDINATION
Attempted to reach patient today for nutrition CC follow up.  LVM with contact information requesting a return call to [phone].  Will attempt to reach again within 1-2 weeks.  PRINCE Proctor

## 2025-04-22 ENCOUNTER — CARE COORDINATION (OUTPATIENT)
Dept: CARE COORDINATION | Age: 89
End: 2025-04-22

## 2025-04-22 NOTE — CARE COORDINATION
Ambulatory Care Coordination Note     2025 11:06 AM     Patient Current Location:  Home: [address] [facility] [address]     ACM contacted the patient and spouse/partner by telephone. Verified name and  with patient and spouse/partner as identifiers.         ACM: ASHU POPE RN     Challenges to be reviewed by the provider   Additional needs identified to be addressed with provider Yes  no               Method of communication with provider: none.    Utilization: Patient has not had any utilization since our last call.     Care Summary Note: Spoke with wife who reports Carl has some back pain that she believes is from sitting in a recliner too much. We discussed an appointment with PCP or a referral for home PT or outpatient PT. She stated she will speak with him and call myself or the PCP office back.   Per wife he has all of his medications and no other needs today.     Offered patient enrollment in the Remote Patient Monitoring (RPM) program for in-home monitoring: Yes, patient enrolled; current status is activated and monitoring.     Assessments Completed:   Congestive Heart Failure Assessment    Do you understand a low sodium diet?: Yes     No patient-reported symptoms      Symptoms:     Symptom course: stable  Weight trend: stable  Salt intake watch compared to last visit: stable      ,   General Assessment    Do you have any symptoms that are causing concern?: Yes  Progression since Onset: Intermittent - Waxing/Waning  Reported Symptoms: Pain (Comment: back pain)          Medications Reviewed:   Patient denies any changes with medications and reports taking all medications as prescribed.    Advance Care Planning:   Reviewed and current     Care Planning:   Education Documentation  No documentation found.  Education Comments  No comments found.     ,    Goals Addressed                   This Visit's Progress     Conditions and Symptoms        I will schedule office visits, as directed by my

## 2025-05-06 ENCOUNTER — CARE COORDINATION (OUTPATIENT)
Dept: CARE COORDINATION | Age: 89
End: 2025-05-06

## 2025-05-06 RX ORDER — BUMETANIDE 1 MG/1
1 TABLET ORAL DAILY
Qty: 90 TABLET | Refills: 3 | Status: SHIPPED | OUTPATIENT
Start: 2025-05-06

## 2025-05-06 NOTE — CARE COORDINATION
Ambulatory Care Coordination Note     5/6/2025 1:43 PM     ACM outreach attempt by this ACM today to perform care management follow up . ACM was unable to reach the patient by telephone today;   left voice message requesting a return phone call to this ACM.     ACM: ASHU POPE RN     RPM enrolled:  Activity: - mins Blood Pressure: 116/57, 76bpm Pulseox: 98%, 71bpm Weight: -lbs Note Created at: 05/06/2025 01:15 PM        PCP/Specialist follow up:   Future Appointments         Provider Specialty Dept Phone    5/15/2025 1:00 PM [facility]F [facility] RM 1 Cardiology [phone]    6/17/2025 1:30 PM Aleksandr Finney MD Internal Medicine [phone]    9/23/2025 1:20 PM Margarito Piper MD Urology [phone]    11/18/2025 1:00 PM Hugo Thorpe MD Pulmonology [phone]            Follow Up:   Plan for next ACM outreach in approximately 1 week to complete:  - disease specific assessments  - medication review  - goal progression  - education   - RPM.

## 2025-05-08 DIAGNOSIS — E11.42 TYPE 2 DIABETES MELLITUS WITH DIABETIC POLYNEUROPATHY, WITHOUT LONG-TERM CURRENT USE OF INSULIN (HCC): ICD-10-CM

## 2025-05-09 ENCOUNTER — CARE COORDINATION (OUTPATIENT)
Dept: CARE COORDINATION | Facility: CLINIC | Age: 89
End: 2025-05-09

## 2025-05-09 ENCOUNTER — CARE COORDINATION (OUTPATIENT)
Dept: CARE COORDINATION | Age: 89
End: 2025-05-09

## 2025-05-09 NOTE — CARE COORDINATION
5/9/2025 9:50 AM  *  Unable to Reach Date/Time:  5/9/2025 9:50 AM  LPN attempted to reach patient by telephone regarding yellow alert in remote patient monitoring program. BP / BP HR not updated x 3 days. Weight has not been updated since 05/07/25 and pulse ox / pulse ox HR has not been updated since 05/08/25. Unable to reach pt and unable to leave message. Voicemail full.

## 2025-05-09 NOTE — CARE COORDINATION
Attempted to reach patient today for nutrition CC follow up.  Unable to leave a message- no voicemail.  Will attempt to reach again within 1-2 weeks.  PRINCE Proctor

## 2025-05-12 RX ORDER — GLIPIZIDE 10 MG/1
TABLET ORAL
Qty: 180 TABLET | Refills: 3 | Status: SHIPPED | OUTPATIENT
Start: 2025-05-12

## 2025-05-13 ENCOUNTER — CARE COORDINATION (OUTPATIENT)
Dept: CASE MANAGEMENT | Age: 89
End: 2025-05-13

## 2025-05-13 ENCOUNTER — CARE COORDINATION (OUTPATIENT)
Dept: CARE COORDINATION | Age: 89
End: 2025-05-13

## 2025-05-13 NOTE — CARE COORDINATION
Date/Time:  5/13/2025 12:11 PM  LPN attempted to reach family by telephone regarding red alert pulse ox 87  in remote patient monitoring program.unable to leave HIPAA compliant message requesting a return call. ACM notified

## 2025-05-13 NOTE — CARE COORDINATION
Date/Time:  5/13/2025 11:26 AM  LPN attempted to reach family by telephone regarding red alert pulse ox 87  in remote patient monitoring program.unable to leave HIPAA compliant message requesting a return call. Will attempt to reach patient again.

## 2025-05-13 NOTE — CARE COORDINATION
Red alert in RPM. Left VM requesting patient to recheck his pulse ox and call ACM nurse or RPM nurse back with any concerns.

## 2025-05-14 ENCOUNTER — CARE COORDINATION (OUTPATIENT)
Dept: CARE COORDINATION | Age: 89
End: 2025-05-14

## 2025-05-14 NOTE — PROGRESS NOTES
PT CALLED AND REMINDED ABOUT APPT TOMORROW THURSDAY 5/16/25 IN CHF CLINIC AT 1PM; UNABLE TO LEAVE MESSAGE; MAILBOX FULL.

## 2025-05-14 NOTE — PROGRESS NOTES
Writer left  on daughter Hannah's vm to remind pt of his CHF appt tomorrow @ 1:00pm, and to remind to get labs drawn prior.

## 2025-05-14 NOTE — CARE COORDINATION
Noted patients vitals are stable today in RPM:  Current Patient Metrics ---- Blood Pressure: 129/71, 75bpm Glucose: 123.0mg/dl Pulseox: 97%, 71bpm Weight: 198.8lbs Note Created at: 05/14/2025 08:02 AM

## 2025-05-15 ENCOUNTER — HOSPITAL ENCOUNTER (OUTPATIENT)
Age: 89
Setting detail: SPECIMEN
Discharge: HOME | End: 2025-05-15
Payer: MEDICARE

## 2025-05-15 ENCOUNTER — HOSPITAL ENCOUNTER (OUTPATIENT)
Dept: OTHER | Age: 89
Discharge: HOME | End: 2025-05-15
Payer: MEDICARE

## 2025-05-15 VITALS
HEIGHT: 70 IN | DIASTOLIC BLOOD PRESSURE: 52 MMHG | OXYGEN SATURATION: 99 % | HEART RATE: 74 BPM | BODY MASS INDEX: 28.15 KG/M2 | SYSTOLIC BLOOD PRESSURE: 118 MMHG | WEIGHT: 196.6 LBS

## 2025-05-15 LAB
ANION GAP SERPL CALCULATED.3IONS-SCNC: 12 MMOL/L (ref 9–16)
BNP SERPL-MCNC: 2238 PG/ML (ref 0–300)
BUN SERPL-MCNC: 84 MG/DL (ref 8–23)
CALCIUM SERPL-MCNC: 9.2 MG/DL (ref 8.6–10.4)
CHLORIDE SERPL-SCNC: 107 MMOL/L (ref 98–107)
CO2 SERPL-SCNC: 20 MMOL/L (ref 20–31)
CREAT SERPL-MCNC: 2 MG/DL (ref 0.7–1.2)
GFR, ESTIMATED: 32 ML/MIN/1.73M2
GLUCOSE SERPL-MCNC: 165 MG/DL (ref 74–99)
POTASSIUM SERPL-SCNC: 4.1 MMOL/L (ref 3.7–5.3)
SODIUM SERPL-SCNC: 139 MMOL/L (ref 136–145)

## 2025-05-15 PROCEDURE — 99211 OFF/OP EST MAY X REQ PHY/QHP: CPT

## 2025-05-15 PROCEDURE — 36415 COLL VENOUS BLD VENIPUNCTURE: CPT

## 2025-05-15 PROCEDURE — 80048 BASIC METABOLIC PNL TOTAL CA: CPT

## 2025-05-15 PROCEDURE — 83880 ASSAY OF NATRIURETIC PEPTIDE: CPT

## 2025-05-15 NOTE — PROGRESS NOTES
[facility]  Heart Failure Clinic      [address] [place] [address]  Phone: [phone]  Fax: [phone]      NAME: Carl Frias  MEDICAL RECORD NUMBER:  [id number]  AGE: 89 y.o.   GENDER: male  : 1935  EPISODE DATE:  5/15/2025      Carl Frias was referred to the [facility] by Dr. ALISA Ware for heart failure services.he is being followed by Cardiologist, Dr. JACQUES Felipe.     ECHO EF%: 55-60%  Date: 1/3/25             ECHO EF%: 50%       Date: 24       Recent Cardiology follow-up apt: 2/10/25  Follow-up apt recommended: 6 months  Upcoming testing: unknown  Medication Management: no  Nephrologist: Dr.N. Bloom - Call to schedule apt asap     Carl Frias is a 89 y.o. male here for the Heart Failure Services.  He is here today for a Heart Failure assessment/consultation, monitoring medication effectiveness, reviewing necessary labs, transition of care, extensive Heart Failure education, reporting any concerns or symptoms and obtaining any necessary medication adjustments or orders from the patients health care team.    Vital Signs:   BP (!) 118/52   Pulse 74   Ht 1.778 m (5' 10\")   Wt 89.2 kg (196 lb 9.6 oz)   SpO2 99%   BMI 28.21 kg/m²    O2 Device: None (Room air)        Weight loss/gain +1.8lbs      Nursing Assessment:    Respiratory:    Assessment  Charting Type: Reassessment    Breath Sounds  Respiratory Pattern: Regular  Breath Sounds Bilateral: Clear  Right Upper Lobe: Clear  Right Middle Lobe: Clear  Right Lower Lobe: Clear, Diminished  Left Upper Lobe: Clear  Left Lower Lobe: Clear, Diminished    Cough/Sputum  Cough: Non-productive  Frequency: Occasional (increased at night)    Cardiac  Cardiac Regularity: Regularly Irregular  Heart Sounds: S1, S2  Jugular Vein Distention (JVD) Present: No  Cardiac Symptoms: Chest pain  Implanted Cardiac Device (Pacemaker, ICD, PA Sensor): No    Peripheral Vascular  Peripheral Vascular (WDL): Exceptions to WDL  Edema:

## 2025-05-16 ENCOUNTER — CARE COORDINATION (OUTPATIENT)
Dept: CARE COORDINATION | Age: 89
End: 2025-05-16

## 2025-05-16 ENCOUNTER — TELEPHONE (OUTPATIENT)
Dept: OTHER | Age: 89
End: 2025-05-16

## 2025-05-16 NOTE — TELEPHONE ENCOUNTER
Discussed CHF Clinic assessment and lab results with Dr. Corona and gives instructions to continue current medications.    Called and discussed further with Cardiologist, Dr. JACQUES Felipe. He gives instructions for me to call his office on Monday and speak to hilary to schedule stress test next week when Dr. Felipe is in the stress lab.     Left message wit pt daughter with update.

## 2025-05-16 NOTE — CARE COORDINATION
Spoke with patient's wife very briefly, she was on her way out and unable to talk.  Will attempt to reach again for nutrition CC follow up in 1-2 weeks.  PRINCE Roman

## 2025-05-16 NOTE — TELEPHONE ENCOUNTER
----- Message from Dr. Nick Corona MD sent at 5/15/2025  6:30 PM EDT -----  Regarding: RE: CHF Clinic    Continue Bumex 1 mg p.o. daily.  ----- Message -----  From: Elvie Donato RN  Sent: 5/15/2025   3:03 PM EDT  To: Nick Corona MD; #  Subject: CHF Clinic                                       Nephrology,  I saw pt in CHF Clinic today. I was concerned about his worsening BUN and Creatinine and needing further direction. I did advise him to schedule a follow-up apt in your office as well.    Pt weight is up 1.8 lbs with 1+BLE edema, no SOB, though increased coughing at night and occasional chest pain. I will be discussing this with his Cardiologist, Dr. Felipe as well.    BNP increased to 2238 from 2001, BUN 84 from 77 and Creatinine 2.0 from 1.9 (recently ranging from 1.6-2.0)  He is on Bumex 1mg daily.    Please review and advise further.    Thanks,  Elvie Donato RN  [facility]  [phone]

## 2025-05-19 ENCOUNTER — CARE COORDINATION (OUTPATIENT)
Dept: CASE MANAGEMENT | Age: 89
End: 2025-05-19

## 2025-05-19 NOTE — CARE COORDINATION
-Remote Alert Monitoring Note      Date/Time:  2025 11:57 AM  Patient Current Location: Home: [address] [address]  Verified patients name and  as identifiers.    Rpm alert to be reviewed by the provider   red alert  pulse ox reading (82)  Vitals Recheck pulse ox reading (98)  Additional needs to be addressed by provider: No                   LPN contacted patient by telephone regarding red alert received   Background: CHF  Refer to 911 immediately if:  Patient unresponsive or unable to provide history  Change in cognition or sudden confusion  Patient unable to respond in complete sentences  Intense chest pain/tightness  Any concern for any clinical emergency  Red Alert: Provider response time of 1 hr required for any red alert requiring intervention  Yellow Alert: Provider response time of 3hr required for any escalated yellow alert  Patient Chief Complaint:  Oxygen O2 Triage  Are you having any Chest Pain? no   Are you having any Shortness of Breath? no   Swelling in your hands or feet? no   Are you having any other health concerns or issues? no  .............................................................................................................................................................................................  Do you use oxygen? No   Patient educated on oxygen preparedness in case of an emergency?  No     Patient/Caregiver educated on how to how to properly place pulse oximeter. Patient/Caregiver verbalizes understanding.     Clinical Interventions: Reviewed and followed up on alerts and treatments-Spoke to patient wife she states he is doing well he is not happy he has to st up to recheck vitals,, after several attempts at getting him to stay sitting up repeat PO is now WNL     Plan/Follow Up: Will continue to review, monitor and address alerts with follow up based on severity of symptoms and risk factors.  **For any new or worsening symptoms or you are concerned in

## 2025-05-19 NOTE — CARE COORDINATION
Date/Time:  5/19/2025 11:54 AM  LPN attempted to reach patient by telephone regarding red alert PO 82 % in remote patient monitoring program. Mailbox full unable to leave HIPAA compliant message requesting a return call. Will attempt to reach patient again.  ACM NOTIFIED

## 2025-05-20 ENCOUNTER — TRANSCRIBE ORDERS (OUTPATIENT)
Dept: ADMINISTRATIVE | Age: 89
End: 2025-05-20

## 2025-05-20 DIAGNOSIS — R07.9 CHEST PAIN, UNSPECIFIED TYPE: Primary | ICD-10-CM

## 2025-05-21 ENCOUNTER — HOSPITAL ENCOUNTER (OUTPATIENT)
Dept: NUCLEAR MEDICINE | Age: 89
Discharge: HOME | End: 2025-05-23
Attending: INTERNAL MEDICINE
Payer: MEDICARE

## 2025-05-21 ENCOUNTER — HOSPITAL ENCOUNTER (OUTPATIENT)
Age: 89
Discharge: HOME | End: 2025-05-23
Attending: INTERNAL MEDICINE
Payer: MEDICARE

## 2025-05-21 DIAGNOSIS — R07.9 CHEST PAIN, UNSPECIFIED TYPE: ICD-10-CM

## 2025-05-21 LAB
NUC STRESS EJECTION FRACTION: 58 %
STRESS BASELINE DIAS BP: 67 MMHG
STRESS BASELINE HR: 72 BPM
STRESS BASELINE SYS BP: 130 MMHG
STRESS ESTIMATED WORKLOAD: 1 METS
STRESS PEAK DIAS BP: 45 MMHG
STRESS PEAK SYS BP: 130 MMHG
STRESS PERCENT HR ACHIEVED: 63 %
STRESS POST PEAK HR: 83 BPM
STRESS RATE PRESSURE PRODUCT: NORMAL BPM*MMHG
STRESS ST DEPRESSION: 0 MM
STRESS STAGE RECOVERY 1 BP: NORMAL MMHG
STRESS STAGE RECOVERY 1 DURATION: 1 MIN:SEC
STRESS STAGE RECOVERY 1 HR: 72 BPM
STRESS STAGE RECOVERY 2 BP: NORMAL MMHG
STRESS STAGE RECOVERY 2 DURATION: 3 MIN:SEC
STRESS STAGE RECOVERY 2 HR: 82 BPM
STRESS STAGE RECOVERY 3 BP: NORMAL MMHG
STRESS STAGE RECOVERY 3 DURATION: 5 MIN:SEC
STRESS STAGE RECOVERY 3 HR: 83 BPM
STRESS STAGE RECOVERY 4 BP: NORMAL MMHG
STRESS STAGE RECOVERY 4 DURATION: 7 MIN:SEC
STRESS STAGE RECOVERY 4 HR: 81 BPM
STRESS TARGET HR: 132 BPM
TID: 1.02

## 2025-05-21 PROCEDURE — 93017 CV STRESS TEST TRACING ONLY: CPT

## 2025-05-21 PROCEDURE — A9500 TC99M SESTAMIBI: HCPCS | Performed by: INTERNAL MEDICINE

## 2025-05-21 PROCEDURE — 3430000000 HC RX DIAGNOSTIC RADIOPHARMACEUTICAL: Performed by: INTERNAL MEDICINE

## 2025-05-21 PROCEDURE — 78452 HT MUSCLE IMAGE SPECT MULT: CPT

## 2025-05-21 PROCEDURE — 2500000003 HC RX 250 WO HCPCS: Performed by: INTERNAL MEDICINE

## 2025-05-21 PROCEDURE — 6360000002 HC RX W HCPCS: Performed by: INTERNAL MEDICINE

## 2025-05-21 RX ORDER — SODIUM CHLORIDE 9 MG/ML
500 INJECTION, SOLUTION INTRAVENOUS CONTINUOUS PRN
Status: ACTIVE | OUTPATIENT
Start: 2025-05-21 | End: 2025-05-21

## 2025-05-21 RX ORDER — ALBUTEROL SULFATE 90 UG/1
2 INHALANT RESPIRATORY (INHALATION) PRN
Status: ACTIVE | OUTPATIENT
Start: 2025-05-21 | End: 2025-05-21

## 2025-05-21 RX ORDER — REGADENOSON 0.08 MG/ML
0.4 INJECTION, SOLUTION INTRAVENOUS
Status: COMPLETED | OUTPATIENT
Start: 2025-05-21 | End: 2025-05-21

## 2025-05-21 RX ORDER — SODIUM CHLORIDE 0.9 % (FLUSH) 0.9 %
5-40 SYRINGE (ML) INJECTION PRN
Status: ACTIVE | OUTPATIENT
Start: 2025-05-21 | End: 2025-05-21

## 2025-05-21 RX ORDER — TETRAKIS(2-METHOXYISOBUTYLISOCYANIDE)COPPER(I) TETRAFLUOROBORATE 1 MG/ML
30 INJECTION, POWDER, LYOPHILIZED, FOR SOLUTION INTRAVENOUS
Status: COMPLETED | OUTPATIENT
Start: 2025-05-21 | End: 2025-05-21

## 2025-05-21 RX ORDER — NITROGLYCERIN 0.4 MG/1
0.4 TABLET SUBLINGUAL EVERY 5 MIN PRN
Status: ACTIVE | OUTPATIENT
Start: 2025-05-21 | End: 2025-05-21

## 2025-05-21 RX ORDER — AMINOPHYLLINE 25 MG/ML
50 INJECTION, SOLUTION INTRAVENOUS PRN
Status: ACTIVE | OUTPATIENT
Start: 2025-05-21 | End: 2025-05-21

## 2025-05-21 RX ORDER — SODIUM CHLORIDE 0.9 % (FLUSH) 0.9 %
10 SYRINGE (ML) INJECTION PRN
Status: DISCONTINUED | OUTPATIENT
Start: 2025-05-21 | End: 2025-05-24 | Stop reason: HOSPADM

## 2025-05-21 RX ORDER — TETRAKIS(2-METHOXYISOBUTYLISOCYANIDE)COPPER(I) TETRAFLUOROBORATE 1 MG/ML
10 INJECTION, POWDER, LYOPHILIZED, FOR SOLUTION INTRAVENOUS
Status: COMPLETED | OUTPATIENT
Start: 2025-05-21 | End: 2025-05-21

## 2025-05-21 RX ORDER — ATROPINE SULFATE 0.1 MG/ML
0.5 INJECTION INTRAVENOUS EVERY 5 MIN PRN
Status: ACTIVE | OUTPATIENT
Start: 2025-05-21 | End: 2025-05-21

## 2025-05-21 RX ORDER — METOPROLOL TARTRATE 1 MG/ML
5 INJECTION, SOLUTION INTRAVENOUS EVERY 5 MIN PRN
Status: ACTIVE | OUTPATIENT
Start: 2025-05-21 | End: 2025-05-21

## 2025-05-21 RX ADMIN — Medication 35.5 MILLICURIE: at 09:51

## 2025-05-21 RX ADMIN — REGADENOSON 0.4 MG: 0.08 INJECTION, SOLUTION INTRAVENOUS at 09:45

## 2025-05-21 RX ADMIN — SODIUM CHLORIDE, PRESERVATIVE FREE 10 ML: 5 INJECTION INTRAVENOUS at 08:00

## 2025-05-21 RX ADMIN — Medication 11.3 MILLICURIE: at 08:00

## 2025-05-22 ENCOUNTER — CARE COORDINATION (OUTPATIENT)
Dept: CARE COORDINATION | Age: 89
End: 2025-05-22

## 2025-05-22 NOTE — CARE COORDINATION
Ambulatory Care Coordination Note     2025 3:33 PM     Patient Current Location:  Home: [address] [facility] [address] [address]     Spouse/Partner contacted the spouse/partner by telephone. Verified name and  with spouse/partner as identifiers.         ACM: ELVIE REILLY RN     Challenges to be reviewed by the provider   Additional needs identified to be addressed with provider No  none               Method of communication with provider: none.    Utilization: Patient has not had any utilization since our last call.     Care Summary Note: Wife and patient denied any needs today. Stated he has all of his medications and is doing well today.     Offered patient enrollment in the Remote Patient Monitoring (RPM) program for in-home monitoring: Yes, patient enrolled; current status is activated and monitoring.   Current Patient Metrics ---- Activity: - mins Blood Pressure: 115/50, 62bpm Pulseox: 99%, 54bpm Weight: -lbs Note Created at: 2025 01:49 PM ET   Assessments Completed:   Congestive Heart Failure Assessment    Do you understand a low sodium diet?: Yes         Symptoms:     Symptom course: stable  Salt intake watch compared to last visit: stable      ,   General Assessment    Do you have any symptoms that are causing concern?: No          Medications Reviewed:   Patient denies any changes with medications and reports taking all medications as prescribed.    Advance Care Planning:   Reviewed and current     Care Planning:   Education Documentation  General medication information, taught by Elvie Reilly RN at 2025  3:32 PM.  Learner: Significant Other  Readiness: Acceptance  Method: Explanation  Response: Verbalizes Understanding    Educate reporting changes in condition, taught by Elvie Reilly RN at 2025  3:32 PM.  Learner: Significant Other  Readiness: Acceptance  Method: Explanation  Response: Verbalizes Understanding    Educate Patient on When to Call for Symptoms, taught by Tommie

## 2025-05-23 ENCOUNTER — CARE COORDINATION (OUTPATIENT)
Dept: CARE COORDINATION | Age: 89
End: 2025-05-23

## 2025-05-23 NOTE — CARE COORDINATION
Patient goals reviewed-  1.Reviewed DASH guidelines- lowfat/cholesterol and low sodium. Reviewed reading food labels-what to look for on labels.  Encouraged to limit saturated fat, trans fat, cholesterol and sodium intake.  2. Reviewed avoiding canned, prepackaged foods, processed meats and cheese. Patient states does eat canned soup once in a while,  encouraged frozen vegetables and low sodium soups. Discussed processed meats in detail to avoid/limit- sausage, norman, bologna, ham, ect.- states he will cut back on these. He eats frozen meals but not often discussed reading food labels and choosing lower sodium meals with <500mg/meal. Goal is <2,300gm of sodium/day.  3. Reviewed best ways to cook foods-baking, broiling, grilling or steaming foods. Discussed healthy fats- using olive or canola oil if adding oil and encouraged to use butter/margarine sparingly. Discussed the different types of fat in diet- trans/saturated fat and cholesterol-encouraged to limit. Discussed foods high in each- patient will be sent a list of foods to avoid/limit.   4. Reviewed shopping the outer rim of the grocery store where fresher foods are found. Patient will be sent a heart healthy grocery shopping guide. Discussed seasonings that can be used that do not contain salt. Patient states is adding salt to foods but will try alternatives suggested. We also discussed eating out and making healthier choices- encouraged to avoid fast food, states eats at sit down restaurants- discussed healthier choices when eating out.   5.Reviewed plate method, encouraged patient  to portion plate 1/4 lean protein, 1/4 carbs. Discussed protein sources in detail an increasing intake of non-starchy vegetables.  Goal is 1/2 of plate to be non-starchy vegetables, 1/4 lean protein, 1/4 carbs.  Discussed what a serving size is and how to measure out servings at meals/snacks.   6. Reviewed avoiding/limiting sweets and sweetened drinks. Patient drinks water, denied

## 2025-05-30 ENCOUNTER — CARE COORDINATION (OUTPATIENT)
Dept: CASE MANAGEMENT | Age: 89
End: 2025-05-30

## 2025-05-30 NOTE — CARE COORDINATION
Message sent to patient via Patient Connect Portal for Remote Patient Monitoring     RPM Nurse message No readings in two days Hello, Please see an important message from your RPM Team:  This is a reminder that we have not received your readings for two days please enter them as soon as possible.    Please do not respond to this message as the messages are not monitored by the remote patient monitoring team. Please log on to your tablet and enter your vitals as you are able. The goal is to have these entered in each day prior to noon.

## 2025-06-02 ENCOUNTER — CARE COORDINATION (OUTPATIENT)
Dept: CASE MANAGEMENT | Age: 89
End: 2025-06-02

## 2025-06-03 ENCOUNTER — CARE COORDINATION (OUTPATIENT)
Dept: CARE COORDINATION | Facility: CLINIC | Age: 89
End: 2025-06-03

## 2025-06-03 NOTE — CARE COORDINATION
6/3/2025 1:36 PM  *  RPM Alert   Remote Patient Monitoring Note      Date/Time:  6/3/2025 1:36 PM  LPN attempted to contact patient by telephone regarding yellow alert received for no weight taken / updated x 8 days. Spoke with patients spouse / emergency contact,  Naomi Frias. Verified patients name and  as identifiers. Spouse reports she is currently IP. States pt \"should be at home\" but is unable to provide alternative number. No alternative number for pt noted in [facility]. Will route to [facility].    Background: Pt enrolled in RPM r/t CHF  Plan/Follow Up: Will continue to review, monitor and address alerts with follow up based on severity of symptoms and risk factors.

## 2025-06-04 ENCOUNTER — CARE COORDINATION (OUTPATIENT)
Dept: CASE MANAGEMENT | Age: 89
End: 2025-06-04

## 2025-06-04 NOTE — CARE COORDINATION
Remote Patient Monitoring Note      Date/Time:  2025 10:14 AM  Patient Current Location: Home: [address] [address]  LPN contacted family by telephone regarding red alert received for blood pressure heart rate (46). Verified patients name and  as identifiers.  Background: CHF  Clinical Interventions:  Called and spoke to patient wife she states she is in the hospital at present time and asked me to call daughter Hannah, Hannah is not with patient but her sister is they will repeat BP and if they have any issues will return call to me,     Plan/Follow Up: Will continue to review, monitor and address alerts with follow up based on severity of symptoms and risk factors.

## 2025-06-05 ENCOUNTER — CARE COORDINATION (OUTPATIENT)
Dept: CARE COORDINATION | Age: 89
End: 2025-06-05

## 2025-06-05 NOTE — CARE COORDINATION
Ambulatory Care Coordination Note     6/5/2025 10:01 AM     ACM outreach attempt by this ACM today to perform care management follow up . ACM was unable to reach the patient by telephone today;   No VM available     ACM: ASHU POPE, RN     Care Summary Note: would like to discuss RPM graduation.     PCP/Specialist follow up:   Future Appointments         Provider Specialty Dept Phone    6/17/2025 1:30 PM Aleksandr Finney MD Internal Medicine [phone]    6/18/2025 1:00 PM [facility]F [facility] RM 1 Cardiology [phone]    9/23/2025 1:20 PM Margarito Piper MD Urology [phone]    11/18/2025 1:00 PM Hugo Thorpe MD Pulmonology [phone]            Follow Up:   Plan for next ACM outreach in approximately 1 week to complete:  - medication review  - RPM.

## 2025-06-12 ENCOUNTER — CARE COORDINATION (OUTPATIENT)
Dept: CARE COORDINATION | Age: 89
End: 2025-06-12

## 2025-06-12 NOTE — CARE COORDINATION
and/or exercise on discharge, taught by Elvie Reilly, RN at 6/12/2025  2:02 PM.  Learner: Patient  Readiness: Acceptance  Method: Explanation  Response: Verbalizes Understanding    Educate dietary adherence benefits, taught by Elvie Reilly RN at 6/12/2025  2:02 PM.  Learner: Patient  Readiness: Acceptance  Method: Explanation  Response: Verbalizes Understanding    Adaptive Equipment, taught by Elvie Reilly RN at 6/12/2025  2:02 PM.  Learner: Patient  Readiness: Acceptance  Method: Explanation  Response: Verbalizes Understanding    Education Comments  No comments found.         PCP/Specialist follow up:   Future Appointments         Provider Specialty Dept Phone    6/17/2025 1:30 PM Aleksandr Finney MD Internal Medicine [phone]    6/18/2025 1:00 PM [facility]F CLIN[facility]  1 Cardiology [phone]    9/23/2025 1:20 PM Margarito Piper MD Urology [phone]    11/18/2025 1:00 PM Hugo Thorpe MD Pulmonology [phone]            Follow Up:   Plan for next ACM outreach in approximately 2 weeks to complete:  - goal progression  - education   - RPM  - graduate if no needs .   Caregiver is agreeable to this plan.

## 2025-06-13 ENCOUNTER — CARE COORDINATION (OUTPATIENT)
Dept: CARE COORDINATION | Facility: CLINIC | Age: 89
End: 2025-06-13

## 2025-06-13 ENCOUNTER — TELEPHONE (OUTPATIENT)
Dept: INTERNAL MEDICINE CLINIC | Age: 89
End: 2025-06-13

## 2025-06-13 NOTE — CARE COORDINATION
Message sent to patient via Patient Connect Portal for Remote Patient Monitoring     RPM Nurse message No weight reading in two days Hello, Please see an important message from your RPM Team:  This is a reminder that we have not received your weight reading for two days please enter it as soon as possible.    Please do not respond to this message as the messages are not monitored by the remote patient monitoring team. Please log on to your tablet and enter your vitals as you are able. The goal is to have these entered in each day prior to noon.     Monitor summary  SR/ST   .16/.08/.36

## 2025-06-16 RX ORDER — LISINOPRIL 10 MG/1
10 TABLET ORAL DAILY
Qty: 30 TABLET | Refills: 1 | Status: SHIPPED | OUTPATIENT
Start: 2025-06-16

## 2025-06-17 ENCOUNTER — OFFICE VISIT (OUTPATIENT)
Dept: INTERNAL MEDICINE CLINIC | Age: 89
End: 2025-06-17

## 2025-06-17 ENCOUNTER — CARE COORDINATION (OUTPATIENT)
Dept: CARE COORDINATION | Facility: CLINIC | Age: 89
End: 2025-06-17

## 2025-06-17 VITALS
BODY MASS INDEX: 28.49 KG/M2 | WEIGHT: 199 LBS | SYSTOLIC BLOOD PRESSURE: 112 MMHG | DIASTOLIC BLOOD PRESSURE: 64 MMHG | HEIGHT: 70 IN

## 2025-06-17 DIAGNOSIS — N18.32 STAGE 3B CHRONIC KIDNEY DISEASE (HCC): ICD-10-CM

## 2025-06-17 DIAGNOSIS — N18.31 CHRONIC KIDNEY DISEASE, STAGE 3A (HCC): ICD-10-CM

## 2025-06-17 DIAGNOSIS — I11.0 HYPERTENSIVE HEART DISEASE WITH HEART FAILURE (HCC): ICD-10-CM

## 2025-06-17 DIAGNOSIS — I48.11 LONGSTANDING PERSISTENT ATRIAL FIBRILLATION (HCC): ICD-10-CM

## 2025-06-17 DIAGNOSIS — E11.42 DIABETIC PERIPHERAL NEUROPATHY (HCC): ICD-10-CM

## 2025-06-17 DIAGNOSIS — I50.42 CHRONIC COMBINED SYSTOLIC AND DIASTOLIC CONGESTIVE HEART FAILURE (HCC): Primary | ICD-10-CM

## 2025-06-17 NOTE — CARE COORDINATION
Message sent to patient via Patient Connect Portal for Remote Patient Monitoring     RPM Nurse message No weight reading in two days Hello, Please see an important message from your RPM Team:  This is a reminder that we have not received your weight reading for two days please enter it as soon as possible.    Please do not respond to this message as the messages are not monitored by the remote patient monitoring team. Please log on to your tablet and enter your vitals as you are able. The goal is to have these entered in each day prior to noon.

## 2025-06-17 NOTE — PROGRESS NOTES
Patient here for evaluation and management  of  Diabetes Mellitus .           -- No symptoms suggestive of hyperglycemia or hypoglycemia .           - Blood sugar diary maintaine [] yes    [x] No    Hemoglobin A1C   Date Value Ref Range Status   10/31/2024 6.0 % Final            Known to have  Hypertension ,  Compliant with meds .  No new symptoms suggestive of high or low bp .        Hemoglobin A1C   Date Value Ref Range Status   10/31/2024 6.0 % Final         BP Readings from Last 3 Encounters:   06/17/25 112/64   05/15/25 (!) 118/52   04/16/25 (!) 108/48       @last(bmp)                                                                                                                                                                                                             OFFICE VISIT  PROGRESS NOTE         Date of patient's visit: 6/17/25  Patient's Name:  Carl Frias  YOB: 1935       Patient Care Team:  Aleksandr Finney MD as PCP - General (Internal Medicine)  Aleksandr Finney MD as PCP - Empaneled Provider  Minerva Box MD as Consulting Physician (Hematology and Oncology)  Kyaw Bowling MD as Consulting Physician (Radiation Oncology)  Ron Mayers MD Katragadda, Srinivas, MD as Consulting Physician (Pulmonary Disease)  Elvie Reilly, RN as Ambulatory Care Manager        SUBJECTIVE     HISTORY           History of present illness     Pertinent details  added to ,       Chief Complaint   Patient presents with    Hypertension          Encounter Diagnoses   Name Primary?    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

## 2025-06-17 NOTE — PROGRESS NOTES
Pt called in to cancel CHF Clinic apt scheduled tomorrow. He reschedules for 6/27/25.

## 2025-06-17 NOTE — PROGRESS NOTES
ATTEMPT TO CALL PATIENT REMINDING OF HEART FAILURE CLINIC APPOINTMENT ON 6/18/2025 AT 1:00. VOICEMAIL BOX FULL, UNABLE TO LEAVE MESSAGE.

## 2025-06-18 ENCOUNTER — HOSPITAL ENCOUNTER (OUTPATIENT)
Dept: OTHER | Age: 89
Discharge: HOME | End: 2025-06-18

## 2025-06-25 ENCOUNTER — CARE COORDINATION (OUTPATIENT)
Dept: CARE COORDINATION | Facility: CLINIC | Age: 89
End: 2025-06-25

## 2025-06-25 NOTE — CARE COORDINATION
6/25/2025 11:52 AM  *  Unable to Reach Date/Time:  6/25/2025 11:52 AM  LPN attempted to reach patient by telephone regarding yellow alert in remote patient monitoring program. Weight has not been updated x 6 days. Unable to reach pt and unable to leave message; voicemail full. Updated BP / BP HR and pulse ox / pulse ox HR are within RPM parameters. Will route to [facility].

## 2025-06-26 ENCOUNTER — CARE COORDINATION (OUTPATIENT)
Dept: CARE COORDINATION | Age: 89
End: 2025-06-26

## 2025-06-26 ENCOUNTER — CARE COORDINATION (OUTPATIENT)
Dept: CARE COORDINATION | Facility: CLINIC | Age: 89
End: 2025-06-26

## 2025-06-26 NOTE — PROGRESS NOTES
Unable to leave a message for pt about appt tomorrow in CHF Clinic 6/27/25 at 12:15; mailbox is full.

## 2025-06-26 NOTE — CARE COORDINATION
6/26/2025 11:14 AM  *  Unable to Reach Date/Time:  6/26/2025 11:14 AM  LPN attempted to reach patient by telephone regarding yellow alert in remote patient monitoring program. Weight has not been updated x 7 days. Unable to reach pt and unable to leave message; voicemail full. Updated BP/ BP HR and pulse ox / pulse ox HR are within RPM parameters.

## 2025-06-27 ENCOUNTER — CARE COORDINATION (OUTPATIENT)
Dept: CARE COORDINATION | Facility: CLINIC | Age: 89
End: 2025-06-27

## 2025-06-27 ENCOUNTER — HOSPITAL ENCOUNTER (OUTPATIENT)
Age: 89
Setting detail: SPECIMEN
Discharge: HOME | End: 2025-06-27
Payer: MEDICARE

## 2025-06-27 ENCOUNTER — HOSPITAL ENCOUNTER (OUTPATIENT)
Dept: OTHER | Age: 89
Discharge: HOME | End: 2025-06-27
Payer: MEDICARE

## 2025-06-27 VITALS
RESPIRATION RATE: 18 BRPM | HEART RATE: 59 BPM | DIASTOLIC BLOOD PRESSURE: 50 MMHG | OXYGEN SATURATION: 98 % | HEIGHT: 70 IN | WEIGHT: 201.5 LBS | BODY MASS INDEX: 28.85 KG/M2 | SYSTOLIC BLOOD PRESSURE: 112 MMHG

## 2025-06-27 LAB
ANION GAP SERPL CALCULATED.3IONS-SCNC: 13 MMOL/L (ref 9–16)
BNP SERPL-MCNC: 2314 PG/ML (ref 0–300)
BUN SERPL-MCNC: 65 MG/DL (ref 8–23)
CALCIUM SERPL-MCNC: 9 MG/DL (ref 8.6–10.4)
CHLORIDE SERPL-SCNC: 104 MMOL/L (ref 98–107)
CO2 SERPL-SCNC: 20 MMOL/L (ref 20–31)
CREAT SERPL-MCNC: 1.9 MG/DL (ref 0.7–1.2)
GFR, ESTIMATED: 34 ML/MIN/1.73M2
GLUCOSE SERPL-MCNC: 161 MG/DL (ref 74–99)
POTASSIUM SERPL-SCNC: 4.3 MMOL/L (ref 3.7–5.3)
SODIUM SERPL-SCNC: 137 MMOL/L (ref 136–145)

## 2025-06-27 PROCEDURE — 80048 BASIC METABOLIC PNL TOTAL CA: CPT

## 2025-06-27 PROCEDURE — 83880 ASSAY OF NATRIURETIC PEPTIDE: CPT

## 2025-06-27 PROCEDURE — 36415 COLL VENOUS BLD VENIPUNCTURE: CPT

## 2025-06-27 PROCEDURE — 99211 OFF/OP EST MAY X REQ PHY/QHP: CPT

## 2025-06-27 NOTE — PROGRESS NOTES
[facility]  Heart Failure Clinic      [address] [place] [address]  Phone: [phone]  Fax: [phone]      NAME: Carl Frias  MEDICAL RECORD NUMBER:  [id number]  AGE: 89 y.o.   GENDER: male  : 1935  EPISODE DATE:  2025      Carl Frias was referred to the [facility] by Dr. ALISA Ware for heart failure services.he is being followed by Cardiologist, Dr. JACQUES Felipe.     ECHO EF%: 55-60%  Date: 1/3/25             ECHO EF%: 50%       Date: 24       Recent Cardiology follow-up apt: 2/10/25  Follow-up apt recommended: 6 months - Call to schedule apt  Upcoming testing: unknown  Medication Management: no  Nephrologist: Dr.N. Bloom - Call to schedule apt asap     Carl Frias is a 89 y.o. male here for the Heart Failure Services.  He is here today for a Heart Failure assessment/consultation, monitoring medication effectiveness, reviewing necessary labs, transition of care, extensive Heart Failure education, reporting any concerns or symptoms and obtaining any necessary medication adjustments or orders from the patients health care team.    Vital Signs:   BP (!) 112/50   Pulse 59   Resp 18   Ht 1.778 m (5' 10\")   Wt 91.4 kg (201 lb 8 oz)   SpO2 98%   BMI 28.91 kg/m²    O2 Device: None (Room air)        Weight loss/gain +4.5lbs      Nursing Assessment:    Respiratory:    Assessment  Charting Type: Reassessment    Breath Sounds  Respiratory Pattern: Regular  Breath Sounds Bilateral: Clear  Right Upper Lobe: Clear  Right Middle Lobe: Clear  Right Lower Lobe: Clear  Left Upper Lobe: Clear  Left Lower Lobe: Clear    Cough/Sputum  Cough: Non-productive  Frequency: Occasional    Cardiac  Cardiac Regularity: Regularly Irregular  Heart Sounds: S1, S2  Jugular Vein Distention (JVD) Present: No  Cardiac Symptoms: Peripheral edema  Implanted Cardiac Device (Pacemaker, ICD, PA Sensor): No    Peripheral Vascular  Peripheral Vascular (WDL): Exceptions to WDL  Edema: Left

## 2025-06-27 NOTE — CARE COORDINATION
Ambulatory Care Coordination Note     6/27/2025 2:18 PM     ACM outreach attempt by this ACM today to perform care management follow up . ACM was unable to reach the patient by telephone today;   left voice message requesting a return phone call to this ACM.     ACM: ASHU POPE RN     Care Summary Note: Would like to remind patient about taking vitals daily including weight. Will check on any safety concerns with weighing self when we speak.     PCP/Specialist follow up:   Future Appointments         Provider Specialty Dept Phone    7/25/2025 12:00 PM [facility] CHF [facility]IN[facility] RM 1 Cardiology [phone]    9/19/2025 1:45 PM Drew Bowen MD Internal Medicine [phone]    9/23/2025 1:20 PM Margarito Piper MD Urology [phone]    11/18/2025 1:00 PM Hugo Thorpe MD Pulmonology [phone]            Follow Up:   Plan for next ACM outreach in approximately 1 week to complete:  RPM

## 2025-06-27 NOTE — CARE COORDINATION
6/27/2025 1:02 PM  *  Unable to Reach Date/Time:  6/27/2025 1:02 PM  LPN attempted to reach patient by telephone regarding yellow alert metric for weight in remote patient monitoring program. . Weight has not been updated x 8 days. Unable to reach pt and unable to leave message; voicemail full. Updated BP/ BP HR and pulse ox / pulse ox HR are within RPM parameters.

## 2025-06-30 ENCOUNTER — CARE COORDINATION (OUTPATIENT)
Dept: CARE COORDINATION | Age: 89
End: 2025-06-30

## 2025-06-30 NOTE — CARE COORDINATION
Ambulatory Care Coordination Note     6/30/2025 12:04 PM     ACM outreach attempt by this ACM today to perform care management follow up . ACM was unable to reach the spouse/partner  by telephone today;   left voice message requesting a return phone call to this ACM.     ACM: ASHU POPE RN     Care Summary Note: VM included concern for RPM, no weights being done. Explained the importance for weights and asked for a return call to discuss any issues or concerns with the scale.     PCP/Specialist follow up:   Future Appointments         Provider Specialty Dept Phone    7/25/2025 12:00 PM [facility] RM 1 Cardiology [phone]    9/19/2025 1:45 PM Drew Bowen MD Internal Medicine [phone]    9/23/2025 1:20 PM Margarito Piper MD Urology [phone]    11/18/2025 1:00 PM Hugo Thorpe MD Pulmonology [phone]            Follow Up:   Plan for next ACM outreach in approximately 1 week to complete:  - disease specific assessments  - medication review  - goal progression  - education   - RPM.

## 2025-07-01 ENCOUNTER — CARE COORDINATION (OUTPATIENT)
Dept: CASE MANAGEMENT | Age: 89
End: 2025-07-01

## 2025-07-01 NOTE — CARE COORDINATION
Date/Time:  7/1/2025 10:59 AM  LPN attempted to reach patient by telephone regarding yellow alert metric for weight in remote patient monitoring program. . Weight has not been updated x 10 days. Unable to reach pt and unable to leave message; voicemail full. Updated BP/ BP HR and pulse ox / pulse ox HR are within RPM parameters

## 2025-07-07 ENCOUNTER — CARE COORDINATION (OUTPATIENT)
Dept: CARE COORDINATION | Age: 89
End: 2025-07-07

## 2025-07-07 NOTE — CARE COORDINATION
Ambulatory Care Coordination Note     7/7/2025 3:32 PM     ACM outreach attempt by this ACM today to perform care management follow up . ACM was unable to reach the patient by telephone today;   voicemail full and unable to leave a message.      ACM: ASHU POPE, RN     Care Summary Note: Would like to discuss graduation from [facility], unable to leave VM.     PCP/Specialist follow up:   Future Appointments         Provider Specialty Dept Phone    7/25/2025 12:00 PM [facility] CHF CLIN[facility] RM 1 Cardiology [phone]    9/19/2025 1:45 PM Drew Bowen MD Internal Medicine [phone]    9/23/2025 1:20 PM Margarito Piper MD Urology [phone]    11/18/2025 1:00 PM Hugo Thorpe MD Pulmonology [phone]            Follow Up:   Plan for next ACM outreach in approximately 1-2 days  to complete:  - disease specific assessments  - goal progression  - education   - [facility].

## 2025-07-09 ENCOUNTER — CARE COORDINATION (OUTPATIENT)
Dept: CASE MANAGEMENT | Age: 89
End: 2025-07-09

## 2025-07-09 ENCOUNTER — CARE COORDINATION (OUTPATIENT)
Dept: CARE COORDINATION | Age: 89
End: 2025-07-09

## 2025-07-09 NOTE — CARE COORDINATION
Ambulatory Care Coordination Note     7/9/2025 12:01 PM     ACM outreach attempt by this ACM today to perform hospital follow up. ACM was unable to reach the patient by telephone today;   Unable yo leave VM for patient. Attempt to reach emergency contact, due to several unsuccessful attempts, left voice message requesting a return phone call to this ACM.     ACM: ASHU POPE RN       PCP/Specialist follow up:   Future Appointments         Provider Specialty Dept Phone    7/25/2025 12:00 PM [facility] CHF CLINIC RM 1 Cardiology [phone]    9/19/2025 1:45 PM Drew Bowen MD Internal Medicine [phone]    9/23/2025 1:20 PM Margarito Piper MD Urology [phone]    11/18/2025 1:00 PM Hugo Thorpe MD Pulmonology [phone]            Follow Up:   Plan for next ACM outreach in approximately 1 week to complete:  Possible graduation.

## 2025-07-09 NOTE — CARE COORDINATION
Date/Time:  7/9/2025 1:10 PM  LPN attempted to reach family by telephone regarding yellow alert no weight in multiple weeks  in remote patient monitoring program unable to leave HIPAA compliant message requesting a return call. ACM again notified

## 2025-07-16 ENCOUNTER — CARE COORDINATION (OUTPATIENT)
Dept: PRIMARY CARE CLINIC | Age: 89
End: 2025-07-16

## 2025-07-16 ENCOUNTER — CARE COORDINATION (OUTPATIENT)
Dept: CARE COORDINATION | Age: 89
End: 2025-07-16

## 2025-07-16 DIAGNOSIS — I50.42 CHRONIC COMBINED SYSTOLIC AND DIASTOLIC CONGESTIVE HEART FAILURE (HCC): Primary | ICD-10-CM

## 2025-07-16 NOTE — CARE COORDINATION
Ambulatory Care Coordination Note     2025 1:45 PM     Patient Current Location:  Home: [address] [place] [address]     ACM contacted the patient by telephone. Verified name and  with spouse/partner as identifiers.     Patient graduated from the High Risk Care Management program on 2025.  Spouse/Partner verbalizes confidence in the ability to self-manage at this time. has the ability to self-manage at this time..  Care management goals have been completed. No further Ambulatory Care Manager follow up scheduled.      ACM: ASHU POPE RN     Challenges to be reviewed by the provider   Additional needs identified to be addressed with provider No  NA               Method of communication with provider: none.    Utilization: Patient has not had any utilization since our last call.     Care Summary Note: Spoke with wife who stated Carl is doing well. He is currently sitting outside. She denied any needs from ACM or PCP.     Offered patient enrollment in the Remote Patient Monitoring (RPM) program for in-home monitoring: Yes, patient enrolled; current status is activated and monitoring.  Remote Patient Monitoring Graduation      Date/Time:  2025 1:45 PM  Patient Current Location: Home: [address] [place] [address]  Patient has graduated from the Remote Patient Monitoring program on 2025.   RPM goals have been met at this time.      Patient has been provided instruction on process to return RPM equipment and RPM has been deactivated.     Patient has ACM's contact information for any further questions, concerns, or needs.     Assessments Completed:   General Assessment    Do you have any symptoms that are causing concern?: No          Medications Reviewed:   Patient denies any changes with medications and reports taking all medications as prescribed.    Advance Care Planning:   Reviewed and current     Care Planning:   Education Documentation  No documentation found.  Education

## 2025-07-16 NOTE — CARE COORDINATION
Patient Carl Frias  07/16/25     Care Coordination  placed call to patient to arrange RPM kit  through [employer]. Left HIPAA Compliant Message     provided return and how to pack equipment in original packing via the patients voicemail if available and provided call back number should patient have questions.    Patient made aware [employer] will  equipment in 2-4 days.

## 2025-07-16 NOTE — PROGRESS NOTES
Remote Patient Order Discontinued    Received request from Elvie Reilly, RN   to discontinue order for remote patient monitoring of CHF and order completed.

## 2025-07-25 ENCOUNTER — HOSPITAL ENCOUNTER (OUTPATIENT)
Age: 89
Setting detail: SPECIMEN
Discharge: HOME | End: 2025-07-25

## 2025-07-25 ENCOUNTER — HOSPITAL ENCOUNTER (OUTPATIENT)
Dept: OTHER | Age: 89
Discharge: HOME | End: 2025-07-25

## 2025-07-25 NOTE — PROGRESS NOTES
Pt was no call/no show to CHF Clinic apt today, 7/25/25. Called daughter, Hannah. The apt was forgotten and she reschedules for 7/28/28.

## 2025-07-28 ENCOUNTER — HOSPITAL ENCOUNTER (OUTPATIENT)
Age: 89
Setting detail: SPECIMEN
Discharge: HOME | End: 2025-07-28
Payer: MEDICARE

## 2025-07-28 ENCOUNTER — HOSPITAL ENCOUNTER (OUTPATIENT)
Dept: OTHER | Age: 89
Discharge: HOME | End: 2025-07-28
Payer: MEDICARE

## 2025-07-28 VITALS
HEIGHT: 70 IN | OXYGEN SATURATION: 99 % | WEIGHT: 202.5 LBS | RESPIRATION RATE: 18 BRPM | DIASTOLIC BLOOD PRESSURE: 52 MMHG | HEART RATE: 78 BPM | BODY MASS INDEX: 28.99 KG/M2 | SYSTOLIC BLOOD PRESSURE: 132 MMHG

## 2025-07-28 LAB
ANION GAP SERPL CALCULATED.3IONS-SCNC: 15 MMOL/L (ref 9–16)
BNP SERPL-MCNC: 2271 PG/ML (ref 0–300)
BUN SERPL-MCNC: 69 MG/DL (ref 8–23)
CALCIUM SERPL-MCNC: 8.9 MG/DL (ref 8.6–10.4)
CHLORIDE SERPL-SCNC: 105 MMOL/L (ref 98–107)
CO2 SERPL-SCNC: 17 MMOL/L (ref 20–31)
CREAT SERPL-MCNC: 1.9 MG/DL (ref 0.7–1.2)
GFR, ESTIMATED: 34 ML/MIN/1.73M2
GLUCOSE SERPL-MCNC: 184 MG/DL (ref 74–99)
POTASSIUM SERPL-SCNC: 4.3 MMOL/L (ref 3.7–5.3)
SODIUM SERPL-SCNC: 137 MMOL/L (ref 136–145)

## 2025-07-28 PROCEDURE — 36415 COLL VENOUS BLD VENIPUNCTURE: CPT

## 2025-07-28 PROCEDURE — 80048 BASIC METABOLIC PNL TOTAL CA: CPT

## 2025-07-28 PROCEDURE — 99211 OFF/OP EST MAY X REQ PHY/QHP: CPT

## 2025-07-28 PROCEDURE — 83880 ASSAY OF NATRIURETIC PEPTIDE: CPT

## 2025-07-28 NOTE — PROGRESS NOTES
[facility]  Heart Failure Clinic      [address] [address]  Phone: [phone]  Fax: [phone]      NAME: Carl Frias  MEDICAL RECORD NUMBER:  [id number]  AGE: 89 y.o.   GENDER: male  : 1935  EPISODE DATE:  2025      Carl Frias was referred to the [facility] by Dr. ALISA Ware for heart failure services.he is being followed by Cardiologist, Dr. JACQUES Felipe.     ECHO EF%: 55-60%  Date: 1/3/25             ECHO EF%: 50%       Date: 24       Recent Cardiology follow-up apt: 2/10/25  Follow-up apt recommended: Apt 25,  2:20pm  Upcoming testing: unknown  Medication Management: no  Nephrologist: Dr.N. Bloom - apt 25 12:30pm     Carl Frias is a 89 y.o. male here for the Heart Failure Services.  He is here today for a Heart Failure assessment/consultation, monitoring medication effectiveness, reviewing necessary labs, transition of care, extensive Heart Failure education, reporting any concerns or symptoms and obtaining any necessary medication adjustments or orders from the patients health care team.    Vital Signs:   BP (!) 132/52   Pulse 78   Resp 18   Ht 1.778 m (5' 10\")   Wt 91.9 kg (202 lb 8 oz)   SpO2 99%   BMI 29.06 kg/m²    O2 Device: None (Room air)        Weight loss/gain +1lb, +4.5lbs last visit on 25      Nursing Assessment:    Respiratory:    Assessment  Charting Type: Reassessment    Breath Sounds  Respiratory Pattern: Regular  Breath Sounds Bilateral: Clear  Right Upper Lobe: Clear  Right Middle Lobe: Clear  Right Lower Lobe: Clear, Diminished  Left Upper Lobe: Clear  Left Lower Lobe: Clear, Diminished    Cough/Sputum  Cough: Non-productive  Frequency: Occasional    Cardiac  Cardiac Regularity: Regularly Irregular  Heart Sounds: S1, S2  Jugular Vein Distention (JVD) Present: No  Cardiac Symptoms: Peripheral edema  Implanted Cardiac Device (Pacemaker, ICD, PA Sensor): No    Peripheral Vascular  Peripheral Vascular (WDL):

## 2025-07-28 NOTE — DISCHARGE INSTRUCTIONS
Cardiology appointment with Dr. Felipe 9/30 2:20pm  Nephrology appointment with Nephrology, DR Bloom 7/30/25 12:30pm

## 2025-07-30 ENCOUNTER — APPOINTMENT (OUTPATIENT)
Dept: CT IMAGING | Age: 89
DRG: 378 | End: 2025-07-30
Payer: MEDICARE

## 2025-07-30 ENCOUNTER — HOSPITAL ENCOUNTER (INPATIENT)
Age: 89
LOS: 3 days | Discharge: HOME HEALTH | DRG: 378 | End: 2025-08-02
Attending: EMERGENCY MEDICINE | Admitting: INTERNAL MEDICINE
Payer: MEDICARE

## 2025-07-30 DIAGNOSIS — K92.2 GASTROINTESTINAL HEMORRHAGE, UNSPECIFIED GASTROINTESTINAL HEMORRHAGE TYPE: ICD-10-CM

## 2025-07-30 DIAGNOSIS — N30.01 ACUTE CYSTITIS WITH HEMATURIA: Primary | ICD-10-CM

## 2025-07-30 DIAGNOSIS — K92.1 MELENA: ICD-10-CM

## 2025-07-30 LAB
ALBUMIN SERPL-MCNC: 3.6 G/DL (ref 3.5–5.2)
ALP SERPL-CCNC: 57 U/L (ref 40–129)
ALT SERPL-CCNC: 10 U/L (ref 10–50)
ANION GAP SERPL CALCULATED.3IONS-SCNC: 14 MMOL/L (ref 9–16)
AST SERPL-CCNC: 14 U/L (ref 10–50)
BACTERIA URNS QL MICRO: ABNORMAL
BASOPHILS # BLD: 0.07 K/UL (ref 0–0.2)
BASOPHILS NFR BLD: 1 % (ref 0–2)
BILIRUB SERPL-MCNC: 0.5 MG/DL (ref 0–1.2)
BILIRUB UR QL STRIP: NEGATIVE
BUN SERPL-MCNC: 63 MG/DL (ref 8–23)
CALCIUM SERPL-MCNC: 9.5 MG/DL (ref 8.6–10.4)
CHLORIDE SERPL-SCNC: 107 MMOL/L (ref 98–107)
CLARITY UR: ABNORMAL
CO2 SERPL-SCNC: 19 MMOL/L (ref 20–31)
COLOR UR: YELLOW
CREAT SERPL-MCNC: 2.1 MG/DL (ref 0.7–1.2)
DATE, STOOL #1: ABNORMAL
EOSINOPHIL # BLD: 0.26 K/UL (ref 0–0.44)
EOSINOPHILS RELATIVE PERCENT: 3 % (ref 0–4)
EPI CELLS #/AREA URNS HPF: ABNORMAL /HPF
ERYTHROCYTE [DISTWIDTH] IN BLOOD BY AUTOMATED COUNT: 14.1 % (ref 11.5–14.9)
EST. AVERAGE GLUCOSE BLD GHB EST-MCNC: 140 MG/DL
FERRITIN SERPL-MCNC: 83 NG/ML
GFR, ESTIMATED: 30 ML/MIN/1.73M2
GLUCOSE BLD-MCNC: 116 MG/DL (ref 75–110)
GLUCOSE BLD-MCNC: 151 MG/DL (ref 75–110)
GLUCOSE SERPL-MCNC: 172 MG/DL (ref 74–99)
GLUCOSE UR STRIP-MCNC: NEGATIVE MG/DL
HBA1C MFR BLD: 6.5 % (ref 4–6)
HCT VFR BLD AUTO: 24.3 % (ref 41–53)
HCT VFR BLD AUTO: 27.4 % (ref 41–53)
HEMOCCULT SP1 STL QL: POSITIVE
HGB BLD-MCNC: 7.6 G/DL (ref 13.5–17.5)
HGB BLD-MCNC: 8.7 G/DL (ref 13.5–17.5)
HGB UR QL STRIP.AUTO: ABNORMAL
IMM GRANULOCYTES # BLD AUTO: 0.06 K/UL (ref 0–0.3)
IMM GRANULOCYTES NFR BLD: 1 %
IMM RETICS NFR: 16.8 % (ref 2.7–18.3)
IRON SATN MFR SERPL: 9 % (ref 20–55)
IRON SERPL-MCNC: 25 UG/DL (ref 61–157)
KETONES UR STRIP-MCNC: NEGATIVE MG/DL
LEUKOCYTE ESTERASE UR QL STRIP: ABNORMAL
LIPASE SERPL-CCNC: 15 U/L (ref 13–60)
LYMPHOCYTES NFR BLD: 1.77 K/UL (ref 1.1–3.7)
LYMPHOCYTES RELATIVE PERCENT: 20 % (ref 24–44)
MCH RBC QN AUTO: 27.1 PG (ref 26–34)
MCHC RBC AUTO-ENTMCNC: 31.8 G/DL (ref 31–37)
MCV RBC AUTO: 85.4 FL (ref 80–100)
MONOCYTES NFR BLD: 0.59 K/UL (ref 0.1–1.2)
MONOCYTES NFR BLD: 7 % (ref 3–12)
NEUTROPHILS NFR BLD: 68 % (ref 36–66)
NEUTS SEG NFR BLD: 5.97 K/UL (ref 1.5–8.1)
NITRITE UR QL STRIP: POSITIVE
NRBC BLD-RTO: 0 PER 100 WBC
PH UR STRIP: 5.5 [PH] (ref 5–8)
PLATELET # BLD AUTO: 351 K/UL (ref 150–450)
PMV BLD AUTO: 8.9 FL (ref 8–13.5)
POTASSIUM SERPL-SCNC: 4.4 MMOL/L (ref 3.7–5.3)
PROT SERPL-MCNC: 7.2 G/DL (ref 6.6–8.7)
PROT UR STRIP-MCNC: ABNORMAL MG/DL
RBC # BLD AUTO: 3.21 M/UL (ref 4.21–5.77)
RBC #/AREA URNS HPF: ABNORMAL /HPF
RETIC HEMOGLOBIN: 28 PG (ref 28.2–35.7)
RETICS # AUTO: 0.06 M/UL (ref 0.03–0.08)
RETICS/RBC NFR AUTO: 1.9 % (ref 0.5–2.5)
SODIUM SERPL-SCNC: 140 MMOL/L (ref 136–145)
SP GR UR STRIP: 1.01 (ref 1–1.03)
TIBC SERPL-MCNC: 267 UG/DL (ref 250–450)
TIME, STOOL #1: 1253
UNSATURATED IRON BINDING CAPACITY: 242 UG/DL (ref 112–347)
UROBILINOGEN UR STRIP-ACNC: NORMAL EU/DL (ref 0–1)
WBC #/AREA URNS HPF: ABNORMAL /HPF
WBC OTHER # BLD: 8.7 K/UL (ref 3.5–11)

## 2025-07-30 PROCEDURE — 99285 EMERGENCY DEPT VISIT HI MDM: CPT

## 2025-07-30 PROCEDURE — 80053 COMPREHEN METABOLIC PANEL: CPT

## 2025-07-30 PROCEDURE — 85025 COMPLETE CBC W/AUTO DIFF WBC: CPT

## 2025-07-30 PROCEDURE — 81001 URINALYSIS AUTO W/SCOPE: CPT

## 2025-07-30 PROCEDURE — 83690 ASSAY OF LIPASE: CPT

## 2025-07-30 PROCEDURE — 82270 OCCULT BLOOD FECES: CPT

## 2025-07-30 PROCEDURE — 83540 ASSAY OF IRON: CPT

## 2025-07-30 PROCEDURE — 85014 HEMATOCRIT: CPT

## 2025-07-30 PROCEDURE — 85018 HEMOGLOBIN: CPT

## 2025-07-30 PROCEDURE — 87186 SC STD MICRODIL/AGAR DIL: CPT

## 2025-07-30 PROCEDURE — 2580000003 HC RX 258

## 2025-07-30 PROCEDURE — 85045 AUTOMATED RETICULOCYTE COUNT: CPT

## 2025-07-30 PROCEDURE — 82728 ASSAY OF FERRITIN: CPT

## 2025-07-30 PROCEDURE — 96374 THER/PROPH/DIAG INJ IV PUSH: CPT

## 2025-07-30 PROCEDURE — 2500000003 HC RX 250 WO HCPCS: Performed by: EMERGENCY MEDICINE

## 2025-07-30 PROCEDURE — 36415 COLL VENOUS BLD VENIPUNCTURE: CPT

## 2025-07-30 PROCEDURE — 6360000002 HC RX W HCPCS

## 2025-07-30 PROCEDURE — 87086 URINE CULTURE/COLONY COUNT: CPT

## 2025-07-30 PROCEDURE — 83036 HEMOGLOBIN GLYCOSYLATED A1C: CPT

## 2025-07-30 PROCEDURE — 82947 ASSAY GLUCOSE BLOOD QUANT: CPT

## 2025-07-30 PROCEDURE — 83550 IRON BINDING TEST: CPT

## 2025-07-30 PROCEDURE — 99223 1ST HOSP IP/OBS HIGH 75: CPT | Performed by: INTERNAL MEDICINE

## 2025-07-30 PROCEDURE — 2500000003 HC RX 250 WO HCPCS

## 2025-07-30 PROCEDURE — 74176 CT ABD & PELVIS W/O CONTRAST: CPT

## 2025-07-30 PROCEDURE — 87077 CULTURE AEROBIC IDENTIFY: CPT

## 2025-07-30 PROCEDURE — 1200000000 HC SEMI PRIVATE

## 2025-07-30 PROCEDURE — 6360000002 HC RX W HCPCS: Performed by: EMERGENCY MEDICINE

## 2025-07-30 RX ORDER — SODIUM CHLORIDE 0.9 % (FLUSH) 0.9 %
5-40 SYRINGE (ML) INJECTION EVERY 12 HOURS SCHEDULED
Status: DISCONTINUED | OUTPATIENT
Start: 2025-07-30 | End: 2025-08-02 | Stop reason: HOSPADM

## 2025-07-30 RX ORDER — FINASTERIDE 5 MG/1
5 TABLET, FILM COATED ORAL DAILY
Status: DISCONTINUED | OUTPATIENT
Start: 2025-07-31 | End: 2025-08-02 | Stop reason: HOSPADM

## 2025-07-30 RX ORDER — ASPIRIN 81 MG/1
81 TABLET ORAL EVERY OTHER DAY
Status: DISCONTINUED | OUTPATIENT
Start: 2025-08-01 | End: 2025-08-02 | Stop reason: HOSPADM

## 2025-07-30 RX ORDER — TAMSULOSIN HYDROCHLORIDE 0.4 MG/1
0.4 CAPSULE ORAL DAILY
Status: DISCONTINUED | OUTPATIENT
Start: 2025-07-31 | End: 2025-08-02 | Stop reason: HOSPADM

## 2025-07-30 RX ORDER — ONDANSETRON 4 MG/1
4 TABLET, ORALLY DISINTEGRATING ORAL EVERY 8 HOURS PRN
Status: DISCONTINUED | OUTPATIENT
Start: 2025-07-30 | End: 2025-07-30

## 2025-07-30 RX ORDER — ORPHENADRINE CITRATE 30 MG/ML
60 INJECTION INTRAMUSCULAR; INTRAVENOUS ONCE
Status: DISCONTINUED | OUTPATIENT
Start: 2025-07-30 | End: 2025-07-30

## 2025-07-30 RX ORDER — ACETAMINOPHEN 650 MG/1
650 SUPPOSITORY RECTAL EVERY 6 HOURS PRN
Status: DISCONTINUED | OUTPATIENT
Start: 2025-07-30 | End: 2025-08-02 | Stop reason: HOSPADM

## 2025-07-30 RX ORDER — SENNA AND DOCUSATE SODIUM 50; 8.6 MG/1; MG/1
2 TABLET, FILM COATED ORAL DAILY
Status: DISCONTINUED | OUTPATIENT
Start: 2025-07-31 | End: 2025-08-02 | Stop reason: HOSPADM

## 2025-07-30 RX ORDER — METOPROLOL SUCCINATE 25 MG/1
12.5 TABLET, EXTENDED RELEASE ORAL DAILY
Status: DISCONTINUED | OUTPATIENT
Start: 2025-07-31 | End: 2025-08-02 | Stop reason: HOSPADM

## 2025-07-30 RX ORDER — ONDANSETRON 2 MG/ML
4 INJECTION INTRAMUSCULAR; INTRAVENOUS EVERY 6 HOURS PRN
Status: DISCONTINUED | OUTPATIENT
Start: 2025-07-30 | End: 2025-07-30

## 2025-07-30 RX ORDER — BUMETANIDE 1 MG/1
1 TABLET ORAL DAILY
Status: DISCONTINUED | OUTPATIENT
Start: 2025-07-31 | End: 2025-08-02 | Stop reason: HOSPADM

## 2025-07-30 RX ORDER — ACETAMINOPHEN 325 MG/1
650 TABLET ORAL EVERY 6 HOURS PRN
Status: DISCONTINUED | OUTPATIENT
Start: 2025-07-30 | End: 2025-08-02 | Stop reason: HOSPADM

## 2025-07-30 RX ORDER — LISINOPRIL 10 MG/1
10 TABLET ORAL DAILY
Status: DISCONTINUED | OUTPATIENT
Start: 2025-07-31 | End: 2025-08-02 | Stop reason: HOSPADM

## 2025-07-30 RX ORDER — SODIUM CHLORIDE 0.9 % (FLUSH) 0.9 %
5-40 SYRINGE (ML) INJECTION PRN
Status: DISCONTINUED | OUTPATIENT
Start: 2025-07-30 | End: 2025-08-02 | Stop reason: HOSPADM

## 2025-07-30 RX ORDER — ORPHENADRINE CITRATE 30 MG/ML
60 INJECTION INTRAMUSCULAR; INTRAVENOUS ONCE
Status: COMPLETED | OUTPATIENT
Start: 2025-07-30 | End: 2025-07-30

## 2025-07-30 RX ORDER — POLYETHYLENE GLYCOL 3350 17 G/17G
17 POWDER, FOR SOLUTION ORAL DAILY PRN
Status: DISCONTINUED | OUTPATIENT
Start: 2025-07-30 | End: 2025-08-02 | Stop reason: HOSPADM

## 2025-07-30 RX ORDER — INSULIN LISPRO 100 [IU]/ML
0-4 INJECTION, SOLUTION INTRAVENOUS; SUBCUTANEOUS
Status: DISCONTINUED | OUTPATIENT
Start: 2025-07-30 | End: 2025-08-02 | Stop reason: HOSPADM

## 2025-07-30 RX ORDER — SODIUM CHLORIDE 9 MG/ML
INJECTION, SOLUTION INTRAVENOUS PRN
Status: DISCONTINUED | OUTPATIENT
Start: 2025-07-30 | End: 2025-08-02 | Stop reason: HOSPADM

## 2025-07-30 RX ORDER — DEXTROSE MONOHYDRATE 100 MG/ML
INJECTION, SOLUTION INTRAVENOUS CONTINUOUS PRN
Status: DISCONTINUED | OUTPATIENT
Start: 2025-07-30 | End: 2025-08-02 | Stop reason: HOSPADM

## 2025-07-30 RX ADMIN — WATER 1000 MG: 1 INJECTION INTRAMUSCULAR; INTRAVENOUS; SUBCUTANEOUS at 14:40

## 2025-07-30 RX ADMIN — PANTOPRAZOLE SODIUM 80 MG: 40 INJECTION, POWDER, FOR SOLUTION INTRAVENOUS at 16:30

## 2025-07-30 RX ADMIN — ORPHENADRINE CITRATE 60 MG: 60 INJECTION INTRAMUSCULAR; INTRAVENOUS at 13:08

## 2025-07-30 RX ADMIN — PANTOPRAZOLE SODIUM 8 MG/HR: 40 INJECTION, POWDER, FOR SOLUTION INTRAVENOUS at 16:43

## 2025-07-30 ASSESSMENT — ENCOUNTER SYMPTOMS
SINUS PRESSURE: 0
FACIAL SWELLING: 0
COLOR CHANGE: 0
RHINORRHEA: 0
EYE DISCHARGE: 0
ABDOMINAL PAIN: 0
SORE THROAT: 0
WHEEZING: 0
EYE REDNESS: 0
VOMITING: 0
SHORTNESS OF BREATH: 0
CONSTIPATION: 0
BACK PAIN: 1
NAUSEA: 0
CHEST TIGHTNESS: 0
COUGH: 0
DIARRHEA: 0
BLOOD IN STOOL: 1
TROUBLE SWALLOWING: 0
EYE PAIN: 0

## 2025-07-30 ASSESSMENT — PAIN DESCRIPTION - LOCATION
LOCATION: FLANK
LOCATION: FLANK

## 2025-07-30 ASSESSMENT — PAIN DESCRIPTION - DESCRIPTORS: DESCRIPTORS: STABBING

## 2025-07-30 ASSESSMENT — PAIN DESCRIPTION - ORIENTATION
ORIENTATION: RIGHT
ORIENTATION: RIGHT

## 2025-07-30 ASSESSMENT — PAIN SCALES - GENERAL
PAINLEVEL_OUTOF10: 8
PAINLEVEL_OUTOF10: 7

## 2025-07-30 ASSESSMENT — PAIN - FUNCTIONAL ASSESSMENT: PAIN_FUNCTIONAL_ASSESSMENT: 0-10

## 2025-07-30 NOTE — ED PROVIDER NOTES
eMERGENCY dEPARTMENT eNCOUnter      Pt Name: Carl Frias  MRN: [id number]  Birthdate 1935  Date of evaluation: 7/30/25      CHIEF COMPLAINT       Chief Complaint   Patient presents with    Flank Pain         HISTORY OF PRESENT ILLNESS    Carl Frias is a 89 y.o. male who presents complaining of flank pain.  Patient is here stating that for the past week or so he has been having pain in his right lower back flank area.  Patient states it was there then it went away and then for the last 3 days it has been back.  Patient has no radiation of the pain.  Patient states he has had no difficulty urinating or having bowel movements though he does struggle with constipation but states that it is the same as it has been.  Patient states he has been noticing that his stools have been black.  Patient denies abdominal pain or vomiting.  Patient is on blood thinners.  Patient is denying new numbness tingling or weakness.      REVIEW OF SYSTEMS       Review of Systems   Constitutional:  Negative for activity change, appetite change, chills, diaphoresis and fever.   HENT:  Negative for congestion, ear pain, facial swelling, nosebleeds, rhinorrhea, sinus pressure, sore throat and trouble swallowing.    Eyes:  Negative for pain, discharge and redness.   Respiratory:  Negative for cough, chest tightness, shortness of breath and wheezing.    Cardiovascular:  Negative for chest pain, palpitations and leg swelling.   Gastrointestinal:  Positive for blood in stool. Negative for abdominal pain, constipation, diarrhea, nausea and vomiting.   Genitourinary:  Positive for flank pain. Negative for difficulty urinating, dysuria, frequency, genital sores and hematuria.   Musculoskeletal:  Positive for back pain. Negative for arthralgias, gait problem, joint swelling, myalgias and neck pain.   Skin:  Negative for color change, pallor, rash and wound.   Neurological:  Negative for dizziness, tremors, seizures, syncope, speech difficulty,      Height: 1.778 m (5' 10\")       MEDICATIONS GIVEN TO PATIENT THIS ENCOUNTER:  Orders Placed This Encounter   Medications    DISCONTD: orphenadrine (NORFLEX) injection 60 mg    orphenadrine (NORFLEX) injection 60 mg    cefTRIAXone (ROCEPHIN) 1,000 mg in sterile water 10 mL IV syringe     Antimicrobial Indications:   Urinary Tract Infection     DISCHARGE PRESCRIPTIONS:  New Prescriptions    No medications on file     PHYSICIAN CONSULTS ORDERED THIS ENCOUNTER:  IP CONSULT TO PRIMARY CARE PROVIDER    FINAL IMPRESSION      1. Acute cystitis with hematuria    2. Gastrointestinal hemorrhage, unspecified gastrointestinal hemorrhage type          DISPOSITION/PLAN   DISPOSITION Decision To Admit 07/30/2025 02:29:17 PM   DISPOSITION CONDITION Stable           PATIENT REFERREDTO:  No follow-up provider specified.    DISCHARGEMEDICATIONS:  New Prescriptions    No medications on file       (Please note that portions of this note were completed with a voice recognition program.  Efforts were made to edit thedictations but occasionally words are mis-transcribed.)    Justus Norton MD  Attending Emergency Physician                        Justus Norton MD  07/30/25 [id number]

## 2025-07-30 NOTE — PROGRESS NOTES
Patient arrived to room 2065. Vital signs completed. Admission questions and assessment completed. Patient and family updated on plan of care.

## 2025-07-30 NOTE — PLAN OF CARE
Problem: Chronic Conditions and Co-morbidities  Goal: Patient's chronic conditions and co-morbidity symptoms are monitored and maintained or improved  Outcome: Progressing  Flowsheets (Taken 7/30/2025 3958)  Care Plan - Patient's Chronic Conditions and Co-Morbidity Symptoms are Monitored and Maintained or Improved:   Monitor and assess patient's chronic conditions and comorbid symptoms for stability, deterioration, or improvement   Collaborate with multidisciplinary team to address chronic and comorbid conditions and prevent exacerbation or deterioration   Update acute care plan with appropriate goals if chronic or comorbid symptoms are exacerbated and prevent overall improvement and discharge     Problem: Discharge Planning  Goal: Discharge to home or other facility with appropriate resources  Outcome: Progressing  Flowsheets (Taken 7/30/2025 5784)  Discharge to home or other facility with appropriate resources:   Identify barriers to discharge with patient and caregiver   Arrange for needed discharge resources and transportation as appropriate   Identify discharge learning needs (meds, wound care, etc)   Arrange for interpreters to assist at discharge as needed   Refer to discharge planning if patient needs post-hospital services based on physician order or complex needs related to functional status, cognitive ability or social support system     Problem: Pain  Goal: Verbalizes/displays adequate comfort level or baseline comfort level  Outcome: Progressing     Problem: Risk for Elopement  Goal: Patient will not exit the unit/facility without proper excort  Outcome: Progressing     Problem: Skin/Tissue Integrity  Goal: Skin integrity remains intact  Description: 1.  Monitor for areas of redness and/or skin breakdown  2.  Assess vascular access sites hourly  3.  Every 4-6 hours minimum:  Change oxygen saturation probe site  4.  Every 4-6 hours:  If on nasal continuous positive airway pressure, respiratory therapy

## 2025-07-30 NOTE — H&P
[facility]  IN-PATIENT SERVICE  [facility]  IN-PATIENT SERVICE   [facility]     HISTORY AND PHYSICAL EXAMINATION            Date:   7/30/2025  Patient name:  Carl Frias  Date of admission:  7/30/2025 12:31 PM  MRN:   [id number]  Account:  [id number]  YOB: 1935  PCP:    Aleksandr Finney MD  Room:   02/02  Code Status:    Prior    Chief Complaint:     Chief Complaint   Patient presents with    Flank Pain       History Obtained From:     patient, electronic medical record    History of Present Illness:     Carl is an 89-year-old male with past medical history of atrial fibrillation (on Eliquis), chronic kidney disease, hypertension, chronic combined systolic and diastolic heart failure, type 2 diabetes mellitus, BPH, history of anemia with gastric ulcer who presented to the ED with complaints of right lower back/flank pain which has been waxing and waning for the last week or so but worsened over the last 3 days.    Per chart review, patient has followed up with urology regarding BPH and urinary findings and he has set up Crenshaw catheter in the past.  He is on tamsulosin and finasteride for the symptoms.  He has also been following up with gastroenterology for his chronic anemia.  EGD was done on 1/3/2025 which showed gastric ulcers as well as positive H. pylori and he was treated at that time.  He was instructed to take PPI twice a day.  Repeat EGD was to be done on 1/21/2025 and biopsies were taken again and showed no evidence of active H. pylori only chronic inactive gastritis.    In the ED, patient had a CT abdomen which showed chronic moderate bilateral hydro ureteral nephrosis to the level of the bladder.  Labs reviewed and he had hemoglobin 8.7, rectal exam was and black stool was noted, occult blood came back positive.  Creatinine was also 2.1.  And GFR of 30.  Urinalysis was performed and showed  TABLET TWICE DAILY BEFORE MEALS 5/12/25   Michael Pino MD   bumetanide (BUMEX) 1 MG tablet TAKE 1 TABLET EVERY DAY 5/6/25   Aleksandr Finney MD   Alcohol Swabs (DROPSAFE ALCOHOL PREP) 70 % PADS USE AS DIRECTED TO CLEANSE SKIN PRIOR TO CHECKING GLUCOSE TWICE DAILY. 4/17/25   Aleksandr Finney MD   senna-docusate (SENNA S) 8.6-50 MG per tablet Take 2 tablets by mouth daily 4/1/25   Aleksandr Finney MD   tamsulosin (FLOMAX) 0.4 MG capsule Take 1 capsule by mouth daily 2/20/25 2/15/26  Aleksandr Finney MD   omeprazole (PRILOSEC) 20 MG delayed release capsule TAKE 1 CAPSULE BY MOUTH 2 TIMES A DAY BEFORE A MEAL FOR 14 DAYS 2/20/25   Torey Washburn MD   Blood Glucose Calibration (TRUE METRIX LEVEL 1) Low SOLN Use as directed 1/30/25   Aleksandr Finney MD   Blood Glucose Monitoring Suppl (TRUE METRIX AIR GLUCOSE METER) w/Device KIT Use as directed to check blood sugar 1/30/25   Aleksandr Finney MD   blood glucose test strips (TRUE METRIX BLOOD GLUCOSE TEST) strip Test blood sugar 2 times daily 1/30/25   Aleksandr Finney MD   TRUEplus Lancets 33G MISC Test blood sugar 2 times daily 1/30/25   Aleksandr Finney MD   finasteride (PROSCAR) 5 MG tablet TAKE 1 TABLET EVERY DAY 1/29/25   Aleksandr Finney MD   metoprolol succinate (TOPROL XL) 25 MG extended release tablet Take 0.5 tablets by mouth daily 1/24/25   Miranda Youssef MD   apixaban (ELIQUIS) 2.5 MG TABS tablet Take 1 tablet by mouth 2 times daily 1/24/25   Alcides Felipe MD   Blood Glucose Calibration (TRUE METRIX LEVEL 2) Normal SOLN  4/9/24   Kia Bell MD   aspirin 81 MG EC tablet Take 1 tablet by mouth every other day 1/9/24   Aleksandr Finney MD   blood glucose monitor strips by Other route 2 times daily True Metrix Blood Glucose Meter 12/13/21   Aleksandr Finney MD   Elastic Bandages & Supports MISC Apply Truss strap for L groin tenderness/hernia 11/5/21   Tabby Arevalo, APRN - CNP        Allergies:     Rivaroxaban    Social History:  Culture    Collection Time: 07/30/25  2:00 PM    Specimen: Urine, clean catch   Result Value Ref Range    Color, UA Yellow Yellow    Turbidity UA Turbid (A) Clear    Glucose, Ur NEGATIVE NEGATIVE mg/dL    Bilirubin, Urine NEGATIVE NEGATIVE    Ketones, Urine NEGATIVE NEGATIVE mg/dL    Specific Gravity, UA 1.011 1.000 - 1.030    Urine Hgb MOD (A) NEGATIVE    pH, Urine 5.5 5.0 - 8.0    Protein, UA 1+ (A) NEGATIVE mg/dL    Urobilinogen, Urine Normal 0.0 - 1.0 EU/dL    Nitrite, Urine POSITIVE (A) NEGATIVE    Leukocyte Esterase, Urine LARGE (A) NEGATIVE   Microscopic Urinalysis    Collection Time: 07/30/25  2:00 PM   Result Value Ref Range    WBC, UA PENDING /HPF    RBC, UA PENDING /HPF    Casts UA PENDING /LPF    Epithelial Cells, UA PENDING /HPF    Bacteria, UA PENDING None       Imaging/Diagnostics:  CT ABDOMEN PELVIS WO CONTRAST Additional Contrast? None  Result Date: 7/30/2025  EXAMINATION: CT OF THE ABDOMEN AND PELVIS WITHOUT CONTRAST 7/30/2025 1:33 pm TECHNIQUE: CT of the abdomen and pelvis was performed without the administration of intravenous contrast. Multiplanar reformatted images are provided for review. Automated exposure control, iterative reconstruction, and/or weight based adjustment of the mA/kV was utilized to reduce the radiation dose to as low as reasonably achievable. COMPARISON: CT abdomen pelvis 01/21/2025 HISTORY: ORDERING SYSTEM PROVIDED HISTORY: Black stools TECHNOLOGIST PROVIDED HISTORY: Black stools Decision Support Exception - unselect if not a suspected or confirmed emergency medical condition->Emergency Medical Condition (MA) Reason for Exam: Black stools Additional signs and symptoms: Pt. states pain in rt. lower flank area, black stools x1 week Relevant Medical/Surgical History: hx inguinal hernia repair FINDINGS: Lower Chest: No pulmonary nodule.  No focal consolidation.  Cardiomegaly with coronary artery calcifications. Organs: Lack of intravenous contrast limits evaluation of solid  organs. Normal liver contour.  Cholelithiasis.  Normal spleen. Normal adrenal glands. Normal pancreatic contour.  Chronic moderate bilateral hydroureteronephrosis to the level of the bladder.  No renal or ureteral stones. Bilateral simple renal cortical cysts, for which no additional follow up is recommended. GI/Bowel: No bowel wall thickening, dilatation or obstruction.  Normal appendix. Pelvis: No bladder wall thickening or stones.  Mild prostatomegaly. Peritoneum/Retroperitoneum: No evidence of ascites or free air. No evidence of retroperitoneal lymphadenopathy. Atherosclerosis of the aorta and its branches. No aneurysm. Bones/Soft Tissues: No acute fracture.  No focal osteoblastic or osteolytic lesion.  Diffuse bony demineralization.     1. No acute abnormality in the abdomen or pelvis. 2. Chronic moderate bilateral hydroureteronephrosis to the level of the bladder.       Assessment :      Primary Problem  GI bleed    Active Hospital Problems    Diagnosis Date Noted    GI bleed [K92.2] 01/21/2025       Plan:     Patient status Admit as inpatient in the  Med/Surge    Urinary tract infection  Right flank/lower back pain for the last 1 week, waxing and waning, worsened over the last 3 days.  No fever, chills, dysuria, hematuria, increased frequency.  Urinalysis showed positive nitrites and leukocyte.  -Culture pending  -Continue IV Rocephin 1 g daily  -CT scan of the abdomen showed moderate bilateral hydroureteronephrosis to the level of the bladder  -Bladder scan    Gastrointestinal Bleeding, most likely upper GI bleed  -Rectal exam in the ED showed dark brown-black stool  -Fecal occult blood positive  -Hemoglobin 8.7 which appears to be at his baseline if not improved from the last several visits within the last 6 months  -Iron and TIBC, ferritin, reticulocyte count ordered  -Patient has not been taking his PPI at home  -IV Protonix drip started  -Gastroenterology consulted    Chronic kidney disease stage IIIb,

## 2025-07-30 NOTE — PROGRESS NOTES
Pharmacy Medication History Note      List of current medications patient is taking is complete.    Source of information: Dispense Report, Patient's Spouse     Changes made to medication list:  Medications removed (include reason, ex. therapy complete or physician discontinued, noncompliance):  None    Medications flagged for provider review:  Omeprazole - pt no longer taking     Medications added/doses adjusted:  Changed Eliquis dose from 1 tablet PO twice a day to reported dose of 1/2 a tablet PO twice a day     Other notes (ex. Recent course of antibiotics, Coumadin dosing):  OARRS report negative.     Current Home Medication List at Time of Admission:  Prior to Admission medications    Medication Sig   lisinopril (PRINIVIL;ZESTRIL) 10 MG tablet Take 1 tablet by mouth daily   glipiZIDE (GLUCOTROL) 10 MG tablet TAKE 1 TABLET TWICE DAILY BEFORE MEALS   bumetanide (BUMEX) 1 MG tablet TAKE 1 TABLET EVERY DAY   senna-docusate (SENNA S) 8.6-50 MG per tablet Take 2 tablets by mouth daily   tamsulosin (FLOMAX) 0.4 MG capsule Take 1 capsule by mouth daily   finasteride (PROSCAR) 5 MG tablet TAKE 1 TABLET EVERY DAY   metoprolol succinate (TOPROL XL) 25 MG extended release tablet Take 0.5 tablets by mouth daily   apixaban (ELIQUIS) 2.5 MG TABS tablet Take 1 tablet by mouth 2 times daily  Patient taking differently: Take 0.5 tablets by mouth 2 times daily   aspirin 81 MG EC tablet Take 1 tablet by mouth every other day   omeprazole (PRILOSEC) 20 MG delayed release capsule TAKE 1 CAPSULE BY MOUTH 2 TIMES A DAY BEFORE A MEAL FOR 14 DAYS  Patient not taking: Reported on 7/30/2025     Please let me know if you have any questions about this encounter. Thank you!    Electronically signed by Chasidy Bar on 7/30/2025 at 2:50 PM

## 2025-07-31 ENCOUNTER — ANESTHESIA EVENT (OUTPATIENT)
Dept: ENDOSCOPY | Age: 89
End: 2025-07-31
Payer: MEDICARE

## 2025-07-31 ENCOUNTER — ANESTHESIA (OUTPATIENT)
Dept: ENDOSCOPY | Age: 89
End: 2025-07-31
Payer: MEDICARE

## 2025-07-31 PROBLEM — Q27.30 AVM (ARTERIOVENOUS MALFORMATION): Status: ACTIVE | Noted: 2025-07-31

## 2025-07-31 PROBLEM — K92.1 MELENA: Status: ACTIVE | Noted: 2025-07-31

## 2025-07-31 PROBLEM — K26.9 DUODENAL ULCER: Status: ACTIVE | Noted: 2025-07-31

## 2025-07-31 PROBLEM — R54 FRAILTY SYNDROME IN GERIATRIC PATIENT: Status: ACTIVE | Noted: 2025-07-31

## 2025-07-31 LAB
ANION GAP SERPL CALCULATED.3IONS-SCNC: 12 MMOL/L (ref 9–16)
BASOPHILS # BLD: 0.07 K/UL (ref 0–0.2)
BASOPHILS NFR BLD: 1 % (ref 0–2)
BUN SERPL-MCNC: 56 MG/DL (ref 8–23)
CALCIUM SERPL-MCNC: 9 MG/DL (ref 8.6–10.4)
CHLORIDE SERPL-SCNC: 111 MMOL/L (ref 98–107)
CO2 SERPL-SCNC: 18 MMOL/L (ref 20–31)
CREAT SERPL-MCNC: 2 MG/DL (ref 0.7–1.2)
EOSINOPHIL # BLD: 0.41 K/UL (ref 0–0.44)
EOSINOPHILS RELATIVE PERCENT: 6 % (ref 0–4)
ERYTHROCYTE [DISTWIDTH] IN BLOOD BY AUTOMATED COUNT: 14.2 % (ref 11.5–14.9)
GFR, ESTIMATED: 32 ML/MIN/1.73M2
GLUCOSE BLD-MCNC: 146 MG/DL (ref 75–110)
GLUCOSE BLD-MCNC: 149 MG/DL (ref 75–110)
GLUCOSE BLD-MCNC: 284 MG/DL (ref 75–110)
GLUCOSE BLD-MCNC: 77 MG/DL (ref 75–110)
GLUCOSE BLD-MCNC: 98 MG/DL (ref 75–110)
GLUCOSE BLD-MCNC: 98 MG/DL (ref 75–110)
GLUCOSE SERPL-MCNC: 88 MG/DL (ref 74–99)
HCT VFR BLD AUTO: 24.3 % (ref 41–53)
HCT VFR BLD AUTO: 24.4 % (ref 41–53)
HCT VFR BLD AUTO: 24.4 % (ref 41–53)
HGB BLD-MCNC: 7.3 G/DL (ref 13.5–17.5)
HGB BLD-MCNC: 7.4 G/DL (ref 13.5–17.5)
HGB BLD-MCNC: 7.5 G/DL (ref 13.5–17.5)
IMM GRANULOCYTES # BLD AUTO: 0.03 K/UL (ref 0–0.3)
IMM GRANULOCYTES NFR BLD: 0 %
LYMPHOCYTES NFR BLD: 1.91 K/UL (ref 1.1–3.7)
LYMPHOCYTES RELATIVE PERCENT: 28 % (ref 24–44)
MCH RBC QN AUTO: 26.8 PG (ref 26–34)
MCHC RBC AUTO-ENTMCNC: 30.7 G/DL (ref 31–37)
MCV RBC AUTO: 87.1 FL (ref 80–100)
MONOCYTES NFR BLD: 0.67 K/UL (ref 0.1–1.2)
MONOCYTES NFR BLD: 10 % (ref 3–12)
NEUTROPHILS NFR BLD: 55 % (ref 36–66)
NEUTS SEG NFR BLD: 3.82 K/UL (ref 1.5–8.1)
NRBC BLD-RTO: 0 PER 100 WBC
PLATELET # BLD AUTO: 320 K/UL (ref 150–450)
PMV BLD AUTO: 9.1 FL (ref 8–13.5)
POTASSIUM SERPL-SCNC: 4.2 MMOL/L (ref 3.7–5.3)
RBC # BLD AUTO: 2.8 M/UL (ref 4.21–5.77)
SODIUM SERPL-SCNC: 141 MMOL/L (ref 136–145)
WBC OTHER # BLD: 6.9 K/UL (ref 3.5–11)

## 2025-07-31 PROCEDURE — 44366 SMALL BOWEL ENDOSCOPY: CPT | Performed by: STUDENT IN AN ORGANIZED HEALTH CARE EDUCATION/TRAINING PROGRAM

## 2025-07-31 PROCEDURE — 6370000000 HC RX 637 (ALT 250 FOR IP): Performed by: STUDENT IN AN ORGANIZED HEALTH CARE EDUCATION/TRAINING PROGRAM

## 2025-07-31 PROCEDURE — 2580000003 HC RX 258

## 2025-07-31 PROCEDURE — 88305 TISSUE EXAM BY PATHOLOGIST: CPT

## 2025-07-31 PROCEDURE — 7100000001 HC PACU RECOVERY - ADDTL 15 MIN: Performed by: STUDENT IN AN ORGANIZED HEALTH CARE EDUCATION/TRAINING PROGRAM

## 2025-07-31 PROCEDURE — 88342 IMHCHEM/IMCYTCHM 1ST ANTB: CPT

## 2025-07-31 PROCEDURE — 6360000002 HC RX W HCPCS

## 2025-07-31 PROCEDURE — 1200000000 HC SEMI PRIVATE

## 2025-07-31 PROCEDURE — 3700000001 HC ADD 15 MINUTES (ANESTHESIA): Performed by: STUDENT IN AN ORGANIZED HEALTH CARE EDUCATION/TRAINING PROGRAM

## 2025-07-31 PROCEDURE — 97530 THERAPEUTIC ACTIVITIES: CPT

## 2025-07-31 PROCEDURE — 2500000003 HC RX 250 WO HCPCS: Performed by: NURSE PRACTITIONER

## 2025-07-31 PROCEDURE — 82947 ASSAY GLUCOSE BLOOD QUANT: CPT

## 2025-07-31 PROCEDURE — 2580000003 HC RX 258: Performed by: ANESTHESIOLOGY

## 2025-07-31 PROCEDURE — 85025 COMPLETE CBC W/AUTO DIFF WBC: CPT

## 2025-07-31 PROCEDURE — 36415 COLL VENOUS BLD VENIPUNCTURE: CPT

## 2025-07-31 PROCEDURE — 3700000000 HC ANESTHESIA ATTENDED CARE: Performed by: STUDENT IN AN ORGANIZED HEALTH CARE EDUCATION/TRAINING PROGRAM

## 2025-07-31 PROCEDURE — 6360000002 HC RX W HCPCS: Performed by: NURSE ANESTHETIST, CERTIFIED REGISTERED

## 2025-07-31 PROCEDURE — 2500000003 HC RX 250 WO HCPCS: Performed by: NURSE ANESTHETIST, CERTIFIED REGISTERED

## 2025-07-31 PROCEDURE — 2500000003 HC RX 250 WO HCPCS: Performed by: STUDENT IN AN ORGANIZED HEALTH CARE EDUCATION/TRAINING PROGRAM

## 2025-07-31 PROCEDURE — 7100000000 HC PACU RECOVERY - FIRST 15 MIN: Performed by: STUDENT IN AN ORGANIZED HEALTH CARE EDUCATION/TRAINING PROGRAM

## 2025-07-31 PROCEDURE — 6360000002 HC RX W HCPCS: Performed by: NURSE PRACTITIONER

## 2025-07-31 PROCEDURE — 99233 SBSQ HOSP IP/OBS HIGH 50: CPT | Performed by: INTERNAL MEDICINE

## 2025-07-31 PROCEDURE — 97162 PT EVAL MOD COMPLEX 30 MIN: CPT

## 2025-07-31 PROCEDURE — 85018 HEMOGLOBIN: CPT

## 2025-07-31 PROCEDURE — 80048 BASIC METABOLIC PNL TOTAL CA: CPT

## 2025-07-31 PROCEDURE — 2720000010 HC SURG SUPPLY STERILE: Performed by: STUDENT IN AN ORGANIZED HEALTH CARE EDUCATION/TRAINING PROGRAM

## 2025-07-31 PROCEDURE — 0W3P8ZZ CONTROL BLEEDING IN GASTROINTESTINAL TRACT, VIA NATURAL OR ARTIFICIAL OPENING ENDOSCOPIC: ICD-10-PCS | Performed by: STUDENT IN AN ORGANIZED HEALTH CARE EDUCATION/TRAINING PROGRAM

## 2025-07-31 PROCEDURE — 0DB68ZX EXCISION OF STOMACH, VIA NATURAL OR ARTIFICIAL OPENING ENDOSCOPIC, DIAGNOSTIC: ICD-10-PCS | Performed by: STUDENT IN AN ORGANIZED HEALTH CARE EDUCATION/TRAINING PROGRAM

## 2025-07-31 PROCEDURE — 44361 SMALL BOWEL ENDOSCOPY/BIOPSY: CPT | Performed by: STUDENT IN AN ORGANIZED HEALTH CARE EDUCATION/TRAINING PROGRAM

## 2025-07-31 PROCEDURE — 2709999900 HC NON-CHARGEABLE SUPPLY: Performed by: STUDENT IN AN ORGANIZED HEALTH CARE EDUCATION/TRAINING PROGRAM

## 2025-07-31 PROCEDURE — 6360000002 HC RX W HCPCS: Performed by: STUDENT IN AN ORGANIZED HEALTH CARE EDUCATION/TRAINING PROGRAM

## 2025-07-31 PROCEDURE — 99223 1ST HOSP IP/OBS HIGH 75: CPT | Performed by: STUDENT IN AN ORGANIZED HEALTH CARE EDUCATION/TRAINING PROGRAM

## 2025-07-31 PROCEDURE — 0DB98ZX EXCISION OF DUODENUM, VIA NATURAL OR ARTIFICIAL OPENING ENDOSCOPIC, DIAGNOSTIC: ICD-10-PCS | Performed by: STUDENT IN AN ORGANIZED HEALTH CARE EDUCATION/TRAINING PROGRAM

## 2025-07-31 PROCEDURE — 85014 HEMATOCRIT: CPT

## 2025-07-31 PROCEDURE — 0DB18ZX EXCISION OF UPPER ESOPHAGUS, VIA NATURAL OR ARTIFICIAL OPENING ENDOSCOPIC, DIAGNOSTIC: ICD-10-PCS | Performed by: STUDENT IN AN ORGANIZED HEALTH CARE EDUCATION/TRAINING PROGRAM

## 2025-07-31 PROCEDURE — 2500000003 HC RX 250 WO HCPCS

## 2025-07-31 PROCEDURE — APPNB30 APP NON BILLABLE TIME 0-30 MINS: Performed by: NURSE PRACTITIONER

## 2025-07-31 PROCEDURE — 3609013000 HC EGD TRANSORAL CONTROL BLEEDING ANY METHOD: Performed by: STUDENT IN AN ORGANIZED HEALTH CARE EDUCATION/TRAINING PROGRAM

## 2025-07-31 PROCEDURE — 97166 OT EVAL MOD COMPLEX 45 MIN: CPT

## 2025-07-31 RX ORDER — PROPOFOL 10 MG/ML
INJECTION, EMULSION INTRAVENOUS
Status: DISCONTINUED | OUTPATIENT
Start: 2025-07-31 | End: 2025-07-31 | Stop reason: SDUPTHER

## 2025-07-31 RX ORDER — SODIUM CHLORIDE 9 MG/ML
INJECTION, SOLUTION INTRAVENOUS CONTINUOUS
Status: DISCONTINUED | OUTPATIENT
Start: 2025-07-31 | End: 2025-07-31 | Stop reason: HOSPADM

## 2025-07-31 RX ORDER — LIDOCAINE HYDROCHLORIDE 20 MG/ML
INJECTION, SOLUTION EPIDURAL; INFILTRATION; INTRACAUDAL; PERINEURAL
Status: DISCONTINUED | OUTPATIENT
Start: 2025-07-31 | End: 2025-07-31 | Stop reason: SDUPTHER

## 2025-07-31 RX ORDER — EPHEDRINE SULFATE/0.9% NACL/PF 25 MG/5 ML
SYRINGE (ML) INTRAVENOUS
Status: DISCONTINUED | OUTPATIENT
Start: 2025-07-31 | End: 2025-07-31 | Stop reason: SDUPTHER

## 2025-07-31 RX ADMIN — EPHEDRINE SULFATE 5 MG: 5 INJECTION INTRAVENOUS at 15:39

## 2025-07-31 RX ADMIN — SODIUM CHLORIDE, PRESERVATIVE FREE 10 ML: 5 INJECTION INTRAVENOUS at 21:11

## 2025-07-31 RX ADMIN — PANTOPRAZOLE SODIUM 8 MG/HR: 40 INJECTION, POWDER, FOR SOLUTION INTRAVENOUS at 02:23

## 2025-07-31 RX ADMIN — WATER 1000 MG: 1 INJECTION INTRAMUSCULAR; INTRAVENOUS; SUBCUTANEOUS at 12:15

## 2025-07-31 RX ADMIN — LIDOCAINE HYDROCHLORIDE 100 MG: 20 INJECTION, SOLUTION EPIDURAL; INFILTRATION; INTRACAUDAL; PERINEURAL at 15:20

## 2025-07-31 RX ADMIN — PANTOPRAZOLE SODIUM 40 MG: 40 INJECTION, POWDER, FOR SOLUTION INTRAVENOUS at 21:10

## 2025-07-31 RX ADMIN — PROPOFOL 50 MCG/KG/MIN: 10 INJECTION, EMULSION INTRAVENOUS at 15:20

## 2025-07-31 RX ADMIN — PANTOPRAZOLE SODIUM 40 MG: 40 INJECTION, POWDER, FOR SOLUTION INTRAVENOUS at 09:01

## 2025-07-31 RX ADMIN — SODIUM CHLORIDE, PRESERVATIVE FREE 10 ML: 5 INJECTION INTRAVENOUS at 09:00

## 2025-07-31 RX ADMIN — INSULIN LISPRO 2 UNITS: 100 INJECTION, SOLUTION INTRAVENOUS; SUBCUTANEOUS at 21:10

## 2025-07-31 RX ADMIN — SODIUM CHLORIDE: 9 INJECTION, SOLUTION INTRAVENOUS at 14:46

## 2025-07-31 RX ADMIN — GLUCAGON 0.5 MG: KIT at 15:24

## 2025-07-31 ASSESSMENT — PAIN - FUNCTIONAL ASSESSMENT
PAIN_FUNCTIONAL_ASSESSMENT: 0-10
PAIN_FUNCTIONAL_ASSESSMENT: CRITICAL CARE PAIN OBSERVATION TOOL (CPOT)

## 2025-07-31 ASSESSMENT — PAIN SCALES - GENERAL: PAINLEVEL_OUTOF10: 0

## 2025-07-31 NOTE — PROGRESS NOTES
[facility]  IN-PATIENT SERVICE  [facility]    PROGRESS NOTE             7/31/2025    6:52 AM    Name:   Carl Frias  MRN:     [id number]     Acct:      [id number]   Room:   2065/2065-01   Day:  1  Admit Date:  7/30/2025 12:31 PM    PCP:  Aleksandr Finney MD  Code Status:  Full Code    Subjective:     C/C:   Chief Complaint   Patient presents with    Flank Pain     Patient was seen and examined at bedside.  Vital signs reviewed and stable.  Labs reviewed, serial H&H were performed every 8 hours hemoglobin went from 8.7-> 7.6 -> 7.5.  GI is consulted, appreciate recommendations.  He was placed n.p.o. at midnight in case they want to do any intervention today.  Still having some minor back pain on the right side, but otherwise not having any gross hematuria or blood in stool.    Brief History:     Carl is an 89-year-old male with past medical history of atrial fibrillation (on Eliquis), chronic kidney disease, hypertension, chronic combined systolic and diastolic heart failure, type 2 diabetes mellitus, BPH, history of anemia with gastric ulcer who presented to the ED with complaints of right lower back/flank pain which has been waxing and waning for the last week or so but worsened over the last 3 days.     Per chart review, patient has followed up with urology regarding BPH and urinary findings and he has set up Crenshaw catheter in the past.  He is on tamsulosin and finasteride for the symptoms.  He has also been following up with gastroenterology for his chronic anemia.  EGD was done on 1/3/2025 which showed gastric ulcers as well as positive H. pylori and he was treated at that time.  He was instructed to take PPI twice a day.  Repeat EGD was to be done on 1/21/2025 and biopsies were taken again and showed no evidence of active H. pylori only chronic inactive gastritis.     In the ED, patient had a CT abdomen which showed chronic moderate bilateral hydro ureteral

## 2025-07-31 NOTE — ACP (ADVANCE CARE PLANNING)
Advance Care Planning     Advance Care Planning Activator (Inpatient)  Conversation Note      Date of ACP Conversation: 7/31/2025     Conversation Conducted with: Patient with Decision Making Capacity    ACP Activator: Thalia Spangler RN    Health Care Decision Maker:     Current Designated Health Care Decision Maker:     Primary Decision Maker: Naomi Frias - Spouse - [phone]    Secondary Decision Maker: Hannah Ceron - Child - [phone]    Supplemental (Other) Decision Maker: Lucy Lazcano - Child - [phone]  Click here to complete Healthcare Decision Makers including section of the Healthcare Decision Maker Relationship (ie \"Primary\")  Today we documented Decision Maker(s) consistent with ACP documents on file.    Care Preferences    Ventilation:  \"If you were in your present state of health and suddenly became very ill and were unable to breathe on your own, what would your preference be about the use of a ventilator (breathing machine) if it were available to you?\"      Would the patient desire the use of ventilator (breathing machine)?: yes    \"If your health worsens and it becomes clear that your chance of recovery is unlikely, what would your preference be about the use of a ventilator (breathing machine) if it were available to you?\"     Would the patient desire the use of ventilator (breathing machine)?: Yes      Resuscitation  \"CPR works best to restart the heart when there is a sudden event, like a heart attack, in someone who is otherwise healthy. Unfortunately, CPR does not typically restart the heart for people who have serious health conditions or who are very sick.\"    \"In the event your heart stopped as a result of an underlying serious health condition, would you want attempts to be made to restart your heart (answer \"yes\" for attempt to resuscitate) or would you prefer a natural death (answer \"no\" for do not attempt to resuscitate)?\" yes       [] Yes   [] No   Educated Patient /

## 2025-07-31 NOTE — CARE COORDINATION
Case Management Assessment  Initial Evaluation    Date/Time of Evaluation: 7/31/2025 10:00AM  Assessment Completed by: Thalia Spangler RN    If patient is discharged prior to next notation, then this note serves as note for discharge by case management.    Patient Name: Carl Frias                   YOB: 1935  Diagnosis: GI bleed [K92.2]  Acute cystitis with hematuria [N30.01]  Gastrointestinal hemorrhage, unspecified gastrointestinal hemorrhage type [K92.2]                   Date / Time: 7/30/2025 12:31 PM    Patient Admission Status: Inpatient   Readmission Risk (Low < 19, Mod (19-27), High > 27): Readmission Risk Score: 19.1    Current PCP: Aleksandr Finney MD  PCP verified by CM? Yes    Chart Reviewed: Yes      History Provided by: Patient, Medical Record  Patient Orientation: Alert and Oriented    Patient Cognition: Alert    Hospitalization in the last 30 days (Readmission):  No    If yes, Readmission Assessment in CM Navigator will be completed.    Advance Directives:      Code Status: Full Code   Patient's Primary Decision Maker is: Named in Scanned ACP Document    Primary Decision Maker: Naomi Frias - Spouse - [phone]    Secondary Decision Maker: OsmanyHannah - Child - [phone]    Supplemental (Other) Decision Maker: Lucy Lazcano - Child - [phone]    Discharge Planning:    Patient lives with: Spouse/Significant Other, Children Type of Home: House  Primary Care Giver: Self  Patient Support Systems include: Spouse/Significant Other, Family Members, Children   Current Financial resources: Medicare  Current community resources: None  Current services prior to admission: Durable Medical Equipment            Current DME: Walker            Type of Home Care services:  PT, OT, Nursing Services    ADLS  Prior functional level: Assistance with the following:, Mobility, Shopping, Housework  Current functional level: Shopping, Mobility, Assistance with the following:, Housework    PT

## 2025-07-31 NOTE — PLAN OF CARE
Problem: Chronic Conditions and Co-morbidities  Goal: Patient's chronic conditions and co-morbidity symptoms are monitored and maintained or improved  7/31/2025 0421 by Victor Manuel Yun RN  Outcome: Progressing     Problem: Pain  Goal: Verbalizes/displays adequate comfort level or baseline comfort level  7/31/2025 0421 by Victor Manuel Yun, RN  Outcome: Progressing     Problem: Skin/Tissue Integrity  Goal: Skin integrity remains intact  Description: 1.  Monitor for areas of redness and/or skin breakdown  2.  Assess vascular access sites hourly  3.  Every 4-6 hours minimum:  Change oxygen saturation probe site  4.  Every 4-6 hours:  If on nasal continuous positive airway pressure, respiratory therapy assess nares and determine need for appliance change or resting period  7/31/2025 0421 by Victor Manuel Yun, RN  Outcome: Progressing

## 2025-07-31 NOTE — CONSULTS
[facility]    GASTROENTEROLOGY CONSULT    Patient:   Carl Frias   :    1935   Facility:   [facility]  Date:    2025  Admission Dx:  GI bleed [K92.2]  Acute cystitis with hematuria [N30.01]  Gastrointestinal hemorrhage, unspecified gastrointestinal hemorrhage type [K92.2]  Requesting physician: Nisa Williamson MD  Reason for consult:  melena      SUBJECTIVE:  History of Present Illness:  This is a 89 y.o.   male who was admitted 2025 with GI bleed [K92.2]  Acute cystitis with hematuria [N30.01]  Gastrointestinal hemorrhage, unspecified gastrointestinal hemorrhage type [K92.2]. We have been asked to see the patient in consultation by Nisa Williamson MD for  melena. This is a 89 year old male with pmh of DM htn testicular cancer afib on eliquis who presented to the ED for left flank pain and a uti. Reports melena every other day for the last month. No c/o nausea or anterior abdominal pain. CT abd shows no acute findings hgb 7.5  fobt positive. denies nsaid use. Has lost weight but cannot quantify, denies dysphagia    Endoscopy   25 colonoscopy   Digital rectal exam- normal   The prep was excellent.       Findings:  Terminal ileum: normal     Cecum: normal     Ascending colon: normal     Transverse colon: normal     Descending/Sigmoid colon: normal     Rectum/Anus: examined in normal and retroflexed positions and was normal; hemorrhoids moderate    Egd 25  Retropharyngeal area was grossly normal appearing     Esophagus: normal;                          Esophagogastric markings: Diaphragmatic hiatus- 40 cm; GE junction- 40 cm; Squamo-columnar junction- 40 cm     Stomach:    Fundus: normal    Body: abnormal: hemoclip attached to the mucosa    Antrum: normal  Biopsy obtained to r/o- H pylori      Duodenum:     Descending: normal    Bulb: normal  OBJECTIVE:    PAST MEDICAL/SURGICAL HISTORY  Past Medical History:   Diagnosis Date    Diabetes mellitus

## 2025-07-31 NOTE — ANESTHESIA POSTPROCEDURE EVALUATION
Department of Anesthesiology  Postprocedure Note    Patient: Carl Frias  MRN: [id number]  YOB: 1935  Date of evaluation: 7/31/2025    Procedure Summary       Date: 07/31/25 Room / Location: [place] [id number] / [facility]    Anesthesia Start: 1509 Anesthesia Stop: 1554    Procedure: ESOPHAGOGASTRODUODENOSCOPY PUSH BX CONTROL HEMORRHAGE WITH APC (Esophagus) Diagnosis:       Melena      (Melena [K92.1])    Surgeons: Torey Washburn MD Responsible Provider: Heavenly Subramanian MD    Anesthesia Type: General ASA Status: 3            Anesthesia Type: General    Lakshmi Phase I: Lakshmi Score: 9    Lakshmi Phase II:      Anesthesia Post Evaluation    Comments: POST- ANESTHESIA EVALUATION       Pt Name: Carl Frias  MRN: [id number]  YOB: 1935  Date of evaluation: 7/31/2025  Time:  4:42 PM      BP (!) 123/52   Pulse 77   Temp 98.1 °F (36.7 °C) (Infrared)   Resp 24   Ht 1.778 m (5' 10\")   Wt 91.6 kg (202 lb)   SpO2 95%   BMI 28.98 kg/m²      Consciousness Level  Awake  Cardiopulmonary Status  Stable  Pain Adequately Treated YES  Nausea / Vomiting  NO  Adequate Hydration  YES  Anesthesia Related Complications NONE      Electronically signed by Heavenly Subramanian MD on 7/31/2025 at 4:42 PM      No notable events documented.

## 2025-07-31 NOTE — PROGRESS NOTES
[facility]   Occupational Therapy Evaluation  Date: 25  Patient Name: Carl Frias       Room: [facility] [place] ENDO Pool/NONE  MRN: [id number]  Account: [id number]   : 1935  (89 y.o.) Gender: male     Discharge Recommendations:  The patient may need non-skilled ADL assistance after discharge.     OT Equipment Recommendations  Other: TBD    Referring Practitioner: Juan Pringle DO  Diagnosis: GI bleed     Treatment Diagnosis: Impaired self care status    Past Medical History:  has a past medical history of Diabetes mellitus (HCC), Hypertension, and Testicular cancer (HCC).    Past Surgical History:   has a past surgical history that includes Testicle removal (); hernia repair (); eye surgery (Bilateral); joint replacement (Bilateral); Upper gastrointestinal endoscopy (N/A, 1/3/2025); Upper gastrointestinal endoscopy (N/A, 2025); and Colonoscopy (N/A, 2025).    Restrictions  Restrictions/Precautions  Restrictions/Precautions: General Precautions  Activity Level: Up as Tolerated, Up with Assist  Required Braces or Orthoses?: No  Implants Present? : Metal implants (Bilat TKA)      Vitals  Vitals  O2 Device: None (Room air)     Subjective  Subjective: \"I should have just stayed up\" pt pleasant and agreeable to OT/PT eval  Comments: Okay for OT/PT eval per SUKHWINDER Harmon  Pain  Pre-Pain: 0    Social/Functional History  Social/Functional History  Lives With: Spouse  Type of Home: House  Home Layout: Multi-level, Performs ADL's on one level, Able to Live on Main level with bedroom/bathroom  Home Access: Stairs to enter with rails  Entrance Stairs - Number of Steps: 4 DIMPLE  Entrance Stairs - Rails: Both  Bathroom Shower/Tub: Walk-in shower, Shower chair with back, Curtain  Bathroom Toilet: Handicap height  Bathroom Equipment: Grab bars in shower, Shower chair, Hand-held shower, Safety frame  Bathroom Accessibility: Walker accessible  Home Equipment: Hospital bed, Grab bars, Walker -

## 2025-07-31 NOTE — PROGRESS NOTES
Physical Therapy  [facility]   Physical Therapy Evaluation  Date: 25  Patient Name: Carl Frias       Room: [facility] [[email or web address] ENDO Pool/NONE  MRN: [id number]  Account: [id number]   : 1935  (89 y.o.) Gender: male     Discharge Recommendations:  Discharge Recommendations: Home with assist PRN     PT Equipment Recommendations  Equipment Needed: No     Past Medical History:  has a past medical history of Diabetes mellitus (HCC), Hypertension, and Testicular cancer (HCC).  Past Surgical History:   has a past surgical history that includes Testicle removal (); hernia repair (); eye surgery (Bilateral); joint replacement (Bilateral); Upper gastrointestinal endoscopy (N/A, 1/3/2025); Upper gastrointestinal endoscopy (N/A, 2025); and Colonoscopy (N/A, 2025).    Subjective  Subjective  Subjective: pt pleasant and cooperative     General  Family/Caregiver Present: Yes  Follows Commands: Within Functional Limits           Social/Functional History  Social/Functional History  Lives With: Spouse  Type of Home: House  Home Layout: Multi-level, Performs ADL's on one level, Able to Live on Main level with bedroom/bathroom  Home Access: Stairs to enter with rails  Entrance Stairs - Number of Steps: 4 DIMPLE  Entrance Stairs - Rails: Both  Bathroom Shower/Tub: Walk-in shower, Shower chair with back, Curtain  Bathroom Toilet: Handicap height  Bathroom Equipment: Grab bars in shower, Shower chair, Hand-held shower, Safety frame  Bathroom Accessibility: Walker accessible  Home Equipment: Hospital bed, Grab bars, Walker - Rolling, Wheelchair - Manual, Lift chair  Prior Level of Assist for ADLs: Independent  Prior Level of Assist for Homemaking: Needs assistance (spouse completes)  Homemaking Responsibilities: No  Prior Level of Assist for Ambulation: Independent household ambulator, with or without device, Independent community ambulator, with or without device  Prior Level of Assist for Transfers:  steps with rail and SBA for home entry            Plan  Physical Therapy Plan  General Plan: 5-7 times per week  Current Treatment Recommendations: Therapeutic activities, Balance training, Functional mobility training, Gait training, Stair training, Transfer training, Endurance training   Safety Devices  Type of Devices: All fall risk precautions in place, Gait belt, Left in bed (transport came to take pt to procedure)  Restraints  Restraints Initially in Place: No       PT Individual Minutes  Time In: 1355  Time Out: 1421  Minutes: 26   Time Code Minutes  Timed Code Treatment Minutes: 8 Minutes       Electronically signed by Rhina Jamil, PT on 7/31/25 at 3:39 PM EDT

## 2025-07-31 NOTE — PLAN OF CARE
Problem: Chronic Conditions and Co-morbidities  Goal: Patient's chronic conditions and co-morbidity symptoms are monitored and maintained or improved  7/31/2025 1132 by Tammy Guillen RN  Outcome: Progressing  Flowsheets (Taken 7/30/2025 1545 by Yana Del Real, RN)  Care Plan - Patient's Chronic Conditions and Co-Morbidity Symptoms are Monitored and Maintained or Improved:   Monitor and assess patient's chronic conditions and comorbid symptoms for stability, deterioration, or improvement   Collaborate with multidisciplinary team to address chronic and comorbid conditions and prevent exacerbation or deterioration   Update acute care plan with appropriate goals if chronic or comorbid symptoms are exacerbated and prevent overall improvement and discharge  7/31/2025 0421 by Victor Manuel Yun RN  Outcome: Progressing     Problem: Discharge Planning  Goal: Discharge to home or other facility with appropriate resources  Outcome: Progressing  Flowsheets (Taken 7/30/2025 1557 by Yana Del Real, RN)  Discharge to home or other facility with appropriate resources:   Identify barriers to discharge with patient and caregiver   Arrange for needed discharge resources and transportation as appropriate   Identify discharge learning needs (meds, wound care, etc)   Arrange for interpreters to assist at discharge as needed   Refer to discharge planning if patient needs post-hospital services based on physician order or complex needs related to functional status, cognitive ability or social support system     Problem: Pain  Goal: Verbalizes/displays adequate comfort level or baseline comfort level  7/31/2025 1132 by Tammy Guillen, RN  Outcome: Progressing  Flowsheets (Taken 7/31/2025 1132)  Verbalizes/displays adequate comfort level or baseline comfort level:   Encourage patient to monitor pain and request assistance   Assess pain using appropriate pain scale   Administer analgesics based on type and severity of pain and evaluate

## 2025-07-31 NOTE — OP NOTE
Push enteroscopy control bleed    DATE OF PROCEDURE: 7/31/2025     SURGEON: Torey Washburn MD  Facility: [facility]  ASSISTANT: None  PREOPERATIVE DIAGNOSIS: Melena, anemia    Diagnosis:    POSTOPERATIVE DIAGNOSIS: As described below (see findings and impression)    OPERATION: Push enteroscopy with biopsy and control bleed    ANESTHESIA: Monitored Anesthesia Care (MAC)     ESTIMATED BLOOD LOSS: Less than 50 ml    COMPLICATIONS: None.     SPECIMENS:  Was Obtained:     ID Type Source Tests Collected by Time Destination   A : DUODENAL ULCER Tissue Duodenum SURGICAL PATHOLOGY Torey Washburn MD 7/31/2025 1537    B : STOMACH BX Tissue Stomach SURGICAL PATHOLOGY Torey Washburn MD 7/31/2025 1541    C : ESOPHAGUS BX Tissue Esophagus SURGICAL PATHOLOGY Torey Washburn MD 7/31/2025 1543         HISTORY: The patient is a 89 y.o. year old male with history of above preop diagnosis.  I recommended push enteroscopy with possible biopsy and I explained the risk, benefits, expected outcome, and alternatives to the procedure.  Risks included but are not limited to bleeding, infection, respiratory distress, hypotension, and perforation of the esophagus, stomach, or duodenum.  Patient understands and is in agreement.      PROCEDURE: The patient was given IV Monitored Anesthesia Care (MAC) and vitals monitoring per anesthesia department.  The patient was placed in the left lateral decubitus position. The patient was monitored continuously with ECG tracing, pulse oximetry, blood pressure monitoring, and direct observation. The scope was inserted into the mouth and advanced under direct vision to proximal jejunum.  A careful inspection was made as the gastroscope was withdrawn, including a retroflexed view of the proximal stomach; findings and interventions are described below. Appropriate photodocumentation was obtained. Post sedation patient was stable with stable vital signs and stable O2 saturations.

## 2025-07-31 NOTE — ANESTHESIA PRE PROCEDURE
98       Coags:   Lab Results   Component Value Date/Time    PROTIME 16.6 01/20/2025 10:22 PM    INR 1.2 01/20/2025 10:22 PM       HCG (If Applicable):   Lab Results   Component Value Date    HCGQUANT <2 10/10/2011        ABGs: No results found for: \"PHART\", \"PO2ART\", \"PLH1GCW\", \"SFJ4PZZ\", \"BEART\", \"M0TPJKVV\"     Type & Screen (If Applicable):  Lab Results   Component Value Date    ABORH A POSITIVE 01/20/2025    LABANTI NEGATIVE 01/20/2025       Drug/Infectious Status (If Applicable):  No results found for: \"HIV\", \"HEPCAB\"    COVID-19 Screening (If Applicable):   Lab Results   Component Value Date/Time    COVID19 Not Detected 04/24/2024 03:28 PM           Anesthesia Evaluation  Patient summary reviewed and Nursing notes reviewed   no history of anesthetic complications:   Airway: Mallampati: III  TM distance: >3 FB   Neck ROM: full  Mouth opening: > = 3 FB   Dental:    (+) upper dentures and lower dentures      Pulmonary:Negative Pulmonary ROS breath sounds clear to auscultation                             Cardiovascular:    (+) hypertension:, past MI: no interval change, dysrhythmias: atrial fibrillation, CHF:      ECG reviewed  Rhythm: irregular  Rate: normal  Echocardiogram reviewed                  Neuro/Psych:   (+) neuromuscular disease:            GI/Hepatic/Renal:   (+) PUD, renal disease: CRI         ROS comment:  Melena.   Endo/Other:    (+) DiabetesType II DM, blood dyscrasia: anemia:..                 Abdominal:             Vascular: negative vascular ROS.         Other Findings:             Anesthesia Plan      general and TIVA     ASA 3       Induction: intravenous.      Anesthetic plan and risks discussed with patient.      Plan discussed with CRNA.                  Heavenly Subramanian MD   7/31/2025

## 2025-08-01 ENCOUNTER — APPOINTMENT (OUTPATIENT)
Dept: GENERAL RADIOLOGY | Age: 89
DRG: 378 | End: 2025-08-01
Payer: MEDICARE

## 2025-08-01 PROBLEM — M54.59 INTRACTABLE LOW BACK PAIN: Status: ACTIVE | Noted: 2025-08-01

## 2025-08-01 PROBLEM — K59.09 OTHER CONSTIPATION: Status: RESOLVED | Noted: 2019-05-09 | Resolved: 2025-08-01

## 2025-08-01 PROBLEM — K52.1 DIARRHEA DUE TO DRUG: Status: RESOLVED | Noted: 2024-05-30 | Resolved: 2025-08-01

## 2025-08-01 PROBLEM — A04.8 H. PYLORI INFECTION: Status: RESOLVED | Noted: 2025-01-13 | Resolved: 2025-08-01

## 2025-08-01 PROBLEM — M80.00XA AGE-RELATED OSTEOPOROSIS WITH CURRENT PATHOLOGICAL FRACTURE: Status: ACTIVE | Noted: 2025-08-01

## 2025-08-01 PROBLEM — E87.6 HYPOKALEMIA: Status: RESOLVED | Noted: 2024-08-12 | Resolved: 2025-08-01

## 2025-08-01 PROBLEM — E83.42 HYPOMAGNESEMIA: Status: RESOLVED | Noted: 2024-04-30 | Resolved: 2025-08-01

## 2025-08-01 PROBLEM — S32.040A COMPRESSION FRACTURE OF L4 LUMBAR VERTEBRA, CLOSED, INITIAL ENCOUNTER (HCC): Status: ACTIVE | Noted: 2025-08-01

## 2025-08-01 LAB
25(OH)D3 SERPL-MCNC: 14.4 NG/ML (ref 30–100)
ANION GAP SERPL CALCULATED.3IONS-SCNC: 10 MMOL/L (ref 9–16)
BASOPHILS # BLD: 0.09 K/UL (ref 0–0.2)
BASOPHILS NFR BLD: 1 % (ref 0–2)
BUN SERPL-MCNC: 45 MG/DL (ref 8–23)
CALCIUM SERPL-MCNC: 8.8 MG/DL (ref 8.6–10.4)
CHLORIDE SERPL-SCNC: 112 MMOL/L (ref 98–107)
CO2 SERPL-SCNC: 19 MMOL/L (ref 20–31)
CREAT SERPL-MCNC: 1.9 MG/DL (ref 0.7–1.2)
EOSINOPHIL # BLD: 0.46 K/UL (ref 0–0.44)
EOSINOPHILS RELATIVE PERCENT: 6 % (ref 0–4)
ERYTHROCYTE [DISTWIDTH] IN BLOOD BY AUTOMATED COUNT: 14.2 % (ref 11.5–14.9)
GFR, ESTIMATED: 34 ML/MIN/1.73M2
GLUCOSE BLD-MCNC: 158 MG/DL (ref 75–110)
GLUCOSE BLD-MCNC: 225 MG/DL (ref 75–110)
GLUCOSE BLD-MCNC: 247 MG/DL (ref 75–110)
GLUCOSE BLD-MCNC: 83 MG/DL (ref 75–110)
GLUCOSE SERPL-MCNC: 112 MG/DL (ref 74–99)
HCT VFR BLD AUTO: 24.6 % (ref 41–53)
HCT VFR BLD AUTO: 25 % (ref 41–53)
HCT VFR BLD AUTO: 25.1 % (ref 41–53)
HGB BLD-MCNC: 7.4 G/DL (ref 13.5–17.5)
HGB BLD-MCNC: 7.6 G/DL (ref 13.5–17.5)
HGB BLD-MCNC: 7.7 G/DL (ref 13.5–17.5)
IMM GRANULOCYTES # BLD AUTO: 0.03 K/UL (ref 0–0.3)
IMM GRANULOCYTES NFR BLD: 0 %
LYMPHOCYTES NFR BLD: 1.9 K/UL (ref 1.1–3.7)
LYMPHOCYTES RELATIVE PERCENT: 25 % (ref 24–44)
MCH RBC QN AUTO: 26.4 PG (ref 26–34)
MCHC RBC AUTO-ENTMCNC: 30.1 G/DL (ref 31–37)
MCV RBC AUTO: 87.9 FL (ref 80–100)
MICROORGANISM SPEC CULT: ABNORMAL
MONOCYTES NFR BLD: 0.78 K/UL (ref 0.1–1.2)
MONOCYTES NFR BLD: 10 % (ref 3–12)
NEUTROPHILS NFR BLD: 58 % (ref 36–66)
NEUTS SEG NFR BLD: 4.33 K/UL (ref 1.5–8.1)
NRBC BLD-RTO: 0 PER 100 WBC
PLATELET # BLD AUTO: 300 K/UL (ref 150–450)
PMV BLD AUTO: 8.9 FL (ref 8–13.5)
POTASSIUM SERPL-SCNC: 4.3 MMOL/L (ref 3.7–5.3)
RBC # BLD AUTO: 2.8 M/UL (ref 4.21–5.77)
SODIUM SERPL-SCNC: 141 MMOL/L (ref 136–145)
SPECIMEN DESCRIPTION: ABNORMAL
WBC OTHER # BLD: 7.6 K/UL (ref 3.5–11)

## 2025-08-01 PROCEDURE — 6370000000 HC RX 637 (ALT 250 FOR IP): Performed by: STUDENT IN AN ORGANIZED HEALTH CARE EDUCATION/TRAINING PROGRAM

## 2025-08-01 PROCEDURE — 82306 VITAMIN D 25 HYDROXY: CPT

## 2025-08-01 PROCEDURE — 6360000002 HC RX W HCPCS: Performed by: STUDENT IN AN ORGANIZED HEALTH CARE EDUCATION/TRAINING PROGRAM

## 2025-08-01 PROCEDURE — 2500000003 HC RX 250 WO HCPCS: Performed by: STUDENT IN AN ORGANIZED HEALTH CARE EDUCATION/TRAINING PROGRAM

## 2025-08-01 PROCEDURE — 85014 HEMATOCRIT: CPT

## 2025-08-01 PROCEDURE — 97116 GAIT TRAINING THERAPY: CPT

## 2025-08-01 PROCEDURE — 6370000000 HC RX 637 (ALT 250 FOR IP): Performed by: NURSE PRACTITIONER

## 2025-08-01 PROCEDURE — 99222 1ST HOSP IP/OBS MODERATE 55: CPT | Performed by: ORTHOPAEDIC SURGERY

## 2025-08-01 PROCEDURE — 85025 COMPLETE CBC W/AUTO DIFF WBC: CPT

## 2025-08-01 PROCEDURE — 36415 COLL VENOUS BLD VENIPUNCTURE: CPT

## 2025-08-01 PROCEDURE — 99232 SBSQ HOSP IP/OBS MODERATE 35: CPT | Performed by: STUDENT IN AN ORGANIZED HEALTH CARE EDUCATION/TRAINING PROGRAM

## 2025-08-01 PROCEDURE — 80048 BASIC METABOLIC PNL TOTAL CA: CPT

## 2025-08-01 PROCEDURE — 99233 SBSQ HOSP IP/OBS HIGH 50: CPT

## 2025-08-01 PROCEDURE — 97110 THERAPEUTIC EXERCISES: CPT

## 2025-08-01 PROCEDURE — 85018 HEMOGLOBIN: CPT

## 2025-08-01 PROCEDURE — 72100 X-RAY EXAM L-S SPINE 2/3 VWS: CPT

## 2025-08-01 PROCEDURE — 82947 ASSAY GLUCOSE BLOOD QUANT: CPT

## 2025-08-01 PROCEDURE — 1200000000 HC SEMI PRIVATE

## 2025-08-01 PROCEDURE — APPSS30 APP SPLIT SHARED TIME 16-30 MINUTES: Performed by: NURSE PRACTITIONER

## 2025-08-01 PROCEDURE — 6370000000 HC RX 637 (ALT 250 FOR IP)

## 2025-08-01 RX ORDER — LIDOCAINE 4 G/G
1 PATCH TOPICAL DAILY
Status: DISCONTINUED | OUTPATIENT
Start: 2025-08-01 | End: 2025-08-02 | Stop reason: HOSPADM

## 2025-08-01 RX ADMIN — PANTOPRAZOLE SODIUM 40 MG: 40 INJECTION, POWDER, FOR SOLUTION INTRAVENOUS at 20:48

## 2025-08-01 RX ADMIN — INSULIN LISPRO 1 UNITS: 100 INJECTION, SOLUTION INTRAVENOUS; SUBCUTANEOUS at 20:40

## 2025-08-01 RX ADMIN — BUMETANIDE 1 MG: 1 TABLET ORAL at 10:59

## 2025-08-01 RX ADMIN — SODIUM CHLORIDE, PRESERVATIVE FREE 10 ML: 5 INJECTION INTRAVENOUS at 11:09

## 2025-08-01 RX ADMIN — SODIUM CHLORIDE, PRESERVATIVE FREE 10 ML: 5 INJECTION INTRAVENOUS at 20:48

## 2025-08-01 RX ADMIN — METOPROLOL SUCCINATE 12.5 MG: 25 TABLET, EXTENDED RELEASE ORAL at 11:01

## 2025-08-01 RX ADMIN — APIXABAN 2.5 MG: 2.5 TABLET, FILM COATED ORAL at 11:28

## 2025-08-01 RX ADMIN — ASPIRIN 81 MG: 81 TABLET, DELAYED RELEASE ORAL at 11:29

## 2025-08-01 RX ADMIN — WATER 1000 MG: 1 INJECTION INTRAMUSCULAR; INTRAVENOUS; SUBCUTANEOUS at 11:28

## 2025-08-01 RX ADMIN — INSULIN LISPRO 1 UNITS: 100 INJECTION, SOLUTION INTRAVENOUS; SUBCUTANEOUS at 17:23

## 2025-08-01 RX ADMIN — FINASTERIDE 5 MG: 5 TABLET, FILM COATED ORAL at 10:59

## 2025-08-01 RX ADMIN — SENNOSIDES AND DOCUSATE SODIUM 2 TABLET: 50; 8.6 TABLET ORAL at 10:59

## 2025-08-01 RX ADMIN — PANTOPRAZOLE SODIUM 40 MG: 40 INJECTION, POWDER, FOR SOLUTION INTRAVENOUS at 11:00

## 2025-08-01 RX ADMIN — LISINOPRIL 10 MG: 10 TABLET ORAL at 10:59

## 2025-08-01 RX ADMIN — TAMSULOSIN HYDROCHLORIDE 0.4 MG: 0.4 CAPSULE ORAL at 10:59

## 2025-08-01 NOTE — PROGRESS NOTES
Physical Therapy  [facility]   Physical Therapy Treatment  Date: 25  Patient Name: Carl Frias       Room:   MRN: [id number]  Account: [id number]   : 1935  (89 y.o.) Gender: male     Discharge Recommendations:  Discharge Recommendations: Home with assist PRN     PT Equipment Recommendations  Equipment Needed: No    General  Patient assessed for rehabilitation services?: Yes  Family/Caregiver Present: Yes  Follows Commands: Within Functional Limits    Past Medical History:  has a past medical history of Diabetes mellitus (HCC), Hypertension, and Testicular cancer (HCC).  Past Surgical History:   has a past surgical history that includes Testicle removal (); hernia repair (); eye surgery (Bilateral); joint replacement (Bilateral); Upper gastrointestinal endoscopy (N/A, 1/3/2025); Upper gastrointestinal endoscopy (N/A, 2025); Colonoscopy (N/A, 2025); and Upper gastrointestinal endoscopy (N/A, 2025).    Restrictions  Restrictions/Precautions  Restrictions/Precautions: General Precautions  Activity Level: Up as Tolerated, Up with Assist  Required Braces or Orthoses?: No  Implants Present? : Metal implants (Bilat TKA)     Subjective  Subjective  Subjective: Pt resting in bed on arrival with son sitting at bedside. Pt pleasant and cooperative         Pain  Pre-Pain: 9  Post-Pain: 9  Pain Location:  (lower back)     Objective  Orientation  Overall Orientation Status: Within Functional Limits  Orientation Level: Oriented X4  Cognition  Overall Cognitive Status: WFL      Transfers  Transfers  Sit to Stand: Minimal Assistance  Stand to Sit: Contact guard assistance  Comment: pt stood from bed with RW     Mobility      Ambulation  Surface: Level tile  Device: Rolling Walker  Assistance: Contact guard assistance  Quality of Gait: antalgic gait, decreased knee flexion bilaterally  Gait Deviations: Slow Thi, Decreased step length, Decreased step  height  Distance: 10ft x2          Stairs  Stairs/Curb  Stairs?: No    Bed Mobility  Bed mobility  Supine to Sit: Partial/Moderate assistance  Sit to Supine: Partial/Moderate assistance (assisting with BLE into bed)  Scooting: Stand by assistance     Balance  Balance  Sitting - Static: Fair, + (SBA)  Sitting - Dynamic: Fair, + (SBA)  Standing - Static: Fair (CGA with RW)  Standing - Dynamic: Fair (CGA with RW)     PT Exercises  A/AROM Exercises: Seated BLE exercises x 10 AROM at EOB  Static Sitting Balance Exercises: Pt dangling EOB x 12', SBA     Assessment  Assessment  Decision Making: Medium Complexity  History: GI Bleed  Exam: decreased balance, endurance, mobility; increased pain  Discharge Recommendations: Home with assist PRN  Activity Tolerance  Activity Tolerance: Patient tolerated treatment well     Patient Education  Patient Education  Education Given To: Patient  Education Provided: Mobility Training, Transfer Training, Plan of Care, Role of Therapy, Home Exercise Program  Education Method: Demonstration, Verbal  Barriers to Learning: None  Education Outcome: Verbalized understanding, Continued education needed     Functional Outcome Measures  AM-PAC Basic Mobility - Inpatient   How much help is needed turning from your back to your side while in a flat bed without using bedrails?: A Lot  How much help is needed moving from lying on your back to sitting on the side of a flat bed without using bedrails?: A Lot  How much help is needed moving to and from a bed to a chair?: A Little  How much help is needed standing up from a chair using your arms?: A Little  How much help is needed walking in hospital room?: A Little  How much help is needed climbing 3-5 steps with a railing?: A Lot  AM-PAC Inpatient Mobility Raw Score : 15  AM-PAC Inpatient T-Scale Score : 39.45  Mobility Inpatient CMS 0-100% Score: 57.7  Mobility Inpatient CMS G-Code Modifier : CK     Goals     Short Term Goals  Time Frame for Short Term  Goals: 2-3 days  Short Term Goal 1: pt able to perform bed mobility mod-I  Short Term Goal 2: pt able to transfer from various surfaces with supervision  Short Term Goal 3: pt able to ambulate with RW and supervision x 50 ft for household distances  Short Term Goal 4: pt able to negotiate 4 steps with rail and SBA for home entry      Plan  Physical Therapy Plan  General Plan: 5-7 times per week  Current Treatment Recommendations: Therapeutic activities, Balance training, Functional mobility training, Gait training, Stair training, Transfer training, Endurance training   Safety Devices  Type of Devices: All fall risk precautions in place, Gait belt, Left in bed, Bed alarm in place       PT Individual Minutes  Time In: 0903  Time Out: 0932  Minutes: 29           Electronically signed by Yaya Billingsley PTA on 8/1/25 at 11:30 AM EDT

## 2025-08-01 NOTE — PROGRESS NOTES
[facility]   OCCUPATIONAL THERAPY MISSED TREATMENT NOTE   INPATIENT   Date: 25  Patient Name: Carl Frias       Room:   MRN: [id number]   Account #: [id number]    : 1935  (89 y.o.)  Gender: male   Referring Practitioner: Juan Pringle DO  Diagnosis: GI bleed             REASON FOR MISSED TREATMENT:  Patient declined   -    Pt politely declines OT this afternoon reporting he never got his lunch and he's having back pain. Will continue to follow to for OT POC. Writer in room 4887-6349 PM.        Electronically signed by BIB Bedoya on 25 at 3:11 PM EDT

## 2025-08-01 NOTE — PROGRESS NOTES
[facility]    Gastroenterology Daily Progress Note      Patient:   Carl Frias   :    1935   Facility:   [facility]  Date:     2025  Consultant:   KALEN Sharma - CNP, CNP      SUBJECTIVE  89 y.o. male admitted 2025 with GI bleed [K92.2]  Acute cystitis with hematuria [N30.01]  Gastrointestinal hemorrhage, unspecified gastrointestinal hemorrhage type [K92.2] and seen for melena. The pt was seen and examined. Hgb 7.4 no bm overnight denies abdominal pain. C/o back pain.  He is s/p egd with push enteroscopy yesterday        OBJECTIVE  Scheduled Meds:   pantoprazole (PROTONIX) 40 mg in sodium chloride (PF) 0.9 % 10 mL injection  40 mg IntraVENous Q12H    apixaban  2.5 mg Oral BID    aspirin  81 mg Oral Every Other Day    insulin lispro  0-4 Units SubCUTAneous 4x Daily AC & HS    bumetanide  1 mg Oral Daily    finasteride  5 mg Oral Daily    lisinopril  10 mg Oral Daily    metoprolol succinate  12.5 mg Oral Daily    sennosides-docusate sodium  2 tablet Oral Daily    tamsulosin  0.4 mg Oral Daily    cefTRIAXone (ROCEPHIN) IV  1,000 mg IntraVENous Q24H    sodium chloride flush  5-40 mL IntraVENous 2 times per day       Vital Signs:  BP (!) 119/51   Pulse 93   Temp 97.9 °F (36.6 °C) (Oral)   Resp 18   Ht 1.778 m (5' 10\")   Wt 91.6 kg (202 lb)   SpO2 95%   BMI 28.98 kg/m²    Review of Systems - History obtained from chart review and the patient  General ROS: positive for  - back pain   Respiratory ROS: no cough, shortness of breath, or wheezing  Cardiovascular ROS: no chest pain or dyspnea on exertion  Gastrointestinal ROS: no abdominal pain, change in bowel habits, or black or bloody stools   Physical Exam:     General Appearance: alert and oriented to person, place and time, well-developed and well-nourished, in no acute distress  Skin: warm and dry, no rash or erythema  Head: normocephalic and atraumatic  Eyes: pupils equal, round, and reactive to light,

## 2025-08-01 NOTE — CARE COORDINATION
Case Management   Daily Progress Note       Patient Name: Carl Frias                   YOB: 1935  Diagnosis: GI bleed [K92.2]  Acute cystitis with hematuria [N30.01]  Gastrointestinal hemorrhage, unspecified gastrointestinal hemorrhage type [K92.2]                       GMLOS: 2.8 days  Length of Stay: 2  days    Readmission Risk (Low < 19, Mod (19-27), High > 27): Readmission Risk Score: 19.8      Patient is alert and oriented.    Spoke with patient, and Current Transitional Plan is:    [x] Home Independently    [] Home with HC    [] Skilled Nursing Facility    [] Acute Rehabilitation    [] Long Term Acute Care (LTAC)    [] Other:     Medical Management: GI Consult. Positive occult. Hgb 8.7 now 7.4. POD #1 EGD.     UTI. IV Rocephin. Cr 2.0 now 1.9.     PT/OT on.     Additional Notes: n/a    Electronically signed by Thalia Spangler RN on 8/1/2025 at 1:12 PM

## 2025-08-01 NOTE — ANESTHESIA POSTPROCEDURE EVALUATION
Department of Anesthesiology  Postprocedure Note    Patient: Carl Frias  MRN: [id number]  YOB: 1935  Date of evaluation: 8/1/2025    Procedure Summary       Date: 07/31/25 Room / Location: [place] [address] / [facility]    Anesthesia Start: 1509 Anesthesia Stop: 1554    Procedure: ESOPHAGOGASTRODUODENOSCOPY PUSH BX CONTROL HEMORRHAGE WITH APC (Esophagus) Diagnosis:       Melena      (Melena [K92.1])    Surgeons: Torey Washburn MD Responsible Provider: Heavenly Subramanian MD    Anesthesia Type: General ASA Status: 3            Anesthesia Type: General    Lakshmi Phase I: Lakshmi Score: 9    Lakshmi Phase II:      Anesthesia Post Evaluation    Comments: POD #1 Patient seen sitting up in chair. No anesthesia related issues reported.    No notable events documented.

## 2025-08-01 NOTE — PROGRESS NOTES
[facility]  IN-PATIENT SERVICE  [facility]    PROGRESS NOTE             8/1/2025    11:34 AM    Name:   Carl Frias  MRN:     [id number]     Acct:      [id number]   Room:   2065/2065-01   Day:  2  Admit Date:  7/30/2025 12:31 PM    PCP:  Aleksandr Finney MD  Code Status:  Full Code    Subjective:     C/C:   Chief Complaint   Patient presents with    Flank Pain     Patient was seen and examined at bedside. Lying comfortably in bed. Vital signs reviewed and stable. Doing well after the push enteroscopy yesterday. Has mild pain in throat. No difficulty eating or swallowing.  Serial H&H performed every 8 hours hemoglobin went from 8.7-> 7.6 -> 7.5->7.4. He complains of mild diffuse back pain when he gets up or walks. Not present at rest.    Brief History:     Carl is an 89-year-old male with past medical history of atrial fibrillation (on Eliquis), chronic kidney disease, hypertension, chronic combined systolic and diastolic heart failure, type 2 diabetes mellitus, BPH, history of anemia with gastric ulcer who presented to the ED with complaints of right lower back/flank pain which has been waxing and waning for the last week or so but worsened over the last 3 days.     Per chart review, patient has followed up with urology regarding BPH and urinary findings and he has set up Crenshaw catheter in the past.  He is on tamsulosin and finasteride for the symptoms.  He has also been following up with gastroenterology for his chronic anemia.  EGD was done on 1/3/2025 which showed gastric ulcers as well as positive H. pylori and he was treated at that time.  He was instructed to take PPI twice a day.  Repeat EGD was to be done on 1/21/2025 and biopsies were taken again and showed no evidence of active H. pylori only chronic inactive gastritis.     In the ED, patient had a CT abdomen which showed chronic moderate bilateral hydro ureteral nephrosis to the level of the bladder.   02:00 PM     CITROBACTER KOSERI (DIVERSUS) >100,000 CFU/ML Identification by MALDI-TOF       [unfilled]    Radiology:    CT ABDOMEN PELVIS WO CONTRAST Additional Contrast? None  Result Date: 7/30/2025  EXAMINATION: CT OF THE ABDOMEN AND PELVIS WITHOUT CONTRAST 7/30/2025 1:33 pm TECHNIQUE: CT of the abdomen and pelvis was performed without the administration of intravenous contrast. Multiplanar reformatted images are provided for review. Automated exposure control, iterative reconstruction, and/or weight based adjustment of the mA/kV was utilized to reduce the radiation dose to as low as reasonably achievable. COMPARISON: CT abdomen pelvis 01/21/2025 HISTORY: ORDERING SYSTEM PROVIDED HISTORY: Black stools TECHNOLOGIST PROVIDED HISTORY: Black stools Decision Support Exception - unselect if not a suspected or confirmed emergency medical condition->Emergency Medical Condition (MA) Reason for Exam: Black stools Additional signs and symptoms: Pt. states pain in rt. lower flank area, black stools x1 week Relevant Medical/Surgical History: hx inguinal hernia repair FINDINGS: Lower Chest: No pulmonary nodule.  No focal consolidation.  Cardiomegaly with coronary artery calcifications. Organs: Lack of intravenous contrast limits evaluation of solid organs. Normal liver contour.  Cholelithiasis.  Normal spleen. Normal adrenal glands. Normal pancreatic contour.  Chronic moderate bilateral hydroureteronephrosis to the level of the bladder.  No renal or ureteral stones. Bilateral simple renal cortical cysts, for which no additional follow up is recommended. GI/Bowel: No bowel wall thickening, dilatation or obstruction.  Normal appendix. Pelvis: No bladder wall thickening or stones.  Mild prostatomegaly. Peritoneum/Retroperitoneum: No evidence of ascites or free air. No evidence of retroperitoneal lymphadenopathy. Atherosclerosis of the aorta and its branches. No aneurysm. Bones/Soft Tissues: No acute fracture.  No focal  positive  -Hemoglobin 8.7 -> 7.6 -> 7.5 -> 7.4  -Iron 25 and TIBC 267, ferritin 83, reticulocyte count normal  -Patient has not been taking his PPI at home  - On IV Protonix drip  -Gastroenterology consulted, Pt had push enteroscopy, awaiting biopsy results, AVM in jejunum APCed, okay to resume Eliquis today     Chronic kidney disease stage IIIb, secondary to chronic obstructive uropathy  -Creatinine is 1.9 which seems to be around his baseline  -Bladder scan ordered to evaluate obstruction     Chronic diastolic congestive heart failure  -Continue Bumex 1 mg daily  -Nuclear Lexiscan stress test on 5/21/2025 showed ejection fraction of 50% and findings are suggestive low risk of cardiac events     Persistent atrial fibrillation  -On Eliquis at home, okay to resume per Gastroenterology   -Compression stockings in place     Benign prostatic hyperplasia  -Continue finasteride 5 mg daily  -Continue tamsulosin 0.4 mg daily     Non-insulin-dependent type 2 diabetes mellitus  -Hemoglobin A1c 6.5  -POC glucose 83  -Hypoglycemia treatment protocol  -Low-dose sliding scale     Hypertension  -Lisinopril 10 mg daily  -Toprol-XL 12.5 mg daily     DVT prophylaxis: ALONSO langee farzaneh, ok to resume eliquis per gastro  GI prophylaxis: Protonix drip  Diet: Regular Adult Diet  Disposition: Home     OT/PT/SW     Code Status: Full code, no intubation    Plan will be discussed with the attending, Dr. Bowen.     Miguel Ángel Banuelos MD  PGY-1 Transitional Year Resident  8/1/2025 11:34 AM     Attending Physician Statement  I have discussed the care of Carl Frias and I have examined the patient myself and taken ROS and HPI, including pertinent history and exam findings, with the resident. I have reviewed the key elements of all parts of the encounter with the resident.  I agree with the assessment, plan and orders as documented by the resident.    High intensity PPI for 2 months atleast as per GI   EGD 7/31  Citrobacter UTI  Resume rossy ALVAREZ with  GI  Monitor H&H   Dc tomorrow if Hb stable  Check EKG   Acute appearing mild wedge compression deformity of L4.   Ortho consult      Electronically signed by Drew Bowen MD

## 2025-08-01 NOTE — PLAN OF CARE
Problem: Chronic Conditions and Co-morbidities  Goal: Patient's chronic conditions and co-morbidity symptoms are monitored and maintained or improved  Outcome: Progressing  Flowsheets (Taken 8/1/2025 0408)  Care Plan - Patient's Chronic Conditions and Co-Morbidity Symptoms are Monitored and Maintained or Improved:   Monitor and assess patient's chronic conditions and comorbid symptoms for stability, deterioration, or improvement   Collaborate with multidisciplinary team to address chronic and comorbid conditions and prevent exacerbation or deterioration     Problem: Skin/Tissue Integrity  Goal: Skin integrity remains intact  Description: 1.  Monitor for areas of redness and/or skin breakdown  2.  Assess vascular access sites hourly  3.  Every 4-6 hours minimum:  Change oxygen saturation probe site  4.  Every 4-6 hours:  If on nasal continuous positive airway pressure, respiratory therapy assess nares and determine need for appliance change or resting period  Outcome: Progressing  Flowsheets (Taken 8/1/2025 0408)  Skin Integrity Remains Intact:   Monitor for areas of redness and/or skin breakdown   Turn and reposition as indicated   Positioning devices

## 2025-08-01 NOTE — PLAN OF CARE
Problem: Chronic Conditions and Co-morbidities  Goal: Patient's chronic conditions and co-morbidity symptoms are monitored and maintained or improved  8/1/2025 1158 by Tammy Guillen, RN  Outcome: Progressing  Flowsheets (Taken 8/1/2025 0408 by Shyann Piper, RN)  Care Plan - Patient's Chronic Conditions and Co-Morbidity Symptoms are Monitored and Maintained or Improved:   Monitor and assess patient's chronic conditions and comorbid symptoms for stability, deterioration, or improvement   Collaborate with multidisciplinary team to address chronic and comorbid conditions and prevent exacerbation or deterioration  8/1/2025 0408 by Shyann Piper, RN  Outcome: Progressing  Flowsheets (Taken 8/1/2025 0408)  Care Plan - Patient's Chronic Conditions and Co-Morbidity Symptoms are Monitored and Maintained or Improved:   Monitor and assess patient's chronic conditions and comorbid symptoms for stability, deterioration, or improvement   Collaborate with multidisciplinary team to address chronic and comorbid conditions and prevent exacerbation or deterioration     Problem: Discharge Planning  Goal: Discharge to home or other facility with appropriate resources  Outcome: Progressing  Flowsheets (Taken 7/30/2025 1557 by Yana Del Real, RN)  Discharge to home or other facility with appropriate resources:   Identify barriers to discharge with patient and caregiver   Arrange for needed discharge resources and transportation as appropriate   Identify discharge learning needs (meds, wound care, etc)   Arrange for interpreters to assist at discharge as needed   Refer to discharge planning if patient needs post-hospital services based on physician order or complex needs related to functional status, cognitive ability or social support system     Problem: Pain  Goal: Verbalizes/displays adequate comfort level or baseline comfort level  Outcome: Progressing  Flowsheets (Taken 7/31/2025 1132)  Verbalizes/displays adequate comfort

## 2025-08-01 NOTE — CONSULTS
Inpatient consult to Orthopedic Surgery  Consult performed by: Raulito Basilio MD  Consult ordered by: Susanna Breen MD        Patient: Carl Frias  Unit/Bed: 2065/2065-01  YOB: 1935  MRN: [id number]  Acct: [id number]   Admitting Diagnosis: GI bleed [K92.2]  Acute cystitis with hematuria [N30.01]  Gastrointestinal hemorrhage, unspecified gastrointestinal hemorrhage type [K92.2]  Admit Date:  7/30/2025  Hospital Day: 2    Subjective:    Patient is having problems with Acute intractable low back pain with L4 compression fracture  Flank Pain      Patient Seen, Chart, Labs, Radiologystudies, and Consults reviewed.    5 day history of low back pain    Recent hospitalization    Returned with low back pain and gi bleed      Unable to get off toilet without assistance and walker due to pain          Objective:   BP (!) 128/52   Pulse 59   Temp 98.2 °F (36.8 °C)   Resp 16   Ht 1.778 m (5' 10\")   Wt 91.6 kg (202 lb)   SpO2 97%   BMI 28.98 kg/m²   Intake/Output Summary (Last 24 hours) at 8/1/2025 1922  Last data filed at 8/1/2025 1737  Gross per 24 hour   Intake 900 ml   Output --   Net 900 ml     Review of Systems   Genitourinary:  Positive for flank pain.     Physical Exam  Vitals and nursing note reviewed.   Constitutional:       Appearance: He is well-developed.   HENT:      Head: Normocephalic and atraumatic.      Nose: Nose normal.   Eyes:      Conjunctiva/sclera: Conjunctivae normal.   Pulmonary:      Effort: Pulmonary effort is normal. No respiratory distress.   Musculoskeletal:      Cervical back: Normal range of motion and neck supple.      Comments: Normal gait     Skin:     General: Skin is warm and dry.   Neurological:      Mental Status: He is alert and oriented to person, place, and time.      Sensory: No sensory deficit.   Psychiatric:         Behavior: Behavior normal.         Thought Content: Thought content normal.           Drains:No  Imaging:Yes Ct scan 1/2025 no l 4

## 2025-08-02 VITALS
HEART RATE: 62 BPM | OXYGEN SATURATION: 94 % | DIASTOLIC BLOOD PRESSURE: 52 MMHG | WEIGHT: 202 LBS | HEIGHT: 70 IN | SYSTOLIC BLOOD PRESSURE: 112 MMHG | TEMPERATURE: 97.7 F | RESPIRATION RATE: 17 BRPM | BODY MASS INDEX: 28.92 KG/M2

## 2025-08-02 LAB
ANION GAP SERPL CALCULATED.3IONS-SCNC: 7 MMOL/L (ref 9–16)
BASOPHILS # BLD: 0.06 K/UL (ref 0–0.2)
BASOPHILS NFR BLD: 1 % (ref 0–2)
BUN SERPL-MCNC: 44 MG/DL (ref 8–23)
CALCIUM SERPL-MCNC: 8.8 MG/DL (ref 8.6–10.4)
CHLORIDE SERPL-SCNC: 110 MMOL/L (ref 98–107)
CO2 SERPL-SCNC: 21 MMOL/L (ref 20–31)
CREAT SERPL-MCNC: 2 MG/DL (ref 0.7–1.2)
EOSINOPHIL # BLD: 0.76 K/UL (ref 0–0.44)
EOSINOPHILS RELATIVE PERCENT: 9 % (ref 0–4)
ERYTHROCYTE [DISTWIDTH] IN BLOOD BY AUTOMATED COUNT: 14.1 % (ref 11.5–14.9)
GFR, ESTIMATED: 32 ML/MIN/1.73M2
GLUCOSE BLD-MCNC: 110 MG/DL (ref 75–110)
GLUCOSE BLD-MCNC: 126 MG/DL (ref 75–110)
GLUCOSE BLD-MCNC: 199 MG/DL (ref 75–110)
GLUCOSE SERPL-MCNC: 118 MG/DL (ref 74–99)
HCT VFR BLD AUTO: 24.6 % (ref 41–53)
HCT VFR BLD AUTO: 25.5 % (ref 41–53)
HGB BLD-MCNC: 7.3 G/DL (ref 13.5–17.5)
HGB BLD-MCNC: 7.9 G/DL (ref 13.5–17.5)
IMM GRANULOCYTES # BLD AUTO: 0.03 K/UL (ref 0–0.3)
IMM GRANULOCYTES NFR BLD: 0 %
LYMPHOCYTES NFR BLD: 2.16 K/UL (ref 1.1–3.7)
LYMPHOCYTES RELATIVE PERCENT: 26 % (ref 24–44)
MCH RBC QN AUTO: 26 PG (ref 26–34)
MCHC RBC AUTO-ENTMCNC: 29.7 G/DL (ref 31–37)
MCV RBC AUTO: 87.5 FL (ref 80–100)
MONOCYTES NFR BLD: 0.75 K/UL (ref 0.1–1.2)
MONOCYTES NFR BLD: 9 % (ref 3–12)
NEUTROPHILS NFR BLD: 55 % (ref 36–66)
NEUTS SEG NFR BLD: 4.43 K/UL (ref 1.5–8.1)
NRBC BLD-RTO: 0 PER 100 WBC
PLATELET # BLD AUTO: 332 K/UL (ref 150–450)
PMV BLD AUTO: 9.4 FL (ref 8–13.5)
POTASSIUM SERPL-SCNC: 4.2 MMOL/L (ref 3.7–5.3)
RBC # BLD AUTO: 2.81 M/UL (ref 4.21–5.77)
SODIUM SERPL-SCNC: 138 MMOL/L (ref 136–145)
WBC OTHER # BLD: 8.2 K/UL (ref 3.5–11)

## 2025-08-02 PROCEDURE — 36415 COLL VENOUS BLD VENIPUNCTURE: CPT

## 2025-08-02 PROCEDURE — 85014 HEMATOCRIT: CPT

## 2025-08-02 PROCEDURE — 85025 COMPLETE CBC W/AUTO DIFF WBC: CPT

## 2025-08-02 PROCEDURE — 6360000002 HC RX W HCPCS: Performed by: STUDENT IN AN ORGANIZED HEALTH CARE EDUCATION/TRAINING PROGRAM

## 2025-08-02 PROCEDURE — 85018 HEMOGLOBIN: CPT

## 2025-08-02 PROCEDURE — 82947 ASSAY GLUCOSE BLOOD QUANT: CPT

## 2025-08-02 PROCEDURE — 80048 BASIC METABOLIC PNL TOTAL CA: CPT

## 2025-08-02 PROCEDURE — 6370000000 HC RX 637 (ALT 250 FOR IP)

## 2025-08-02 PROCEDURE — 93005 ELECTROCARDIOGRAM TRACING: CPT

## 2025-08-02 PROCEDURE — 2500000003 HC RX 250 WO HCPCS: Performed by: STUDENT IN AN ORGANIZED HEALTH CARE EDUCATION/TRAINING PROGRAM

## 2025-08-02 PROCEDURE — 99239 HOSP IP/OBS DSCHRG MGMT >30: CPT

## 2025-08-02 PROCEDURE — 99231 SBSQ HOSP IP/OBS SF/LOW 25: CPT | Performed by: ORTHOPAEDIC SURGERY

## 2025-08-02 PROCEDURE — 6370000000 HC RX 637 (ALT 250 FOR IP): Performed by: STUDENT IN AN ORGANIZED HEALTH CARE EDUCATION/TRAINING PROGRAM

## 2025-08-02 RX ORDER — LEVOFLOXACIN 250 MG/1
250 TABLET, FILM COATED ORAL DAILY
Qty: 5 TABLET | Refills: 0 | Status: SHIPPED | OUTPATIENT
Start: 2025-08-02 | End: 2025-08-02

## 2025-08-02 RX ORDER — PANTOPRAZOLE SODIUM 40 MG/1
40 TABLET, DELAYED RELEASE ORAL 2 TIMES DAILY
Qty: 180 TABLET | Refills: 1 | Status: ON HOLD | OUTPATIENT
Start: 2025-08-02

## 2025-08-02 RX ORDER — METOPROLOL SUCCINATE 25 MG/1
12.5 TABLET, EXTENDED RELEASE ORAL DAILY
Qty: 30 TABLET | Refills: 3 | Status: ON HOLD | OUTPATIENT
Start: 2025-08-03

## 2025-08-02 RX ORDER — LEVOFLOXACIN 250 MG/1
250 TABLET, FILM COATED ORAL DAILY
Qty: 5 TABLET | Refills: 0 | Status: ON HOLD | OUTPATIENT
Start: 2025-08-02 | End: 2025-08-07

## 2025-08-02 RX ADMIN — SENNOSIDES AND DOCUSATE SODIUM 2 TABLET: 50; 8.6 TABLET ORAL at 08:27

## 2025-08-02 RX ADMIN — BUMETANIDE 1 MG: 1 TABLET ORAL at 08:27

## 2025-08-02 RX ADMIN — LISINOPRIL 10 MG: 10 TABLET ORAL at 08:27

## 2025-08-02 RX ADMIN — METOPROLOL SUCCINATE 12.5 MG: 25 TABLET, EXTENDED RELEASE ORAL at 08:27

## 2025-08-02 RX ADMIN — POLYETHYLENE GLYCOL 3350 17 G: 17 POWDER, FOR SOLUTION ORAL at 08:37

## 2025-08-02 RX ADMIN — SODIUM CHLORIDE, PRESERVATIVE FREE 10 ML: 5 INJECTION INTRAVENOUS at 08:27

## 2025-08-02 RX ADMIN — WATER 1000 MG: 1 INJECTION INTRAMUSCULAR; INTRAVENOUS; SUBCUTANEOUS at 12:16

## 2025-08-02 RX ADMIN — PANTOPRAZOLE SODIUM 40 MG: 40 INJECTION, POWDER, FOR SOLUTION INTRAVENOUS at 08:25

## 2025-08-02 RX ADMIN — FINASTERIDE 5 MG: 5 TABLET, FILM COATED ORAL at 08:27

## 2025-08-02 RX ADMIN — TAMSULOSIN HYDROCHLORIDE 0.4 MG: 0.4 CAPSULE ORAL at 08:27

## 2025-08-02 NOTE — PROGRESS NOTES
[facility]  IN-PATIENT SERVICE  [facility]    PROGRESS NOTE             8/2/2025    8:31 AM    Name:   Carl Frias  MRN:     [id number]     Acct:      [id number]   Room:   2065/2065-01   Day:  3  Admit Date:  7/30/2025 12:31 PM    PCP:  Aleksandr Finney MD  Code Status:  DNR-CCA    Subjective:     C/C:   Chief Complaint   Patient presents with    Flank Pain     Patient was seen and examined at bedside this AM.  Vitals reviewed.  He continues to complain of lower back pain.  His x-ray from yesterday showed L4 compression fracture for which Ortho is following and recommended kyphoplasty to be scheduled on Monday.  This was discussed with the patient and family.  However, they do not want any surgical intervention at this time.  According to the patient's family, one of the staff suggested that the fracture would heal on its own in 3 months.    Patient has not had a bowel movement since 3 days and has recently taken Senokot and GlycoLax this morning    Serial H&H performed every 8 hours hemoglobin went from 8.7-> 7.6 -> 7.5->7.4->7.3.     Brief History:     Carl is an 89-year-old male with past medical history of atrial fibrillation (on Eliquis), chronic kidney disease, hypertension, chronic combined systolic and diastolic heart failure, type 2 diabetes mellitus, BPH, history of anemia with gastric ulcer who presented to the ED with complaints of right lower back/flank pain which has been waxing and waning for the last week or so but worsened over the last 3 days.     Per chart review, patient has followed up with urology regarding BPH and urinary findings and he has set up Crenshaw catheter in the past.  He is on tamsulosin and finasteride for the symptoms.  He has also been following up with gastroenterology for his chronic anemia.  EGD was done on 1/3/2025 which showed gastric ulcers as well as positive H. pylori and he was treated at that time.  He was instructed to  History: hx inguinal hernia repair FINDINGS: Lower Chest: No pulmonary nodule.  No focal consolidation.  Cardiomegaly with coronary artery calcifications. Organs: Lack of intravenous contrast limits evaluation of solid organs. Normal liver contour.  Cholelithiasis.  Normal spleen. Normal adrenal glands. Normal pancreatic contour.  Chronic moderate bilateral hydroureteronephrosis to the level of the bladder.  No renal or ureteral stones. Bilateral simple renal cortical cysts, for which no additional follow up is recommended. GI/Bowel: No bowel wall thickening, dilatation or obstruction.  Normal appendix. Pelvis: No bladder wall thickening or stones.  Mild prostatomegaly. Peritoneum/Retroperitoneum: No evidence of ascites or free air. No evidence of retroperitoneal lymphadenopathy. Atherosclerosis of the aorta and its branches. No aneurysm. Bones/Soft Tissues: No acute fracture.  No focal osteoblastic or osteolytic lesion.  Diffuse bony demineralization.     1. No acute abnormality in the abdomen or pelvis. 2. Chronic moderate bilateral hydroureteronephrosis to the level of the bladder.         Physical Examination:        Physical Exam  Vitals reviewed.   Constitutional:       General: No acute distress.  HENT:      Head: Normocephalic and atraumatic.   Cardiovascular:      Rate and Rhythm: Normal rate. Rhythm irregular.   Pulmonary:      Effort: Pulmonary effort is normal. No respiratory distress.      Breath sounds: Normal breath sounds.   Abdominal:      Palpations: Abdomen is soft.      Tenderness: There is no abdominal tenderness.  Skin:     General: Skin is warm and dry.   Neurological:      General: No focal deficit present.      Mental Status: He is alert and oriented to person, place, and time.      Cranial Nerves: No cranial nerve deficit.     Assessment:        Primary Problem  GI bleed    Active Hospital Problems    Diagnosis Date Noted    Age-related osteoporosis with current pathological fracture

## 2025-08-02 NOTE — DISCHARGE INSTR - DIET

## 2025-08-02 NOTE — PROGRESS NOTES
Per SW, patient is unable to have [facility] for home care.    Updated wife. Per wife, ok for [facility] and then if they decline, [facility] care.     Updated SW. To send referral.

## 2025-08-02 NOTE — PROGRESS NOTES
Pt and family refused surgical intervention at this time. Plan would be home with home care. Updated Dr. Bowen of this via perfect serve.

## 2025-08-02 NOTE — PROGRESS NOTES
Pt alert, oriented and medically stable for discharge. Pt items sent home with patient including paperwork, clothing, shoes. Reviewed with patient and family discharge instructions, new medications and their side effects as well as follow up appointments. Pt transported via wheelchair to main entrance. IV removed.

## 2025-08-02 NOTE — PLAN OF CARE
Problem: Chronic Conditions and Co-morbidities  Goal: Patient's chronic conditions and co-morbidity symptoms are monitored and maintained or improved  8/2/2025 1400 by Mica Ferraro RN  Outcome: Progressing  Flowsheets (Taken 8/2/2025 0827)  Care Plan - Patient's Chronic Conditions and Co-Morbidity Symptoms are Monitored and Maintained or Improved: Monitor and assess patient's chronic conditions and comorbid symptoms for stability, deterioration, or improvement   Monitoring  Problem: Discharge Planning  Goal: Discharge to home or other facility with appropriate resources  8/2/2025 1400 by Mica Ferraro RN  Outcome: Progressing  Flowsheets (Taken 8/2/2025 0827)  Discharge to home or other facility with appropriate resources: Identify barriers to discharge with patient and caregiver   Pt to discharge home with home care  Problem: Pain  Goal: Verbalizes/displays adequate comfort level or baseline comfort level  8/2/2025 1400 by Mica Ferraro RN  Outcome: Progressing  Flowsheets (Taken 8/2/2025 0930)  Verbalizes/displays adequate comfort level or baseline comfort level: Encourage patient to monitor pain and request assistance   Pt denies pain this shift.   Problem: Risk for Elopement  Goal: Patient will not exit the unit/facility without proper excort  8/2/2025 1400 by Mica Ferraro RN  Outcome: Progressing  Flowsheets (Taken 8/2/2025 0827)  Nursing Interventions for Elopement Risk: Assist with personal care needs such as toileting, eating, dressing, as needed to reduce the risk of wandering   Monitoring  Problem: Skin/Tissue Integrity  Goal: Skin integrity remains intact  Description: 1.  Monitor for areas of redness and/or skin breakdown  2.  Assess vascular access sites hourly  3.  Every 4-6 hours minimum:  Change oxygen saturation probe site  4.  Every 4-6 hours:  If on nasal continuous positive airway pressure, respiratory therapy assess nares and determine need for appliance change or resting  period  8/2/2025 1400 by Mica Ferraro, RN  Outcome: Progressing  Flowsheets  Taken 8/2/2025 0940  Skin Integrity Remains Intact: Monitor for areas of redness and/or skin breakdown  Taken 8/2/2025 0827  Skin Integrity Remains Intact: Monitor for areas of redness and/or skin breakdown   Monitoring. Pt encouraged to get up and move more and use restroom in bathroom. Pt has been incontinent on self.   Problem: ABCDS Injury Assessment  Goal: Absence of physical injury  8/2/2025 1400 by Mica Ferraro, RN  Outcome: Progressing  Flowsheets (Taken 8/2/2025 0940)  Absence of Physical Injury: Implement safety measures based on patient assessment   No injury noted this shift.   Problem: Safety - Adult  Goal: Free from fall injury  8/2/2025 1400 by Mica Ferraro, RN  Outcome: Progressing  Flowsheets (Taken 8/2/2025 0940)  Free From Fall Injury: Instruct family/caregiver on patient safety   No injury noted this shift.

## 2025-08-02 NOTE — CARE COORDINATION
Continuity of Care Form    Patient Name: Carl Frias   :  1935  MRN:  [id number]    Admit date:  2025  Discharge date:  25    Code Status Order: DNR-CCA   Advance Directives:    Date/Time Healthcare Directive Type of Healthcare Directive Copy in Chart Healthcare Agent Appointed Healthcare Agent's Name Healthcare Agent's Phone Number    25 1438 Yes, patient has an advance directive for healthcare treatment  Living will;Health care treatment directive;Durable power of  for health care  Yes, copy in chart  --  Hannah, daughter  --             Admitting Physician:  Nisa Williamson MD  PCP: Aleksandr Finney MD    Discharging Nurse: SUKHWINDER Nino  Discharging Hospital Unit/Room#: 5/-01  Discharging Unit Phone Number: [phone]    Emergency Contact:   Extended Emergency Contact Information  Primary Emergency Contact: Naomi Frias  Address: [address] [place] [address]  Home Phone: [phone]  Mobile Phone: [phone]  Relation: Spouse  Secondary Emergency Contact: Lucy Lazcano   [facility]  Home Phone: [phone]  Relation: Child    Past Surgical History:  Past Surgical History:   Procedure Laterality Date    COLONOSCOPY N/A 2025    COLONOSCOPY DIAGNOSTIC performed by Koffi Chauhan MD at [facility]    EYE SURGERY Bilateral     HERNIA REPAIR      left inguinal    JOINT REPLACEMENT Bilateral     TESTICLE REMOVAL      UPPER GASTROINTESTINAL ENDOSCOPY N/A 1/3/2025    ESOPHAGOGASTRODUODENOSCOPY BIOPSY WITH CLIPP APPLICATION X 1 performed by Torey Washburn MD at [facility]    UPPER GASTROINTESTINAL ENDOSCOPY N/A 2025    ESOPHAGOGASTRODUODENOSCOPY BIOPSY performed by Koffi Chauhan MD at [facility]    UPPER GASTROINTESTINAL ENDOSCOPY N/A 2025    ESOPHAGOGASTRODUODENOSCOPY PUSH BX CONTROL HEMORRHAGE WITH APC performed by Torey Washburn MD at [facility]       Immunization History:   Immunization History  E53.8    Chronic kidney disease, stage 3a (HCC) N18.31    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) E13.22    Stage 3b chronic kidney disease (HCC) N18.32    Acquired absence of other genital organ(s) Z90.79    Calculus of gallbladder with acute cholecystitis without obstruction K80.00    Difficulty in walking, not elsewhere classified R26.2    Hypo-osmolality and hyponatremia E87.1    Localized edema R60.0    Long term (current) use of oral hypoglycemic drugs Z79.84    Need for assistance with personal care Z74.1    Obesity, unspecified E66.9    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    Other abnormalities of gait and mobility R26.89    Other reduced mobility Z74.09    Other thrombophilia D68.69    Patient's other noncompliance with medication regimen for other reason Z91.148    History of falling Z91.81    Personal history of irradiation Z92.3    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 coli) as the cause of diseases classified elsewhere B96.20    Unspecified hearing loss, unspecified ear H91.90    Unspecified hydronephrosis N13.30    Unsteadiness on feet R26.81    Urinary tract infection, site not specified N39.0    Ventricular tachycardia, unspecified (HCC) I47.20    Other fatigue R53.83    Weakness R53.1    Anemia D64.9    Chronic congestive heart failure (HCC) I50.9    Stage 4 chronic kidney disease (HCC) N18.4    Gastric ulcer K25.9    Hypoglycemia E16.2    GI bleed K92.2    Mild malnutrition E44.1    Poor appetite R63.0    Melena K92.1    Frailty syndrome in geriatric patient R54    Duodenal ulcer K26.9    AVM (arteriovenous malformation) Q27.30    Age-related  osteoporosis with current pathological fracture M80.00XA    Compression fracture of L4 lumbar vertebra, closed, initial encounter ([facility]) S32.040A    Intractable low back pain M54.59       Isolation/Infection:   Isolation            No Isolation          Patient Infection Status    No active infections.   Resolved       Infection Onset Added Last Indicated Last Indicated By Resolved Resolved By    COVID-19 22 COVID-19 & Influenza Combo 22 Infection                          Nurse Assessment:  Last Vital Signs: BP (!) 112/52   Pulse 62   Temp 97.7 °F (36.5 °C) (Oral)   Resp 17   Ht 1.778 m (5' 10\")   Wt 91.6 kg (202 lb)   SpO2 94%   BMI 28.98 kg/m²     Last documented pain score (0-10 scale): Pain Level: 0  Last Weight:   Wt Readings from Last 1 Encounters:   25 91.6 kg (202 lb)     Mental Status:  oriented, alert, and forgetful at times    IV Access:  - None    Nursing Mobility/ADLs:  Walking   Assisted  Transfer  Assisted  Bathing  Assisted  Dressing  Assisted  Toileting  Assisted  Feeding  Independent  Med Admin  Assisted  Med Delivery   whole    Wound Care Documentation and Therapy:  Wound 24 Sacrum right buttocks (Active)   Number of days: 530       Wound 25 Penis Distal red scabbed/abrasians (Active)   Number of days: 193        Elimination:  Continence:   Bowel: Yes  Bladder: No  Urinary Catheter: None   Colostomy/Ileostomy/Ileal Conduit: No       Date of Last BM: 25    Intake/Output Summary (Last 24 hours) at 2025 0944  Last data filed at 2025 1737  Gross per 24 hour   Intake 360 ml   Output --   Net 360 ml     I/O last 3 completed shifts:  In: 900 [P.O.:900]  Out: -     Safety Concerns:     At Risk for Falls    Impairments/Disabilities:      Vision and Hearing    Nutrition Therapy:  Current Nutrition Therapy:   - Oral Diet:  General    Routes of Feeding: Oral  Liquids: Thin Liquids  Daily Fluid Restriction: no  Last Modified Barium  [id number] - [facility] [address] [place] [address] -  [phone] - F [phone]  [address] [place] [address]  Phone: [phone]       levoFLOXacin 250 MG tablet        metoprolol succinate 25 MG extended release tablet

## 2025-08-02 NOTE — DISCHARGE INSTR - COC
Continuity of Care Form    Patient Name: Carl Frias   :  1935  MRN:  [id number]    Admit date:  2025  Discharge date:  25    Code Status Order: DNR-CCA   Advance Directives:    Date/Time Healthcare Directive Type of Healthcare Directive Copy in Chart Healthcare Agent Appointed Healthcare Agent's Name Healthcare Agent's Phone Number    25 1438 Yes, patient has an advance directive for healthcare treatment  Living will;Health care treatment directive;Durable power of  for health care  Yes, copy in chart  --  Hannah, daughter  --             Admitting Physician:  Nisa Williamson MD  PCP: Aleksandr Finney MD    Discharging Nurse: SUKHWINDER Nino  Discharging Hospital Unit/Room#: 5/-01  Discharging Unit Phone Number: [phone]    Emergency Contact:   Extended Emergency Contact Information  Primary Emergency Contact: Naomi Frias  Address: [address] [place] [address]  Home Phone: [phone]  Mobile Phone: [phone]  Relation: Spouse  Secondary Emergency Contact: Lucy Lazcano   [facility]  Home Phone: [phone]  Relation: Child    Past Surgical History:  Past Surgical History:   Procedure Laterality Date    COLONOSCOPY N/A 2025    COLONOSCOPY DIAGNOSTIC performed by Koffi Chauhan MD at [facility]    EYE SURGERY Bilateral     HERNIA REPAIR      left inguinal    JOINT REPLACEMENT Bilateral     TESTICLE REMOVAL      UPPER GASTROINTESTINAL ENDOSCOPY N/A 1/3/2025    ESOPHAGOGASTRODUODENOSCOPY BIOPSY WITH CLIPP APPLICATION X 1 performed by Torey Washburn MD at [facility]    UPPER GASTROINTESTINAL ENDOSCOPY N/A 2025    ESOPHAGOGASTRODUODENOSCOPY BIOPSY performed by Koffi Chauhan MD at [facility]    UPPER GASTROINTESTINAL ENDOSCOPY N/A 2025    ESOPHAGOGASTRODUODENOSCOPY PUSH BX CONTROL HEMORRHAGE WITH APC performed by Torey Washburn MD at [facility]       Immunization History:   Immunization History  E53.8    Chronic kidney disease, stage 3a (HCC) N18.31    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) E13.22    Stage 3b chronic kidney disease (HCC) N18.32    Acquired absence of other genital organ(s) Z90.79    Calculus of gallbladder with acute cholecystitis without obstruction K80.00    Difficulty in walking, not elsewhere classified R26.2    Hypo-osmolality and hyponatremia E87.1    Localized edema R60.0    Long term (current) use of oral hypoglycemic drugs Z79.84    Need for assistance with personal care Z74.1    Obesity, unspecified E66.9    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    Other abnormalities of gait and mobility R26.89    Other reduced mobility Z74.09    Other thrombophilia D68.69    Patient's other noncompliance with medication regimen for other reason Z91.148    History of falling Z91.81    Personal history of irradiation Z92.3    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 coli) as the cause of diseases classified elsewhere B96.20    Unspecified hearing loss, unspecified ear H91.90    Unspecified hydronephrosis N13.30    Unsteadiness on feet R26.81    Urinary tract infection, site not specified N39.0    Ventricular tachycardia, unspecified (HCC) I47.20    Other fatigue R53.83    Weakness R53.1    Anemia D64.9    Chronic congestive heart failure (HCC) I50.9    Stage 4 chronic kidney disease (HCC) N18.4    Gastric ulcer K25.9    Hypoglycemia E16.2    GI bleed K92.2    Mild malnutrition E44.1    Poor appetite R63.0    Melena K92.1    Frailty syndrome in geriatric patient R54    Duodenal ulcer K26.9    AVM (arteriovenous malformation) Q27.30    Age-related

## 2025-08-02 NOTE — DISCHARGE SUMMARY
[facility]   IN-PATIENT SERVICE   [facility]    Discharge Summary     Patient ID: Carl Frias  :  1935   MRN: [id number]     ACCOUNT:  [id number]   Patient's PCP: Aleksandr Finney MD  Admit Date: 2025   Discharge Date: 2025     Length of Stay: 3  Code Status:  DNR-CCA  Admitting Physician: Nisa Williamson MD  Discharge Physician: Miguel Ángel Banuelos MD     Active Discharge Diagnoses:       Primary Problem  GI bleed, UTI      Hospital Problems  Active Hospital Problems    Diagnosis Date Noted    Age-related osteoporosis with current pathological fracture [M80.00XA] 2025    Compression fracture of L4 lumbar vertebra, closed, initial encounter ([facility]) [S32.040A] 2025    Intractable low back pain [M54.59] 2025    Melena [K92.1] 2025    Frailty syndrome in geriatric patient [R54] 2025    Duodenal ulcer [K26.9] 2025    AVM (arteriovenous malformation) [Q27.30] 2025    GI bleed [K92.2] 2025       Admission Condition:  poor     Discharged Condition: good    Hospital Stay:       Hospital Course:  Carl Frias is a 89 y.o. male with past medical history of atrial fibrillation (on Eliquis), chronic kidney disease, hypertension, chronic combined systolic and diastolic heart failure,  type 2 diabetes mellitus, BPH, history of anemia with gastric ulcer who was admitted for the management of   GI bleed and urinary tract infection.  He presented to ER with waxing and waning right flank and lower back pain for the last 1 week.  Denied fevers, chills, blood or pain with urination. Urinalysis was positive for nitrates and leukocyte esterase. CT abdomen showed chronic moderate bilateral hydroureteronephrosis to the level of the bladder. Hemoglobin was 8.7, black stool was noted on rectal exam and his fecal occult blood came back positive.  Patient was started on IV Rocephin 1 g daily for broad-spectrum coverage.  His cultures later came back  lesion.  Diffuse bony demineralization.     1. No acute abnormality in the abdomen or pelvis. 2. Chronic moderate bilateral hydroureteronephrosis to the level of the bladder.     Consultations:    Consults:     Final Specialist Recommendations/Findings:   IP CONSULT TO PRIMARY CARE PROVIDER  IP CONSULT TO GI  IP CONSULT TO SOCIAL WORK  IP CONSULT TO ORTHOPEDIC SURGERY      The patient was seen and examined on day of discharge and this discharge summary is in conjunction with any daily progress note from day of discharge.    Discharge plan:     Disposition: Home    Physician Follow Up:     Raulito Basilio MD  [address], [address]  [place] [address]  [phone]    Schedule an appointment as soon as possible for a visit in 2 week(s)  Follow up    Torey Washburn MD  [address]  [address]  [place] [address]  [phone]    Follow up      Aleksandr Finney MD  [address] [place] [address]  [phone]    Follow up      [facility]  [address] [facility] [place] [id number] [phone]         Diet: cardiac diet    Activity: As tolerated    Instructions to Patient:     Follow up with your primary care physician (PCP) Aleksandr Finney MD  Take all your medication as prescribed Eliquis 2.5 mg (take 1 tablet by mouth 2 times daily), levofloxacin 250 mg (take 1 tablet daily for 5 days), aspirin 81 mg daily, Bumex 1 mg daily, lisinopril 10 mg daily. If your prescription has refills, obtain the medication refills from the pharmacy before you run out. Seek medical attention if you run out of a medication you need and do not have any refills of.   Reasons to call your doctor or go to the ER: Dizziness, lightheadedness, irregular heartbeat, chest pain, fainting spells, or any other concerning symptoms.  Please follow-up with gastroenterology outpatient for GI bleed.  Please follow-up with orthopedic for compression fracture    Discharge Medications:      Medication List

## 2025-08-02 NOTE — CARE COORDINATION
DISCHARGE PLANNING NOTE:    [facility] unable to accept this pt, at [place] capacity for insurance plan.    Referrals sent to 1) [facility] 2) [facility] per family request.    Electronically signed by JOHNY Delong on 8/2/2025 at 4:29 PM    [facility] pt. Bedside RN Mica hernandez. LIZ faxed to [facility].    Electronically signed by JOHNY Delong on 8/2/2025 at 5:03 PM

## 2025-08-02 NOTE — PROGRESS NOTES
Resident team to change pharmacy to [facility] for discharge.

## 2025-08-02 NOTE — PROGRESS NOTES
Surgical Progress Note    POD:      Patient doing fairly well  Vitals:    25 0930   BP:    Pulse: 62   Resp:    Temp:    SpO2:       Temp (24hrs), Av °F (36.7 °C), Min:97.7 °F (36.5 °C), Max:98.2 °F (36.8 °C)       Pain Control fair  No unusual nausea    Exam: no  change        Lungs:  No respiratory distress    Labs reviewed:  Labs:  WBC/Hgb/Hct/Plts:  8.2/7.3/24.6/332 (356)  BUN/Cr/glu/ALT/AST/amyl/lip:  44/2.0/--/--/--/--/-- (356)     Na/K/Cl/CO2:  138/4.2/110/21 (356)    I/O last 3 completed shifts:  In: 900 [P.O.:900]  Out: -     Assessment:    Patient Active Problem List   Diagnosis    Benign prostatic hyperplasia without lower urinary tract symptoms    Essential hypertension    Type 2 diabetes mellitus with chronic kidney disease (HCC)    Type 2 diabetes mellitus with diabetic polyneuropathy (HCC)    Cardiomegaly    Unspecified atrial fibrillation (HCC)    Chronic atrial fibrillation (HCC)    Diabetic peripheral neuropathy (HCC)    History of inguinal hernia repair    Chronic combined systolic and diastolic congestive heart failure (HCC)    Hydrocele, unspecified    Low back pain, unspecified    Other specified abdominal hernia without obstruction or gangrene    Radiculopathy, lumbosacral region    Other atopic dermatitis    Acute kidney failure, unspecified    Unstable gait    Easy bruisability    Acute on chronic combined systolic (congestive) and diastolic (congestive) heart failure (HCC)    Acquired left foot drop    Secondary hypercoagulable state    Systolic CHF, acute (HCC)    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)    Iron deficiency anemia, unspecified    ROXI (acute kidney injury)    Deficiency of other specified B group vitamins    Chronic kidney disease, stage 3a (HCC)    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)    Stage 3b chronic kidney disease (HCC)    Acquired absence of other genital organ(s)    Calculus of gallbladder with acute  cholecystitis without obstruction    Difficulty in walking, not elsewhere classified    Hypo-osmolality and hyponatremia    Localized edema    Long term (current) use of oral hypoglycemic drugs    Need for assistance with personal care    Obesity, unspecified    Old myocardial infarction    Other abnormalities of gait and mobility    Other reduced mobility    Other thrombophilia    Patient's other noncompliance with medication regimen for other reason    History of falling    Personal history of irradiation    Personal history of malignant neoplasm of testis    Personal history of nicotine dependence    Pleural effusion, not elsewhere classified    Polyosteoarthritis, unspecified    Unspecified osteoarthritis, unspecified site    Presence of artificial knee joint, bilateral    Spontaneous ecchymoses    Unspecified Escherichia coli (E. coli) as the cause of diseases classified elsewhere    Unspecified hearing loss, unspecified ear    Unspecified hydronephrosis    Unsteadiness on feet    Urinary tract infection, site not specified    Ventricular tachycardia, unspecified ([facility])    Other fatigue    Weakness    Anemia    Chronic congestive heart failure (HCC)    Stage 4 chronic kidney disease (HCC)    Gastric ulcer    Hypoglycemia    GI bleed    Mild malnutrition    Poor appetite    Melena    Frailty syndrome in geriatric patient    Duodenal ulcer    AVM (arteriovenous malformation)    Age-related osteoporosis with current pathological fracture    Compression fracture of L4 lumbar vertebra, closed, initial encounter ([facility])    Intractable low back pain       Plan: declines kyphoplasty at this time  Will sign off    Follow up 2 weeks    Raulito Basilio MD MD  8/2/2025 10:17 AM

## 2025-08-02 NOTE — PLAN OF CARE
Problem: Chronic Conditions and Co-morbidities  Goal: Patient's chronic conditions and co-morbidity symptoms are monitored and maintained or improved  8/2/2025 0002 by Uvaldo Gauthier RN  Outcome: Progressing  8/1/2025 1158 by Tammy Guillen RN  Outcome: Progressing  Flowsheets (Taken 8/1/2025 0408 by Shyann Piper, RN)  Care Plan - Patient's Chronic Conditions and Co-Morbidity Symptoms are Monitored and Maintained or Improved:   Monitor and assess patient's chronic conditions and comorbid symptoms for stability, deterioration, or improvement   Collaborate with multidisciplinary team to address chronic and comorbid conditions and prevent exacerbation or deterioration     Problem: Discharge Planning  Goal: Discharge to home or other facility with appropriate resources  8/2/2025 0002 by Uvaldo Gauthier RN  Outcome: Progressing  8/1/2025 1158 by Tammy Guillen RN  Outcome: Progressing  Flowsheets (Taken 7/30/2025 1557 by Yana Del Real RN)  Discharge to home or other facility with appropriate resources:   Identify barriers to discharge with patient and caregiver   Arrange for needed discharge resources and transportation as appropriate   Identify discharge learning needs (meds, wound care, etc)   Arrange for interpreters to assist at discharge as needed   Refer to discharge planning if patient needs post-hospital services based on physician order or complex needs related to functional status, cognitive ability or social support system     Problem: Pain  Goal: Verbalizes/displays adequate comfort level or baseline comfort level  8/2/2025 0002 by Uvaldo Gauthier RN  Outcome: Progressing  8/1/2025 1158 by Tammy Guillen RN  Outcome: Progressing  Flowsheets (Taken 7/31/2025 1132)  Verbalizes/displays adequate comfort level or baseline comfort level:   Encourage patient to monitor pain and request assistance   Assess pain using appropriate pain scale   Administer analgesics based on type and severity of pain and evaluate  response   Implement non-pharmacological measures as appropriate and evaluate response     Problem: Risk for Elopement  Goal: Patient will not exit the unit/facility without proper excort  8/2/2025 0002 by Uvaldo Gauthier, RN  Outcome: Progressing  8/1/2025 1158 by Tammy Guillen, RN  Outcome: Progressing  Flowsheets (Taken 7/31/2025 1132)  Nursing Interventions for Elopement Risk:   Assist with personal care needs such as toileting, eating, dressing, as needed to reduce the risk of wandering   Collaborate with family members/caregivers to mitigate the elopement risk   Collaborate with treatment team for drug withdrawal symptoms treatment

## 2025-08-02 NOTE — CARE COORDINATION
Case Management   Daily Progress Note       Patient Name: Carl Frias                   YOB: 1935  Diagnosis: GI bleed [K92.2]  Acute cystitis with hematuria [N30.01]  Gastrointestinal hemorrhage, unspecified gastrointestinal hemorrhage type [K92.2]                       GMLOS: 2.8 days  Length of Stay: 3  days    Readmission Risk (Low < 19, Mod (19-27), High > 27): Readmission Risk Score: 19.8      Chart Reviewed, and Current Transitional Plan is:    [] Home Independently    [x] Home with HC    [] Skilled Nursing Facility    [] Acute Rehabilitation    [] Long Term Acute Care (LTAC)    [] Other:     Additional Notes: Writer was informed by bedside SUKHWINDER Nino pt does not wish for surgical intervention and would like to return home at discharge. Writer met with pt and son in room, requests writer to call spouse regarding [facility].    Call placed to pt spouse Naomi for discussion. No answer, voicemail left for return call.    Electronically signed by JOHNY Delong on 8/2/2025 at 12:25 PM    Message received from bedside SUKHWINDER Nino that family would like [facility], current with pt spouse. Referral sent.

## 2025-08-02 NOTE — PROGRESS NOTES
Unable to get a hold of wife. Per daughter Hannah, family has used [facility] in the past and wound like to continue with them in the home. To update SW.

## 2025-08-03 ENCOUNTER — APPOINTMENT (OUTPATIENT)
Dept: CT IMAGING | Age: 89
DRG: 516 | End: 2025-08-03
Payer: MEDICARE

## 2025-08-03 ENCOUNTER — HOSPITAL ENCOUNTER (INPATIENT)
Age: 89
LOS: 4 days | Discharge: NURSING, REHAB OR LONG-TERM CARE | DRG: 516 | End: 2025-08-07
Attending: EMERGENCY MEDICINE
Payer: MEDICARE

## 2025-08-03 DIAGNOSIS — S32.040A CLOSED WEDGE COMPRESSION FRACTURE OF L4 VERTEBRA, INITIAL ENCOUNTER (HCC): Primary | ICD-10-CM

## 2025-08-03 DIAGNOSIS — R00.1 BRADYCARDIA: ICD-10-CM

## 2025-08-03 PROBLEM — M54.9 BACK PAIN DUE TO INJURY: Status: ACTIVE | Noted: 2025-08-03

## 2025-08-03 LAB
ALBUMIN SERPL-MCNC: 3 G/DL (ref 3.5–5.2)
ALP SERPL-CCNC: 50 U/L (ref 40–129)
ALT SERPL-CCNC: 8 U/L (ref 10–50)
ANION GAP SERPL CALCULATED.3IONS-SCNC: 12 MMOL/L (ref 9–16)
AST SERPL-CCNC: 12 U/L (ref 10–50)
BASOPHILS # BLD: 0.07 K/UL (ref 0–0.2)
BASOPHILS NFR BLD: 1 % (ref 0–2)
BILIRUB SERPL-MCNC: <0.2 MG/DL (ref 0–1.2)
BUN SERPL-MCNC: 43 MG/DL (ref 8–23)
CALCIUM SERPL-MCNC: 8.8 MG/DL (ref 8.6–10.4)
CHLORIDE SERPL-SCNC: 104 MMOL/L (ref 98–107)
CO2 SERPL-SCNC: 20 MMOL/L (ref 20–31)
CREAT SERPL-MCNC: 1.8 MG/DL (ref 0.7–1.2)
EKG Q-T INTERVAL: 448 MS
EKG QRS DURATION: 70 MS
EKG QTC CALCULATION (BAZETT): 387 MS
EKG R AXIS: -27 DEGREES
EKG T AXIS: -3 DEGREES
EKG VENTRICULAR RATE: 45 BPM
EOSINOPHIL # BLD: 0.72 K/UL (ref 0–0.44)
EOSINOPHILS RELATIVE PERCENT: 10 % (ref 0–4)
ERYTHROCYTE [DISTWIDTH] IN BLOOD BY AUTOMATED COUNT: 13.9 % (ref 11.5–14.9)
GFR, ESTIMATED: 36 ML/MIN/1.73M2
GLUCOSE BLD-MCNC: 196 MG/DL (ref 75–110)
GLUCOSE SERPL-MCNC: 213 MG/DL (ref 74–99)
HCT VFR BLD AUTO: 23.3 % (ref 41–53)
HGB BLD-MCNC: 7.3 G/DL (ref 13.5–17.5)
IMM GRANULOCYTES # BLD AUTO: 0.04 K/UL (ref 0–0.3)
IMM GRANULOCYTES NFR BLD: 1 %
INR PPP: 1.2
LYMPHOCYTES NFR BLD: 1.83 K/UL (ref 1.1–3.7)
LYMPHOCYTES RELATIVE PERCENT: 25 % (ref 24–44)
MCH RBC QN AUTO: 26.9 PG (ref 26–34)
MCHC RBC AUTO-ENTMCNC: 31.3 G/DL (ref 31–37)
MCV RBC AUTO: 86 FL (ref 80–100)
MONOCYTES NFR BLD: 0.47 K/UL (ref 0.1–1.2)
MONOCYTES NFR BLD: 7 % (ref 3–12)
NEUTROPHILS NFR BLD: 56 % (ref 36–66)
NEUTS SEG NFR BLD: 4.08 K/UL (ref 1.5–8.1)
NRBC BLD-RTO: 0 PER 100 WBC
PLATELET # BLD AUTO: 313 K/UL (ref 150–450)
PMV BLD AUTO: 8.7 FL (ref 8–13.5)
POTASSIUM SERPL-SCNC: 4.2 MMOL/L (ref 3.7–5.3)
PROT SERPL-MCNC: 6.1 G/DL (ref 6.6–8.7)
PROTHROMBIN TIME: 15.8 SEC (ref 11.8–14.6)
RBC # BLD AUTO: 2.71 M/UL (ref 4.21–5.77)
SODIUM SERPL-SCNC: 136 MMOL/L (ref 136–145)
TROPONIN I SERPL HS-MCNC: 78 NG/L (ref 0–22)
TROPONIN I SERPL HS-MCNC: 83 NG/L (ref 0–22)
WBC OTHER # BLD: 7.2 K/UL (ref 3.5–11)

## 2025-08-03 PROCEDURE — 85610 PROTHROMBIN TIME: CPT

## 2025-08-03 PROCEDURE — 82947 ASSAY GLUCOSE BLOOD QUANT: CPT

## 2025-08-03 PROCEDURE — 6360000002 HC RX W HCPCS

## 2025-08-03 PROCEDURE — 99285 EMERGENCY DEPT VISIT HI MDM: CPT

## 2025-08-03 PROCEDURE — 6370000000 HC RX 637 (ALT 250 FOR IP)

## 2025-08-03 PROCEDURE — 80053 COMPREHEN METABOLIC PANEL: CPT

## 2025-08-03 PROCEDURE — 86900 BLOOD TYPING SEROLOGIC ABO: CPT

## 2025-08-03 PROCEDURE — 2060000000 HC ICU INTERMEDIATE R&B

## 2025-08-03 PROCEDURE — 86920 COMPATIBILITY TEST SPIN: CPT

## 2025-08-03 PROCEDURE — 86850 RBC ANTIBODY SCREEN: CPT

## 2025-08-03 PROCEDURE — 2500000003 HC RX 250 WO HCPCS

## 2025-08-03 PROCEDURE — 36415 COLL VENOUS BLD VENIPUNCTURE: CPT

## 2025-08-03 PROCEDURE — 93005 ELECTROCARDIOGRAM TRACING: CPT | Performed by: EMERGENCY MEDICINE

## 2025-08-03 PROCEDURE — 85025 COMPLETE CBC W/AUTO DIFF WBC: CPT

## 2025-08-03 PROCEDURE — 84484 ASSAY OF TROPONIN QUANT: CPT

## 2025-08-03 PROCEDURE — 86901 BLOOD TYPING SEROLOGIC RH(D): CPT

## 2025-08-03 PROCEDURE — 96374 THER/PROPH/DIAG INJ IV PUSH: CPT

## 2025-08-03 PROCEDURE — 72131 CT LUMBAR SPINE W/O DYE: CPT

## 2025-08-03 RX ORDER — PANTOPRAZOLE SODIUM 40 MG/1
40 TABLET, DELAYED RELEASE ORAL 2 TIMES DAILY
Status: DISCONTINUED | OUTPATIENT
Start: 2025-08-03 | End: 2025-08-04

## 2025-08-03 RX ORDER — ACETAMINOPHEN 325 MG/1
650 TABLET ORAL EVERY 6 HOURS PRN
Status: DISCONTINUED | OUTPATIENT
Start: 2025-08-03 | End: 2025-08-07 | Stop reason: HOSPADM

## 2025-08-03 RX ORDER — ASPIRIN 81 MG/1
81 TABLET ORAL EVERY OTHER DAY
Status: DISCONTINUED | OUTPATIENT
Start: 2025-08-05 | End: 2025-08-04

## 2025-08-03 RX ORDER — INSULIN LISPRO 100 [IU]/ML
0-4 INJECTION, SOLUTION INTRAVENOUS; SUBCUTANEOUS
Status: DISCONTINUED | OUTPATIENT
Start: 2025-08-03 | End: 2025-08-07 | Stop reason: HOSPADM

## 2025-08-03 RX ORDER — LEVOFLOXACIN 500 MG/1
250 TABLET, FILM COATED ORAL DAILY
Status: DISCONTINUED | OUTPATIENT
Start: 2025-08-04 | End: 2025-08-07 | Stop reason: HOSPADM

## 2025-08-03 RX ORDER — FENTANYL CITRATE 0.05 MG/ML
50 INJECTION, SOLUTION INTRAMUSCULAR; INTRAVENOUS
Status: DISPENSED | OUTPATIENT
Start: 2025-08-03 | End: 2025-08-05

## 2025-08-03 RX ORDER — FINASTERIDE 5 MG/1
5 TABLET, FILM COATED ORAL DAILY
Status: DISCONTINUED | OUTPATIENT
Start: 2025-08-04 | End: 2025-08-07 | Stop reason: HOSPADM

## 2025-08-03 RX ORDER — TAMSULOSIN HYDROCHLORIDE 0.4 MG/1
0.4 CAPSULE ORAL DAILY
Status: DISCONTINUED | OUTPATIENT
Start: 2025-08-04 | End: 2025-08-07 | Stop reason: HOSPADM

## 2025-08-03 RX ORDER — ONDANSETRON 2 MG/ML
4 INJECTION INTRAMUSCULAR; INTRAVENOUS EVERY 6 HOURS PRN
Status: DISCONTINUED | OUTPATIENT
Start: 2025-08-03 | End: 2025-08-07 | Stop reason: HOSPADM

## 2025-08-03 RX ORDER — LISINOPRIL 10 MG/1
10 TABLET ORAL DAILY
Status: DISCONTINUED | OUTPATIENT
Start: 2025-08-04 | End: 2025-08-07 | Stop reason: HOSPADM

## 2025-08-03 RX ORDER — SODIUM CHLORIDE 9 MG/ML
INJECTION, SOLUTION INTRAVENOUS PRN
Status: DISCONTINUED | OUTPATIENT
Start: 2025-08-03 | End: 2025-08-07 | Stop reason: HOSPADM

## 2025-08-03 RX ORDER — BUMETANIDE 1 MG/1
1 TABLET ORAL DAILY
Status: DISCONTINUED | OUTPATIENT
Start: 2025-08-04 | End: 2025-08-07 | Stop reason: HOSPADM

## 2025-08-03 RX ORDER — ACETAMINOPHEN 650 MG/1
650 SUPPOSITORY RECTAL EVERY 6 HOURS PRN
Status: DISCONTINUED | OUTPATIENT
Start: 2025-08-03 | End: 2025-08-07 | Stop reason: HOSPADM

## 2025-08-03 RX ORDER — METOPROLOL SUCCINATE 25 MG/1
12.5 TABLET, EXTENDED RELEASE ORAL DAILY
Status: DISCONTINUED | OUTPATIENT
Start: 2025-08-04 | End: 2025-08-07 | Stop reason: HOSPADM

## 2025-08-03 RX ORDER — ONDANSETRON 4 MG/1
4 TABLET, ORALLY DISINTEGRATING ORAL EVERY 8 HOURS PRN
Status: DISCONTINUED | OUTPATIENT
Start: 2025-08-03 | End: 2025-08-07 | Stop reason: HOSPADM

## 2025-08-03 RX ORDER — SENNA AND DOCUSATE SODIUM 50; 8.6 MG/1; MG/1
2 TABLET, FILM COATED ORAL DAILY
Status: DISCONTINUED | OUTPATIENT
Start: 2025-08-04 | End: 2025-08-07 | Stop reason: HOSPADM

## 2025-08-03 RX ORDER — SODIUM CHLORIDE 0.9 % (FLUSH) 0.9 %
5-40 SYRINGE (ML) INJECTION EVERY 12 HOURS SCHEDULED
Status: DISCONTINUED | OUTPATIENT
Start: 2025-08-03 | End: 2025-08-07 | Stop reason: HOSPADM

## 2025-08-03 RX ORDER — SODIUM CHLORIDE 0.9 % (FLUSH) 0.9 %
10 SYRINGE (ML) INJECTION PRN
Status: DISCONTINUED | OUTPATIENT
Start: 2025-08-03 | End: 2025-08-07 | Stop reason: HOSPADM

## 2025-08-03 RX ORDER — DEXTROSE MONOHYDRATE 100 MG/ML
INJECTION, SOLUTION INTRAVENOUS CONTINUOUS PRN
Status: DISCONTINUED | OUTPATIENT
Start: 2025-08-03 | End: 2025-08-07 | Stop reason: HOSPADM

## 2025-08-03 RX ORDER — BISACODYL 5 MG/1
5 TABLET, DELAYED RELEASE ORAL DAILY PRN
Status: DISCONTINUED | OUTPATIENT
Start: 2025-08-03 | End: 2025-08-07 | Stop reason: HOSPADM

## 2025-08-03 RX ORDER — FENTANYL CITRATE 0.05 MG/ML
50 INJECTION, SOLUTION INTRAMUSCULAR; INTRAVENOUS ONCE
Status: COMPLETED | OUTPATIENT
Start: 2025-08-03 | End: 2025-08-03

## 2025-08-03 RX ADMIN — Medication 3 MG: at 23:01

## 2025-08-03 RX ADMIN — PANTOPRAZOLE SODIUM 40 MG: 40 TABLET, DELAYED RELEASE ORAL at 23:01

## 2025-08-03 RX ADMIN — SODIUM CHLORIDE, PRESERVATIVE FREE 10 ML: 5 INJECTION INTRAVENOUS at 23:01

## 2025-08-03 RX ADMIN — FENTANYL CITRATE 50 MCG: 0.05 INJECTION, SOLUTION INTRAMUSCULAR; INTRAVENOUS at 18:43

## 2025-08-03 RX ADMIN — FENTANYL CITRATE 50 MCG: 50 INJECTION INTRAMUSCULAR; INTRAVENOUS at 23:01

## 2025-08-03 ASSESSMENT — PAIN DESCRIPTION - LOCATION
LOCATION: BACK

## 2025-08-03 ASSESSMENT — PAIN SCALES - GENERAL
PAINLEVEL_OUTOF10: 9
PAINLEVEL_OUTOF10: 9
PAINLEVEL_OUTOF10: 8
PAINLEVEL_OUTOF10: 9

## 2025-08-03 ASSESSMENT — PAIN DESCRIPTION - DESCRIPTORS: DESCRIPTORS: ACHING;DISCOMFORT

## 2025-08-03 ASSESSMENT — PAIN DESCRIPTION - ORIENTATION
ORIENTATION: LOWER
ORIENTATION: POSTERIOR
ORIENTATION: LOWER

## 2025-08-03 ASSESSMENT — PAIN - FUNCTIONAL ASSESSMENT: PAIN_FUNCTIONAL_ASSESSMENT: 0-10

## 2025-08-03 ASSESSMENT — PAIN SCALES - WONG BAKER: WONGBAKER_NUMERICALRESPONSE: NO HURT

## 2025-08-04 ENCOUNTER — ANESTHESIA (OUTPATIENT)
Dept: OPERATING ROOM | Age: 89
DRG: 516 | End: 2025-08-04
Payer: MEDICARE

## 2025-08-04 ENCOUNTER — TELEPHONE (OUTPATIENT)
Dept: INTERNAL MEDICINE CLINIC | Age: 89
End: 2025-08-04

## 2025-08-04 ENCOUNTER — APPOINTMENT (OUTPATIENT)
Dept: GENERAL RADIOLOGY | Age: 89
DRG: 516 | End: 2025-08-04
Payer: MEDICARE

## 2025-08-04 ENCOUNTER — ANESTHESIA EVENT (OUTPATIENT)
Dept: OPERATING ROOM | Age: 89
DRG: 516 | End: 2025-08-04
Payer: MEDICARE

## 2025-08-04 PROBLEM — R00.1 BRADYCARDIA: Status: ACTIVE | Noted: 2025-08-04

## 2025-08-04 PROBLEM — K27.9 PUD (PEPTIC ULCER DISEASE): Status: ACTIVE | Noted: 2025-01-03

## 2025-08-04 PROBLEM — Z87.19 H/O: UPPER GI BLEED: Status: ACTIVE | Noted: 2025-08-04

## 2025-08-04 PROBLEM — I70.1 BILATERAL RENAL ARTERY STENOSIS: Status: ACTIVE | Noted: 2025-08-04

## 2025-08-04 PROBLEM — R79.89 ELEVATED TROPONIN: Status: ACTIVE | Noted: 2025-08-04

## 2025-08-04 PROBLEM — N39.0 URINARY TRACT INFECTION WITHOUT HEMATURIA: Status: ACTIVE | Noted: 2025-08-04

## 2025-08-04 LAB
ANION GAP SERPL CALCULATED.3IONS-SCNC: 11 MMOL/L (ref 9–16)
BASOPHILS # BLD: 0.09 K/UL (ref 0–0.2)
BASOPHILS NFR BLD: 1 % (ref 0–2)
BUN SERPL-MCNC: 40 MG/DL (ref 8–23)
CALCIUM SERPL-MCNC: 9 MG/DL (ref 8.6–10.4)
CHLORIDE SERPL-SCNC: 107 MMOL/L (ref 98–107)
CO2 SERPL-SCNC: 21 MMOL/L (ref 20–31)
CREAT SERPL-MCNC: 1.7 MG/DL (ref 0.7–1.2)
EKG Q-T INTERVAL: 448 MS
EKG Q-T INTERVAL: 450 MS
EKG Q-T INTERVAL: 472 MS
EKG QRS DURATION: 70 MS
EKG QRS DURATION: 78 MS
EKG QRS DURATION: 78 MS
EKG QTC CALCULATION (BAZETT): 387 MS
EKG QTC CALCULATION (BAZETT): 418 MS
EKG QTC CALCULATION (BAZETT): 426 MS
EKG R AXIS: -19 DEGREES
EKG R AXIS: -22 DEGREES
EKG R AXIS: -27 DEGREES
EKG T AXIS: -3 DEGREES
EKG T AXIS: 14 DEGREES
EKG T AXIS: 9 DEGREES
EKG VENTRICULAR RATE: 45 BPM
EKG VENTRICULAR RATE: 49 BPM
EKG VENTRICULAR RATE: 52 BPM
EOSINOPHIL # BLD: 0.83 K/UL (ref 0–0.44)
EOSINOPHILS RELATIVE PERCENT: 11 % (ref 0–4)
ERYTHROCYTE [DISTWIDTH] IN BLOOD BY AUTOMATED COUNT: 13.7 % (ref 11.5–14.9)
GFR, ESTIMATED: 38 ML/MIN/1.73M2
GLUCOSE BLD-MCNC: 110 MG/DL (ref 75–110)
GLUCOSE BLD-MCNC: 148 MG/DL (ref 75–110)
GLUCOSE BLD-MCNC: 425 MG/DL (ref 75–110)
GLUCOSE BLD-MCNC: 64 MG/DL (ref 75–110)
GLUCOSE BLD-MCNC: 97 MG/DL (ref 75–110)
GLUCOSE SERPL-MCNC: 69 MG/DL (ref 74–99)
HCT VFR BLD AUTO: 24.8 % (ref 41–53)
HCT VFR BLD AUTO: 26.3 % (ref 41–53)
HCT VFR BLD AUTO: 27.2 % (ref 41–53)
HGB BLD-MCNC: 7.5 G/DL (ref 13.5–17.5)
HGB BLD-MCNC: 7.8 G/DL (ref 13.5–17.5)
HGB BLD-MCNC: 8.3 G/DL (ref 13.5–17.5)
IMM GRANULOCYTES # BLD AUTO: 0.03 K/UL (ref 0–0.3)
IMM GRANULOCYTES NFR BLD: 0 %
LYMPHOCYTES NFR BLD: 2.23 K/UL (ref 1.1–3.7)
LYMPHOCYTES RELATIVE PERCENT: 30 % (ref 24–44)
MCH RBC QN AUTO: 26.7 PG (ref 26–34)
MCHC RBC AUTO-ENTMCNC: 30.2 G/DL (ref 31–37)
MCV RBC AUTO: 88.3 FL (ref 80–100)
MONOCYTES NFR BLD: 0.63 K/UL (ref 0.1–1.2)
MONOCYTES NFR BLD: 9 % (ref 3–12)
NEUTROPHILS NFR BLD: 49 % (ref 36–66)
NEUTS SEG NFR BLD: 3.57 K/UL (ref 1.5–8.1)
NRBC BLD-RTO: 0 PER 100 WBC
PLATELET # BLD AUTO: 313 K/UL (ref 150–450)
PMV BLD AUTO: 8.9 FL (ref 8–13.5)
POTASSIUM SERPL-SCNC: 4.5 MMOL/L (ref 3.7–5.3)
RBC # BLD AUTO: 2.81 M/UL (ref 4.21–5.77)
SODIUM SERPL-SCNC: 139 MMOL/L (ref 136–145)
SURGICAL PATHOLOGY REPORT: NORMAL
TROPONIN I SERPL HS-MCNC: 75 NG/L (ref 0–22)
WBC OTHER # BLD: 7.4 K/UL (ref 3.5–11)

## 2025-08-04 PROCEDURE — 6360000002 HC RX W HCPCS

## 2025-08-04 PROCEDURE — 99223 1ST HOSP IP/OBS HIGH 75: CPT | Performed by: INTERNAL MEDICINE

## 2025-08-04 PROCEDURE — 2500000003 HC RX 250 WO HCPCS: Performed by: ORTHOPAEDIC SURGERY

## 2025-08-04 PROCEDURE — 3600000012 HC SURGERY LEVEL 2 ADDTL 15MIN: Performed by: ORTHOPAEDIC SURGERY

## 2025-08-04 PROCEDURE — 3700000000 HC ANESTHESIA ATTENDED CARE: Performed by: ORTHOPAEDIC SURGERY

## 2025-08-04 PROCEDURE — 6370000000 HC RX 637 (ALT 250 FOR IP): Performed by: ORTHOPAEDIC SURGERY

## 2025-08-04 PROCEDURE — 2580000003 HC RX 258: Performed by: ORTHOPAEDIC SURGERY

## 2025-08-04 PROCEDURE — 0QS03ZZ REPOSITION LUMBAR VERTEBRA, PERCUTANEOUS APPROACH: ICD-10-PCS | Performed by: ORTHOPAEDIC SURGERY

## 2025-08-04 PROCEDURE — 85014 HEMATOCRIT: CPT

## 2025-08-04 PROCEDURE — 82947 ASSAY GLUCOSE BLOOD QUANT: CPT

## 2025-08-04 PROCEDURE — 93010 ELECTROCARDIOGRAM REPORT: CPT | Performed by: INTERNAL MEDICINE

## 2025-08-04 PROCEDURE — 36415 COLL VENOUS BLD VENIPUNCTURE: CPT

## 2025-08-04 PROCEDURE — 2060000000 HC ICU INTERMEDIATE R&B

## 2025-08-04 PROCEDURE — C1713 ANCHOR/SCREW BN/BN,TIS/BN: HCPCS | Performed by: ORTHOPAEDIC SURGERY

## 2025-08-04 PROCEDURE — 0QU03JZ SUPPLEMENT LUMBAR VERTEBRA WITH SYNTHETIC SUBSTITUTE, PERCUTANEOUS APPROACH: ICD-10-PCS | Performed by: ORTHOPAEDIC SURGERY

## 2025-08-04 PROCEDURE — 3600000002 HC SURGERY LEVEL 2 BASE: Performed by: ORTHOPAEDIC SURGERY

## 2025-08-04 PROCEDURE — 1200000000 HC SEMI PRIVATE

## 2025-08-04 PROCEDURE — 6360000002 HC RX W HCPCS: Performed by: ORTHOPAEDIC SURGERY

## 2025-08-04 PROCEDURE — 2580000003 HC RX 258

## 2025-08-04 PROCEDURE — 7100000000 HC PACU RECOVERY - FIRST 15 MIN: Performed by: ORTHOPAEDIC SURGERY

## 2025-08-04 PROCEDURE — 80048 BASIC METABOLIC PNL TOTAL CA: CPT

## 2025-08-04 PROCEDURE — 2500000003 HC RX 250 WO HCPCS

## 2025-08-04 PROCEDURE — 7100000001 HC PACU RECOVERY - ADDTL 15 MIN: Performed by: ORTHOPAEDIC SURGERY

## 2025-08-04 PROCEDURE — 6370000000 HC RX 637 (ALT 250 FOR IP)

## 2025-08-04 PROCEDURE — 6370000000 HC RX 637 (ALT 250 FOR IP): Performed by: NURSE PRACTITIONER

## 2025-08-04 PROCEDURE — 84484 ASSAY OF TROPONIN QUANT: CPT

## 2025-08-04 PROCEDURE — 85018 HEMOGLOBIN: CPT

## 2025-08-04 PROCEDURE — 85025 COMPLETE CBC W/AUTO DIFF WBC: CPT

## 2025-08-04 PROCEDURE — 3700000001 HC ADD 15 MINUTES (ANESTHESIA): Performed by: ORTHOPAEDIC SURGERY

## 2025-08-04 PROCEDURE — 2709999900 HC NON-CHARGEABLE SUPPLY: Performed by: ORTHOPAEDIC SURGERY

## 2025-08-04 PROCEDURE — C1894 INTRO/SHEATH, NON-LASER: HCPCS | Performed by: ORTHOPAEDIC SURGERY

## 2025-08-04 DEVICE — CEMENT BNE HI VISC RADIOPAQUE KYPHON HV-R: Type: IMPLANTABLE DEVICE | Status: FUNCTIONAL

## 2025-08-04 RX ORDER — ONDANSETRON 2 MG/ML
4 INJECTION INTRAMUSCULAR; INTRAVENOUS
Status: DISCONTINUED | OUTPATIENT
Start: 2025-08-04 | End: 2025-08-04 | Stop reason: HOSPADM

## 2025-08-04 RX ORDER — SODIUM CHLORIDE 0.9 % (FLUSH) 0.9 %
5-40 SYRINGE (ML) INJECTION PRN
Status: DISCONTINUED | OUTPATIENT
Start: 2025-08-04 | End: 2025-08-07 | Stop reason: HOSPADM

## 2025-08-04 RX ORDER — ASPIRIN 81 MG/1
81 TABLET ORAL EVERY OTHER DAY
Status: DISCONTINUED | OUTPATIENT
Start: 2025-08-05 | End: 2025-08-07 | Stop reason: HOSPADM

## 2025-08-04 RX ORDER — DIPHENHYDRAMINE HYDROCHLORIDE 50 MG/ML
12.5 INJECTION, SOLUTION INTRAMUSCULAR; INTRAVENOUS
Status: DISCONTINUED | OUTPATIENT
Start: 2025-08-04 | End: 2025-08-04 | Stop reason: HOSPADM

## 2025-08-04 RX ORDER — ATROPINE SULFATE 0.1 MG/ML
1 INJECTION INTRAVENOUS PRN
Status: DISCONTINUED | OUTPATIENT
Start: 2025-08-04 | End: 2025-08-07 | Stop reason: HOSPADM

## 2025-08-04 RX ORDER — PROPOFOL 10 MG/ML
INJECTION, EMULSION INTRAVENOUS
Status: DISCONTINUED | OUTPATIENT
Start: 2025-08-04 | End: 2025-08-04 | Stop reason: SDUPTHER

## 2025-08-04 RX ORDER — SODIUM CHLORIDE 0.9 % (FLUSH) 0.9 %
5-40 SYRINGE (ML) INJECTION PRN
Status: DISCONTINUED | OUTPATIENT
Start: 2025-08-04 | End: 2025-08-04 | Stop reason: HOSPADM

## 2025-08-04 RX ORDER — OXYCODONE HYDROCHLORIDE 10 MG/1
10 TABLET ORAL EVERY 4 HOURS PRN
Status: DISCONTINUED | OUTPATIENT
Start: 2025-08-04 | End: 2025-08-07 | Stop reason: HOSPADM

## 2025-08-04 RX ORDER — PANTOPRAZOLE SODIUM 40 MG/1
40 TABLET, DELAYED RELEASE ORAL
Status: DISCONTINUED | OUTPATIENT
Start: 2025-08-07 | End: 2025-08-04

## 2025-08-04 RX ORDER — OXYCODONE HYDROCHLORIDE 5 MG/1
5 TABLET ORAL EVERY 4 HOURS PRN
Status: DISCONTINUED | OUTPATIENT
Start: 2025-08-04 | End: 2025-08-07 | Stop reason: HOSPADM

## 2025-08-04 RX ORDER — DEXAMETHASONE SODIUM PHOSPHATE 4 MG/ML
INJECTION, SOLUTION INTRA-ARTICULAR; INTRALESIONAL; INTRAMUSCULAR; INTRAVENOUS; SOFT TISSUE
Status: DISCONTINUED | OUTPATIENT
Start: 2025-08-04 | End: 2025-08-04 | Stop reason: SDUPTHER

## 2025-08-04 RX ORDER — FENTANYL CITRATE 50 UG/ML
INJECTION, SOLUTION INTRAMUSCULAR; INTRAVENOUS
Status: DISCONTINUED | OUTPATIENT
Start: 2025-08-04 | End: 2025-08-04 | Stop reason: SDUPTHER

## 2025-08-04 RX ORDER — ROCURONIUM BROMIDE 10 MG/ML
INJECTION, SOLUTION INTRAVENOUS
Status: DISCONTINUED | OUTPATIENT
Start: 2025-08-04 | End: 2025-08-04 | Stop reason: SDUPTHER

## 2025-08-04 RX ORDER — SODIUM CHLORIDE 0.9 % (FLUSH) 0.9 %
5-40 SYRINGE (ML) INJECTION EVERY 12 HOURS SCHEDULED
Status: DISCONTINUED | OUTPATIENT
Start: 2025-08-04 | End: 2025-08-07 | Stop reason: HOSPADM

## 2025-08-04 RX ORDER — SODIUM CHLORIDE 9 MG/ML
INJECTION, SOLUTION INTRAVENOUS PRN
Status: DISCONTINUED | OUTPATIENT
Start: 2025-08-04 | End: 2025-08-04 | Stop reason: HOSPADM

## 2025-08-04 RX ORDER — SODIUM CHLORIDE 0.9 % (FLUSH) 0.9 %
5-40 SYRINGE (ML) INJECTION EVERY 12 HOURS SCHEDULED
Status: DISCONTINUED | OUTPATIENT
Start: 2025-08-04 | End: 2025-08-04 | Stop reason: HOSPADM

## 2025-08-04 RX ORDER — SODIUM CHLORIDE, SODIUM LACTATE, POTASSIUM CHLORIDE, CALCIUM CHLORIDE 600; 310; 30; 20 MG/100ML; MG/100ML; MG/100ML; MG/100ML
INJECTION, SOLUTION INTRAVENOUS CONTINUOUS
Status: DISCONTINUED | OUTPATIENT
Start: 2025-08-04 | End: 2025-08-04 | Stop reason: HOSPADM

## 2025-08-04 RX ORDER — INSULIN LISPRO 100 [IU]/ML
4 INJECTION, SOLUTION INTRAVENOUS; SUBCUTANEOUS ONCE
Status: COMPLETED | OUTPATIENT
Start: 2025-08-04 | End: 2025-08-04

## 2025-08-04 RX ORDER — ONDANSETRON 2 MG/ML
INJECTION INTRAMUSCULAR; INTRAVENOUS
Status: DISCONTINUED | OUTPATIENT
Start: 2025-08-04 | End: 2025-08-04 | Stop reason: SDUPTHER

## 2025-08-04 RX ORDER — TRANEXAMIC ACID 100 MG/ML
INJECTION, SOLUTION INTRAVENOUS
Status: DISCONTINUED | OUTPATIENT
Start: 2025-08-04 | End: 2025-08-04 | Stop reason: SDUPTHER

## 2025-08-04 RX ORDER — CEFAZOLIN SODIUM 1 G/3ML
INJECTION, POWDER, FOR SOLUTION INTRAMUSCULAR; INTRAVENOUS
Status: DISCONTINUED | OUTPATIENT
Start: 2025-08-04 | End: 2025-08-04 | Stop reason: SDUPTHER

## 2025-08-04 RX ORDER — SODIUM CHLORIDE 9 MG/ML
INJECTION, SOLUTION INTRAVENOUS CONTINUOUS
Status: DISCONTINUED | OUTPATIENT
Start: 2025-08-04 | End: 2025-08-05

## 2025-08-04 RX ORDER — FENTANYL CITRATE 0.05 MG/ML
25 INJECTION, SOLUTION INTRAMUSCULAR; INTRAVENOUS EVERY 5 MIN PRN
Status: DISCONTINUED | OUTPATIENT
Start: 2025-08-04 | End: 2025-08-04 | Stop reason: HOSPADM

## 2025-08-04 RX ORDER — SODIUM CHLORIDE, SODIUM LACTATE, POTASSIUM CHLORIDE, CALCIUM CHLORIDE 600; 310; 30; 20 MG/100ML; MG/100ML; MG/100ML; MG/100ML
INJECTION, SOLUTION INTRAVENOUS
Status: DISCONTINUED | OUTPATIENT
Start: 2025-08-04 | End: 2025-08-04 | Stop reason: SDUPTHER

## 2025-08-04 RX ORDER — LIDOCAINE HYDROCHLORIDE 10 MG/ML
INJECTION, SOLUTION EPIDURAL; INFILTRATION; INTRACAUDAL; PERINEURAL
Status: DISCONTINUED | OUTPATIENT
Start: 2025-08-04 | End: 2025-08-04 | Stop reason: SDUPTHER

## 2025-08-04 RX ORDER — LIDOCAINE HYDROCHLORIDE AND EPINEPHRINE 10; 10 MG/ML; UG/ML
INJECTION, SOLUTION INFILTRATION; PERINEURAL PRN
Status: DISCONTINUED | OUTPATIENT
Start: 2025-08-04 | End: 2025-08-04 | Stop reason: ALTCHOICE

## 2025-08-04 RX ORDER — SODIUM CHLORIDE 9 MG/ML
INJECTION, SOLUTION INTRAVENOUS PRN
Status: DISCONTINUED | OUTPATIENT
Start: 2025-08-04 | End: 2025-08-07 | Stop reason: HOSPADM

## 2025-08-04 RX ORDER — ACETAMINOPHEN 325 MG/1
650 TABLET ORAL EVERY 6 HOURS
Status: DISCONTINUED | OUTPATIENT
Start: 2025-08-04 | End: 2025-08-07 | Stop reason: HOSPADM

## 2025-08-04 RX ADMIN — BUMETANIDE 1 MG: 1 TABLET ORAL at 08:45

## 2025-08-04 RX ADMIN — SODIUM CHLORIDE: 0.9 INJECTION, SOLUTION INTRAVENOUS at 01:34

## 2025-08-04 RX ADMIN — SODIUM CHLORIDE: 0.9 INJECTION, SOLUTION INTRAVENOUS at 13:56

## 2025-08-04 RX ADMIN — ACETAMINOPHEN 650 MG: 325 TABLET ORAL at 15:30

## 2025-08-04 RX ADMIN — INSULIN LISPRO 4 UNITS: 100 INJECTION, SOLUTION INTRAVENOUS; SUBCUTANEOUS at 20:44

## 2025-08-04 RX ADMIN — SODIUM CHLORIDE, PRESERVATIVE FREE 10 ML: 5 INJECTION INTRAVENOUS at 08:45

## 2025-08-04 RX ADMIN — SODIUM CHLORIDE, PRESERVATIVE FREE 10 ML: 5 INJECTION INTRAVENOUS at 20:45

## 2025-08-04 RX ADMIN — ACETAMINOPHEN 650 MG: 325 TABLET ORAL at 20:43

## 2025-08-04 RX ADMIN — FENTANYL CITRATE 50 MCG: 50 INJECTION INTRAMUSCULAR; INTRAVENOUS at 11:34

## 2025-08-04 RX ADMIN — CEFAZOLIN 2 G: 1 INJECTION, POWDER, FOR SOLUTION INTRAMUSCULAR; INTRAVENOUS at 11:49

## 2025-08-04 RX ADMIN — PANTOPRAZOLE SODIUM 8 MG/HR: 40 INJECTION, POWDER, FOR SOLUTION INTRAVENOUS at 01:34

## 2025-08-04 RX ADMIN — FINASTERIDE 5 MG: 5 TABLET, FILM COATED ORAL at 08:44

## 2025-08-04 RX ADMIN — BISACODYL 5 MG: 5 TABLET, COATED ORAL at 17:54

## 2025-08-04 RX ADMIN — LISINOPRIL 10 MG: 10 TABLET ORAL at 08:45

## 2025-08-04 RX ADMIN — SENNOSIDES AND DOCUSATE SODIUM 2 TABLET: 50; 8.6 TABLET ORAL at 08:45

## 2025-08-04 RX ADMIN — Medication 2000 MG: at 20:44

## 2025-08-04 RX ADMIN — PANTOPRAZOLE SODIUM 40 MG: 40 INJECTION, POWDER, FOR SOLUTION INTRAVENOUS at 15:32

## 2025-08-04 RX ADMIN — PROPOFOL 120 MG: 10 INJECTION, EMULSION INTRAVENOUS at 11:34

## 2025-08-04 RX ADMIN — ONDANSETRON 4 MG: 2 INJECTION, SOLUTION INTRAMUSCULAR; INTRAVENOUS at 12:09

## 2025-08-04 RX ADMIN — TRANEXAMIC ACID 1000 MG: 1 INJECTION, SOLUTION INTRAVENOUS at 11:42

## 2025-08-04 RX ADMIN — DEXAMETHASONE SODIUM PHOSPHATE 4 MG: 4 INJECTION INTRA-ARTICULAR; INTRALESIONAL; INTRAMUSCULAR; INTRAVENOUS; SOFT TISSUE at 11:52

## 2025-08-04 RX ADMIN — PANTOPRAZOLE SODIUM 8 MG/HR: 40 INJECTION, POWDER, FOR SOLUTION INTRAVENOUS at 08:57

## 2025-08-04 RX ADMIN — PANTOPRAZOLE SODIUM 80 MG: 40 INJECTION, POWDER, FOR SOLUTION INTRAVENOUS at 01:35

## 2025-08-04 RX ADMIN — SODIUM CHLORIDE: 0.9 INJECTION, SOLUTION INTRAVENOUS at 20:42

## 2025-08-04 RX ADMIN — LEVOFLOXACIN 250 MG: 500 TABLET, FILM COATED ORAL at 08:45

## 2025-08-04 RX ADMIN — LIDOCAINE HYDROCHLORIDE 50 MG: 10 INJECTION, SOLUTION EPIDURAL; INFILTRATION; INTRACAUDAL; PERINEURAL at 11:34

## 2025-08-04 RX ADMIN — SODIUM CHLORIDE, POTASSIUM CHLORIDE, SODIUM LACTATE AND CALCIUM CHLORIDE: 600; 310; 30; 20 INJECTION, SOLUTION INTRAVENOUS at 11:33

## 2025-08-04 RX ADMIN — SODIUM CHLORIDE: 0.9 INJECTION, SOLUTION INTRAVENOUS at 08:56

## 2025-08-04 RX ADMIN — TAMSULOSIN HYDROCHLORIDE 0.4 MG: 0.4 CAPSULE ORAL at 08:45

## 2025-08-04 RX ADMIN — SUGAMMADEX 200 MG: 100 INJECTION, SOLUTION INTRAVENOUS at 12:14

## 2025-08-04 RX ADMIN — ROCURONIUM BROMIDE 50 MG: 10 INJECTION, SOLUTION INTRAVENOUS at 11:34

## 2025-08-04 RX ADMIN — DEXTROSE 125 ML: 10 SOLUTION INTRAVENOUS at 06:14

## 2025-08-04 ASSESSMENT — PAIN DESCRIPTION - ORIENTATION
ORIENTATION: LOWER;POSTERIOR
ORIENTATION: LOWER
ORIENTATION: LOWER;POSTERIOR

## 2025-08-04 ASSESSMENT — PAIN DESCRIPTION - DESCRIPTORS
DESCRIPTORS: ACHING;DISCOMFORT
DESCRIPTORS: ACHING
DESCRIPTORS: ACHING;DISCOMFORT

## 2025-08-04 ASSESSMENT — PAIN SCALES - GENERAL
PAINLEVEL_OUTOF10: 0
PAINLEVEL_OUTOF10: 8
PAINLEVEL_OUTOF10: 8
PAINLEVEL_OUTOF10: 5

## 2025-08-04 ASSESSMENT — ENCOUNTER SYMPTOMS
DIARRHEA: 0
CONSTIPATION: 0
ABDOMINAL PAIN: 0
BLOOD IN STOOL: 1
RECTAL PAIN: 0
VOMITING: 0
ANAL BLEEDING: 0
NAUSEA: 0
ABDOMINAL DISTENTION: 0

## 2025-08-04 ASSESSMENT — PAIN DESCRIPTION - LOCATION
LOCATION: BACK

## 2025-08-04 ASSESSMENT — PAIN - FUNCTIONAL ASSESSMENT: PAIN_FUNCTIONAL_ASSESSMENT: 0-10

## 2025-08-05 LAB
ANION GAP SERPL CALCULATED.3IONS-SCNC: 10 MMOL/L (ref 9–16)
BASOPHILS # BLD: 0 K/UL (ref 0–0.2)
BASOPHILS NFR BLD: 0 % (ref 0–2)
BUN SERPL-MCNC: 34 MG/DL (ref 8–23)
CALCIUM SERPL-MCNC: 8.9 MG/DL (ref 8.6–10.4)
CHLORIDE SERPL-SCNC: 108 MMOL/L (ref 98–107)
CO2 SERPL-SCNC: 21 MMOL/L (ref 20–31)
CREAT SERPL-MCNC: 1.7 MG/DL (ref 0.7–1.2)
EOSINOPHIL # BLD: 0.07 K/UL (ref 0–0.44)
EOSINOPHILS RELATIVE PERCENT: 1 % (ref 0–4)
ERYTHROCYTE [DISTWIDTH] IN BLOOD BY AUTOMATED COUNT: 13.7 % (ref 11.5–14.9)
GFR, ESTIMATED: 38 ML/MIN/1.73M2
GLUCOSE BLD-MCNC: 150 MG/DL (ref 75–110)
GLUCOSE BLD-MCNC: 194 MG/DL (ref 75–110)
GLUCOSE BLD-MCNC: 247 MG/DL (ref 75–110)
GLUCOSE BLD-MCNC: 261 MG/DL (ref 75–110)
GLUCOSE BLD-MCNC: 89 MG/DL (ref 75–110)
GLUCOSE SERPL-MCNC: 90 MG/DL (ref 74–99)
HCT VFR BLD AUTO: 23.2 % (ref 41–53)
HCT VFR BLD AUTO: 23.2 % (ref 41–53)
HCT VFR BLD AUTO: 26.5 % (ref 41–53)
HCT VFR BLD AUTO: 26.6 % (ref 41–53)
HGB BLD-MCNC: 6.9 G/DL (ref 13.5–17.5)
HGB BLD-MCNC: 7.1 G/DL (ref 13.5–17.5)
HGB BLD-MCNC: 8.1 G/DL (ref 13.5–17.5)
HGB BLD-MCNC: 8.3 G/DL (ref 13.5–17.5)
IMM GRANULOCYTES # BLD AUTO: 0.07 K/UL (ref 0–0.3)
IMM GRANULOCYTES NFR BLD: 1 %
IRON SATN MFR SERPL: 11 % (ref 20–55)
IRON SERPL-MCNC: 23 UG/DL (ref 61–157)
LYMPHOCYTES NFR BLD: 1.63 K/UL (ref 1.1–3.7)
LYMPHOCYTES RELATIVE PERCENT: 24 % (ref 24–44)
MCH RBC QN AUTO: 25.7 PG (ref 26–34)
MCHC RBC AUTO-ENTMCNC: 29.7 G/DL (ref 31–37)
MCV RBC AUTO: 86.2 FL (ref 80–100)
MONOCYTES NFR BLD: 0.68 K/UL (ref 0.1–1.2)
MONOCYTES NFR BLD: 10 % (ref 3–12)
MORPHOLOGY: ABNORMAL
NEUTROPHILS NFR BLD: 64 % (ref 36–66)
NEUTS SEG NFR BLD: 4.35 K/UL (ref 1.5–8.1)
NRBC BLD-RTO: 0 PER 100 WBC
PLATELET # BLD AUTO: 338 K/UL (ref 150–450)
PMV BLD AUTO: 8.9 FL (ref 8–13.5)
POTASSIUM SERPL-SCNC: 5 MMOL/L (ref 3.7–5.3)
RBC # BLD AUTO: 2.69 M/UL (ref 4.21–5.77)
SODIUM SERPL-SCNC: 139 MMOL/L (ref 136–145)
TIBC SERPL-MCNC: 205 UG/DL (ref 250–450)
UNSATURATED IRON BINDING CAPACITY: 182 UG/DL (ref 112–347)
WBC OTHER # BLD: 6.8 K/UL (ref 3.5–11)

## 2025-08-05 PROCEDURE — APPSS30 APP SPLIT SHARED TIME 16-30 MINUTES: Performed by: NURSE PRACTITIONER

## 2025-08-05 PROCEDURE — 36430 TRANSFUSION BLD/BLD COMPNT: CPT

## 2025-08-05 PROCEDURE — 80048 BASIC METABOLIC PNL TOTAL CA: CPT

## 2025-08-05 PROCEDURE — 83550 IRON BINDING TEST: CPT

## 2025-08-05 PROCEDURE — 97162 PT EVAL MOD COMPLEX 30 MIN: CPT

## 2025-08-05 PROCEDURE — 85014 HEMATOCRIT: CPT

## 2025-08-05 PROCEDURE — 6370000000 HC RX 637 (ALT 250 FOR IP): Performed by: ORTHOPAEDIC SURGERY

## 2025-08-05 PROCEDURE — 2580000003 HC RX 258: Performed by: ORTHOPAEDIC SURGERY

## 2025-08-05 PROCEDURE — 85018 HEMOGLOBIN: CPT

## 2025-08-05 PROCEDURE — 2500000003 HC RX 250 WO HCPCS: Performed by: ORTHOPAEDIC SURGERY

## 2025-08-05 PROCEDURE — P9016 RBC LEUKOCYTES REDUCED: HCPCS

## 2025-08-05 PROCEDURE — 2580000003 HC RX 258

## 2025-08-05 PROCEDURE — 2060000000 HC ICU INTERMEDIATE R&B

## 2025-08-05 PROCEDURE — 2500000003 HC RX 250 WO HCPCS

## 2025-08-05 PROCEDURE — 30233N1 TRANSFUSION OF NONAUTOLOGOUS RED BLOOD CELLS INTO PERIPHERAL VEIN, PERCUTANEOUS APPROACH: ICD-10-PCS

## 2025-08-05 PROCEDURE — 85025 COMPLETE CBC W/AUTO DIFF WBC: CPT

## 2025-08-05 PROCEDURE — 99231 SBSQ HOSP IP/OBS SF/LOW 25: CPT | Performed by: INTERNAL MEDICINE

## 2025-08-05 PROCEDURE — 6360000002 HC RX W HCPCS: Performed by: ORTHOPAEDIC SURGERY

## 2025-08-05 PROCEDURE — 97530 THERAPEUTIC ACTIVITIES: CPT

## 2025-08-05 PROCEDURE — 83540 ASSAY OF IRON: CPT

## 2025-08-05 PROCEDURE — 36415 COLL VENOUS BLD VENIPUNCTURE: CPT

## 2025-08-05 PROCEDURE — 97166 OT EVAL MOD COMPLEX 45 MIN: CPT

## 2025-08-05 PROCEDURE — 1200000000 HC SEMI PRIVATE

## 2025-08-05 PROCEDURE — 6360000002 HC RX W HCPCS

## 2025-08-05 RX ORDER — SODIUM CHLORIDE 9 MG/ML
INJECTION, SOLUTION INTRAVENOUS PRN
Status: DISCONTINUED | OUTPATIENT
Start: 2025-08-05 | End: 2025-08-07 | Stop reason: HOSPADM

## 2025-08-05 RX ORDER — OXYCODONE AND ACETAMINOPHEN 5; 325 MG/1; MG/1
1 TABLET ORAL EVERY 6 HOURS PRN
Qty: 20 TABLET | Refills: 0 | Status: SHIPPED | OUTPATIENT
Start: 2025-08-05 | End: 2025-08-10

## 2025-08-05 RX ADMIN — ACETAMINOPHEN 650 MG: 325 TABLET ORAL at 15:05

## 2025-08-05 RX ADMIN — SENNOSIDES AND DOCUSATE SODIUM 2 TABLET: 50; 8.6 TABLET ORAL at 09:28

## 2025-08-05 RX ADMIN — SODIUM CHLORIDE, PRESERVATIVE FREE 10 ML: 5 INJECTION INTRAVENOUS at 20:24

## 2025-08-05 RX ADMIN — FINASTERIDE 5 MG: 5 TABLET, FILM COATED ORAL at 09:30

## 2025-08-05 RX ADMIN — PANTOPRAZOLE SODIUM 40 MG: 40 INJECTION, POWDER, FOR SOLUTION INTRAVENOUS at 15:06

## 2025-08-05 RX ADMIN — TAMSULOSIN HYDROCHLORIDE 0.4 MG: 0.4 CAPSULE ORAL at 09:29

## 2025-08-05 RX ADMIN — ACETAMINOPHEN 650 MG: 325 TABLET ORAL at 04:01

## 2025-08-05 RX ADMIN — SODIUM CHLORIDE, PRESERVATIVE FREE 10 ML: 5 INJECTION INTRAVENOUS at 09:30

## 2025-08-05 RX ADMIN — INSULIN LISPRO 2 UNITS: 100 INJECTION, SOLUTION INTRAVENOUS; SUBCUTANEOUS at 20:24

## 2025-08-05 RX ADMIN — LISINOPRIL 10 MG: 10 TABLET ORAL at 09:28

## 2025-08-05 RX ADMIN — PANTOPRAZOLE SODIUM 40 MG: 40 INJECTION, POWDER, FOR SOLUTION INTRAVENOUS at 04:04

## 2025-08-05 RX ADMIN — IRON SUCROSE 300 MG: 20 INJECTION, SOLUTION INTRAVENOUS at 17:05

## 2025-08-05 RX ADMIN — INSULIN LISPRO 1 UNITS: 100 INJECTION, SOLUTION INTRAVENOUS; SUBCUTANEOUS at 17:05

## 2025-08-05 RX ADMIN — SODIUM CHLORIDE: 0.9 INJECTION, SOLUTION INTRAVENOUS at 04:03

## 2025-08-05 RX ADMIN — LEVOFLOXACIN 250 MG: 500 TABLET, FILM COATED ORAL at 09:29

## 2025-08-05 RX ADMIN — Medication 2000 MG: at 04:04

## 2025-08-05 RX ADMIN — BUMETANIDE 1 MG: 1 TABLET ORAL at 09:28

## 2025-08-05 RX ADMIN — ACETAMINOPHEN 650 MG: 325 TABLET ORAL at 21:13

## 2025-08-05 RX ADMIN — ACETAMINOPHEN 650 MG: 325 TABLET ORAL at 09:28

## 2025-08-05 ASSESSMENT — PAIN DESCRIPTION - ORIENTATION
ORIENTATION: LOWER
ORIENTATION: LOWER
ORIENTATION: POSTERIOR

## 2025-08-05 ASSESSMENT — PAIN DESCRIPTION - LOCATION
LOCATION: BACK

## 2025-08-05 ASSESSMENT — PAIN DESCRIPTION - DESCRIPTORS
DESCRIPTORS: ACHING;DISCOMFORT
DESCRIPTORS: ACHING
DESCRIPTORS: ACHING

## 2025-08-05 ASSESSMENT — PAIN SCALES - GENERAL
PAINLEVEL_OUTOF10: 5
PAINLEVEL_OUTOF10: 4
PAINLEVEL_OUTOF10: 3

## 2025-08-06 LAB
ABO/RH: NORMAL
ANION GAP SERPL CALCULATED.3IONS-SCNC: 10 MMOL/L (ref 9–16)
ANTIBODY SCREEN: NEGATIVE
ARM BAND NUMBER: NORMAL
BASOPHILS # BLD: 0.08 K/UL (ref 0–0.2)
BASOPHILS NFR BLD: 1 % (ref 0–2)
BLOOD BANK BLOOD PRODUCT EXPIRATION DATE: NORMAL
BLOOD BANK DISPENSE STATUS: NORMAL
BLOOD BANK ISBT PRODUCT BLOOD TYPE: 9500
BLOOD BANK PRODUCT CODE: NORMAL
BLOOD BANK SAMPLE EXPIRATION: NORMAL
BLOOD BANK UNIT TYPE AND RH: NORMAL
BPU ID: NORMAL
BUN SERPL-MCNC: 40 MG/DL (ref 8–23)
CALCIUM SERPL-MCNC: 8.9 MG/DL (ref 8.6–10.4)
CHLORIDE SERPL-SCNC: 106 MMOL/L (ref 98–107)
CO2 SERPL-SCNC: 22 MMOL/L (ref 20–31)
COMPONENT: NORMAL
CREAT SERPL-MCNC: 1.7 MG/DL (ref 0.7–1.2)
CROSSMATCH RESULT: NORMAL
DATE, STOOL #1: ABNORMAL
EOSINOPHIL # BLD: 0.8 K/UL (ref 0–0.44)
EOSINOPHILS RELATIVE PERCENT: 10 % (ref 0–4)
ERYTHROCYTE [DISTWIDTH] IN BLOOD BY AUTOMATED COUNT: 14 % (ref 11.5–14.9)
GFR, ESTIMATED: 38 ML/MIN/1.73M2
GLUCOSE BLD-MCNC: 122 MG/DL (ref 75–110)
GLUCOSE BLD-MCNC: 207 MG/DL (ref 75–110)
GLUCOSE BLD-MCNC: 253 MG/DL (ref 75–110)
GLUCOSE BLD-MCNC: 97 MG/DL (ref 75–110)
GLUCOSE SERPL-MCNC: 108 MG/DL (ref 74–99)
HCT VFR BLD AUTO: 25.7 % (ref 41–53)
HCT VFR BLD AUTO: 26.1 % (ref 41–53)
HCT VFR BLD AUTO: 26.5 % (ref 41–53)
HCT VFR BLD AUTO: 28.7 % (ref 41–53)
HEMOCCULT SP1 STL QL: POSITIVE
HGB BLD-MCNC: 8 G/DL (ref 13.5–17.5)
HGB BLD-MCNC: 8 G/DL (ref 13.5–17.5)
HGB BLD-MCNC: 8.4 G/DL (ref 13.5–17.5)
HGB BLD-MCNC: 8.4 G/DL (ref 13.5–17.5)
IMM GRANULOCYTES # BLD AUTO: 0.07 K/UL (ref 0–0.3)
IMM GRANULOCYTES NFR BLD: 1 %
LYMPHOCYTES NFR BLD: 2.54 K/UL (ref 1.1–3.7)
LYMPHOCYTES RELATIVE PERCENT: 31 % (ref 24–44)
MCH RBC QN AUTO: 26.1 PG (ref 26–34)
MCHC RBC AUTO-ENTMCNC: 30.7 G/DL (ref 31–37)
MCV RBC AUTO: 85.3 FL (ref 80–100)
MONOCYTES NFR BLD: 0.67 K/UL (ref 0.1–1.2)
MONOCYTES NFR BLD: 8 % (ref 3–12)
NEUTROPHILS NFR BLD: 49 % (ref 36–66)
NEUTS SEG NFR BLD: 3.96 K/UL (ref 1.5–8.1)
NRBC BLD-RTO: 0 PER 100 WBC
PLATELET # BLD AUTO: 330 K/UL (ref 150–450)
PMV BLD AUTO: 8.7 FL (ref 8–13.5)
POTASSIUM SERPL-SCNC: 4.4 MMOL/L (ref 3.7–5.3)
RBC # BLD AUTO: 3.06 M/UL (ref 4.21–5.77)
SODIUM SERPL-SCNC: 138 MMOL/L (ref 136–145)
TIME, STOOL #1: ABNORMAL
TRANSFUSION STATUS: NORMAL
UNIT DIVISION: 0
UNIT ISSUE DATE/TIME: NORMAL
WBC OTHER # BLD: 8.1 K/UL (ref 3.5–11)

## 2025-08-06 PROCEDURE — 2580000003 HC RX 258

## 2025-08-06 PROCEDURE — 2060000000 HC ICU INTERMEDIATE R&B

## 2025-08-06 PROCEDURE — 82270 OCCULT BLOOD FECES: CPT

## 2025-08-06 PROCEDURE — 36415 COLL VENOUS BLD VENIPUNCTURE: CPT

## 2025-08-06 PROCEDURE — 97110 THERAPEUTIC EXERCISES: CPT

## 2025-08-06 PROCEDURE — 6360000002 HC RX W HCPCS

## 2025-08-06 PROCEDURE — 85014 HEMATOCRIT: CPT

## 2025-08-06 PROCEDURE — 6370000000 HC RX 637 (ALT 250 FOR IP): Performed by: ORTHOPAEDIC SURGERY

## 2025-08-06 PROCEDURE — 1200000000 HC SEMI PRIVATE

## 2025-08-06 PROCEDURE — 85025 COMPLETE CBC W/AUTO DIFF WBC: CPT

## 2025-08-06 PROCEDURE — 82947 ASSAY GLUCOSE BLOOD QUANT: CPT

## 2025-08-06 PROCEDURE — 85018 HEMOGLOBIN: CPT

## 2025-08-06 PROCEDURE — 2500000003 HC RX 250 WO HCPCS

## 2025-08-06 PROCEDURE — 97116 GAIT TRAINING THERAPY: CPT

## 2025-08-06 PROCEDURE — 80048 BASIC METABOLIC PNL TOTAL CA: CPT

## 2025-08-06 PROCEDURE — 2500000003 HC RX 250 WO HCPCS: Performed by: ORTHOPAEDIC SURGERY

## 2025-08-06 RX ADMIN — INSULIN LISPRO 1 UNITS: 100 INJECTION, SOLUTION INTRAVENOUS; SUBCUTANEOUS at 21:19

## 2025-08-06 RX ADMIN — INSULIN LISPRO 2 UNITS: 100 INJECTION, SOLUTION INTRAVENOUS; SUBCUTANEOUS at 16:44

## 2025-08-06 RX ADMIN — ACETAMINOPHEN 650 MG: 325 TABLET ORAL at 15:44

## 2025-08-06 RX ADMIN — PANTOPRAZOLE SODIUM 40 MG: 40 INJECTION, POWDER, FOR SOLUTION INTRAVENOUS at 15:46

## 2025-08-06 RX ADMIN — LISINOPRIL 10 MG: 10 TABLET ORAL at 08:24

## 2025-08-06 RX ADMIN — LEVOFLOXACIN 250 MG: 500 TABLET, FILM COATED ORAL at 08:25

## 2025-08-06 RX ADMIN — TAMSULOSIN HYDROCHLORIDE 0.4 MG: 0.4 CAPSULE ORAL at 08:24

## 2025-08-06 RX ADMIN — ACETAMINOPHEN 650 MG: 325 TABLET ORAL at 08:25

## 2025-08-06 RX ADMIN — BUMETANIDE 1 MG: 1 TABLET ORAL at 08:26

## 2025-08-06 RX ADMIN — ACETAMINOPHEN 650 MG: 325 TABLET ORAL at 21:17

## 2025-08-06 RX ADMIN — FINASTERIDE 5 MG: 5 TABLET, FILM COATED ORAL at 08:26

## 2025-08-06 RX ADMIN — IRON SUCROSE 300 MG: 20 INJECTION, SOLUTION INTRAVENOUS at 16:01

## 2025-08-06 RX ADMIN — ACETAMINOPHEN 650 MG: 325 TABLET ORAL at 03:40

## 2025-08-06 RX ADMIN — PANTOPRAZOLE SODIUM 40 MG: 40 INJECTION, POWDER, FOR SOLUTION INTRAVENOUS at 03:40

## 2025-08-06 RX ADMIN — SODIUM CHLORIDE, PRESERVATIVE FREE 10 ML: 5 INJECTION INTRAVENOUS at 21:19

## 2025-08-06 RX ADMIN — SODIUM CHLORIDE, PRESERVATIVE FREE 10 ML: 5 INJECTION INTRAVENOUS at 08:28

## 2025-08-06 RX ADMIN — SENNOSIDES AND DOCUSATE SODIUM 2 TABLET: 50; 8.6 TABLET ORAL at 08:26

## 2025-08-06 ASSESSMENT — PAIN SCALES - GENERAL
PAINLEVEL_OUTOF10: 4
PAINLEVEL_OUTOF10: 5

## 2025-08-06 ASSESSMENT — PAIN DESCRIPTION - DESCRIPTORS
DESCRIPTORS: ACHING
DESCRIPTORS: ACHING
DESCRIPTORS: ACHING;SORE

## 2025-08-06 ASSESSMENT — PAIN DESCRIPTION - LOCATION
LOCATION: BACK

## 2025-08-06 ASSESSMENT — PAIN DESCRIPTION - ORIENTATION
ORIENTATION: LOWER

## 2025-08-07 VITALS
WEIGHT: 202 LBS | SYSTOLIC BLOOD PRESSURE: 115 MMHG | TEMPERATURE: 98.3 F | BODY MASS INDEX: 28.92 KG/M2 | DIASTOLIC BLOOD PRESSURE: 68 MMHG | OXYGEN SATURATION: 98 % | HEART RATE: 69 BPM | HEIGHT: 70 IN | RESPIRATION RATE: 19 BRPM

## 2025-08-07 LAB
GLUCOSE BLD-MCNC: 226 MG/DL (ref 75–110)
GLUCOSE BLD-MCNC: 99 MG/DL (ref 75–110)
HCT VFR BLD AUTO: 25.9 % (ref 41–53)
HCT VFR BLD AUTO: 26.9 % (ref 41–53)
HGB BLD-MCNC: 7.9 G/DL (ref 13.5–17.5)
HGB BLD-MCNC: 8.3 G/DL (ref 13.5–17.5)

## 2025-08-07 PROCEDURE — 97530 THERAPEUTIC ACTIVITIES: CPT

## 2025-08-07 PROCEDURE — 82947 ASSAY GLUCOSE BLOOD QUANT: CPT

## 2025-08-07 PROCEDURE — 85018 HEMOGLOBIN: CPT

## 2025-08-07 PROCEDURE — 2500000003 HC RX 250 WO HCPCS

## 2025-08-07 PROCEDURE — 6370000000 HC RX 637 (ALT 250 FOR IP): Performed by: ORTHOPAEDIC SURGERY

## 2025-08-07 PROCEDURE — 97116 GAIT TRAINING THERAPY: CPT

## 2025-08-07 PROCEDURE — 97110 THERAPEUTIC EXERCISES: CPT

## 2025-08-07 PROCEDURE — 6360000002 HC RX W HCPCS

## 2025-08-07 PROCEDURE — 85014 HEMATOCRIT: CPT

## 2025-08-07 PROCEDURE — 97535 SELF CARE MNGMENT TRAINING: CPT

## 2025-08-07 PROCEDURE — 36415 COLL VENOUS BLD VENIPUNCTURE: CPT

## 2025-08-07 RX ADMIN — PANTOPRAZOLE SODIUM 40 MG: 40 INJECTION, POWDER, FOR SOLUTION INTRAVENOUS at 17:18

## 2025-08-07 RX ADMIN — LEVOFLOXACIN 250 MG: 500 TABLET, FILM COATED ORAL at 09:07

## 2025-08-07 RX ADMIN — PANTOPRAZOLE SODIUM 40 MG: 40 INJECTION, POWDER, FOR SOLUTION INTRAVENOUS at 04:32

## 2025-08-07 RX ADMIN — ACETAMINOPHEN 650 MG: 325 TABLET ORAL at 17:52

## 2025-08-07 RX ADMIN — LISINOPRIL 10 MG: 10 TABLET ORAL at 17:56

## 2025-08-07 RX ADMIN — ACETAMINOPHEN 650 MG: 325 TABLET ORAL at 04:32

## 2025-08-07 RX ADMIN — BUMETANIDE 1 MG: 1 TABLET ORAL at 09:13

## 2025-08-07 RX ADMIN — FINASTERIDE 5 MG: 5 TABLET, FILM COATED ORAL at 09:08

## 2025-08-07 RX ADMIN — SENNOSIDES AND DOCUSATE SODIUM 2 TABLET: 50; 8.6 TABLET ORAL at 09:08

## 2025-08-07 RX ADMIN — INSULIN LISPRO 1 UNITS: 100 INJECTION, SOLUTION INTRAVENOUS; SUBCUTANEOUS at 17:58

## 2025-08-07 RX ADMIN — TAMSULOSIN HYDROCHLORIDE 0.4 MG: 0.4 CAPSULE ORAL at 09:07

## 2025-08-07 ASSESSMENT — PAIN SCALES - GENERAL
PAINLEVEL_OUTOF10: 6
PAINLEVEL_OUTOF10: 5

## 2025-08-21 NOTE — PROGRESS NOTES
Physician Progress Note      PATIENT:               MILY PELAYO  [facility] #:                  [id number]  :                       1935  ADMIT DATE:       2025 12:31 PM  DISCH DATE:        2025 6:00 PM  RESPONDING  PROVIDER #:        MIGUEL ÁNGEL BANUELOS          QUERY TEXT:    BPH is documented in the medical record (PN note  by Drew Ye MD)). Please specify the associated urinary conditions:    The clinical indicators include:  - M89Yrs, BPH, GI bleed and urinary tract infection( DS by TORRES  Miguel Ángel Banuelos,   )    -Benign prostatic hyperplasia( PN note  by Drew Ye MD)    -Genitourinary:  Positive for flank pain.( Review of Systems  op and   anesthesia report)    -Continue finasteride 5 mg daily, Continue tamsulosin 0.4 mg daily( Epic   medication )    ThanksJessica [facility]-CDS  Options provided:  -- BPH with partial/complete urinary obstruction  -- Other - I will add my own diagnosis  -- Disagree - Not applicable / Not valid  -- Disagree - Clinically unable to determine / Unknown  -- Refer to Clinical Documentation Reviewer    PROVIDER RESPONSE TEXT:    This patient has BPH with partial/complete urinary obstruction.    Query created by: Jessica Biggs on 2025 2:30 AM      Electronically signed by:  MIGUEL ÁNGEL BANUELOS 2025 1:08 PM

## 2025-08-25 ENCOUNTER — TELEPHONE (OUTPATIENT)
Dept: INTERNAL MEDICINE CLINIC | Age: 89
End: 2025-08-25

## 2025-08-27 ENCOUNTER — HOSPITAL ENCOUNTER (OUTPATIENT)
Age: 89
Setting detail: SPECIMEN
Discharge: HOME | End: 2025-08-27
Payer: MEDICARE

## 2025-08-27 ENCOUNTER — HOSPITAL ENCOUNTER (OUTPATIENT)
Dept: OTHER | Age: 89
Discharge: HOME | End: 2025-08-27
Payer: MEDICARE

## 2025-08-27 VITALS
BODY MASS INDEX: 30.03 KG/M2 | HEIGHT: 70 IN | RESPIRATION RATE: 20 BRPM | DIASTOLIC BLOOD PRESSURE: 56 MMHG | SYSTOLIC BLOOD PRESSURE: 112 MMHG | HEART RATE: 60 BPM | WEIGHT: 209.8 LBS | OXYGEN SATURATION: 97 %

## 2025-08-27 LAB
ANION GAP SERPL CALCULATED.3IONS-SCNC: 12 MMOL/L (ref 9–16)
BNP SERPL-MCNC: 2425 PG/ML (ref 0–300)
BUN SERPL-MCNC: 47 MG/DL (ref 8–23)
CALCIUM SERPL-MCNC: 9.2 MG/DL (ref 8.6–10.4)
CHLORIDE SERPL-SCNC: 106 MMOL/L (ref 98–107)
CO2 SERPL-SCNC: 22 MMOL/L (ref 20–31)
CREAT SERPL-MCNC: 1.8 MG/DL (ref 0.7–1.2)
GFR, ESTIMATED: 36 ML/MIN/1.73M2
GLUCOSE SERPL-MCNC: 143 MG/DL (ref 74–99)
POTASSIUM SERPL-SCNC: 4.1 MMOL/L (ref 3.7–5.3)
SODIUM SERPL-SCNC: 140 MMOL/L (ref 136–145)

## 2025-08-27 PROCEDURE — 99211 OFF/OP EST MAY X REQ PHY/QHP: CPT

## 2025-08-27 PROCEDURE — 80048 BASIC METABOLIC PNL TOTAL CA: CPT

## 2025-08-27 PROCEDURE — 36415 COLL VENOUS BLD VENIPUNCTURE: CPT

## 2025-08-27 PROCEDURE — 83880 ASSAY OF NATRIURETIC PEPTIDE: CPT

## 2025-09-03 PROBLEM — R79.89 ELEVATED TROPONIN: Status: RESOLVED | Noted: 2025-08-04 | Resolved: 2025-09-03

## 2025-09-05 ENCOUNTER — OFFICE VISIT (OUTPATIENT)
Dept: INTERNAL MEDICINE CLINIC | Age: 89
End: 2025-09-05
Payer: MEDICARE

## 2025-09-05 VITALS
HEIGHT: 70 IN | HEART RATE: 60 BPM | BODY MASS INDEX: 30.06 KG/M2 | SYSTOLIC BLOOD PRESSURE: 122 MMHG | WEIGHT: 210 LBS | DIASTOLIC BLOOD PRESSURE: 64 MMHG | OXYGEN SATURATION: 99 %

## 2025-09-05 DIAGNOSIS — M77.8 TENDINITIS OF RIGHT SHOULDER: Primary | ICD-10-CM

## 2025-09-05 PROCEDURE — G8427 DOCREV CUR MEDS BY ELIG CLIN: HCPCS

## 2025-09-05 PROCEDURE — 99213 OFFICE O/P EST LOW 20 MIN: CPT

## 2025-09-05 PROCEDURE — 1111F DSCHRG MED/CURRENT MED MERGE: CPT

## 2025-09-05 PROCEDURE — 1036F TOBACCO NON-USER: CPT

## 2025-09-05 PROCEDURE — G8417 CALC BMI ABV UP PARAM F/U: HCPCS

## 2025-09-05 PROCEDURE — 1159F MED LIST DOCD IN RCRD: CPT

## 2025-09-05 PROCEDURE — 1123F ACP DISCUSS/DSCN MKR DOCD: CPT

## 2025-09-05 ASSESSMENT — ENCOUNTER SYMPTOMS
WHEEZING: 0
VOMITING: 0
SHORTNESS OF BREATH: 0
DIARRHEA: 0
NAUSEA: 0
BACK PAIN: 0
COUGH: 0
CONSTIPATION: 0
ABDOMINAL PAIN: 0
ALLERGIC/IMMUNOLOGIC NEGATIVE: 1

## (undated) DEVICE — FORCEPS BX L240CM JAW DIA2.4MM WRK CHN 2.8MM ORNG L CAP W/

## (undated) DEVICE — SINGLE USE AIR/WATER, SUCTION AND BIOPSY VALVES SET: Brand: ORCAPOD™

## (undated) DEVICE — ENDO KIT W/SYRINGE: Brand: MEDLINE INDUSTRIES, INC.

## (undated) DEVICE — DRAPE LAP 102X78X121IN POLYPR SMS STD T INSTR PD CNTOUR

## (undated) DEVICE — BITEBLOCK 54FR W/ DENT RIM BLOX

## (undated) DEVICE — NEEDLE QUINCKE 18GX3.5

## (undated) DEVICE — GLOVE SURG SZ 8 L12IN FNGR THK79MIL GRN LTX FREE

## (undated) DEVICE — ENDOSCOPIC KIT 1.1+ 10 FT OP4 CA DE

## (undated) DEVICE — FIAPC® PROBE W/ FILTER 2200 A OD 2.3MM/6.9FR; L 2.2M/7.2FT: Brand: ERBE

## (undated) DEVICE — TOWEL SURG W17XL27IN BLU COT STD PREWASHED STERILE 6 PER PK

## (undated) DEVICE — GOWN,POLY REINFORCED,LG: Brand: MEDLINE

## (undated) DEVICE — KIT POS PT SPNL TBL JACK

## (undated) DEVICE — BANDAGE ADH W0.75XL3IN NAT PLAS CURAD

## (undated) DEVICE — DRAPE SURG ST 3/4 SHEET W53XL77IN STD POLYPR 3 QTR DISP

## (undated) DEVICE — GLOVE ORANGE PI 8 1/2   MSG9085

## (undated) DEVICE — ERBE NESSY® OMEGA PLATE USA (85+23)CM² , WITH CABLE 3 M: Brand: ERBE

## (undated) DEVICE — MIXER BNE CEM FULL DOSE PMMA HI VISC RADPQ W/O ANTIBIO HV-R

## (undated) DEVICE — COVER MAYO STD W24XL53IN UNIV SMS DISP

## (undated) DEVICE — COVER TBL W60XL90IN UNIV SMS REINF DISP

## (undated) DEVICE — DEFENDO AIR WATER SUCTION AND BIOPSY VALVE KIT FOR  OLYMPUS: Brand: DEFENDO AIR/WATER/SUCTION AND BIOPSY VALVE

## (undated) DEVICE — SYRINGE MED 10ML LUERLOCK TIP W/O SFTY DISP

## (undated) DEVICE — BLADE SURG 15 TWIN BK CARBON STL STRL CISION LF DISP

## (undated) DEVICE — SPONGE LAP W18XL18IN WHT COT 4 PLY FLD STRUNG RADPQ DISP ST 2 PER PACK

## (undated) DEVICE — COUNTER NDL 10 COUNT HLD 20 FOAM BLK SGL MAG

## (undated) DEVICE — BAG C ARM 22 IN LEN 22 IN W LTX FREE ST SNAP

## (undated) DEVICE — APPLICATOR MEDICATED 26 CC SOLUTION HI LT ORNG CHLORAPREP

## (undated) DEVICE — MARKER SURG SKIN GENTIAN VLT REG TIP W/ 6IN RUL AND BLNK DYNJSM02

## (undated) DEVICE — DRAPE SURG 44X44 IN POLYURETHANE STRL MEDI-SLUSH LF DISP

## (undated) DEVICE — PAD N ADH W3XL8IN POLY COT SFT PERF FLM EASILY CUT ABSRB

## (undated) DEVICE — Device

## (undated) DEVICE — BLANKET WRM W40.2XL55.9IN IORT LO BODY + MISTRAL AIR

## (undated) DEVICE — GLOVE ORTHO 8   MSG9480

## (undated) DEVICE — GLOVE ORANGE PI 8   MSG9080